# Patient Record
Sex: FEMALE | Race: BLACK OR AFRICAN AMERICAN | Employment: OTHER | URBAN - METROPOLITAN AREA
[De-identification: names, ages, dates, MRNs, and addresses within clinical notes are randomized per-mention and may not be internally consistent; named-entity substitution may affect disease eponyms.]

---

## 2017-11-17 ENCOUNTER — TELEPHONE (OUTPATIENT)
Dept: CARDIOLOGY CLINIC | Age: 42
End: 2017-11-17

## 2017-11-17 NOTE — TELEPHONE ENCOUNTER
Received call from Dr. Tomas Foster at [facility] clinic.   ([phone])    Dr. Tomas Foster would like the patient seen as a new patient for CHF by Dr. Lennox Pluck. He saw the patient on Thursday 11/16/17. Patient with weight 337.8 lbs. 15 weight gain over the past month. 3+ BLE. /104. Lasix increased to 60 mg BID by Dr. Frank fox and he will fax results over to our office. Last ECHO 2014-LVEF 55%    Spoke with Cindi BEAVER and patient placed on schedule 11/21/17 at 1215 as a new patient. LM for patient to call our office and Jaymie Ramirez did place patient on schedule. Informed Dr. Tomas Foster that patient was placed on the schedule and we did leave a message for the patient.

## 2017-11-21 ENCOUNTER — OFFICE VISIT (OUTPATIENT)
Dept: CARDIOLOGY CLINIC | Age: 42
End: 2017-11-21

## 2017-11-21 VITALS
HEART RATE: 90 BPM | SYSTOLIC BLOOD PRESSURE: 184 MMHG | BODY MASS INDEX: 50.02 KG/M2 | HEIGHT: 64 IN | DIASTOLIC BLOOD PRESSURE: 108 MMHG | WEIGHT: 293 LBS | OXYGEN SATURATION: 91 %

## 2017-11-21 DIAGNOSIS — I10 ESSENTIAL HYPERTENSION: ICD-10-CM

## 2017-11-21 DIAGNOSIS — E78.5 HYPERLIPIDEMIA LDL GOAL <70: ICD-10-CM

## 2017-11-21 DIAGNOSIS — I50.32 CHRONIC DIASTOLIC HEART FAILURE (HCC): Primary | ICD-10-CM

## 2017-11-21 PROCEDURE — 99205 OFFICE O/P NEW HI 60 MIN: CPT | Performed by: INTERNAL MEDICINE

## 2017-11-21 PROCEDURE — G8427 DOCREV CUR MEDS BY ELIG CLIN: HCPCS | Performed by: INTERNAL MEDICINE

## 2017-11-21 PROCEDURE — G8484 FLU IMMUNIZE NO ADMIN: HCPCS | Performed by: INTERNAL MEDICINE

## 2017-11-21 PROCEDURE — G8417 CALC BMI ABV UP PARAM F/U: HCPCS | Performed by: INTERNAL MEDICINE

## 2017-11-21 PROCEDURE — 4004F PT TOBACCO SCREEN RCVD TLK: CPT | Performed by: INTERNAL MEDICINE

## 2017-11-21 RX ORDER — VALSARTAN 320 MG/1
320 TABLET ORAL DAILY
Qty: 30 TABLET | Refills: 11 | Status: SHIPPED | OUTPATIENT
Start: 2017-11-21 | End: 2018-01-19 | Stop reason: ALTCHOICE

## 2017-11-21 RX ORDER — TORSEMIDE 100 MG/1
50 TABLET ORAL 2 TIMES DAILY
Qty: 90 TABLET | Refills: 11 | Status: SHIPPED | OUTPATIENT
Start: 2017-11-21 | End: 2018-06-07 | Stop reason: SDUPTHER

## 2017-11-21 RX ORDER — SPIRONOLACTONE 25 MG/1
25 TABLET ORAL DAILY
Qty: 30 TABLET | Refills: 11 | Status: SHIPPED | OUTPATIENT
Start: 2017-11-21 | End: 2018-06-07 | Stop reason: SDUPTHER

## 2017-11-21 NOTE — PROGRESS NOTES
[address]  Cardiology Consult Note      Mono Mike  1975, 38 y.o.    CC: Weight gain and Shortness of breath              CALVIN MARIA ELENA CL:    HPI:   This is a 38 y.o. female with a PMH significant for COPD, chronic diastolic heart failure, Hypertension, Hyperlipidemia and Type II Diabetes Mellitus. Patient is here to establish care and be evaluated for worsening shortness of breath and weight gain. She says she has gained ~ 30 pounds in the last month. She offers that her PCP increased her Lasix dose and she doesn't notice any change. She says she drinks \"a lot of water. \" Reports compliance with medication. Today, denies any chest pain, palpitations or dizziness. Past Medical History:   Diagnosis Date    Arthritis     Asthma     [facility]     [facility]     Diabetes mellitus [facility]     Hyperlipidemia     Hypertension     Obesity       Past Surgical History:   Procedure Laterality Date    CHOLECYSTECTOMY  1997    HYSTERECTOMY  2008    SKIN BIOPSY  2015    TUBAL LIGATION        History reviewed. No pertinent family history.    Social History   Substance Use Topics    Smoking status: Current Every Day Smoker     Packs/day: 1.00     Years: 20.00     Types: Cigarettes    Smokeless tobacco: Never Used    Alcohol use Yes      Comment: occ     No Known Allergies      Review of Systems -   Constitutional: Negative for weight gain/loss; malaise, fever  Respiratory: Negative for Asthma;  cough and hemoptysis  Cardiovascular: Negative for palpitations,dizziness   Gastrointestinal: Negative for abd.pain; constipation/diarrhea;    Genitourinary: Negative for stones; hematuria; frequency hesitancy  Integumentt: Negative for rash or pruritis  Hematologic/lymphatic: Negative for blood dyscrasia; leukemia/lymphoma  Musculoskeletal: Negative for Connective tissue disease  Neurological:  Negative for Seizure losartan-hydrochlorothiazide (HYZAAR) 100-25 MG per tablet Take 1 tablet by mouth daily.  metoprolol (LOPRESSOR) 25 MG tablet   Take 50 mg by mouth 2 times daily       amLODIPine (NORVASC) 10 MG tablet Take 10 mg by mouth daily.  metFORMIN (GLUCOPHAGE) 1000 MG tablet Take 1,000 mg by mouth 2 times daily (with meals).  aspirin 81 MG tablet Take 81 mg by mouth daily.  furosemide (LASIX) 40 MG tablet Take 40 mg by mouth 2 times daily. Labs:   Lab Results   Component Value Date    HDL 55 10/02/2014    LDLCALC 97 10/02/2014    TRIG 101 10/02/2014       EKG:     ECHO: 10/3/2014  Summary  This is a suboptimal study due to poor echocardiographic window  Normal left ventricle size, wall thickness and systolic function with an estimated ejection fraction of 55%. No significant valvular disease       ASSESSMENT AND PLAN:      SOB/LAIRD  Likely due to weight; gained 30lbs in the last 1 month  May have diastolic heart failure is well  Lives a sedentary lifestyle    LE Edema   Sleeps sitting up/sleeps sitting up  Discontinue Amlodipine; Also asked her to not take any ibuprofen .  this causes fluid retention  Lasix recently increased to 60 mg daily; no improvement  Will switch to Torsemide 50 mg bid; renal panel in 1 week  Elevate feet when sitting  Encouraged daily walking program    Chronic Diastolic Heart Failure  Have educated her about fluid and salt intake; avoidance of NSAIDs; keeping her feet up and sleeping in bed  Lasix not effective; will switch to Torsemide 50 mg bid  Have advised her to lay down after diuretic for better results  Repeat ECHO to evaluate LVEF    Severe hypertension  Systolic blood pressure is 184  We'll change Hyzaar to valsartan 320 and add Aldactone  Higher dose of torsemide twice a day and discontinuation of ibuprofen use may also help  Amlodipine is being discontinued for lower extremity edema      Follow up in 2-3 weeks with myself or Annie Oquendo       Thank you very much for allowing me to participate in the care of your patient. Please do not hesitate to contact me if you have any questions.         Sincerely,    Liat Cordoba M.D  [facility], [address] [place] [address] [facility] [facility]  Ph: [phone]  Fax: [phone]

## 2017-11-21 NOTE — PATIENT INSTRUCTIONS
yourself. Call your doctor now or seek immediate medical care if:  · Your shortness of breath gets worse or you start to wheeze. Wheezing is a high-pitched sound when you breathe. · You wake up at night out of breath or have to prop your head up on several pillows to breathe. · You are short of breath after only light activity or while at rest.  Watch closely for changes in your health, and be sure to contact your doctor if:  · You do not get better over the next 1 to 2 days. Where can you learn more? Go to https://chpepiceweb.Upland Software. org and sign in to your Stamped account. Enter S780 in the Tuizzi box to learn more about \"Shortness of Breath: Care Instructions. \"     If you do not have an account, please click on the \"Sign Up Now\" link. Current as of: March 25, 2017  Content Version: 11.3  © 9194-7137 [employer]. Care instructions adapted under license by [facility] ([facility]). If you have questions about a medical condition or this instruction, always ask your healthcare professional. [place] [address] any warranty or liability for your use of this information. Patient Education        Leg and Ankle Edema: Care Instructions  Your Care Instructions  Swelling in the legs, ankles, and feet is called edema. It is common after you sit or stand for a while. Long plane flights or car rides often cause swelling in the legs and feet. You may also have swelling if you have to stand for long periods of time at your job. Problems with the veins in the legs (varicose veins) and changes in hormones can also cause swelling. Sometimes the swelling in the ankles and feet is caused by a more serious problem, such as heart failure, infection, blood clots, or liver or kidney disease. Follow-up care is a key part of your treatment and safety. Be sure to make and go to all appointments, and call your doctor if you are having problems.  Its also a good idea to know your test 11.3  © 9915-7826 Healthwise, Incorporated. Care instructions adapted under license by [facility] If you have questions about a medical condition or this instruction, always ask your healthcare professional. [address] any warranty or liability for your use of this information. Patient Education        Low Sodium Diet (2,000 Milligram): Care Instructions  Your Care Instructions  Too much sodium causes your body to hold on to extra water. This can raise your blood pressure and force your heart and kidneys to work harder. In very serious cases, this could cause you to be put in the hospital. It might even be life-threatening. By limiting sodium, you will feel better and lower your risk of serious problems. The most common source of sodium is salt. People get most of the salt in their diet from canned, prepared, and packaged foods. Fast food and restaurant meals also are very high in sodium. Your doctor will probably limit your sodium to less than 2,000 milligrams (mg) a day. This limit counts all the sodium in prepared and packaged foods and any salt you add to your food. Follow-up care is a key part of your treatment and safety. Be sure to make and go to all appointments, and call your doctor if you are having problems. It's also a good idea to know your test results and keep a list of the medicines you take. How can you care for yourself at home? Read food labels  · Read labels on cans and food packages. The labels tell you how much sodium is in each serving. Make sure that you look at the serving size. If you eat more than the serving size, you have eaten more sodium. · Food labels also tell you the Percent Daily Value for sodium. Choose products with low Percent Daily Values for sodium. · Be aware that sodium can come in forms other than salt, including monosodium glutamate (MSG), sodium citrate, and sodium bicarbonate (baking soda). MSG is often added to Asian food.  When you eat out, you can sometimes ask for food without MSG or added salt. Buy low-sodium foods  · Buy foods that are labeled \"unsalted\" (no salt added), \"sodium-free\" (less than 5 mg of sodium per serving), or \"low-sodium\" (less than 140 mg of sodium per serving). Foods labeled \"reduced-sodium\" and \"light sodium\" may still have too much sodium. Be sure to read the label to see how much sodium you are getting. · Buy fresh vegetables, or frozen vegetables without added sauces. Buy low-sodium versions of canned vegetables, soups, and other canned goods. Prepare low-sodium meals  · Cut back on the amount of salt you use in cooking. This will help you adjust to the taste. Do not add salt after cooking. One teaspoon of salt has about 2,300 mg of sodium. · Take the salt shaker off the table. · Flavor your food with garlic, lemon juice, onion, vinegar, herbs, and spices. Do not use soy sauce, lite soy sauce, steak sauce, onion salt, garlic salt, celery salt, mustard, or ketchup on your food. · Use low-sodium salad dressings, sauces, and ketchup. Or make your own salad dressings and sauces without adding salt. · Use less salt (or none) when recipes call for it. You can often use half the salt a recipe calls for without losing flavor. Other foods such as rice, pasta, and grains do not need added salt. · Rinse canned vegetables, and cook them in fresh water. This removes somebut not allof the salt. · Avoid water that is naturally high in sodium or that has been treated with water softeners, which add sodium. Call your local water company to find out the sodium content of your water supply. If you buy bottled water, read the label and choose a sodium-free brand. Avoid high-sodium foods  · Avoid eating:  ¨ Smoked, cured, salted, and canned meat, fish, and poultry. ¨ Ham, linda, hot dogs, and luncheon meats. ¨ Regular, hard, and processed cheese and regular peanut butter.   ¨ Crackers with salted tops, and other salted snack foods such as pretzels, chips, and salted popcorn. ¨ Frozen prepared meals, unless labeled low-sodium. ¨ Canned and dried soups, broths, and bouillon, unless labeled sodium-free or low-sodium. ¨ Canned vegetables, unless labeled sodium-free or low-sodium. ¨ Western Natalia fries, pizza, tacos, and other fast foods. ¨ Pickles, olives, ketchup, and other condiments, especially soy sauce, unless labeled sodium-free or low-sodium. Where can you learn more? Go to https://TechulonpeOrad Hi-Tech Systems.nokisaki.com. org and sign in to your WePow account. Enter V715 in the 2d2c box to learn more about \"Low Sodium Diet (2,000 Milligram): Care Instructions. \"     If you do not have an account, please click on the \"Sign Up Now\" link. Current as of: July 26, 2016  Content Version: 11.3  © 9490-8237 [employer] Efficient. Care instructions adapted under license by [facility] ([facility]). If you have questions about a medical condition or this instruction, always ask your healthcare professional. [place] [address] any warranty or liability for your use of this information. Patient Education        Limiting Sodium and Fluids With Heart Failure: Care Instructions  Your Care Instructions  Sodium causes your body to hold on to extra water. This may cause your heart failure symptoms to get worse. Limiting sodium may help you feel better and lower your risk of having to go to the hospital.  People get most of their sodium from processed foods. Fast food and restaurant meals also tend to be very high in sodium. Your doctor may suggest that you limit sodium to 2,000 milligrams (mg) a day or less. That is less than 1 teaspoon of salt a day, including all the salt you eat in cooked or packaged foods. Usually, you have to limit the amount of liquids you drink only if your heart failure is severe. Limiting sodium alone often is enough to help your body get rid of extra fluids.  However, your doctor may tell you to limit your fluid intake to a set amount each day. Follow-up care is a key part of your treatment and safety. Be sure to make and go to all appointments, and call your doctor if you are having problems. It's also a good idea to know your test results and keep a list of the medicines you take. How can you care for yourself at home? Read food labels  · Read food labels on cans and food packages. The labels tell you how much sodium is in each serving. Make sure that you look at the serving size. If you eat more than the serving size, you have eaten more sodium than is listed for one serving. · Food labels also tell you the Percent Daily Value. If the Percent Daily Value says 50%, it means that you will get at least 50% of all the sodium you need for the entire day in one serving. Choose products with low Percent Daily Values for sodium. · Be aware that sodium can come in forms other than salt, including monosodium glutamate (MSG), sodium citrate, and sodium bicarbonate (baking soda). MSG is often added to Asian food. You can sometimes ask for food without MSG or salt. Buy low-sodium foods  · Buy foods that are labeled \"unsalted\" (no salt added), \"sodium-free\" (less than 5 mg of sodium per serving), or \"low-sodium\" (less than 140 mg of sodium per serving). A food labeled \"light sodium\" has less than half of the full-sodium version of that food. Foods labeled \"reduced-sodium\" may still have too much sodium. · Buy fresh vegetables or plain, frozen vegetables. Buy low-sodium versions of canned vegetables, soups, and other canned goods. Prepare low-sodium meals  · Use less salt each day when cooking. Reducing salt in this way will help you adjust to the taste. Do not add salt after cooking. Take the salt shaker off the table. · Flavor your food with garlic, lemon juice, onion, vinegar, herbs, and spices instead of salt.  Do not use soy sauce, steak sauce, onion salt, garlic salt, mustard, or ketchup on your

## 2017-12-05 ENCOUNTER — HOSPITAL ENCOUNTER (OUTPATIENT)
Dept: NON INVASIVE DIAGNOSTICS | Age: 42
Discharge: HOME | End: 2017-12-05
Attending: INTERNAL MEDICINE | Admitting: INTERNAL MEDICINE

## 2017-12-05 DIAGNOSIS — I50.32 CHRONIC DIASTOLIC HEART FAILURE (HCC): ICD-10-CM

## 2017-12-05 LAB
ALBUMIN SERPL-MCNC: 4.4 G/DL (ref 3.4–5)
ANION GAP SERPL CALCULATED.3IONS-SCNC: 10 MMOL/L (ref 3–16)
BUN BLDV-MCNC: 10 MG/DL (ref 7–20)
CALCIUM SERPL-MCNC: 9.1 MG/DL (ref 8.3–10.6)
CHLORIDE BLD-SCNC: 96 MMOL/L (ref 99–110)
CO2: 35 MMOL/L (ref 21–32)
CREAT SERPL-MCNC: 0.8 MG/DL (ref 0.6–1.1)
GFR AFRICAN AMERICAN: >60
GFR NON-AFRICAN AMERICAN: >60
GLUCOSE BLD-MCNC: 67 MG/DL (ref 70–99)
LV EF: 60 %
LVEF MODALITY: NORMAL
PHOSPHORUS: 4.5 MG/DL (ref 2.5–4.9)
POTASSIUM SERPL-SCNC: 4.3 MMOL/L (ref 3.5–5.1)
SODIUM BLD-SCNC: 141 MMOL/L (ref 136–145)

## 2017-12-12 ENCOUNTER — OFFICE VISIT (OUTPATIENT)
Dept: CARDIOLOGY CLINIC | Age: 42
End: 2017-12-12

## 2017-12-12 VITALS
HEIGHT: 64 IN | HEART RATE: 71 BPM | DIASTOLIC BLOOD PRESSURE: 82 MMHG | SYSTOLIC BLOOD PRESSURE: 122 MMHG | BODY MASS INDEX: 50.02 KG/M2 | OXYGEN SATURATION: 97 % | WEIGHT: 293 LBS

## 2017-12-12 DIAGNOSIS — I50.32 CHRONIC DIASTOLIC HEART FAILURE (HCC): Primary | ICD-10-CM

## 2017-12-12 DIAGNOSIS — I10 ESSENTIAL HYPERTENSION: ICD-10-CM

## 2017-12-12 PROCEDURE — 4004F PT TOBACCO SCREEN RCVD TLK: CPT | Performed by: INTERNAL MEDICINE

## 2017-12-12 PROCEDURE — G8427 DOCREV CUR MEDS BY ELIG CLIN: HCPCS | Performed by: INTERNAL MEDICINE

## 2017-12-12 PROCEDURE — G8417 CALC BMI ABV UP PARAM F/U: HCPCS | Performed by: INTERNAL MEDICINE

## 2017-12-12 PROCEDURE — 93000 ELECTROCARDIOGRAM COMPLETE: CPT | Performed by: INTERNAL MEDICINE

## 2017-12-12 PROCEDURE — 99214 OFFICE O/P EST MOD 30 MIN: CPT | Performed by: INTERNAL MEDICINE

## 2017-12-12 PROCEDURE — G8484 FLU IMMUNIZE NO ADMIN: HCPCS | Performed by: INTERNAL MEDICINE

## 2017-12-12 NOTE — PATIENT INSTRUCTIONS
Patient Education        High Blood Pressure: Care Instructions  Your Care Instructions    If your blood pressure is usually above 140/90, you have high blood pressure, or hypertension. That means the top number is 140 or higher or the bottom number is 90 or higher, or both. Despite what a lot of people think, high blood pressure usually doesn't cause headaches or make you feel dizzy or lightheaded. It usually has no symptoms. But it does increase your risk for heart attack, stroke, and kidney or eye damage. The higher your blood pressure, the more your risk increases. Your doctor will give you a goal for your blood pressure. Your goal will be based on your health and your age. An example of a goal is to keep your blood pressure below 140/90. Lifestyle changes, such as eating healthy and being active, are always important to help lower blood pressure. You might also take medicine to reach your blood pressure goal.  Follow-up care is a key part of your treatment and safety. Be sure to make and go to all appointments, and call your doctor if you are having problems. It's also a good idea to know your test results and keep a list of the medicines you take. How can you care for yourself at home? Medical treatment  · If you stop taking your medicine, your blood pressure will go back up. You may take one or more types of medicine to lower your blood pressure. Be safe with medicines. Take your medicine exactly as prescribed. Call your doctor if you think you are having a problem with your medicine. · Talk to your doctor before you start taking aspirin every day. Aspirin can help certain people lower their risk of a heart attack or stroke. But taking aspirin isn't right for everyone, because it can cause serious bleeding. · See your doctor regularly. You may need to see the doctor more often at first or until your blood pressure comes down.   · If you are taking blood pressure medicine, talk to your doctor before information.

## 2017-12-12 NOTE — PROGRESS NOTES
spacer 1 kit 0    montelukast (SINGULAIR) 10 MG tablet Take 10 mg by mouth nightly.  Cholecalciferol (VITAMIN D3) 94475 UNITS CAPS Take 10,000 Units by mouth three times a week. Takes on Mon Wed Fri      cloNIDine (CATAPRES) 0.3 MG tablet Take 0.3 mg by mouth 2 times daily.  metoprolol (LOPRESSOR) 25 MG tablet   Take 50 mg by mouth 2 times daily       metFORMIN (GLUCOPHAGE) 1000 MG tablet Take 1,000 mg by mouth 2 times daily (with meals).  aspirin 81 MG tablet Take 81 mg by mouth daily. Labs:   Lab Results   Component Value Date    HDL 55 10/02/2014    LDLCALC 97 10/02/2014    TRIG 101 10/02/2014       EKG:     ECHO:12/5/2017   Concentric LVH with normal systolic function. EF is  60%   Left atrium is of normal size.   Right ventricular systolic function is normal .    ASSESSMENT AND PLAN:      SOB/LAIRD  Improved; continue Torsemide     LE Edema   Improved since stopping Amlodipine  Continue low sodium diet and Torsemide    Chronic Diastolic Heart Failure  Most recent ECHO shows EF of 60%  Compensated on clinical exam  Continue Torsemide and Aldactone    Severe hypertension  BP is well controlled  Continue current medication regimen  Low fat/low sodium diet      Follow up in 6 months. Thank you very much for allowing me to participate in the care of your patient. Please do not hesitate to contact me if you have any questions.         Sincerely,    Collins Thakkar M.D  [facility], [address] [place] [address]  Ph: [phone]  Fax: [phone]

## 2018-03-09 ENCOUNTER — OFFICE VISIT (OUTPATIENT)
Dept: ORTHOPEDIC SURGERY | Age: 43
End: 2018-03-09

## 2018-03-09 VITALS — RESPIRATION RATE: 19 BRPM | WEIGHT: 293 LBS | BODY MASS INDEX: 51.91 KG/M2 | HEIGHT: 63 IN

## 2018-03-09 DIAGNOSIS — M17.0 BILATERAL PRIMARY OSTEOARTHRITIS OF KNEE: Primary | ICD-10-CM

## 2018-03-09 PROCEDURE — G8484 FLU IMMUNIZE NO ADMIN: HCPCS | Performed by: ORTHOPAEDIC SURGERY

## 2018-03-09 PROCEDURE — G8427 DOCREV CUR MEDS BY ELIG CLIN: HCPCS | Performed by: ORTHOPAEDIC SURGERY

## 2018-03-09 PROCEDURE — 99213 OFFICE O/P EST LOW 20 MIN: CPT | Performed by: ORTHOPAEDIC SURGERY

## 2018-03-09 PROCEDURE — 4004F PT TOBACCO SCREEN RCVD TLK: CPT | Performed by: ORTHOPAEDIC SURGERY

## 2018-03-09 PROCEDURE — G8417 CALC BMI ABV UP PARAM F/U: HCPCS | Performed by: ORTHOPAEDIC SURGERY

## 2018-03-09 NOTE — PROGRESS NOTES
Amara Mckeon  [id number]  March 9, 2018    Chief Complaint   Patient presents with    Pain     rohan knee pain       History: The patient is here in follow-up regarding both knees. She completed bilateral Supartz injections on 8/25/16. She tells me these injections provided relief of her knee pain for at least a year. She has continued taking approximately 4 Vicodin per day via her chronic pain management provider. The patient still has a BMI of 58. She has ultimately lost over 100 pounds over the last few years but has had some setbacks recently. She reports her knee pain continues to be worse with all weightbearing activities. The patient's  past medical history, medications, allergies,  family history, social history, and review of systems have been reviewed, and dated and are recorded in the chart. Vitals:  Resp 19   Ht 5' 2.99\" (1.6 m)   Wt (!) 330 lb 0.5 oz (149.7 kg)   BMI 58.48 kg/m²     Physical: Ms. Amara Mckeon appears well, she is in no apparent distress, she demonstrates appropriate mood & affect. She is alert and oriented to person, place and time. Examination of the bilateral lower extremity reveals no pain with range of motion of the hip. She has generalized tenderness to palpation about the joint line of the bilateral knees. Range of motion is from 0 degrees to 110 degrees bilaterally. Strength is 4+ to 5/5 for all muscle groups about the bilateral knee. There is patellofemoral crepitus with range of motion of the bilateral knee. Varus and valgus stressing of the knees reveals no evidence of instability. There is a small effusion in the bilateral knees. Anterior drawer and Lachman are negative bilaterally. Examination of the skin reveals no rashes, ulceration, or lesion, bilaterally in the lower extremities. Sensation to both lower extremities is grossly intact. Exam of both feet reveals pedal pulses intact and brisk cap refill.  Patient is able to dorsiflex and wiggle all toes. Deep tendon reflexes of the lower extremities are normal and symmetric. X-rays: 4 views of the bilateral knees were obtained in 2016 were again reviewed today. There is moderate osteoarthritis of her bilateral knees. The changes are most pronounced within her medial and patellofemoral compartments. Her left knee is slightly more arthritic than her right knee. in the office today were reviewed extensively. Impression: #1 bilateral knee osteoarthritis    Plan:  She would like to continue with Visco supplementation given her improvement with this in the past.  She will be scheduled for Euflexxa injections and will be called to schedule upon approval.  She will continue working on weight loss.

## 2018-03-16 ENCOUNTER — TELEPHONE (OUTPATIENT)
Dept: ORTHOPEDIC SURGERY | Age: 43
End: 2018-03-16

## 2018-03-16 NOTE — TELEPHONE ENCOUNTER
I spoke with the patient and informed her that I do not see an authorization for the injections. She was informed that Brittany Guidry will look into this on Monday when she is back in the office and call her.

## 2018-03-22 ENCOUNTER — OFFICE VISIT (OUTPATIENT)
Dept: ORTHOPEDIC SURGERY | Age: 43
End: 2018-03-22

## 2018-03-22 VITALS — HEIGHT: 62 IN | BODY MASS INDEX: 53.92 KG/M2 | WEIGHT: 293 LBS

## 2018-03-22 DIAGNOSIS — M17.12 PRIMARY OSTEOARTHRITIS OF LEFT KNEE: ICD-10-CM

## 2018-03-22 DIAGNOSIS — M17.11 PRIMARY OSTEOARTHRITIS OF RIGHT KNEE: Primary | ICD-10-CM

## 2018-03-22 PROCEDURE — 20610 DRAIN/INJ JOINT/BURSA W/O US: CPT | Performed by: ORTHOPAEDIC SURGERY

## 2018-03-29 ENCOUNTER — OFFICE VISIT (OUTPATIENT)
Dept: ORTHOPEDIC SURGERY | Age: 43
End: 2018-03-29

## 2018-03-29 VITALS — BODY MASS INDEX: 50.02 KG/M2 | HEIGHT: 64 IN | WEIGHT: 293 LBS

## 2018-03-29 DIAGNOSIS — M17.11 PRIMARY OSTEOARTHRITIS OF RIGHT KNEE: ICD-10-CM

## 2018-03-29 DIAGNOSIS — M17.12 PRIMARY OSTEOARTHRITIS OF LEFT KNEE: Primary | ICD-10-CM

## 2018-03-29 PROCEDURE — 20610 DRAIN/INJ JOINT/BURSA W/O US: CPT | Performed by: ORTHOPAEDIC SURGERY

## 2018-04-05 ENCOUNTER — OFFICE VISIT (OUTPATIENT)
Dept: ORTHOPEDIC SURGERY | Age: 43
End: 2018-04-05

## 2018-04-05 VITALS — HEIGHT: 63 IN | WEIGHT: 293 LBS | BODY MASS INDEX: 51.91 KG/M2

## 2018-04-05 DIAGNOSIS — M17.11 OSTEOARTHRITIS OF RIGHT KNEE, UNSPECIFIED OSTEOARTHRITIS TYPE: Primary | ICD-10-CM

## 2018-04-05 DIAGNOSIS — M17.12 OSTEOARTHRITIS OF LEFT KNEE, UNSPECIFIED OSTEOARTHRITIS TYPE: ICD-10-CM

## 2018-04-05 PROCEDURE — 20610 DRAIN/INJ JOINT/BURSA W/O US: CPT | Performed by: ORTHOPAEDIC SURGERY

## 2018-05-18 ENCOUNTER — OFFICE VISIT (OUTPATIENT)
Dept: ORTHOPEDIC SURGERY | Age: 43
End: 2018-05-18

## 2018-05-18 VITALS
SYSTOLIC BLOOD PRESSURE: 193 MMHG | HEART RATE: 81 BPM | WEIGHT: 293 LBS | DIASTOLIC BLOOD PRESSURE: 110 MMHG | HEIGHT: 63 IN | BODY MASS INDEX: 51.91 KG/M2

## 2018-05-18 DIAGNOSIS — M25.562 PAIN IN BOTH KNEES, UNSPECIFIED CHRONICITY: Primary | ICD-10-CM

## 2018-05-18 DIAGNOSIS — M17.2 BILATERAL POST-TRAUMATIC OSTEOARTHRITIS OF KNEE: ICD-10-CM

## 2018-05-18 DIAGNOSIS — M25.561 PAIN IN BOTH KNEES, UNSPECIFIED CHRONICITY: Primary | ICD-10-CM

## 2018-05-18 PROCEDURE — G8417 CALC BMI ABV UP PARAM F/U: HCPCS | Performed by: NURSE PRACTITIONER

## 2018-05-18 PROCEDURE — 99213 OFFICE O/P EST LOW 20 MIN: CPT | Performed by: NURSE PRACTITIONER

## 2018-05-18 PROCEDURE — 4004F PT TOBACCO SCREEN RCVD TLK: CPT | Performed by: NURSE PRACTITIONER

## 2018-05-18 PROCEDURE — G8427 DOCREV CUR MEDS BY ELIG CLIN: HCPCS | Performed by: NURSE PRACTITIONER

## 2018-06-07 ENCOUNTER — OFFICE VISIT (OUTPATIENT)
Dept: CARDIOLOGY CLINIC | Age: 43
End: 2018-06-07

## 2018-06-07 VITALS
HEIGHT: 64 IN | BODY MASS INDEX: 50.02 KG/M2 | WEIGHT: 293 LBS | DIASTOLIC BLOOD PRESSURE: 110 MMHG | SYSTOLIC BLOOD PRESSURE: 180 MMHG | HEART RATE: 88 BPM | OXYGEN SATURATION: 97 %

## 2018-06-07 DIAGNOSIS — R06.09 DOE (DYSPNEA ON EXERTION): ICD-10-CM

## 2018-06-07 DIAGNOSIS — I10 SEVERE HYPERTENSION: Primary | ICD-10-CM

## 2018-06-07 DIAGNOSIS — I50.32 CHRONIC DIASTOLIC CHF (CONGESTIVE HEART FAILURE) (HCC): ICD-10-CM

## 2018-06-07 DIAGNOSIS — R60.0 BILATERAL LEG EDEMA: ICD-10-CM

## 2018-06-07 PROCEDURE — 93000 ELECTROCARDIOGRAM COMPLETE: CPT | Performed by: INTERNAL MEDICINE

## 2018-06-07 PROCEDURE — 99214 OFFICE O/P EST MOD 30 MIN: CPT | Performed by: INTERNAL MEDICINE

## 2018-06-07 PROCEDURE — G8417 CALC BMI ABV UP PARAM F/U: HCPCS | Performed by: INTERNAL MEDICINE

## 2018-06-07 PROCEDURE — 4004F PT TOBACCO SCREEN RCVD TLK: CPT | Performed by: INTERNAL MEDICINE

## 2018-06-07 PROCEDURE — G8427 DOCREV CUR MEDS BY ELIG CLIN: HCPCS | Performed by: INTERNAL MEDICINE

## 2018-06-07 RX ORDER — SPIRONOLACTONE 25 MG/1
25 TABLET ORAL
Qty: 30 TABLET | Refills: 6 | Status: SHIPPED | OUTPATIENT
Start: 2018-06-07 | End: 2018-10-18 | Stop reason: SDUPTHER

## 2018-06-07 RX ORDER — TORSEMIDE 100 MG/1
50 TABLET ORAL EVERY MORNING
Qty: 30 TABLET | Refills: 6 | Status: SHIPPED | OUTPATIENT
Start: 2018-06-07 | End: 2018-10-18 | Stop reason: SDUPTHER

## 2018-09-13 NOTE — PROGRESS NOTES
Skin: Negative for rash  Neurological: Negative for syncope  Hematological: Negative for adenopathy  Psychiatric/Behavorial: Negative for anxiety    Objective:   PHYSICAL EXAM:  Blood pressure (!) 155/98, pulse 101, temperature 98.1 °F (36.7 °C), temperature source Oral, resp. rate 20, height 5' 3\" (1.6 m), weight (!) 355 lb (161 kg), SpO2 98 %, not currently breastfeeding.'  Gen: No distress. Obese Body mass index is 62.89 kg/m². Neck: Trachea midline. No obvious mass. Resp: No accessory muscle use. No crackles. No wheezes. No rhonchi. CV: Regular rate. Regular rhythm. No murmur or rub. No edema. Skin: Warm, dry, normal texture and turgor. No nodule on exposed extremities. Lymph: No cervical LAD. No supraclavicular LAD. M/S: No cyanosis. No clubbing. No joint deformity. Neuro: Moves all four extremities. Psych: Oriented x 3. No anxiety. Awake. Alert. Intact judgement and insight. Current Outpatient Prescriptions   Medication Sig Dispense Refill    mometasone-formoterol (DULERA) 200-5 MCG/ACT inhaler Inhale 2 puffs into the lungs every 12 hours      loratadine (CLARITIN) 10 MG tablet Take 10 mg by mouth daily      montelukast (SINGULAIR) 10 MG tablet Take 1 tablet by mouth nightly 30 tablet 3    albuterol-ipratropium (COMBIVENT RESPIMAT)  MCG/ACT AERS inhaler Inhale 1 puff into the lungs every 6 hours 1 Inhaler 1    omeprazole (PRILOSEC) 20 MG delayed release capsule Take 20 mg by mouth daily      acetaminophen-codeine (TYLENOL/CODEINE #3) 300-30 MG per tablet Take 1 tablet by mouth every 8 hours as needed for Pain. Mary [place]es aspirin 81 MG EC tablet Take 81 mg by mouth daily      spironolactone (ALDACTONE) 25 MG tablet Take 1 tablet by mouth Daily with lunch (Patient taking differently: Take 25 mg by mouth Daily with lunch ) 30 tablet 6    torsemide (DEMADEX) 100 MG tablet Take 0.5 tablets by mouth every morning 30 tablet 6    metoprolol tartrate (LOPRESSOR) 25 MG tablet angle.  The remainder of the lungs are clear. There is no  pleural effusion. Upper Abdomen: The visualized portions of the solid abdominal organs are  unremarkable. Soft Tissues/Bones: There is no fracture or aggressive osseous lesion. Impression Mild bibasilar segmental atelectasis versus pneumonia. CTPA: No results found for this or any previous visit. CXR PA/LAT:   Results for orders placed during the hospital encounter of 08/27/18   XR CHEST STANDARD (2 VW)    Narrative EXAMINATION:  TWO VIEWS OF THE CHEST    8/26/2018 11:48 pm    COMPARISON:  10/03/2017 and CT chest 10/06/2015    HISTORY:  ORDERING PHYSICIAN PROVIDED HISTORY: copd vs chf  TECHNOLOGIST PROVIDED HISTORY:  Technologist Provided Reason for Exam: COPD vs CHF? Acuity: Acute  Type of Encounter: Initial  Relevant Medical/Surgical History: Current Every Day Smoker, 0.5 ppd, 10  pack-years. Hx of COPD, CHF, asthma, bronchitis, hypertension, diabetes, and  obesity. FINDINGS:  Heart size and configuration are normal.  There is a left lower  lobe/retrocardiac 3.3 cm pneumatocele or bulla containing a small air-fluid  level. The lungs are otherwise clear. No pneumothorax or pleural effusion. No acute bone finding. Impression Unchanged left lower lobe/retrocardiac 3.3 cm pneumatocele or bulla. Small  air-fluid level suggests infection. CXR portable:   Results for orders placed during the hospital encounter of 10/02/14   XR Chest Portable    Narrative INDICATIONS:  check picc placement     COMPARISON: 2004 10      views : 1     FINDINGS: A  right upper extremity PICC line has been placed  terminating in the region of the superior vena cava. The heart and  lungs are stable. A cavitary lesion in the left lung base is  unchanged. .        Impression IMPRESSION: Right upper extremity PICC line placement terminating  in the region of the superior vena cava. Assessment:     ·  JOSE  · obesity  · COPD, mild, FEV1 76%. Decreased DLCO. Plan:      Problem List Items Addressed This Visit     JOSE treated with BiPAP     She was set to 13/7. Since she has not used for some time with weight gain since then, it was changed to [address] [facility] 6, Max 16 PS 4-8. Will assess with next visit. I strongly advised her to clean her bipap and will order new equipment. Morbid obesity with BMI of 60.0-69.9, adult ([facility] [address]     We discuss relationship between weight and sleep apnea. Weight loss encouraged. COPD, mild ([address]     symptoms controlled iwht albuterol / combivent prn and Dulera daily. Education give on albuterol and combivent. Only using albuterol for emergency when out and about since she leaves in her car. Relevant Medications    mometasone-formoterol (DULERA) 200-5 MCG/ACT inhaler    loratadine (CLARITIN) 10 MG tablet                  This note was transcribed using [id number] [facility]. Please disregard any translational errors.     [facility] [phone]

## 2018-09-14 ENCOUNTER — OFFICE VISIT (OUTPATIENT)
Dept: PULMONOLOGY | Age: 43
End: 2018-09-14

## 2018-09-14 VITALS
SYSTOLIC BLOOD PRESSURE: 155 MMHG | RESPIRATION RATE: 20 BRPM | HEART RATE: 101 BPM | OXYGEN SATURATION: 98 % | DIASTOLIC BLOOD PRESSURE: 98 MMHG | BODY MASS INDEX: 51.91 KG/M2 | TEMPERATURE: 98.1 F | WEIGHT: 293 LBS | HEIGHT: 63 IN

## 2018-09-14 DIAGNOSIS — G47.33 OSA TREATED WITH BIPAP: ICD-10-CM

## 2018-09-14 DIAGNOSIS — E66.01 MORBID OBESITY WITH BMI OF 60.0-69.9, ADULT (HCC): ICD-10-CM

## 2018-09-14 DIAGNOSIS — J44.9 COPD, MILD (HCC): ICD-10-CM

## 2018-09-14 PROCEDURE — 3023F SPIROM DOC REV: CPT | Performed by: INTERNAL MEDICINE

## 2018-09-14 PROCEDURE — 99214 OFFICE O/P EST MOD 30 MIN: CPT | Performed by: INTERNAL MEDICINE

## 2018-09-14 PROCEDURE — 1111F DSCHRG MED/CURRENT MED MERGE: CPT | Performed by: INTERNAL MEDICINE

## 2018-09-14 PROCEDURE — G8926 SPIRO NO PERF OR DOC: HCPCS | Performed by: INTERNAL MEDICINE

## 2018-09-14 PROCEDURE — 1036F TOBACCO NON-USER: CPT | Performed by: INTERNAL MEDICINE

## 2018-09-14 PROCEDURE — G8427 DOCREV CUR MEDS BY ELIG CLIN: HCPCS | Performed by: INTERNAL MEDICINE

## 2018-09-14 PROCEDURE — G8417 CALC BMI ABV UP PARAM F/U: HCPCS | Performed by: INTERNAL MEDICINE

## 2018-09-14 RX ORDER — LORATADINE 10 MG/1
10 TABLET ORAL DAILY
COMMUNITY
End: 2021-04-08 | Stop reason: SDUPTHER

## 2018-09-14 ASSESSMENT — SLEEP AND FATIGUE QUESTIONNAIRES
HOW LIKELY ARE YOU TO NOD OFF OR FALL ASLEEP WHILE SITTING AND TALKING TO SOMEONE: 0
HOW LIKELY ARE YOU TO NOD OFF OR FALL ASLEEP WHILE SITTING INACTIVE IN A PUBLIC PLACE: 0
HOW LIKELY ARE YOU TO NOD OFF OR FALL ASLEEP WHEN YOU ARE A PASSENGER IN A CAR FOR AN HOUR WITHOUT A BREAK: 0
ESS TOTAL SCORE: 4
HOW LIKELY ARE YOU TO NOD OFF OR FALL ASLEEP WHILE WATCHING TV: 1
HOW LIKELY ARE YOU TO NOD OFF OR FALL ASLEEP IN A CAR, WHILE STOPPED FOR A FEW MINUTES IN TRAFFIC: 0
HOW LIKELY ARE YOU TO NOD OFF OR FALL ASLEEP WHILE SITTING AND READING: 0
HOW LIKELY ARE YOU TO NOD OFF OR FALL ASLEEP WHILE LYING DOWN TO REST IN THE AFTERNOON WHEN CIRCUMSTANCES PERMIT: 3
HOW LIKELY ARE YOU TO NOD OFF OR FALL ASLEEP WHILE SITTING QUIETLY AFTER LUNCH WITHOUT ALCOHOL: 0

## 2018-09-14 NOTE — LETTER
[facility] Pulmonology  [address] [facility]  Phone: [phone]  Fax: [phone]     9/14/2018    Dr. Javier Nathan, APRN - CNP    I have seen your patient, Christiano Velez on follow up. Here is the assessment and plan for your patient. Any question, please do not hesitate to call. Problem List Items Addressed This Visit     JOSE treated with BiPAP     She was set to 13/7. Since she has not used for some time with weight gain since then, it was changed to [address] [facility] 6, Max 16 PS 4-8. Will assess with next visit. I strongly advised her to clean her bipap and will order new equipment. Morbid obesity with BMI of 60.0-69.9, adult ([address]     We discuss relationship between weight and sleep apnea. Weight loss encouraged. COPD, mild ([address]     symptoms controlled iwht albuterol / combivent prn and Dulera daily. Education give on albuterol and combivent. Only using albuterol for emergency when out and about since she leaves in her car.            Relevant Medications    mometasone-formoterol (DULERA) 200-5 MCG/ACT inhaler    loratadine (CLARITIN) 10 MG tablet            Sincerely,    [facility]  [phone]

## 2018-09-14 NOTE — ASSESSMENT & PLAN NOTE
She was set to 13/7. Since she has not used for some time with weight gain since then, it was changed to [address] [facility] 6, Max 16 PS 4-8. Will assess with next visit. I strongly advised her to clean her bipap and will order new equipment.

## 2018-10-17 NOTE — PROGRESS NOTES
[address]  Cardiology Progress Note      José Steward  1975, 37 y.o.    CC: Hypertension           ERNESTO LING KIMMIE, APRN - CNP:    HPI:   This is a 37 y.o. female with a PMH significant for COPD, chronic diastolic heart failure, Hypertension, Hyperlipidemia and Type II Diabetes Mellitus. Returns for management of HTN and HF. BP is high today. Says she \"always has a headache. \"  Says she eats 1 bag of ice daily because her mouth is dry. She reports not taking medication this morning, but says she has been compliant with all medication except Valsartan; needs a refill. She offers having pain \"all over\" and is going to a pain clinic for management of chronic pain. Today, specifically denies any dyspnea, chest pain, palpitations and dizziness. Past Medical History:   Diagnosis Date    Arthritis     Asthma     CHF (congestive heart failure) (HCC)     [facility] obstructive pulmonary disease) [facility] [address]     Diabetes mellitus ([facility] [address]     Hyperlipidemia     Hypertension     Obesity       Past Surgical History:   Procedure Laterality Date    CHOLECYSTECTOMY  1997    HYSTERECTOMY  2008    SKIN BIOPSY  2015    TUBAL LIGATION        No family history on file.    Social History   Substance Use Topics    Smoking status: Former Smoker     Packs/day: 0.50     Years: 20.00     Types: Cigarettes     Start date: 1/1/1985    Smokeless tobacco: Never Used    Alcohol use 0.6 oz/week     1 Glasses of wine per week      Comment: occ     Allergies   Allergen Reactions    Latex          Review of Systems -   Constitutional: Negative for weight gain/loss; malaise, fever  Respiratory: Negative for Asthma;  cough and hemoptysis  Cardiovascular: Negative for palpitations,dizziness   Gastrointestinal: Negative for abd.pain; constipation/diarrhea;    Genitourinary: Negative for stones; hematuria; frequency hesitancy  Integumentt: Negative for rash or pruritis  Hematologic/lymphatic: Negative for blood aspirin 81 MG EC tablet Take 81 mg by mouth daily      spironolactone (ALDACTONE) 25 MG tablet Take 1 tablet by mouth Daily with lunch (Patient taking differently: Take 25 mg by mouth Daily with lunch ) 30 tablet 6    torsemide (DEMADEX) 100 MG tablet Take 0.5 tablets by mouth every morning 30 tablet 6    metoprolol tartrate (LOPRESSOR) 25 MG tablet Take 2 tablets by mouth 2 times daily 60 tablet 6    acetaminophen (APAP EXTRA STRENGTH) 500 MG tablet Take 2 tablets by mouth every 6 hours as needed for Pain DO NOT TAKE WITH OTHER MEDICATIONS CONTAINING ACETAMINOPHEN. 30 tablet 0    magnesium chloride (MAG DELAY) 535 (64 Mg) MG TBCR extended release tablet Take 2 tablets by mouth daily for 2 days 4 tablet 0    Spacer/Aero-Holding Chambers KIT Please dispense aerochamber spacer 1 kit 0    Cholecalciferol (VITAMIN D3) 12365 UNITS CAPS Take 10,000 Units by mouth three times a week. Takes on Mon Wed Fri      metFORMIN (GLUCOPHAGE) 500 MG tablet Take 500 mg by mouth 2 times daily (with meals)        No current facility-administered medications for this visit. Labs:   Lab Results   Component Value Date    HDL 55 10/02/2014    LDLCALC 97 10/02/2014    TRIG 101 10/02/2014       EKG: none today    ECHO:12/5/2017  Concentric LVH with normal systolic function. EF is  60%  Left atrium is of normal size. Right ventricular systolic function is normal .    ASSESSMENT AND PLAN:      Essential Hypertension  BP remains elevated. Reported stopping all medication at last visit due to dizziness. Again, I have asked her to stop Clonidine as long as she is taking all other anti-hypertensive;s  She can continue BB, ARB, Aldactone and Torsemide. Again discussed the importance of daily walking and low sodium diet. Discussed the importance of medication compliance and patient verbalized understanding.      Chronic Diastolic Heart Failure  NYHA Class II    ECHO shows EF of 60%  Compensated on clinical exam  Resuming

## 2018-10-18 ENCOUNTER — OFFICE VISIT (OUTPATIENT)
Dept: CARDIOLOGY CLINIC | Age: 43
End: 2018-10-18
Payer: MEDICAID

## 2018-10-18 VITALS
DIASTOLIC BLOOD PRESSURE: 92 MMHG | BODY MASS INDEX: 51.91 KG/M2 | SYSTOLIC BLOOD PRESSURE: 160 MMHG | HEIGHT: 63 IN | HEART RATE: 88 BPM | OXYGEN SATURATION: 97 % | WEIGHT: 293 LBS

## 2018-10-18 DIAGNOSIS — I10 ESSENTIAL HYPERTENSION: Primary | ICD-10-CM

## 2018-10-18 DIAGNOSIS — I10 ESSENTIAL HYPERTENSION: ICD-10-CM

## 2018-10-18 DIAGNOSIS — G47.33 OSA TREATED WITH BIPAP: ICD-10-CM

## 2018-10-18 DIAGNOSIS — I50.32 CHRONIC DIASTOLIC HEART FAILURE (HCC): ICD-10-CM

## 2018-10-18 LAB
A/G RATIO: 1.6 (ref 1.1–2.2)
ALBUMIN SERPL-MCNC: 3.9 G/DL (ref 3.4–5)
ALP BLD-CCNC: 65 U/L (ref 40–129)
ALT SERPL-CCNC: 10 U/L (ref 10–40)
ANION GAP SERPL CALCULATED.3IONS-SCNC: 13 MMOL/L (ref 3–16)
AST SERPL-CCNC: 12 U/L (ref 15–37)
BILIRUB SERPL-MCNC: 0.3 MG/DL (ref 0–1)
BUN BLDV-MCNC: 6 MG/DL (ref 7–20)
CALCIUM SERPL-MCNC: 9.2 MG/DL (ref 8.3–10.6)
CHLORIDE BLD-SCNC: 104 MMOL/L (ref 99–110)
CO2: 26 MMOL/L (ref 21–32)
CREAT SERPL-MCNC: 0.7 MG/DL (ref 0.6–1.1)
GFR AFRICAN AMERICAN: >60
GFR NON-AFRICAN AMERICAN: >60
GLOBULIN: 2.5 G/DL
GLUCOSE BLD-MCNC: 105 MG/DL (ref 70–99)
POTASSIUM SERPL-SCNC: 4.6 MMOL/L (ref 3.5–5.1)
SODIUM BLD-SCNC: 143 MMOL/L (ref 136–145)
TOTAL PROTEIN: 6.4 G/DL (ref 6.4–8.2)

## 2018-10-18 PROCEDURE — G8417 CALC BMI ABV UP PARAM F/U: HCPCS | Performed by: INTERNAL MEDICINE

## 2018-10-18 PROCEDURE — G8427 DOCREV CUR MEDS BY ELIG CLIN: HCPCS | Performed by: INTERNAL MEDICINE

## 2018-10-18 PROCEDURE — 4004F PT TOBACCO SCREEN RCVD TLK: CPT | Performed by: INTERNAL MEDICINE

## 2018-10-18 PROCEDURE — 99214 OFFICE O/P EST MOD 30 MIN: CPT | Performed by: INTERNAL MEDICINE

## 2018-10-18 PROCEDURE — G8484 FLU IMMUNIZE NO ADMIN: HCPCS | Performed by: INTERNAL MEDICINE

## 2018-10-18 RX ORDER — VALSARTAN 320 MG/1
320 TABLET ORAL DAILY
Qty: 30 TABLET | Refills: 11 | Status: SHIPPED | OUTPATIENT
Start: 2018-10-18 | End: 2018-10-23

## 2018-10-18 RX ORDER — SPIRONOLACTONE 25 MG/1
25 TABLET ORAL
Qty: 30 TABLET | Refills: 11 | Status: SHIPPED | OUTPATIENT
Start: 2018-10-18 | End: 2020-01-16

## 2018-10-18 RX ORDER — METOPROLOL SUCCINATE 50 MG/1
50 TABLET, EXTENDED RELEASE ORAL 2 TIMES DAILY
COMMUNITY
End: 2018-10-18

## 2018-10-18 RX ORDER — CLONIDINE HYDROCHLORIDE 0.3 MG/1
0.3 TABLET ORAL 3 TIMES DAILY
COMMUNITY
End: 2018-10-18

## 2018-10-18 RX ORDER — VALSARTAN 320 MG/1
320 TABLET ORAL DAILY
COMMUNITY
End: 2018-10-18 | Stop reason: SDUPTHER

## 2018-10-18 RX ORDER — TORSEMIDE 100 MG/1
50 TABLET ORAL EVERY MORNING
Qty: 30 TABLET | Refills: 11 | Status: SHIPPED | OUTPATIENT
Start: 2018-10-18 | End: 2019-03-18 | Stop reason: SDUPTHER

## 2018-10-18 RX ORDER — METOPROLOL TARTRATE 50 MG/1
50 TABLET, FILM COATED ORAL 2 TIMES DAILY
Qty: 60 TABLET | Refills: 11 | Status: SHIPPED | OUTPATIENT
Start: 2018-10-18 | End: 2019-01-14 | Stop reason: SDUPTHER

## 2018-10-22 NOTE — TELEPHONE ENCOUNTER
Dr. Victorino Baptiste,     Patient's pharmacy doesn't have Valsartan 320 mg in stock. Please advise if she can switch to Losartan and if so, what dose?

## 2018-10-23 RX ORDER — LOSARTAN POTASSIUM 100 MG/1
100 TABLET ORAL DAILY
Qty: 30 TABLET | Refills: 3 | Status: SHIPPED | OUTPATIENT
Start: 2018-10-23 | End: 2019-03-05 | Stop reason: SDUPTHER

## 2018-12-06 ENCOUNTER — OFFICE VISIT (OUTPATIENT)
Dept: PULMONOLOGY | Age: 43
End: 2018-12-06
Payer: MEDICAID

## 2018-12-06 VITALS
WEIGHT: 293 LBS | SYSTOLIC BLOOD PRESSURE: 110 MMHG | RESPIRATION RATE: 16 BRPM | TEMPERATURE: 98.1 F | OXYGEN SATURATION: 92 % | HEART RATE: 90 BPM | BODY MASS INDEX: 51.91 KG/M2 | HEIGHT: 63 IN | DIASTOLIC BLOOD PRESSURE: 70 MMHG

## 2018-12-06 DIAGNOSIS — J44.9 COPD, MILD (HCC): Primary | ICD-10-CM

## 2018-12-06 DIAGNOSIS — Z71.6 TOBACCO ABUSE COUNSELING: ICD-10-CM

## 2018-12-06 DIAGNOSIS — G47.33 OSA TREATED WITH BIPAP: ICD-10-CM

## 2018-12-06 PROCEDURE — G8427 DOCREV CUR MEDS BY ELIG CLIN: HCPCS | Performed by: INTERNAL MEDICINE

## 2018-12-06 PROCEDURE — 4004F PT TOBACCO SCREEN RCVD TLK: CPT | Performed by: INTERNAL MEDICINE

## 2018-12-06 PROCEDURE — 3023F SPIROM DOC REV: CPT | Performed by: INTERNAL MEDICINE

## 2018-12-06 PROCEDURE — G8484 FLU IMMUNIZE NO ADMIN: HCPCS | Performed by: INTERNAL MEDICINE

## 2018-12-06 PROCEDURE — G8926 SPIRO NO PERF OR DOC: HCPCS | Performed by: INTERNAL MEDICINE

## 2018-12-06 PROCEDURE — 99214 OFFICE O/P EST MOD 30 MIN: CPT | Performed by: INTERNAL MEDICINE

## 2018-12-06 PROCEDURE — G8417 CALC BMI ABV UP PARAM F/U: HCPCS | Performed by: INTERNAL MEDICINE

## 2018-12-06 RX ORDER — ALBUTEROL SULFATE 90 UG/1
2 AEROSOL, METERED RESPIRATORY (INHALATION) EVERY 6 HOURS PRN
Qty: 1 INHALER | Refills: 5 | Status: SHIPPED | OUTPATIENT
Start: 2018-12-06 | End: 2019-06-06 | Stop reason: SDUPTHER

## 2018-12-06 ASSESSMENT — SLEEP AND FATIGUE QUESTIONNAIRES
HOW LIKELY ARE YOU TO NOD OFF OR FALL ASLEEP WHILE LYING DOWN TO REST IN THE AFTERNOON WHEN CIRCUMSTANCES PERMIT: 2
HOW LIKELY ARE YOU TO NOD OFF OR FALL ASLEEP WHILE SITTING QUIETLY AFTER LUNCH WITHOUT ALCOHOL: 0
HOW LIKELY ARE YOU TO NOD OFF OR FALL ASLEEP WHILE SITTING AND TALKING TO SOMEONE: 0
HOW LIKELY ARE YOU TO NOD OFF OR FALL ASLEEP IN A CAR, WHILE STOPPED FOR A FEW MINUTES IN TRAFFIC: 0
ESS TOTAL SCORE: 4
HOW LIKELY ARE YOU TO NOD OFF OR FALL ASLEEP WHILE WATCHING TV: 2
HOW LIKELY ARE YOU TO NOD OFF OR FALL ASLEEP WHEN YOU ARE A PASSENGER IN A CAR FOR AN HOUR WITHOUT A BREAK: 0
HOW LIKELY ARE YOU TO NOD OFF OR FALL ASLEEP WHILE SITTING AND READING: 0
HOW LIKELY ARE YOU TO NOD OFF OR FALL ASLEEP WHILE SITTING INACTIVE IN A PUBLIC PLACE: 0

## 2019-01-07 ENCOUNTER — APPOINTMENT (OUTPATIENT)
Dept: GENERAL RADIOLOGY | Age: 44
End: 2019-01-07
Payer: MEDICAID

## 2019-01-07 ENCOUNTER — HOSPITAL ENCOUNTER (EMERGENCY)
Age: 44
Discharge: HOME | End: 2019-01-07
Payer: MEDICAID

## 2019-01-07 VITALS
HEART RATE: 94 BPM | SYSTOLIC BLOOD PRESSURE: 189 MMHG | RESPIRATION RATE: 18 BRPM | HEIGHT: 64 IN | BODY MASS INDEX: 50.02 KG/M2 | TEMPERATURE: 98.2 F | OXYGEN SATURATION: 99 % | WEIGHT: 293 LBS | DIASTOLIC BLOOD PRESSURE: 109 MMHG

## 2019-01-07 DIAGNOSIS — M16.11 OSTEOARTHRITIS OF RIGHT HIP, UNSPECIFIED OSTEOARTHRITIS TYPE: Primary | ICD-10-CM

## 2019-01-07 PROCEDURE — 6370000000 HC RX 637 (ALT 250 FOR IP): Performed by: PHYSICIAN ASSISTANT

## 2019-01-07 PROCEDURE — 73502 X-RAY EXAM HIP UNI 2-3 VIEWS: CPT

## 2019-01-07 PROCEDURE — 96372 THER/PROPH/DIAG INJ SC/IM: CPT

## 2019-01-07 PROCEDURE — 99283 EMERGENCY DEPT VISIT LOW MDM: CPT

## 2019-01-07 PROCEDURE — 6360000002 HC RX W HCPCS: Performed by: PHYSICIAN ASSISTANT

## 2019-01-07 RX ORDER — OXYCODONE HYDROCHLORIDE AND ACETAMINOPHEN 5; 325 MG/1; MG/1
1 TABLET ORAL EVERY 6 HOURS PRN
Qty: 12 TABLET | Refills: 0 | Status: SHIPPED | OUTPATIENT
Start: 2019-01-07 | End: 2019-01-12

## 2019-01-07 RX ORDER — OXYCODONE HYDROCHLORIDE AND ACETAMINOPHEN 5; 325 MG/1; MG/1
1 TABLET ORAL ONCE
Status: COMPLETED | OUTPATIENT
Start: 2019-01-07 | End: 2019-01-07

## 2019-01-07 RX ORDER — KETOROLAC TROMETHAMINE 30 MG/ML
30 INJECTION, SOLUTION INTRAMUSCULAR; INTRAVENOUS ONCE
Status: COMPLETED | OUTPATIENT
Start: 2019-01-07 | End: 2019-01-07

## 2019-01-07 RX ADMIN — OXYCODONE AND ACETAMINOPHEN 1 TABLET: 5; 325 TABLET ORAL at 20:15

## 2019-01-07 RX ADMIN — KETOROLAC TROMETHAMINE 30 MG: 30 INJECTION, SOLUTION INTRAMUSCULAR at 20:15

## 2019-01-07 ASSESSMENT — PAIN DESCRIPTION - PAIN TYPE: TYPE: ACUTE PAIN

## 2019-01-07 ASSESSMENT — PAIN SCALES - GENERAL
PAINLEVEL_OUTOF10: 7
PAINLEVEL_OUTOF10: 10
PAINLEVEL_OUTOF10: 7
PAINLEVEL_OUTOF10: 10

## 2019-01-07 ASSESSMENT — PAIN DESCRIPTION - LOCATION: LOCATION: GENERALIZED

## 2019-01-08 ENCOUNTER — TELEPHONE (OUTPATIENT)
Dept: PAIN MANAGEMENT | Age: 44
End: 2019-01-08

## 2019-01-14 ENCOUNTER — OFFICE VISIT (OUTPATIENT)
Dept: FAMILY MEDICINE CLINIC | Age: 44
End: 2019-01-14
Payer: MEDICAID

## 2019-01-14 VITALS
BODY MASS INDEX: 50.02 KG/M2 | TEMPERATURE: 97.8 F | WEIGHT: 293 LBS | HEIGHT: 64 IN | HEART RATE: 94 BPM | DIASTOLIC BLOOD PRESSURE: 108 MMHG | OXYGEN SATURATION: 98 % | SYSTOLIC BLOOD PRESSURE: 156 MMHG

## 2019-01-14 DIAGNOSIS — M17.0 PRIMARY OSTEOARTHRITIS OF BOTH KNEES: ICD-10-CM

## 2019-01-14 DIAGNOSIS — I10 ESSENTIAL HYPERTENSION: ICD-10-CM

## 2019-01-14 DIAGNOSIS — E66.01 MORBID OBESITY WITH BMI OF 60.0-69.9, ADULT (HCC): ICD-10-CM

## 2019-01-14 DIAGNOSIS — I50.32 CHRONIC DIASTOLIC CONGESTIVE HEART FAILURE (HCC): ICD-10-CM

## 2019-01-14 DIAGNOSIS — R73.03 PREDIABETES: Primary | ICD-10-CM

## 2019-01-14 DIAGNOSIS — E78.2 MIXED HYPERLIPIDEMIA: ICD-10-CM

## 2019-01-14 LAB
CREATININE URINE: 267.3 MG/DL (ref 28–259)
HBA1C MFR BLD: 6 %
MICROALBUMIN UR-MCNC: 2.4 MG/DL
MICROALBUMIN/CREAT UR-RTO: 9 MG/G (ref 0–30)

## 2019-01-14 PROCEDURE — 99204 OFFICE O/P NEW MOD 45 MIN: CPT | Performed by: FAMILY MEDICINE

## 2019-01-14 PROCEDURE — G8417 CALC BMI ABV UP PARAM F/U: HCPCS | Performed by: FAMILY MEDICINE

## 2019-01-14 PROCEDURE — 83036 HEMOGLOBIN GLYCOSYLATED A1C: CPT | Performed by: FAMILY MEDICINE

## 2019-01-14 PROCEDURE — 4004F PT TOBACCO SCREEN RCVD TLK: CPT | Performed by: FAMILY MEDICINE

## 2019-01-14 PROCEDURE — G8484 FLU IMMUNIZE NO ADMIN: HCPCS | Performed by: FAMILY MEDICINE

## 2019-01-14 PROCEDURE — G8427 DOCREV CUR MEDS BY ELIG CLIN: HCPCS | Performed by: FAMILY MEDICINE

## 2019-01-14 RX ORDER — METOPROLOL TARTRATE 100 MG/1
100 TABLET ORAL 2 TIMES DAILY
Qty: 60 TABLET | Refills: 2 | Status: SHIPPED | OUTPATIENT
Start: 2019-01-14 | End: 2019-04-17 | Stop reason: SDUPTHER

## 2019-01-14 ASSESSMENT — ENCOUNTER SYMPTOMS
NAUSEA: 0
SORE THROAT: 0
EYE REDNESS: 0
COLOR CHANGE: 0
SHORTNESS OF BREATH: 0
COUGH: 1
VOMITING: 0
SINUS PRESSURE: 0
DIARRHEA: 0

## 2019-01-14 ASSESSMENT — PATIENT HEALTH QUESTIONNAIRE - PHQ9
10. IF YOU CHECKED OFF ANY PROBLEMS, HOW DIFFICULT HAVE THESE PROBLEMS MADE IT FOR YOU TO DO YOUR WORK, TAKE CARE OF THINGS AT HOME, OR GET ALONG WITH OTHER PEOPLE: 2
SUM OF ALL RESPONSES TO PHQ QUESTIONS 1-9: 19
8. MOVING OR SPEAKING SO SLOWLY THAT OTHER PEOPLE COULD HAVE NOTICED. OR THE OPPOSITE, BEING SO FIGETY OR RESTLESS THAT YOU HAVE BEEN MOVING AROUND A LOT MORE THAN USUAL: 3
3. TROUBLE FALLING OR STAYING ASLEEP: 3
1. LITTLE INTEREST OR PLEASURE IN DOING THINGS: 2
SUM OF ALL RESPONSES TO PHQ9 QUESTIONS 1 & 2: 5
2. FEELING DOWN, DEPRESSED OR HOPELESS: 3
7. TROUBLE CONCENTRATING ON THINGS, SUCH AS READING THE NEWSPAPER OR WATCHING TELEVISION: 0
4. FEELING TIRED OR HAVING LITTLE ENERGY: 3
SUM OF ALL RESPONSES TO PHQ QUESTIONS 1-9: 19
6. FEELING BAD ABOUT YOURSELF - OR THAT YOU ARE A FAILURE OR HAVE LET YOURSELF OR YOUR FAMILY DOWN: 2
9. THOUGHTS THAT YOU WOULD BE BETTER OFF DEAD, OR OF HURTING YOURSELF: 0
5. POOR APPETITE OR OVEREATING: 3

## 2019-01-18 ENCOUNTER — OFFICE VISIT (OUTPATIENT)
Dept: ORTHOPEDIC SURGERY | Age: 44
End: 2019-01-18
Payer: MEDICAID

## 2019-01-18 ENCOUNTER — PRE-EVALUATION (OUTPATIENT)
Dept: ORTHOPEDIC SURGERY | Age: 44
End: 2019-01-18

## 2019-01-18 ENCOUNTER — TELEPHONE (OUTPATIENT)
Dept: FAMILY MEDICINE CLINIC | Age: 44
End: 2019-01-18

## 2019-01-18 VITALS
BODY MASS INDEX: 50.02 KG/M2 | WEIGHT: 293 LBS | DIASTOLIC BLOOD PRESSURE: 116 MMHG | HEIGHT: 64 IN | RESPIRATION RATE: 16 BRPM | SYSTOLIC BLOOD PRESSURE: 194 MMHG | HEART RATE: 87 BPM

## 2019-01-18 DIAGNOSIS — M16.11 PRIMARY OSTEOARTHRITIS OF RIGHT HIP: Primary | ICD-10-CM

## 2019-01-18 DIAGNOSIS — M16.11 OSTEOARTHRITIS OF RIGHT HIP, UNSPECIFIED OSTEOARTHRITIS TYPE: ICD-10-CM

## 2019-01-18 DIAGNOSIS — M25.551 RIGHT HIP PAIN: ICD-10-CM

## 2019-01-18 DIAGNOSIS — E66.01 MORBID OBESITY (HCC): ICD-10-CM

## 2019-01-18 DIAGNOSIS — M17.0 PRIMARY OSTEOARTHRITIS OF BOTH KNEES: ICD-10-CM

## 2019-01-18 PROCEDURE — G8427 DOCREV CUR MEDS BY ELIG CLIN: HCPCS | Performed by: ORTHOPAEDIC SURGERY

## 2019-01-18 PROCEDURE — 99213 OFFICE O/P EST LOW 20 MIN: CPT | Performed by: ORTHOPAEDIC SURGERY

## 2019-01-18 PROCEDURE — G8417 CALC BMI ABV UP PARAM F/U: HCPCS | Performed by: ORTHOPAEDIC SURGERY

## 2019-01-18 PROCEDURE — G8484 FLU IMMUNIZE NO ADMIN: HCPCS | Performed by: ORTHOPAEDIC SURGERY

## 2019-01-18 PROCEDURE — APPSS15 APP SPLIT SHARED TIME 0-15 MINUTES: Performed by: NURSE PRACTITIONER

## 2019-01-18 PROCEDURE — 4004F PT TOBACCO SCREEN RCVD TLK: CPT | Performed by: ORTHOPAEDIC SURGERY

## 2019-01-21 ENCOUNTER — TELEPHONE (OUTPATIENT)
Dept: ORTHOPEDIC SURGERY | Age: 44
End: 2019-01-21

## 2019-01-21 RX ORDER — MELOXICAM 15 MG/1
15 TABLET ORAL DAILY
Qty: 30 TABLET | Refills: 3 | Status: SHIPPED | OUTPATIENT
Start: 2019-01-21 | End: 2019-06-26 | Stop reason: ALTCHOICE

## 2019-01-22 ENCOUNTER — OFFICE VISIT (OUTPATIENT)
Dept: ORTHOPEDIC SURGERY | Age: 44
End: 2019-01-22
Payer: MEDICAID

## 2019-01-22 VITALS
SYSTOLIC BLOOD PRESSURE: 209 MMHG | DIASTOLIC BLOOD PRESSURE: 103 MMHG | BODY MASS INDEX: 50.02 KG/M2 | HEIGHT: 64 IN | WEIGHT: 293 LBS | HEART RATE: 89 BPM

## 2019-01-22 DIAGNOSIS — M16.11 PRIMARY OSTEOARTHRITIS OF RIGHT HIP: Primary | ICD-10-CM

## 2019-01-22 PROCEDURE — 99999 PR OFFICE/OUTPT VISIT,PROCEDURE ONLY: CPT | Performed by: ORTHOPAEDIC SURGERY

## 2019-01-22 PROCEDURE — 20611 DRAIN/INJ JOINT/BURSA W/US: CPT | Performed by: ORTHOPAEDIC SURGERY

## 2019-01-22 PROCEDURE — E0100 CANE ADJUST/FIXED WITH TIP: HCPCS | Performed by: ORTHOPAEDIC SURGERY

## 2019-01-22 RX ORDER — METHYLPREDNISOLONE ACETATE 40 MG/ML
80 INJECTION, SUSPENSION INTRA-ARTICULAR; INTRALESIONAL; INTRAMUSCULAR; SOFT TISSUE ONCE
Status: COMPLETED | OUTPATIENT
Start: 2019-01-22 | End: 2019-01-22

## 2019-01-22 RX ADMIN — METHYLPREDNISOLONE ACETATE 80 MG: 40 INJECTION, SUSPENSION INTRA-ARTICULAR; INTRALESIONAL; INTRAMUSCULAR; SOFT TISSUE at 15:24

## 2019-01-28 ENCOUNTER — TELEPHONE (OUTPATIENT)
Dept: FAMILY MEDICINE CLINIC | Age: 44
End: 2019-01-28

## 2019-01-29 ENCOUNTER — TELEPHONE (OUTPATIENT)
Dept: PAIN MANAGEMENT | Age: 44
End: 2019-01-29

## 2019-02-07 ENCOUNTER — TELEPHONE (OUTPATIENT)
Dept: PAIN MANAGEMENT | Age: 44
End: 2019-02-07

## 2019-02-07 ENCOUNTER — OFFICE VISIT (OUTPATIENT)
Dept: PAIN MANAGEMENT | Age: 44
End: 2019-02-07
Payer: MEDICAID

## 2019-02-07 VITALS
HEART RATE: 83 BPM | HEIGHT: 64 IN | BODY MASS INDEX: 50.02 KG/M2 | DIASTOLIC BLOOD PRESSURE: 109 MMHG | OXYGEN SATURATION: 93 % | WEIGHT: 293 LBS | SYSTOLIC BLOOD PRESSURE: 199 MMHG

## 2019-02-07 DIAGNOSIS — E66.01 MORBID OBESITY WITH BMI OF 60.0-69.9, ADULT (HCC): ICD-10-CM

## 2019-02-07 DIAGNOSIS — G47.33 OSA TREATED WITH BIPAP: ICD-10-CM

## 2019-02-07 DIAGNOSIS — M16.11 PRIMARY OSTEOARTHRITIS OF RIGHT HIP: ICD-10-CM

## 2019-02-07 DIAGNOSIS — M17.0 PRIMARY OSTEOARTHRITIS OF BOTH KNEES: ICD-10-CM

## 2019-02-07 DIAGNOSIS — G89.4 CHRONIC PAIN SYNDROME: ICD-10-CM

## 2019-02-07 DIAGNOSIS — G89.29 CHRONIC BILATERAL LOW BACK PAIN WITHOUT SCIATICA: ICD-10-CM

## 2019-02-07 DIAGNOSIS — F51.01 PRIMARY INSOMNIA: ICD-10-CM

## 2019-02-07 DIAGNOSIS — F32.A DEPRESSION, UNSPECIFIED DEPRESSION TYPE: ICD-10-CM

## 2019-02-07 DIAGNOSIS — M54.50 CHRONIC BILATERAL LOW BACK PAIN WITHOUT SCIATICA: ICD-10-CM

## 2019-02-07 PROCEDURE — 99244 OFF/OP CNSLTJ NEW/EST MOD 40: CPT | Performed by: NURSE PRACTITIONER

## 2019-02-07 PROCEDURE — G8417 CALC BMI ABV UP PARAM F/U: HCPCS | Performed by: NURSE PRACTITIONER

## 2019-02-07 PROCEDURE — G8484 FLU IMMUNIZE NO ADMIN: HCPCS | Performed by: NURSE PRACTITIONER

## 2019-02-07 PROCEDURE — G8427 DOCREV CUR MEDS BY ELIG CLIN: HCPCS | Performed by: NURSE PRACTITIONER

## 2019-02-07 RX ORDER — AMITRIPTYLINE HYDROCHLORIDE 25 MG/1
25 TABLET, FILM COATED ORAL NIGHTLY
Qty: 30 TABLET | Refills: 0 | Status: SHIPPED | OUTPATIENT
Start: 2019-02-07 | End: 2019-03-07 | Stop reason: SDUPTHER

## 2019-02-07 RX ORDER — ATORVASTATIN CALCIUM 80 MG/1
80 TABLET, FILM COATED ORAL DAILY
COMMUNITY
End: 2019-06-26

## 2019-02-07 RX ORDER — DULOXETIN HYDROCHLORIDE 30 MG/1
30 CAPSULE, DELAYED RELEASE ORAL DAILY
Qty: 30 CAPSULE | Refills: 0 | Status: SHIPPED | OUTPATIENT
Start: 2019-02-07 | End: 2019-02-15 | Stop reason: ALTCHOICE

## 2019-02-07 RX ORDER — HYDROCODONE BITARTRATE AND ACETAMINOPHEN 5; 325 MG/1; MG/1
1 TABLET ORAL EVERY 8 HOURS PRN
Qty: 84 TABLET | Refills: 0 | Status: SHIPPED | OUTPATIENT
Start: 2019-02-07 | End: 2019-02-07 | Stop reason: SDUPTHER

## 2019-02-07 RX ORDER — HYDROCODONE BITARTRATE AND ACETAMINOPHEN 5; 325 MG/1; MG/1
1 TABLET ORAL EVERY 8 HOURS PRN
Qty: 84 TABLET | Refills: 0 | Status: SHIPPED | OUTPATIENT
Start: 2019-02-07 | End: 2019-03-07 | Stop reason: SDUPTHER

## 2019-02-07 RX ORDER — LIDOCAINE 50 MG/G
1 PATCH TOPICAL DAILY
Qty: 28 PATCH | Refills: 0 | Status: SHIPPED | OUTPATIENT
Start: 2019-02-07 | End: 2019-02-15 | Stop reason: ALTCHOICE

## 2019-02-07 RX ORDER — CLONIDINE HYDROCHLORIDE 0.1 MG/1
0.3 TABLET ORAL 3 TIMES DAILY
COMMUNITY
End: 2020-11-16 | Stop reason: SDUPTHER

## 2019-02-15 ENCOUNTER — HOSPITAL ENCOUNTER (OUTPATIENT)
Dept: GENERAL RADIOLOGY | Age: 44
Discharge: HOME | End: 2019-02-15
Payer: MEDICAID

## 2019-02-15 ENCOUNTER — HOSPITAL ENCOUNTER (OUTPATIENT)
Age: 44
Discharge: HOME | End: 2019-02-15
Payer: MEDICAID

## 2019-02-15 DIAGNOSIS — G89.4 CHRONIC PAIN SYNDROME: ICD-10-CM

## 2019-02-15 DIAGNOSIS — M54.50 CHRONIC BILATERAL LOW BACK PAIN WITHOUT SCIATICA: ICD-10-CM

## 2019-02-15 DIAGNOSIS — G89.29 CHRONIC BILATERAL LOW BACK PAIN WITHOUT SCIATICA: ICD-10-CM

## 2019-02-15 PROCEDURE — 72100 X-RAY EXAM L-S SPINE 2/3 VWS: CPT

## 2019-02-15 RX ORDER — VENLAFAXINE 25 MG/1
25 TABLET ORAL 2 TIMES DAILY
Qty: 60 TABLET | Refills: 0 | Status: SHIPPED | OUTPATIENT
Start: 2019-02-15 | End: 2019-03-16 | Stop reason: SDUPTHER

## 2019-02-18 RX ORDER — IPRATROPIUM/ALBUTEROL SULFATE 20-100 MCG
MIST INHALER (GRAM) INHALATION
Qty: 4 INHALER | Refills: 1 | Status: SHIPPED | OUTPATIENT
Start: 2019-02-18 | End: 2019-04-23

## 2019-02-19 ENCOUNTER — TELEPHONE (OUTPATIENT)
Dept: ORTHOPEDIC SURGERY | Age: 44
End: 2019-02-19

## 2019-03-05 RX ORDER — LOSARTAN POTASSIUM 100 MG/1
TABLET ORAL
Qty: 30 TABLET | Refills: 2 | Status: SHIPPED | OUTPATIENT
Start: 2019-03-05 | End: 2019-06-26

## 2019-03-07 ENCOUNTER — OFFICE VISIT (OUTPATIENT)
Dept: PAIN MANAGEMENT | Age: 44
End: 2019-03-07
Payer: MEDICAID

## 2019-03-07 VITALS — HEART RATE: 64 BPM | DIASTOLIC BLOOD PRESSURE: 102 MMHG | OXYGEN SATURATION: 94 % | SYSTOLIC BLOOD PRESSURE: 191 MMHG

## 2019-03-07 DIAGNOSIS — M54.50 CHRONIC BILATERAL LOW BACK PAIN WITHOUT SCIATICA: ICD-10-CM

## 2019-03-07 DIAGNOSIS — M16.11 PRIMARY OSTEOARTHRITIS OF RIGHT HIP: ICD-10-CM

## 2019-03-07 DIAGNOSIS — G89.29 CHRONIC BILATERAL LOW BACK PAIN WITHOUT SCIATICA: ICD-10-CM

## 2019-03-07 DIAGNOSIS — F32.A DEPRESSION, UNSPECIFIED DEPRESSION TYPE: ICD-10-CM

## 2019-03-07 DIAGNOSIS — F51.01 PRIMARY INSOMNIA: ICD-10-CM

## 2019-03-07 DIAGNOSIS — E66.01 MORBID OBESITY WITH BMI OF 60.0-69.9, ADULT (HCC): ICD-10-CM

## 2019-03-07 DIAGNOSIS — G47.33 OSA TREATED WITH BIPAP: ICD-10-CM

## 2019-03-07 DIAGNOSIS — G89.4 CHRONIC PAIN SYNDROME: ICD-10-CM

## 2019-03-07 DIAGNOSIS — M17.0 PRIMARY OSTEOARTHRITIS OF BOTH KNEES: ICD-10-CM

## 2019-03-07 PROCEDURE — 4004F PT TOBACCO SCREEN RCVD TLK: CPT | Performed by: NURSE PRACTITIONER

## 2019-03-07 PROCEDURE — G8427 DOCREV CUR MEDS BY ELIG CLIN: HCPCS | Performed by: NURSE PRACTITIONER

## 2019-03-07 PROCEDURE — G8484 FLU IMMUNIZE NO ADMIN: HCPCS | Performed by: NURSE PRACTITIONER

## 2019-03-07 PROCEDURE — G8417 CALC BMI ABV UP PARAM F/U: HCPCS | Performed by: NURSE PRACTITIONER

## 2019-03-07 PROCEDURE — 99213 OFFICE O/P EST LOW 20 MIN: CPT | Performed by: NURSE PRACTITIONER

## 2019-03-07 RX ORDER — DULOXETIN HYDROCHLORIDE 30 MG/1
30 CAPSULE, DELAYED RELEASE ORAL 2 TIMES DAILY
Qty: 60 CAPSULE | Refills: 0 | Status: SHIPPED | OUTPATIENT
Start: 2019-03-07 | End: 2019-04-04 | Stop reason: SDUPTHER

## 2019-03-07 RX ORDER — AMITRIPTYLINE HYDROCHLORIDE 25 MG/1
25 TABLET, FILM COATED ORAL NIGHTLY
Qty: 30 TABLET | Refills: 0 | Status: SHIPPED | OUTPATIENT
Start: 2019-03-07 | End: 2019-04-04 | Stop reason: SDUPTHER

## 2019-03-07 RX ORDER — HYDROCODONE BITARTRATE AND ACETAMINOPHEN 5; 325 MG/1; MG/1
1 TABLET ORAL EVERY 8 HOURS PRN
Qty: 84 TABLET | Refills: 0 | Status: SHIPPED | OUTPATIENT
Start: 2019-03-07 | End: 2019-04-04 | Stop reason: SDUPTHER

## 2019-03-08 ENCOUNTER — TELEPHONE (OUTPATIENT)
Dept: PAIN MANAGEMENT | Age: 44
End: 2019-03-08

## 2019-03-09 DIAGNOSIS — F51.01 PRIMARY INSOMNIA: ICD-10-CM

## 2019-03-09 DIAGNOSIS — G89.4 CHRONIC PAIN SYNDROME: ICD-10-CM

## 2019-03-16 DIAGNOSIS — G89.4 CHRONIC PAIN SYNDROME: ICD-10-CM

## 2019-03-19 RX ORDER — TORSEMIDE 100 MG/1
50 TABLET ORAL EVERY MORNING
Qty: 90 TABLET | Refills: 2 | Status: SHIPPED | OUTPATIENT
Start: 2019-03-19 | End: 2019-08-15

## 2019-04-01 RX ORDER — AMITRIPTYLINE HYDROCHLORIDE 25 MG/1
TABLET, FILM COATED ORAL
Qty: 30 TABLET | Refills: 0 | Status: SHIPPED | OUTPATIENT
Start: 2019-04-01 | End: 2019-04-04

## 2019-04-04 ENCOUNTER — OFFICE VISIT (OUTPATIENT)
Dept: PAIN MANAGEMENT | Age: 44
End: 2019-04-04
Payer: MEDICAID

## 2019-04-04 VITALS — DIASTOLIC BLOOD PRESSURE: 104 MMHG | SYSTOLIC BLOOD PRESSURE: 181 MMHG | OXYGEN SATURATION: 98 % | HEART RATE: 74 BPM

## 2019-04-04 DIAGNOSIS — G89.29 CHRONIC BILATERAL LOW BACK PAIN WITHOUT SCIATICA: ICD-10-CM

## 2019-04-04 DIAGNOSIS — M17.0 PRIMARY OSTEOARTHRITIS OF BOTH KNEES: ICD-10-CM

## 2019-04-04 DIAGNOSIS — G89.4 CHRONIC PAIN SYNDROME: ICD-10-CM

## 2019-04-04 DIAGNOSIS — M16.11 PRIMARY OSTEOARTHRITIS OF RIGHT HIP: ICD-10-CM

## 2019-04-04 DIAGNOSIS — F32.A DEPRESSION, UNSPECIFIED DEPRESSION TYPE: ICD-10-CM

## 2019-04-04 DIAGNOSIS — M54.50 CHRONIC BILATERAL LOW BACK PAIN WITHOUT SCIATICA: ICD-10-CM

## 2019-04-04 DIAGNOSIS — I10 ESSENTIAL HYPERTENSION: ICD-10-CM

## 2019-04-04 DIAGNOSIS — F51.01 PRIMARY INSOMNIA: ICD-10-CM

## 2019-04-04 DIAGNOSIS — G47.33 OSA TREATED WITH BIPAP: ICD-10-CM

## 2019-04-04 DIAGNOSIS — E66.01 MORBID OBESITY WITH BMI OF 60.0-69.9, ADULT (HCC): ICD-10-CM

## 2019-04-04 PROCEDURE — 4004F PT TOBACCO SCREEN RCVD TLK: CPT | Performed by: NURSE PRACTITIONER

## 2019-04-04 PROCEDURE — G8427 DOCREV CUR MEDS BY ELIG CLIN: HCPCS | Performed by: NURSE PRACTITIONER

## 2019-04-04 PROCEDURE — 99213 OFFICE O/P EST LOW 20 MIN: CPT | Performed by: NURSE PRACTITIONER

## 2019-04-04 PROCEDURE — G8417 CALC BMI ABV UP PARAM F/U: HCPCS | Performed by: NURSE PRACTITIONER

## 2019-04-04 RX ORDER — AMITRIPTYLINE HYDROCHLORIDE 25 MG/1
25 TABLET, FILM COATED ORAL NIGHTLY
Qty: 30 TABLET | Refills: 0 | Status: SHIPPED | OUTPATIENT
Start: 2019-04-04 | End: 2019-05-02 | Stop reason: SDUPTHER

## 2019-04-04 RX ORDER — DULOXETIN HYDROCHLORIDE 30 MG/1
30 CAPSULE, DELAYED RELEASE ORAL 2 TIMES DAILY
Qty: 60 CAPSULE | Refills: 0 | Status: SHIPPED | OUTPATIENT
Start: 2019-04-04 | End: 2019-04-04

## 2019-04-04 RX ORDER — HYDROCODONE BITARTRATE AND ACETAMINOPHEN 5; 325 MG/1; MG/1
1 TABLET ORAL EVERY 8 HOURS PRN
Qty: 84 TABLET | Refills: 0 | Status: SHIPPED | OUTPATIENT
Start: 2019-04-04 | End: 2019-05-02 | Stop reason: SDUPTHER

## 2019-04-04 RX ORDER — VENLAFAXINE 25 MG/1
TABLET ORAL
Qty: 60 TABLET | Refills: 0 | Status: SHIPPED | OUTPATIENT
Start: 2019-04-04 | End: 2019-05-02

## 2019-04-04 NOTE — PROGRESS NOTES
Elisa Gale  1975  [id number]      HISTORY OF PRESENT ILLNESS:  Ms. Corinne Glow is a 37 y.o. female returns for a follow up visit for pain management  She has a diagnosis of   1. Chronic pain syndrome    2. Chronic bilateral low back pain without sciatica    3. Primary osteoarthritis of both knees    4. Primary osteoarthritis of right hip    5. JOSE treated with BiPAP    6. Morbid obesity with BMI of 60.0-69.9, adult ([address]    7. Depression, unspecified depression type, mild    8. Primary insomnia    9. Essential hypertension      On the Patients Pain Assessment form:  She complains of pain in the both buttocks, both knee(s), right leg(s): upper and bilateral lower back She rates the pain 6/10 and describes it as sharp, aching, burning. Current treatment regimen has helped relieve about 30% of the pain. She denies any side effects from the current pain regimen. Patient reports that since the last follow up visit the physical functioning is better, family/social relationships are unchanged, mood is better sleep patterns are better, and that the overall functioning is better. Patient denies misusing/abusing her narcotic pain medications or using any illegal drugs. There are No indicators for possible drug abuse, addiction or diversion problems. Upon obtaining medical history from Ms. Corinne Glow states that pain is manageable on current pain therapy. She was not given a refill of the Cymbalta due to denied coverage. Pain medications otherwise provide adequate relief of pain, takes medications as prescribed. Mood is stable without anxiety. Sleep is fair with an average of 5-6 hours. Denies to having issues of constipation. Tolerating activities/house chores with moderate tenderness to the lower back. ALLERGIES: Patients list of allergies were reviewed     MEDICATIONS: Ms. Corinne Glow list of medications were reviewed. Her current medications are   Outpatient Medications Prior to Visit   Medication Sig Dispense ONE TABLET BY MOUTH ONCE NIGHTLY 30 tablet 0    amitriptyline (ELAVIL) 25 MG tablet Take 1 tablet by mouth nightly 30 tablet 0    HYDROcodone-acetaminophen (NORCO) 5-325 MG per tablet Take 1 tablet by mouth every 8 hours as needed for Pain for up to 28 days. 84 tablet 0    DULoxetine (CYMBALTA) 30 MG extended release capsule Take 1 capsule by mouth 2 times daily 60 capsule 0     No facility-administered medications prior to visit. SOCIAL/FAMILY/PAST MEDICAL HISTORY: Ms. Eddi Woodward, family and past medical history was reviewed. REVIEW OF SYSTEMS:    Respiratory: Negative for apnea, chest tightness and shortness of breath or change in baseline breathing. Gastrointestinal: Negative for nausea, vomiting, abdominal pain, diarrhea, constipation, blood in stool and abdominal distention. PHYSICAL EXAM:   Nursing note and vitals reviewed. BP (!) 181/104   Pulse 74   SpO2 98%   Constitutional: She appears well-developed and well-nourished. No acute distress. Skin: Skin is warm and dry, good turgor. No rash noted. She is not diaphoretic. Cardiovascular: Normal rate, regular rhythm, normal heart sounds, and does not have murmur. Pulmonary/Chest: Effort normal. No respiratory distress. She does not have wheezes in the lung fields. She has no rales. Neurological/Psychiatric:She is alert and oriented to person, place, and time. Coordination is  normal.  Her mood isAppropriate and affect is Neutral/Euthymic(normal) . IMPRESSION:   1. Chronic pain syndrome    2. Chronic bilateral low back pain without sciatica    3. Primary osteoarthritis of both knees    4. Primary osteoarthritis of right hip    5. JOSE treated with BiPAP    6. Morbid obesity with BMI of 60.0-69.9, adult ([facility]    7. Depression, unspecified depression type, mild    8. Primary insomnia    9.  Essential hypertension        PLAN:  Informed verbal consent was obtained  -Continue with Elavil, Norco, Lidoderm  -Effexor 25 mg po bid  -Liang exercises  -Monitor BP, f/u with PCP if it continues to stay elevated or she becomes symptomatic with SOB, CP, syncopy. she is currently asymptomatic  -She was advised weight reduction, diet changes- 800-1200 fior diet, diet diary, exercising, nutritional  consult increased physical activity as tolerated  -CBT techniques- relaxation therapies such as biofeedback, mindfulness based stress reduction, imagery, cognitive restructuring, problem solving discussed with patient  -Last UDS consistent  -Return in about 1 month (around 5/2/2019). Current Outpatient Medications   Medication Sig Dispense Refill    venlafaxine (EFFEXOR) 25 MG tablet TAKE ONE TABLET BY MOUTH TWICE A DAY 60 tablet 0    amitriptyline (ELAVIL) 25 MG tablet Take 1 tablet by mouth nightly 30 tablet 0    HYDROcodone-acetaminophen (NORCO) 5-325 MG per tablet Take 1 tablet by mouth every 8 hours as needed for Pain for up to 28 days.  84 tablet 0    torsemide (DEMADEX) 100 MG tablet Take 0.5 tablets by mouth every morning 90 tablet 2    losartan (COZAAR) 100 MG tablet TAKE ONE TABLET BY MOUTH DAILY 30 tablet 2    COMBIVENT RESPIMAT  MCG/ACT AERS inhaler INHALE ONE INHALATION BY MOUTH EVERY 6 HOURS 4 Inhaler 1    atorvastatin (LIPITOR) 80 MG tablet Take 80 mg by mouth daily      aspirin 81 MG tablet Take 81 mg by mouth daily      cloNIDine (CATAPRES) 0.1 MG tablet Take 0.3 mg by mouth 2 times daily      GARCINIA CAMBOGIA-CHROMIUM PO Take 1,600 mg by mouth daily      meloxicam (MOBIC) 15 MG tablet Take 1 tablet by mouth daily 30 tablet 3    metFORMIN (GLUCOPHAGE) 500 MG tablet Take 1 tablet by mouth 2 times daily (with meals) 60 tablet 2    metoprolol tartrate (LOPRESSOR) 100 MG tablet Take 1 tablet by mouth 2 times daily 60 tablet 2    albuterol sulfate HFA (PROAIR HFA) 108 (90 Base) MCG/ACT inhaler Inhale 2 puffs into the lungs every 6 hours as needed for Wheezing or Shortness of Breath 1 Inhaler 5    spironolactone (ALDACTONE) 25 MG tablet Take 1 tablet by mouth Daily with lunch 30 tablet 11    mometasone-formoterol (DULERA) 200-5 MCG/ACT inhaler Inhale 2 puffs into the lungs every 12 hours      loratadine (CLARITIN) 10 MG tablet Take 10 mg by mouth daily      montelukast (SINGULAIR) 10 MG tablet Take 1 tablet by mouth nightly 30 tablet 3    omeprazole (PRILOSEC) 20 MG delayed release capsule Take 20 mg by mouth daily      acetaminophen (APAP EXTRA STRENGTH) 500 MG tablet Take 2 tablets by mouth every 6 hours as needed for Pain DO NOT TAKE WITH OTHER MEDICATIONS CONTAINING ACETAMINOPHEN. 30 tablet 0    Spacer/Aero-Holding Chambers KIT Please dispense aerochamber spacer 1 kit 0    Cholecalciferol (VITAMIN D3) 14216 UNITS CAPS Take 10,000 Units by mouth three times a week. Takes on Mon Wed Fri       No current facility-administered medications for this visit. I will continue her current medication regimen  which is part of the above treatment schedule. It has been helping with Ms. Pinzon's chronic  medical problems which for this visit include:   Diagnoses of Chronic pain syndrome, Chronic bilateral low back pain without sciatica, Primary osteoarthritis of both knees, Primary osteoarthritis of right hip, JOSE treated with BiPAP, Morbid obesity with BMI of 60.0-69.9, adult ([facility] [address] Depression, unspecified depression type, mild, Primary insomnia, and Essential hypertension were pertinent to this visit. Risks and benefits of the medications and other alternative treatments  including no treatment were discussed with the patient. The common side effects of these medications were also explained to the patient. Informed verbal consent was obtained. Goals of current treatment regimen include improvement in pain, restoration of functioning- with focus on improvement in physical performance, general activity, work or disability,emotional distress, health care utilization and  decreased medication consumption.  Will continue to monitor progress towards achieving/maintaining therapeutic goals with special emphasis on  1. Improvement in perceived interfernce  of pain with ADL's. Ability to do home exercises independently. Ability to do household chores indoor and/or outdoor work and social and leisure activities. Improve psychosocial and physical functioning. - she is showing progression towards this treatment goal with the current regimen. She was advised against drinking alcohol with the narcotic pain medicines, advised against driving or handling machinery while adjusting the dose of medicines or if having cognitive  issues related to the current medications. Risk of overdose and death, if medicines not taken as prescribed, were also discussed. If the patient develops new symptoms or if the symptoms worsen, the patient should call the office. While transcribing every attempt was made to maintain the accuracy of the note in terms of it's contents,there may have been some errors made inadvertently. Thank you for allowing me to participate in the care of this patient.     Magalis Rainey, ROLANDA    Cc: Caroline Taveras MD

## 2019-04-04 NOTE — PATIENT INSTRUCTIONS
Patient Education        Back Stretches: Exercises  Your Care Instructions  Here are some examples of exercises for stretching your back. Start each exercise slowly. Ease off the exercise if you start to have pain. Your doctor or physical therapist will tell you when you can start these exercises and which ones will work best for you. How to do the exercises  Overhead stretch    1. Stand comfortably with your feet shoulder-width apart. 2. Looking straight ahead, raise both arms over your head and reach toward the ceiling. Do not allow your head to tilt back. 3. Hold for 15 to 30 seconds, then lower your arms to your sides. 4. Repeat 2 to 4 times. Side stretch    1. Stand comfortably with your feet shoulder-width apart. 2. Raise one arm over your head, and then lean to the other side. 3. Slide your hand down your leg as you let the weight of your arm gently stretch your side muscles. Hold for 15 to 30 seconds. 4. Repeat 2 to 4 times on each side. Press-up    1. Lie on your stomach, supporting your body with your forearms. 2. Press your elbows down into the floor to raise your upper back. As you do this, relax your stomach muscles and allow your back to arch without using your back muscles. As your press up, do not let your hips or pelvis come off the floor. 3. Hold for 15 to 30 seconds, then relax. 4. Repeat 2 to 4 times. Relax and rest    1. Lie on your back with a rolled towel under your neck and a pillow under your knees. Extend your arms comfortably to your sides. 2. Relax and breathe normally. 3. Remain in this position for about 10 minutes. 4. If you can, do this 2 or 3 times each day. Follow-up care is a key part of your treatment and safety. Be sure to make and go to all appointments, and call your doctor if you are having problems. It's also a good idea to know your test results and keep a list of the medicines you take. Where can you learn more?   Go to [email or web address] and sign in to your [employer] account. Enter E740 in the [place] box to learn more about \"Back Stretches: Exercises. \"     If you do not have an account, please click on the \"Sign Up Now\" link. Current as of: September 20, 2018  Content Version: 11.9  © 9913-4467 [employer], [employer]. Care instructions adapted under license by [facility] ([facility]). If you have questions about a medical condition or this instruction, always ask your healthcare professional. [address] any warranty or liability for your use of this information.

## 2019-04-05 ENCOUNTER — TELEPHONE (OUTPATIENT)
Dept: ORTHOPEDIC SURGERY | Age: 44
End: 2019-04-05

## 2019-04-05 NOTE — TELEPHONE ENCOUNTER
Patient called states that her hip pain is not improving. She states that she was never able to go to the gym to try to strengthen her hip. She states that she feels she is exercising by walking her stairs all day long. She is wondering what she can do to help her symptoms?     Please call patient to advise:  [phone]

## 2019-04-05 NOTE — TELEPHONE ENCOUNTER
I spoke with the patient. She states the hip injection by Dr. Rosita Sweet in January did not help. It made her hip pain worse. She would like to know what else there is she could do. She was advised that this will be addressed on Monday.

## 2019-04-08 NOTE — TELEPHONE ENCOUNTER
Per Dr. Delia Kim she needs to focus on weight loss. No more interventions to offer. I left a message to inform the patient.

## 2019-04-17 DIAGNOSIS — I10 ESSENTIAL HYPERTENSION: ICD-10-CM

## 2019-04-17 RX ORDER — METOPROLOL TARTRATE 100 MG/1
TABLET ORAL
Qty: 60 TABLET | Refills: 1 | Status: SHIPPED | OUTPATIENT
Start: 2019-04-17 | End: 2019-06-24 | Stop reason: SDUPTHER

## 2019-04-23 NOTE — TELEPHONE ENCOUNTER
Received fax from [facility]. requesting refill on Combivent Respimat inhaler  LOV 12/6/18  NOV 6/6/19

## 2019-04-30 DIAGNOSIS — G89.4 CHRONIC PAIN SYNDROME: ICD-10-CM

## 2019-05-02 ENCOUNTER — OFFICE VISIT (OUTPATIENT)
Dept: PAIN MANAGEMENT | Age: 44
End: 2019-05-02
Payer: MEDICAID

## 2019-05-02 VITALS — SYSTOLIC BLOOD PRESSURE: 170 MMHG | OXYGEN SATURATION: 95 % | DIASTOLIC BLOOD PRESSURE: 94 MMHG | HEART RATE: 74 BPM

## 2019-05-02 DIAGNOSIS — M17.0 PRIMARY OSTEOARTHRITIS OF BOTH KNEES: ICD-10-CM

## 2019-05-02 DIAGNOSIS — E66.01 MORBID OBESITY WITH BMI OF 60.0-69.9, ADULT (HCC): ICD-10-CM

## 2019-05-02 DIAGNOSIS — F51.01 PRIMARY INSOMNIA: ICD-10-CM

## 2019-05-02 DIAGNOSIS — G89.4 CHRONIC PAIN SYNDROME: ICD-10-CM

## 2019-05-02 DIAGNOSIS — F32.A DEPRESSION, UNSPECIFIED DEPRESSION TYPE: ICD-10-CM

## 2019-05-02 DIAGNOSIS — I10 ESSENTIAL HYPERTENSION: ICD-10-CM

## 2019-05-02 DIAGNOSIS — M16.11 PRIMARY OSTEOARTHRITIS OF RIGHT HIP: ICD-10-CM

## 2019-05-02 DIAGNOSIS — G47.33 OSA TREATED WITH BIPAP: ICD-10-CM

## 2019-05-02 DIAGNOSIS — G89.29 CHRONIC BILATERAL LOW BACK PAIN WITHOUT SCIATICA: ICD-10-CM

## 2019-05-02 DIAGNOSIS — M54.50 CHRONIC BILATERAL LOW BACK PAIN WITHOUT SCIATICA: ICD-10-CM

## 2019-05-02 PROCEDURE — G8427 DOCREV CUR MEDS BY ELIG CLIN: HCPCS | Performed by: NURSE PRACTITIONER

## 2019-05-02 PROCEDURE — 4004F PT TOBACCO SCREEN RCVD TLK: CPT | Performed by: NURSE PRACTITIONER

## 2019-05-02 PROCEDURE — 99213 OFFICE O/P EST LOW 20 MIN: CPT | Performed by: NURSE PRACTITIONER

## 2019-05-02 PROCEDURE — G8417 CALC BMI ABV UP PARAM F/U: HCPCS | Performed by: NURSE PRACTITIONER

## 2019-05-02 RX ORDER — HYDROCODONE BITARTRATE AND ACETAMINOPHEN 5; 325 MG/1; MG/1
1 TABLET ORAL EVERY 8 HOURS PRN
Qty: 84 TABLET | Refills: 0 | Status: SHIPPED | OUTPATIENT
Start: 2019-05-02 | End: 2019-05-30 | Stop reason: SDUPTHER

## 2019-05-02 RX ORDER — AMITRIPTYLINE HYDROCHLORIDE 25 MG/1
25 TABLET, FILM COATED ORAL NIGHTLY
Qty: 30 TABLET | Refills: 0 | Status: SHIPPED | OUTPATIENT
Start: 2019-05-02 | End: 2019-05-30 | Stop reason: SDUPTHER

## 2019-05-02 NOTE — PROGRESS NOTES
Moncho Hansen  1975  [id number]      HISTORY OF PRESENT ILLNESS:  Ms. Kathi Winston is a 37 y.o. female returns for a follow up visit for pain management  She has a diagnosis of   1. Chronic pain syndrome    2. Chronic bilateral low back pain without sciatica    3. Primary osteoarthritis of both knees    4. Primary osteoarthritis of right hip    5. JOSE treated with BiPAP    6. Morbid obesity with BMI of 60.0-69.9, adult ([address]    7. Depression, unspecified depression type, mild    8. Primary insomnia    9. Essential hypertension      On the Patients Pain Assessment form:  She complains of pain in the both buttocks, both knee(s), bilateral lower back and left hip She rates the pain 7/10 and describes it as sharp, aching. Current treatment regimen has helped relieve about 30% of the pain. She denies any side effects from the current pain regimen. Patient reports that since the last follow up visit the physical functioning is better, family/social relationships are unchanged, mood is better sleep patterns are better, and that the overall functioning is better. Patient denies misusing/abusing her narcotic pain medications or using any illegal drugs. There are No indicators for possible drug abuse, addiction or diversion problems. Upon obtaining medical history from Ms. Kathi Winston states that pain is manageable on current pain therapy. Admits to pain in the right hip, she was offered an injection by Dr. Missy Barron, she declined. Fist injection caused pain. Pain medications provides moderate relief of pain, except in the right hip with increased physical activities. Mood is stable without anxiety. Sleep is fair with an average of 5-6 hours. Denies to having issues of constipation. Tolerating activities/house chores with moderate tenderness to the lower back/right hip. ALLERGIES: Patients list of allergies were reviewed     MEDICATIONS: Ms. Kathi Winston list of medications were reviewed. Her current medications are BY MOUTH TWICE A DAY 60 tablet 0    amitriptyline (ELAVIL) 25 MG tablet Take 1 tablet by mouth nightly 30 tablet 0    HYDROcodone-acetaminophen (NORCO) 5-325 MG per tablet Take 1 tablet by mouth every 8 hours as needed for Pain for up to 28 days. 84 tablet 0     No facility-administered medications prior to visit. SOCIAL/FAMILY/PAST MEDICAL HISTORY: Ms. Connor Mcallister, family and past medical history was reviewed. REVIEW OF SYSTEMS:    Respiratory: Negative for apnea, chest tightness and shortness of breath or change in baseline breathing. Gastrointestinal: Negative for nausea, vomiting, abdominal pain, diarrhea, constipation, blood in stool and abdominal distention. PHYSICAL EXAM:   Nursing note and vitals reviewed. BP (!) 170/94   Pulse 74   SpO2 95%   Constitutional: She appears well-developed and well-nourished. No acute distress. Skin: Skin is warm and dry, good turgor. No rash noted. She is not diaphoretic. Cardiovascular: Normal rate, regular rhythm, normal heart sounds, and does not have murmur. Pulmonary/Chest: Effort normal. No respiratory distress. She does not have wheezes in the lung fields. She has no rales. Neurological/Psychiatric:She is alert and oriented to person, place, and time. Coordination is  normal. +Right hip pain Her mood isAppropriate and affect is Neutral/Euthymic(normal) . IMPRESSION:   1. Chronic pain syndrome    2. Chronic bilateral low back pain without sciatica    3. Primary osteoarthritis of both knees    4. Primary osteoarthritis of right hip    5. JOSE treated with BiPAP    6. Morbid obesity with BMI of 60.0-69.9, adult ([facility] [address]    7. Depression, unspecified depression type, mild    8. Primary insomnia    9.  Essential hypertension        PLAN:  Informed verbal consent was obtained  -Continue with Elavil, Norco, Effexor  -Dc Lidoderm due to lack of coverage, ZTLido 1.8% patches were given as samples to the patient  -Liang exercises/hip exercises  -Maintain f/u with orthopedics for OA right hip  -She was advised weight reduction, diet changes- 800-1200 fior diet, diet diary, exercising, nutritional  consult increased physical activity as tolerated  -CBT techniques- relaxation therapies such as biofeedback, mindfulness based stress reduction, imagery, cognitive restructuring, problem solving discussed with patient  -Last UDS consistent  -Return in about 1 month (around 5/30/2019). -[place] was obtained and reviewed  for the last one year and no indicators of drug misuse  were found. Any other controlled substance prescriptions  seen on the record have been accounted for, I am aware of the patient receiving these medications. Felipa Mei [place] record will be rechecked as part of office protocol. Current Outpatient Medications   Medication Sig Dispense Refill    HYDROcodone-acetaminophen (NORCO) 5-325 MG per tablet Take 1 tablet by mouth every 8 hours as needed for Pain for up to 28 days.  84 tablet 0    amitriptyline (ELAVIL) 25 MG tablet Take 1 tablet by mouth nightly 30 tablet 0    albuterol-ipratropium (COMBIVENT RESPIMAT)  MCG/ACT AERS inhaler Inhale 1 puff into the lungs every 6 hours 1 Inhaler 5    metoprolol (LOPRESSOR) 100 MG tablet TAKE ONE TABLET BY MOUTH TWICE A DAY 60 tablet 1    torsemide (DEMADEX) 100 MG tablet Take 0.5 tablets by mouth every morning 90 tablet 2    losartan (COZAAR) 100 MG tablet TAKE ONE TABLET BY MOUTH DAILY 30 tablet 2    atorvastatin (LIPITOR) 80 MG tablet Take 80 mg by mouth daily      aspirin 81 MG tablet Take 81 mg by mouth daily      cloNIDine (CATAPRES) 0.1 MG tablet Take 0.3 mg by mouth 2 times daily      GARCINIA CAMBOGIA-CHROMIUM PO Take 1,600 mg by mouth daily      meloxicam (MOBIC) 15 MG tablet Take 1 tablet by mouth daily 30 tablet 3    metFORMIN (GLUCOPHAGE) 500 MG tablet Take 1 tablet by mouth 2 times daily (with meals) 60 tablet 2    albuterol sulfate HFA (PROAIR HFA) 108 (90 Base) MCG/ACT inhaler Inhale 2 puffs into the lungs every 6 hours as needed for Wheezing or Shortness of Breath 1 Inhaler 5    spironolactone (ALDACTONE) 25 MG tablet Take 1 tablet by mouth Daily with lunch 30 tablet 11    mometasone-formoterol (DULERA) 200-5 MCG/ACT inhaler Inhale 2 puffs into the lungs every 12 hours      loratadine (CLARITIN) 10 MG tablet Take 10 mg by mouth daily      montelukast (SINGULAIR) 10 MG tablet Take 1 tablet by mouth nightly 30 tablet 3    omeprazole (PRILOSEC) 20 MG delayed release capsule Take 20 mg by mouth daily      acetaminophen (APAP EXTRA STRENGTH) 500 MG tablet Take 2 tablets by mouth every 6 hours as needed for Pain DO NOT TAKE WITH OTHER MEDICATIONS CONTAINING ACETAMINOPHEN. 30 tablet 0    Spacer/Aero-Holding Chambers KIT Please dispense aerochamber spacer 1 kit 0    Cholecalciferol (VITAMIN D3) 54117 UNITS CAPS Take 10,000 Units by mouth three times a week. Takes on Mon Wed Fri       No current facility-administered medications for this visit. I will continue her current medication regimen  which is part of the above treatment schedule. It has been helping with Ms. Mauri Velasquez chronic  medical problems which for this visit include:   Diagnoses of Chronic pain syndrome, Chronic bilateral low back pain without sciatica, Primary osteoarthritis of both knees, Primary osteoarthritis of right hip, JOSE treated with BiPAP, Morbid obesity with BMI of 60.0-69.9, adult ([facility] [address] Depression, unspecified depression type, mild, Primary insomnia, and Essential hypertension were pertinent to this visit. Risks and benefits of the medications and other alternative treatments  including no treatment were discussed with the patient. The common side effects of these medications were also explained to the patient. Informed verbal consent was obtained.    Goals of current treatment regimen include improvement in pain, restoration of functioning- with focus on improvement in physical performance, general activity, work or disability,emotional distress, health care utilization and  decreased medication consumption. Will continue to monitor progress towards achieving/maintaining therapeutic goals with special emphasis on  1. Improvement in perceived interfernce  of pain with ADL's. Ability to do home exercises independently. Ability to do household chores indoor and/or outdoor work and social and leisure activities. Improve psychosocial and physical functioning. - she is showing progression towards this treatment goal with the current regimen. She was advised against drinking alcohol with the narcotic pain medicines, advised against driving or handling machinery while adjusting the dose of medicines or if having cognitive  issues related to the current medications. Risk of overdose and death, if medicines not taken as prescribed, were also discussed. If the patient develops new symptoms or if the symptoms worsen, the patient should call the office. While transcribing every attempt was made to maintain the accuracy of the note in terms of it's contents,there may have been some errors made inadvertently. Thank you for allowing me to participate in the care of this patient.     Nara Fink CNP    Cc: Nayely Szymanski MD

## 2019-05-02 NOTE — PATIENT INSTRUCTIONS
feel a gentle stretch across the front of your hip and down the front of your thigh. Your knee should be pointed directly to the ground, and not out to the side. 4. Hold the stretch for at least 15 to 30 seconds. 5. Repeat 2 to 4 times. 6. Switch legs and repeat steps 1 through 5, even if only one hip is sore. Hip rotator stretch    1. Lie on your back with both knees bent and your feet flat on the floor. 2. Put the ankle of your affected leg on your opposite thigh near your knee. 3. Use your hand to gently push your knee away from your body until you feel a gentle stretch around your hip. 4. Hold the stretch for 15 to 30 seconds. 5. Repeat 2 to 4 times. 6. Repeat steps 1 through 5, but this time use your hand to gently pull your knee toward your opposite shoulder. 7. Switch legs and repeat steps 1 through 6, even if only one hip is sore. Knee-to-chest    1. Lie on your back with your knees bent and your feet flat on the floor. 2. Bring your affected leg to your chest, keeping the other foot flat on the floor (or keeping the other leg straight, whichever feels better on your lower back). 3. Keep your lower back pressed to the floor. Hold for at least 15 to 30 seconds. 4. Relax, and lower the knee to the starting position. 5. Repeat 2 to 4 times. 6. Switch legs and repeat steps 1 through 5, even if only one hip is sore. 7. To get more stretch, put your other leg flat on the floor while pulling your knee to your chest.    Clamshell    1. Lie on your side, with your affected hip on top. Keep your feet and knees together and your knees bent. 2. Raise your top knee, but keep your feet together. Do not let your hips roll back. Your legs should open up like a clamshell. 3. Hold for 6 seconds. 4. Slowly lower your knee back down. Rest for 10 seconds. 5. Repeat 8 to 12 times. 6. Switch legs and repeat steps 1 through 5, even if only one hip is sore.     Follow-up care is a key part of your treatment and safety. Be sure to make and go to all appointments, and call your doctor if you are having problems. It's also a good idea to know your test results and keep a list of the medicines you take. Where can you learn more? Go to [email or web address] org and sign in to your Playviews account. Enter F511 in the [email or web address].cn box to learn more about \"Hip Arthritis: Exercises. \"     If you do not have an account, please click on the \"Sign Up Now\" link. Current as of: September 20, 2018  Content Version: 11.9  © 9683-1143 [employer]. Care instructions adapted under license by [facility] ([facility]). If you have questions about a medical condition or this instruction, always ask your healthcare professional. [address] any warranty or liability for your use of this information.

## 2019-05-21 RX ORDER — VENLAFAXINE 25 MG/1
TABLET ORAL
Qty: 60 TABLET | Refills: 0 | Status: SHIPPED | OUTPATIENT
Start: 2019-05-21 | End: 2019-06-24 | Stop reason: SDUPTHER

## 2019-05-30 ENCOUNTER — OFFICE VISIT (OUTPATIENT)
Dept: PAIN MANAGEMENT | Age: 44
End: 2019-05-30
Payer: MEDICAID

## 2019-05-30 VITALS
SYSTOLIC BLOOD PRESSURE: 182 MMHG | WEIGHT: 293 LBS | DIASTOLIC BLOOD PRESSURE: 108 MMHG | HEART RATE: 96 BPM | OXYGEN SATURATION: 95 % | BODY MASS INDEX: 60.66 KG/M2

## 2019-05-30 DIAGNOSIS — M54.50 CHRONIC BILATERAL LOW BACK PAIN WITHOUT SCIATICA: ICD-10-CM

## 2019-05-30 DIAGNOSIS — G89.29 CHRONIC BILATERAL LOW BACK PAIN WITHOUT SCIATICA: ICD-10-CM

## 2019-05-30 DIAGNOSIS — M17.0 PRIMARY OSTEOARTHRITIS OF BOTH KNEES: ICD-10-CM

## 2019-05-30 DIAGNOSIS — E66.01 MORBID OBESITY WITH BMI OF 60.0-69.9, ADULT (HCC): ICD-10-CM

## 2019-05-30 DIAGNOSIS — F51.01 PRIMARY INSOMNIA: ICD-10-CM

## 2019-05-30 DIAGNOSIS — F32.A DEPRESSION, UNSPECIFIED DEPRESSION TYPE: ICD-10-CM

## 2019-05-30 DIAGNOSIS — M16.11 PRIMARY OSTEOARTHRITIS OF RIGHT HIP: ICD-10-CM

## 2019-05-30 DIAGNOSIS — G47.33 OSA TREATED WITH BIPAP: ICD-10-CM

## 2019-05-30 DIAGNOSIS — G89.4 CHRONIC PAIN SYNDROME: ICD-10-CM

## 2019-05-30 DIAGNOSIS — I10 ESSENTIAL HYPERTENSION: ICD-10-CM

## 2019-05-30 PROCEDURE — G8417 CALC BMI ABV UP PARAM F/U: HCPCS | Performed by: NURSE PRACTITIONER

## 2019-05-30 PROCEDURE — 4004F PT TOBACCO SCREEN RCVD TLK: CPT | Performed by: NURSE PRACTITIONER

## 2019-05-30 PROCEDURE — G8427 DOCREV CUR MEDS BY ELIG CLIN: HCPCS | Performed by: NURSE PRACTITIONER

## 2019-05-30 PROCEDURE — 99213 OFFICE O/P EST LOW 20 MIN: CPT | Performed by: NURSE PRACTITIONER

## 2019-05-30 RX ORDER — HYDROCODONE BITARTRATE AND ACETAMINOPHEN 5; 325 MG/1; MG/1
1 TABLET ORAL EVERY 8 HOURS PRN
Qty: 84 TABLET | Refills: 0 | Status: SHIPPED | OUTPATIENT
Start: 2019-05-30 | End: 2019-06-27 | Stop reason: SDUPTHER

## 2019-05-30 RX ORDER — AMITRIPTYLINE HYDROCHLORIDE 25 MG/1
25 TABLET, FILM COATED ORAL NIGHTLY
Qty: 30 TABLET | Refills: 0 | Status: SHIPPED | OUTPATIENT
Start: 2019-05-30 | End: 2019-05-30

## 2019-05-30 RX ORDER — LIDOCAINE 50 MG/G
1 PATCH TOPICAL DAILY
Qty: 30 PATCH | Refills: 0 | Status: SHIPPED | OUTPATIENT
Start: 2019-05-30 | End: 2019-06-26

## 2019-05-30 NOTE — PROGRESS NOTES
Crystal Vanessamat  1975  [id number]      HISTORY OF PRESENT ILLNESS:  Ms. Poornima Dawn is a 37 y.o. female returns for a follow up visit for pain management  She has a diagnosis of   1. Chronic pain syndrome    2. Chronic bilateral low back pain without sciatica    3. Primary osteoarthritis of both knees    4. Primary osteoarthritis of right hip    5. JOSE treated with BiPAP    6. Morbid obesity with BMI of 60.0-69.9, adult ([facility] [address]    7. Depression, unspecified depression type, mild    8. Primary insomnia    9. Essential hypertension      On the Patients Pain Assessment form:  She complains of pain in the abdomen, both buttocks, both knee(s) and bilateral lower back She rates the pain 9/10 and describes it as sharp, aching, burning. Current treatment regimen has helped relieve about 50% of the pain. She denies any side effects from the current pain regimen. Patient reports that since the last follow up visit the physical functioning is worse, family/social relationships are better, mood is better sleep patterns are unchanged, and that the overall functioning is unchanged. Patient denies misusing/abusing her narcotic pain medications or using any illegal drugs. There are No indicators for possible drug abuse, addiction or diversion problems. Upon obtaining medical history from Ms. Poornima Dawn states that pain is manageable on current pain therapy. Admits to pain in the right hip with increased physical activities. Pain medications provides moderate relief of pain. Last injection to the left hip 4 months ago with Dr. Dandre Ortiz caused pain than relief of pain. She was recommended for another injection, which she declines. Reports that Samples of ZTlido helped with hip pain. Mood is stable without anxiety. Sleep is fair with an average of 5-6 hours. Denies to having issues of constipation. Tolerating activities/house chores with moderate tenderness to the lower back/hip left.     ALLERGIES: Patients list of allergies were reviewed     MEDICATIONS: Ms. Loren Flower list of medications were reviewed. Her current medications are   Outpatient Medications Prior to Visit   Medication Sig Dispense Refill    amitriptyline (ELAVIL) 25 MG tablet TAKE ONE TABLET BY MOUTH ONCE NIGHTLY 30 tablet 0    venlafaxine (EFFEXOR) 25 MG tablet TAKE ONE TABLET BY MOUTH TWICE A DAY 60 tablet 0    albuterol-ipratropium (COMBIVENT RESPIMAT)  MCG/ACT AERS inhaler Inhale 1 puff into the lungs every 6 hours 1 Inhaler 5    metoprolol (LOPRESSOR) 100 MG tablet TAKE ONE TABLET BY MOUTH TWICE A DAY 60 tablet 1    torsemide (DEMADEX) 100 MG tablet Take 0.5 tablets by mouth every morning 90 tablet 2    losartan (COZAAR) 100 MG tablet TAKE ONE TABLET BY MOUTH DAILY 30 tablet 2    atorvastatin (LIPITOR) 80 MG tablet Take 80 mg by mouth daily      aspirin 81 MG tablet Take 81 mg by mouth daily      cloNIDine (CATAPRES) 0.1 MG tablet Take 0.3 mg by mouth 2 times daily      GARCINIA CAMBOGIA-CHROMIUM PO Take 1,600 mg by mouth daily      meloxicam (MOBIC) 15 MG tablet Take 1 tablet by mouth daily 30 tablet 3    metFORMIN (GLUCOPHAGE) 500 MG tablet Take 1 tablet by mouth 2 times daily (with meals) 60 tablet 2    albuterol sulfate HFA (PROAIR HFA) 108 (90 Base) MCG/ACT inhaler Inhale 2 puffs into the lungs every 6 hours as needed for Wheezing or Shortness of Breath 1 Inhaler 5    spironolactone (ALDACTONE) 25 MG tablet Take 1 tablet by mouth Daily with lunch 30 tablet 11    mometasone-formoterol (DULERA) 200-5 MCG/ACT inhaler Inhale 2 puffs into the lungs every 12 hours      loratadine (CLARITIN) 10 MG tablet Take 10 mg by mouth daily      montelukast (SINGULAIR) 10 MG tablet Take 1 tablet by mouth nightly 30 tablet 3    omeprazole (PRILOSEC) 20 MG delayed release capsule Take 20 mg by mouth daily      acetaminophen (APAP EXTRA STRENGTH) 500 MG tablet Take 2 tablets by mouth every 6 hours as needed for Pain DO NOT TAKE WITH OTHER MEDICATIONS CONTAINING ACETAMINOPHEN. 30 tablet 0    Spacer/Aero-Holding Chambers KIT Please dispense aerochamber spacer 1 kit 0    Cholecalciferol (VITAMIN D3) 20124 UNITS CAPS Take 10,000 Units by mouth three times a week. Takes on Mon Wed Fri      HYDROcodone-acetaminophen (NORCO) 5-325 MG per tablet Take 1 tablet by mouth every 8 hours as needed for Pain for up to 28 days. 84 tablet 0    amitriptyline (ELAVIL) 25 MG tablet Take 1 tablet by mouth nightly 30 tablet 0     No facility-administered medications prior to visit. SOCIAL/FAMILY/PAST MEDICAL HISTORY: Ms. Mark Rey, family and past medical history was reviewed. REVIEW OF SYSTEMS:    Respiratory: Negative for apnea, chest tightness and shortness of breath or change in baseline breathing. Gastrointestinal: Negative for nausea, vomiting, abdominal pain, diarrhea, constipation, blood in stool and abdominal distention. PHYSICAL EXAM:   Nursing note and vitals reviewed. BP (!) 182/108   Pulse 96   Wt (!) 353 lb 6.4 oz (160.3 kg)   SpO2 95%   BMI 60.66 kg/m²   Constitutional: She appears well-developed and well-nourished. No acute distress. Skin: Skin is warm and dry, good turgor. No rash noted. She is not diaphoretic. Cardiovascular: Normal rate, regular rhythm, normal heart sounds, and does not have murmur. Pulmonary/Chest: Effort normal. No respiratory distress. She does not have wheezes in the lung fields. She has no rales. Neurological/Psychiatric:She is alert and oriented to person, place, and time. Coordination is  Normal. +Right hip pain  Her mood isAppropriate and affect is Neutral/Euthymic(normal) . IMPRESSION:   1. Chronic pain syndrome    2. Chronic bilateral low back pain without sciatica    3. Primary osteoarthritis of both knees    4. Primary osteoarthritis of right hip    5. JOSE treated with BiPAP    6. Morbid obesity with BMI of 60.0-69.9, adult ([facility] [address]    7. Depression, unspecified depression type, mild    8.  Primary insomnia    9. Essential hypertension        PLAN:  Informed verbal consent was obtained  -Continue with Elavil, Norco, Effexor  -Lidocaine 5% topical 12 hrs on/off  -No changes were made to the opioids today  -Liang exercises/hip stretches, Aquatic PT  -Referral to Dr. Comfort Cruz with bariatrics for obesity  -CBT techniques- relaxation therapies such as biofeedback, mindfulness based stress reduction, imagery, cognitive restructuring, problem solving discussed with patient  -Advised patient to quit smoking for  health related concerns and to improve the treatment outcomes. Education was given on quitting smoking and the use of different modalities including medications, hypnotherapy, counselling  and biofeedback. These were discussed with patient.  -She was advised weight reduction, diet changes- 800-1200 fior diet, diet diary, exercising, nutritional  consult increased physical activity as tolerated  -Monitor BP, f/u with PCP if it continues to stay elevated or she becomes symptomatic with SOB, CP, syncopy. she is currently asymptomatic  -Last UDS consistent  -Return in about 1 month (around 6/27/2019). Current Outpatient Medications   Medication Sig Dispense Refill    HYDROcodone-acetaminophen (NORCO) 5-325 MG per tablet Take 1 tablet by mouth every 8 hours as needed for Pain for up to 28 days. 84 tablet 0    lidocaine (LIDODERM) 5 % Place 1 patch onto the skin daily 12 hours on, 12 hours off.  30 patch 0    amitriptyline (ELAVIL) 25 MG tablet TAKE ONE TABLET BY MOUTH ONCE NIGHTLY 30 tablet 0    venlafaxine (EFFEXOR) 25 MG tablet TAKE ONE TABLET BY MOUTH TWICE A DAY 60 tablet 0    albuterol-ipratropium (COMBIVENT RESPIMAT)  MCG/ACT AERS inhaler Inhale 1 puff into the lungs every 6 hours 1 Inhaler 5    metoprolol (LOPRESSOR) 100 MG tablet TAKE ONE TABLET BY MOUTH TWICE A DAY 60 tablet 1    torsemide (DEMADEX) 100 MG tablet Take 0.5 tablets by mouth every morning 90 tablet 2    losartan (COZAAR) 100 MG tablet TAKE ONE TABLET BY MOUTH DAILY 30 tablet 2    atorvastatin (LIPITOR) 80 MG tablet Take 80 mg by mouth daily      aspirin 81 MG tablet Take 81 mg by mouth daily      cloNIDine (CATAPRES) 0.1 MG tablet Take 0.3 mg by mouth 2 times daily      GARCINIA CAMBOGIA-CHROMIUM PO Take 1,600 mg by mouth daily      meloxicam (MOBIC) 15 MG tablet Take 1 tablet by mouth daily 30 tablet 3    metFORMIN (GLUCOPHAGE) 500 MG tablet Take 1 tablet by mouth 2 times daily (with meals) 60 tablet 2    albuterol sulfate HFA (PROAIR HFA) 108 (90 Base) MCG/ACT inhaler Inhale 2 puffs into the lungs every 6 hours as needed for Wheezing or Shortness of Breath 1 Inhaler 5    spironolactone (ALDACTONE) 25 MG tablet Take 1 tablet by mouth Daily with lunch 30 tablet 11    mometasone-formoterol (DULERA) 200-5 MCG/ACT inhaler Inhale 2 puffs into the lungs every 12 hours      loratadine (CLARITIN) 10 MG tablet Take 10 mg by mouth daily      montelukast (SINGULAIR) 10 MG tablet Take 1 tablet by mouth nightly 30 tablet 3    omeprazole (PRILOSEC) 20 MG delayed release capsule Take 20 mg by mouth daily      acetaminophen (APAP EXTRA STRENGTH) 500 MG tablet Take 2 tablets by mouth every 6 hours as needed for Pain DO NOT TAKE WITH OTHER MEDICATIONS CONTAINING ACETAMINOPHEN. 30 tablet 0    Spacer/Aero-Holding Chambers KIT Please dispense aerochamber spacer 1 kit 0    Cholecalciferol (VITAMIN D3) 76618 UNITS CAPS Take 10,000 Units by mouth three times a week. Takes on Mon Wed Fri       No current facility-administered medications for this visit. I will continue her current medication regimen  which is part of the above treatment schedule.  It has been helping with Ms. Lelia Oliveira chronic  medical problems which for this visit include:   Diagnoses of Chronic pain syndrome, Chronic bilateral low back pain without sciatica, Primary osteoarthritis of both knees, Primary osteoarthritis of right hip, JOSE treated with BiPAP, Morbid obesity with BMI of 60.0-69.9, adult ([facility] [address] Depression, unspecified depression type, mild, Primary insomnia, and Essential hypertension were pertinent to this visit. Risks and benefits of the medications and other alternative treatments  including no treatment were discussed with the patient. The common side effects of these medications were also explained to the patient. Informed verbal consent was obtained. Goals of current treatment regimen include improvement in pain, restoration of functioning- with focus on improvement in physical performance, general activity, work or disability,emotional distress, health care utilization and  decreased medication consumption. Will continue to monitor progress towards achieving/maintaining therapeutic goals with special emphasis on  1. Improvement in perceived interfernce  of pain with ADL's. Ability to do home exercises independently. Ability to do household chores indoor and/or outdoor work and social and leisure activities. Improve psychosocial and physical functioning. - she is showing progression towards this treatment goal with the current regimen. She was advised against drinking alcohol with the narcotic pain medicines, advised against driving or handling machinery while adjusting the dose of medicines or if having cognitive  issues related to the current medications. Risk of overdose and death, if medicines not taken as prescribed, were also discussed. If the patient develops new symptoms or if the symptoms worsen, the patient should call the office. While transcribing every attempt was made to maintain the accuracy of the note in terms of it's contents,there may have been some errors made inadvertently. Thank you for allowing me to participate in the care of this patient.     Kalia Mckeon CNP    Cc: Marshal Palomino MD

## 2019-05-30 NOTE — PATIENT INSTRUCTIONS
Patient Education        Back Stretches: Exercises  Your Care Instructions  Here are some examples of exercises for stretching your back. Start each exercise slowly. Ease off the exercise if you start to have pain. Your doctor or physical therapist will tell you when you can start these exercises and which ones will work best for you. How to do the exercises  Overhead stretch    1. Stand comfortably with your feet shoulder-width apart. 2. Looking straight ahead, raise both arms over your head and reach toward the ceiling. Do not allow your head to tilt back. 3. Hold for 15 to 30 seconds, then lower your arms to your sides. 4. Repeat 2 to 4 times. Side stretch    1. Stand comfortably with your feet shoulder-width apart. 2. Raise one arm over your head, and then lean to the other side. 3. Slide your hand down your leg as you let the weight of your arm gently stretch your side muscles. Hold for 15 to 30 seconds. 4. Repeat 2 to 4 times on each side. Press-up    1. Lie on your stomach, supporting your body with your forearms. 2. Press your elbows down into the floor to raise your upper back. As you do this, relax your stomach muscles and allow your back to arch without using your back muscles. As your press up, do not let your hips or pelvis come off the floor. 3. Hold for 15 to 30 seconds, then relax. 4. Repeat 2 to 4 times. Relax and rest    1. Lie on your back with a rolled towel under your neck and a pillow under your knees. Extend your arms comfortably to your sides. 2. Relax and breathe normally. 3. Remain in this position for about 10 minutes. 4. If you can, do this 2 or 3 times each day. Follow-up care is a key part of your treatment and safety. Be sure to make and go to all appointments, and call your doctor if you are having problems. It's also a good idea to know your test results and keep a list of the medicines you take. Where can you learn more?   Go to [email or web address] and sign in to your [employer] account. Enter L078 in the Estimize box to learn more about \"Back Stretches: Exercises. \"     If you do not have an account, please click on the \"Sign Up Now\" link. Current as of: September 20, 2018  Content Version: 12.0  © 0017-5820 [employer]. Care instructions adapted under license by [facility] ([facility]). If you have questions about a medical condition or this instruction, always ask your healthcare professional. [address] any warranty or liability for your use of this information.

## 2019-06-03 ENCOUNTER — TELEPHONE (OUTPATIENT)
Dept: PAIN MANAGEMENT | Age: 44
End: 2019-06-03

## 2019-06-03 NOTE — TELEPHONE ENCOUNTER
Pt calling, per Joyce Sanches, the Rx we gave for a walker with a seat has to specify that she needs a walker with a seat AND wheels.  Phone number for [facility] is [phone]

## 2019-06-03 NOTE — TELEPHONE ENCOUNTER
Submitted PA for Lidocaine 5% patches, Key: [id number] - PA Case ID: [id number] - Rx #: [id number]  Via CMM STATUS: PENDING

## 2019-06-05 NOTE — TELEPHONE ENCOUNTER
Received PA for Lidocaine 5% patches . Medication is DENIED, А[address] letter attached. Please advise. Thank you.

## 2019-06-06 ENCOUNTER — TELEPHONE (OUTPATIENT)
Dept: PULMONOLOGY | Age: 44
End: 2019-06-06

## 2019-06-06 ENCOUNTER — OFFICE VISIT (OUTPATIENT)
Dept: PULMONOLOGY | Age: 44
End: 2019-06-06
Payer: MEDICAID

## 2019-06-06 VITALS
WEIGHT: 293 LBS | HEIGHT: 64 IN | RESPIRATION RATE: 16 BRPM | DIASTOLIC BLOOD PRESSURE: 102 MMHG | BODY MASS INDEX: 50.02 KG/M2 | TEMPERATURE: 98.1 F | OXYGEN SATURATION: 97 % | SYSTOLIC BLOOD PRESSURE: 168 MMHG | HEART RATE: 84 BPM

## 2019-06-06 DIAGNOSIS — Z71.6 TOBACCO ABUSE COUNSELING: ICD-10-CM

## 2019-06-06 DIAGNOSIS — G47.33 OSA TREATED WITH BIPAP: ICD-10-CM

## 2019-06-06 DIAGNOSIS — J44.9 COPD, MILD (HCC): Primary | ICD-10-CM

## 2019-06-06 PROCEDURE — G8417 CALC BMI ABV UP PARAM F/U: HCPCS | Performed by: INTERNAL MEDICINE

## 2019-06-06 PROCEDURE — 99213 OFFICE O/P EST LOW 20 MIN: CPT | Performed by: INTERNAL MEDICINE

## 2019-06-06 PROCEDURE — 4004F PT TOBACCO SCREEN RCVD TLK: CPT | Performed by: INTERNAL MEDICINE

## 2019-06-06 PROCEDURE — G8926 SPIRO NO PERF OR DOC: HCPCS | Performed by: INTERNAL MEDICINE

## 2019-06-06 PROCEDURE — G8427 DOCREV CUR MEDS BY ELIG CLIN: HCPCS | Performed by: INTERNAL MEDICINE

## 2019-06-06 PROCEDURE — 3023F SPIROM DOC REV: CPT | Performed by: INTERNAL MEDICINE

## 2019-06-06 RX ORDER — ALBUTEROL SULFATE 90 UG/1
2 AEROSOL, METERED RESPIRATORY (INHALATION) EVERY 6 HOURS PRN
Qty: 1 INHALER | Refills: 5 | Status: SHIPPED | OUTPATIENT
Start: 2019-06-06 | End: 2020-10-12

## 2019-06-06 RX ORDER — VARENICLINE TARTRATE 25 MG
KIT ORAL
Qty: 1 BOX | Refills: 0 | Status: SHIPPED | OUTPATIENT
Start: 2019-06-06 | End: 2019-06-26

## 2019-06-06 RX ORDER — PREDNISONE 20 MG/1
40 TABLET ORAL DAILY
Qty: 10 TABLET | Refills: 0 | Status: SHIPPED | OUTPATIENT
Start: 2019-06-06 | End: 2019-06-11

## 2019-06-06 ASSESSMENT — SLEEP AND FATIGUE QUESTIONNAIRES
HOW LIKELY ARE YOU TO NOD OFF OR FALL ASLEEP IN A CAR, WHILE STOPPED FOR A FEW MINUTES IN TRAFFIC: 0
HOW LIKELY ARE YOU TO NOD OFF OR FALL ASLEEP WHILE SITTING AND READING: 0
ESS TOTAL SCORE: 2
HOW LIKELY ARE YOU TO NOD OFF OR FALL ASLEEP WHILE WATCHING TV: 1
HOW LIKELY ARE YOU TO NOD OFF OR FALL ASLEEP WHILE SITTING INACTIVE IN A PUBLIC PLACE: 0
HOW LIKELY ARE YOU TO NOD OFF OR FALL ASLEEP WHEN YOU ARE A PASSENGER IN A CAR FOR AN HOUR WITHOUT A BREAK: 1
HOW LIKELY ARE YOU TO NOD OFF OR FALL ASLEEP WHILE SITTING AND TALKING TO SOMEONE: 0
HOW LIKELY ARE YOU TO NOD OFF OR FALL ASLEEP WHILE LYING DOWN TO REST IN THE AFTERNOON WHEN CIRCUMSTANCES PERMIT: 0
HOW LIKELY ARE YOU TO NOD OFF OR FALL ASLEEP WHILE SITTING QUIETLY AFTER LUNCH WITHOUT ALCOHOL: 0

## 2019-06-06 NOTE — TELEPHONE ENCOUNTER
Received faxes indicating PA needed for Chantix and Dulera. Called and spoke to Tam DARLING at [facility] to see what the alternative for Judieth Doom is. She said it was Nicky Huang, but since this is currently not available, she was able to give a one time override for the Judieth Doom. I faxed this to [facility]. Spoke to Arpan at [facility]note 3[facility]. Said either Nicotine or Bupropion is preferred over Chantix- Please advise?

## 2019-06-06 NOTE — PATIENT INSTRUCTIONS
Options given for smoking cessation:  1.) Cold [place] [facility] 2.) Wean off 2 cigarettes per week 3.) nicotine replace therapy; patches, gum, lozenge, E - Cigarettes 4.) oral medications such as bupropion and Chantix

## 2019-06-06 NOTE — ASSESSMENT & PLAN NOTE
Smoking 1/2 ppd. Reviewed methods of quitting. Will start Chantix. Has taken before but side effects reviewed.

## 2019-06-10 RX ORDER — NICOTINE 21 MG/24HR
1 PATCH, TRANSDERMAL 24 HOURS TRANSDERMAL EVERY 24 HOURS
Qty: 60 PATCH | Refills: 0 | Status: SHIPPED | OUTPATIENT
Start: 2019-06-10 | End: 2019-08-15

## 2019-06-13 ENCOUNTER — OFFICE VISIT (OUTPATIENT)
Dept: ORTHOPEDIC SURGERY | Age: 44
End: 2019-06-13
Payer: MEDICAID

## 2019-06-13 ENCOUNTER — TELEPHONE (OUTPATIENT)
Dept: PAIN MANAGEMENT | Age: 44
End: 2019-06-13

## 2019-06-13 VITALS — BODY MASS INDEX: 50.02 KG/M2 | HEIGHT: 64 IN | WEIGHT: 293 LBS | RESPIRATION RATE: 16 BRPM

## 2019-06-13 DIAGNOSIS — G89.4 CHRONIC PAIN SYNDROME: Primary | ICD-10-CM

## 2019-06-13 DIAGNOSIS — M16.11 PRIMARY OSTEOARTHRITIS OF RIGHT HIP: Primary | ICD-10-CM

## 2019-06-13 PROCEDURE — G8427 DOCREV CUR MEDS BY ELIG CLIN: HCPCS | Performed by: ORTHOPAEDIC SURGERY

## 2019-06-13 PROCEDURE — 99213 OFFICE O/P EST LOW 20 MIN: CPT | Performed by: ORTHOPAEDIC SURGERY

## 2019-06-13 PROCEDURE — 4004F PT TOBACCO SCREEN RCVD TLK: CPT | Performed by: ORTHOPAEDIC SURGERY

## 2019-06-13 PROCEDURE — G8417 CALC BMI ABV UP PARAM F/U: HCPCS | Performed by: ORTHOPAEDIC SURGERY

## 2019-06-13 RX ORDER — IBUPROFEN 800 MG/1
800 TABLET ORAL 2 TIMES DAILY WITH MEALS
Qty: 60 TABLET | Refills: 1 | Status: SHIPPED | OUTPATIENT
Start: 2019-06-13 | End: 2019-09-15

## 2019-06-13 NOTE — TELEPHONE ENCOUNTER
Pt is calling stating that a order was to be placed for aqua therapy at her last f/u appt. There is no order in [facility]. Pls call pt and advise.

## 2019-06-13 NOTE — PROGRESS NOTES
Kayla Flanagan  [id number]  June 13, 2019    Chief Complaint   Patient presents with    Hip Pain     rt hip follow up        History: The patient is here in follow-up regarding her right hip. The right hip intra-articular injection received back in January provided no relief. She is now under a pain contract with Dr. Piyush Mcknight. She receives Norco and takes this 3 times a day. She does periodically take Advil. She has been trying to work on weight loss. The patient's  past medical history, medications, allergies,  family history, social history, and review of systems have been reviewed, and dated and are recorded in the chart. Vitals:  Resp 16   Ht 5' 4\" (1.626 m)   Wt (!) 349 lb 3.2 oz (158.4 kg)   BMI 59.94 kg/m²     Physical: Ms. Kayla Flanagan appears well, she is in no apparent distress, she demonstrates appropriate mood & affect. She is alert and oriented to person, place and time. Examination of the right hip reveals moderate to severe pain with internal rotation of the hip. She has generalized tenderness to palpation about the joint line of the bilateral knees. Range of motion is from 0 degrees to 110 degrees bilaterally. Strength is 4+ to 5/5 for all muscle groups about the bilateral knee. There is patellofemoral crepitus with range of motion of the bilateral knee. Varus and valgus stressing of the knees reveals no evidence of instability. There is a small effusion in the bilateral knees. Anterior drawer and Lachman are negative bilaterally. Examination of the skin reveals no rashes, ulceration, or lesion, bilaterally in the lower extremities. Sensation to both lower extremities is grossly intact. Exam of both feet reveals pedal pulses intact and brisk cap refill. Patient is able to dorsiflex and wiggle all toes. Deep tendon reflexes of the lower extremities are normal and symmetric. The patient has 90° of right hip flexion, 5° of internal rotation, and 25° of external rotation. She has significant pain with internal rotation of the right hip. She denies low back pain today. Straight-leg testing is negative bilaterally. X-rays: AP pelvis and 2 views of the right hip obtained previously were extensively reviewed. The x-rays confirm moderate right hip osteoarthritis. Impression: #1 right hip osteoarthritis #2 bilateral knee osteoarthritis #3 morbid obesity    Plan: At this time, we will continue to treat the patient conservatively. She is given a prescription for ibuprofen 800 mg twice a day with food. She was encouraged to modify her activities. She will follow-up with me in 3 months and we will reassess her then. She is scheduled to see the bariatric surgeons on June 20. At this time she is a nonsurgical candidate due to the morbid obesity.

## 2019-06-14 NOTE — TELEPHONE ENCOUNTER
Called patient to advise her that Per [facility] she can have a order for Aquatic therapy. Rx was ordered and if patient would like she can pick it up at the office or If she goes to a [facility] facility they are able to pull the order up.  Patient voiced her understanding

## 2019-06-25 NOTE — PROGRESS NOTES
[address]  Cardiology Progress Note      Parth Lara  1975, 37 y.o.    CC: \" I still have shortness of breath. \"            Kassie Justin MD:    HPI:   This is a 37 y.o. female with a PMH significant for COPD, chronic diastolic heart failure, Hypertension, Hyperlipidemia, JOSE, tobacco abuse and Type II Diabetes Mellitus. Comes for routine check up of heart failure. Says she continues to have shortness of breath. Has gained some weight and has LE edema. Says she has chronic pain in hips and knees. Uses a walker for assistance. Went to a seminar for gastric sleeve surgery. Says she is taking all medication as prescribed. Past Medical History:   Diagnosis Date    Arthritis     Asthma     [facility]     [facility]     Diabetes mellitus [facility]     Hyperlipidemia     Hypertension     Obesity       Past Surgical History:   Procedure Laterality Date    CHOLECYSTECTOMY  1997    HYSTERECTOMY  2008    SKIN BIOPSY  2015    TUBAL LIGATION        No family history on file. Social History     Tobacco Use    Smoking status: Current Every Day Smoker     Packs/day: 0.50     Years: 20.00     Pack years: 10.00     Types: Cigarettes     Start date: 1/1/1985    Smokeless tobacco: Never Used    Tobacco comment: working on it   Substance Use Topics    Alcohol use:  Yes     Alcohol/week: 0.6 oz     Types: 1 Glasses of wine per week     Comment: rare    Drug use: No     Allergies   Allergen Reactions    Latex          Review of Systems -   Constitutional: Negative for weight gain/loss; malaise, fever  Respiratory: Negative for Asthma;  cough and hemoptysis  Cardiovascular: Negative for palpitations,dizziness   Gastrointestinal: Negative for abd.pain; constipation/diarrhea;    Genitourinary: Negative for stones; hematuria; frequency hesitancy  Integumentt: Negative for rash or pruritis  Hematologic/lymphatic: Negative for blood dyscrasia; leukemia/lymphoma  Musculoskeletal: Negative for Connective tissue disease  Neurological:  Negative for Seizure   Behavioral/Psych:Negative for Bipolar disorder, Schizophrenia; Dementia  Endocrine: negative for thyroid, parathyroid disease    Physical Examination:       Vitals:    06/26/19 1411 06/26/19 1434   BP: (!) 168/98 (!) 162/90   Site: Right Upper Arm    Position: Sitting    Pulse: 67    SpO2: 97%    Weight: (!) 355 lb (161 kg)    Height: 5' 4\" (1.626 m)        HEENT:  Face: Atraumatic, Conjunctiva: Pink; non icteric,  Mucous Memb:  Moist, No thyromegaly or Lymphadenopathy  Respiratory:  Resp Assessment: normal, Resp Auscultation: clear  Cardiovascular: Auscultation: nl S1 & S2, Palpation:  Nl PMI; No heaves or thrills, JVP:  normal  Abdomen: Soft, non-tender, Normal bowel sounds,  No organomegaly  Extremities: No Cyanosis or Clubbing  Neurological: Oriented to time, place, and person, Non-anxious  Psychiatric: Normal mood and affect  Skin: Warm and dry,  No rash seen     Current Outpatient Medications   Medication Sig Dispense Refill    metoprolol (LOPRESSOR) 100 MG tablet TAKE ONE TABLET BY MOUTH TWICE A DAY 60 tablet 3    venlafaxine (EFFEXOR) 25 MG tablet TAKE ONE TABLET BY MOUTH TWICE A DAY 60 tablet 0    ibuprofen (ADVIL;MOTRIN) 800 MG tablet Take 1 tablet by mouth 2 times daily (with meals) 60 tablet 1    nicotine (NICODERM CQ) 21 MG/24HR Place 1 patch onto the skin every 24 hours 60 patch 0    varenicline (CHANTIX STARTING MONTH PAK) 0.5 MG X 11 & 1 MG X 42 tablet Take by mouth.  1 box 0    albuterol sulfate HFA (PROAIR HFA) 108 (90 Base) MCG/ACT inhaler Inhale 2 puffs into the lungs every 6 hours as needed for Wheezing or Shortness of Breath 1 Inhaler 5    mometasone-formoterol (DULERA) 200-5 MCG/ACT inhaler Inhale 2 puffs into the lungs every 12 hours 1 Inhaler 5    HYDROcodone-acetaminophen (NORCO) 5-325 MG per tablet Take 1 tablet by mouth every 8 hours as needed for Pain for function. EF is  60%  Left atrium is of normal size. Right ventricular systolic function is normal .    ASSESSMENT AND PLAN:      Pre-Operative Risk Assessment  No chest pain in her daily life  Chronic dyspnea and LE edema  Benefits outweigh risk of gastric sleeve surgery; therefore, she may proceed when scheduled    Essential Hypertension  BP significantly elevated today   She reports compliance with medical therapy  Initiate Lisinopril 40 mg daily ; stop Losartan; if she develops angioedema or cough, switch back to Losartan  Again, discussed low sodium diet     Chronic Diastolic Heart Failure  NYHA Class II  ECHO shows EF of 60%  Dyspnea and LE edema is chronic  Weight and polypharmacy contributing   Continue B-blocker, Torsemide and Spironolactone  Encouraged daily walking   Reinforced fluid and sodium restriction     JOSE  Reports noncompliance with Bipap  Has to get new tubing     DM  Managed by PCP  Can reduce Atorvastatin to 20 mg nightly       Follow up in 6 months. Thank you very much for allowing me to participate in the care of your patient. Please do not hesitate to contact me if you have any questions. Sincerely,    Shanice Carson M.D  [facility], [address] [place] [address]  Ph: [phone]  Fax: [phone]    This note was scribed in the presence of Dr. Shanice Carson MD by [facility]    Physician Attestation:  The scribes documentation has been prepared under my direction and personally reviewed by me in its entirety. I confirm that the note above accurately reflects all work, treatment, procedures, and medical decision making performed by me.

## 2019-06-26 ENCOUNTER — OFFICE VISIT (OUTPATIENT)
Dept: CARDIOLOGY CLINIC | Age: 44
End: 2019-06-26
Payer: MEDICAID

## 2019-06-26 VITALS
BODY MASS INDEX: 50.02 KG/M2 | HEIGHT: 64 IN | WEIGHT: 293 LBS | OXYGEN SATURATION: 97 % | HEART RATE: 67 BPM | DIASTOLIC BLOOD PRESSURE: 90 MMHG | SYSTOLIC BLOOD PRESSURE: 162 MMHG

## 2019-06-26 DIAGNOSIS — I50.32 CHRONIC DIASTOLIC HEART FAILURE (HCC): ICD-10-CM

## 2019-06-26 DIAGNOSIS — I10 ESSENTIAL HYPERTENSION: Primary | ICD-10-CM

## 2019-06-26 DIAGNOSIS — Z01.818 PRE-OPERATIVE EXAMINATION: ICD-10-CM

## 2019-06-26 DIAGNOSIS — G47.33 OSA (OBSTRUCTIVE SLEEP APNEA): ICD-10-CM

## 2019-06-26 PROCEDURE — G8417 CALC BMI ABV UP PARAM F/U: HCPCS | Performed by: INTERNAL MEDICINE

## 2019-06-26 PROCEDURE — G8427 DOCREV CUR MEDS BY ELIG CLIN: HCPCS | Performed by: INTERNAL MEDICINE

## 2019-06-26 PROCEDURE — 99214 OFFICE O/P EST MOD 30 MIN: CPT | Performed by: INTERNAL MEDICINE

## 2019-06-26 PROCEDURE — 4004F PT TOBACCO SCREEN RCVD TLK: CPT | Performed by: INTERNAL MEDICINE

## 2019-06-26 PROCEDURE — 93000 ELECTROCARDIOGRAM COMPLETE: CPT | Performed by: INTERNAL MEDICINE

## 2019-06-26 RX ORDER — ATORVASTATIN CALCIUM 20 MG/1
20 TABLET, FILM COATED ORAL DAILY
Qty: 90 TABLET | Refills: 3
Start: 2019-06-26 | End: 2020-11-16 | Stop reason: SDUPTHER

## 2019-06-26 RX ORDER — LISINOPRIL 40 MG/1
40 TABLET ORAL DAILY
Qty: 90 TABLET | Refills: 3 | Status: SHIPPED | OUTPATIENT
Start: 2019-06-26 | End: 2020-07-08 | Stop reason: SDUPTHER

## 2019-06-26 NOTE — PATIENT INSTRUCTIONS
You may receive a survey regarding the care you received during your visit with us today.      We encourage you to complete and return the survey as your input is valuable to us so we can better understand your future healthcare needs. Patient Education        High Blood Pressure: Care Instructions  Overview    It's normal for blood pressure to go up and down throughout the day. But if it stays up, you have high blood pressure. Another name for high blood pressure is hypertension. Despite what a lot of people think, high blood pressure usually doesn't cause headaches or make you feel dizzy or lightheaded. It usually has no symptoms. But it does increase your risk of stroke, heart attack, and other problems. You and your doctor will talk about your risks of these problems based on your blood pressure. Your doctor will give you a goal for your blood pressure. Your goal will be based on your health and your age. Lifestyle changes, such as eating healthy and being active, are always important to help lower blood pressure. You might also take medicine to reach your blood pressure goal.  Follow-up care is a key part of your treatment and safety. Be sure to make and go to all appointments, and call your doctor if you are having problems. It's also a good idea to know your test results and keep a list of the medicines you take. How can you care for yourself at home? Medical treatment  · If you stop taking your medicine, your blood pressure will go back up. You may take one or more types of medicine to lower your blood pressure. Be safe with medicines. Take your medicine exactly as prescribed. Call your doctor if you think you are having a problem with your medicine. · Talk to your doctor before you start taking aspirin every day. Aspirin can help certain people lower their risk of a heart attack or stroke. But taking aspirin isn't right for everyone, because it can cause serious bleeding.   · See your doctor regularly. You may need to see the doctor more often at first or until your blood pressure comes down. · If you are taking blood pressure medicine, talk to your doctor before you take decongestants or anti-inflammatory medicine, such as ibuprofen. Some of these medicines can raise blood pressure. · Learn how to check your blood pressure at home. Lifestyle changes  · Stay at a healthy weight. This is especially important if you put on weight around the waist. Losing even 10 pounds can help you lower your blood pressure. · If your doctor recommends it, get more exercise. Walking is a good choice. Bit by bit, increase the amount you walk every day. Try for at least 30 minutes on most days of the week. You also may want to swim, bike, or do other activities. · Avoid or limit alcohol. Talk to your doctor about whether you can drink any alcohol. · Try to limit how much sodium you eat to less than 2,300 milligrams (mg) a day. Your doctor may ask you to try to eat less than 1,500 mg a day. · Eat plenty of fruits (such as bananas and oranges), vegetables, legumes, whole grains, and low-fat dairy products. · Lower the amount of saturated fat in your diet. Saturated fat is found in animal products such as milk, cheese, and meat. Limiting these foods may help you lose weight and also lower your risk for heart disease. · Do not smoke. Smoking increases your risk for heart attack and stroke. If you need help quitting, talk to your doctor about stop-smoking programs and medicines. These can increase your chances of quitting for good. When should you call for help? Call 911 anytime you think you may need emergency care. This may mean having symptoms that suggest that your blood pressure is causing a serious heart or blood vessel problem. Your blood pressure may be over 180/120.   For example, call 911 if:    · You have symptoms of a heart attack. These may include:  ?  Chest pain or pressure, or a strange feeling in the [employer] disclaims any warranty or liability for your use of this information. Patient Education        Shortness of Breath: Care Instructions  Your Care Instructions  Shortness of breath has many causes. Sometimes conditions such as anxiety can lead to shortness of breath. Some people get mild shortness of breath when they exercise. Trouble breathing also can be a symptom of a serious problem, such as asthma, lung disease, emphysema, heart problems, and pneumonia. If your shortness of breath continues, you may need tests and treatment. Watch for any changes in your breathing and other symptoms. Follow-up care is a key part of your treatment and safety. Be sure to make and go to all appointments, and call your doctor if you are having problems. It's also a good idea to know your test results and keep a list of the medicines you take. How can you care for yourself at home? · Do not smoke or allow others to smoke around you. If you need help quitting, talk to your doctor about stop-smoking programs and medicines. These can increase your chances of quitting for good. · Get plenty of rest and sleep. · Take your medicines exactly as prescribed. Call your doctor if you think you are having a problem with your medicine. · Find healthy ways to deal with stress. ? Exercise daily. ? Get plenty of sleep. ? Eat regularly and well. When should you call for help? Call 911 anytime you think you may need emergency care. For example, call if:    · You have severe shortness of breath.     · You have symptoms of a heart attack. These may include:  ? Chest pain or pressure, or a strange feeling in the chest.  ? Sweating. ? Shortness of breath. ? Nausea or vomiting. ? Pain, pressure, or a strange feeling in the back, neck, jaw, or upper belly or in one or both shoulders or arms. ? Lightheadedness or sudden weakness. ? A fast or irregular heartbeat.   After you call 911, the  may tell you to chew 1 adult-strength or 2 to 4 low-dose aspirin. Wait for an ambulance. Do not try to drive yourself.    Call your doctor now or seek immediate medical care if:    · Your shortness of breath gets worse or you start to wheeze. Wheezing is a high-pitched sound when you breathe.     · You wake up at night out of breath or have to prop your head up on several pillows to breathe.     · You are short of breath after only light activity or while at rest.    Watch closely for changes in your health, and be sure to contact your doctor if:    · You do not get better over the next 1 to 2 days. Where can you learn more? Go to https://GuideWalleb."[employer]. org and sign in to your Sentrigo account. Enter S780 in the LifeScribe box to learn more about \"Shortness of Breath: Care Instructions. \"     If you do not have an account, please click on the \"Sign Up Now\" link. Current as of: September 5, 2018  Content Version: 12.0  © 2285-6521 [employer]. Care instructions adapted under license by [facility] ([facility]). If you have questions about a medical condition or this instruction, always ask your healthcare professional. [place] [address] any warranty or liability for your use of this information. Patient Education        Limiting Sodium and Fluids With Heart Failure: Care Instructions  Your Care Instructions    Sodium causes your body to hold on to extra water. This may cause your heart failure symptoms to get worse. Limiting sodium may help you feel better and lower your risk of having to go to the hospital.  People get most of their sodium from processed foods. Fast food and restaurant meals also tend to be very high in sodium. Your doctor may suggest that you limit sodium to 2,000 milligrams (mg) a day or less. That is less than 1 teaspoon of salt a day, including all the salt you eat in cooked or packaged foods.   Usually, you have to limit the amount of liquids you drink only if your heart failure is severe. Limiting sodium alone often is enough to help your body get rid of extra fluids. However, your doctor may tell you to limit your fluid intake to a set amount each day. Follow-up care is a key part of your treatment and safety. Be sure to make and go to all appointments, and call your doctor if you are having problems. It's also a good idea to know your test results and keep a list of the medicines you take. How can you care for yourself at home? Read food labels  · Read food labels on cans and food packages. The labels tell you how much sodium is in each serving. Make sure that you look at the serving size. If you eat more than the serving size, you have eaten more sodium than is listed for one serving. · Food labels also tell you the Percent Daily Value. If the Percent Daily Value says 50%, it means that you will get at least 50% of all the sodium you need for the entire day in one serving. Choose products with low Percent Daily Values for sodium. · Be aware that sodium can come in forms other than salt, including monosodium glutamate (MSG), sodium citrate, and sodium bicarbonate (baking soda). MSG is often added to Asian food. You can sometimes ask for food without MSG or salt. Buy low-sodium foods  · Buy foods that are labeled \"unsalted\" (no salt added), \"sodium-free\" (less than 5 mg of sodium per serving), or \"low-sodium\" (less than 140 mg of sodium per serving). A food labeled \"light sodium\" has less than half of the full-sodium version of that food. Foods labeled \"reduced-sodium\" may still have too much sodium. · Buy fresh vegetables or plain, frozen vegetables. Buy low-sodium versions of canned vegetables, soups, and other canned goods. Prepare low-sodium meals  · Use less salt each day when cooking. Reducing salt in this way will help you adjust to the taste. Do not add salt after cooking. Take the salt shaker off the table.   · Flavor your food with garlic, lemon juice, onion, vinegar, herbs, and spices instead of salt. Do not use soy sauce, steak sauce, onion salt, garlic salt, mustard, or ketchup on your food. · Make your own salad dressings, sauces, and ketchup without adding salt. · Use less salt (or none) when recipes call for it. You can often use half the salt a recipe calls for without losing flavor. Other dishes like rice, pasta, and grains do not need added salt. · Rinse canned vegetables. This removes some--but not all--of the salt. · Avoid water that has a naturally high sodium content or that has been treated with water softeners, which add sodium. Call your local water company to find out the sodium content of your water supply. If you buy bottled water, read the label and choose a sodium-free brand. Avoid high-sodium foods, such as:  · Smoked, cured, salted, and canned meat, fish, and poultry. · Ham, linda, hot dogs, and luncheon meats. · Regular, hard, and processed cheese and regular peanut butter. · Crackers with salted tops. · Frozen prepared meals. · Canned and dried soups, broths, and bouillon, unless labeled sodium-free or low-sodium. · Canned vegetables, unless labeled sodium-free or low-sodium. · Salted snack foods such as chips and pretzels. · Western Natalia fries, pizza, tacos, and other fast foods. · Pickles, olives, ketchup, and other condiments, especially soy sauce, unless labeled sodium-free or low-sodium. If you cannot cook for yourself  · Have family members or friends help you, or have someone cook low-sodium meals. · Check with your local senior nutrition program to find out where meals are served and whether they offer a low-sodium option. You can often find these programs through your local health department or hospital.  · Have meals delivered to your home. Most [facility] have a Meals on JESSICA Hess. These programs provide one hot meal a day for older adults, delivered to their homes. Ask whether these meals are low-sodium.  Let them know that you are on a low-sodium diet. Limiting fluid intake  · Find a method that works for you. You might simply write down how much you drink every time you do. Some people keep a container filled with the amount of fluid allowed for that day. If they drink from a source other than the container, then they pour out that amount. · Measure your regular drinking glasses to find out how much fluid each one holds. Once you know this, you will not have to measure every time. · Besides water, milk, juices, and other drinks, some foods have a lot of fluid. Count any foods that will melt (such as ice cream or gelatin dessert) or liquid foods (such as soup) as part of your fluid intake for the day. Where can you learn more? Go to https://Fleet Entertainment GrouppeContacts+.[employer]. org and sign in to your Homeschooling Through the Ages account. Enter A166 in the Tale Me Stories box to learn more about \"Limiting Sodium and Fluids With Heart Failure: Care Instructions. \"     If you do not have an account, please click on the \"Sign Up Now\" link. Current as of: July 22, 2018  Content Version: 12.0  © 2728-1986 Healthwise, Incorporated. Care instructions adapted under license by [facility] ([facility]). If you have questions about a medical condition or this instruction, always ask your healthcare professional. [address] any warranty or liability for your use of this information.

## 2019-06-27 ENCOUNTER — OFFICE VISIT (OUTPATIENT)
Dept: PAIN MANAGEMENT | Age: 44
End: 2019-06-27
Payer: MEDICAID

## 2019-06-27 VITALS — OXYGEN SATURATION: 95 % | DIASTOLIC BLOOD PRESSURE: 97 MMHG | SYSTOLIC BLOOD PRESSURE: 169 MMHG | HEART RATE: 75 BPM

## 2019-06-27 DIAGNOSIS — M17.0 PRIMARY OSTEOARTHRITIS OF BOTH KNEES: ICD-10-CM

## 2019-06-27 DIAGNOSIS — F32.A DEPRESSION, UNSPECIFIED DEPRESSION TYPE: ICD-10-CM

## 2019-06-27 DIAGNOSIS — G89.4 CHRONIC PAIN SYNDROME: ICD-10-CM

## 2019-06-27 DIAGNOSIS — F51.01 PRIMARY INSOMNIA: ICD-10-CM

## 2019-06-27 DIAGNOSIS — G89.29 CHRONIC BILATERAL LOW BACK PAIN WITHOUT SCIATICA: ICD-10-CM

## 2019-06-27 DIAGNOSIS — M54.50 CHRONIC BILATERAL LOW BACK PAIN WITHOUT SCIATICA: ICD-10-CM

## 2019-06-27 DIAGNOSIS — E66.01 MORBID OBESITY WITH BMI OF 60.0-69.9, ADULT (HCC): ICD-10-CM

## 2019-06-27 DIAGNOSIS — M16.11 PRIMARY OSTEOARTHRITIS OF RIGHT HIP: ICD-10-CM

## 2019-06-27 DIAGNOSIS — G47.33 OSA TREATED WITH BIPAP: ICD-10-CM

## 2019-06-27 DIAGNOSIS — I10 ESSENTIAL HYPERTENSION: ICD-10-CM

## 2019-06-27 PROCEDURE — 4004F PT TOBACCO SCREEN RCVD TLK: CPT | Performed by: NURSE PRACTITIONER

## 2019-06-27 PROCEDURE — 99213 OFFICE O/P EST LOW 20 MIN: CPT | Performed by: NURSE PRACTITIONER

## 2019-06-27 PROCEDURE — G8417 CALC BMI ABV UP PARAM F/U: HCPCS | Performed by: NURSE PRACTITIONER

## 2019-06-27 PROCEDURE — G8427 DOCREV CUR MEDS BY ELIG CLIN: HCPCS | Performed by: NURSE PRACTITIONER

## 2019-06-27 RX ORDER — AMITRIPTYLINE HYDROCHLORIDE 25 MG/1
TABLET, FILM COATED ORAL
Qty: 30 TABLET | Refills: 0 | Status: SHIPPED | OUTPATIENT
Start: 2019-06-27 | End: 2019-08-01 | Stop reason: SDUPTHER

## 2019-06-27 RX ORDER — HYDROCODONE BITARTRATE AND ACETAMINOPHEN 5; 325 MG/1; MG/1
1 TABLET ORAL EVERY 8 HOURS PRN
Qty: 90 TABLET | Refills: 0 | Status: SHIPPED | OUTPATIENT
Start: 2019-06-27 | End: 2019-06-27 | Stop reason: SDUPTHER

## 2019-06-27 RX ORDER — HYDROCODONE BITARTRATE AND ACETAMINOPHEN 5; 325 MG/1; MG/1
1 TABLET ORAL EVERY 8 HOURS PRN
Qty: 15 TABLET | Refills: 0 | Status: SHIPPED | OUTPATIENT
Start: 2019-06-27 | End: 2019-08-01 | Stop reason: SDUPTHER

## 2019-06-27 NOTE — PROGRESS NOTES
Angelne Bhavanasummer  1975  [id number]      HISTORY OF PRESENT ILLNESS:  Ms. Paula Niño is a 37 y.o. female returns for a follow up visit for pain management  She has a diagnosis of   1. Chronic pain syndrome    2. Chronic bilateral low back pain without sciatica    3. Primary osteoarthritis of both knees    4. Primary osteoarthritis of right hip    5. JOSE treated with BiPAP    6. Morbid obesity with BMI of 60.0-69.9, adult ([address]    7. Depression, unspecified depression type, mild    8. Primary insomnia    9. Essential hypertension      On the Patients Pain Assessment form:  She complains of pain in the both knee(s) and right hip She rates the pain 9/10 and describes it as sharp, aching, pins and needles. Current treatment regimen has helped relieve about 30% of the pain. She denies any side effects from the current pain regimen. Patient reports that since the last follow up visit the physical functioning is worse, family/social relationships are better, mood is better sleep patterns are better, and that the overall functioning is unchanged. Patient denies misusing/abusing her narcotic pain medications or using any illegal drugs. There are No indicators for possible drug abuse, addiction or diversion problems. Upon obtaining medical history from Ms. Paula Niño states that pain is manageable on current pain therapy. Admits to having bilateral hip pain. She was treated by Dr. Champ Amor who recommended conservative treatment. Ibuprofen helps manage her pain with pain medications. She is working towards weight loss surgery. Mood is stable without anxiety. Sleep is fair with an average of 5-6 hours. Denies to having issues of constipation. Tolerating activities/house chores with moderate tenderness to the lower back.  Smokes 1/2 a pack of cigarettes a day, plans to quit smoking in readiness for weight loss surgery    ALLERGIES: Patients list of allergies were reviewed     MEDICATIONS: Ms. Paula Niño list of medications were reviewed. Her current medications are   Outpatient Medications Prior to Visit   Medication Sig Dispense Refill    atorvastatin (LIPITOR) 20 MG tablet Take 1 tablet by mouth daily 90 tablet 3    lisinopril (PRINIVIL;ZESTRIL) 40 MG tablet Take 1 tablet by mouth daily 90 tablet 3    metoprolol (LOPRESSOR) 100 MG tablet TAKE ONE TABLET BY MOUTH TWICE A DAY 60 tablet 3    venlafaxine (EFFEXOR) 25 MG tablet TAKE ONE TABLET BY MOUTH TWICE A DAY 60 tablet 0    ibuprofen (ADVIL;MOTRIN) 800 MG tablet Take 1 tablet by mouth 2 times daily (with meals) 60 tablet 1    nicotine (NICODERM CQ) 21 MG/24HR Place 1 patch onto the skin every 24 hours 60 patch 0    albuterol sulfate HFA (PROAIR HFA) 108 (90 Base) MCG/ACT inhaler Inhale 2 puffs into the lungs every 6 hours as needed for Wheezing or Shortness of Breath 1 Inhaler 5    mometasone-formoterol (DULERA) 200-5 MCG/ACT inhaler Inhale 2 puffs into the lungs every 12 hours 1 Inhaler 5    albuterol-ipratropium (COMBIVENT RESPIMAT)  MCG/ACT AERS inhaler Inhale 1 puff into the lungs every 6 hours 1 Inhaler 5    torsemide (DEMADEX) 100 MG tablet Take 0.5 tablets by mouth every morning 90 tablet 2    aspirin 81 MG tablet Take 81 mg by mouth daily      cloNIDine (CATAPRES) 0.1 MG tablet Take 0.3 mg by mouth 2 times daily      GARCINIA CAMBOGIA-CHROMIUM PO Take 1,600 mg by mouth daily      metFORMIN (GLUCOPHAGE) 500 MG tablet Take 1 tablet by mouth 2 times daily (with meals) 60 tablet 2    spironolactone (ALDACTONE) 25 MG tablet Take 1 tablet by mouth Daily with lunch 30 tablet 11    loratadine (CLARITIN) 10 MG tablet Take 10 mg by mouth daily      montelukast (SINGULAIR) 10 MG tablet Take 1 tablet by mouth nightly 30 tablet 3    omeprazole (PRILOSEC) 20 MG delayed release capsule Take 20 mg by mouth daily      acetaminophen (APAP EXTRA STRENGTH) 500 MG tablet Take 2 tablets by mouth every 6 hours as needed for Pain DO NOT TAKE WITH OTHER MEDICATIONS CONTAINING ACETAMINOPHEN. 30 tablet 0    Spacer/Aero-Holding Chambers KIT Please dispense aerochamber spacer 1 kit 0    Cholecalciferol (VITAMIN D3) 73161 UNITS CAPS Take 10,000 Units by mouth three times a week. Takes on Mon Wed Fri      HYDROcodone-acetaminophen (NORCO) 5-325 MG per tablet Take 1 tablet by mouth every 8 hours as needed for Pain for up to 28 days. 84 tablet 0    amitriptyline (ELAVIL) 25 MG tablet TAKE ONE TABLET BY MOUTH ONCE NIGHTLY 30 tablet 0     No facility-administered medications prior to visit. SOCIAL/FAMILY/PAST MEDICAL HISTORY: Ms. Christa Jain, family and past medical history was reviewed. REVIEW OF SYSTEMS:    Respiratory: Negative for apnea, chest tightness and shortness of breath or change in baseline breathing. Gastrointestinal: Negative for nausea, vomiting, abdominal pain, diarrhea, constipation, blood in stool and abdominal distention. PHYSICAL EXAM:   Nursing note and vitals reviewed. BP (!) 169/97   Pulse 75   SpO2 95%   Constitutional: She appears well-developed and well-nourished. No acute distress. Skin: Skin is warm and dry, good turgor. No rash noted. She is not diaphoretic. Cardiovascular: Normal rate, regular rhythm, normal heart sounds, and does not have murmur. Pulmonary/Chest: Effort normal. No respiratory distress. She does not have wheezes in the lung fields. She has no rales. Neurological/Psychiatric:She is alert and oriented to person, place, and time. Coordination is  Normal. +hip pain bilateral  Her mood isAppropriate and affect is Neutral/Euthymic(normal) . IMPRESSION:   1. Chronic pain syndrome    2. Chronic bilateral low back pain without sciatica    3. Primary osteoarthritis of both knees    4. Primary osteoarthritis of right hip    5. JOSE treated with BiPAP    6. Morbid obesity with BMI of 60.0-69.9, adult ([facility] [address]    7. Depression, unspecified depression type, mild    8. Primary insomnia    9.  Essential hypertension PLAN:  Informed verbal consent was obtained  -Continue with Elavil, Norco, Effexor, Lidocaine  -Liang exercises/hip stretches  -Maintain f/u with orthopedics for OA of hips prn, bariatrics for weight loss  -Monitor BP, f/u with PCP if it continues to stay elevated or she becomes symptomatic with SOB, CP, syncopy. she is currently asymptomatic  -CBT techniques- relaxation therapies such as biofeedback, mindfulness based stress reduction, imagery, cognitive restructuring, problem solving discussed with patient  -Advised patient to quit smoking for  health related concerns and to improve the treatment outcomes. Education was given on quitting smoking and the use of different modalities including medications, hypnotherapy, counselling  and biofeedback. These were discussed with patient.  -Last UDS consistent  -Return in about 1 month (around 7/25/2019). Current Outpatient Medications   Medication Sig Dispense Refill    amitriptyline (ELAVIL) 25 MG tablet TAKE ONE TABLET BY MOUTH ONCE NIGHTLY 30 tablet 0    HYDROcodone-acetaminophen (NORCO) 5-325 MG per tablet Take 1 tablet by mouth every 8 hours as needed for Pain for up to 5 days.  15 tablet 0    atorvastatin (LIPITOR) 20 MG tablet Take 1 tablet by mouth daily 90 tablet 3    lisinopril (PRINIVIL;ZESTRIL) 40 MG tablet Take 1 tablet by mouth daily 90 tablet 3    metoprolol (LOPRESSOR) 100 MG tablet TAKE ONE TABLET BY MOUTH TWICE A DAY 60 tablet 3    venlafaxine (EFFEXOR) 25 MG tablet TAKE ONE TABLET BY MOUTH TWICE A DAY 60 tablet 0    ibuprofen (ADVIL;MOTRIN) 800 MG tablet Take 1 tablet by mouth 2 times daily (with meals) 60 tablet 1    nicotine (NICODERM CQ) 21 MG/24HR Place 1 patch onto the skin every 24 hours 60 patch 0    albuterol sulfate HFA (PROAIR HFA) 108 (90 Base) MCG/ACT inhaler Inhale 2 puffs into the lungs every 6 hours as needed for Wheezing or Shortness of Breath 1 Inhaler 5    mometasone-formoterol (DULERA) 200-5 MCG/ACT inhaler and other alternative treatments  including no treatment were discussed with the patient. The common side effects of these medications were also explained to the patient. Informed verbal consent was obtained. Goals of current treatment regimen include improvement in pain, restoration of functioning- with focus on improvement in physical performance, general activity, work or disability,emotional distress, health care utilization and  decreased medication consumption. Will continue to monitor progress towards achieving/maintaining therapeutic goals with special emphasis on  1. Improvement in perceived interfernce  of pain with ADL's. Ability to do home exercises independently. Ability to do household chores indoor and/or outdoor work and social and leisure activities. Improve psychosocial and physical functioning. - she is showing progression towards this treatment goal with the current regimen. She was advised against drinking alcohol with the narcotic pain medicines, advised against driving or handling machinery while adjusting the dose of medicines or if having cognitive  issues related to the current medications. Risk of overdose and death, if medicines not taken as prescribed, were also discussed. If the patient develops new symptoms or if the symptoms worsen, the patient should call the office. While transcribing every attempt was made to maintain the accuracy of the note in terms of it's contents,there may have been some errors made inadvertently. Thank you for allowing me to participate in the care of this patient.     Jacy Fregoso CNP    Cc: Cheryl Wiley MD

## 2019-06-27 NOTE — PATIENT INSTRUCTIONS
[email or web address] and sign in to your Club 42cmhart account. Enter C585 in the Good Seedhire box to learn more about \"Back Stretches: Exercises. \"     If you do not have an account, please click on the \"Sign Up Now\" link. Current as of: September 20, 2018  Content Version: 12.0  © 2443-0379 Healthwise, Incorporated. Care instructions adapted under license by [facility] [place] ([facility]). If you have questions about a medical condition or this instruction, always ask your healthcare professional. [address] any warranty or liability for your use of this information.

## 2019-07-02 ENCOUNTER — TELEPHONE (OUTPATIENT)
Dept: BARIATRICS/WEIGHT MGMT | Age: 44
End: 2019-07-02

## 2019-07-02 NOTE — TELEPHONE ENCOUNTER
Attempted to contact patient re: BMI verification. LM on VM for patient to call the office.     Patient does have LWS coverage with Rebecca Bryant

## 2019-07-08 ENCOUNTER — HOSPITAL ENCOUNTER (OUTPATIENT)
Dept: PHYSICAL THERAPY | Age: 44
Setting detail: THERAPIES SERIES
Discharge: HOME | End: 2019-07-08
Payer: MEDICAID

## 2019-07-08 PROCEDURE — 97162 PT EVAL MOD COMPLEX 30 MIN: CPT

## 2019-07-08 PROCEDURE — 97530 THERAPEUTIC ACTIVITIES: CPT

## 2019-07-08 ASSESSMENT — PAIN SCALES - QUEBEC BACK PAIN DISABILITY SCALE
PULL OR PUSH HEAVY DOORS: 1
PUT ON SOCKS OR PANYHOSE: 2
SLEEP THROUGH THE NIGHT: 5
WALK A FEW BLOCKS OR 300 TO 400M: 5
RIDE IN A CAR: 3
LIFT AND CARRY A HEAVY SUITCASE: 4
CARRY TWO BAGS OF GROCERIES: 4
RUN ONE BLOCK OR 100M: 5
BEND OVER TO CLEAN THE BATHTUB: 4
MOVE A CHAIR: 4
QUEBEC DISABILITY INDEX: 60-79%
TURN OVER IN BED: 4
QUEBEC CMS MODIFIER: CL
MAKE YOUR BED: 1
GET OUT OF BED: 4
CLIMB ONE FLIGHT OF STAIRS: 5
SIT IN A CHAIR FOR SEVERAL HOURS: 3
STAND UP FOR 20 TO 30 MINUTES: 5
TOTAL SCORE: 71
THROW A BALL: 5
WALK SEVERAL KILOMETERS  OR MILES: 2
REACH UP TO HIGH SHELVES: 4
TAKE FOOD OUT OF THE REFRIGERATOR: 1

## 2019-07-08 ASSESSMENT — PAIN - FUNCTIONAL ASSESSMENT: PAIN_FUNCTIONAL_ASSESSMENT: PREVENTS OR INTERFERES WITH ALL ACTIVE AND SOME PASSIVE ACTIVITIES

## 2019-07-08 ASSESSMENT — PAIN DESCRIPTION - LOCATION: LOCATION: BACK;KNEE;HIP

## 2019-07-08 ASSESSMENT — PAIN DESCRIPTION - PROGRESSION: CLINICAL_PROGRESSION: NOT CHANGED

## 2019-07-08 NOTE — PROGRESS NOTES
Physical Therapy  Initial Assessment  Date: 2019  Patient Name: Faby Nina  MRN: [id number]  : 1975     Treatment Diagnosis: Decreased strength, mobility, balance    Restrictions  Restrictions/Precautions  Restrictions/Precautions: Fall Risk(mod: notes she fell a week ago)    Subjective   General  Chart Reviewed: Yes  Patient assessed for rehabilitation services?: Yes  Additional Pertinent Hx: OA, asthma, CHF, COPD, DM, HTN  Referring Practitioner: Beata Martínez CNP  Referral Date : 19  Diagnosis: Chronic Pain Syndrome  PT Visit Information  PT Insurance Information: Joseeduinia Jorge  Total # of Visits Approved: 8  Total # of Visits to Date: 1  Subjective  Subjective: Patient reports chronic history of LBP, B knee pain, and R hip pain. Notes her pain started years ago when she was extremely overweight (455#). Through the years the pain has continued to progress. Has had cortisone injections and viscosupplementation injections in B knees through the years without any relief. At the beginning of this year, patient had R hip injection. Patient notes that her hip felt worse after the injection. Pt denies any injections in spine. Patient reports that sitting is better than standing but needs to move frequently. Patient ambulates in community with rolling walker at all times. Patient notes that steps are a big issue for her and that she has numerous steps at home to do. Patient has done land PT in the past for B knees. Patient was recently approved for gastric bypass surgery. Pain Screening  Patient Currently in Pain: Yes  Pain Assessment  Pain Assessment: 0-10  Pain Level: (pain 7/10 at rest, 10/10 with activity)  Patient's Stated Pain Goal: 2(Pt goal: \"Be able to walk better and get some relief. \")  Pain Location: Back;Knee;Hip  Pain Descriptors: Burning; Shooting; Naseem Bumpers; Constant  Clinical Progression: Not changed  Functional Pain Assessment: Prevents or interferes with all active and some

## 2019-07-08 NOTE — FLOWSHEET NOTE
Physical Therapy Daily Treatment Note  Date:  2019    Patient Name:  Yossi Garrett    :  1975  MRN: [id number]    Restrictions/Precautions: Fall Risk(mod: notes she fell a week ago)    Pertinent Medical History: OA, asthma, CHF, COPD, DM, HTN    Medical/Treatment Diagnosis Information:  · Diagnosis: Chronic Pain Syndrome  · Treatment Diagnosis: Decreased strength, mobility, balance    Insurance/Certification information:  PT Insurance Information: Scottie  Physician Information:  Referring Practitioner: Roscoe Mckeon CNP  Plan of care signed (Y/N): sent for cosign      Visit# / total visits:    Pain level: 7-10/10     Functional Outcomes Measure: at eval  Test: [place]  Score:  71    Progress Note: []  Yes  [x]  No  Next due by: Visit #10      History of Injury: Patient reports chronic history of LBP, B knee pain, and R hip pain. Notes her pain started years ago when she was extremely overweight (455#). Through the years the pain has continued to progress. Has had cortisone injections and viscosupplementation injections in B knees through the years without any relief. At the beginning of this year, patient had R hip injection. Patient notes that her hip felt worse after the injection. Pt denies any injections in spine. Patient reports that sitting is better than standing but needs to move frequently. Patient ambulates in community with rolling walker at all times. Patient notes that steps are a big issue for her and that she has numerous steps at home to do. Patient has done land PT in the past for B knees. Patient was recently approved for gastric bypass surgery. Subjective:   See eval    Objective:  Observation:   · Posture: Fair(forward flexed posture in standing)  · Palpation: Mod TTP B medial knee joint line, Mod TTP R lateral hip/ant thigh  · Observation: Patient ambulates with rolling walker demonstrating wide BRIANNE and mild antalgic gait.   Morbidly obese  · Test No    Plan:   [] Continue per plan of care [] Alter current plan (see comments)  [x] Plan of care initiated [] Hold pending MD visit [] Discharge    Plan for Next Session:  Begin aquatics    Electronically signed by:  Virginia Sanz, [id number]

## 2019-07-09 ENCOUNTER — HOSPITAL ENCOUNTER (OUTPATIENT)
Dept: PHYSICAL THERAPY | Age: 44
Setting detail: THERAPIES SERIES
Discharge: HOME | End: 2019-07-09
Payer: MEDICAID

## 2019-07-09 PROCEDURE — 97113 AQUATIC THERAPY/EXERCISES: CPT

## 2019-07-09 NOTE — FLOWSHEET NOTE
Physical Therapy Aquatic Flow Sheet   Date:  2019    Patient Name:  Jackie Luque    :  1975     Restrictions/Precautions: Fall Risk(mod: notes she fell a week ago)    Pertinent Medical History: OA, asthma, CHF, COPD, DM, HTN     Medical/Treatment Diagnosis Information:  · Diagnosis: Chronic Pain Syndrome  · Treatment Diagnosis: Decreased strength, mobility, balance     Insurance/Certification information:  PT Insurance Information: Scottie  Physician Information:  Referring Practitioner: Bill Dunbar CNP  Plan of care signed (Y/N): sent for cosign       Visit# / total visits:    Pain level:      6/10               Progress Note: []  Yes  [x]  No  Next due by: Visit #10:      History of Injury:  Patient reports chronic history of LBP, B knee pain, and R hip pain. Notes her pain started years ago when she was extremely overweight (455#). Through the years the pain has continued to progress. Has had cortisone injections and viscosupplementation injections in B knees through the years without any relief. At the beginning of this year, patient had R hip injection. Patient notes that her hip felt worse after the injection. Pt denies any injections in spine. Patient reports that sitting is better than standing but needs to move frequently. Patient ambulates in community with rolling walker at all times. Patient notes that steps are a big issue for her and that she has numerous steps at home to do. Patient has done land PT in the past for B knees.   Patient was recently approved for gastric bypass surgery.      Subjective:  C/o of LBP, B knees, R hip    Objective:   Observation:  See eval   Test measurements:      Key  B= Belt DB= Dumbells T= Theratube   G=Gloves N= Noodles W= Weights   P= Paddles WW=Water Walker K= Kickboard        Transfers:   steps      % Immersion: 75%            Ambulation:  laps  ind Exercises UE:      Forwards 3 Shoulder Shrugs     Lateral  Shoulder circles

## 2019-07-11 ENCOUNTER — HOSPITAL ENCOUNTER (OUTPATIENT)
Dept: PHYSICAL THERAPY | Age: 44
Setting detail: THERAPIES SERIES
Discharge: HOME | End: 2019-07-11
Payer: MEDICAID

## 2019-07-11 PROCEDURE — 97113 AQUATIC THERAPY/EXERCISES: CPT

## 2019-07-11 PROCEDURE — 97150 GROUP THERAPEUTIC PROCEDURES: CPT

## 2019-07-15 ENCOUNTER — TELEPHONE (OUTPATIENT)
Dept: PULMONOLOGY | Age: 44
End: 2019-07-15

## 2019-07-15 DIAGNOSIS — J44.9 COPD, MILD (HCC): Primary | ICD-10-CM

## 2019-07-16 ENCOUNTER — HOSPITAL ENCOUNTER (OUTPATIENT)
Dept: PHYSICAL THERAPY | Age: 44
Setting detail: THERAPIES SERIES
Discharge: HOME | End: 2019-07-16
Payer: MEDICAID

## 2019-07-16 PROCEDURE — 97113 AQUATIC THERAPY/EXERCISES: CPT

## 2019-07-16 PROCEDURE — 97150 GROUP THERAPEUTIC PROCEDURES: CPT

## 2019-07-18 ENCOUNTER — HOSPITAL ENCOUNTER (OUTPATIENT)
Dept: PHYSICAL THERAPY | Age: 44
Setting detail: THERAPIES SERIES
Discharge: HOME | End: 2019-07-18
Payer: MEDICAID

## 2019-07-18 PROCEDURE — 97113 AQUATIC THERAPY/EXERCISES: CPT

## 2019-07-18 PROCEDURE — 97150 GROUP THERAPEUTIC PROCEDURES: CPT

## 2019-07-23 ENCOUNTER — HOSPITAL ENCOUNTER (OUTPATIENT)
Dept: PHYSICAL THERAPY | Age: 44
Setting detail: THERAPIES SERIES
Discharge: HOME | End: 2019-07-23
Payer: MEDICAID

## 2019-07-23 PROCEDURE — 97150 GROUP THERAPEUTIC PROCEDURES: CPT

## 2019-07-23 PROCEDURE — 97113 AQUATIC THERAPY/EXERCISES: CPT

## 2019-07-23 NOTE — FLOWSHEET NOTE
ind Exercises UE:      Forwards 6 Shoulder Shrugs 10x    Lateral 2 Shoulder circles     Marching 1 Scapular Retraction 10x    Retro   Rolling 10x     [place]ariocas  Push Downs 10x     IR/ER 10x     Punching 10x   Stretching: B    30 sec  Rowing 10x   Gastroc/Soleus  2 Elbow Flex/Ext 10x   Hamstring  2 Shldr Flex/Ext 10x   Knee flex stretch   Shldr Abd/Add 10x   Piriformis   Horiz Abd/Add 10x    Hip flexor    Arm Circles 10x    SKTC   PNF Diagonals 10x   DKTC  UE oscillations f/b, s/s    ITB   Wall Push-ups    Quad  Figure 8's    Mid back   Buoy pull/push downs    UT  Tband rows    Rhom  Tband lats    Post Shoulder  Lumbar Rot w/ paddles    Pec   Small ball pull downs                   diagonals             Cervical:       AROM Flex       AROM Extension     LEs exercises:  B  AROM Side Bending    Marching  10 AROM Rotation    Heel Raises  10 Chin tucks    Toe Raises  10 Chin nods     Squats  10      Hamstring Curls  10      Hip Flexion  10 Balance:  B   Hip Abduction 10 SLS    Hip Circles 10 Tandem stance    TA set 10 NBOS eyes open    Glut Set 10 NBOS eyes closed    Hip Extension  Hand to Opposite Knee 10   Hip Adduction    Box Step  3 R/L   Hip IR   Noodle Stance     Hip ER  Stop/Go Gait    Fig 8's  Switch Gait                Seated: B Functional:    Ankle Pumps 30 Step up forward    Ankle circles 10 Step up lateral    Knee flex & ext 30 Step down    Hip Abd & Adduction 30 Othello squats    Bicycle  30 Crate Lifts    Add Set with ball 10 Lunges forward    LX stab with med ball throws  Lunges lateral    Ankle INV  Lunges retro    Ankle EV  Lower ab curl with noodle      Upper ab curl with ball      Med ball straight lifts      Med ball diagonal lifts      Hydrorider          Noodle:      Leg Press  Deep Water:     hang at wall 3 min    relief Jog    Noodle hang deep water  Jumping Jacks    Noodle Bicycle  Heel to toe      Hand to opposite knee      Cross country skier      Rocking Horse      Goals:    Short term goals  Time Frame for Short term goals: 3 weeks  Short term goal 1: Patient will tolerate 40 mins of aquatic exercises without increased pain for ease with transfers. Short term goal 2: Patient will report 30% improvement with pain for ease with ADLs. Long term goals  Time Frame for Long term goals : at discharge  Long term goal 1: Increase LE strength to 4/5 for ease with gait. Long term goal 2: Patient will report 50% improvement with transfers and steps. Long term goal 3: Patient will report 70% improvement with pain for ease with ADLs. Individual Aquatic Timed Minutes:  15  Group Aquatic Timed Minutes: 35  Aquatic group therapy is the presence of more than 1 patient in the pool, at the same time. During that time each patient receives individualized instruction designed for their specific diagnosis. Treatment/Activity Tolerance:  [x] Patient tolerated treatment well [] Patient limited by fatique  [] Patient limited by pain  [] Patient limited by other medical complications  [x] Other:  Progressing well with aquatic ex. Pain 0/10 after aquatic ex.  misty 50 min aquatic ex. Prognosis: [x] Good [] Fair  [] Poor    Patient Requires Follow-up: [x] Yes  [] No    Plan:   [x] Continue per plan of care [] Alter current plan (see comments)  [] Plan of care initiated [] Hold pending MD visit [] Discharge    Plan for Next Session: add squats and step ups fwds.       Electronically signed by: Zak Correa,

## 2019-07-25 ENCOUNTER — HOSPITAL ENCOUNTER (OUTPATIENT)
Dept: PHYSICAL THERAPY | Age: 44
Setting detail: THERAPIES SERIES
Discharge: HOME | End: 2019-07-25
Payer: MEDICAID

## 2019-07-25 PROCEDURE — 97113 AQUATIC THERAPY/EXERCISES: CPT

## 2019-07-25 PROCEDURE — 97150 GROUP THERAPEUTIC PROCEDURES: CPT

## 2019-07-30 ENCOUNTER — HOSPITAL ENCOUNTER (OUTPATIENT)
Dept: PHYSICAL THERAPY | Age: 44
Setting detail: THERAPIES SERIES
Discharge: HOME | End: 2019-07-30
Payer: MEDICAID

## 2019-07-30 PROCEDURE — 97150 GROUP THERAPEUTIC PROCEDURES: CPT

## 2019-07-30 PROCEDURE — 97113 AQUATIC THERAPY/EXERCISES: CPT

## 2019-07-30 PROCEDURE — 97110 THERAPEUTIC EXERCISES: CPT

## 2019-07-30 PROCEDURE — 97530 THERAPEUTIC ACTIVITIES: CPT

## 2019-07-30 ASSESSMENT — PAIN SCALES - QUEBEC BACK PAIN DISABILITY SCALE
STAND UP FOR 20 TO 30 MINUTES: 2
MOVE A CHAIR: 3
GET OUT OF BED: 1
QUEBEC CMS MODIFIER: CK
REACH UP TO HIGH SHELVES: 1
WALK A FEW BLOCKS OR 300 TO 400M: 4
RUN ONE BLOCK OR 100M: 5
CARRY TWO BAGS OF GROCERIES: 3
PUT ON SOCKS OR PANYHOSE: 1
MAKE YOUR BED: 0
BEND OVER TO CLEAN THE BATHTUB: 3
QUEBEC DISABILITY INDEX: 40-59%
TOTAL SCORE: 42
THROW A BALL: 3
LIFT AND CARRY A HEAVY SUITCASE: 4
RIDE IN A CAR: 1
TURN OVER IN BED: 2
SLEEP THROUGH THE NIGHT: 1
CLIMB ONE FLIGHT OF STAIRS: 4
PULL OR PUSH HEAVY DOORS: 0
SIT IN A CHAIR FOR SEVERAL HOURS: 2
WALK SEVERAL KILOMETERS  OR MILES: 2
TAKE FOOD OUT OF THE REFRIGERATOR: 0

## 2019-07-30 NOTE — FLOWSHEET NOTE
Cross country skier      Rocking Horse      Goals:    Short term goals  Time Frame for Short term goals: 3 weeks  Short term goal 1: Patient will tolerate 40 mins of aquatic exercises without increased pain for ease with transfers. Short term goal 2: Patient will report 30% improvement with pain for ease with ADLs. Long term goals  Time Frame for Long term goals : at discharge  Long term goal 1: Increase LE strength to 4/5 for ease with gait. Long term goal 2: Patient will report 50% improvement with transfers and steps. Long term goal 3: Patient will report 70% improvement with pain for ease with ADLs. Individual Aquatic Timed Minutes:  15  Group Aquatic Timed Minutes: 45  Aquatic group therapy is the presence of more than 1 patient in the pool, at the same time. During that time each patient receives individualized instruction designed for their specific diagnosis. Treatment/Activity Tolerance:  [x] Patient tolerated treatment well [] Patient limited by fatique  [] Patient limited by pain  [] Patient limited by other medical complications  [x] Other:  Progressing well with aquatic ex. Pain 2/10 after aquatic ex. Issued written aquatic HEP booklet,  [facility] information and 30 day pass. Reviewed aquatic exercise progression/precautions. Pt verbalized understanding. Prognosis: [x] Good [] Fair  [] Poor    Patient Requires Follow-up: [] Yes  [x] No    Plan:   [x] Continue per plan of care [] Alter current plan (see comments)  [] Plan of care initiated [] Hold pending MD visit [x] Discharge    Plan for Next Session:  D/c with continuation of aquatic ex independently.       Electronically signed by: FRITZ Aguilera

## 2019-07-30 NOTE — FLOWSHEET NOTE
Patient notes the break in PT from Thursday to Tuesday is really long and hard. Objective:  Observation: Updated 7/30/19  · Posture: Fair(forward flexed posture in standing)  · Palpation: Min TTP B medial knee joint line, Mod TTP R lateral hip/ant thigh  · Observation: Patient ambulates with rolling walker demonstrating wide BRIANNE and mild antalgic gait. Morbidly obese  · Test measurements:    · B knee PROM = 0-100  · B hip PROM = 90  ROM:  Date Lumbar Flex Lumbar Ext Lumbar L SB Lumbar R SB Lumbar L Rot Lumbar R Rot   Eval 7/8 Mod decreased neutral Mod decreased Mod decreased Severe decreased Severe decreased   7/30 Min decreased Mod decreased  Min decreased Min decreased  Min decreased Min decreased     Strength:  Date Hip flexion Hip abduction Quad Hamstring Ankle DF Ankle PF   Eval 7/8 L=3/5  R=3-/5 L=3/5  R=3-/5 L=4/5  R=4+/5 L=4-/5  R=4/5 B=5/5 B=4+/5   7/30 L=4-/5  R=3/5 L=3/5  R=3-/5 L=5/5  R=4+/5 L=5/5  R=4+/5 B=5/5 L=5/5  R=4+/5       Exercises:  Exercise/Equipment Resistance/Repetitions Other comments        Re-eval  Measurements taken                          Other Therapeutic Activities:  Pt was educated on PT POC, Diagnosis, Prognosis, pathomechanics as well as frequency and duration of scheduling future physical therapy appointments. Time was also taken on this day to answer all patient questions and participation in PT. Reviewed appointment policy in detail with patient and patient verbalized understanding. Also discussed with patient aquatic rehab expectations and procedures. Patient taken on pool tour. 7/30:Re-eval measurements taken for MD BRADSHAW. Discussed remaining deficits and continuation with HEP at this time. Patient agreeable. Home Exercise Program:  Patient issued aquatic HEP and 30 day free pass to fitness center. Patient also issued list of local area pools that are available.  Stressed the importance of continuing on own with HEP in order to maintain therapy benefits and to

## 2019-08-01 ENCOUNTER — OFFICE VISIT (OUTPATIENT)
Dept: PAIN MANAGEMENT | Age: 44
End: 2019-08-01
Payer: MEDICAID

## 2019-08-01 VITALS
BODY MASS INDEX: 60.28 KG/M2 | OXYGEN SATURATION: 97 % | SYSTOLIC BLOOD PRESSURE: 174 MMHG | HEART RATE: 93 BPM | WEIGHT: 293 LBS | DIASTOLIC BLOOD PRESSURE: 105 MMHG

## 2019-08-01 DIAGNOSIS — M54.50 CHRONIC BILATERAL LOW BACK PAIN WITHOUT SCIATICA: ICD-10-CM

## 2019-08-01 DIAGNOSIS — G89.29 CHRONIC BILATERAL LOW BACK PAIN WITHOUT SCIATICA: ICD-10-CM

## 2019-08-01 DIAGNOSIS — F32.A DEPRESSION, UNSPECIFIED DEPRESSION TYPE: ICD-10-CM

## 2019-08-01 DIAGNOSIS — E66.01 MORBID OBESITY WITH BMI OF 60.0-69.9, ADULT (HCC): ICD-10-CM

## 2019-08-01 DIAGNOSIS — G89.4 CHRONIC PAIN SYNDROME: ICD-10-CM

## 2019-08-01 DIAGNOSIS — F51.01 PRIMARY INSOMNIA: ICD-10-CM

## 2019-08-01 DIAGNOSIS — G47.33 OSA TREATED WITH BIPAP: ICD-10-CM

## 2019-08-01 DIAGNOSIS — M17.0 PRIMARY OSTEOARTHRITIS OF BOTH KNEES: ICD-10-CM

## 2019-08-01 DIAGNOSIS — M16.11 PRIMARY OSTEOARTHRITIS OF RIGHT HIP: ICD-10-CM

## 2019-08-01 DIAGNOSIS — I10 ESSENTIAL HYPERTENSION: ICD-10-CM

## 2019-08-01 PROCEDURE — 99213 OFFICE O/P EST LOW 20 MIN: CPT | Performed by: NURSE PRACTITIONER

## 2019-08-01 PROCEDURE — G8427 DOCREV CUR MEDS BY ELIG CLIN: HCPCS | Performed by: NURSE PRACTITIONER

## 2019-08-01 PROCEDURE — G8417 CALC BMI ABV UP PARAM F/U: HCPCS | Performed by: NURSE PRACTITIONER

## 2019-08-01 PROCEDURE — 4004F PT TOBACCO SCREEN RCVD TLK: CPT | Performed by: NURSE PRACTITIONER

## 2019-08-01 RX ORDER — HYDROCODONE BITARTRATE AND ACETAMINOPHEN 5; 325 MG/1; MG/1
1 TABLET ORAL EVERY 8 HOURS PRN
Qty: 90 TABLET | Refills: 0 | Status: SHIPPED | OUTPATIENT
Start: 2019-08-01 | End: 2019-08-29 | Stop reason: SDUPTHER

## 2019-08-01 RX ORDER — AMITRIPTYLINE HYDROCHLORIDE 25 MG/1
TABLET, FILM COATED ORAL
Qty: 30 TABLET | Refills: 0 | Status: SHIPPED | OUTPATIENT
Start: 2019-08-01 | End: 2019-08-29 | Stop reason: SDUPTHER

## 2019-08-01 RX ORDER — VENLAFAXINE 25 MG/1
TABLET ORAL
Qty: 60 TABLET | Refills: 0 | Status: SHIPPED | OUTPATIENT
Start: 2019-08-01 | End: 2019-08-29 | Stop reason: SDUPTHER

## 2019-08-01 NOTE — PATIENT INSTRUCTIONS
Patient Education        Back Stretches: Exercises  Your Care Instructions  Here are some examples of exercises for stretching your back. Start each exercise slowly. Ease off the exercise if you start to have pain. Your doctor or physical therapist will tell you when you can start these exercises and which ones will work best for you. How to do the exercises  Overhead stretch    1. Stand comfortably with your feet shoulder-width apart. 2. Looking straight ahead, raise both arms over your head and reach toward the ceiling. Do not allow your head to tilt back. 3. Hold for 15 to 30 seconds, then lower your arms to your sides. 4. Repeat 2 to 4 times. Side stretch    1. Stand comfortably with your feet shoulder-width apart. 2. Raise one arm over your head, and then lean to the other side. 3. Slide your hand down your leg as you let the weight of your arm gently stretch your side muscles. Hold for 15 to 30 seconds. 4. Repeat 2 to 4 times on each side. Press-up    1. Lie on your stomach, supporting your body with your forearms. 2. Press your elbows down into the floor to raise your upper back. As you do this, relax your stomach muscles and allow your back to arch without using your back muscles. As your press up, do not let your hips or pelvis come off the floor. 3. Hold for 15 to 30 seconds, then relax. 4. Repeat 2 to 4 times. Relax and rest    1. Lie on your back with a rolled towel under your neck and a pillow under your knees. Extend your arms comfortably to your sides. 2. Relax and breathe normally. 3. Remain in this position for about 10 minutes. 4. If you can, do this 2 or 3 times each day. Follow-up care is a key part of your treatment and safety. Be sure to make and go to all appointments, and call your doctor if you are having problems. It's also a good idea to know your test results and keep a list of the medicines you take. Where can you learn more?   Go to [email or web address] and sign in to your [employer] account. Enter Z676 in the [employer] box to learn more about \"Back Stretches: Exercises. \"     If you do not have an account, please click on the \"Sign Up Now\" link. Current as of: September 20, 2018  Content Version: 12.0  © 3121-5026 [employer]. Care instructions adapted under license by [facility] ([facility]). If you have questions about a medical condition or this instruction, always ask your healthcare professional. [address] any warranty or liability for your use of this information. Patient Education        Hip Arthritis: Exercises  Your Care Instructions  Here are some examples of exercises for hip arthritis. Start each exercise slowly. Ease off the exercise if you start to have pain. Your doctor or physical therapist will tell you when you can start these exercises and which ones will work best for you. How to do the exercises  Straight-leg raises to the outside    1. Lie on your side, with your affected hip on top. 2. Tighten the front thigh muscles of your top leg to keep your knee straight. 3. Keep your hip and your leg straight in line with the rest of your body, and keep your knee pointing forward. Do not drop your hip back. 4. Lift your top leg straight up toward the ceiling, about 12 inches off the floor. Hold for about 6 seconds, then slowly lower your leg. 5. Repeat 8 to 12 times. 6. Switch legs and repeat steps 1 through 5, even if only one hip is sore. Straight-leg raises to the inside    1. Lie on your side with your affected hip on the floor. 2. You can either prop up your other leg on a chair, or you can bend that knee and put that foot in front of your other knee. Do not drop your hip back. 3. Tighten the muscles on the front thigh of your bottom leg to straighten that knee.   4. Keep your kneecap pointing forward and your leg straight, and lift your bottom leg

## 2019-08-01 NOTE — PROGRESS NOTES
Colleen Mcdermott  1975  [id number]      HISTORY OF PRESENT ILLNESS:  Ms. Ruben Muñoz is a 37 y.o. female returns for a follow up visit for pain management  She has a diagnosis of   1. Chronic pain syndrome    2. Chronic bilateral low back pain without sciatica    3. Primary osteoarthritis of both knees    4. Primary osteoarthritis of right hip    5. JOSE treated with BiPAP    6. Morbid obesity with BMI of 60.0-69.9, adult ([address]    7. Depression, unspecified depression type, mild    8. Primary insomnia    9. Essential hypertension      On the Patients Pain Assessment form:  She complains of pain in the both buttocks, both knee(s), bilateral lower back and right hip She rates the pain 7/10 and describes it as sharp, aching, pins and needles. Current treatment regimen has helped relieve about 40% of the pain. She denies any side effects from the current pain regimen. Patient reports that since the last follow up visit the physical functioning is better, family/social relationships are better, mood is better sleep patterns are unchanged, and that the overall functioning is better. Patient denies misusing/abusing her narcotic pain medications or using any illegal drugs. There are No indicators for possible drug abuse, addiction or diversion problems. Upon obtaining medical history from Ms. Ruben Muñoz states that pain is manageable on current pain therapy. Completed aquatic PT, continues to go to the gym for exercises. She has an appointment with bariatrics in 2 weeks. Continues to f/u with Dr. Osmin Escudero for right hip arthritis. Mood is stable without anxiety. Sleep is fair with an average of 5-6 hours. Denies to having issues of constipation. Tolerating activities/house chores with moderate tenderness to the right hip pain. Smokes 6 cigarettes a day. ALLERGIES: Patients list of allergies were reviewed     MEDICATIONS: Ms. Ruben Muñoz list of medications were reviewed. Her current medications are   Outpatient Medications

## 2019-08-08 ENCOUNTER — TELEPHONE (OUTPATIENT)
Dept: BARIATRICS/WEIGHT MGMT | Age: 44
End: 2019-08-08

## 2019-08-08 NOTE — TELEPHONE ENCOUNTER
Called as a new pt courtesy call - spoke w patient. Did receive paperwork - told patient to have new pt paperwork completely filled out, insurance card, and id and to arrive at appt time. If they didn't have the paperwork filled out and arrive on time may be rescheduled. 1 [facility] location.

## 2019-08-15 ENCOUNTER — OFFICE VISIT (OUTPATIENT)
Dept: BARIATRICS/WEIGHT MGMT | Age: 44
End: 2019-08-15
Payer: MEDICAID

## 2019-08-15 VITALS
HEIGHT: 64 IN | WEIGHT: 293 LBS | SYSTOLIC BLOOD PRESSURE: 138 MMHG | BODY MASS INDEX: 50.02 KG/M2 | HEART RATE: 76 BPM | DIASTOLIC BLOOD PRESSURE: 86 MMHG

## 2019-08-15 DIAGNOSIS — I10 ESSENTIAL HYPERTENSION: ICD-10-CM

## 2019-08-15 DIAGNOSIS — Z71.6 TOBACCO ABUSE COUNSELING: ICD-10-CM

## 2019-08-15 DIAGNOSIS — E11.69 DIABETES MELLITUS TYPE 2 IN OBESE (HCC): ICD-10-CM

## 2019-08-15 DIAGNOSIS — E66.9 DIABETES MELLITUS TYPE 2 IN OBESE (HCC): ICD-10-CM

## 2019-08-15 DIAGNOSIS — E66.01 MORBID OBESITY WITH BMI OF 60.0-69.9, ADULT (HCC): Primary | ICD-10-CM

## 2019-08-15 DIAGNOSIS — G47.33 OSA (OBSTRUCTIVE SLEEP APNEA): ICD-10-CM

## 2019-08-15 DIAGNOSIS — K21.9 CHRONIC GERD: ICD-10-CM

## 2019-08-15 PROCEDURE — 4004F PT TOBACCO SCREEN RCVD TLK: CPT | Performed by: SURGERY

## 2019-08-15 PROCEDURE — G8417 CALC BMI ABV UP PARAM F/U: HCPCS | Performed by: SURGERY

## 2019-08-15 PROCEDURE — G8427 DOCREV CUR MEDS BY ELIG CLIN: HCPCS | Performed by: SURGERY

## 2019-08-15 PROCEDURE — 2022F DILAT RTA XM EVC RTNOPTHY: CPT | Performed by: SURGERY

## 2019-08-15 PROCEDURE — 99205 OFFICE O/P NEW HI 60 MIN: CPT | Performed by: SURGERY

## 2019-08-15 PROCEDURE — 3044F HG A1C LEVEL LT 7.0%: CPT | Performed by: SURGERY

## 2019-08-15 NOTE — PROGRESS NOTES
patient eats until she is - uncomfortable/stuffed. Patient describes level of activity as - sedentary. Surgery  Patient's greatest concern about having surgery is: not waking up. Patient describes the motivation for weight loss surgery to be: wants to live / wants to walk. Patient does feel confident in her ability to make these changes. The patient's expectations of post-surgical eating habits - voices understanding. Patient states she does understand the consequences of not complying with post-op food guidelines. Patient states she understands the long term changes in food intake that will be necessary for all occasions after surgery for the rest of her life. she does understand the long term food changes. Patient is deemed nutritionally appropriate to proceed. Goals  Weight: ~200 lb, just wants to be comfortable  Health Improvement: walk / breath better / get off c-pap    Assessment  Nutritional Needs: RMR=(9.99 x 160) + (6.25 x 163) - (4.92 x 43 y.o.) -161 = 2235 kcal x 1.4 (sedentary activity factor)= 3129 kcal - 1000 (for 2 lb weight loss/week)= 2129 kcal.    Plan  Surgical procedure desired: sleeve    Plan/Recommendations: Start pre-op guidelines    Goals:   -Eat 4-5 times daily  -Avoid high fat and high sugar foods  -Include protein with all meals and snacks  -Avoid carbonation and caffeine  -Avoid calorie containing beverages  -Increase physical activity as tolerated    PES Statement:  Overweight/Obesity related to lack of exercise, sedentary lifestyle, unhealthy eating habits, and unsuccessful diet attempts as evidenced by BMI. Body mass index is 60.49 kg/m². .    Will follow up as necessary.     Juliana Milton

## 2019-08-16 ENCOUNTER — TELEPHONE (OUTPATIENT)
Dept: CARDIOLOGY CLINIC | Age: 44
End: 2019-08-16

## 2019-08-29 ENCOUNTER — OFFICE VISIT (OUTPATIENT)
Dept: PAIN MANAGEMENT | Age: 44
End: 2019-08-29
Payer: MEDICAID

## 2019-08-29 VITALS
WEIGHT: 293 LBS | SYSTOLIC BLOOD PRESSURE: 152 MMHG | HEART RATE: 98 BPM | BODY MASS INDEX: 59.67 KG/M2 | DIASTOLIC BLOOD PRESSURE: 101 MMHG | OXYGEN SATURATION: 98 %

## 2019-08-29 DIAGNOSIS — I10 ESSENTIAL HYPERTENSION: ICD-10-CM

## 2019-08-29 DIAGNOSIS — F32.A DEPRESSION, UNSPECIFIED DEPRESSION TYPE: ICD-10-CM

## 2019-08-29 DIAGNOSIS — M17.0 PRIMARY OSTEOARTHRITIS OF BOTH KNEES: ICD-10-CM

## 2019-08-29 DIAGNOSIS — G47.33 OSA TREATED WITH BIPAP: ICD-10-CM

## 2019-08-29 DIAGNOSIS — M54.50 CHRONIC BILATERAL LOW BACK PAIN WITHOUT SCIATICA: ICD-10-CM

## 2019-08-29 DIAGNOSIS — M16.11 PRIMARY OSTEOARTHRITIS OF RIGHT HIP: ICD-10-CM

## 2019-08-29 DIAGNOSIS — F51.01 PRIMARY INSOMNIA: ICD-10-CM

## 2019-08-29 DIAGNOSIS — G89.4 CHRONIC PAIN SYNDROME: ICD-10-CM

## 2019-08-29 DIAGNOSIS — G89.29 CHRONIC BILATERAL LOW BACK PAIN WITHOUT SCIATICA: ICD-10-CM

## 2019-08-29 DIAGNOSIS — E66.01 MORBID OBESITY WITH BMI OF 60.0-69.9, ADULT (HCC): ICD-10-CM

## 2019-08-29 PROCEDURE — 99213 OFFICE O/P EST LOW 20 MIN: CPT | Performed by: NURSE PRACTITIONER

## 2019-08-29 PROCEDURE — G8427 DOCREV CUR MEDS BY ELIG CLIN: HCPCS | Performed by: NURSE PRACTITIONER

## 2019-08-29 PROCEDURE — G8417 CALC BMI ABV UP PARAM F/U: HCPCS | Performed by: NURSE PRACTITIONER

## 2019-08-29 PROCEDURE — 4004F PT TOBACCO SCREEN RCVD TLK: CPT | Performed by: NURSE PRACTITIONER

## 2019-08-29 RX ORDER — AMITRIPTYLINE HYDROCHLORIDE 25 MG/1
TABLET, FILM COATED ORAL
Qty: 30 TABLET | Refills: 1 | Status: SHIPPED | OUTPATIENT
Start: 2019-08-29 | End: 2019-10-25 | Stop reason: SDUPTHER

## 2019-08-29 RX ORDER — HYDROCODONE BITARTRATE AND ACETAMINOPHEN 5; 325 MG/1; MG/1
1 TABLET ORAL EVERY 8 HOURS PRN
Qty: 90 TABLET | Refills: 0 | Status: SHIPPED | OUTPATIENT
Start: 2019-08-29 | End: 2019-09-27 | Stop reason: SDUPTHER

## 2019-08-29 RX ORDER — VENLAFAXINE 25 MG/1
TABLET ORAL
Qty: 60 TABLET | Refills: 1 | Status: SHIPPED | OUTPATIENT
Start: 2019-08-29 | End: 2019-10-25 | Stop reason: SDUPTHER

## 2019-08-29 NOTE — PATIENT INSTRUCTIONS
Patient Education        Back Stretches: Exercises  Introduction  Here are some examples of exercises for stretching your back. Start each exercise slowly. Ease off the exercise if you start to have pain. Your doctor or physical therapist will tell you when you can start these exercises and which ones will work best for you. How to do the exercises  Overhead stretch    1. Stand comfortably with your feet shoulder-width apart. 2. Looking straight ahead, raise both arms over your head and reach toward the ceiling. Do not allow your head to tilt back. 3. Hold for 15 to 30 seconds, then lower your arms to your sides. 4. Repeat 2 to 4 times. Side stretch    1. Stand comfortably with your feet shoulder-width apart. 2. Raise one arm over your head, and then lean to the other side. 3. Slide your hand down your leg as you let the weight of your arm gently stretch your side muscles. Hold for 15 to 30 seconds. 4. Repeat 2 to 4 times on each side. Press-up    1. Lie on your stomach, supporting your body with your forearms. 2. Press your elbows down into the floor to raise your upper back. As you do this, relax your stomach muscles and allow your back to arch without using your back muscles. As your press up, do not let your hips or pelvis come off the floor. 3. Hold for 15 to 30 seconds, then relax. 4. Repeat 2 to 4 times. Relax and rest    1. Lie on your back with a rolled towel under your neck and a pillow under your knees. Extend your arms comfortably to your sides. 2. Relax and breathe normally. 3. Remain in this position for about 10 minutes. 4. If you can, do this 2 or 3 times each day. Follow-up care is a key part of your treatment and safety. Be sure to make and go to all appointments, and call your doctor if you are having problems. It's also a good idea to know your test results and keep a list of the medicines you take. Where can you learn more?   Go to center. 2. Adjust the height of the bike seat so that your knee is slightly bent when your leg is extended downward. If your knee hurts when the pedal reaches the top, you can raise the seat so that your knee does not bend as much. 3. Start slowly. At first, try to do 5 to 10 minutes of cycling with little to no resistance. Then increase your time and the resistance bit by bit until you can do 20 to 30 minutes without pain. 4. If you start to have pain, rest your knee until your pain gets back to the level that is normal for you. Or cycle for less time or with less effort. Follow-up care is a key part of your treatment and safety. Be sure to make and go to all appointments, and call your doctor if you are having problems. It's also a good idea to know your test results and keep a list of the medicines you take. Where can you learn more? Go to [email or web address] and sign in to your Smart Education account. Enter C159 in the eIQnetworks box to learn more about \"Knee Arthritis: Exercises. \"     If you do not have an account, please click on the \"Sign Up Now\" link. Current as of: September 20, 2018  Content Version: 12.1  © 1497-5814 Healthwise, Incorporated. Care instructions adapted under license by [facility] ([facility]). If you have questions about a medical condition or this instruction, always ask your healthcare professional. [place] [address] any warranty or liability for your use of this information.

## 2019-08-29 NOTE — PROGRESS NOTES
Colleen Sharron  1975  [id number]      HISTORY OF PRESENT ILLNESS:  Ms. Ruben Muñoz is a 37 y.o. female returns for a follow up visit for pain management  She has a diagnosis of   1. Chronic pain syndrome    2. Chronic bilateral low back pain without sciatica    3. Primary osteoarthritis of both knees    4. Primary osteoarthritis of right hip    5. JOSE treated with BiPAP    6. Morbid obesity with BMI of 60.0-69.9, adult ([facility] [address]    7. Depression, unspecified depression type, mild    8. Primary insomnia    9. Essential hypertension      On the Patients Pain Assessment form:  She complains of pain in the both buttocks, both knee(s), bilateral lower back and right hip She rates the pain 6/10 and describes it as sharp, aching, burning. Current treatment regimen has helped relieve about 40% of the pain. She denies any side effects from the current pain regimen. Patient reports that since the last follow up visit the physical functioning is better, family/social relationships are better, mood is better sleep patterns are better, and that the overall functioning is better. Patient denies misusing/abusing her narcotic pain medications or using any illegal drugs. There are No indicators for possible drug abuse, addiction or diversion problems. Upon obtaining medical history from Ms. Ruben Muñoz states that pain is manageable on current pain therapy. Mild tenderness to the lumbar region/knees, tolerable on current pain therapy. She has lost 4 pounds since the last o.v. Continues to work with Dr. Servando Giordano with [facility] for weight loss surgery. Mood is stable without anxiety. Sleep is fair with an average of 5-6 hours. Denies to having issues of constipation. Tolerating activities/house chores with moderate tenderness to the lower back/knees. Smoking 1/2 a pack a day    ALLERGIES: Patients list of allergies were reviewed     MEDICATIONS: Ms. Ruben Muñoz list of medications were reviewed. Her current medications are   Outpatient SOB, CP, syncopy. she is currently asymptomatic  -CBT techniques- relaxation therapies such as biofeedback, mindfulness based stress reduction, imagery, cognitive restructuring, problem solving discussed with patient  -She was advised weight reduction, diet changes- 800-1200 fior diet, diet diary, exercising, nutritional  consult increased physical activity as tolerated  -Last UDS 3/7/19 consistent  -Return in about 4 weeks (around 9/26/2019). Current Outpatient Medications   Medication Sig Dispense Refill    amitriptyline (ELAVIL) 25 MG tablet TAKE ONE TABLET BY MOUTH ONCE NIGHTLY 30 tablet 1    HYDROcodone-acetaminophen (NORCO) 5-325 MG per tablet Take 1 tablet by mouth every 8 hours as needed for Pain for up to 30 days.  90 tablet 0    venlafaxine (EFFEXOR) 25 MG tablet TAKE ONE TABLET BY MOUTH TWICE A DAY 60 tablet 1    fluticasone-salmeterol (WIXELA INHUB) 250-50 MCG/DOSE AEPB Inhale 1 puff into the lungs every 12 hours 60 each 3    atorvastatin (LIPITOR) 20 MG tablet Take 1 tablet by mouth daily 90 tablet 3    lisinopril (PRINIVIL;ZESTRIL) 40 MG tablet Take 1 tablet by mouth daily 90 tablet 3    metoprolol (LOPRESSOR) 100 MG tablet TAKE ONE TABLET BY MOUTH TWICE A DAY 60 tablet 3    ibuprofen (ADVIL;MOTRIN) 800 MG tablet Take 1 tablet by mouth 2 times daily (with meals) 60 tablet 1    albuterol sulfate HFA (PROAIR HFA) 108 (90 Base) MCG/ACT inhaler Inhale 2 puffs into the lungs every 6 hours as needed for Wheezing or Shortness of Breath 1 Inhaler 5    albuterol-ipratropium (COMBIVENT RESPIMAT)  MCG/ACT AERS inhaler Inhale 1 puff into the lungs every 6 hours 1 Inhaler 5    cloNIDine (CATAPRES) 0.1 MG tablet Take 0.3 mg by mouth 2 times daily      GARCINIA CAMBOGIA-CHROMIUM PO Take 1,600 mg by mouth daily      metFORMIN (GLUCOPHAGE) 500 MG tablet Take 1 tablet by mouth 2 times daily (with meals) 60 tablet 2    spironolactone (ALDACTONE) 25 MG tablet Take 1 tablet by mouth Daily with lunch home exercises independently. Ability to do household chores indoor and/or outdoor work and social and leisure activities. Improve psychosocial and physical functioning. - she is showing progression towards this treatment goal with the current regimen. She was advised against drinking alcohol with the narcotic pain medicines, advised against driving or handling machinery while adjusting the dose of medicines or if having cognitive  issues related to the current medications. Risk of overdose and death, if medicines not taken as prescribed, were also discussed. If the patient develops new symptoms or if the symptoms worsen, the patient should call the office. While transcribing every attempt was made to maintain the accuracy of the note in terms of it's contents,there may have been some errors made inadvertently. Thank you for allowing me to participate in the care of this patient.     Rogelio Desai CNP    Cc: Chiquis Lopez MD

## 2019-09-15 ENCOUNTER — HOSPITAL ENCOUNTER (EMERGENCY)
Age: 44
Discharge: HOME | End: 2019-09-15
Attending: EMERGENCY MEDICINE
Payer: MEDICAID

## 2019-09-15 ENCOUNTER — APPOINTMENT (OUTPATIENT)
Dept: GENERAL RADIOLOGY | Age: 44
End: 2019-09-15
Payer: MEDICAID

## 2019-09-15 VITALS
RESPIRATION RATE: 30 BRPM | SYSTOLIC BLOOD PRESSURE: 198 MMHG | DIASTOLIC BLOOD PRESSURE: 96 MMHG | TEMPERATURE: 98.5 F | WEIGHT: 293 LBS | OXYGEN SATURATION: 93 % | HEART RATE: 102 BPM | BODY MASS INDEX: 50.02 KG/M2 | HEIGHT: 64 IN

## 2019-09-15 DIAGNOSIS — J40 BRONCHITIS: Primary | ICD-10-CM

## 2019-09-15 LAB
ANION GAP SERPL CALCULATED.3IONS-SCNC: 10 MMOL/L (ref 3–16)
BUN BLDV-MCNC: 6 MG/DL (ref 7–20)
CALCIUM SERPL-MCNC: 9.2 MG/DL (ref 8.3–10.6)
CHLORIDE BLD-SCNC: 102 MMOL/L (ref 99–110)
CO2: 30 MMOL/L (ref 21–32)
CREAT SERPL-MCNC: 0.7 MG/DL (ref 0.6–1.1)
GFR AFRICAN AMERICAN: >60
GFR NON-AFRICAN AMERICAN: >60
GLUCOSE BLD-MCNC: 134 MG/DL (ref 70–99)
HCT VFR BLD CALC: 41.7 % (ref 36–48)
HEMOGLOBIN: 14.1 G/DL (ref 12–16)
MCH RBC QN AUTO: 32.7 PG (ref 26–34)
MCHC RBC AUTO-ENTMCNC: 33.9 G/DL (ref 31–36)
MCV RBC AUTO: 96.4 FL (ref 80–100)
PDW BLD-RTO: 13.5 % (ref 12.4–15.4)
PLATELET # BLD: 234 K/UL (ref 135–450)
PMV BLD AUTO: 9.1 FL (ref 5–10.5)
POTASSIUM REFLEX MAGNESIUM: 4.1 MMOL/L (ref 3.5–5.1)
RBC # BLD: 4.32 M/UL (ref 4–5.2)
SODIUM BLD-SCNC: 142 MMOL/L (ref 136–145)
WBC # BLD: 5.4 K/UL (ref 4–11)

## 2019-09-15 PROCEDURE — 80048 BASIC METABOLIC PNL TOTAL CA: CPT

## 2019-09-15 PROCEDURE — 6360000002 HC RX W HCPCS: Performed by: EMERGENCY MEDICINE

## 2019-09-15 PROCEDURE — 71046 X-RAY EXAM CHEST 2 VIEWS: CPT

## 2019-09-15 PROCEDURE — 85027 COMPLETE CBC AUTOMATED: CPT

## 2019-09-15 PROCEDURE — 99285 EMERGENCY DEPT VISIT HI MDM: CPT

## 2019-09-15 PROCEDURE — 6370000000 HC RX 637 (ALT 250 FOR IP): Performed by: EMERGENCY MEDICINE

## 2019-09-15 PROCEDURE — 36415 COLL VENOUS BLD VENIPUNCTURE: CPT

## 2019-09-15 RX ORDER — IPRATROPIUM BROMIDE AND ALBUTEROL SULFATE 2.5; .5 MG/3ML; MG/3ML
1 SOLUTION RESPIRATORY (INHALATION) ONCE
Status: COMPLETED | OUTPATIENT
Start: 2019-09-15 | End: 2019-09-15

## 2019-09-15 RX ORDER — PREDNISONE 10 MG/1
10 TABLET ORAL DAILY
Qty: 5 TABLET | Refills: 0 | Status: SHIPPED | OUTPATIENT
Start: 2019-09-15 | End: 2019-09-20

## 2019-09-15 RX ORDER — ALBUTEROL SULFATE 2.5 MG/3ML
2.5 SOLUTION RESPIRATORY (INHALATION) ONCE
Status: COMPLETED | OUTPATIENT
Start: 2019-09-15 | End: 2019-09-15

## 2019-09-15 RX ORDER — ALBUTEROL SULFATE 90 UG/1
2 AEROSOL, METERED RESPIRATORY (INHALATION) EVERY 4 HOURS PRN
Qty: 1 INHALER | Refills: 1 | Status: SHIPPED | OUTPATIENT
Start: 2019-09-15 | End: 2019-10-03 | Stop reason: SDUPTHER

## 2019-09-15 RX ORDER — PREDNISONE 20 MG/1
40 TABLET ORAL ONCE
Status: COMPLETED | OUTPATIENT
Start: 2019-09-15 | End: 2019-09-15

## 2019-09-15 RX ADMIN — ALBUTEROL SULFATE 2.5 MG: 2.5 SOLUTION RESPIRATORY (INHALATION) at 12:35

## 2019-09-15 RX ADMIN — PREDNISONE 40 MG: 20 TABLET ORAL at 12:35

## 2019-09-15 RX ADMIN — IPRATROPIUM BROMIDE AND ALBUTEROL SULFATE 1 AMPULE: .5; 3 SOLUTION RESPIRATORY (INHALATION) at 11:41

## 2019-09-15 NOTE — ED NOTES
Discharge and education instructions reviewed. Patient verbalized understanding, teach-back successful. Patient denied questions at this time. No acute distress noted. Patient instructed to follow-up as noted - return to emergency department if symptoms worsen. Patient verbalized understanding. Discharged per EDMD with discharge instructions.          Spencer Renee RN  09/15/19 1903

## 2019-09-15 NOTE — ED PROVIDER NOTES
Emergency [facility]    Patient: Regine Lopez  MRN: [id number]  : 1975  Date of Evaluation: 9/15/2019  ED Provider: Jean Carlos Botello MD    Chief Complaint       Chief Complaint   Patient presents with    Shortness of Breath     pt with a hx of CHF/COPD presents to ED with a 2 day hx of increased sob (@ rest and c activity)/cough productive of green phlegm/denies fever/chills/sweats/n/v/+dx1 today/she is tachypneic @ 30 resp per minute/aao x 4/ bpm. Skin is warm/dry/appropriate for ethnicity. Judy Huynh is a 37 y.o. female who presents to the emergency department cough with productive green sputum for the last 24 to 48 hours and shortness of breath. She has not had no chest discomfort fever chills or rigors. He does have COPD but is not on home oxygen she still smokes about half a pack cigarettes daily. Denies any orthopnea changes. No myalgias no sore throat. She was taken off of her diuretics a while back. He does not use any inhalers the last couple days says she just been in bed \"\" history of DVT or PE.    ROS:     At least 10 systems reviewed and otherwise acutely negative except as in the [address]     Past History     Past Medical History:   Diagnosis Date    Anxiety     Arthritis     Asthma     Back pain     CHF (congestive heart failure) (HCC)     COPD (chronic obstructive pulmonary disease) (HCC)     Depression     Diabetes mellitus (HCC)     GERD (gastroesophageal reflux disease)     Hyperlipidemia     Hypertension     Obesity     Sleep apnea      Past Surgical History:   Procedure Laterality Date    CHOLECYSTECTOMY      HYSTERECTOMY      SKIN BIOPSY      TUBAL LIGATION       Social History     Socioeconomic History    Marital status: Single     Spouse name: None    Number of children: None    Years of education: None    Highest education level: None   Occupational History    Occupation: stay at home mom   Social Needs    Financial resource strain: None    Food insecurity:     Worry: None     Inability: None    Transportation needs:     Medical: None     Non-medical: None   Tobacco Use    Smoking status: Current Every Day Smoker     Packs/day: 0.50     Years: 20.00     Pack years: 10.00     Types: Cigarettes     Start date: 1/1/1985    Smokeless tobacco: Never Used    Tobacco comment: working on it   Substance and Sexual Activity    Alcohol use: Yes     Alcohol/week: 1.0 standard drinks     Types: 1 Glasses of wine per week     Comment: rare    Drug use: No    Sexual activity: Yes     Partners: Male   Lifestyle    Physical activity:     Days per week: None     Minutes per session: None    Stress: None   Relationships    Social connections:     Talks on phone: None     Gets together: None     Attends Latter day service: None     Active member of club or organization: None     Attends meetings of clubs or organizations: None     Relationship status: None    Intimate partner violence:     Fear of current or ex partner: None     Emotionally abused: None     Physically abused: None     Forced sexual activity: None   Other Topics Concern    None   Social History Narrative    None       Medications/Allergies     Discharge Medication List as of 9/15/2019  1:12 PM      CONTINUE these medications which have NOT CHANGED    Details   amitriptyline (ELAVIL) 25 MG tablet TAKE ONE TABLET BY MOUTH ONCE NIGHTLY, Disp-30 tablet, R-1Normal      HYDROcodone-acetaminophen (NORCO) 5-325 MG per tablet Take 1 tablet by mouth every 8 hours as needed for Pain for up to 30 days. , Disp-90 tablet, R-0Print      venlafaxine (EFFEXOR) 25 MG tablet TAKE ONE TABLET BY MOUTH TWICE A DAY, Disp-60 tablet, R-1Normal      fluticasone-salmeterol (WIXELA INHUB) 250-50 MCG/DOSE AEPB Inhale 1 puff into the lungs every 12 hours, Disp-60 each, R-3Normal      atorvastatin (LIPITOR) 20 MG tablet Take 1 tablet by mouth Oral 102 30 98 % 5' 4\" (1.626 m) (!) 337 lb 11.9 oz (153.2 kg)     GENERAL APPEARANCE: Awake and alert. Cooperative. No acute distress. HEAD: Normocephalic. Atraumatic. EYES: Sclera anicteric. ENT: Tolerates saliva. No trismus. NECK: Supple. Trachea midline. CARDIO: RRR. Radial pulse 2+. LUNGS: Respirations slightly labored, inspiratory and expiratory wheezes throughout with fair to good air movement. ABDOMEN: Soft. Non-distended. Non-tender. EXTREMITIES: No acute deformities. No cyanosis clubbing or edema  SKIN: Warm and dry. NEUROLOGICAL: No gross facial drooping. Moves all 4 extremities spontaneously.    [address] [place], [place] [address]Storee   Labs:  Results for orders placed or performed during the hospital encounter of 09/15/19   CBC   Result Value Ref Range    WBC 5.4 4.0 - 11.0 K/uL    RBC 4.32 4.00 - 5.20 M/uL    Hemoglobin 14.1 12.0 - 16.0 g/dL    Hematocrit 41.7 36.0 - 48.0 %    MCV 96.4 80.0 - 100.0 fL    MCH 32.7 26.0 - 34.0 pg    MCHC 33.9 31.0 - 36.0 g/dL    RDW 13.5 12.4 - 15.4 %    Platelets 439 354 - 595 K/uL    MPV 9.1 5.0 - 10.5 fL   Basic Metabolic Panel w/ Reflex to MG   Result Value Ref Range    Sodium 142 136 - 145 mmol/L    Potassium reflex Magnesium 4.1 3.5 - 5.1 mmol/L    Chloride 102 99 - 110 mmol/L    CO2 30 21 - 32 mmol/L    Anion Gap 10 3 - 16    Glucose 134 (H) 70 - 99 mg/dL    BUN 6 (L) 7 - 20 mg/dL    CREATININE 0.7 0.6 - 1.1 mg/dL    GFR Non-African American >60 >60    GFR African American >60 >60    Calcium 9.2 8.3 - 10.6 mg/dL     Radiographs:  Xr Chest Standard (2 Vw)    Result Date: 9/15/2019  EXAMINATION: TWO XRAY VIEWS OF THE CHEST 9/15/2019 11:24 am COMPARISON: August 27, 2018 HISTORY: ORDERING SYSTEM PROVIDED HISTORY: cough TECHNOLOGIST PROVIDED HISTORY: Reason for exam:->cough Reason for Exam: SOB Acuity: Acute Type of Exam: Initial Additional signs and symptoms: Pt c/o wheezing, cough, and cold sx Relevant Medical/Surgical History: Pt says H/O asthma and COPD, denies surgery to chest, smoker x's 20 yrs FINDINGS: Cardiac silhouette is normal in size. Lungs appear clear. No acute bony abnormality. No acute findings       Procedures/EKG:       ED Course and MDM   In brief, Radha Yee is a 37 y.o. female who presented to the emergency department with cough congestion differential do include pneumonia bronchitis viral syndrome CHF and COPD. Her exam was consistent with COPD she did receive aerosols DuoNeb and albuterol as improved was placed on prednisone be discharged in stable condition there is no sign of any  respiratory or hematological decompensation. ED Medication Orders (From admission, onward)    Start Ordered     Status Ordering Provider    09/15/19 1215 09/15/19 1214  predniSONE (DELTASONE) tablet 40 mg  ONCE      Last MAR action:  Given - by Nick Winston on 09/15/19 at [facility]    09/15/19 1215 09/15/19 1214  albuterol (PROVENTIL) nebulizer solution 2.5 mg  ONCE      Last MAR action:  Given - by Nick Winston on 09/15/19 at [facility]    09/15/19 1130 09/15/19 1116  ipratropium-albuterol (DUONEB) nebulizer solution 1 ampule  ONCE      Last MAR action:  Given - by Nick Winston on 09/15/19 at 114[address] [facility]          Final Impression      1.  Bronchitis      DISPOSITION Decision To Discharge 09/15/2019 01:18:14 PM     (Please note that portions of this note may have been completed with a voice recognition program. Efforts were made to edit the dictations but occasionally words are mis-transcribed.)    Didi Guallpa MD  [id number] Juhi Morales MD  09/15/19 9748

## 2019-09-15 NOTE — ED TRIAGE NOTES
pt with a hx of CHF/COPD presents to ED with a 2 day hx of increased sob (@ rest and c activity)/cough productive of green phlegm/denies fever/chills/sweats/n/v/+dx1 today/she is tachypneic @ 30 resp per minute/aao x 4/ bpm. Skin is warm/dry/appropriate for ethnicity.

## 2019-09-17 DIAGNOSIS — I10 ESSENTIAL HYPERTENSION: ICD-10-CM

## 2019-09-17 DIAGNOSIS — Z71.6 TOBACCO ABUSE COUNSELING: ICD-10-CM

## 2019-09-17 DIAGNOSIS — K21.9 CHRONIC GERD: ICD-10-CM

## 2019-09-17 DIAGNOSIS — E66.9 DIABETES MELLITUS TYPE 2 IN OBESE (HCC): ICD-10-CM

## 2019-09-17 DIAGNOSIS — G47.33 OSA (OBSTRUCTIVE SLEEP APNEA): ICD-10-CM

## 2019-09-17 DIAGNOSIS — E66.01 MORBID OBESITY WITH BMI OF 60.0-69.9, ADULT (HCC): ICD-10-CM

## 2019-09-17 DIAGNOSIS — E11.69 DIABETES MELLITUS TYPE 2 IN OBESE (HCC): ICD-10-CM

## 2019-09-17 LAB
A/G RATIO: 1.8 (ref 1.1–2.2)
ALBUMIN SERPL-MCNC: 4.6 G/DL (ref 3.4–5)
ALP BLD-CCNC: 67 U/L (ref 40–129)
ALT SERPL-CCNC: 14 U/L (ref 10–40)
ANION GAP SERPL CALCULATED.3IONS-SCNC: 15 MMOL/L (ref 3–16)
AST SERPL-CCNC: 12 U/L (ref 15–37)
BASOPHILS ABSOLUTE: 0 K/UL (ref 0–0.2)
BASOPHILS RELATIVE PERCENT: 0.7 %
BILIRUB SERPL-MCNC: 0.3 MG/DL (ref 0–1)
BUN BLDV-MCNC: 6 MG/DL (ref 7–20)
CALCIUM SERPL-MCNC: 9.4 MG/DL (ref 8.3–10.6)
CHLORIDE BLD-SCNC: 100 MMOL/L (ref 99–110)
CHOLESTEROL, TOTAL: 198 MG/DL (ref 0–199)
CO2: 25 MMOL/L (ref 21–32)
CREAT SERPL-MCNC: 0.8 MG/DL (ref 0.6–1.1)
EOSINOPHILS ABSOLUTE: 0.1 K/UL (ref 0–0.6)
EOSINOPHILS RELATIVE PERCENT: 1.5 %
FOLATE: 10 NG/ML (ref 4.78–24.2)
GFR AFRICAN AMERICAN: >60
GFR NON-AFRICAN AMERICAN: >60
GLOBULIN: 2.5 G/DL
GLUCOSE BLD-MCNC: 107 MG/DL (ref 70–99)
HCT VFR BLD CALC: 41.9 % (ref 36–48)
HDLC SERPL-MCNC: 74 MG/DL (ref 40–60)
HEMOGLOBIN: 14.4 G/DL (ref 12–16)
IRON SATURATION: 30 % (ref 15–50)
IRON: 85 UG/DL (ref 37–145)
LDL CHOLESTEROL CALCULATED: 102 MG/DL
LYMPHOCYTES ABSOLUTE: 1.9 K/UL (ref 1–5.1)
LYMPHOCYTES RELATIVE PERCENT: 35.9 %
MCH RBC QN AUTO: 33.3 PG (ref 26–34)
MCHC RBC AUTO-ENTMCNC: 34.5 G/DL (ref 31–36)
MCV RBC AUTO: 96.6 FL (ref 80–100)
MONOCYTES ABSOLUTE: 0.6 K/UL (ref 0–1.3)
MONOCYTES RELATIVE PERCENT: 11.1 %
NEUTROPHILS ABSOLUTE: 2.7 K/UL (ref 1.7–7.7)
NEUTROPHILS RELATIVE PERCENT: 50.8 %
PDW BLD-RTO: 13.6 % (ref 12.4–15.4)
PLATELET # BLD: 242 K/UL (ref 135–450)
PMV BLD AUTO: 9.5 FL (ref 5–10.5)
POTASSIUM SERPL-SCNC: 4.3 MMOL/L (ref 3.5–5.1)
RBC # BLD: 4.34 M/UL (ref 4–5.2)
SODIUM BLD-SCNC: 140 MMOL/L (ref 136–145)
TOTAL IRON BINDING CAPACITY: 279 UG/DL (ref 260–445)
TOTAL PROTEIN: 7.1 G/DL (ref 6.4–8.2)
TRIGL SERPL-MCNC: 110 MG/DL (ref 0–150)
TSH REFLEX: 1.05 UIU/ML (ref 0.27–4.2)
VITAMIN B-12: 615 PG/ML (ref 211–911)
VITAMIN D 25-HYDROXY: 31.5 NG/ML
VLDLC SERPL CALC-MCNC: 22 MG/DL
WBC # BLD: 5.4 K/UL (ref 4–11)

## 2019-09-18 LAB
ESTIMATED AVERAGE GLUCOSE: 111.2 MG/DL
HBA1C MFR BLD: 5.5 %

## 2019-09-27 ENCOUNTER — OFFICE VISIT (OUTPATIENT)
Dept: PAIN MANAGEMENT | Age: 44
End: 2019-09-27
Payer: MEDICAID

## 2019-09-27 VITALS — DIASTOLIC BLOOD PRESSURE: 103 MMHG | HEART RATE: 68 BPM | OXYGEN SATURATION: 96 % | SYSTOLIC BLOOD PRESSURE: 178 MMHG

## 2019-09-27 DIAGNOSIS — M16.11 PRIMARY OSTEOARTHRITIS OF RIGHT HIP: ICD-10-CM

## 2019-09-27 DIAGNOSIS — E66.01 MORBID OBESITY WITH BMI OF 60.0-69.9, ADULT (HCC): ICD-10-CM

## 2019-09-27 DIAGNOSIS — G89.29 CHRONIC BILATERAL LOW BACK PAIN WITHOUT SCIATICA: ICD-10-CM

## 2019-09-27 DIAGNOSIS — G47.33 OSA TREATED WITH BIPAP: ICD-10-CM

## 2019-09-27 DIAGNOSIS — G89.4 CHRONIC PAIN SYNDROME: ICD-10-CM

## 2019-09-27 DIAGNOSIS — F32.A DEPRESSION, UNSPECIFIED DEPRESSION TYPE: ICD-10-CM

## 2019-09-27 DIAGNOSIS — F51.01 PRIMARY INSOMNIA: ICD-10-CM

## 2019-09-27 DIAGNOSIS — I10 ESSENTIAL HYPERTENSION: ICD-10-CM

## 2019-09-27 DIAGNOSIS — M54.50 CHRONIC BILATERAL LOW BACK PAIN WITHOUT SCIATICA: ICD-10-CM

## 2019-09-27 DIAGNOSIS — M17.0 PRIMARY OSTEOARTHRITIS OF BOTH KNEES: ICD-10-CM

## 2019-09-27 PROCEDURE — 99213 OFFICE O/P EST LOW 20 MIN: CPT | Performed by: NURSE PRACTITIONER

## 2019-09-27 PROCEDURE — G8427 DOCREV CUR MEDS BY ELIG CLIN: HCPCS | Performed by: NURSE PRACTITIONER

## 2019-09-27 PROCEDURE — G8417 CALC BMI ABV UP PARAM F/U: HCPCS | Performed by: NURSE PRACTITIONER

## 2019-09-27 PROCEDURE — 4004F PT TOBACCO SCREEN RCVD TLK: CPT | Performed by: NURSE PRACTITIONER

## 2019-09-27 RX ORDER — HYDROCODONE BITARTRATE AND ACETAMINOPHEN 5; 325 MG/1; MG/1
1 TABLET ORAL EVERY 8 HOURS PRN
Qty: 90 TABLET | Refills: 0 | Status: SHIPPED | OUTPATIENT
Start: 2019-09-27 | End: 2019-10-25 | Stop reason: SDUPTHER

## 2019-09-27 NOTE — PATIENT INSTRUCTIONS
[email or web address] and sign in to your [employer] account. Enter L956 in the [employer] ([employer]hire box to learn more about \"Back Stretches: Exercises. \"     If you do not have an account, please click on the \"Sign Up Now\" link. Current as of: September 20, 2018  Content Version: 12.1  © 6849-1976 Healthwise, Incorporated. Care instructions adapted under license by [facility] ([facility]). If you have questions about a medical condition or this instruction, always ask your healthcare professional. [address] any warranty or liability for your use of this information.

## 2019-09-27 NOTE — PROGRESS NOTES
Regine Hartmanpa  1975  [id number]      HISTORY OF PRESENT ILLNESS:  Ms. Silvana Mascorro is a 53 y.o. female returns for a follow up visit for pain management  She has a diagnosis of   1. Chronic pain syndrome    2. Chronic bilateral low back pain without sciatica    3. Primary osteoarthritis of both knees    4. Primary osteoarthritis of right hip    5. JOSE treated with BiPAP    6. Morbid obesity with BMI of 60.0-69.9, adult ([facility] [address]    7. Depression, unspecified depression type, mild    8. Primary insomnia    9. Essential hypertension      On the Patients Pain Assessment form:  She complains of pain in the both buttocks, both knee(s), left leg(s): upper and bilateral lower back She rates the pain 7/10 and describes it as sharp, aching, burning. Current treatment regimen has helped relieve about 40% of the pain. She denies any side effects from the current pain regimen. Patient reports that since the last follow up visit the physical functioning is better, family/social relationships are better, mood is better sleep patterns are better, and that the overall functioning is better. Patient denies misusing/abusing her narcotic pain medications or using any illegal drugs. There are No indicators for possible drug abuse, addiction or diversion problems. Upon obtaining medical history from Ms. Silvana Mascorro states that pain is manageable on current pain therapy. She was treated in ER for Bronchitis, symptoms are improving after treatment with antibiotics, steroids. Pain medications adequately manage her pain, takes them as prescribed. Continues to f/u with bariatrics for weight loss surgery. Mood is stable without anxiety. Sleep is fair with an average of 5-6 hours. Denies to having issues of constipation. Tolerating activities/house chores with moderate tenderness to the lower back.  Smoking 1/2 a pack of cigarettes a day    ALLERGIES: Patients list of allergies were reviewed     MEDICATIONS: Ms. Silvana Mascorro list of medications were reviewed. Her current medications are   Outpatient Medications Prior to Visit   Medication Sig Dispense Refill    albuterol sulfate HFA (PROVENTIL HFA) 108 (90 Base) MCG/ACT inhaler Inhale 2 puffs into the lungs every 4 hours as needed for Wheezing or Shortness of Breath With spacer (and mask if indicated). Thanks. 1 Inhaler 1    amitriptyline (ELAVIL) 25 MG tablet TAKE ONE TABLET BY MOUTH ONCE NIGHTLY 30 tablet 1    venlafaxine (EFFEXOR) 25 MG tablet TAKE ONE TABLET BY MOUTH TWICE A DAY 60 tablet 1    fluticasone-salmeterol (WIXELA INHUB) 250-50 MCG/DOSE AEPB Inhale 1 puff into the lungs every 12 hours 60 each 3    atorvastatin (LIPITOR) 20 MG tablet Take 1 tablet by mouth daily 90 tablet 3    lisinopril (PRINIVIL;ZESTRIL) 40 MG tablet Take 1 tablet by mouth daily 90 tablet 3    metoprolol (LOPRESSOR) 100 MG tablet TAKE ONE TABLET BY MOUTH TWICE A DAY 60 tablet 3    albuterol sulfate HFA (PROAIR HFA) 108 (90 Base) MCG/ACT inhaler Inhale 2 puffs into the lungs every 6 hours as needed for Wheezing or Shortness of Breath 1 Inhaler 5    albuterol-ipratropium (COMBIVENT RESPIMAT)  MCG/ACT AERS inhaler Inhale 1 puff into the lungs every 6 hours 1 Inhaler 5    cloNIDine (CATAPRES) 0.1 MG tablet Take 0.3 mg by mouth 2 times daily      GARCINIA CAMBOGIA-CHROMIUM PO Take 1,600 mg by mouth daily      metFORMIN (GLUCOPHAGE) 500 MG tablet Take 1 tablet by mouth 2 times daily (with meals) 60 tablet 2    spironolactone (ALDACTONE) 25 MG tablet Take 1 tablet by mouth Daily with lunch 30 tablet 11    loratadine (CLARITIN) 10 MG tablet Take 10 mg by mouth daily      montelukast (SINGULAIR) 10 MG tablet Take 1 tablet by mouth nightly 30 tablet 3    omeprazole (PRILOSEC) 20 MG delayed release capsule Take 20 mg by mouth daily      acetaminophen (APAP EXTRA STRENGTH) 500 MG tablet Take 2 tablets by mouth every 6 hours as needed for Pain DO NOT TAKE WITH OTHER MEDICATIONS CONTAINING ACETAMINOPHEN.  30 tablet 0 exercises  -Monitor BP, f/u with PCP if it continues to stay elevated or she becomes symptomatic with SOB, CP, syncopy. she is currently asymptomatic, compliant with prescribed antihypertensives, last taken this morning  -CBT techniques- relaxation therapies such as biofeedback, mindfulness based stress reduction, imagery, cognitive restructuring, problem solving discussed with patient  -Advised patient to quit smoking for  health related concerns and to improve the treatment outcomes. Education was given on quitting smoking and the use of different modalities including medications, hypnotherapy, counselling  and biofeedback. These were discussed with patient.  -She was advised weight reduction, diet changes- 800-1200 fior diet, diet diary, exercising, nutritional  consult increased physical activity as tolerated  -Last UDS 3/7/19 Consistent  -Return in about 4 weeks (around 10/25/2019). Current Outpatient Medications   Medication Sig Dispense Refill    HYDROcodone-acetaminophen (NORCO) 5-325 MG per tablet Take 1 tablet by mouth every 8 hours as needed for Pain for up to 30 days. 90 tablet 0    albuterol sulfate HFA (PROVENTIL HFA) 108 (90 Base) MCG/ACT inhaler Inhale 2 puffs into the lungs every 4 hours as needed for Wheezing or Shortness of Breath With spacer (and mask if indicated). Thanks.  1 Inhaler 1    amitriptyline (ELAVIL) 25 MG tablet TAKE ONE TABLET BY MOUTH ONCE NIGHTLY 30 tablet 1    venlafaxine (EFFEXOR) 25 MG tablet TAKE ONE TABLET BY MOUTH TWICE A DAY 60 tablet 1    fluticasone-salmeterol (WIXELA INHUB) 250-50 MCG/DOSE AEPB Inhale 1 puff into the lungs every 12 hours 60 each 3    atorvastatin (LIPITOR) 20 MG tablet Take 1 tablet by mouth daily 90 tablet 3    lisinopril (PRINIVIL;ZESTRIL) 40 MG tablet Take 1 tablet by mouth daily 90 tablet 3    metoprolol (LOPRESSOR) 100 MG tablet TAKE ONE TABLET BY MOUTH TWICE A DAY 60 tablet 3    albuterol sulfate HFA (PROAIR HFA) 108 (90 Base) MCG/ACT inhaler Inhale 2 puffs into the lungs every 6 hours as needed for Wheezing or Shortness of Breath 1 Inhaler 5    albuterol-ipratropium (COMBIVENT RESPIMAT)  MCG/ACT AERS inhaler Inhale 1 puff into the lungs every 6 hours 1 Inhaler 5    cloNIDine (CATAPRES) 0.1 MG tablet Take 0.3 mg by mouth 2 times daily      GARCINIA CAMBOGIA-CHROMIUM PO Take 1,600 mg by mouth daily      metFORMIN (GLUCOPHAGE) 500 MG tablet Take 1 tablet by mouth 2 times daily (with meals) 60 tablet 2    spironolactone (ALDACTONE) 25 MG tablet Take 1 tablet by mouth Daily with lunch 30 tablet 11    loratadine (CLARITIN) 10 MG tablet Take 10 mg by mouth daily      montelukast (SINGULAIR) 10 MG tablet Take 1 tablet by mouth nightly 30 tablet 3    omeprazole (PRILOSEC) 20 MG delayed release capsule Take 20 mg by mouth daily      acetaminophen (APAP EXTRA STRENGTH) 500 MG tablet Take 2 tablets by mouth every 6 hours as needed for Pain DO NOT TAKE WITH OTHER MEDICATIONS CONTAINING ACETAMINOPHEN. 30 tablet 0    Spacer/Aero-Holding Chambers KIT Please dispense aerochamber spacer 1 kit 0    Cholecalciferol (VITAMIN D3) 48300 UNITS CAPS Take 10,000 Units by mouth three times a week. Takes on Mon Wed Fri       No current facility-administered medications for this visit. I will continue her current medication regimen  which is part of the above treatment schedule. It has been helping with Ms. Edgardo Gerardo chronic  medical problems which for this visit include:   Diagnoses of Chronic pain syndrome, Chronic bilateral low back pain without sciatica, Primary osteoarthritis of both knees, Primary osteoarthritis of right hip, JOSE treated with BiPAP, Morbid obesity with BMI of 60.0-69.9, adult ([facility] [address] Depression, unspecified depression type, mild, Primary insomnia, and Essential hypertension were pertinent to this visit.    Risks and benefits of the medications and other alternative treatments  including no treatment were discussed with the

## 2019-10-03 ENCOUNTER — OFFICE VISIT (OUTPATIENT)
Dept: PULMONOLOGY | Age: 44
End: 2019-10-03
Payer: MEDICAID

## 2019-10-03 ENCOUNTER — OFFICE VISIT (OUTPATIENT)
Dept: BARIATRICS/WEIGHT MGMT | Age: 44
End: 2019-10-03
Payer: MEDICAID

## 2019-10-03 VITALS
WEIGHT: 293 LBS | BODY MASS INDEX: 50.02 KG/M2 | SYSTOLIC BLOOD PRESSURE: 150 MMHG | RESPIRATION RATE: 16 BRPM | DIASTOLIC BLOOD PRESSURE: 100 MMHG | TEMPERATURE: 98.4 F | HEIGHT: 64 IN | OXYGEN SATURATION: 96 % | HEART RATE: 72 BPM

## 2019-10-03 VITALS
HEART RATE: 64 BPM | DIASTOLIC BLOOD PRESSURE: 84 MMHG | BODY MASS INDEX: 50.02 KG/M2 | WEIGHT: 293 LBS | HEIGHT: 64 IN | SYSTOLIC BLOOD PRESSURE: 136 MMHG

## 2019-10-03 DIAGNOSIS — G47.33 OSA (OBSTRUCTIVE SLEEP APNEA): ICD-10-CM

## 2019-10-03 DIAGNOSIS — Z71.6 TOBACCO ABUSE COUNSELING: ICD-10-CM

## 2019-10-03 DIAGNOSIS — I10 ESSENTIAL HYPERTENSION: ICD-10-CM

## 2019-10-03 DIAGNOSIS — E66.01 MORBID OBESITY WITH BMI OF 50.0-59.9, ADULT (HCC): Primary | ICD-10-CM

## 2019-10-03 DIAGNOSIS — J44.9 COPD, MILD (HCC): ICD-10-CM

## 2019-10-03 DIAGNOSIS — K21.9 CHRONIC GERD: ICD-10-CM

## 2019-10-03 DIAGNOSIS — E11.69 DIABETES MELLITUS TYPE 2 IN OBESE (HCC): ICD-10-CM

## 2019-10-03 DIAGNOSIS — E66.9 DIABETES MELLITUS TYPE 2 IN OBESE (HCC): ICD-10-CM

## 2019-10-03 PROCEDURE — G8417 CALC BMI ABV UP PARAM F/U: HCPCS | Performed by: INTERNAL MEDICINE

## 2019-10-03 PROCEDURE — 4004F PT TOBACCO SCREEN RCVD TLK: CPT | Performed by: SURGERY

## 2019-10-03 PROCEDURE — G8427 DOCREV CUR MEDS BY ELIG CLIN: HCPCS | Performed by: INTERNAL MEDICINE

## 2019-10-03 PROCEDURE — G8417 CALC BMI ABV UP PARAM F/U: HCPCS | Performed by: SURGERY

## 2019-10-03 PROCEDURE — 4004F PT TOBACCO SCREEN RCVD TLK: CPT | Performed by: INTERNAL MEDICINE

## 2019-10-03 PROCEDURE — 99214 OFFICE O/P EST MOD 30 MIN: CPT | Performed by: INTERNAL MEDICINE

## 2019-10-03 PROCEDURE — G8926 SPIRO NO PERF OR DOC: HCPCS | Performed by: INTERNAL MEDICINE

## 2019-10-03 PROCEDURE — G8484 FLU IMMUNIZE NO ADMIN: HCPCS | Performed by: SURGERY

## 2019-10-03 PROCEDURE — 99213 OFFICE O/P EST LOW 20 MIN: CPT | Performed by: SURGERY

## 2019-10-03 PROCEDURE — G8484 FLU IMMUNIZE NO ADMIN: HCPCS | Performed by: INTERNAL MEDICINE

## 2019-10-03 PROCEDURE — 3023F SPIROM DOC REV: CPT | Performed by: INTERNAL MEDICINE

## 2019-10-03 PROCEDURE — 3044F HG A1C LEVEL LT 7.0%: CPT | Performed by: SURGERY

## 2019-10-03 PROCEDURE — 2022F DILAT RTA XM EVC RTNOPTHY: CPT | Performed by: SURGERY

## 2019-10-03 PROCEDURE — G8427 DOCREV CUR MEDS BY ELIG CLIN: HCPCS | Performed by: SURGERY

## 2019-10-03 ASSESSMENT — SLEEP AND FATIGUE QUESTIONNAIRES
HOW LIKELY ARE YOU TO NOD OFF OR FALL ASLEEP WHILE LYING DOWN TO REST IN THE AFTERNOON WHEN CIRCUMSTANCES PERMIT: 1
HOW LIKELY ARE YOU TO NOD OFF OR FALL ASLEEP WHEN YOU ARE A PASSENGER IN A CAR FOR AN HOUR WITHOUT A BREAK: 0
HOW LIKELY ARE YOU TO NOD OFF OR FALL ASLEEP WHILE SITTING INACTIVE IN A PUBLIC PLACE: 0
HOW LIKELY ARE YOU TO NOD OFF OR FALL ASLEEP WHILE WATCHING TV: 1
ESS TOTAL SCORE: 2
HOW LIKELY ARE YOU TO NOD OFF OR FALL ASLEEP WHILE SITTING AND READING: 0
HOW LIKELY ARE YOU TO NOD OFF OR FALL ASLEEP WHILE SITTING AND TALKING TO SOMEONE: 0
HOW LIKELY ARE YOU TO NOD OFF OR FALL ASLEEP IN A CAR, WHILE STOPPED FOR A FEW MINUTES IN TRAFFIC: 0
HOW LIKELY ARE YOU TO NOD OFF OR FALL ASLEEP WHILE SITTING QUIETLY AFTER LUNCH WITHOUT ALCOHOL: 0

## 2019-10-11 ENCOUNTER — TELEPHONE (OUTPATIENT)
Dept: BARIATRICS/WEIGHT MGMT | Age: 44
End: 2019-10-11

## 2019-10-14 ENCOUNTER — HOSPITAL ENCOUNTER (OUTPATIENT)
Age: 44
Setting detail: OUTPATIENT SURGERY
Discharge: HOME | End: 2019-10-14
Attending: SURGERY | Admitting: SURGERY
Payer: MEDICAID

## 2019-10-14 ENCOUNTER — ANESTHESIA (OUTPATIENT)
Dept: ENDOSCOPY | Age: 44
End: 2019-10-14
Payer: MEDICAID

## 2019-10-14 ENCOUNTER — ANESTHESIA EVENT (OUTPATIENT)
Dept: ENDOSCOPY | Age: 44
End: 2019-10-14
Payer: MEDICAID

## 2019-10-14 VITALS
RESPIRATION RATE: 20 BRPM | SYSTOLIC BLOOD PRESSURE: 185 MMHG | DIASTOLIC BLOOD PRESSURE: 102 MMHG | OXYGEN SATURATION: 98 % | HEART RATE: 75 BPM

## 2019-10-14 VITALS
SYSTOLIC BLOOD PRESSURE: 214 MMHG | OXYGEN SATURATION: 97 % | DIASTOLIC BLOOD PRESSURE: 115 MMHG | RESPIRATION RATE: 26 BRPM

## 2019-10-14 LAB
GLUCOSE BLD-MCNC: 90 MG/DL (ref 70–99)
PERFORMED ON: NORMAL

## 2019-10-14 PROCEDURE — 2500000003 HC RX 250 WO HCPCS: Performed by: NURSE ANESTHETIST, CERTIFIED REGISTERED

## 2019-10-14 PROCEDURE — 7100000011 HC PHASE II RECOVERY - ADDTL 15 MIN: Performed by: SURGERY

## 2019-10-14 PROCEDURE — 6360000002 HC RX W HCPCS: Performed by: NURSE ANESTHETIST, CERTIFIED REGISTERED

## 2019-10-14 PROCEDURE — 3609012400 HC EGD TRANSORAL BIOPSY SINGLE/MULTIPLE: Performed by: SURGERY

## 2019-10-14 PROCEDURE — 88305 TISSUE EXAM BY PATHOLOGIST: CPT

## 2019-10-14 PROCEDURE — 43239 EGD BIOPSY SINGLE/MULTIPLE: CPT | Performed by: SURGERY

## 2019-10-14 PROCEDURE — 7100000010 HC PHASE II RECOVERY - FIRST 15 MIN: Performed by: SURGERY

## 2019-10-14 PROCEDURE — 2709999900 HC NON-CHARGEABLE SUPPLY: Performed by: SURGERY

## 2019-10-14 PROCEDURE — 2580000003 HC RX 258: Performed by: NURSE ANESTHETIST, CERTIFIED REGISTERED

## 2019-10-14 PROCEDURE — 3700000000 HC ANESTHESIA ATTENDED CARE: Performed by: SURGERY

## 2019-10-14 RX ORDER — PROPOFOL 10 MG/ML
INJECTION, EMULSION INTRAVENOUS PRN
Status: DISCONTINUED | OUTPATIENT
Start: 2019-10-14 | End: 2019-10-14 | Stop reason: SDUPTHER

## 2019-10-14 RX ORDER — LIDOCAINE HYDROCHLORIDE 20 MG/ML
INJECTION, SOLUTION EPIDURAL; INFILTRATION; INTRACAUDAL; PERINEURAL PRN
Status: DISCONTINUED | OUTPATIENT
Start: 2019-10-14 | End: 2019-10-14 | Stop reason: SDUPTHER

## 2019-10-14 RX ORDER — SODIUM CHLORIDE, SODIUM LACTATE, POTASSIUM CHLORIDE, CALCIUM CHLORIDE 600; 310; 30; 20 MG/100ML; MG/100ML; MG/100ML; MG/100ML
INJECTION, SOLUTION INTRAVENOUS CONTINUOUS PRN
Status: DISCONTINUED | OUTPATIENT
Start: 2019-10-14 | End: 2019-10-14 | Stop reason: SDUPTHER

## 2019-10-14 RX ADMIN — SODIUM CHLORIDE, SODIUM LACTATE, POTASSIUM CHLORIDE, AND CALCIUM CHLORIDE: 600; 310; 30; 20 INJECTION, SOLUTION INTRAVENOUS at 12:50

## 2019-10-14 RX ADMIN — LIDOCAINE HYDROCHLORIDE 100 MG: 20 INJECTION, SOLUTION EPIDURAL; INFILTRATION; INTRACAUDAL; PERINEURAL at 13:49

## 2019-10-14 RX ADMIN — PROPOFOL 50 MG: 10 INJECTION, EMULSION INTRAVENOUS at 13:53

## 2019-10-14 RX ADMIN — PROPOFOL 50 MG: 10 INJECTION, EMULSION INTRAVENOUS at 13:51

## 2019-10-14 RX ADMIN — PROPOFOL 50 MG: 10 INJECTION, EMULSION INTRAVENOUS at 13:50

## 2019-10-14 ASSESSMENT — PULMONARY FUNCTION TESTS
PIF_VALUE: 0

## 2019-10-14 ASSESSMENT — ENCOUNTER SYMPTOMS: DYSPNEA ACTIVITY LEVEL: AFTER AMBULATING 1 FLIGHT OF STAIRS

## 2019-10-14 ASSESSMENT — PAIN SCALES - GENERAL
PAINLEVEL_OUTOF10: 0

## 2019-10-14 ASSESSMENT — LIFESTYLE VARIABLES: SMOKING_STATUS: 1

## 2019-10-14 ASSESSMENT — COPD QUESTIONNAIRES: CAT_SEVERITY: MODERATE

## 2019-10-22 ENCOUNTER — TELEPHONE (OUTPATIENT)
Dept: PULMONOLOGY | Age: 44
End: 2019-10-22

## 2019-10-23 DIAGNOSIS — I10 ESSENTIAL HYPERTENSION: ICD-10-CM

## 2019-10-23 RX ORDER — METOPROLOL TARTRATE 100 MG/1
TABLET ORAL
Qty: 60 TABLET | Refills: 2 | Status: SHIPPED | OUTPATIENT
Start: 2019-10-23 | End: 2020-02-28

## 2019-10-25 ENCOUNTER — OFFICE VISIT (OUTPATIENT)
Dept: PAIN MANAGEMENT | Age: 44
End: 2019-10-25
Payer: MEDICAID

## 2019-10-25 VITALS
BODY MASS INDEX: 58.74 KG/M2 | OXYGEN SATURATION: 95 % | SYSTOLIC BLOOD PRESSURE: 153 MMHG | HEART RATE: 74 BPM | DIASTOLIC BLOOD PRESSURE: 95 MMHG | WEIGHT: 293 LBS

## 2019-10-25 DIAGNOSIS — F51.01 PRIMARY INSOMNIA: ICD-10-CM

## 2019-10-25 DIAGNOSIS — G89.29 CHRONIC BILATERAL LOW BACK PAIN WITHOUT SCIATICA: ICD-10-CM

## 2019-10-25 DIAGNOSIS — G47.33 OSA TREATED WITH BIPAP: ICD-10-CM

## 2019-10-25 DIAGNOSIS — E66.01 MORBID OBESITY WITH BMI OF 60.0-69.9, ADULT (HCC): ICD-10-CM

## 2019-10-25 DIAGNOSIS — M54.50 CHRONIC BILATERAL LOW BACK PAIN WITHOUT SCIATICA: ICD-10-CM

## 2019-10-25 DIAGNOSIS — M16.11 PRIMARY OSTEOARTHRITIS OF RIGHT HIP: ICD-10-CM

## 2019-10-25 DIAGNOSIS — I10 ESSENTIAL HYPERTENSION: ICD-10-CM

## 2019-10-25 DIAGNOSIS — F32.A DEPRESSION, UNSPECIFIED DEPRESSION TYPE: ICD-10-CM

## 2019-10-25 DIAGNOSIS — M17.0 PRIMARY OSTEOARTHRITIS OF BOTH KNEES: ICD-10-CM

## 2019-10-25 DIAGNOSIS — G89.4 CHRONIC PAIN SYNDROME: ICD-10-CM

## 2019-10-25 PROCEDURE — G8417 CALC BMI ABV UP PARAM F/U: HCPCS | Performed by: NURSE PRACTITIONER

## 2019-10-25 PROCEDURE — G8484 FLU IMMUNIZE NO ADMIN: HCPCS | Performed by: NURSE PRACTITIONER

## 2019-10-25 PROCEDURE — 99213 OFFICE O/P EST LOW 20 MIN: CPT | Performed by: NURSE PRACTITIONER

## 2019-10-25 PROCEDURE — G8427 DOCREV CUR MEDS BY ELIG CLIN: HCPCS | Performed by: NURSE PRACTITIONER

## 2019-10-25 PROCEDURE — 4004F PT TOBACCO SCREEN RCVD TLK: CPT | Performed by: NURSE PRACTITIONER

## 2019-10-25 RX ORDER — AMITRIPTYLINE HYDROCHLORIDE 25 MG/1
TABLET, FILM COATED ORAL
Qty: 30 TABLET | Refills: 1 | Status: SHIPPED | OUTPATIENT
Start: 2019-10-25 | End: 2020-01-23 | Stop reason: SDUPTHER

## 2019-10-25 RX ORDER — VENLAFAXINE 25 MG/1
TABLET ORAL
Qty: 60 TABLET | Refills: 1 | Status: SHIPPED | OUTPATIENT
Start: 2019-10-25 | End: 2020-01-23 | Stop reason: SDUPTHER

## 2019-10-25 RX ORDER — HYDROCODONE BITARTRATE AND ACETAMINOPHEN 5; 325 MG/1; MG/1
1 TABLET ORAL EVERY 8 HOURS PRN
Qty: 90 TABLET | Refills: 0 | Status: SHIPPED | OUTPATIENT
Start: 2019-10-25 | End: 2019-11-22 | Stop reason: SDUPTHER

## 2019-10-31 ENCOUNTER — OFFICE VISIT (OUTPATIENT)
Dept: PULMONOLOGY | Age: 44
End: 2019-10-31
Payer: MEDICAID

## 2019-10-31 VITALS
BODY MASS INDEX: 50.02 KG/M2 | HEIGHT: 64 IN | DIASTOLIC BLOOD PRESSURE: 82 MMHG | SYSTOLIC BLOOD PRESSURE: 122 MMHG | RESPIRATION RATE: 16 BRPM | HEART RATE: 68 BPM | TEMPERATURE: 98.3 F | WEIGHT: 293 LBS | OXYGEN SATURATION: 96 %

## 2019-10-31 DIAGNOSIS — Z71.6 TOBACCO ABUSE COUNSELING: ICD-10-CM

## 2019-10-31 DIAGNOSIS — G47.33 OSA (OBSTRUCTIVE SLEEP APNEA): ICD-10-CM

## 2019-10-31 DIAGNOSIS — J44.9 COPD, MILD (HCC): ICD-10-CM

## 2019-10-31 PROCEDURE — G8427 DOCREV CUR MEDS BY ELIG CLIN: HCPCS | Performed by: INTERNAL MEDICINE

## 2019-10-31 PROCEDURE — G8417 CALC BMI ABV UP PARAM F/U: HCPCS | Performed by: INTERNAL MEDICINE

## 2019-10-31 PROCEDURE — 4004F PT TOBACCO SCREEN RCVD TLK: CPT | Performed by: INTERNAL MEDICINE

## 2019-10-31 PROCEDURE — G8484 FLU IMMUNIZE NO ADMIN: HCPCS | Performed by: INTERNAL MEDICINE

## 2019-10-31 PROCEDURE — 3023F SPIROM DOC REV: CPT | Performed by: INTERNAL MEDICINE

## 2019-10-31 PROCEDURE — 99214 OFFICE O/P EST MOD 30 MIN: CPT | Performed by: INTERNAL MEDICINE

## 2019-10-31 PROCEDURE — G8926 SPIRO NO PERF OR DOC: HCPCS | Performed by: INTERNAL MEDICINE

## 2019-10-31 ASSESSMENT — SLEEP AND FATIGUE QUESTIONNAIRES
HOW LIKELY ARE YOU TO NOD OFF OR FALL ASLEEP WHEN YOU ARE A PASSENGER IN A CAR FOR AN HOUR WITHOUT A BREAK: 0
HOW LIKELY ARE YOU TO NOD OFF OR FALL ASLEEP WHILE LYING DOWN TO REST IN THE AFTERNOON WHEN CIRCUMSTANCES PERMIT: 1
HOW LIKELY ARE YOU TO NOD OFF OR FALL ASLEEP WHILE WATCHING TV: 1
HOW LIKELY ARE YOU TO NOD OFF OR FALL ASLEEP WHILE SITTING AND READING: 0
HOW LIKELY ARE YOU TO NOD OFF OR FALL ASLEEP WHILE SITTING INACTIVE IN A PUBLIC PLACE: 0
HOW LIKELY ARE YOU TO NOD OFF OR FALL ASLEEP WHILE SITTING AND TALKING TO SOMEONE: 0
HOW LIKELY ARE YOU TO NOD OFF OR FALL ASLEEP IN A CAR, WHILE STOPPED FOR A FEW MINUTES IN TRAFFIC: 0
ESS TOTAL SCORE: 2
HOW LIKELY ARE YOU TO NOD OFF OR FALL ASLEEP WHILE SITTING QUIETLY AFTER LUNCH WITHOUT ALCOHOL: 0

## 2019-11-07 ENCOUNTER — OFFICE VISIT (OUTPATIENT)
Dept: BARIATRICS/WEIGHT MGMT | Age: 44
End: 2019-11-07
Payer: MEDICAID

## 2019-11-07 VITALS
WEIGHT: 293 LBS | HEART RATE: 76 BPM | SYSTOLIC BLOOD PRESSURE: 138 MMHG | HEIGHT: 64 IN | BODY MASS INDEX: 50.02 KG/M2 | DIASTOLIC BLOOD PRESSURE: 86 MMHG

## 2019-11-07 DIAGNOSIS — E66.01 MORBID OBESITY WITH BMI OF 50.0-59.9, ADULT (HCC): Primary | ICD-10-CM

## 2019-11-07 DIAGNOSIS — I10 ESSENTIAL HYPERTENSION: ICD-10-CM

## 2019-11-07 DIAGNOSIS — G47.33 OSA (OBSTRUCTIVE SLEEP APNEA): ICD-10-CM

## 2019-11-07 DIAGNOSIS — E11.69 DIABETES MELLITUS TYPE 2 IN OBESE (HCC): ICD-10-CM

## 2019-11-07 DIAGNOSIS — K21.9 CHRONIC GERD: ICD-10-CM

## 2019-11-07 DIAGNOSIS — E66.9 DIABETES MELLITUS TYPE 2 IN OBESE (HCC): ICD-10-CM

## 2019-11-07 PROCEDURE — 2022F DILAT RTA XM EVC RTNOPTHY: CPT | Performed by: SURGERY

## 2019-11-07 PROCEDURE — 99213 OFFICE O/P EST LOW 20 MIN: CPT | Performed by: SURGERY

## 2019-11-07 PROCEDURE — G8417 CALC BMI ABV UP PARAM F/U: HCPCS | Performed by: SURGERY

## 2019-11-07 PROCEDURE — 4004F PT TOBACCO SCREEN RCVD TLK: CPT | Performed by: SURGERY

## 2019-11-07 PROCEDURE — G8484 FLU IMMUNIZE NO ADMIN: HCPCS | Performed by: SURGERY

## 2019-11-07 PROCEDURE — G8427 DOCREV CUR MEDS BY ELIG CLIN: HCPCS | Performed by: SURGERY

## 2019-11-07 PROCEDURE — 3044F HG A1C LEVEL LT 7.0%: CPT | Performed by: SURGERY

## 2019-11-15 DIAGNOSIS — J44.9 COPD, MILD (HCC): ICD-10-CM

## 2019-11-22 ENCOUNTER — OFFICE VISIT (OUTPATIENT)
Dept: PAIN MANAGEMENT | Age: 44
End: 2019-11-22
Payer: MEDICAID

## 2019-11-22 VITALS — HEART RATE: 75 BPM | SYSTOLIC BLOOD PRESSURE: 170 MMHG | DIASTOLIC BLOOD PRESSURE: 98 MMHG | OXYGEN SATURATION: 97 %

## 2019-11-22 DIAGNOSIS — M16.11 PRIMARY OSTEOARTHRITIS OF RIGHT HIP: ICD-10-CM

## 2019-11-22 DIAGNOSIS — M54.50 CHRONIC BILATERAL LOW BACK PAIN WITHOUT SCIATICA: ICD-10-CM

## 2019-11-22 DIAGNOSIS — F51.01 PRIMARY INSOMNIA: ICD-10-CM

## 2019-11-22 DIAGNOSIS — G47.33 OSA TREATED WITH BIPAP: ICD-10-CM

## 2019-11-22 DIAGNOSIS — M17.0 PRIMARY OSTEOARTHRITIS OF BOTH KNEES: ICD-10-CM

## 2019-11-22 DIAGNOSIS — I10 ESSENTIAL HYPERTENSION: ICD-10-CM

## 2019-11-22 DIAGNOSIS — E66.01 MORBID OBESITY WITH BMI OF 60.0-69.9, ADULT (HCC): ICD-10-CM

## 2019-11-22 DIAGNOSIS — G89.29 CHRONIC BILATERAL LOW BACK PAIN WITHOUT SCIATICA: ICD-10-CM

## 2019-11-22 DIAGNOSIS — F32.A DEPRESSION, UNSPECIFIED DEPRESSION TYPE: ICD-10-CM

## 2019-11-22 DIAGNOSIS — G89.4 CHRONIC PAIN SYNDROME: ICD-10-CM

## 2019-11-22 PROCEDURE — G8484 FLU IMMUNIZE NO ADMIN: HCPCS | Performed by: NURSE PRACTITIONER

## 2019-11-22 PROCEDURE — 4004F PT TOBACCO SCREEN RCVD TLK: CPT | Performed by: NURSE PRACTITIONER

## 2019-11-22 PROCEDURE — G8427 DOCREV CUR MEDS BY ELIG CLIN: HCPCS | Performed by: NURSE PRACTITIONER

## 2019-11-22 PROCEDURE — 99213 OFFICE O/P EST LOW 20 MIN: CPT | Performed by: NURSE PRACTITIONER

## 2019-11-22 PROCEDURE — G8417 CALC BMI ABV UP PARAM F/U: HCPCS | Performed by: NURSE PRACTITIONER

## 2019-11-22 RX ORDER — HYDROCODONE BITARTRATE AND ACETAMINOPHEN 5; 325 MG/1; MG/1
1 TABLET ORAL EVERY 8 HOURS PRN
Qty: 90 TABLET | Refills: 0 | Status: SHIPPED | OUTPATIENT
Start: 2019-11-22 | End: 2019-12-20 | Stop reason: SDUPTHER

## 2019-11-25 ENCOUNTER — TELEPHONE (OUTPATIENT)
Dept: PULMONOLOGY | Age: 44
End: 2019-11-25

## 2019-12-03 ENCOUNTER — INITIAL CONSULT (OUTPATIENT)
Dept: BARIATRICS/WEIGHT MGMT | Age: 44
End: 2019-12-03
Payer: MEDICAID

## 2019-12-03 DIAGNOSIS — F41.8 MIXED ANXIETY AND DEPRESSIVE DISORDER: Primary | ICD-10-CM

## 2019-12-03 DIAGNOSIS — E66.01 MORBID OBESITY WITH BMI OF 60.0-69.9, ADULT (HCC): ICD-10-CM

## 2019-12-03 PROCEDURE — 90791 PSYCH DIAGNOSTIC EVALUATION: CPT | Performed by: PSYCHOLOGIST

## 2019-12-05 ENCOUNTER — OFFICE VISIT (OUTPATIENT)
Dept: BARIATRICS/WEIGHT MGMT | Age: 44
End: 2019-12-05
Payer: MEDICAID

## 2019-12-05 VITALS
HEIGHT: 64 IN | WEIGHT: 293 LBS | BODY MASS INDEX: 50.02 KG/M2 | SYSTOLIC BLOOD PRESSURE: 136 MMHG | HEART RATE: 76 BPM | DIASTOLIC BLOOD PRESSURE: 84 MMHG

## 2019-12-05 DIAGNOSIS — I10 ESSENTIAL HYPERTENSION: ICD-10-CM

## 2019-12-05 DIAGNOSIS — E66.01 MORBID OBESITY WITH BMI OF 50.0-59.9, ADULT (HCC): Primary | ICD-10-CM

## 2019-12-05 DIAGNOSIS — K21.9 CHRONIC GERD: ICD-10-CM

## 2019-12-05 DIAGNOSIS — E11.69 DIABETES MELLITUS TYPE 2 IN OBESE (HCC): ICD-10-CM

## 2019-12-05 DIAGNOSIS — G47.33 OSA (OBSTRUCTIVE SLEEP APNEA): ICD-10-CM

## 2019-12-05 DIAGNOSIS — E66.9 DIABETES MELLITUS TYPE 2 IN OBESE (HCC): ICD-10-CM

## 2019-12-05 PROCEDURE — G8417 CALC BMI ABV UP PARAM F/U: HCPCS | Performed by: SURGERY

## 2019-12-05 PROCEDURE — G8427 DOCREV CUR MEDS BY ELIG CLIN: HCPCS | Performed by: SURGERY

## 2019-12-05 PROCEDURE — 99213 OFFICE O/P EST LOW 20 MIN: CPT | Performed by: SURGERY

## 2019-12-05 PROCEDURE — 4004F PT TOBACCO SCREEN RCVD TLK: CPT | Performed by: SURGERY

## 2019-12-05 PROCEDURE — 3044F HG A1C LEVEL LT 7.0%: CPT | Performed by: SURGERY

## 2019-12-05 PROCEDURE — G8484 FLU IMMUNIZE NO ADMIN: HCPCS | Performed by: SURGERY

## 2019-12-05 PROCEDURE — 2022F DILAT RTA XM EVC RTNOPTHY: CPT | Performed by: SURGERY

## 2019-12-20 ENCOUNTER — OFFICE VISIT (OUTPATIENT)
Dept: PAIN MANAGEMENT | Age: 44
End: 2019-12-20
Payer: MEDICAID

## 2019-12-20 VITALS — HEART RATE: 63 BPM | SYSTOLIC BLOOD PRESSURE: 195 MMHG | DIASTOLIC BLOOD PRESSURE: 100 MMHG | OXYGEN SATURATION: 98 %

## 2019-12-20 DIAGNOSIS — M16.11 PRIMARY OSTEOARTHRITIS OF RIGHT HIP: ICD-10-CM

## 2019-12-20 DIAGNOSIS — G47.33 OSA TREATED WITH BIPAP: ICD-10-CM

## 2019-12-20 DIAGNOSIS — I10 ESSENTIAL HYPERTENSION: ICD-10-CM

## 2019-12-20 DIAGNOSIS — F51.01 PRIMARY INSOMNIA: ICD-10-CM

## 2019-12-20 DIAGNOSIS — M54.50 CHRONIC BILATERAL LOW BACK PAIN WITHOUT SCIATICA: ICD-10-CM

## 2019-12-20 DIAGNOSIS — M17.0 PRIMARY OSTEOARTHRITIS OF BOTH KNEES: ICD-10-CM

## 2019-12-20 DIAGNOSIS — G89.4 CHRONIC PAIN SYNDROME: ICD-10-CM

## 2019-12-20 DIAGNOSIS — G89.29 CHRONIC BILATERAL LOW BACK PAIN WITHOUT SCIATICA: ICD-10-CM

## 2019-12-20 DIAGNOSIS — F32.A DEPRESSION, UNSPECIFIED DEPRESSION TYPE: ICD-10-CM

## 2019-12-20 DIAGNOSIS — E66.01 MORBID OBESITY WITH BMI OF 60.0-69.9, ADULT (HCC): ICD-10-CM

## 2019-12-20 PROCEDURE — G8484 FLU IMMUNIZE NO ADMIN: HCPCS | Performed by: NURSE PRACTITIONER

## 2019-12-20 PROCEDURE — 4004F PT TOBACCO SCREEN RCVD TLK: CPT | Performed by: NURSE PRACTITIONER

## 2019-12-20 PROCEDURE — G8427 DOCREV CUR MEDS BY ELIG CLIN: HCPCS | Performed by: NURSE PRACTITIONER

## 2019-12-20 PROCEDURE — 99213 OFFICE O/P EST LOW 20 MIN: CPT | Performed by: NURSE PRACTITIONER

## 2019-12-20 PROCEDURE — G8417 CALC BMI ABV UP PARAM F/U: HCPCS | Performed by: NURSE PRACTITIONER

## 2019-12-20 RX ORDER — HYDROCODONE BITARTRATE AND ACETAMINOPHEN 5; 325 MG/1; MG/1
1 TABLET ORAL EVERY 8 HOURS PRN
Qty: 90 TABLET | Refills: 0 | Status: SHIPPED | OUTPATIENT
Start: 2019-12-20 | End: 2020-01-23 | Stop reason: SDUPTHER

## 2020-01-02 ENCOUNTER — OFFICE VISIT (OUTPATIENT)
Dept: BARIATRICS/WEIGHT MGMT | Age: 45
End: 2020-01-02
Payer: MEDICAID

## 2020-01-02 VITALS
HEART RATE: 60 BPM | BODY MASS INDEX: 50.02 KG/M2 | WEIGHT: 293 LBS | DIASTOLIC BLOOD PRESSURE: 88 MMHG | HEIGHT: 64 IN | SYSTOLIC BLOOD PRESSURE: 138 MMHG

## 2020-01-02 PROCEDURE — G8417 CALC BMI ABV UP PARAM F/U: HCPCS | Performed by: SURGERY

## 2020-01-02 PROCEDURE — G8484 FLU IMMUNIZE NO ADMIN: HCPCS | Performed by: SURGERY

## 2020-01-02 PROCEDURE — 99213 OFFICE O/P EST LOW 20 MIN: CPT | Performed by: SURGERY

## 2020-01-02 PROCEDURE — 4004F PT TOBACCO SCREEN RCVD TLK: CPT | Performed by: SURGERY

## 2020-01-02 PROCEDURE — 3046F HEMOGLOBIN A1C LEVEL >9.0%: CPT | Performed by: SURGERY

## 2020-01-02 PROCEDURE — G8427 DOCREV CUR MEDS BY ELIG CLIN: HCPCS | Performed by: SURGERY

## 2020-01-02 PROCEDURE — 2022F DILAT RTA XM EVC RTNOPTHY: CPT | Performed by: SURGERY

## 2020-01-02 NOTE — PROGRESS NOTES
Cholecalciferol (VITAMIN D3) 75042 UNITS CAPS, Take 10,000 Units by mouth three times a week. Takes on Mon Wed Fri, Disp: , Rfl:       Review of Systems - History obtained from the patient  General ROS: negative  Psychological ROS: negative  Endocrine ROS: negative  Respiratory ROS: negative  Cardiovascular ROS: negative  Gastrointestinal ROS:negative  Genito-Urinary ROS: negative  Musculoskeletal ROS: negative   Skin ROS: negative    Physical Exam   Vitals Reviewed   Constitutional: Patient is oriented to person, place, and time. Patient appears well-developed and well-nourished. Patient is active and cooperative. Non-toxic appearance. No distress. Neck: Trachea normal and normal range of motion. No JVD present. Pulmonary/Chest: Effort normal. No accessory muscle usage or stridor. No apnea. No respiratory distress. Cardiovascular: Normal rate and no JVD. Abdominal: Normal appearance. Patient exhibits no distension. Abdomen is soft, obese, non tender. Musculoskeletal: Normal range of motion. Patient exhibits no edema. Neurological: Patient is alert and oriented to person, place, and time. Patient has normal strength. GCS eye subscore is 4. GCS verbal subscore is 5. GCS motor subscore is 6. Skin: Skin is warm and dry. No abrasion and no rash noted. Patient is not diaphoretic. No cyanosis or erythema. Psychiatric: Patient has a normal mood and affect. Speech is normal and behavior is normal. Cognition and memory are normal.       Bety Leal is 53 y.o. female, Body mass index is 57.64 kg/m². pre surgery, has gained 2# since last visit. The patient underwent dietary counseling with registered dietician. I have reviewed, discussed and agree with the dietary plan. Patient is trying hard to keep good dietary and behavior modifications. Patient is monitoring portion sizes, food choices and liquid calories. Patient is trying to exercise regularly as much as possible.   We discussed how her

## 2020-01-16 ENCOUNTER — OFFICE VISIT (OUTPATIENT)
Dept: CARDIOLOGY CLINIC | Age: 45
End: 2020-01-16
Payer: MEDICAID

## 2020-01-16 ENCOUNTER — TELEPHONE (OUTPATIENT)
Dept: CARDIOLOGY CLINIC | Age: 45
End: 2020-01-16

## 2020-01-16 VITALS
OXYGEN SATURATION: 99 % | BODY MASS INDEX: 50.02 KG/M2 | DIASTOLIC BLOOD PRESSURE: 82 MMHG | WEIGHT: 293 LBS | HEART RATE: 60 BPM | HEIGHT: 64 IN | SYSTOLIC BLOOD PRESSURE: 130 MMHG

## 2020-01-16 PROCEDURE — G8484 FLU IMMUNIZE NO ADMIN: HCPCS | Performed by: INTERNAL MEDICINE

## 2020-01-16 PROCEDURE — 2022F DILAT RTA XM EVC RTNOPTHY: CPT | Performed by: INTERNAL MEDICINE

## 2020-01-16 PROCEDURE — 4004F PT TOBACCO SCREEN RCVD TLK: CPT | Performed by: INTERNAL MEDICINE

## 2020-01-16 PROCEDURE — G8417 CALC BMI ABV UP PARAM F/U: HCPCS | Performed by: INTERNAL MEDICINE

## 2020-01-16 PROCEDURE — 3046F HEMOGLOBIN A1C LEVEL >9.0%: CPT | Performed by: INTERNAL MEDICINE

## 2020-01-16 PROCEDURE — G8427 DOCREV CUR MEDS BY ELIG CLIN: HCPCS | Performed by: INTERNAL MEDICINE

## 2020-01-16 PROCEDURE — 99213 OFFICE O/P EST LOW 20 MIN: CPT | Performed by: INTERNAL MEDICINE

## 2020-01-16 RX ORDER — TORSEMIDE 20 MG/1
20 TABLET ORAL DAILY PRN
Qty: 30 TABLET | Refills: 11 | Status: SHIPPED | OUTPATIENT
Start: 2020-01-16 | End: 2020-07-16 | Stop reason: DRUGHIGH

## 2020-01-16 NOTE — TELEPHONE ENCOUNTER
Vannessa called in stating she just left here and she was supposed to call back. She states she does not have any spirolactone at home. You can reach Vannessa at [phone].

## 2020-01-16 NOTE — PROGRESS NOTES
[address]  Cardiology Progress Note      Lelia Comment  1975, 53 y.o.    CC: \" Leg swelling. \"            Slim Byers MD:    HPI:   This is a 53 y.o. female with a PMH significant for COPD, chronic diastolic heart failure, Hypertension, Hyperlipidemia, JOSE, tobacco abuse and Type II Diabetes Mellitus. Patient comes today for follow up. Says she has not had bariatric surgery due to continued smoking. Says her son had heart surgery and she \"put everything on hold. \" Reports bilateral leg edema and says she has not been taking any water pills. Past Medical History:   Diagnosis Date    Anxiety     Arthritis     Asthma     Back pain     CHF (congestive heart failure) (HCC)     COPD (chronic obstructive pulmonary disease) (HCC)     Depression     Diabetes mellitus (HCC)     GERD (gastroesophageal reflux disease)     Hyperlipidemia     Hypertension     Obesity     Sleep apnea       Past Surgical History:   Procedure Laterality Date    CHOLECYSTECTOMY  1997    HYSTERECTOMY  2008    SKIN BIOPSY  2015    TUBAL LIGATION      UPPER GASTROINTESTINAL ENDOSCOPY N/A 10/14/2019    EGD BIOPSY performed by Dorcas Salazar DO at [facility] ENDOSCOPY      No family history on file. Social History     Tobacco Use    Smoking status: Current Every Day Smoker     Packs/day: 0.25     Years: 20.00     Pack years: 5.00     Types: Cigarettes     Start date: 1/1/1985    Smokeless tobacco: Never Used    Tobacco comment: working on it   Substance Use Topics    Alcohol use:  Yes     Alcohol/week: 1.0 standard drinks     Types: 1 Glasses of wine per week     Comment: rare    Drug use: No     Allergies   Allergen Reactions    Latex          Review of Systems -   Constitutional: Negative for weight gain/loss; malaise, fever  Respiratory: Negative for Asthma;  cough and hemoptysis  Cardiovascular: Negative for palpitations,dizziness   Gastrointestinal: Negative for abd.pain; constipation/diarrhea;

## 2020-01-16 NOTE — TELEPHONE ENCOUNTER
Called and spoke with pt. Advised pt that she is to take torsemide in place of Aldactone. Pt does have Lisinopril at home.

## 2020-01-23 ENCOUNTER — OFFICE VISIT (OUTPATIENT)
Dept: PAIN MANAGEMENT | Age: 45
End: 2020-01-23
Payer: MEDICAID

## 2020-01-23 VITALS — HEART RATE: 67 BPM | OXYGEN SATURATION: 99 % | DIASTOLIC BLOOD PRESSURE: 106 MMHG | SYSTOLIC BLOOD PRESSURE: 168 MMHG

## 2020-01-23 PROCEDURE — G8484 FLU IMMUNIZE NO ADMIN: HCPCS | Performed by: NURSE PRACTITIONER

## 2020-01-23 PROCEDURE — G8417 CALC BMI ABV UP PARAM F/U: HCPCS | Performed by: NURSE PRACTITIONER

## 2020-01-23 PROCEDURE — G8427 DOCREV CUR MEDS BY ELIG CLIN: HCPCS | Performed by: NURSE PRACTITIONER

## 2020-01-23 PROCEDURE — 4004F PT TOBACCO SCREEN RCVD TLK: CPT | Performed by: NURSE PRACTITIONER

## 2020-01-23 PROCEDURE — 99213 OFFICE O/P EST LOW 20 MIN: CPT | Performed by: NURSE PRACTITIONER

## 2020-01-23 RX ORDER — HYDROCODONE BITARTRATE AND ACETAMINOPHEN 5; 325 MG/1; MG/1
1 TABLET ORAL EVERY 8 HOURS PRN
Qty: 90 TABLET | Refills: 0 | Status: SHIPPED | OUTPATIENT
Start: 2020-01-23 | End: 2020-02-20 | Stop reason: SDUPTHER

## 2020-01-23 RX ORDER — VENLAFAXINE 25 MG/1
TABLET ORAL
Qty: 60 TABLET | Refills: 1 | Status: SHIPPED | OUTPATIENT
Start: 2020-01-23 | End: 2020-03-19 | Stop reason: SDUPTHER

## 2020-01-23 RX ORDER — AMITRIPTYLINE HYDROCHLORIDE 25 MG/1
TABLET, FILM COATED ORAL
Qty: 30 TABLET | Refills: 1 | Status: SHIPPED | OUTPATIENT
Start: 2020-01-23 | End: 2020-03-19 | Stop reason: SDUPTHER

## 2020-01-23 NOTE — PROGRESS NOTES
NIGHTLY 30 tablet 1    venlafaxine (EFFEXOR) 25 MG tablet TAKE ONE TABLET BY MOUTH TWICE A DAY 60 tablet 1     No facility-administered medications prior to visit. SOCIAL/FAMILY/PAST MEDICAL HISTORY: Ms. Lizzeth Cifuentes, family and past medical history was reviewed. REVIEW OF SYSTEMS:    Respiratory: Negative for apnea, chest tightness and shortness of breath or change in baseline breathing. Gastrointestinal: Negative for nausea, vomiting, abdominal pain, diarrhea, constipation, blood in stool and abdominal distention. PHYSICAL EXAM:   Nursing note and vitals reviewed. BP (!) 168/106   Pulse 67   SpO2 99%   Constitutional: She appears well-developed and well-nourished. No acute distress. Skin: Skin is warm and dry, good turgor. No rash noted. She is not diaphoretic. Cardiovascular: Normal rate, regular rhythm, normal heart sounds, and does not have murmur. Pulmonary/Chest: Effort normal. No respiratory distress. She does not have wheezes in the lung fields. She has no rales. Neurological/Psychiatric:She is alert and oriented to person, place, and time. Coordination is  normal. Dtable gait with the aid of a cane Her mood isAppropriate and affect is Neutral/Euthymic(normal) . IMPRESSION:   1. Chronic pain syndrome    2. Chronic bilateral low back pain without sciatica    3. Primary osteoarthritis of both knees    4. Primary osteoarthritis of right hip    5. JOSE treated with BiPAP    6. Morbid obesity with BMI of 60.0-69.9, adult ([facility] [address]    7. Depression, unspecified depression type, mild    8. Primary insomnia    9. Essential hypertension        PLAN:  Informed verbal consent was obtained  -Continue with Elavil, Norco, Effexor, Lidocaine  -Liang exercises/Aquatic therapy  -Maintain f/u with beriatrics for weight loss  -Monitor BP, f/u with PCP if it continues to stay elevated or she becomes symptomatic with SOB, CP, syncopy.  she is currently asymptomatic  -CBT techniques- (SINGULAIR) 10 MG tablet Take 1 tablet by mouth nightly 30 tablet 3    omeprazole (PRILOSEC) 20 MG delayed release capsule Take 20 mg by mouth daily      acetaminophen (APAP EXTRA STRENGTH) 500 MG tablet Take 2 tablets by mouth every 6 hours as needed for Pain DO NOT TAKE WITH OTHER MEDICATIONS CONTAINING ACETAMINOPHEN. 30 tablet 0    Spacer/Aero-Holding Chambers KIT Please dispense aerochamber spacer 1 kit 0    Cholecalciferol (VITAMIN D3) 68529 UNITS CAPS Take 10,000 Units by mouth three times a week. Takes on Mon Wed Fri       No current facility-administered medications for this visit. I will continue her current medication regimen  which is part of the above treatment schedule. It has been helping with Ms. Victor Hugo Sun chronic  medical problems which for this visit include:   Diagnoses of Chronic pain syndrome, Chronic bilateral low back pain without sciatica, Primary osteoarthritis of both knees, Primary osteoarthritis of right hip, JOSE treated with BiPAP, Morbid obesity with BMI of 60.0-69.9, adult ([facility] [address] Depression, unspecified depression type, mild, Primary insomnia, and Essential hypertension were pertinent to this visit. Risks and benefits of the medications and other alternative treatments  including no treatment were discussed with the patient. The common side effects of these medications were also explained to the patient. Informed verbal consent was obtained. Goals of current treatment regimen include improvement in pain, restoration of functioning- with focus on improvement in physical performance, general activity, work or disability,emotional distress, health care utilization and  decreased medication consumption. Will continue to monitor progress towards achieving/maintaining therapeutic goals with special emphasis on  1. Improvement in perceived interfernce  of pain with ADL's. Ability to do home exercises independently.  Ability to do household chores indoor and/or outdoor work and

## 2020-01-31 ENCOUNTER — TELEPHONE (OUTPATIENT)
Dept: FAMILY MEDICINE CLINIC | Age: 45
End: 2020-01-31

## 2020-02-06 ENCOUNTER — TELEPHONE (OUTPATIENT)
Dept: PULMONOLOGY | Age: 45
End: 2020-02-06

## 2020-02-06 ENCOUNTER — OFFICE VISIT (OUTPATIENT)
Dept: BARIATRICS/WEIGHT MGMT | Age: 45
End: 2020-02-06
Payer: MEDICAID

## 2020-02-06 VITALS
SYSTOLIC BLOOD PRESSURE: 188 MMHG | DIASTOLIC BLOOD PRESSURE: 96 MMHG | HEART RATE: 76 BPM | HEIGHT: 64 IN | BODY MASS INDEX: 50.02 KG/M2 | WEIGHT: 293 LBS

## 2020-02-06 PROCEDURE — G8484 FLU IMMUNIZE NO ADMIN: HCPCS | Performed by: SURGERY

## 2020-02-06 PROCEDURE — 2022F DILAT RTA XM EVC RTNOPTHY: CPT | Performed by: SURGERY

## 2020-02-06 PROCEDURE — G8427 DOCREV CUR MEDS BY ELIG CLIN: HCPCS | Performed by: SURGERY

## 2020-02-06 PROCEDURE — G8417 CALC BMI ABV UP PARAM F/U: HCPCS | Performed by: SURGERY

## 2020-02-06 PROCEDURE — 4004F PT TOBACCO SCREEN RCVD TLK: CPT | Performed by: SURGERY

## 2020-02-06 PROCEDURE — 99213 OFFICE O/P EST LOW 20 MIN: CPT | Performed by: SURGERY

## 2020-02-06 PROCEDURE — 3046F HEMOGLOBIN A1C LEVEL >9.0%: CPT | Performed by: SURGERY

## 2020-02-06 RX ORDER — ASPIRIN 81 MG/1
TABLET, COATED ORAL
COMMUNITY
Start: 2020-01-23 | End: 2020-07-16

## 2020-02-06 NOTE — Clinical Note
We need your help with smoking cessationShe will be seeing you soon to discuss optionsThis is the only thing keeping her from a bariatric operationThanks! [place]

## 2020-02-06 NOTE — PROGRESS NOTES
[facility]) Physicians   General & Laparoscopic Surgery  [employer]       HPI:     Lenwood Gottron is a very pleasant 53 y.o. female with Body mass index is 57.19 kg/m². , Pre-Surgery. Pre-operative clearance and work up pending. Working hard to keep good dietary habits as well level of activity. Patient denies any nausea, vomiting, fevers, chills, shortness of breath, chest pain, cough, constipation or difficulty urinating. Past Medical History:   Diagnosis Date    Anxiety     Arthritis     Asthma     Back pain     CHF (congestive heart failure) (HCC)     COPD (chronic obstructive pulmonary disease) (HCC)     Depression     Diabetes mellitus (HCC)     GERD (gastroesophageal reflux disease)     Hyperlipidemia     Hypertension     Obesity     Sleep apnea      Past Surgical History:   Procedure Laterality Date    CHOLECYSTECTOMY  1997    HYSTERECTOMY  2008    SKIN BIOPSY  2015    TUBAL LIGATION      UPPER GASTROINTESTINAL ENDOSCOPY N/A 10/14/2019    EGD BIOPSY performed by Yumkio Candelaria DO at [facility] ENDOSCOPY     No family history on file. Social History     Tobacco Use    Smoking status: Current Every Day Smoker     Packs/day: 0.25     Years: 20.00     Pack years: 5.00     Types: Cigarettes     Start date: 1/1/1985    Smokeless tobacco: Never Used    Tobacco comment: working on it   Substance Use Topics    Alcohol use: Yes     Alcohol/week: 1.0 standard drinks     Types: 1 Glasses of wine per week     Comment: rare     I counseled the patient on the importance of not smoking and risks of ETOH. Allergies   Allergen Reactions    Latex      Vitals:    02/06/20 0934   BP: (!) 188/96   Pulse: 76   Weight: (!) 333 lb 3.2 oz (151.1 kg)   Height: 5' 4\" (1.626 m)       Body mass index is 57.19 kg/m².       Current Outpatient Medications:     ASPIRIN LOW DOSE 81 MG EC tablet, , Disp: , Rfl:     amitriptyline (ELAVIL) 25 MG tablet, TAKE ONE TABLET BY MOUTH ONCE NIGHTLY, Disp: 30 tablet, Rfl: 1    venlafaxine (EFFEXOR) 25 MG tablet, TAKE ONE TABLET BY MOUTH TWICE A DAY, Disp: 60 tablet, Rfl: 1    HYDROcodone-acetaminophen (NORCO) 5-325 MG per tablet, Take 1 tablet by mouth every 8 hours as needed for Pain for up to 30 days. , Disp: 90 tablet, Rfl: 0    torsemide (DEMADEX) 20 MG tablet, Take 1 tablet by mouth daily as needed (shortness of breath/wheezing), Disp: 30 tablet, Rfl: 11    fluticasone-salmeterol (ADVAIR) 250-50 MCG/DOSE AEPB, INHALE ONE PUFF BY MOUTH EVERY 12 HOURS, Disp: 60 each, Rfl: 2    metoprolol (LOPRESSOR) 100 MG tablet, TAKE ONE TABLET BY MOUTH TWICE A DAY (Patient taking differently: 100 mg 3 times daily ), Disp: 60 tablet, Rfl: 2    atorvastatin (LIPITOR) 20 MG tablet, Take 1 tablet by mouth daily, Disp: 90 tablet, Rfl: 3    lisinopril (PRINIVIL;ZESTRIL) 40 MG tablet, Take 1 tablet by mouth daily, Disp: 90 tablet, Rfl: 3    albuterol sulfate HFA (PROAIR HFA) 108 (90 Base) MCG/ACT inhaler, Inhale 2 puffs into the lungs every 6 hours as needed for Wheezing or Shortness of Breath, Disp: 1 Inhaler, Rfl: 5    albuterol-ipratropium (COMBIVENT RESPIMAT)  MCG/ACT AERS inhaler, Inhale 1 puff into the lungs every 6 hours, Disp: 1 Inhaler, Rfl: 5    cloNIDine (CATAPRES) 0.1 MG tablet, Take 0.3 mg by mouth three times daily , Disp: , Rfl:     GARCINIA CAMBOGIA-CHROMIUM PO, Take 1,600 mg by mouth daily, Disp: , Rfl:     metFORMIN (GLUCOPHAGE) 500 MG tablet, Take 1 tablet by mouth 2 times daily (with meals), Disp: 60 tablet, Rfl: 2    loratadine (CLARITIN) 10 MG tablet, Take 10 mg by mouth daily, Disp: , Rfl:     montelukast (SINGULAIR) 10 MG tablet, Take 1 tablet by mouth nightly, Disp: 30 tablet, Rfl: 3    omeprazole (PRILOSEC) 20 MG delayed release capsule, Take 20 mg by mouth daily, Disp: , Rfl:     acetaminophen (APAP EXTRA STRENGTH) 500 MG tablet, Take 2 tablets by mouth every 6 hours as needed for Pain DO NOT TAKE WITH OTHER MEDICATIONS CONTAINING ACETAMINOPHEN., Disp: 30 tablet, Rfl: 0    Spacer/Aero-Holding Chambers KIT, Please dispense aerochamber spacer, Disp: 1 kit, Rfl: 0    Cholecalciferol (VITAMIN D3) 29732 UNITS CAPS, Take 10,000 Units by mouth three times a week. Takes on Mon Wed Fri, Disp: , Rfl:       Review of Systems - History obtained from the patient  General ROS: negative  Psychological ROS: negative  Endocrine ROS: negative  Respiratory ROS: negative  Cardiovascular ROS: negative  Gastrointestinal ROS:negative  Genito-Urinary ROS: negative  Musculoskeletal ROS: negative   Skin ROS: negative    Physical Exam   Vitals Reviewed   Constitutional: Patient is oriented to person, place, and time. Patient appears well-developed and well-nourished. Patient is active and cooperative. Non-toxic appearance. No distress. Neck: Trachea normal and normal range of motion. No JVD present. Pulmonary/Chest: Effort normal. No accessory muscle usage or stridor. No apnea. No respiratory distress. Cardiovascular: Normal rate and no JVD. Abdominal: Normal appearance. Patient exhibits no distension. Abdomen is soft, obese, non tender. Musculoskeletal: Normal range of motion. Patient exhibits no edema. Neurological: Patient is alert and oriented to person, place, and time. Patient has normal strength. GCS eye subscore is 4. GCS verbal subscore is 5. GCS motor subscore is 6. Skin: Skin is warm and dry. No abrasion and no rash noted. Patient is not diaphoretic. No cyanosis or erythema. Psychiatric: Patient has a normal mood and affect. Speech is normal and behavior is normal. Cognition and memory are normal.       Roel Khan is 53 y.o. female, Body mass index is 57.19 kg/m². pre surgery, has lost 3# since last visit. The patient underwent dietary counseling with registered dietician. I have reviewed, discussed and agree with the dietary plan. Patient is trying hard to keep good dietary and behavior modifications.  Patient is monitoring portion sizes, food choices and liquid calories. Patient is trying to exercise regularly as much as possible. We discussed how her weight affects her overall health including:  Vannessa was seen today for obesity. Diagnoses and all orders for this visit:    Morbid obesity with BMI of 50.0-59.9, adult ([address]    Diabetes mellitus type 2 in obese ([facility] [address]    Chronic GERD    Tobacco abuse counseling       and importance of weight loss to alleviate those co morbid conditions. I encouraged the patient to continue exercise and keeping healthy eating habits. Discussed pre-op labs and work up till now. Also counseled the patient extensively on Surgery. I spent 15 minutes face to face with patient with more than 50% of the time counseling and/or coordinating care for weight loss surgery. RTC in 4 weeks  Obtain rest of pre-op work up / clearances  Diet and Exercise      Patient advised that its their responsibility to follow up for studies and/or labs ordered today.      [employer]

## 2020-02-06 NOTE — PROGRESS NOTES
Javi Huerta lost 2.6 lbs over 1 month. Breakfast: half caff coffee + protein bar    Snack: None    Lunch: sandwich - spicy mustard, turkey/ham slices with slice of cheese, 1 slice white bread, trying to do protein shake    Snack: veggies with Marky dressing     Dinner: leftovers - spaghetti with ground beef (83% lean) in spaghetti sauce     Snack: None    Fluids: half caff coffee, water    Is pt consuming smaller portions? yes , using smaller compartmentalized plate    Is pt consuming at least 64 oz of fluids per day? yes , 5 16 oz bottles daily    Is pt consuming carbonated, caffeinated, or sugary beverages? yes , some caffeine    Has pt sampled Unjury and/or Nectar protein? Yes, did not like it.     Exercise: Walking up and down steps and at grocery store    Plan/Recommendations:   Trying protein powder in different ways- use , try unflavored or chicken soup flavor  Protein with q meal and snack  Eliminate caffeine    Handouts: None    Yesy Ast

## 2020-02-13 ENCOUNTER — TELEPHONE (OUTPATIENT)
Dept: CARDIOLOGY CLINIC | Age: 45
End: 2020-02-13

## 2020-02-13 ENCOUNTER — OFFICE VISIT (OUTPATIENT)
Dept: FAMILY MEDICINE CLINIC | Age: 45
End: 2020-02-13
Payer: MEDICAID

## 2020-02-13 VITALS
HEART RATE: 67 BPM | OXYGEN SATURATION: 98 % | WEIGHT: 293 LBS | DIASTOLIC BLOOD PRESSURE: 84 MMHG | BODY MASS INDEX: 50.02 KG/M2 | HEIGHT: 64 IN | SYSTOLIC BLOOD PRESSURE: 132 MMHG

## 2020-02-13 LAB
BILIRUBIN, POC: NORMAL
BLOOD URINE, POC: NORMAL
CLARITY, POC: CLEAR
COLOR, POC: YELLOW
GLUCOSE URINE, POC: NORMAL
KETONES, POC: NORMAL
LEUKOCYTE EST, POC: NORMAL
NITRITE, POC: NORMAL
PH, POC: 7.5
PROTEIN, POC: NORMAL
SPECIFIC GRAVITY, POC: 1.02
UROBILINOGEN, POC: 0.2

## 2020-02-13 PROCEDURE — G8484 FLU IMMUNIZE NO ADMIN: HCPCS | Performed by: FAMILY MEDICINE

## 2020-02-13 PROCEDURE — G8417 CALC BMI ABV UP PARAM F/U: HCPCS | Performed by: FAMILY MEDICINE

## 2020-02-13 PROCEDURE — 99214 OFFICE O/P EST MOD 30 MIN: CPT | Performed by: FAMILY MEDICINE

## 2020-02-13 PROCEDURE — 4004F PT TOBACCO SCREEN RCVD TLK: CPT | Performed by: FAMILY MEDICINE

## 2020-02-13 PROCEDURE — G8427 DOCREV CUR MEDS BY ELIG CLIN: HCPCS | Performed by: FAMILY MEDICINE

## 2020-02-13 PROCEDURE — 81002 URINALYSIS NONAUTO W/O SCOPE: CPT | Performed by: FAMILY MEDICINE

## 2020-02-13 RX ORDER — TIZANIDINE 4 MG/1
4 TABLET ORAL EVERY 8 HOURS PRN
Qty: 30 TABLET | Refills: 0 | Status: SHIPPED | OUTPATIENT
Start: 2020-02-13 | End: 2020-05-18

## 2020-02-13 RX ORDER — VARENICLINE TARTRATE 25 MG
KIT ORAL
Qty: 1 BOX | Refills: 0 | Status: SHIPPED | OUTPATIENT
Start: 2020-02-13 | End: 2020-07-16 | Stop reason: ALTCHOICE

## 2020-02-13 RX ORDER — VARENICLINE TARTRATE 1 MG/1
1 TABLET, FILM COATED ORAL 2 TIMES DAILY
Qty: 60 TABLET | Refills: 1 | Status: SHIPPED | OUTPATIENT
Start: 2020-02-13 | End: 2020-07-16

## 2020-02-13 ASSESSMENT — ENCOUNTER SYMPTOMS
SHORTNESS OF BREATH: 0
BACK PAIN: 1
COUGH: 0

## 2020-02-13 NOTE — PROGRESS NOTES
2/13/2020    This is a 53 y.o. female   Chief Complaint   Patient presents with    Other     wants help to stop smoking    Back Pain     left sided low back pain, about 2 weeks - having no urinary symptom     HPI  Tobacco  - motivated to quit  - currently down to about 1/2 ppd  - has smoked for 30+ years  - has quit before with Chantix. Was able to quit 6 months. Restarted due to stress. Weight  - long time issue  - working to get weight off  - report was down near 300 pounds before getting sick last year  - used to be around 450 years ago  - gave up [employer], other foods  - following with Dr. Estefany Parker for bariatric surgery. Interested in gastric sleeve. Needs to quit smoking to be eligible. - has been meeting with dietician monthly  - she is motivated to have less joint pain and less strain on her heart    HTN  BP Readings from Last 3 Encounters:   02/13/20 132/84   02/06/20 (!) 188/96   01/23/20 (!) 168/106     Lab Results   Component Value Date     09/17/2019    K 4.3 09/17/2019    CREATININE 0.8 09/17/2019   - currently on lisinopril, clonidine, metoprolol, torsemide  - taking medications as prescribed  - denies dizziness or light-headedness  - denies side effects from medications  - BP at home: typically [id number] sysyolic, 88-47 diastolic at home    Low back pain  - chronic. A little worse on L lower side recently. Not radiating. No leg symptoms or weakness    Review of Systems   Constitutional: Negative for chills and fever. Respiratory: Negative for cough and shortness of breath. Cardiovascular: Negative for chest pain. Leg swelling (at baseline)   Musculoskeletal: Positive for arthralgias and back pain.        Patient Active Problem List   Diagnosis    Osteoarthritis of left knee    Hemoptysis    Essential hypertension    Morbid obesity with body mass index of 50.0-59.9 in adult [facility])    CHF (congestive heart failure) ([facility] [address]    Osteoarthritis of both knees    Hyperlipidemia    Sexual activity: Yes     Partners: Male   Lifestyle    Physical activity:     Days per week: Not on file     Minutes per session: Not on file    Stress: Not on file   Relationships    Social connections:     Talks on phone: Not on file     Gets together: Not on file     Attends Restoration service: Not on file     Active member of club or organization: Not on file     Attends meetings of clubs or organizations: Not on file     Relationship status: Not on file    Intimate partner violence:     Fear of current or ex partner: Not on file     Emotionally abused: Not on file     Physically abused: Not on file     Forced sexual activity: Not on file   Other Topics Concern    Not on file   Social History Narrative    Not on file       No family history on file. Current Outpatient Medications   Medication Sig Dispense Refill    varenicline (CHANTIX STARTING MONTH BRI) 0.5 MG X 11 & 1 MG X 42 tablet Take by mouth. 1 box 0    varenicline (CHANTIX CONTINUING MONTH BRI) 1 MG tablet Take 1 tablet by mouth 2 times daily 60 tablet 1    tiZANidine (ZANAFLEX) 4 MG tablet Take 1 tablet by mouth every 8 hours as needed (back pain) 30 tablet 0    ASPIRIN LOW DOSE 81 MG EC tablet       amitriptyline (ELAVIL) 25 MG tablet TAKE ONE TABLET BY MOUTH ONCE NIGHTLY 30 tablet 1    venlafaxine (EFFEXOR) 25 MG tablet TAKE ONE TABLET BY MOUTH TWICE A DAY 60 tablet 1    HYDROcodone-acetaminophen (NORCO) 5-325 MG per tablet Take 1 tablet by mouth every 8 hours as needed for Pain for up to 30 days.  90 tablet 0    torsemide (DEMADEX) 20 MG tablet Take 1 tablet by mouth daily as needed (shortness of breath/wheezing) 30 tablet 11    fluticasone-salmeterol (ADVAIR) 250-50 MCG/DOSE AEPB INHALE ONE PUFF BY MOUTH EVERY 12 HOURS 60 each 2    metoprolol (LOPRESSOR) 100 MG tablet TAKE ONE TABLET BY MOUTH TWICE A DAY (Patient taking differently: 100 mg 3 times daily ) 60 tablet 2    atorvastatin (LIPITOR) 20 MG tablet Take 1 tablet by mouth daily 90 tablet 3    lisinopril (PRINIVIL;ZESTRIL) 40 MG tablet Take 1 tablet by mouth daily 90 tablet 3    albuterol sulfate HFA (PROAIR HFA) 108 (90 Base) MCG/ACT inhaler Inhale 2 puffs into the lungs every 6 hours as needed for Wheezing or Shortness of Breath 1 Inhaler 5    albuterol-ipratropium (COMBIVENT RESPIMAT)  MCG/ACT AERS inhaler Inhale 1 puff into the lungs every 6 hours 1 Inhaler 5    cloNIDine (CATAPRES) 0.1 MG tablet Take 0.3 mg by mouth three times daily       GARCINIA CAMBOGIA-CHROMIUM PO Take 1,600 mg by mouth daily      metFORMIN (GLUCOPHAGE) 500 MG tablet Take 1 tablet by mouth 2 times daily (with meals) 60 tablet 2    loratadine (CLARITIN) 10 MG tablet Take 10 mg by mouth daily      montelukast (SINGULAIR) 10 MG tablet Take 1 tablet by mouth nightly 30 tablet 3    omeprazole (PRILOSEC) 20 MG delayed release capsule Take 20 mg by mouth daily      acetaminophen (APAP EXTRA STRENGTH) 500 MG tablet Take 2 tablets by mouth every 6 hours as needed for Pain DO NOT TAKE WITH OTHER MEDICATIONS CONTAINING ACETAMINOPHEN. 30 tablet 0    Spacer/Aero-Holding Chambers KIT Please dispense aerochamber spacer 1 kit 0    Cholecalciferol (VITAMIN D3) 36693 UNITS CAPS Take 10,000 Units by mouth three times a week. Takes on Mon Wed Fri       No current facility-administered medications for this visit. Allergies   Allergen Reactions    Latex        /84 (Site: Right Upper Arm, Position: Sitting, Cuff Size: Large Adult)   Pulse 67   Ht 5' 4\" (1.626 m)   Wt (!) 342 lb (155.1 kg)   SpO2 98%   BMI 58.70 kg/m²     Physical Exam  Constitutional:       Appearance: She is well-developed. HENT:      Head: Normocephalic and atraumatic. Pulmonary:      Effort: Pulmonary effort is normal.   Musculoskeletal:      Comments: Mild TTP L lower back with muscle tenderness   Skin:     Coloration: Skin is not pale.    Neurological:      Mental Status: She is alert and oriented to person, place, and time.         Wt Readings from Last 3 Encounters:   02/13/20 (!) 342 lb (155.1 kg)   02/06/20 (!) 333 lb 3.2 oz (151.1 kg)   01/16/20 (!) 344 lb (156 kg)     BP Readings from Last 3 Encounters:   02/13/20 132/84   02/06/20 (!) 188/96   01/23/20 (!) 168/106     Assessment/Plan:  Vannessa was seen today for other and back pain. Diagnoses and all orders for this visit:    Tobacco use  Discussed side effects including vivid dreams and possible mood changes. Told to begin starter pack and set quit date for 2 weeks afterward. Call for further refills. -     varenicline (CHANTIX STARTING MONTH PAK) 0.5 MG X 11 & 1 MG X 42 tablet; Take by mouth.  -     varenicline (CHANTIX CONTINUING MONTH PAK) 1 MG tablet; Take 1 tablet by mouth 2 times daily    Morbid obesity with body mass index of 50.0-59.9 in [facility])  Pt following with Bariatric center and motivated, looking toward gastric sleeve surgery    Essential hypertension  This is well controlled today. Continue current medicines. Tobacco abuse counseling  Discussed 5+ minutes on details of this. Low back pain with bilateral sciatica, unspecified back pain laterality, unspecified chronicity  No red flag symptoms. Discussed sedative side effects of muscle relaxer, use PRN. Call if worsening or not improving.  -     POCT Urinalysis no Micro  -     tiZANidine (ZANAFLEX) 4 MG tablet; Take 1 tablet by mouth every 8 hours as needed (back pain)        Return in about 6 months (around 8/13/2020) for htn f/u.

## 2020-02-13 NOTE — TELEPHONE ENCOUNTER
Vannessa stopped by the office and is going to be have gastric sleeve surgery with Dr. Kami House and says her doctor wants to know what her fluid intake should be on a daily basis . Has CHF. Surgery not yet scheduled.   Saw Chalino Organ 1/16/2020    Vannessa can be reached at [phone]

## 2020-02-13 NOTE — TELEPHONE ENCOUNTER
Pt returned call. Advised pt that fluid intake should be limited to 64oz daily. Advised pt to also consult Dr. Kacey Miner. Pt voiced understanding.

## 2020-02-20 ENCOUNTER — OFFICE VISIT (OUTPATIENT)
Dept: PAIN MANAGEMENT | Age: 45
End: 2020-02-20
Payer: MEDICAID

## 2020-02-20 VITALS
WEIGHT: 293 LBS | DIASTOLIC BLOOD PRESSURE: 91 MMHG | BODY MASS INDEX: 58.12 KG/M2 | HEART RATE: 67 BPM | OXYGEN SATURATION: 93 % | SYSTOLIC BLOOD PRESSURE: 149 MMHG

## 2020-02-20 PROCEDURE — G8427 DOCREV CUR MEDS BY ELIG CLIN: HCPCS | Performed by: NURSE PRACTITIONER

## 2020-02-20 PROCEDURE — 99213 OFFICE O/P EST LOW 20 MIN: CPT | Performed by: NURSE PRACTITIONER

## 2020-02-20 PROCEDURE — 4004F PT TOBACCO SCREEN RCVD TLK: CPT | Performed by: NURSE PRACTITIONER

## 2020-02-20 PROCEDURE — G8417 CALC BMI ABV UP PARAM F/U: HCPCS | Performed by: NURSE PRACTITIONER

## 2020-02-20 PROCEDURE — G8484 FLU IMMUNIZE NO ADMIN: HCPCS | Performed by: NURSE PRACTITIONER

## 2020-02-20 RX ORDER — HYDROCODONE BITARTRATE AND ACETAMINOPHEN 5; 325 MG/1; MG/1
1 TABLET ORAL EVERY 8 HOURS PRN
Qty: 90 TABLET | Refills: 0 | Status: SHIPPED | OUTPATIENT
Start: 2020-02-20 | End: 2020-03-19 | Stop reason: SDUPTHER

## 2020-02-20 NOTE — PROGRESS NOTES
were pertinent to this visit. Risks and benefits of the medications and other alternative treatments  including no treatment were discussed with the patient. The common side effects of these medications were also explained to the patient. Informed verbal consent was obtained. Goals of current treatment regimen include improvement in pain, restoration of functioning- with focus on improvement in physical performance, general activity, work or disability,emotional distress, health care utilization and  decreased medication consumption. Will continue to monitor progress towards achieving/maintaining therapeutic goals with special emphasis on  1. Improvement in perceived interfernce  of pain with ADL's. Ability to do home exercises independently. Ability to do household chores indoor and/or outdoor work and social and leisure activities. Improve psychosocial and physical functioning. - she is showing progression towards this treatment goal with the current regimen. She was advised against drinking alcohol with the narcotic pain medicines, advised against driving or handling machinery while adjusting the dose of medicines or if having cognitive  issues related to the current medications. Risk of overdose and death, if medicines not taken as prescribed, were also discussed. If the patient develops new symptoms or if the symptoms worsen, the patient should call the office. While transcribing every attempt was made to maintain the accuracy of the note in terms of it's contents,there may have been some errors made inadvertently. Thank you for allowing me to participate in the care of this patient.     Haydee Murrieta, CNP    Cc: Addison Omer MD

## 2020-02-20 NOTE — PATIENT INSTRUCTIONS
Patient Education        Back Stretches: Exercises  Introduction  Here are some examples of exercises for stretching your back. Start each exercise slowly. Ease off the exercise if you start to have pain. Your doctor or physical therapist will tell you when you can start these exercises and which ones will work best for you. How to do the exercises  Overhead stretch   1. Stand comfortably with your feet shoulder-width apart. 2. Looking straight ahead, raise both arms over your head and reach toward the ceiling. Do not allow your head to tilt back. 3. Hold for 15 to 30 seconds, then lower your arms to your sides. 4. Repeat 2 to 4 times. Side stretch   1. Stand comfortably with your feet shoulder-width apart. 2. Raise one arm over your head, and then lean to the other side. 3. Slide your hand down your leg as you let the weight of your arm gently stretch your side muscles. Hold for 15 to 30 seconds. 4. Repeat 2 to 4 times on each side. Press-up   1. Lie on your stomach, supporting your body with your forearms. 2. Press your elbows down into the floor to raise your upper back. As you do this, relax your stomach muscles and allow your back to arch without using your back muscles. As your press up, do not let your hips or pelvis come off the floor. 3. Hold for 15 to 30 seconds, then relax. 4. Repeat 2 to 4 times. Relax and rest   1. Lie on your back with a rolled towel under your neck and a pillow under your knees. Extend your arms comfortably to your sides. 2. Relax and breathe normally. 3. Remain in this position for about 10 minutes. 4. If you can, do this 2 or 3 times each day. Follow-up care is a key part of your treatment and safety. Be sure to make and go to all appointments, and call your doctor if you are having problems. It's also a good idea to know your test results and keep a list of the medicines you take. Where can you learn more? Go to https://margo.[employer]. org and sign in to your [employer] account. Enter H163 in the [place] box to learn more about \"Back Stretches: Exercises. \"     If you do not have an account, please click on the \"Sign Up Now\" link. Current as of: June 26, 2019  Content Version: 12.3  © 7062-0469 Healthwise, Incorporated. Care instructions adapted under license by [facility] ([facility]). If you have questions about a medical condition or this instruction, always ask your healthcare professional. [address] any warranty or liability for your use of this information.

## 2020-02-28 ENCOUNTER — APPOINTMENT (OUTPATIENT)
Dept: GENERAL RADIOLOGY | Age: 45
End: 2020-02-28
Payer: MEDICAID

## 2020-02-28 ENCOUNTER — HOSPITAL ENCOUNTER (EMERGENCY)
Age: 45
Discharge: HOME | End: 2020-02-28
Payer: MEDICAID

## 2020-02-28 VITALS
TEMPERATURE: 97.6 F | OXYGEN SATURATION: 99 % | HEART RATE: 76 BPM | RESPIRATION RATE: 19 BRPM | HEIGHT: 64 IN | SYSTOLIC BLOOD PRESSURE: 210 MMHG | DIASTOLIC BLOOD PRESSURE: 100 MMHG | BODY MASS INDEX: 50.02 KG/M2 | WEIGHT: 293 LBS

## 2020-02-28 LAB
ANION GAP SERPL CALCULATED.3IONS-SCNC: 11 MMOL/L (ref 3–16)
BASOPHILS ABSOLUTE: 0.1 K/UL (ref 0–0.2)
BASOPHILS RELATIVE PERCENT: 0.9 %
BUN BLDV-MCNC: 9 MG/DL (ref 7–20)
CALCIUM SERPL-MCNC: 9.1 MG/DL (ref 8.3–10.6)
CHLORIDE BLD-SCNC: 103 MMOL/L (ref 99–110)
CO2: 26 MMOL/L (ref 21–32)
CREAT SERPL-MCNC: 0.7 MG/DL (ref 0.6–1.1)
EKG ATRIAL RATE: 69 BPM
EKG DIAGNOSIS: NORMAL
EKG P AXIS: 57 DEGREES
EKG P-R INTERVAL: 172 MS
EKG Q-T INTERVAL: 376 MS
EKG QRS DURATION: 96 MS
EKG QTC CALCULATION (BAZETT): 402 MS
EKG R AXIS: 45 DEGREES
EKG T AXIS: 45 DEGREES
EKG VENTRICULAR RATE: 69 BPM
EOSINOPHILS ABSOLUTE: 0.2 K/UL (ref 0–0.6)
EOSINOPHILS RELATIVE PERCENT: 2.5 %
GFR AFRICAN AMERICAN: >60
GFR NON-AFRICAN AMERICAN: >60
GLUCOSE BLD-MCNC: 91 MG/DL (ref 70–99)
HCT VFR BLD CALC: 37.9 % (ref 36–48)
HEMOGLOBIN: 13 G/DL (ref 12–16)
LYMPHOCYTES ABSOLUTE: 2.4 K/UL (ref 1–5.1)
LYMPHOCYTES RELATIVE PERCENT: 39.6 %
MCH RBC QN AUTO: 33.5 PG (ref 26–34)
MCHC RBC AUTO-ENTMCNC: 34.2 G/DL (ref 31–36)
MCV RBC AUTO: 97.9 FL (ref 80–100)
MONOCYTES ABSOLUTE: 0.5 K/UL (ref 0–1.3)
MONOCYTES RELATIVE PERCENT: 9.1 %
NEUTROPHILS ABSOLUTE: 2.9 K/UL (ref 1.7–7.7)
NEUTROPHILS RELATIVE PERCENT: 47.9 %
PDW BLD-RTO: 13.6 % (ref 12.4–15.4)
PLATELET # BLD: 235 K/UL (ref 135–450)
PMV BLD AUTO: 8.8 FL (ref 5–10.5)
POTASSIUM SERPL-SCNC: 4.6 MMOL/L (ref 3.5–5.1)
PRO-BNP: 425 PG/ML (ref 0–124)
RAPID INFLUENZA  B AGN: NEGATIVE
RAPID INFLUENZA A AGN: NEGATIVE
RBC # BLD: 3.87 M/UL (ref 4–5.2)
SODIUM BLD-SCNC: 140 MMOL/L (ref 136–145)
TROPONIN: <0.01 NG/ML
WBC # BLD: 6 K/UL (ref 4–11)

## 2020-02-28 PROCEDURE — 6360000002 HC RX W HCPCS: Performed by: NURSE PRACTITIONER

## 2020-02-28 PROCEDURE — 6370000000 HC RX 637 (ALT 250 FOR IP): Performed by: NURSE PRACTITIONER

## 2020-02-28 PROCEDURE — 99283 EMERGENCY DEPT VISIT LOW MDM: CPT

## 2020-02-28 PROCEDURE — 87804 INFLUENZA ASSAY W/OPTIC: CPT

## 2020-02-28 PROCEDURE — 2580000003 HC RX 258: Performed by: NURSE PRACTITIONER

## 2020-02-28 PROCEDURE — 80048 BASIC METABOLIC PNL TOTAL CA: CPT

## 2020-02-28 PROCEDURE — 93010 ELECTROCARDIOGRAM REPORT: CPT | Performed by: INTERNAL MEDICINE

## 2020-02-28 PROCEDURE — 96375 TX/PRO/DX INJ NEW DRUG ADDON: CPT

## 2020-02-28 PROCEDURE — 85025 COMPLETE CBC W/AUTO DIFF WBC: CPT

## 2020-02-28 PROCEDURE — 36415 COLL VENOUS BLD VENIPUNCTURE: CPT

## 2020-02-28 PROCEDURE — 93005 ELECTROCARDIOGRAM TRACING: CPT | Performed by: NURSE PRACTITIONER

## 2020-02-28 PROCEDURE — 83880 ASSAY OF NATRIURETIC PEPTIDE: CPT

## 2020-02-28 PROCEDURE — 84484 ASSAY OF TROPONIN QUANT: CPT

## 2020-02-28 PROCEDURE — 71046 X-RAY EXAM CHEST 2 VIEWS: CPT

## 2020-02-28 PROCEDURE — 96365 THER/PROPH/DIAG IV INF INIT: CPT

## 2020-02-28 RX ORDER — DEXTROMETHORPHAN HYDROBROMIDE AND PROMETHAZINE HYDROCHLORIDE 15; 6.25 MG/5ML; MG/5ML
5 SYRUP ORAL 4 TIMES DAILY PRN
Qty: 118 ML | Refills: 0 | Status: SHIPPED | OUTPATIENT
Start: 2020-02-28 | End: 2020-03-06

## 2020-02-28 RX ORDER — IPRATROPIUM BROMIDE AND ALBUTEROL SULFATE 2.5; .5 MG/3ML; MG/3ML
1 SOLUTION RESPIRATORY (INHALATION) ONCE
Status: COMPLETED | OUTPATIENT
Start: 2020-02-28 | End: 2020-02-28

## 2020-02-28 RX ORDER — CEFUROXIME AXETIL 500 MG/1
500 TABLET ORAL 2 TIMES DAILY
Qty: 14 TABLET | Refills: 0 | Status: SHIPPED | OUTPATIENT
Start: 2020-02-28 | End: 2020-03-06

## 2020-02-28 RX ORDER — PREDNISONE 20 MG/1
TABLET ORAL
Qty: 18 TABLET | Refills: 0 | Status: SHIPPED | OUTPATIENT
Start: 2020-02-28 | End: 2020-03-09

## 2020-02-28 RX ORDER — METHYLPREDNISOLONE SODIUM SUCCINATE 125 MG/2ML
60 INJECTION, POWDER, LYOPHILIZED, FOR SOLUTION INTRAMUSCULAR; INTRAVENOUS ONCE
Status: COMPLETED | OUTPATIENT
Start: 2020-02-28 | End: 2020-02-28

## 2020-02-28 RX ORDER — METOPROLOL TARTRATE 100 MG/1
TABLET ORAL
Qty: 60 TABLET | Refills: 2 | Status: SHIPPED | OUTPATIENT
Start: 2020-02-28 | End: 2020-05-28

## 2020-02-28 RX ADMIN — METHYLPREDNISOLONE SODIUM SUCCINATE 60 MG: 125 INJECTION, POWDER, FOR SOLUTION INTRAMUSCULAR; INTRAVENOUS at 14:44

## 2020-02-28 RX ADMIN — IPRATROPIUM BROMIDE AND ALBUTEROL SULFATE 1 AMPULE: .5; 3 SOLUTION RESPIRATORY (INHALATION) at 14:28

## 2020-02-28 RX ADMIN — CEFTRIAXONE 1 G: 1 INJECTION, POWDER, FOR SOLUTION INTRAMUSCULAR; INTRAVENOUS at 15:00

## 2020-02-28 ASSESSMENT — PAIN DESCRIPTION - LOCATION: LOCATION: CHEST;RIB CAGE

## 2020-02-28 ASSESSMENT — PAIN DESCRIPTION - DESCRIPTORS: DESCRIPTORS: THROBBING;TIGHTNESS

## 2020-02-28 ASSESSMENT — ENCOUNTER SYMPTOMS
SORE THROAT: 1
CHEST TIGHTNESS: 1
RHINORRHEA: 1
SHORTNESS OF BREATH: 1
COUGH: 1
WHEEZING: 1
NAUSEA: 1

## 2020-02-28 ASSESSMENT — PAIN DESCRIPTION - PAIN TYPE: TYPE: ACUTE PAIN

## 2020-02-28 ASSESSMENT — PAIN DESCRIPTION - PROGRESSION: CLINICAL_PROGRESSION: GRADUALLY WORSENING

## 2020-02-28 ASSESSMENT — PAIN SCALES - GENERAL: PAINLEVEL_OUTOF10: 10

## 2020-02-28 ASSESSMENT — PAIN DESCRIPTION - FREQUENCY: FREQUENCY: INTERMITTENT

## 2020-02-28 NOTE — ED NOTES
Pt up to bathroom to void misty well still chest pain on breathing 5/10     Viktor aHji, RN  02/28/20 2393

## 2020-02-28 NOTE — ED PROVIDER NOTES
The Ekg interpreted by me shows  normal sinus rhythm with a rate of 69  Axis is   Normal  QTc is  normal  Intervals and Durations are unremarkable.       ST Segments: normal  No significant change from prior EKG dated 6/26/19            Naz Hardin MD  02/28/20 [id number]

## 2020-02-28 NOTE — ED PROVIDER NOTES
pain, palpitations and leg swelling. Gastrointestinal: Positive for nausea. Genitourinary:        Stress incontinence with coughing, sneezing   Musculoskeletal: Negative. Neurological: Negative. Except as noted above the remainder of the review of systems was reviewed and negative. PAST MEDICAL HISTORY         Diagnosis Date    Anxiety     Arthritis     Asthma     Back pain     CHF (congestive heart failure) (HCC)     COPD (chronic obstructive pulmonary disease) (HCC)     Depression     Diabetes mellitus ([address]     GERD (gastroesophageal reflux disease)     Hyperlipidemia     Hypertension     Obesity     Sleep apnea        SURGICAL HISTORY           Procedure Laterality Date    CHOLECYSTECTOMY      HYSTERECTOMY      SKIN BIOPSY      TUBAL LIGATION      UPPER GASTROINTESTINAL ENDOSCOPY N/A 10/14/2019    EGD BIOPSY performed by Deisy Marcos DO at [address]     [email or web address]    ALLERGIES     Latex    FAMILY HISTORY     History reviewed. No pertinent family history. Family Status   Relation Name Status    Mother      Father  Alive        SOCIAL HISTORY      reports that she has been smoking cigarettes. She started smoking about 35 years ago. She has a 5.00 pack-year smoking history. She uses smokeless tobacco. She reports current alcohol use of about 1.0 standard drinks of alcohol per week. She reports that she does not use drugs. PHYSICAL EXAM    (up to 7 for level 4, 8 or more for level 5)     ED Triage Vitals [20 1400]   Enc Vitals Group      BP (!) 195/99      Pulse 76      Resp 19      Temp 97.6 °F (36.4 °C)      Temp Source Oral      SpO2 99 %      Weight (!) 343 lb 4.1 oz (155.7 kg)      Height 5' 4\" (1.626 m)      Head Circumference       Peak Flow       Pain Score       Pain Loc       Pain Edu? Excl. in [address]? Physical Exam  Vitals signs and nursing note reviewed.    Constitutional:       General: She is [address]    Schedule an appointment as soon as possible for a visit       [facility]  [place] [address] [phone]    If symptoms worsen      DISCHARGE MEDICATIONS:  New Prescriptions    CEFUROXIME (CEFTIN) 500 MG TABLET    Take 1 tablet by mouth 2 times daily for 7 days    PREDNISONE (DELTASONE) 20 MG TABLET    3 tabs po qam for 3 days then 2 tabs qam for 3 days the 1 tab qam for 3 days    PROMETHAZINE-DEXTROMETHORPHAN (PROMETHAZINE-DM) 6.25-15 MG/5ML SYRUP    Take 5 mLs by mouth 4 times daily as needed for Cough       (Please note that portions of this note were completed with a voice recognition program.  Efforts were made to edit the dictations but occasionally words are mis-transcribed.)    GILLIAN Yates CNP, APRN - CNP  02/28/20 1600

## 2020-03-05 ENCOUNTER — OFFICE VISIT (OUTPATIENT)
Dept: BARIATRICS/WEIGHT MGMT | Age: 45
End: 2020-03-05
Payer: MEDICAID

## 2020-03-05 VITALS
HEIGHT: 64 IN | WEIGHT: 293 LBS | DIASTOLIC BLOOD PRESSURE: 112 MMHG | BODY MASS INDEX: 50.02 KG/M2 | SYSTOLIC BLOOD PRESSURE: 185 MMHG | HEART RATE: 60 BPM

## 2020-03-05 PROCEDURE — G8427 DOCREV CUR MEDS BY ELIG CLIN: HCPCS | Performed by: SURGERY

## 2020-03-05 PROCEDURE — G8484 FLU IMMUNIZE NO ADMIN: HCPCS | Performed by: SURGERY

## 2020-03-05 PROCEDURE — 99214 OFFICE O/P EST MOD 30 MIN: CPT | Performed by: SURGERY

## 2020-03-05 PROCEDURE — G8417 CALC BMI ABV UP PARAM F/U: HCPCS | Performed by: SURGERY

## 2020-03-05 PROCEDURE — 3046F HEMOGLOBIN A1C LEVEL >9.0%: CPT | Performed by: SURGERY

## 2020-03-05 PROCEDURE — 4004F PT TOBACCO SCREEN RCVD TLK: CPT | Performed by: SURGERY

## 2020-03-05 PROCEDURE — 2022F DILAT RTA XM EVC RTNOPTHY: CPT | Performed by: SURGERY

## 2020-03-05 NOTE — PROGRESS NOTES
TWICE A DAY, Disp: 60 tablet, Rfl: 2    cefUROXime (CEFTIN) 500 MG tablet, Take 1 tablet by mouth 2 times daily for 7 days, Disp: 14 tablet, Rfl: 0    predniSONE (DELTASONE) 20 MG tablet, 3 tabs po qam for 3 days then 2 tabs qam for 3 days the 1 tab qam for 3 days, Disp: 18 tablet, Rfl: 0    promethazine-dextromethorphan (PROMETHAZINE-DM) 6.25-15 MG/5ML syrup, Take 5 mLs by mouth 4 times daily as needed for Cough, Disp: 118 mL, Rfl: 0    HYDROcodone-acetaminophen (NORCO) 5-325 MG per tablet, Take 1 tablet by mouth every 8 hours as needed for Pain for up to 30 days. , Disp: 90 tablet, Rfl: 0    varenicline (CHANTIX STARTING MONTH BRI) 0.5 MG X 11 & 1 MG X 42 tablet, Take by mouth., Disp: 1 box, Rfl: 0    varenicline (CHANTIX CONTINUING MONTH BRI) 1 MG tablet, Take 1 tablet by mouth 2 times daily, Disp: 60 tablet, Rfl: 1    tiZANidine (ZANAFLEX) 4 MG tablet, Take 1 tablet by mouth every 8 hours as needed (back pain), Disp: 30 tablet, Rfl: 0    ASPIRIN LOW DOSE 81 MG EC tablet, , Disp: , Rfl:     amitriptyline (ELAVIL) 25 MG tablet, TAKE ONE TABLET BY MOUTH ONCE NIGHTLY, Disp: 30 tablet, Rfl: 1    venlafaxine (EFFEXOR) 25 MG tablet, TAKE ONE TABLET BY MOUTH TWICE A DAY, Disp: 60 tablet, Rfl: 1    torsemide (DEMADEX) 20 MG tablet, Take 1 tablet by mouth daily as needed (shortness of breath/wheezing), Disp: 30 tablet, Rfl: 11    fluticasone-salmeterol (ADVAIR) 250-50 MCG/DOSE AEPB, INHALE ONE PUFF BY MOUTH EVERY 12 HOURS, Disp: 60 each, Rfl: 2    atorvastatin (LIPITOR) 20 MG tablet, Take 1 tablet by mouth daily, Disp: 90 tablet, Rfl: 3    lisinopril (PRINIVIL;ZESTRIL) 40 MG tablet, Take 1 tablet by mouth daily, Disp: 90 tablet, Rfl: 3    albuterol sulfate HFA (PROAIR HFA) 108 (90 Base) MCG/ACT inhaler, Inhale 2 puffs into the lungs every 6 hours as needed for Wheezing or Shortness of Breath, Disp: 1 Inhaler, Rfl: 5    albuterol-ipratropium (COMBIVENT RESPIMAT)  MCG/ACT AERS inhaler, Inhale 1 puff

## 2020-03-16 NOTE — PROGRESS NOTES
EXAM: CHEST AP ONLY

 

INDICATION: Pulmonary infiltrate follow-up..

 

TECHNIQUE: Single AP view 

 

COMPARISON: 3/15/2020 chest x-ray

 

FINDINGS:

 

The heart size is enlarged, similar to prior..

 

The great vessels appear unremarkable.

 

There is no hilar or mediastinal mass.

 

Lungs show further improvement in interstitial opacities with minimal 

residual bibasilar linear opacities.

 

No evidence of a pneumothorax or pleural effusion although the left 

costophrenic angle has been partly excluded.

 

There are no significant osseous abnormalities.

 

IMPRESSION:

 

Continued improvement in interstitial pulmonary opacities with residual 

bibasilar opacities favored to represent atelectasis..

 

 

Electronically signed by: Yaneli Zabala MD (3/16/2020 8:10 AM) 

[id number] REASON FOR CONSULTATION/CC:    Chief Complaint   Patient presents with    COPD     f/u COPD    Sleep Apnea     f/u cpap- did not bring machine or card. Has not used for approx 5 mos. PCP: Jamie Ramirez MD    HISTORY OF PRESENT ILLNESS: Parth Lara is a 37y.o. year old female with a history of JOSE who presents :       JOSE  Has not used for 6 months  She is going to try to use it again. She is going start sleeping in bed room again. Tobacco abuse  1/2 ppd. Interested in quitting. Has tried chantix in the past with success. COPD  Using Dulera. No issues with cost or side effects     She is concerned for \"summer cold\"             PAST MEDICAL HISTORY:  Past Medical History:   Diagnosis Date    Arthritis     Asthma     [facility] [address]     COPD (chronic obstructive pulmonary disease) [facility] [address]     Diabetes mellitus ([facility] [address]     Hyperlipidemia     Hypertension     Obesity        PAST SURGICAL HISTORY:  Past Surgical History:   Procedure Laterality Date    CHOLECYSTECTOMY  1997    HYSTERECTOMY  2008    SKIN BIOPSY  2015    TUBAL LIGATION         FAMILY HISTORY:  family history is not on file. SOCIAL HISTORY:   reports that she has been smoking cigarettes. She started smoking about 34 years ago. She has a 10.00 pack-year smoking history. She has never used smokeless tobacco.      ALLERGIES:  Patient is allergic to latex. REVIEW OF SYSTEMS:  Constitutional: Negative for fever   HENT: Negative for sore throat  Respiratory: Negative for dyspnea, cough  Cardiovascular: Negative for chest pain  Gastrointestinal: Negative for vomiting, diarrhea   Skin: Negative for rash  Psychiatric/Behavorial: Negative for anxiety     Objective:   PHYSICAL EXAM:  Blood pressure (!) 168/102, pulse 84, temperature 98.1 °F (36.7 °C), temperature source Oral, resp. rate 16, height 5' 4\" (1.626 m), weight (!) 349 lb (158.3 kg), SpO2 97 %, not currently breastfeeding.'  Gen: No distress. Body mass index is 59.91 kg/m². ENT:   Resp: No accessory muscle use. No crackles. No wheezes. No rhonchi. CV: Regular rate. Regular rhythm. No murmur or rub. No edema. Skin: Warm, dry, normal texture and turgor. No nodule on exposed extremities. M/S: No cyanosis. No clubbing. No joint deformity. Psych: Oriented x 3. No anxiety. Awake. Alert. Intact judgement and insight. Good Mood / Affect. Memory appears in tact  . Current Outpatient Medications   Medication Sig Dispense Refill    predniSONE (DELTASONE) 20 MG tablet Take 2 tablets by mouth daily for 5 days 10 tablet 0    varenicline (CHANTIX STARTING MONTH PAK) 0.5 MG X 11 & 1 MG X 42 tablet Take by mouth. 1 box 0    albuterol sulfate HFA (PROAIR HFA) 108 (90 Base) MCG/ACT inhaler Inhale 2 puffs into the lungs every 6 hours as needed for Wheezing or Shortness of Breath 1 Inhaler 5    mometasone-formoterol (DULERA) 200-5 MCG/ACT inhaler Inhale 2 puffs into the lungs every 12 hours 1 Inhaler 5    HYDROcodone-acetaminophen (NORCO) 5-325 MG per tablet Take 1 tablet by mouth every 8 hours as needed for Pain for up to 28 days.  84 tablet 0    amitriptyline (ELAVIL) 25 MG tablet TAKE ONE TABLET BY MOUTH ONCE NIGHTLY 30 tablet 0    albuterol-ipratropium (COMBIVENT RESPIMAT)  MCG/ACT AERS inhaler Inhale 1 puff into the lungs every 6 hours 1 Inhaler 5    metoprolol (LOPRESSOR) 100 MG tablet TAKE ONE TABLET BY MOUTH TWICE A DAY 60 tablet 1    torsemide (DEMADEX) 100 MG tablet Take 0.5 tablets by mouth every morning 90 tablet 2    losartan (COZAAR) 100 MG tablet TAKE ONE TABLET BY MOUTH DAILY 30 tablet 2    atorvastatin (LIPITOR) 80 MG tablet Take 80 mg by mouth daily      aspirin 81 MG tablet Take 81 mg by mouth daily      cloNIDine (CATAPRES) 0.1 MG tablet Take 0.3 mg by mouth 2 times daily      GARCINIA CAMBOGIA-CHROMIUM PO Take 1,600 mg by mouth daily      meloxicam (MOBIC) 15 MG tablet Take 1 tablet by mouth daily 30 tablet 3    metFORMIN (GLUCOPHAGE) 500 MG tablet Take 1 tablet by mouth 2 times daily (with meals) 60 tablet 2    spironolactone (ALDACTONE) 25 MG tablet Take 1 tablet by mouth Daily with lunch 30 tablet 11    loratadine (CLARITIN) 10 MG tablet Take 10 mg by mouth daily      montelukast (SINGULAIR) 10 MG tablet Take 1 tablet by mouth nightly 30 tablet 3    omeprazole (PRILOSEC) 20 MG delayed release capsule Take 20 mg by mouth daily      acetaminophen (APAP EXTRA STRENGTH) 500 MG tablet Take 2 tablets by mouth every 6 hours as needed for Pain DO NOT TAKE WITH OTHER MEDICATIONS CONTAINING ACETAMINOPHEN. 30 tablet 0    Spacer/Aero-Holding Chambers KIT Please dispense aerochamber spacer 1 kit 0    Cholecalciferol (VITAMIN D3) 06390 UNITS CAPS Take 10,000 Units by mouth three times a week. Takes on Mon Wed Fri      lidocaine (LIDODERM) 5 % Place 1 patch onto the skin daily 12 hours on, 12 hours off. 30 patch 0    venlafaxine (EFFEXOR) 25 MG tablet TAKE ONE TABLET BY MOUTH TWICE A DAY 60 tablet 0     No current facility-administered medications for this visit. Data Reviewed:   CBC and Renal reviewed  Last CBC  Lab Results   Component Value Date    WBC 9.0 08/28/2018    RBC 4.60 08/28/2018    HGB 15.1 08/28/2018    MCV 96.8 08/28/2018     08/28/2018     Last Renal  Lab Results   Component Value Date     10/18/2018    K 4.6 10/18/2018    K 4.1 08/28/2018     10/18/2018    CO2 26 10/18/2018    CO2 28 08/28/2018    CO2 28 08/26/2018    BUN 6 10/18/2018    CREATININE 0.7 10/18/2018    GLUCOSE 105 10/18/2018    CALCIUM 9.2 10/18/2018       Last ABG  POC Blood Gas: No results found for: POCPH, POCPCO2, POCPO2, POCHCO3, NBEA, [id number]  No results for input(s): PH, PCO2, PO2, HCO3, BE, O2SAT in the last 72 hours. Assessment:     ·  JOSE  · obesity  · COPD, mild, FEV1 76%. Decreased DLCO. Plan:      Problem List Items Addressed This Visit     Tobacco abuse counseling     Smoking 1/2 ppd. Reviewed methods of quitting. Will start Chantix. Has taken before but side effects reviewed. Relevant Medications    varenicline (CHANTIX STARTING MONTH PAK) 0.5 MG X 11 & 1 MG X 42 tablet    JOSE treated with BiPAP     She has completely stopped using this. COPD, mild ([address] - Primary     Using dyspnea and albuterol as needed    She is very concerned about getting the summer cold and leading to aecopd. She would like to have rx pred on hand. This was given. Advised of good hand washing to prevent this.           Relevant Medications    predniSONE (DELTASONE) 20 MG tablet    varenicline (CHANTIX STARTING MONTH PAK) 0.5 MG X 11 & 1 MG X 42 tablet    albuterol sulfate HFA (PROAIR HFA) 108 (90 Base) MCG/ACT inhaler    mometasone-formoterol (DULERA) 200-5 MCG/ACT inhaler               [facility] [address] [place] [facility]  [phone]

## 2020-03-19 ENCOUNTER — OFFICE VISIT (OUTPATIENT)
Dept: PAIN MANAGEMENT | Age: 45
End: 2020-03-19
Payer: MEDICAID

## 2020-03-19 VITALS
BODY MASS INDEX: 60.15 KG/M2 | HEART RATE: 55 BPM | OXYGEN SATURATION: 96 % | DIASTOLIC BLOOD PRESSURE: 88 MMHG | SYSTOLIC BLOOD PRESSURE: 169 MMHG | WEIGHT: 293 LBS

## 2020-03-19 PROCEDURE — 99213 OFFICE O/P EST LOW 20 MIN: CPT | Performed by: NURSE PRACTITIONER

## 2020-03-19 PROCEDURE — 4004F PT TOBACCO SCREEN RCVD TLK: CPT | Performed by: NURSE PRACTITIONER

## 2020-03-19 PROCEDURE — G8484 FLU IMMUNIZE NO ADMIN: HCPCS | Performed by: NURSE PRACTITIONER

## 2020-03-19 PROCEDURE — G8427 DOCREV CUR MEDS BY ELIG CLIN: HCPCS | Performed by: NURSE PRACTITIONER

## 2020-03-19 PROCEDURE — G8417 CALC BMI ABV UP PARAM F/U: HCPCS | Performed by: NURSE PRACTITIONER

## 2020-03-19 RX ORDER — HYDROCODONE BITARTRATE AND ACETAMINOPHEN 5; 325 MG/1; MG/1
1 TABLET ORAL EVERY 8 HOURS PRN
Qty: 90 TABLET | Refills: 0 | Status: SHIPPED | OUTPATIENT
Start: 2020-03-19 | End: 2020-04-20 | Stop reason: SDUPTHER

## 2020-03-19 RX ORDER — AMITRIPTYLINE HYDROCHLORIDE 25 MG/1
TABLET, FILM COATED ORAL
Qty: 30 TABLET | Refills: 1 | Status: SHIPPED | OUTPATIENT
Start: 2020-03-19 | End: 2020-04-20 | Stop reason: SDUPTHER

## 2020-03-19 RX ORDER — VENLAFAXINE 25 MG/1
TABLET ORAL
Qty: 60 TABLET | Refills: 1 | Status: SHIPPED | OUTPATIENT
Start: 2020-03-19 | End: 2020-04-20 | Stop reason: SDUPTHER

## 2020-03-19 NOTE — PROGRESS NOTES
Reggie Riedel  1975  <[id number]>      HISTORY OF PRESENT ILLNESS:  Ms. Kemal Saenz is a 53 y.o. female returns for a follow up visit for pain management  She has a diagnosis of   1. Chronic pain syndrome    2. Chronic bilateral low back pain without sciatica    3. Primary osteoarthritis of both knees    4. Primary osteoarthritis of right hip    5. JOSE treated with BiPAP    6. Morbid obesity with BMI of 60.0-69.9, adult ([address]    7. Depression, unspecified depression type, mild    8. Primary insomnia    9. Essential hypertension      On the Patients Pain Assessment form:  She complains of pain in the both buttocks, both knee(s), both leg(s): upper and bilateral lower back She rates the pain 6/10 and describes it as sharp, aching, burning. Current treatment regimen has helped relieve about 40% of the pain. She denies any side effects from the current pain regimen. Patient reports that since the last follow up visit the physical functioning is unchanged, family/social relationships are unchanged, mood is better sleep patterns are better, and that the overall functioning is better. Patient denies misusing/abusing her narcotic pain medications or using any illegal drugs. There are No indicators for possible drug abuse, addiction or diversion problems. Upon obtaining medical history from Ms. Kemal Seanz states that pain is manageable on current pain therapy. She was treated in the ER for URI, currently reports symptoms are resolved. Takes medications as prescribed. Mood is stable without anxiety. Sleep is fair with an average of 5-6 hours. Denies to having issues of constipation. Tolerating activities/house chores with moderate tenderness to the lower back. Quits smoking 1 month ago    ALLERGIES: Patients list of allergies were reviewed     MEDICATIONS: Ms. Kemal Saenz list of medications were reviewed. Her current medications are   Outpatient Medications Prior to Visit   Medication Sig Dispense Refill    metoprolol mouth three times a week. Takes on Mon Wed Fri      HYDROcodone-acetaminophen (NORCO) 5-325 MG per tablet Take 1 tablet by mouth every 8 hours as needed for Pain for up to 30 days. 90 tablet 0    amitriptyline (ELAVIL) 25 MG tablet TAKE ONE TABLET BY MOUTH ONCE NIGHTLY 30 tablet 1    venlafaxine (EFFEXOR) 25 MG tablet TAKE ONE TABLET BY MOUTH TWICE A DAY 60 tablet 1     No facility-administered medications prior to visit. SOCIAL/FAMILY/PAST MEDICAL HISTORY: Ms. Chiara Trinidad, family and past medical history was reviewed. REVIEW OF SYSTEMS:    Respiratory: Negative for apnea, chest tightness and shortness of breath or change in baseline breathing. Gastrointestinal: Negative for nausea, vomiting, abdominal pain, diarrhea, constipation, blood in stool and abdominal distention. PHYSICAL EXAM:   Nursing note and vitals reviewed. BP (!) 169/88   Pulse 55   Wt (!) 350 lb 6.4 oz (158.9 kg)   SpO2 96%   BMI 60.15 kg/m²   Constitutional: She appears well-developed and well-nourished. No acute distress. Skin: Skin is warm and dry, good turgor. No rash noted. She is not diaphoretic. Cardiovascular: Normal rate, regular rhythm, normal heart sounds, and does not have murmur. Pulmonary/Chest: Effort normal. No respiratory distress. She does not have wheezes in the lung fields. She has no rales. Neurological/Psychiatric:She is alert and oriented to person, place, and time. Coordination is  normal.  Her mood isAppropriate and affect is Neutral/Euthymic(normal) . IMPRESSION:   1. Chronic pain syndrome    2. Chronic bilateral low back pain without sciatica    3. Primary osteoarthritis of both knees    4. Primary osteoarthritis of right hip    5. JOSE treated with BiPAP    6. Morbid obesity with BMI of 60.0-69.9, adult ([facility] [address]    7. Depression, unspecified depression type, mild    8. Primary insomnia    9.  Essential hypertension        PLAN:  Informed verbal consent was obtained  -Continue with HFA) 108 (90 Base) MCG/ACT inhaler Inhale 2 puffs into the lungs every 6 hours as needed for Wheezing or Shortness of Breath 1 Inhaler 5    albuterol-ipratropium (COMBIVENT RESPIMAT)  MCG/ACT AERS inhaler Inhale 1 puff into the lungs every 6 hours 1 Inhaler 5    cloNIDine (CATAPRES) 0.1 MG tablet Take 0.3 mg by mouth three times daily       GARCINIA CAMBOGIA-CHROMIUM PO Take 1,600 mg by mouth daily      metFORMIN (GLUCOPHAGE) 500 MG tablet Take 1 tablet by mouth 2 times daily (with meals) 60 tablet 2    loratadine (CLARITIN) 10 MG tablet Take 10 mg by mouth daily      montelukast (SINGULAIR) 10 MG tablet Take 1 tablet by mouth nightly 30 tablet 3    omeprazole (PRILOSEC) 20 MG delayed release capsule Take 20 mg by mouth daily      acetaminophen (APAP EXTRA STRENGTH) 500 MG tablet Take 2 tablets by mouth every 6 hours as needed for Pain DO NOT TAKE WITH OTHER MEDICATIONS CONTAINING ACETAMINOPHEN. 30 tablet 0    Spacer/Aero-Holding Chambers KIT Please dispense aerochamber spacer 1 kit 0    Cholecalciferol (VITAMIN D3) 73423 UNITS CAPS Take 10,000 Units by mouth three times a week. Takes on Mon Wed Fri       No current facility-administered medications for this visit. I will continue her current medication regimen  which is part of the above treatment schedule. It has been helping with Ms. Kristofer Duffy chronic  medical problems which for this visit include:   Diagnoses of Chronic pain syndrome, Chronic bilateral low back pain without sciatica, Primary osteoarthritis of both knees, Primary osteoarthritis of right hip, JOSE treated with BiPAP, Morbid obesity with BMI of 60.0-69.9, adult ([facility] [address] Depression, unspecified depression type, mild, Primary insomnia, and Essential hypertension were pertinent to this visit. Risks and benefits of the medications and other alternative treatments  including no treatment were discussed with the patient. The common side effects of these medications were also explained

## 2020-03-19 NOTE — PATIENT INSTRUCTIONS
and sign in to your [employer] account. Enter D557 in the [employer] box to learn more about \"Back Stretches: Exercises. \"     If you do not have an account, please click on the \"Sign Up Now\" link. Current as of: June 26, 2019Content Version: 12.4  © 5045-6366 [employer], Incorporated. Care instructions adapted under license by [facility] ([facility]). If you have questions about a medical condition or this instruction, always ask your healthcare professional. [address] any warranty or liability for your use of this information.

## 2020-04-20 ENCOUNTER — VIRTUAL VISIT (OUTPATIENT)
Dept: PAIN MANAGEMENT | Age: 45
End: 2020-04-20
Payer: MEDICAID

## 2020-04-20 PROCEDURE — 99213 OFFICE O/P EST LOW 20 MIN: CPT | Performed by: NURSE PRACTITIONER

## 2020-04-20 PROCEDURE — G8427 DOCREV CUR MEDS BY ELIG CLIN: HCPCS | Performed by: NURSE PRACTITIONER

## 2020-04-20 RX ORDER — VENLAFAXINE 25 MG/1
TABLET ORAL
Qty: 60 TABLET | Refills: 1 | Status: SHIPPED | OUTPATIENT
Start: 2020-04-20 | End: 2020-06-15 | Stop reason: SDUPTHER

## 2020-04-20 RX ORDER — AMITRIPTYLINE HYDROCHLORIDE 25 MG/1
TABLET, FILM COATED ORAL
Qty: 30 TABLET | Refills: 1 | Status: SHIPPED | OUTPATIENT
Start: 2020-04-20 | End: 2020-06-15 | Stop reason: SDUPTHER

## 2020-04-20 RX ORDER — HYDROCODONE BITARTRATE AND ACETAMINOPHEN 5; 325 MG/1; MG/1
1 TABLET ORAL EVERY 8 HOURS PRN
Qty: 90 TABLET | Refills: 0 | Status: SHIPPED | OUTPATIENT
Start: 2020-04-20 | End: 2020-05-18 | Stop reason: SDUPTHER

## 2020-04-20 RX ORDER — BACLOFEN 10 MG/1
10 TABLET ORAL DAILY
Qty: 30 TABLET | Refills: 0 | Status: SHIPPED | OUTPATIENT
Start: 2020-04-20 | End: 2020-05-18 | Stop reason: SDUPTHER

## 2020-04-20 NOTE — PATIENT INSTRUCTIONS
Patient Education        Back Stretches: Exercises  Introduction  Here are some examples of exercises for stretching your back. Start each exercise slowly. Ease off the exercise if you start to have pain. Your doctor or physical therapist will tell you when you can start these exercises and which ones will work best for you. How to do the exercises  Overhead stretch   1. Stand comfortably with your feet shoulder-width apart. 2. Looking straight ahead, raise both arms over your head and reach toward the ceiling. Do not allow your head to tilt back. 3. Hold for 15 to 30 seconds, then lower your arms to your sides. 4. Repeat 2 to 4 times. Side stretch   1. Stand comfortably with your feet shoulder-width apart. 2. Raise one arm over your head, and then lean to the other side. 3. Slide your hand down your leg as you let the weight of your arm gently stretch your side muscles. Hold for 15 to 30 seconds. 4. Repeat 2 to 4 times on each side. Press-up   1. Lie on your stomach, supporting your body with your forearms. 2. Press your elbows down into the floor to raise your upper back. As you do this, relax your stomach muscles and allow your back to arch without using your back muscles. As your press up, do not let your hips or pelvis come off the floor. 3. Hold for 15 to 30 seconds, then relax. 4. Repeat 2 to 4 times. Relax and rest   1. Lie on your back with a rolled towel under your neck and a pillow under your knees. Extend your arms comfortably to your sides. 2. Relax and breathe normally. 3. Remain in this position for about 10 minutes. 4. If you can, do this 2 or 3 times each day. Follow-up care is a key part of your treatment and safety. Be sure to make and go to all appointments, and call your doctor if you are having problems. It's also a good idea to know your test results and keep a list of the medicines you take. Where can you learn more? Go to [email or web address]

## 2020-04-20 NOTE — PROGRESS NOTES
TELE HEALTH VISIT (AUDIO-VISUAL)    Pursuant to the emergency declaration under the [facility] waiver authority and the [employer] Act, this Virtual  Visit was conducted, with patient's consent, to reduce the patient's risk of exposure to COVID-19 and provide continuity of care for an established patient. Service is  provided through a video synchronous discussion virtually to substitute for in-person clinic visit. Due to this being a TeleHealth encounter (During HJTPD-77 public health emergency), evaluation of the following organ systems was limited: Vitals/Constitutional/EENT/Resp/CV/GI//MS/Neuro/Skin/Lluk-Dbav-Jgp. Cely Delvis  1975  <[id number]>    Ms. Kiara Wang is being seen virtually for a follow up visit using [employer] me[employer] Video visit/[employer] or face time  Informed verbal consent to the virtual visit was obtained from Ms. Kiara Wang. Risks associated with HIPPA compliance with the virtual visit was explained to the patient. Ms. Kiara Wang is at her home and Adra Sandhoff. Clarke Hilda APRN-CNP is in her office. HISTORY OF PRESENT ILLNESS:  Ms. Kiara Wang is a 53 y.o. female  being assessed for a follow up visit for pain management for evaluation of ongoing care regarding her symptoms and monitoring of compliance with long term use high risk medications. She has a diagnosis of   1. Chronic pain syndrome    2. Chronic bilateral low back pain without sciatica    3. Back spasm    4. Primary osteoarthritis of both knees    5. Primary osteoarthritis of right hip    6. JOSE treated with BiPAP    7. Morbid obesity with BMI of 60.0-69.9, adult ([facility] [address]    8. Depression, unspecified depression type, mild    9. Primary insomnia    10.  Essential hypertension      On the Patients Pain Assessment form reviewed with the Medical Assistant:  She complains of pain in the bilateral lower back  with radiation to the hips Right and days. 90 tablet 0    baclofen (LIORESAL) 10 MG tablet Take 1 tablet by mouth daily 30 tablet 0    metoprolol (LOPRESSOR) 100 MG tablet TAKE ONE TABLET BY MOUTH TWICE A DAY 60 tablet 2    varenicline (CHANTIX STARTING MONTH PAK) 0.5 MG X 11 & 1 MG X 42 tablet Take by mouth. 1 box 0    varenicline (CHANTIX CONTINUING MONTH PAK) 1 MG tablet Take 1 tablet by mouth 2 times daily 60 tablet 1    tiZANidine (ZANAFLEX) 4 MG tablet Take 1 tablet by mouth every 8 hours as needed (back pain) 30 tablet 0    ASPIRIN LOW DOSE 81 MG EC tablet       torsemide (DEMADEX) 20 MG tablet Take 1 tablet by mouth daily as needed (shortness of breath/wheezing) 30 tablet 11    fluticasone-salmeterol (ADVAIR) 250-50 MCG/DOSE AEPB INHALE ONE PUFF BY MOUTH EVERY 12 HOURS 60 each 2    atorvastatin (LIPITOR) 20 MG tablet Take 1 tablet by mouth daily 90 tablet 3    lisinopril (PRINIVIL;ZESTRIL) 40 MG tablet Take 1 tablet by mouth daily 90 tablet 3    albuterol sulfate HFA (PROAIR HFA) 108 (90 Base) MCG/ACT inhaler Inhale 2 puffs into the lungs every 6 hours as needed for Wheezing or Shortness of Breath 1 Inhaler 5    albuterol-ipratropium (COMBIVENT RESPIMAT)  MCG/ACT AERS inhaler Inhale 1 puff into the lungs every 6 hours 1 Inhaler 5    cloNIDine (CATAPRES) 0.1 MG tablet Take 0.3 mg by mouth three times daily       GARCINIA CAMBOGIA-CHROMIUM PO Take 1,600 mg by mouth daily      metFORMIN (GLUCOPHAGE) 500 MG tablet Take 1 tablet by mouth 2 times daily (with meals) 60 tablet 2    loratadine (CLARITIN) 10 MG tablet Take 10 mg by mouth daily      montelukast (SINGULAIR) 10 MG tablet Take 1 tablet by mouth nightly 30 tablet 3    omeprazole (PRILOSEC) 20 MG delayed release capsule Take 20 mg by mouth daily      acetaminophen (APAP EXTRA STRENGTH) 500 MG tablet Take 2 tablets by mouth every 6 hours as needed for Pain DO NOT TAKE WITH OTHER MEDICATIONS CONTAINING ACETAMINOPHEN.  30 tablet 0    Spacer/Aero-Holding Chambers KIT Please dispense aerochamber spacer 1 kit 0    Cholecalciferol (VITAMIN D3) 46261 UNITS CAPS Take 10,000 Units by mouth three times a week. Takes on Mon Wed Fri       No current facility-administered medications for this visit. I will continue her current medication regimen  which is part of the above treatment schedule. It has been helping with Ms. Margarito Weems chronic  medical problems which for this visit include:   Diagnoses of Chronic pain syndrome, Chronic bilateral low back pain without sciatica, Back spasm, Primary osteoarthritis of both knees, Primary osteoarthritis of right hip, JOSE treated with BiPAP, Morbid obesity with BMI of 60.0-69.9, adult ([facility] [address] Depression, unspecified depression type, mild, Primary insomnia, and Essential hypertension were pertinent to this visit. Risks and benefits of the medications and other alternative treatments  including no treatment were discussed with the patient. The common side effects of these medications were also explained to the patient. Informed verbal consent was obtained. Goals of current treatment regimen include improvement in pain, restoration of functioning- with focus on improvement in physical performance, general activity, work or disability,emotional distress, health care utilization and  decreased medication consumption. Will continue to monitor progress towards achieving/maintaining therapeutic goals with special emphasis on  1. Improvement in perceived interfernce  of pain with ADL's. Ability to do home exercises independently. Ability to do household chores indoor and/or outdoor work and social and leisure activities. Improve psychosocial and physical functioning. - she is showing progression towards this treatment goal with the current regimen.     She was advised against drinking alcohol with the narcotic pain medicines, advised against driving or handling machinery while adjusting the dose of medicines or if having cognitive  issues related to

## 2020-05-04 ENCOUNTER — TELEMEDICINE (OUTPATIENT)
Dept: BARIATRICS/WEIGHT MGMT | Age: 45
End: 2020-05-04
Payer: MEDICAID

## 2020-05-04 VITALS — WEIGHT: 293 LBS | BODY MASS INDEX: 58.88 KG/M2

## 2020-05-04 PROCEDURE — G8417 CALC BMI ABV UP PARAM F/U: HCPCS | Performed by: SURGERY

## 2020-05-04 PROCEDURE — 1036F TOBACCO NON-USER: CPT | Performed by: SURGERY

## 2020-05-04 PROCEDURE — 99214 OFFICE O/P EST MOD 30 MIN: CPT | Performed by: SURGERY

## 2020-05-04 PROCEDURE — G8428 CUR MEDS NOT DOCUMENT: HCPCS | Performed by: SURGERY

## 2020-05-04 NOTE — PROGRESS NOTES
every 6 hours as needed for Wheezing or Shortness of Breath, Disp: 1 Inhaler, Rfl: 5    albuterol-ipratropium (COMBIVENT RESPIMAT)  MCG/ACT AERS inhaler, Inhale 1 puff into the lungs every 6 hours, Disp: 1 Inhaler, Rfl: 5    cloNIDine (CATAPRES) 0.1 MG tablet, Take 0.3 mg by mouth three times daily , Disp: , Rfl:     GARCINIA CAMBOGIA-CHROMIUM PO, Take 1,600 mg by mouth daily, Disp: , Rfl:     metFORMIN (GLUCOPHAGE) 500 MG tablet, Take 1 tablet by mouth 2 times daily (with meals), Disp: 60 tablet, Rfl: 2    loratadine (CLARITIN) 10 MG tablet, Take 10 mg by mouth daily, Disp: , Rfl:     montelukast (SINGULAIR) 10 MG tablet, Take 1 tablet by mouth nightly, Disp: 30 tablet, Rfl: 3    omeprazole (PRILOSEC) 20 MG delayed release capsule, Take 20 mg by mouth daily, Disp: , Rfl:     acetaminophen (APAP EXTRA STRENGTH) 500 MG tablet, Take 2 tablets by mouth every 6 hours as needed for Pain DO NOT TAKE WITH OTHER MEDICATIONS CONTAINING ACETAMINOPHEN., Disp: 30 tablet, Rfl: 0    Spacer/Aero-Holding Chambers KIT, Please dispense aerochamber spacer, Disp: 1 kit, Rfl: 0    Cholecalciferol (VITAMIN D3) 18436 UNITS CAPS, Take 10,000 Units by mouth three times a week. Takes on Mon Wed Fri, Disp: , Rfl:     Review of Systems - History obtained from the patient  General ROS: negative  Psychological ROS: negative  Ophthalmic ROS: negative  Neurological ROS: negative  ENT ROS: negative  Allergy and Immunology ROS: negative  Hematological and Lymphatic ROS: negative  Endocrine ROS: negative  Breast ROS: negative  Respiratory ROS: negative  Cardiovascular ROS: negative  Gastrointestinal ROS:negative  Genito-Urinary ROS: negative  Musculoskeletal ROS: negative   Skin ROS: negative       Physical Exam  Deferred       A/P    Poly Fisher is 53 y.o. female, Body mass index is 58.88 kg/m². pre surgery, has  since last visit. The patient underwent dietary counseling with myself / or registered dietitian.   I have reviewed, discussed and agree with the dietary plan. Patient is trying hard to keep good dietary and behavior modifications. Patient is monitoring portion sizes, food choices and liquid calories. Patient is trying to exercise regularly as much as possible. We discussed how her weight affects her overall health including:  Patient Active Problem List   Diagnosis    Osteoarthritis of left knee    Hemoptysis    Essential hypertension    Morbid obesity with body mass index of 50.0-59.9 in adult [facility])    CHF (congestive heart failure) (HCC)    Osteoarthritis of both knees    Hyperlipidemia    Cavitating mass in left lower lung lobe    Cavitary lesion of lung    COPD, mild (HCC)    Chronic diastolic CHF (congestive heart failure) ([facility] [address]    JOSE (obstructive sleep apnea)    Tobacco abuse counseling    Bronchitis    Pneumatocele of lung    Prediabetes    Chronic pain syndrome    Primary osteoarthritis of right hip    Chronic bilateral low back pain without sciatica    Diabetes mellitus type 2 in obese ([facility] [address]    Chronic GERD    and importance of weight loss to alleviate those co morbid conditions. I encouraged the patient to continue exercise and keeping healthy eating habits. Discussed pre-op labs and work up till now. Also counseled the patient extensively on Surgery. Obesity as a disease is considered a high risk to patients overall health and should therefore be considered a high risk disease state. I spent a total of 25 minutes via telemedicine (Video) with the patient and greater than 50% of the time was spent in counseling, answering questions, addressing concerns, reviewing labs and/or other studies with the patient as well as coordinating care. RTC in 4 weeks  Obtain rest of pre-op work up / clearances  Healthy Diet and Exercise      Patient advised that its their responsibility to follow up for their care, studies, referrals and/or labs ordered today.        Pursuant to the emergency

## 2020-05-04 NOTE — PROGRESS NOTES
Joseph U. 64. stable / unchanged. Pt's mother recently passed away; she is back to smoking cigarettes and drinking more coffee. She recognizes she is not ready mentally for surgery. Is pt eating at least 4 times everyday? trying to; including protein shakes    Is pt eating a lean protein source with all meals and snacks? yes trying to    Has pt decreased their portions using the plate method? portions are too big    Is pt choosing low fat/sugar free options? no    Is pt drinking at least 64 oz of clear liquids everyday? yes she is trying to    Has pt stopped drinking carbonation, caffeinated, and sugar sweetened beverages? no; still drinking coffee    Has pt sampled Unjury and/or Nectar protein?  yes she has    Participating in intentional exercise? no    Plan/Recommendations: Pt recognizes she is grieving and dealing with depression and will take a break for a month    Handouts: none    Kasandra Rodriges

## 2020-05-04 NOTE — PATIENT INSTRUCTIONS
Due to the COVID-19 restrictions on close contact interactions the patient's visit was conducted via telephone in alfredo of a face to face visit. The patient is here through telemedicine for their 8th presurgical visit  .

## 2020-05-15 NOTE — PROGRESS NOTES
of breath or change in baseline breathing. Gastrointestinal: Negative for nausea, vomiting, abdominal pain, diarrhea, constipation, blood in stool and abdominal distention. PHYSICAL EXAM:   Nursing note and vitals reviewed. There were no vitals taken for this visit. as per patient  Constitutional: She appears well-developed and well-nourished. No acute distress. Skin: Skin appears to be warm and dry. No rashes or any other marks noted. She is not diaphoretic. Neurological/Psychiatric:She is alert and oriented to person, place, and time. Coordination is  normal.  Her mood isAppropriate and affect is Neutral/Euthymic(normal). Her behavior is normal.   thought content normal.   Musculoskeletal / Extremities: Gait is normal, assistive devices use: cane. IMPRESSION:   1. Chronic pain syndrome    2. Chronic bilateral low back pain without sciatica    3. Back spasm    4. Primary osteoarthritis of both knees    5. Primary osteoarthritis of right hip    6. JOSE treated with BiPAP    7. Morbid obesity with BMI of 60.0-69.9, adult ([facility]    8. Depression, unspecified depression type, mild    9. Primary insomnia    10. Essential hypertension        PLAN:  Informed verbal consent regarding treatment was obtained  -Continue with Norco, Elavil, Effexor, Lidocaine, Baclofen  -Liang exercises/knee stretches recommended  -CBT techniques- relaxation therapies such as biofeedback, mindfulness based stress reduction, imagery, cognitive restructuring, problem solving discussed with patient  -Advised patient to quit smoking for  health related concerns and to improve the treatment outcomes. Education was given on quitting smoking and the use of different modalities including medications, hypnotherapy, counselling  and biofeedback.  These were discussed with patient.  -She was advised weight reduction, diet changes- 800-1200 fior diet, diet diary, exercising, nutritional  consult increased physical activity as

## 2020-05-18 ENCOUNTER — VIRTUAL VISIT (OUTPATIENT)
Dept: PAIN MANAGEMENT | Age: 45
End: 2020-05-18
Payer: MEDICAID

## 2020-05-18 PROCEDURE — 99213 OFFICE O/P EST LOW 20 MIN: CPT | Performed by: NURSE PRACTITIONER

## 2020-05-18 PROCEDURE — G8427 DOCREV CUR MEDS BY ELIG CLIN: HCPCS | Performed by: NURSE PRACTITIONER

## 2020-05-18 RX ORDER — BACLOFEN 10 MG/1
10 TABLET ORAL DAILY
Qty: 30 TABLET | Refills: 0 | Status: SHIPPED | OUTPATIENT
Start: 2020-05-18 | End: 2020-06-15 | Stop reason: SDUPTHER

## 2020-05-18 RX ORDER — HYDROCODONE BITARTRATE AND ACETAMINOPHEN 5; 325 MG/1; MG/1
1 TABLET ORAL EVERY 8 HOURS PRN
Qty: 90 TABLET | Refills: 0 | Status: SHIPPED | OUTPATIENT
Start: 2020-05-18 | End: 2020-06-15 | Stop reason: SDUPTHER

## 2020-05-18 NOTE — PATIENT INSTRUCTIONS
Patient Education        Back Stretches: Exercises  Introduction  Here are some examples of exercises for stretching your back. Start each exercise slowly. Ease off the exercise if you start to have pain. Your doctor or physical therapist will tell you when you can start these exercises and which ones will work best for you. How to do the exercises  Overhead stretch   1. Stand comfortably with your feet shoulder-width apart. 2. Looking straight ahead, raise both arms over your head and reach toward the ceiling. Do not allow your head to tilt back. 3. Hold for 15 to 30 seconds, then lower your arms to your sides. 4. Repeat 2 to 4 times. Side stretch   1. Stand comfortably with your feet shoulder-width apart. 2. Raise one arm over your head, and then lean to the other side. 3. Slide your hand down your leg as you let the weight of your arm gently stretch your side muscles. Hold for 15 to 30 seconds. 4. Repeat 2 to 4 times on each side. Press-up   1. Lie on your stomach, supporting your body with your forearms. 2. Press your elbows down into the floor to raise your upper back. As you do this, relax your stomach muscles and allow your back to arch without using your back muscles. As your press up, do not let your hips or pelvis come off the floor. 3. Hold for 15 to 30 seconds, then relax. 4. Repeat 2 to 4 times. Relax and rest   1. Lie on your back with a rolled towel under your neck and a pillow under your knees. Extend your arms comfortably to your sides. 2. Relax and breathe normally. 3. Remain in this position for about 10 minutes. 4. If you can, do this 2 or 3 times each day. Follow-up care is a key part of your treatment and safety. Be sure to make and go to all appointments, and call your doctor if you are having problems. It's also a good idea to know your test results and keep a list of the medicines you take. Where can you learn more? Go to [email or web address]

## 2020-05-19 RX ORDER — BACLOFEN 10 MG/1
TABLET ORAL
Qty: 30 TABLET | Refills: 0 | OUTPATIENT
Start: 2020-05-19

## 2020-05-20 RX ORDER — BACLOFEN 10 MG/1
TABLET ORAL
Qty: 30 TABLET | Refills: 0 | OUTPATIENT
Start: 2020-05-20

## 2020-05-28 RX ORDER — METOPROLOL TARTRATE 100 MG/1
TABLET ORAL
Qty: 60 TABLET | Refills: 2 | Status: SHIPPED | OUTPATIENT
Start: 2020-05-28 | End: 2020-11-16 | Stop reason: SDUPTHER

## 2020-06-15 ENCOUNTER — VIRTUAL VISIT (OUTPATIENT)
Dept: PAIN MANAGEMENT | Age: 45
End: 2020-06-15
Payer: MEDICAID

## 2020-06-15 PROCEDURE — 99213 OFFICE O/P EST LOW 20 MIN: CPT | Performed by: NURSE PRACTITIONER

## 2020-06-15 PROCEDURE — G8427 DOCREV CUR MEDS BY ELIG CLIN: HCPCS | Performed by: NURSE PRACTITIONER

## 2020-06-15 RX ORDER — HYDROCODONE BITARTRATE AND ACETAMINOPHEN 5; 325 MG/1; MG/1
1 TABLET ORAL EVERY 8 HOURS PRN
Qty: 90 TABLET | Refills: 0 | Status: SHIPPED | OUTPATIENT
Start: 2020-06-15 | End: 2020-07-13 | Stop reason: SDUPTHER

## 2020-06-15 RX ORDER — BACLOFEN 10 MG/1
10 TABLET ORAL DAILY
Qty: 30 TABLET | Refills: 0 | Status: SHIPPED | OUTPATIENT
Start: 2020-06-15 | End: 2020-07-13 | Stop reason: SDUPTHER

## 2020-06-15 RX ORDER — VENLAFAXINE 25 MG/1
TABLET ORAL
Qty: 60 TABLET | Refills: 1 | Status: SHIPPED | OUTPATIENT
Start: 2020-06-15 | End: 2020-08-10 | Stop reason: SDUPTHER

## 2020-06-15 RX ORDER — AMITRIPTYLINE HYDROCHLORIDE 25 MG/1
TABLET, FILM COATED ORAL
Qty: 30 TABLET | Refills: 1 | Status: SHIPPED | OUTPATIENT
Start: 2020-06-15 | End: 2020-08-10 | Stop reason: SDUPTHER

## 2020-06-15 NOTE — PATIENT INSTRUCTIONS
Patient Education        Back Stretches: Exercises  Introduction  Here are some examples of exercises for stretching your back. Start each exercise slowly. Ease off the exercise if you start to have pain. Your doctor or physical therapist will tell you when you can start these exercises and which ones will work best for you. How to do the exercises  Overhead stretch   1. Stand comfortably with your feet shoulder-width apart. 2. Looking straight ahead, raise both arms over your head and reach toward the ceiling. Do not allow your head to tilt back. 3. Hold for 15 to 30 seconds, then lower your arms to your sides. 4. Repeat 2 to 4 times. Side stretch   1. Stand comfortably with your feet shoulder-width apart. 2. Raise one arm over your head, and then lean to the other side. 3. Slide your hand down your leg as you let the weight of your arm gently stretch your side muscles. Hold for 15 to 30 seconds. 4. Repeat 2 to 4 times on each side. Press-up   1. Lie on your stomach, supporting your body with your forearms. 2. Press your elbows down into the floor to raise your upper back. As you do this, relax your stomach muscles and allow your back to arch without using your back muscles. As your press up, do not let your hips or pelvis come off the floor. 3. Hold for 15 to 30 seconds, then relax. 4. Repeat 2 to 4 times. Relax and rest   1. Lie on your back with a rolled towel under your neck and a pillow under your knees. Extend your arms comfortably to your sides. 2. Relax and breathe normally. 3. Remain in this position for about 10 minutes. 4. If you can, do this 2 or 3 times each day. Follow-up care is a key part of your treatment and safety. Be sure to make and go to all appointments, and call your doctor if you are having problems. It's also a good idea to know your test results and keep a list of the medicines you take. Where can you learn more? Go to [email or web address]

## 2020-06-15 NOTE — PROGRESS NOTES
pain 4/10 and describes it as burning, stabbing, pressure. Current treatment regimen has helped relieve about 60% of the pain. She denies any side effects from the current pain regimen. Patient reports that since the last follow up visit the physical functioning is unchanged, family/social relationships are unchanged, mood is unchanged sleep patterns are better, and that the overall functioning is unchanged. Patient denies misusing/abusing her narcotic pain medications or using any illegal drugs. Upon obtaining medical history from Ms. Christine Torres states that pain is manageable on current pain therapy. Occasional aching with increased physical activities. Still working with bariatrics with hopes for weight loss surgery. Had quit smoking cigarettes, restarted during the covid-19 pandemic, working towards smoking cessation. Mood is stable without anxiety. Sleep is fair with an average of 5-6 hours. Denies to having issues of constipation. Tolerating activities/house chores with moderate tenderness to the lower back. ALLERGIES: Patients list of allergies were reviewed     MEDICATIONS: Ms. Christine Torres list of medications were reviewed. Her current medications are   Outpatient Medications Prior to Visit   Medication Sig Dispense Refill    metoprolol (LOPRESSOR) 100 MG tablet TAKE ONE TABLET BY MOUTH TWICE A DAY 60 tablet 2    varenicline (CHANTIX STARTING MONTH PAK) 0.5 MG X 11 & 1 MG X 42 tablet Take by mouth.  1 box 0    varenicline (CHANTIX CONTINUING MONTH PAK) 1 MG tablet Take 1 tablet by mouth 2 times daily 60 tablet 1    ASPIRIN LOW DOSE 81 MG EC tablet       torsemide (DEMADEX) 20 MG tablet Take 1 tablet by mouth daily as needed (shortness of breath/wheezing) 30 tablet 11    fluticasone-salmeterol (ADVAIR) 250-50 MCG/DOSE AEPB INHALE ONE PUFF BY MOUTH EVERY 12 HOURS 60 each 2    atorvastatin (LIPITOR) 20 MG tablet Take 1 tablet by mouth daily 90 tablet 3    lisinopril (PRINIVIL;ZESTRIL) 40 MG tablet Take 1 tablet by mouth daily 90 tablet 3    albuterol sulfate HFA (PROAIR HFA) 108 (90 Base) MCG/ACT inhaler Inhale 2 puffs into the lungs every 6 hours as needed for Wheezing or Shortness of Breath 1 Inhaler 5    albuterol-ipratropium (COMBIVENT RESPIMAT)  MCG/ACT AERS inhaler Inhale 1 puff into the lungs every 6 hours 1 Inhaler 5    cloNIDine (CATAPRES) 0.1 MG tablet Take 0.3 mg by mouth three times daily       GARCINIA CAMBOGIA-CHROMIUM PO Take 1,600 mg by mouth daily      metFORMIN (GLUCOPHAGE) 500 MG tablet Take 1 tablet by mouth 2 times daily (with meals) 60 tablet 2    loratadine (CLARITIN) 10 MG tablet Take 10 mg by mouth daily      montelukast (SINGULAIR) 10 MG tablet Take 1 tablet by mouth nightly 30 tablet 3    omeprazole (PRILOSEC) 20 MG delayed release capsule Take 20 mg by mouth daily      acetaminophen (APAP EXTRA STRENGTH) 500 MG tablet Take 2 tablets by mouth every 6 hours as needed for Pain DO NOT TAKE WITH OTHER MEDICATIONS CONTAINING ACETAMINOPHEN. 30 tablet 0    Spacer/Aero-Holding Chambers KIT Please dispense aerochamber spacer 1 kit 0    Cholecalciferol (VITAMIN D3) 89333 UNITS CAPS Take 10,000 Units by mouth three times a week. Takes on Mon Wed Fri      HYDROcodone-acetaminophen (NORCO) 5-325 MG per tablet Take 1 tablet by mouth every 8 hours as needed for Pain for up to 30 days. 90 tablet 0    baclofen (LIORESAL) 10 MG tablet Take 1 tablet by mouth daily 30 tablet 0    amitriptyline (ELAVIL) 25 MG tablet TAKE ONE TABLET BY MOUTH ONCE NIGHTLY 30 tablet 1    venlafaxine (EFFEXOR) 25 MG tablet TAKE ONE TABLET BY MOUTH TWICE A DAY 60 tablet 1     No facility-administered medications prior to visit. SOCIAL/FAMILY/PAST MEDICAL HISTORY: Ms. Arsen Camacho, family and past medical history was reviewed. REVIEW OF SYSTEMS:    Respiratory: Negative for apnea, chest tightness and shortness of breath or change in baseline breathing.     Gastrointestinal: Negative for nausea, Pain DO NOT TAKE WITH OTHER MEDICATIONS CONTAINING ACETAMINOPHEN. 30 tablet 0    Spacer/Aero-Holding Chambers KIT Please dispense aerochamber spacer 1 kit 0    Cholecalciferol (VITAMIN D3) 68838 UNITS CAPS Take 10,000 Units by mouth three times a week. Takes on Mon Wed Fri       No current facility-administered medications for this visit. I will continue her current medication regimen  which is part of the above treatment schedule. It has been helping with Ms. Miky Donohue chronic  medical problems which for this visit include:   Diagnoses of Chronic pain syndrome, Primary osteoarthritis of both knees, Primary osteoarthritis of right hip, Chronic bilateral low back pain without sciatica, Back spasm, Morbid obesity with BMI of 60.0-69.9, adult ([facility] [address] Depression, unspecified depression type, mild, Primary insomnia, and JOSE treated with BiPAP were pertinent to this visit. Risks and benefits of the medications and other alternative treatments  including no treatment were discussed with the patient. The common side effects of these medications were also explained to the patient. Informed verbal consent was obtained. Goals of current treatment regimen include improvement in pain, restoration of functioning- with focus on improvement in physical performance, general activity, work or disability,emotional distress, health care utilization and  decreased medication consumption. Will continue to monitor progress towards achieving/maintaining therapeutic goals with special emphasis on  1. Improvement in perceived interfernce  of pain with ADL's. Ability to do home exercises independently. Ability to do household chores indoor and/or outdoor work and social and leisure activities. Improve psychosocial and physical functioning. - she is showing progression towards this treatment goal with the current regimen.     She was advised against drinking alcohol with the narcotic pain medicines, advised against driving or handling

## 2020-06-18 RX ORDER — BACLOFEN 10 MG/1
TABLET ORAL
Qty: 30 TABLET | Refills: 0 | OUTPATIENT
Start: 2020-06-18

## 2020-07-08 ENCOUNTER — TELEMEDICINE (OUTPATIENT)
Dept: BARIATRICS/WEIGHT MGMT | Age: 45
End: 2020-07-08
Payer: MEDICAID

## 2020-07-08 PROCEDURE — 2022F DILAT RTA XM EVC RTNOPTHY: CPT | Performed by: SURGERY

## 2020-07-08 PROCEDURE — G8428 CUR MEDS NOT DOCUMENT: HCPCS | Performed by: SURGERY

## 2020-07-08 PROCEDURE — 4004F PT TOBACCO SCREEN RCVD TLK: CPT | Performed by: SURGERY

## 2020-07-08 PROCEDURE — 3046F HEMOGLOBIN A1C LEVEL >9.0%: CPT | Performed by: SURGERY

## 2020-07-08 PROCEDURE — 99213 OFFICE O/P EST LOW 20 MIN: CPT | Performed by: SURGERY

## 2020-07-08 PROCEDURE — G8417 CALC BMI ABV UP PARAM F/U: HCPCS | Performed by: SURGERY

## 2020-07-08 RX ORDER — LISINOPRIL 40 MG/1
40 TABLET ORAL DAILY
Qty: 90 TABLET | Refills: 3 | Status: SHIPPED | OUTPATIENT
Start: 2020-07-08 | End: 2020-09-10

## 2020-07-08 NOTE — PROGRESS NOTES
[facility] ([facility]) Physicians   [employer]  Miky Cassidy DO       TELEHEALTH EVALUATION -- Audio/Visual (During QLNBX-13 public health emergency)           Chief Complaint   Patient presents with    Weight Management       HPI:       Due to the COVID-19 pandemic restrictions on close contact interactions as recommended by CDC and health authorities, the patient's visit was conducted via telemedicine in lieu of a face to face visit. Patient verbally consented and agreed to proceed. The patient is here through telemedicine for their bariatric surgery presurgical visit for future weight loss. Melanie Gonzalez is a very pleasant 53 y.o. female with Chronic Obesity. [facility] has been struggling for several years now with obesity. Mayfairia feels the weight is an obstacle to achieve and perform things in daily living as well risk on health. Patient  is very determined to lose weight and be healthy, and is working towards  surgical weight loss to achieve this goal. Pre-operative clearance and work up pending. Working hard to keep good dietary habits as well level of activity. Patient denies any nausea, vomiting, fevers, chills, shortness of breath, chest pain, cough, constipation or difficulty urinating. Past Medical History:   Diagnosis Date    Anxiety     Arthritis     Asthma     Back pain     CHF (congestive heart failure) (HCC)     COPD (chronic obstructive pulmonary disease) (HCC)     Depression     Diabetes mellitus (HCC)     GERD (gastroesophageal reflux disease)     Hyperlipidemia     Hypertension     Obesity     Sleep apnea      Past Surgical History:   Procedure Laterality Date    CHOLECYSTECTOMY  1997    HYSTERECTOMY  2008    SKIN BIOPSY  2015    TUBAL LIGATION      UPPER GASTROINTESTINAL ENDOSCOPY N/A 10/14/2019    EGD BIOPSY performed by Miky Cassidy DO at [facility] ENDOSCOPY     No family history on file.   Social History     Tobacco Use    Smoking status: Current Every Day Smoker     Packs/day: 0.25     Years: 20.00     Pack years: 5.00     Types: Cigarettes     Start date: 1985     Last attempt to quit: 2020     Years since quittin.3    Smokeless tobacco: Former User    Tobacco comment: working on it   Substance Use Topics    Alcohol use: Yes     Alcohol/week: 1.0 standard drinks     Types: 1 Glasses of wine per week     Comment: rare     I counseled the patient on the importance of not smoking and risks of ETOH. Allergies   Allergen Reactions    Latex      There were no vitals filed for this visit.       Lab Results   Component Value Date    WBC 6.0 2020    RBC 3.87 2020    HGB 13.0 2020    HCT 37.9 2020    MCV 97.9 2020    MCH 33.5 2020    MCHC 34.2 2020    MPV 8.8 2020    NEUTOPHILPCT 47.9 2020    LYMPHOPCT 39.6 2020    MONOPCT 9.1 2020    EOSRELPCT 2.5 2020    BASOPCT 0.9 2020    NEUTROABS 2.9 2020    LYMPHSABS 2.4 2020    MONOSABS 0.5 2020    EOSABS 0.2 2020     Lab Results   Component Value Date     2020    K 4.6 2020    K 4.1 09/15/2019     2020    CO2 26 2020    ANIONGAP 11 2020    GLUCOSE 91 2020    BUN 9 2020    CREATININE 0.7 2020    LABGLOM >60 2020    GFRAA >60 2020    GFRAA >60 2013    CALCIUM 9.1 2020    PROT 7.1 2019    PROT 7.1 2012    LABALBU 4.6 2019    AGRATIO 1.8 2019    BILITOT 0.3 2019    ALKPHOS 67 2019    ALT 14 2019    AST 12 2019    GLOB 2.5 2019     Lab Results   Component Value Date    CHOL 198 2019    TRIG 110 2019    HDL 74 2019    LDLCALC 102 2019    LABVLDL 22 2019     Lab Results   Component Value Date    TSHREFLEX 1.05 2019     Lab Results   Component Value Date    IRON 85 2019    TIBC 279 2019    LABIRON 30 2019     Lab Results Component Value Date    LLAAEQUB36 615 09/17/2019    FOLATE 10.00 09/17/2019     Lab Results   Component Value Date    VITD25 31.5 09/17/2019     Lab Results   Component Value Date    LABA1C 5.5 09/17/2019    .2 09/17/2019         Current Outpatient Medications:     lisinopril (PRINIVIL;ZESTRIL) 40 MG tablet, Take 1 tablet by mouth daily, Disp: 90 tablet, Rfl: 3    HYDROcodone-acetaminophen (NORCO) 5-325 MG per tablet, Take 1 tablet by mouth every 8 hours as needed for Pain for up to 30 days. , Disp: 90 tablet, Rfl: 0    baclofen (LIORESAL) 10 MG tablet, Take 1 tablet by mouth daily, Disp: 30 tablet, Rfl: 0    amitriptyline (ELAVIL) 25 MG tablet, TAKE ONE TABLET BY MOUTH ONCE NIGHTLY, Disp: 30 tablet, Rfl: 1    venlafaxine (EFFEXOR) 25 MG tablet, TAKE ONE TABLET BY MOUTH TWICE A DAY, Disp: 60 tablet, Rfl: 1    metoprolol (LOPRESSOR) 100 MG tablet, TAKE ONE TABLET BY MOUTH TWICE A DAY, Disp: 60 tablet, Rfl: 2    varenicline (CHANTIX STARTING MONTH BRI) 0.5 MG X 11 & 1 MG X 42 tablet, Take by mouth., Disp: 1 box, Rfl: 0    varenicline (CHANTIX CONTINUING MONTH BRI) 1 MG tablet, Take 1 tablet by mouth 2 times daily, Disp: 60 tablet, Rfl: 1    ASPIRIN LOW DOSE 81 MG EC tablet, , Disp: , Rfl:     torsemide (DEMADEX) 20 MG tablet, Take 1 tablet by mouth daily as needed (shortness of breath/wheezing), Disp: 30 tablet, Rfl: 11    fluticasone-salmeterol (ADVAIR) 250-50 MCG/DOSE AEPB, INHALE ONE PUFF BY MOUTH EVERY 12 HOURS, Disp: 60 each, Rfl: 2    atorvastatin (LIPITOR) 20 MG tablet, Take 1 tablet by mouth daily, Disp: 90 tablet, Rfl: 3    albuterol sulfate HFA (PROAIR HFA) 108 (90 Base) MCG/ACT inhaler, Inhale 2 puffs into the lungs every 6 hours as needed for Wheezing or Shortness of Breath, Disp: 1 Inhaler, Rfl: 5    albuterol-ipratropium (COMBIVENT RESPIMAT)  MCG/ACT AERS inhaler, Inhale 1 puff into the lungs every 6 hours, Disp: 1 Inhaler, Rfl: 5    cloNIDine (CATAPRES) 0.1 MG tablet, Take health including:  Patient Active Problem List   Diagnosis    Osteoarthritis of left knee    Hemoptysis    Essential hypertension    Morbid obesity with body mass index of 50.0-59.9 in adult [facility])    CHF (congestive heart failure) (HCC)    Osteoarthritis of both knees    Hyperlipidemia    Cavitating mass in left lower lung lobe    Cavitary lesion of lung    COPD, mild (HCC)    Chronic diastolic CHF (congestive heart failure) ([facility] [address]    JOSE (obstructive sleep apnea)    Tobacco abuse counseling    Bronchitis    Pneumatocele of lung    Prediabetes    Chronic pain syndrome    Primary osteoarthritis of right hip    Chronic bilateral low back pain without sciatica    Diabetes mellitus type 2 in obese ([facility] [address]    Chronic GERD    and importance of weight loss to alleviate those co morbid conditions. I encouraged the patient to continue exercise and keeping healthy eating habits. Discussed pre-op labs and work up till now. Also counseled the patient extensively on Surgery. Obesity as a disease is considered a high risk to patients overall health and should therefore be considered a high risk disease state. I spent a total of 15 minutes via telemedicine (Video) with the patient and greater than 50% of the time was spent in counseling, answering questions, addressing concerns, reviewing labs and/or other studies with the patient as well as coordinating care. RTC in 4 weeks  Obtain rest of pre-op work up / clearances  Healthy Diet and Exercise   Complete tobacco cessation     Patient advised that its their responsibility to follow up for their care, studies, referrals and/or labs ordered today.        Pursuant to the emergency declaration under the [facility], [address] [place] [address] authority and the [employer] and [employer] Act, this Virtual Visit was conducted, with patient's consent, to reduce the patient's risk of exposure to COVID-19 and provide continuity of care for an established patient. Services were provided through a video synchronous discussion virtually to substitute for in-person clinic visit.

## 2020-07-13 ENCOUNTER — VIRTUAL VISIT (OUTPATIENT)
Dept: PAIN MANAGEMENT | Age: 45
End: 2020-07-13
Payer: MEDICAID

## 2020-07-13 PROCEDURE — 99213 OFFICE O/P EST LOW 20 MIN: CPT | Performed by: NURSE PRACTITIONER

## 2020-07-13 PROCEDURE — G8427 DOCREV CUR MEDS BY ELIG CLIN: HCPCS | Performed by: NURSE PRACTITIONER

## 2020-07-13 RX ORDER — HYDROCODONE BITARTRATE AND ACETAMINOPHEN 5; 325 MG/1; MG/1
1 TABLET ORAL EVERY 8 HOURS PRN
Qty: 90 TABLET | Refills: 0 | Status: SHIPPED | OUTPATIENT
Start: 2020-07-13 | End: 2020-08-10 | Stop reason: SDUPTHER

## 2020-07-13 RX ORDER — BACLOFEN 10 MG/1
10 TABLET ORAL DAILY
Qty: 30 TABLET | Refills: 0 | Status: SHIPPED | OUTPATIENT
Start: 2020-07-13 | End: 2020-08-10 | Stop reason: SDUPTHER

## 2020-07-13 NOTE — PATIENT INSTRUCTIONS
Patient Education        Back Stretches: Exercises  Introduction  Here are some examples of exercises for stretching your back. Start each exercise slowly. Ease off the exercise if you start to have pain. Your doctor or physical therapist will tell you when you can start these exercises and which ones will work best for you. How to do the exercises  Overhead stretch   1. Stand comfortably with your feet shoulder-width apart. 2. Looking straight ahead, raise both arms over your head and reach toward the ceiling. Do not allow your head to tilt back. 3. Hold for 15 to 30 seconds, then lower your arms to your sides. 4. Repeat 2 to 4 times. Side stretch   1. Stand comfortably with your feet shoulder-width apart. 2. Raise one arm over your head, and then lean to the other side. 3. Slide your hand down your leg as you let the weight of your arm gently stretch your side muscles. Hold for 15 to 30 seconds. 4. Repeat 2 to 4 times on each side. Press-up   1. Lie on your stomach, supporting your body with your forearms. 2. Press your elbows down into the floor to raise your upper back. As you do this, relax your stomach muscles and allow your back to arch without using your back muscles. As your press up, do not let your hips or pelvis come off the floor. 3. Hold for 15 to 30 seconds, then relax. 4. Repeat 2 to 4 times. Relax and rest   1. Lie on your back with a rolled towel under your neck and a pillow under your knees. Extend your arms comfortably to your sides. 2. Relax and breathe normally. 3. Remain in this position for about 10 minutes. 4. If you can, do this 2 or 3 times each day. Follow-up care is a key part of your treatment and safety. Be sure to make and go to all appointments, and call your doctor if you are having problems. It's also a good idea to know your test results and keep a list of the medicines you take. Where can you learn more? Go to [email or web address].[email or web address] org and sign in to your [employer] account. Enter L547 in the Sellywhere box to learn more about \"Back Stretches: Exercises. \"     If you do not have an account, please click on the \"Sign Up Now\" link. Current as of: March 2, 2020               Content Version: 12.5  © 9398-0440 Healthwise, Incorporated. Care instructions adapted under license by [facility] ([facility]). If you have questions about a medical condition or this instruction, always ask your healthcare professional. [address] any warranty or liability for your use of this information.

## 2020-07-13 NOTE — PROGRESS NOTES
TELE HEALTH VISIT (AUDIO-VISUAL)    Pursuant to the emergency declaration under the [facility] waiver authority and the [employer] and [employer] Act, this Virtual  Visit was conducted, with patient's consent, to reduce the patient's risk of exposure to COVID-19 and provide continuity of care for an established patient. Service is  provided through a video synchronous discussion virtually to substitute for in-person clinic visit. Due to this being a TeleHealth encounter (During XADLJ-88 public health emergency), evaluation of the following organ systems was limited: Vitals/Constitutional/EENT/Resp/CV/GI//MS/Neuro/Skin/Qdls-Ztft-Uwe. Chevis Lesch  1975  <[id number]>    Ms. Hollis Temple is being seen virtually for a follow up visit using Doxy. me/YuMingle Video visit/[employer] or face time  Informed verbal consent to the virtual visit was obtained from Ms. Hollis Temple. Risks associated with HIPPA compliance with the virtual visit was explained to the patient. Ms. Hollis Temple is at her home and Varun BERTRAND is in her office. HISTORY OF PRESENT ILLNESS:  Ms. Hollis Temple is a 53 y.o. female  being assessed for a follow up visit for pain management for evaluation of ongoing care regarding her symptoms and monitoring of compliance with long term use high risk medications. She has a diagnosis of   1. Chronic pain syndrome    2. Back spasm    3. Primary osteoarthritis of both knees    4. Primary osteoarthritis of right hip    5. Chronic bilateral low back pain without sciatica    6. Morbid obesity with BMI of 60.0-69.9, adult ([facility] [address]    7. Depression, unspecified depression type, mild    8. Primary insomnia    9.  JOSE treated with BiPAP      On the Patients Pain Assessment form reviewed with the Medical Assistant:  She complains of pain in the both knee(s) and bilateral lower back  with radiation to the hips Bilateral . She rates the pain 10/10 and describes it as burning, throbbing, pulling, stabbing. Current treatment regimen has helped relieve about 40% of the pain. She denies any side effects from the current pain regimen. Patient reports that since the last follow up visit the physical functioning is unchanged, family/social relationships are better, mood is unchanged sleep patterns are better, and that the overall functioning is better. Patient denies misusing/abusing her narcotic pain medications or using any illegal drugs. Upon obtaining medical history from Ms. Florence Diaz states that pain is manageable on current pain therapy. Takes pain medications as prescribed. Mood is stable without anxiety. Sleep is fair with an average of 5-6 hours. Denies to having issues of constipation. Tolerating activities/house chores with moderate tenderness to the lower back. Working on smoking cessation. Reports having had a covid-19 test that came back negative. ALLERGIES: Patients list of allergies were reviewed     MEDICATIONS: Ms. Florence Diaz list of medications were reviewed. Her current medications are   Outpatient Medications Prior to Visit   Medication Sig Dispense Refill    lisinopril (PRINIVIL;ZESTRIL) 40 MG tablet Take 1 tablet by mouth daily 90 tablet 3    amitriptyline (ELAVIL) 25 MG tablet TAKE ONE TABLET BY MOUTH ONCE NIGHTLY 30 tablet 1    venlafaxine (EFFEXOR) 25 MG tablet TAKE ONE TABLET BY MOUTH TWICE A DAY 60 tablet 1    metoprolol (LOPRESSOR) 100 MG tablet TAKE ONE TABLET BY MOUTH TWICE A DAY 60 tablet 2    varenicline (CHANTIX STARTING MONTH BRI) 0.5 MG X 11 & 1 MG X 42 tablet Take by mouth.  1 box 0    varenicline (CHANTIX CONTINUING MONTH PAK) 1 MG tablet Take 1 tablet by mouth 2 times daily 60 tablet 1    ASPIRIN LOW DOSE 81 MG EC tablet       torsemide (DEMADEX) 20 MG tablet Take 1 tablet by mouth daily as needed (shortness of breath/wheezing) 30 tablet 11    fluticasone-salmeterol (ADVAIR) 250-50 MCG/DOSE AEPB INHALE mg by mouth daily      metFORMIN (GLUCOPHAGE) 500 MG tablet Take 1 tablet by mouth 2 times daily (with meals) 60 tablet 2    loratadine (CLARITIN) 10 MG tablet Take 10 mg by mouth daily      montelukast (SINGULAIR) 10 MG tablet Take 1 tablet by mouth nightly 30 tablet 3    omeprazole (PRILOSEC) 20 MG delayed release capsule Take 20 mg by mouth daily      acetaminophen (APAP EXTRA STRENGTH) 500 MG tablet Take 2 tablets by mouth every 6 hours as needed for Pain DO NOT TAKE WITH OTHER MEDICATIONS CONTAINING ACETAMINOPHEN. 30 tablet 0    Spacer/Aero-Holding Chambers KIT Please dispense aerochamber spacer 1 kit 0    Cholecalciferol (VITAMIN D3) 30435 UNITS CAPS Take 10,000 Units by mouth three times a week. Takes on Mon Wed Fri       No current facility-administered medications for this visit. I will continue her current medication regimen  which is part of the above treatment schedule. It has been helping with Ms. Caitlin Duncan chronic  medical problems which for this visit include:   Diagnoses of Chronic pain syndrome, Back spasm, Primary osteoarthritis of both knees, Primary osteoarthritis of right hip, Chronic bilateral low back pain without sciatica, Morbid obesity with BMI of 60.0-69.9, adult ([facility] [address] Depression, unspecified depression type, mild, Primary insomnia, and JOSE treated with BiPAP were pertinent to this visit. Risks and benefits of the medications and other alternative treatments  including no treatment were discussed with the patient. The common side effects of these medications were also explained to the patient. Informed verbal consent was obtained. Goals of current treatment regimen include improvement in pain, restoration of functioning- with focus on improvement in physical performance, general activity, work or disability,emotional distress, health care utilization and  decreased medication consumption.  Will continue to monitor progress towards achieving/maintaining therapeutic goals with special emphasis on  1. Improvement in perceived interfernce  of pain with ADL's. Ability to do home exercises independently. Ability to do household chores indoor and/or outdoor work and social and leisure activities. Improve psychosocial and physical functioning. - she is showing progression towards this treatment goal with the current regimen. She was advised against drinking alcohol with the narcotic pain medicines, advised against driving or handling machinery while adjusting the dose of medicines or if having cognitive  issues related to the current medications. Risk of overdose and death, if medicines not taken as prescribed, were also discussed. If the patient develops new symptoms or if the symptoms worsen, the patient should call the office. While transcribing every attempt was made to maintain the accuracy of the note in terms of it's contents,there may have been some errors made inadvertently. Thank you for allowing me to participate in the care of this patient. Anoop Payton.  Corey Ibarra APRN-CNP     Cc: Charito Fernandes MD

## 2020-07-16 ENCOUNTER — HOSPITAL ENCOUNTER (EMERGENCY)
Age: 45
Discharge: HOME | End: 2020-07-16
Payer: MEDICAID

## 2020-07-16 ENCOUNTER — APPOINTMENT (OUTPATIENT)
Dept: CT IMAGING | Age: 45
End: 2020-07-16
Payer: MEDICAID

## 2020-07-16 ENCOUNTER — PATIENT MESSAGE (OUTPATIENT)
Dept: FAMILY MEDICINE CLINIC | Age: 45
End: 2020-07-16

## 2020-07-16 ENCOUNTER — OFFICE VISIT (OUTPATIENT)
Dept: CARDIOLOGY CLINIC | Age: 45
End: 2020-07-16
Payer: MEDICAID

## 2020-07-16 VITALS
TEMPERATURE: 98 F | RESPIRATION RATE: 16 BRPM | OXYGEN SATURATION: 99 % | HEART RATE: 62 BPM | SYSTOLIC BLOOD PRESSURE: 173 MMHG | DIASTOLIC BLOOD PRESSURE: 94 MMHG

## 2020-07-16 VITALS
DIASTOLIC BLOOD PRESSURE: 98 MMHG | TEMPERATURE: 98.1 F | OXYGEN SATURATION: 97 % | HEART RATE: 56 BPM | SYSTOLIC BLOOD PRESSURE: 186 MMHG | BODY MASS INDEX: 50.02 KG/M2 | WEIGHT: 293 LBS | HEIGHT: 64 IN

## 2020-07-16 LAB
A/G RATIO: 1.2 (ref 1.1–2.2)
ALBUMIN SERPL-MCNC: 3.7 G/DL (ref 3.4–5)
ALP BLD-CCNC: 65 U/L (ref 40–129)
ALT SERPL-CCNC: 9 U/L (ref 10–40)
ANION GAP SERPL CALCULATED.3IONS-SCNC: 8 MMOL/L (ref 3–16)
AST SERPL-CCNC: 11 U/L (ref 15–37)
BACTERIA: ABNORMAL /HPF
BASOPHILS ABSOLUTE: 0.1 K/UL (ref 0–0.2)
BASOPHILS RELATIVE PERCENT: 1.2 %
BILIRUB SERPL-MCNC: 0.4 MG/DL (ref 0–1)
BILIRUBIN URINE: NEGATIVE
BLOOD, URINE: ABNORMAL
BUN BLDV-MCNC: 6 MG/DL (ref 7–20)
CALCIUM SERPL-MCNC: 8.8 MG/DL (ref 8.3–10.6)
CHLORIDE BLD-SCNC: 101 MMOL/L (ref 99–110)
CLARITY: ABNORMAL
CO2: 27 MMOL/L (ref 21–32)
COLOR: ABNORMAL
CREAT SERPL-MCNC: 0.8 MG/DL (ref 0.6–1.1)
EOSINOPHILS ABSOLUTE: 0.1 K/UL (ref 0–0.6)
EOSINOPHILS RELATIVE PERCENT: 2.1 %
EPITHELIAL CELLS, UA: 14 /HPF (ref 0–5)
GFR AFRICAN AMERICAN: >60
GFR NON-AFRICAN AMERICAN: >60
GLOBULIN: 3.2 G/DL
GLUCOSE BLD-MCNC: 106 MG/DL (ref 70–99)
GLUCOSE URINE: NEGATIVE MG/DL
HCG(URINE) PREGNANCY TEST: NEGATIVE
HCT VFR BLD CALC: 42.1 % (ref 36–48)
HEMOGLOBIN: 14.1 G/DL (ref 12–16)
HYALINE CASTS: 7 /LPF (ref 0–8)
KETONES, URINE: NEGATIVE MG/DL
LEUKOCYTE ESTERASE, URINE: ABNORMAL
LIPASE: 50 U/L (ref 13–60)
LYMPHOCYTES ABSOLUTE: 2.6 K/UL (ref 1–5.1)
LYMPHOCYTES RELATIVE PERCENT: 41.9 %
MCH RBC QN AUTO: 32.6 PG (ref 26–34)
MCHC RBC AUTO-ENTMCNC: 33.5 G/DL (ref 31–36)
MCV RBC AUTO: 97.3 FL (ref 80–100)
MICROSCOPIC EXAMINATION: YES
MONOCYTES ABSOLUTE: 0.5 K/UL (ref 0–1.3)
MONOCYTES RELATIVE PERCENT: 7.4 %
NEUTROPHILS ABSOLUTE: 2.9 K/UL (ref 1.7–7.7)
NEUTROPHILS RELATIVE PERCENT: 47.4 %
NITRITE, URINE: NEGATIVE
PDW BLD-RTO: 13.1 % (ref 12.4–15.4)
PH UA: 7 (ref 5–8)
PLATELET # BLD: 244 K/UL (ref 135–450)
PMV BLD AUTO: 9.1 FL (ref 5–10.5)
POTASSIUM REFLEX MAGNESIUM: 4.4 MMOL/L (ref 3.5–5.1)
PROTEIN UA: >=300 MG/DL
RBC # BLD: 4.33 M/UL (ref 4–5.2)
RBC UA: 166 /HPF (ref 0–4)
SODIUM BLD-SCNC: 136 MMOL/L (ref 136–145)
SPECIFIC GRAVITY UA: 1.03 (ref 1–1.03)
TOTAL PROTEIN: 6.9 G/DL (ref 6.4–8.2)
URINE REFLEX TO CULTURE: YES
URINE TYPE: ABNORMAL
UROBILINOGEN, URINE: 1 E.U./DL
WBC # BLD: 6.1 K/UL (ref 4–11)
WBC UA: 442 /HPF (ref 0–5)

## 2020-07-16 PROCEDURE — 74176 CT ABD & PELVIS W/O CONTRAST: CPT

## 2020-07-16 PROCEDURE — G8417 CALC BMI ABV UP PARAM F/U: HCPCS | Performed by: INTERNAL MEDICINE

## 2020-07-16 PROCEDURE — G8427 DOCREV CUR MEDS BY ELIG CLIN: HCPCS | Performed by: INTERNAL MEDICINE

## 2020-07-16 PROCEDURE — 87086 URINE CULTURE/COLONY COUNT: CPT

## 2020-07-16 PROCEDURE — 80053 COMPREHEN METABOLIC PANEL: CPT

## 2020-07-16 PROCEDURE — 2580000003 HC RX 258: Performed by: NURSE PRACTITIONER

## 2020-07-16 PROCEDURE — 83690 ASSAY OF LIPASE: CPT

## 2020-07-16 PROCEDURE — 99284 EMERGENCY DEPT VISIT MOD MDM: CPT

## 2020-07-16 PROCEDURE — 99214 OFFICE O/P EST MOD 30 MIN: CPT | Performed by: INTERNAL MEDICINE

## 2020-07-16 PROCEDURE — 4004F PT TOBACCO SCREEN RCVD TLK: CPT | Performed by: INTERNAL MEDICINE

## 2020-07-16 PROCEDURE — 81001 URINALYSIS AUTO W/SCOPE: CPT

## 2020-07-16 PROCEDURE — 85025 COMPLETE CBC W/AUTO DIFF WBC: CPT

## 2020-07-16 PROCEDURE — 84703 CHORIONIC GONADOTROPIN ASSAY: CPT

## 2020-07-16 PROCEDURE — 6360000002 HC RX W HCPCS: Performed by: NURSE PRACTITIONER

## 2020-07-16 PROCEDURE — 6370000000 HC RX 637 (ALT 250 FOR IP): Performed by: NURSE PRACTITIONER

## 2020-07-16 PROCEDURE — 96365 THER/PROPH/DIAG IV INF INIT: CPT

## 2020-07-16 RX ORDER — CEFUROXIME AXETIL 500 MG/1
500 TABLET ORAL 2 TIMES DAILY
Qty: 20 TABLET | Refills: 0 | Status: SHIPPED | OUTPATIENT
Start: 2020-07-16 | End: 2020-07-26

## 2020-07-16 RX ORDER — SPIRONOLACTONE 25 MG/1
25 TABLET ORAL DAILY
Qty: 90 TABLET | Refills: 3 | Status: SHIPPED | OUTPATIENT
Start: 2020-07-16 | End: 2020-07-30

## 2020-07-16 RX ORDER — TORSEMIDE 20 MG/1
20 TABLET ORAL DAILY
Qty: 30 TABLET | Refills: 11 | Status: SHIPPED
Start: 2020-07-16 | End: 2020-07-30

## 2020-07-16 RX ORDER — OXYCODONE HYDROCHLORIDE AND ACETAMINOPHEN 5; 325 MG/1; MG/1
1 TABLET ORAL ONCE
Status: COMPLETED | OUTPATIENT
Start: 2020-07-16 | End: 2020-07-16

## 2020-07-16 RX ORDER — 0.9 % SODIUM CHLORIDE 0.9 %
500 INTRAVENOUS SOLUTION INTRAVENOUS ONCE
Status: COMPLETED | OUTPATIENT
Start: 2020-07-16 | End: 2020-07-16

## 2020-07-16 RX ADMIN — SODIUM CHLORIDE 500 ML: 9 INJECTION, SOLUTION INTRAVENOUS at 15:44

## 2020-07-16 RX ADMIN — OXYCODONE HYDROCHLORIDE AND ACETAMINOPHEN 1 TABLET: 5; 325 TABLET ORAL at 15:43

## 2020-07-16 RX ADMIN — CEFTRIAXONE 1 G: 1 INJECTION, POWDER, FOR SOLUTION INTRAMUSCULAR; INTRAVENOUS at 15:44

## 2020-07-16 ASSESSMENT — PAIN - FUNCTIONAL ASSESSMENT: PAIN_FUNCTIONAL_ASSESSMENT: ACTIVITIES ARE NOT PREVENTED

## 2020-07-16 ASSESSMENT — PAIN DESCRIPTION - ORIENTATION: ORIENTATION: LEFT

## 2020-07-16 ASSESSMENT — PAIN SCALES - GENERAL
PAINLEVEL_OUTOF10: 8
PAINLEVEL_OUTOF10: 3
PAINLEVEL_OUTOF10: 4

## 2020-07-16 ASSESSMENT — PAIN DESCRIPTION - PAIN TYPE: TYPE: ACUTE PAIN

## 2020-07-16 ASSESSMENT — PAIN DESCRIPTION - ONSET: ONSET: ON-GOING

## 2020-07-16 ASSESSMENT — PAIN DESCRIPTION - PROGRESSION: CLINICAL_PROGRESSION: NOT CHANGED

## 2020-07-16 ASSESSMENT — PAIN DESCRIPTION - FREQUENCY: FREQUENCY: CONTINUOUS

## 2020-07-16 ASSESSMENT — PAIN DESCRIPTION - LOCATION: LOCATION: FLANK

## 2020-07-16 ASSESSMENT — PAIN SCALES - WONG BAKER: WONGBAKER_NUMERICALRESPONSE: 4

## 2020-07-16 ASSESSMENT — PAIN DESCRIPTION - DESCRIPTORS: DESCRIPTORS: ACHING;CONSTANT

## 2020-07-16 NOTE — PATIENT INSTRUCTIONS
ibuprofen. Some of these medicines can raise blood pressure. · Learn how to check your blood pressure at home. Lifestyle changes  · Stay at a healthy weight. This is especially important if you put on weight around the waist. Losing even 10 pounds can help you lower your blood pressure. · If your doctor recommends it, get more exercise. Walking is a good choice. Bit by bit, increase the amount you walk every day. Try for at least 30 minutes on most days of the week. You also may want to swim, bike, or do other activities. · Avoid or limit alcohol. Talk to your doctor about whether you can drink any alcohol. · Try to limit how much sodium you eat to less than 2,300 milligrams (mg) a day. Your doctor may ask you to try to eat less than 1,500 mg a day. · Eat plenty of fruits (such as bananas and oranges), vegetables, legumes, whole grains, and low-fat dairy products. · Lower the amount of saturated fat in your diet. Saturated fat is found in animal products such as milk, cheese, and meat. Limiting these foods may help you lose weight and also lower your risk for heart disease. · Do not smoke. Smoking increases your risk for heart attack and stroke. If you need help quitting, talk to your doctor about stop-smoking programs and medicines. These can increase your chances of quitting for good. When should you call for help? Call  911 anytime you think you may need emergency care. This may mean having symptoms that suggest that your blood pressure is causing a serious heart or blood vessel problem. Your blood pressure may be over 180/120. For example, call 911 if:  · You have symptoms of a heart attack. These may include:  ? Chest pain or pressure, or a strange feeling in the chest.  ? Sweating. ? Shortness of breath. ? Nausea or vomiting. ? Pain, pressure, or a strange feeling in the back, neck, jaw, or upper belly or in one or both shoulders or arms. ? Lightheadedness or sudden weakness.   ? A fast or irregular heartbeat. · You have symptoms of a stroke. These may include:  ? Sudden numbness, tingling, weakness, or loss of movement in your face, arm, or leg, especially on only one side of your body. ? Sudden vision changes. ? Sudden trouble speaking. ? Sudden confusion or trouble understanding simple statements. ? Sudden problems with walking or balance. ? A sudden, severe headache that is different from past headaches. · You have severe back or belly pain. Do not wait until your blood pressure comes down on its own. Get help right away. Call your doctor now or seek immediate care if:  · Your blood pressure is much higher than normal (such as 180/120 or higher), but you don't have symptoms. · You think high blood pressure is causing symptoms, such as:  ? Severe headache.  ? Blurry vision. Watch closely for changes in your health, and be sure to contact your doctor if:  · Your blood pressure measures higher than your doctor recommends at least 2 times. That means the top number is higher or the bottom number is higher, or both. · You think you may be having side effects from your blood pressure medicine. Where can you learn more? Go to https://QuesCompepiceweb.inexio. org and sign in to your [employer] account. Enter F819 in the [employer] box to learn more about \"High Blood Pressure: Care Instructions. \"     If you do not have an account, please click on the \"Sign Up Now\" link. Current as of: December 16, 2019               Content Version: 12.5  © 6678-4614 Healthwise, Incorporated. Care instructions adapted under license by [facility] [employer] [facility] ([facility]). If you have questions about a medical condition or this instruction, always ask your healthcare professional. [place] [address] any warranty or liability for your use of this information.          Patient Education        Low Sodium Diet (2,000 Milligram): Care Instructions  Your Care Instructions     Too much sodium causes your body to hold on to extra water. This can raise your blood pressure and force your heart and kidneys to work harder. In very serious cases, this could cause you to be put in the hospital. It might even be life-threatening. By limiting sodium, you will feel better and lower your risk of serious problems. The most common source of sodium is salt. People get most of the salt in their diet from canned, prepared, and packaged foods. Fast food and restaurant meals also are very high in sodium. Your doctor will probably limit your sodium to less than 2,000 milligrams (mg) a day. This limit counts all the sodium in prepared and packaged foods and any salt you add to your food. Follow-up care is a key part of your treatment and safety. Be sure to make and go to all appointments, and call your doctor if you are having problems. It's also a good idea to know your test results and keep a list of the medicines you take. How can you care for yourself at home? Read food labels  · Read labels on cans and food packages. The labels tell you how much sodium is in each serving. Make sure that you look at the serving size. If you eat more than the serving size, you have eaten more sodium. · Food labels also tell you the Percent Daily Value for sodium. Choose products with low Percent Daily Values for sodium. · Be aware that sodium can come in forms other than salt, including monosodium glutamate (MSG), sodium citrate, and sodium bicarbonate (baking soda). MSG is often added to Asian food. When you eat out, you can sometimes ask for food without MSG or added salt. Buy low-sodium foods  · Buy foods that are labeled \"unsalted\" (no salt added), \"sodium-free\" (less than 5 mg of sodium per serving), or \"low-sodium\" (less than 140 mg of sodium per serving). Foods labeled \"reduced-sodium\" and \"light sodium\" may still have too much sodium. Be sure to read the label to see how much sodium you are getting.   · Buy fresh vegetables, or frozen vegetables without added sauces. Buy low-sodium versions of canned vegetables, soups, and other canned goods. Prepare low-sodium meals  · Cut back on the amount of salt you use in cooking. This will help you adjust to the taste. Do not add salt after cooking. One teaspoon of salt has about 2,300 mg of sodium. · Take the salt shaker off the table. · Flavor your food with garlic, lemon juice, onion, vinegar, herbs, and spices. Do not use soy sauce, lite soy sauce, steak sauce, onion salt, garlic salt, celery salt, mustard, or ketchup on your food. · Use low-sodium salad dressings, sauces, and ketchup. Or make your own salad dressings and sauces without adding salt. · Use less salt (or none) when recipes call for it. You can often use half the salt a recipe calls for without losing flavor. Other foods such as rice, pasta, and grains do not need added salt. · Rinse canned vegetables, and cook them in fresh water. This removes some--but not all--of the salt. · Avoid water that is naturally high in sodium or that has been treated with water softeners, which add sodium. Call your local water company to find out the sodium content of your water supply. If you buy bottled water, read the label and choose a sodium-free brand. Avoid high-sodium foods  · Avoid eating:  ? Smoked, cured, salted, and canned meat, fish, and poultry. ? Ham, linda, hot dogs, and luncheon meats. ? Regular, hard, and processed cheese and regular peanut butter. ? Crackers with salted tops, and other salted snack foods such as pretzels, chips, and salted popcorn. ? Frozen prepared meals, unless labeled low-sodium. ? Canned and dried soups, broths, and bouillon, unless labeled sodium-free or low-sodium. ? Canned vegetables, unless labeled sodium-free or low-sodium. ? Western [place]ia fries, pizza, tacos, and other fast foods. ? Pickles, olives, ketchup, and other condiments, especially soy sauce, unless labeled sodium-free or low-sodium.   Where can you learn more? Go to https://chpepiceweb.healthPositronics. org and sign in to your [employer] account. Enter I321 in the [facility] box to learn more about \"Low Sodium Diet (2,000 Milligram): Care Instructions. \"     If you do not have an account, please click on the \"Sign Up Now\" link. Current as of: August 22, 2019               Content Version: 12.5  © 5178-5542 Healthwise, Incorporated. Care instructions adapted under license by [facility] ([facility]). If you have questions about a medical condition or this instruction, always ask your healthcare professional. [address] any warranty or liability for your use of this information.

## 2020-07-17 ENCOUNTER — CARE COORDINATION (OUTPATIENT)
Dept: CARE COORDINATION | Age: 45
End: 2020-07-17

## 2020-07-17 LAB — URINE CULTURE, ROUTINE: NORMAL

## 2020-07-17 ASSESSMENT — ENCOUNTER SYMPTOMS
DIARRHEA: 0
CONSTIPATION: 0
BACK PAIN: 1
VOMITING: 0
NAUSEA: 1
RESPIRATORY NEGATIVE: 1

## 2020-07-17 NOTE — CARE COORDINATION
all cruise travel and non-essential air travel.  Call your healthcare professional if you have concerns about COVID-19 and your underlying condition or if you are sick. For more information on steps you can take to protect yourself, see CDC's How to Protect Yourself    Pt will be further monitored by COVID Loop Team based on severity of symptoms and risk factors.

## 2020-07-17 NOTE — ED PROVIDER NOTES
[address]  [address] [address]  Dept: [address]: [phone]  eMERGENCYdEPARTMENT eNCOUnter      Pt Name: Caroline Burden  MRN: [id number]  Dayogfqamar 1975  Date of evaluation: 7/16/2020  Provider:GILLIAN Mancilla CNP     Evaluated by the advanced practice provider    [address] [place] [address]       Chief Complaint   Patient presents with    Flank Pain     left with bood in urine       CRITICAL CARE TIME       HISTORY OF PRESENT ILLNESS  (Location/Symptom, Timing/Onset, Context/Setting, Quality, Duration,Modifying Factors, Severity.)   Caroline Burden is a 53 y.o. female who presents to the emergency department complaining of dark urine, left lower back aching for approximately 1 to 2 weeks. Noticed some mild discomfort during urination. Pain is slightly starting to radiate towards the left lower abdomen. Denies fever. Denies chills. Pain medication that she is taking for her osteoarthritis is no longer helping. She thought maybe she just had a strained back muscle but it just seemed to be more progressive in the last couple of days. She has had some mild nausea without food intolerance. Denies trauma. Denies falling. She also notes that she noticed some streaking of blood in urine yesterday and then a little bit more this morning. History of partial hysterectomy: Uterus, prior tubal ligation. Still has ovaries and tubes    Nursing Notes were reviewedand agreed with or any disagreements were addressed in the HPI. REVIEW OF SYSTEMS    (2-9 systems for level 4, 10 or more for level 5)     Review of Systems   Constitutional: Negative. HENT: Negative. Respiratory: Negative. Cardiovascular: Negative. Gastrointestinal: Positive for nausea. Negative for constipation, diarrhea and vomiting. Genitourinary: Positive for dysuria, flank pain and hematuria.    Musculoskeletal: Positive for arthralgias and back pain. Skin: Negative. Neurological: Negative. Hematological: Negative. Except as noted above the remainder of the review of systems was reviewed and negative. PAST MEDICAL HISTORY         Diagnosis Date    Anxiety     Arthritis     Asthma     Back pain     CHF (congestive heart failure) (HCC)     COPD (chronic obstructive pulmonary disease) (HCC)     Depression     Diabetes mellitus ([facility] [address]     GERD (gastroesophageal reflux disease)     Hyperlipidemia     Hypertension     Obesity     Sleep apnea        SURGICAL HISTORY           Procedure Laterality Date    CHOLECYSTECTOMY      HYSTERECTOMY      SKIN BIOPSY      TUBAL LIGATION      UPPER GASTROINTESTINAL ENDOSCOPY N/A 10/14/2019    EGD BIOPSY performed by Areli Parsons DO at [address]     [unfilled]    ALLERGIES     Latex    FAMILY HISTORY     No family history on file. Family Status   Relation Name Status    Mother      Father  Alive        SOCIAL HISTORY      reports that she has been smoking cigarettes. She started smoking about 35 years ago. She has a 5.00 pack-year smoking history. She has quit using smokeless tobacco. She reports current alcohol use of about 1.0 standard drinks of alcohol per week. She reports that she does not use drugs. PHYSICAL EXAM    (up to 7 for level 4, 8 or more for level 5)     ED Triage Vitals [20 1204]   Enc Vitals Group      BP (!) 155/102      Pulse 62      Resp 16      Temp 98 °F (36.7 °C)      Temp Source Oral      SpO2 99 %      Weight       Height       Head Circumference       Peak Flow       Pain Score       Pain Loc       Pain Edu? Excl. in [address]? Physical Exam  Vitals signs and nursing note reviewed. Constitutional:       General: She is awake. She is not in acute distress. Appearance: Normal appearance. She is well-developed. She is morbidly obese.  She is not toxic-appearing or LABS:  Labs Reviewed   COMPREHENSIVE METABOLIC PANEL W/ REFLEX TO MG FOR LOW K - Abnormal; Notable for the following components:       Result Value    Glucose 106 (*)     BUN 6 (*)     ALT 9 (*)     AST 11 (*)     All other components within normal limits    Narrative:     Performed at:  [facility]  [address] [facility] [address] [employer] [address]   Phone [phone]   URINE RT REFLEX TO CULTURE - Abnormal; Notable for the following components:    Clarity, UA CLOUDY (*)     Blood, Urine LARGE (*)     Protein, UA >=300 (*)     Leukocyte Esterase, Urine MODERATE (*)     All other components within normal limits    Narrative:     Performed at:  [facility]  [address] [facility] [address] [place] [address]   Phone [phone]   MICROSCOPIC URINALYSIS - Abnormal; Notable for the following components:    Bacteria, UA RARE (*)     WBC,  (*)     RBC,  (*)     Epithelial Cells, UA 14 (*)     All other components within normal limits    Narrative:     Performed at:  [facility]  [address] [facility] [address] [address]   Phone [phone]   CULTURE, URINE    Narrative:     ORDER#: [id number]                          ORDERED BY: MELISSA BRANDON  SOURCE: Urine Clean Catch                  COLLECTED:  07/16/20 12:30  ANTIBIOTICS AT [place].:                      RECEIVED :  07/16/20 12:57  Performed at:  [facility]  [address] [facility] [address] [address]   Phone [phone]   CBC WITH AUTO DIFFERENTIAL    Narrative:     Performed at:  [facility]  [address] [facility] [address] [address]   Phone [phone]   LIPASE    Narrative:     Performed at:  [facility]  [address] [facility] [address] [address]   Phone [phone]   PREGNANCY, URINE    Narrative:     Performed at:  [facility]  [address] [place] [address]   Phone [phone]       All other labs were within normal range or not returned as of this dictation. EMERGENCY DEPARTMENT COURSE and DIFFERENTIAL DIAGNOSIS/MDM:   Vitals:    Vitals:    07/16/20 1204 07/16/20 1545 07/16/20 1602   BP: (!) 155/102 (!) 165/98 (!) 173/94   Pulse: 62     Resp: 16     Temp: 98 °F (36.7 °C)     TempSrc: Oral     SpO2: 99%       Medications   0.9 % sodium chloride bolus (0 mLs Intravenous Stopped 7/16/20 1623)   cefTRIAXone (ROCEPHIN) 1 g IVPB in 50 mL D5W minibag (0 g Intravenous Stopped 7/16/20 1614)   oxyCODONE-acetaminophen (PERCOCET) 5-325 MG per tablet 1 tablet (1 tablet Oral Given 7/16/20 1543)       MDM   Patient was seen and evaluated per myself. Dr. Maxwlel Whalen was present available for consultation as needed. Differential diagnosis: Renal calculus/obstruction, pyelonephritis, UTI, hemorrhagic cystitis, sepsis, renal failure, musculoskeletal back pain    Patient has a history of hypertension and her blood pressure is elevated here in the emergency department. She is afebrile hemodynamically stable does not appear to be in acute distress. Skin is warm and dry. No evidence of shock. Left CVA tenderness without evidence of an acute abdomen. CBC is negative for leukocytosis. Metabolic panel is negative for metabolic acidosis. BUN 6, creatinine and GFR within normal limits. Glucose stable at 106. No electrolyte derangement. Urinalysis: Cloudy, large blood and moderate leukocytes protein greater than 300  Urine pregnancy negative  Microscopic urine: White blood cells 442, RBCs 166  Urine culture will be processed    CT of the abdomen and pelvis negative for evidence of free air or free fluid. No evidence of hydronephrosis. No evidence of retroperitoneal hemorrhage. No vascular phenomenon. No evidence of perinephric stranding. No evidence of renal calculus or hydronephrosis.     Patient was treated with tablet,ConnXusPrint             (Please note that portions of this note were completed with a voice recognition program.  Efforts were made to edit the dictations but occasionally words are mis-transcribed.)    GILLIAN Archer - GILLIAN Harrison CNP  07/17/20 7199

## 2020-07-20 RX ORDER — BACLOFEN 10 MG/1
TABLET ORAL
Qty: 30 TABLET | Refills: 0 | OUTPATIENT
Start: 2020-07-20

## 2020-07-22 DIAGNOSIS — I50.32 CHRONIC DIASTOLIC HEART FAILURE (HCC): ICD-10-CM

## 2020-07-22 DIAGNOSIS — I10 ESSENTIAL HYPERTENSION: ICD-10-CM

## 2020-07-22 LAB
ALBUMIN SERPL-MCNC: 4.1 G/DL (ref 3.4–5)
ANION GAP SERPL CALCULATED.3IONS-SCNC: 12 MMOL/L (ref 3–16)
BUN BLDV-MCNC: 8 MG/DL (ref 7–20)
CALCIUM SERPL-MCNC: 9.1 MG/DL (ref 8.3–10.6)
CHLORIDE BLD-SCNC: 102 MMOL/L (ref 99–110)
CO2: 25 MMOL/L (ref 21–32)
CREAT SERPL-MCNC: 0.8 MG/DL (ref 0.6–1.1)
GFR AFRICAN AMERICAN: >60
GFR NON-AFRICAN AMERICAN: >60
GLUCOSE BLD-MCNC: 105 MG/DL (ref 70–99)
PHOSPHORUS: 3.2 MG/DL (ref 2.5–4.9)
POTASSIUM SERPL-SCNC: 4.7 MMOL/L (ref 3.5–5.1)
SODIUM BLD-SCNC: 139 MMOL/L (ref 136–145)

## 2020-07-24 ENCOUNTER — TELEMEDICINE (OUTPATIENT)
Dept: FAMILY MEDICINE CLINIC | Age: 45
End: 2020-07-24
Payer: MEDICAID

## 2020-07-24 PROCEDURE — G8427 DOCREV CUR MEDS BY ELIG CLIN: HCPCS | Performed by: FAMILY MEDICINE

## 2020-07-24 PROCEDURE — 99214 OFFICE O/P EST MOD 30 MIN: CPT | Performed by: FAMILY MEDICINE

## 2020-07-24 NOTE — PROGRESS NOTES
7/24/2020    This is a 53 y.o. female   Chief Complaint   Patient presents with   [facility] ED Follow-up     lower back pain left side. 7/16 rosalva Dorado concerned about kidneys     HPI   Pt following up after ERR visit  - she reports having lower back pain on her L side that was worsening earlier this month. She then noted blood in the urine starting around 7/10, her urine was very dark. SHe then went to the ER on 7/16 where she had a urinalysis notable for high WBC and RBCs and protein  - urine culture was negative, and CT abdomen and pelvis had no acute findings  - she reports her urine is now more clear and she has been drinking a lot more water  - her back pain is still present but has improved some. Doesn't keep her from doing things  - she denies having fever or chills  - no new medications that were started in recent past before this started    - she feels she is holding onto more fluid. She does have a hx of CHF. She denies SOB  - She was recently started on spironolactone just a few days ago    She notes her BP has been elevated more over the past few months, rarely to never within normal range    She smokes about 5 cigarettes per day  - she reports her breathing has been fine. She is on Advair daily and has Proair PRN.  She follows with Pulm    Review of Systems   As per HPI, otherwise negative    Past Medical History:   Diagnosis Date    Anxiety     Arthritis     Asthma     Back pain     CHF (congestive heart failure) (HCC)     [facility]ch[facility]     Depression     Diabetes mellitus ([facility]     GERD (gastroesophageal reflux disease)     Hyperlipidemia     Hypertension     Obesity     Sleep apnea        Past Surgical History:   Procedure Laterality Date    CHOLECYSTECTOMY  1997    HYSTERECTOMY  2008    SKIN BIOPSY  2015    TUBAL LIGATION      UPPER GASTROINTESTINAL ENDOSCOPY N/A 10/14/2019    EGD BIOPSY performed by Magdiel Curran DO at [facility]       No family history on file. Current Outpatient Medications   Medication Sig Dispense Refill    torsemide (DEMADEX) 20 MG tablet Take 1 tablet by mouth daily 30 tablet 11    spironolactone (ALDACTONE) 25 MG tablet Take 1 tablet by mouth daily 90 tablet 3    cefUROXime (CEFTIN) 500 MG tablet Take 1 tablet by mouth 2 times daily for 10 days 20 tablet 0    HYDROcodone-acetaminophen (NORCO) 5-325 MG per tablet Take 1 tablet by mouth every 8 hours as needed for Pain for up to 30 days.  90 tablet 0    baclofen (LIORESAL) 10 MG tablet Take 1 tablet by mouth daily 30 tablet 0    lisinopril (PRINIVIL;ZESTRIL) 40 MG tablet Take 1 tablet by mouth daily 90 tablet 3    amitriptyline (ELAVIL) 25 MG tablet TAKE ONE TABLET BY MOUTH ONCE NIGHTLY 30 tablet 1    venlafaxine (EFFEXOR) 25 MG tablet TAKE ONE TABLET BY MOUTH TWICE A DAY 60 tablet 1    metoprolol (LOPRESSOR) 100 MG tablet TAKE ONE TABLET BY MOUTH TWICE A DAY 60 tablet 2    fluticasone-salmeterol (ADVAIR) 250-50 MCG/DOSE AEPB INHALE ONE PUFF BY MOUTH EVERY 12 HOURS 60 each 2    atorvastatin (LIPITOR) 20 MG tablet Take 1 tablet by mouth daily 90 tablet 3    albuterol sulfate HFA (PROAIR HFA) 108 (90 Base) MCG/ACT inhaler Inhale 2 puffs into the lungs every 6 hours as needed for Wheezing or Shortness of Breath 1 Inhaler 5    albuterol-ipratropium (COMBIVENT RESPIMAT)  MCG/ACT AERS inhaler Inhale 1 puff into the lungs every 6 hours 1 Inhaler 5    cloNIDine (CATAPRES) 0.1 MG tablet Take 0.3 mg by mouth three times daily       GARCINIA CAMBOGIA-CHROMIUM PO Take 1,600 mg by mouth daily      metFORMIN (GLUCOPHAGE) 500 MG tablet Take 1 tablet by mouth 2 times daily (with meals) 60 tablet 2    loratadine (CLARITIN) 10 MG tablet Take 10 mg by mouth daily      montelukast (SINGULAIR) 10 MG tablet Take 1 tablet by mouth nightly 30 tablet 3    omeprazole (PRILOSEC) 20 MG delayed release capsule Take 20 mg by mouth daily      acetaminophen (APAP EXTRA STRENGTH) 500 MG tablet Take 2 tablets by mouth every 6 hours as needed for Pain DO NOT TAKE WITH OTHER MEDICATIONS CONTAINING ACETAMINOPHEN. 30 tablet 0    Spacer/Aero-Holding Chambers KIT Please dispense aerochamber spacer 1 kit 0    Cholecalciferol (VITAMIN D3) 23059 UNITS CAPS Take 10,000 Units by mouth three times a week. Takes on Mon Wed Fri       No current facility-administered medications for this visit. There were no vitals taken for this visit. Physical Exam  Constitutional:       Appearance: She is well-developed. HENT:      Head: Normocephalic and atraumatic. Pulmonary:      Effort: Pulmonary effort is normal.   Skin:     Coloration: Skin is not pale. Neurological:      Mental Status: She is alert and oriented to person, place, and time. Wt Readings from Last 3 Encounters:   07/16/20 (!) 346 lb 6.4 oz (157.1 kg)   05/04/20 (!) 343 lb (155.6 kg)   03/19/20 (!) 350 lb 6.4 oz (158.9 kg)       BP Readings from Last 3 Encounters:   07/16/20 (!) 173/94   07/16/20 (!) 186/98   03/19/20 (!) 169/88         Assessment/Plan:  1. Gross hematuria  - [facility] ([facility]) -Shanna Encarnacion MD, Nephrology, [facility]    2. Proteinuria, unspecified type  - [facility] ([facility]) -Shanna Encarnacion MD, Nephrology, [facility]    3. Abnormal urinalysis  - [facility] ([facility]) -Shanna Encarnacion MD, Nephrology, [facility]    4. COPD, mild ([address]  Well controlled, no issues    Pt with gross hematuria and proteinuria. Normal CT scan and urine culture negative. Concern for possible glomerulonephritis or other acute renal issue. Her Creatinine is normal. Will get her in with Nephrology. Advised if having worsening swelling or feeling ill to call this weekend. If no etiology is  Found we can consider Urology referral for cystoscopy given her smoking history, but the amount of proteinuria on the U/A points to a different etiology at this time.     Recently started on spironolactone but this was after hematuria had already started. Continue to monitor BP and may need to increase this. Eliana Albright is a 53 y.o. female being evaluated by a Virtual Visit (video visit) encounter to address concerns as mentioned above. A caregiver was present when appropriate. Due to this being a TeleHealth encounter ([facility] public health emergency), evaluation of the following organ systems was limited: Vitals/Constitutional/EENT/Resp/CV/GI//MS/Neuro/Skin/Heme-Lymph-Imm. Pursuant to the emergency declaration under the [facility], [address] [place] [address] authority and the [employer] Act, this Virtual Visit was conducted with patient's (and/or legal guardian's) consent, to reduce the patient's risk of exposure to COVID-19 and provide necessary medical care. The patient (and/or legal guardian) has also been advised to contact this office for worsening conditions or problems, and seek emergency medical treatment and/or call 911 if deemed necessary. Patient identification was verified at the start of the visit: yes    Total time spent for this encounter: not billed on time    Services were provided through a video synchronous discussion virtually to substitute for in-person clinic visit. Patient and provider were located at their individual homes. --Lucy Thomson MD on 7/24/2020 at 2:42 PM    An electronic signature was used to authenticate this note.

## 2020-07-24 NOTE — Clinical Note
Please call to set up appointment for patient with nephrology. This is semi-urgent so early next week would be ideal with their first available provider. Thanks.

## 2020-07-26 ENCOUNTER — CARE COORDINATION (OUTPATIENT)
Dept: CARE COORDINATION | Age: 45
End: 2020-07-26

## 2020-07-26 NOTE — CARE COORDINATION
Comment alert noted in Loop remote symptom monitoring program. Messaged patient to notify Briana Brandie if symptoms have worsened since yesterday. [place], 10:13 AM  \"My back is hurting me\"    Message sent to patient:   Samantha Adam Morning and thank you for checking in with us this morning! Sorry to hear you are having back pain. I would recommend calling your PCP tomorrow for follow up and continue your medications prescribed by pain management. If pain is increased/severe, you could call the on call physician for your PCP to get recommendations.  Luis Molina RN

## 2020-08-03 DIAGNOSIS — R80.9 PROTEINURIA, UNSPECIFIED TYPE: ICD-10-CM

## 2020-08-03 LAB
ANION GAP SERPL CALCULATED.3IONS-SCNC: 13 MMOL/L (ref 3–16)
BILIRUBIN URINE: NEGATIVE
BLOOD, URINE: NEGATIVE
BUN BLDV-MCNC: 7 MG/DL (ref 7–20)
CALCIUM SERPL-MCNC: 9.2 MG/DL (ref 8.3–10.6)
CHLORIDE BLD-SCNC: 101 MMOL/L (ref 99–110)
CLARITY: CLEAR
CO2: 26 MMOL/L (ref 21–32)
COLOR: YELLOW
CREAT SERPL-MCNC: 0.8 MG/DL (ref 0.6–1.1)
CREATININE URINE: 215.9 MG/DL (ref 28–259)
EPITHELIAL CELLS, UA: 5 /HPF (ref 0–5)
GFR AFRICAN AMERICAN: >60
GFR NON-AFRICAN AMERICAN: >60
GLUCOSE BLD-MCNC: 94 MG/DL (ref 70–99)
GLUCOSE URINE: NEGATIVE MG/DL
HCT VFR BLD CALC: 41.5 % (ref 36–48)
HEMOGLOBIN: 14 G/DL (ref 12–16)
HYALINE CASTS: 2 /LPF (ref 0–8)
KETONES, URINE: NEGATIVE MG/DL
LEUKOCYTE ESTERASE, URINE: NEGATIVE
MCH RBC QN AUTO: 32.8 PG (ref 26–34)
MCHC RBC AUTO-ENTMCNC: 33.7 G/DL (ref 31–36)
MCV RBC AUTO: 97.3 FL (ref 80–100)
MICROSCOPIC EXAMINATION: NORMAL
NITRITE, URINE: NEGATIVE
PARATHYROID HORMONE INTACT: 47.7 PG/ML (ref 14–72)
PDW BLD-RTO: 13.8 % (ref 12.4–15.4)
PH UA: 6 (ref 5–8)
PLATELET # BLD: 233 K/UL (ref 135–450)
PMV BLD AUTO: 9.5 FL (ref 5–10.5)
POTASSIUM SERPL-SCNC: 4.8 MMOL/L (ref 3.5–5.1)
PROTEIN PROTEIN: 10 MG/DL
PROTEIN UA: NEGATIVE MG/DL
PROTEIN/CREAT RATIO: 0 MG/DL
RBC # BLD: 4.26 M/UL (ref 4–5.2)
RBC UA: 2 /HPF (ref 0–4)
SODIUM BLD-SCNC: 140 MMOL/L (ref 136–145)
SPECIFIC GRAVITY UA: 1.02 (ref 1–1.03)
URINE TYPE: NORMAL
UROBILINOGEN, URINE: 0.2 E.U./DL
VITAMIN D 25-HYDROXY: 41.4 NG/ML
WBC # BLD: 5.3 K/UL (ref 4–11)
WBC UA: 1 /HPF (ref 0–5)

## 2020-08-10 ENCOUNTER — VIRTUAL VISIT (OUTPATIENT)
Dept: PAIN MANAGEMENT | Age: 45
End: 2020-08-10
Payer: MEDICAID

## 2020-08-10 PROCEDURE — 99213 OFFICE O/P EST LOW 20 MIN: CPT | Performed by: NURSE PRACTITIONER

## 2020-08-10 PROCEDURE — G8427 DOCREV CUR MEDS BY ELIG CLIN: HCPCS | Performed by: NURSE PRACTITIONER

## 2020-08-10 RX ORDER — AMITRIPTYLINE HYDROCHLORIDE 25 MG/1
TABLET, FILM COATED ORAL
Qty: 30 TABLET | Refills: 1 | Status: SHIPPED | OUTPATIENT
Start: 2020-08-10 | End: 2020-10-07 | Stop reason: SDUPTHER

## 2020-08-10 RX ORDER — HYDROCODONE BITARTRATE AND ACETAMINOPHEN 5; 325 MG/1; MG/1
1 TABLET ORAL EVERY 8 HOURS PRN
Qty: 90 TABLET | Refills: 0 | Status: SHIPPED | OUTPATIENT
Start: 2020-08-10 | End: 2020-09-09 | Stop reason: SDUPTHER

## 2020-08-10 RX ORDER — BACLOFEN 10 MG/1
10 TABLET ORAL DAILY
Qty: 30 TABLET | Refills: 0 | Status: SHIPPED | OUTPATIENT
Start: 2020-08-10 | End: 2020-09-09 | Stop reason: SDUPTHER

## 2020-08-10 RX ORDER — VENLAFAXINE 25 MG/1
TABLET ORAL
Qty: 60 TABLET | Refills: 1 | Status: SHIPPED | OUTPATIENT
Start: 2020-08-10 | End: 2020-10-07 | Stop reason: SDUPTHER

## 2020-08-10 NOTE — PATIENT INSTRUCTIONS
Patient Education        Back Stretches: Exercises  Introduction  Here are some examples of exercises for stretching your back. Start each exercise slowly. Ease off the exercise if you start to have pain. Your doctor or physical therapist will tell you when you can start these exercises and which ones will work best for you. How to do the exercises  Overhead stretch   1. Stand comfortably with your feet shoulder-width apart. 2. Looking straight ahead, raise both arms over your head and reach toward the ceiling. Do not allow your head to tilt back. 3. Hold for 15 to 30 seconds, then lower your arms to your sides. 4. Repeat 2 to 4 times. Side stretch   1. Stand comfortably with your feet shoulder-width apart. 2. Raise one arm over your head, and then lean to the other side. 3. Slide your hand down your leg as you let the weight of your arm gently stretch your side muscles. Hold for 15 to 30 seconds. 4. Repeat 2 to 4 times on each side. Press-up   1. Lie on your stomach, supporting your body with your forearms. 2. Press your elbows down into the floor to raise your upper back. As you do this, relax your stomach muscles and allow your back to arch without using your back muscles. As your press up, do not let your hips or pelvis come off the floor. 3. Hold for 15 to 30 seconds, then relax. 4. Repeat 2 to 4 times. Relax and rest   1. Lie on your back with a rolled towel under your neck and a pillow under your knees. Extend your arms comfortably to your sides. 2. Relax and breathe normally. 3. Remain in this position for about 10 minutes. 4. If you can, do this 2 or 3 times each day. Follow-up care is a key part of your treatment and safety. Be sure to make and go to all appointments, and call your doctor if you are having problems. It's also a good idea to know your test results and keep a list of the medicines you take. Where can you learn more? Go to [email or web address] org and sign in to your [employer] account. Enter K134 in the Qudini box to learn more about \"Back Stretches: Exercises. \"     If you do not have an account, please click on the \"Sign Up Now\" link. Current as of: March 2, 2020               Content Version: 12.5  © 8618-5742 Healthwise, Incorporated. Care instructions adapted under license by [facility] ([facility]). If you have questions about a medical condition or this instruction, always ask your healthcare professional. [address] any warranty or liability for your use of this information.

## 2020-08-10 NOTE — PROGRESS NOTES
pain 10/10 and describes it as pulling, aching, stabbing. Current treatment regimen has helped relieve about 70% of the pain. She denies any side effects from the current pain regimen. Patient reports that since the last follow up visit the physical functioning is unchanged, family/social relationships are unchanged, mood is unchanged sleep patterns are unchanged, and that the overall functioning is better. Patient denies misusing/abusing her narcotic pain medications or using any illegal drugs. Upon obtaining medical history from Ms. Love Guy states that pain is manageable on current pain therapy. Take pain medications as prescribed. Reports that the pain medications do not seem to last long enough. Still working with bariatrics with hopes for weight loss surgery. Mood is stable without anxiety. Sleep is fair with an average of 5-6 hours. Denies to having issues of constipation. Tolerating activities/house chores with moderate tenderness to the lower back. ALLERGIES: Patients list of allergies were reviewed     MEDICATIONS: Ms. Love Guy list of medications were reviewed. Her current medications are   Outpatient Medications Prior to Visit   Medication Sig Dispense Refill    spironolactone (ALDACTONE) 25 MG tablet Take 1 tablet by mouth 2 times daily 60 tablet 0    torsemide (DEMADEX) 20 MG tablet Take 2 tablets by mouth daily 60 tablet 11    lisinopril (PRINIVIL;ZESTRIL) 40 MG tablet Take 1 tablet by mouth daily 90 tablet 3    metoprolol (LOPRESSOR) 100 MG tablet TAKE ONE TABLET BY MOUTH TWICE A DAY 60 tablet 2    fluticasone-salmeterol (ADVAIR) 250-50 MCG/DOSE AEPB INHALE ONE PUFF BY MOUTH EVERY 12 HOURS 60 each 2    atorvastatin (LIPITOR) 20 MG tablet Take 1 tablet by mouth daily 90 tablet 3    albuterol sulfate HFA (PROAIR HFA) 108 (90 Base) MCG/ACT inhaler Inhale 2 puffs into the lungs every 6 hours as needed for Wheezing or Shortness of Breath 1 Inhaler 5    albuterol-ipratropium (COMBIVENT RESPIMAT)  MCG/ACT AERS inhaler Inhale 1 puff into the lungs every 6 hours 1 Inhaler 5    cloNIDine (CATAPRES) 0.1 MG tablet Take 0.3 mg by mouth three times daily       GARCINIA CAMBOGIA-CHROMIUM PO Take 1,600 mg by mouth daily      metFORMIN (GLUCOPHAGE) 500 MG tablet Take 1 tablet by mouth 2 times daily (with meals) 60 tablet 2    loratadine (CLARITIN) 10 MG tablet Take 10 mg by mouth daily      montelukast (SINGULAIR) 10 MG tablet Take 1 tablet by mouth nightly 30 tablet 3    omeprazole (PRILOSEC) 20 MG delayed release capsule Take 20 mg by mouth daily      acetaminophen (APAP EXTRA STRENGTH) 500 MG tablet Take 2 tablets by mouth every 6 hours as needed for Pain DO NOT TAKE WITH OTHER MEDICATIONS CONTAINING ACETAMINOPHEN. 30 tablet 0    Spacer/Aero-Holding Chambers KIT Please dispense aerochamber spacer 1 kit 0    Cholecalciferol (VITAMIN D3) 53795 UNITS CAPS Take 10,000 Units by mouth three times a week. Takes on Mon Wed Fri      HYDROcodone-acetaminophen (NORCO) 5-325 MG per tablet Take 1 tablet by mouth every 8 hours as needed for Pain for up to 30 days. 90 tablet 0    baclofen (LIORESAL) 10 MG tablet Take 1 tablet by mouth daily 30 tablet 0    amitriptyline (ELAVIL) 25 MG tablet TAKE ONE TABLET BY MOUTH ONCE NIGHTLY 30 tablet 1    venlafaxine (EFFEXOR) 25 MG tablet TAKE ONE TABLET BY MOUTH TWICE A DAY 60 tablet 1     No facility-administered medications prior to visit. SOCIAL/FAMILY/PAST MEDICAL HISTORY: Ms. Gerber Sides, family and past medical history was reviewed. REVIEW OF SYSTEMS:    Respiratory: Negative for apnea, chest tightness and shortness of breath or change in baseline breathing. Gastrointestinal: Negative for nausea, vomiting, abdominal pain, diarrhea, constipation, blood in stool and abdominal distention. PHYSICAL EXAM:   Nursing note and vitals reviewed. LMP  (LMP Unknown)  as per patient  Constitutional: She appears well-developed and well-nourished. No acute distress. Skin: Skin appears to be warm and dry. No rashes or any other marks noted. She is not diaphoretic. Neurological/Psychiatric:She is alert and oriented to person, place, and time. Coordination is  normal.  Her mood isAppropriate and affect is Neutral/Euthymic(normal). Her behavior is normal.   thought content normal.   Musculoskeletal / Extremities: Gait is normal, assistive devices use: cane. IMPRESSION:   1. Chronic pain syndrome    2. Back spasm    3. Primary osteoarthritis of both knees    4. Primary osteoarthritis of right hip    5. Chronic bilateral low back pain without sciatica    6. Morbid obesity with BMI of 60.0-69.9, adult ([facility] [address]    7. Depression, unspecified depression type, mild    8. Primary insomnia    9. JOSE treated with BiPAP        PLAN:  Informed verbal consent regarding treatment was obtained  -Continue with Norco, Elavil, Effexor, Lidocaine, Baclofen  -Aquatic PT  -Adjustment in dose will be considered when patient confirms being in therapy  -CBT techniques- relaxation therapies such as biofeedback, mindfulness based stress reduction, imagery, cognitive restructuring, problem solving discussed with patient  -She was advised weight reduction, diet changes- 800-1200 fior diet, diet diary, exercising, nutritional  consult increased physical activity as tolerated. Currently under the care of beriatrics  -Advised patient to quit smoking for  health related concerns and to improve the treatment outcomes. Education was given on quitting smoking and the use of different modalities including medications, hypnotherapy, counselling  and biofeedback. These were discussed with patient.  -Last UDS 2/23/20 Consistent  -Return in about 4 weeks (around 9/7/2020). Current Outpatient Medications   Medication Sig Dispense Refill    HYDROcodone-acetaminophen (NORCO) 5-325 MG per tablet Take 1 tablet by mouth every 8 hours as needed for Pain for up to 30 days.  90 tablet 0    baclofen (LIORESAL) 10 MG tablet Take 1 tablet by mouth daily 30 tablet 0    amitriptyline (ELAVIL) 25 MG tablet TAKE ONE TABLET BY MOUTH ONCE NIGHTLY 30 tablet 1    venlafaxine (EFFEXOR) 25 MG tablet TAKE ONE TABLET BY MOUTH TWICE A DAY 60 tablet 1    spironolactone (ALDACTONE) 25 MG tablet Take 1 tablet by mouth 2 times daily 60 tablet 0    torsemide (DEMADEX) 20 MG tablet Take 2 tablets by mouth daily 60 tablet 11    lisinopril (PRINIVIL;ZESTRIL) 40 MG tablet Take 1 tablet by mouth daily 90 tablet 3    metoprolol (LOPRESSOR) 100 MG tablet TAKE ONE TABLET BY MOUTH TWICE A DAY 60 tablet 2    fluticasone-salmeterol (ADVAIR) 250-50 MCG/DOSE AEPB INHALE ONE PUFF BY MOUTH EVERY 12 HOURS 60 each 2    atorvastatin (LIPITOR) 20 MG tablet Take 1 tablet by mouth daily 90 tablet 3    albuterol sulfate HFA (PROAIR HFA) 108 (90 Base) MCG/ACT inhaler Inhale 2 puffs into the lungs every 6 hours as needed for Wheezing or Shortness of Breath 1 Inhaler 5    albuterol-ipratropium (COMBIVENT RESPIMAT)  MCG/ACT AERS inhaler Inhale 1 puff into the lungs every 6 hours 1 Inhaler 5    cloNIDine (CATAPRES) 0.1 MG tablet Take 0.3 mg by mouth three times daily       GARCINIA CAMBOGIA-CHROMIUM PO Take 1,600 mg by mouth daily      metFORMIN (GLUCOPHAGE) 500 MG tablet Take 1 tablet by mouth 2 times daily (with meals) 60 tablet 2    loratadine (CLARITIN) 10 MG tablet Take 10 mg by mouth daily      montelukast (SINGULAIR) 10 MG tablet Take 1 tablet by mouth nightly 30 tablet 3    omeprazole (PRILOSEC) 20 MG delayed release capsule Take 20 mg by mouth daily      acetaminophen (APAP EXTRA STRENGTH) 500 MG tablet Take 2 tablets by mouth every 6 hours as needed for Pain DO NOT TAKE WITH OTHER MEDICATIONS CONTAINING ACETAMINOPHEN. 30 tablet 0    Spacer/Aero-Holding Chambers KIT Please dispense aerochamber spacer 1 kit 0    Cholecalciferol (VITAMIN D3) 82484 UNITS CAPS Take 10,000 Units by mouth three times a week.  Takes on Mon Wed Fri No current facility-administered medications for this visit. I will continue her current medication regimen  which is part of the above treatment schedule. It has been helping with Ms. Caitlin Duncan chronic  medical problems which for this visit include:   Diagnoses of Chronic pain syndrome, Back spasm, Primary osteoarthritis of both knees, Primary osteoarthritis of right hip, Chronic bilateral low back pain without sciatica, Morbid obesity with BMI of 60.0-69.9, adult ([facility] [address] Depression, unspecified depression type, mild, Primary insomnia, and JOSE treated with BiPAP were pertinent to this visit. Risks and benefits of the medications and other alternative treatments  including no treatment were discussed with the patient. The common side effects of these medications were also explained to the patient. Informed verbal consent was obtained. Goals of current treatment regimen include improvement in pain, restoration of functioning- with focus on improvement in physical performance, general activity, work or disability,emotional distress, health care utilization and  decreased medication consumption. Will continue to monitor progress towards achieving/maintaining therapeutic goals with special emphasis on  1. Improvement in perceived interfernce  of pain with ADL's. Ability to do home exercises independently. Ability to do household chores indoor and/or outdoor work and social and leisure activities. Improve psychosocial and physical functioning. - she is not showing any significant progress/or showing regression  towards this goal and reassessment and adjustment of goals/treatment have been made. She was advised against drinking alcohol with the narcotic pain medicines, advised against driving or handling machinery while adjusting the dose of medicines or if having cognitive  issues related to the current medications. Risk of overdose and death, if medicines not taken as prescribed, were also discussed.  If the patient develops new symptoms or if the symptoms worsen, the patient should call the office. While transcribing every attempt was made to maintain the accuracy of the note in terms of it's contents,there may have been some errors made inadvertently. Thank you for allowing me to participate in the care of this patient. Ashwin Garcia.  Haylie FRENCH-CNP     Cc: Jettie Bamberger, MD

## 2020-08-20 ENCOUNTER — TELEMEDICINE (OUTPATIENT)
Dept: BARIATRICS/WEIGHT MGMT | Age: 45
End: 2020-08-20
Payer: MEDICAID

## 2020-08-20 PROCEDURE — G8428 CUR MEDS NOT DOCUMENT: HCPCS | Performed by: SURGERY

## 2020-08-20 PROCEDURE — 4004F PT TOBACCO SCREEN RCVD TLK: CPT | Performed by: SURGERY

## 2020-08-20 PROCEDURE — G8417 CALC BMI ABV UP PARAM F/U: HCPCS | Performed by: SURGERY

## 2020-08-20 PROCEDURE — 99214 OFFICE O/P EST MOD 30 MIN: CPT | Performed by: SURGERY

## 2020-08-20 NOTE — PROGRESS NOTES
[facility] [place] ([facility]) Physicians   [employer]  Trevor Diaz DO       TELEHEALTH EVALUATION -- Audio/Visual (During NHDSW-99 public health emergency)           Chief Complaint   Patient presents with    Weight Management       HPI:       Due to the COVID-19 pandemic restrictions on close contact interactions as recommended by CDC and health authorities, the patient's visit was conducted via telemedicine in lieu of a face to face visit. Patient verbally consented and agreed to proceed. The patient is here through telemedicine for their bariatric surgery presurgical visit for future weight loss. Bell Perkins is a very pleasant 53 y.o. female with Chronic Obesity. Robbie Perez has been struggling for several years now with obesity. Robbie Perez feels the weight is an obstacle to achieve and perform things in daily living as well risk on health. Patient  is very determined to lose weight and be healthy, and is working towards  surgical weight loss to achieve this goal. Pre-operative clearance and work up pending. Working hard to keep good dietary habits as well level of activity. Patient denies any nausea, vomiting, fevers, chills, shortness of breath, chest pain, cough, constipation or difficulty urinating. Past Medical History:   Diagnosis Date    Anxiety     Arthritis     Asthma     Back pain     CHF (congestive heart failure) (HCC)     COPD (chronic obstructive pulmonary disease) (HCC)     Depression     Diabetes mellitus (HCC)     GERD (gastroesophageal reflux disease)     Hyperlipidemia     Hypertension     Obesity     Sleep apnea      Past Surgical History:   Procedure Laterality Date    CHOLECYSTECTOMY  1997    HYSTERECTOMY  2008    SKIN BIOPSY  2015    TUBAL LIGATION      UPPER GASTROINTESTINAL ENDOSCOPY N/A 10/14/2019    EGD BIOPSY performed by Trevor Diaz DO at [facility] END[facility]COPY     No family history on file.   Social History     Tobacco Use    Smoking status: Current Every Day Smoker     Packs/day: 0.25     Years: 20.00     Pack years: 5.00     Types: Cigarettes     Start date: 1985     Last attempt to quit: 2020     Years since quittin.4    Smokeless tobacco: Former User    Tobacco comment: working on it   Substance Use Topics    Alcohol use: Yes     Alcohol/week: 1.0 standard drinks     Types: 1 Glasses of wine per week     Comment: rare     I counseled the patient on the importance of not smoking and risks of ETOH. Allergies   Allergen Reactions    Latex      There were no vitals filed for this visit.       Lab Results   Component Value Date    WBC 5.3 2020    RBC 4.26 2020    HGB 14.0 2020    HCT 41.5 2020    MCV 97.3 2020    MCH 32.8 2020    MCHC 33.7 2020    MPV 9.5 2020    NEUTOPHILPCT 47.4 2020    LYMPHOPCT 41.9 2020    MONOPCT 7.4 2020    EOSRELPCT 2.1 2020    BASOPCT 1.2 2020    NEUTROABS 2.9 2020    LYMPHSABS 2.6 2020    MONOSABS 0.5 2020    EOSABS 0.1 2020     Lab Results   Component Value Date     2020    K 4.8 2020    K 4.4 2020     2020    CO2 26 2020    ANIONGAP 13 2020    GLUCOSE 94 2020    BUN 7 2020    CREATININE 0.8 2020    LABGLOM >60 2020    GFRAA >60 2020    GFRAA >60 2013    CALCIUM 9.2 2020    PROT 6.9 2020    PROT 7.1 2012    LABALBU 4.1 2020    AGRATIO 1.2 2020    BILITOT 0.4 2020    ALKPHOS 65 2020    ALT 9 2020    AST 11 2020    GLOB 3.2 2020     Lab Results   Component Value Date    CHOL 198 2019    TRIG 110 2019    HDL 74 2019    LDLCALC 102 2019    LABVLDL 22 2019     Lab Results   Component Value Date    TSHREFLEX 1.05 2019     Lab Results   Component Value Date    IRON 85 2019    TIBC 279 2019    LABIRON 30 2019     Lab Results Component Value Date    IESYFDFN91 615 09/17/2019    FOLATE 10.00 09/17/2019     Lab Results   Component Value Date    VITD25 41.4 08/03/2020     Lab Results   Component Value Date    LABA1C 5.5 09/17/2019    .2 09/17/2019         Current Outpatient Medications:     HYDROcodone-acetaminophen (NORCO) 5-325 MG per tablet, Take 1 tablet by mouth every 8 hours as needed for Pain for up to 30 days. , Disp: 90 tablet, Rfl: 0    baclofen (LIORESAL) 10 MG tablet, Take 1 tablet by mouth daily, Disp: 30 tablet, Rfl: 0    amitriptyline (ELAVIL) 25 MG tablet, TAKE ONE TABLET BY MOUTH ONCE NIGHTLY, Disp: 30 tablet, Rfl: 1    venlafaxine (EFFEXOR) 25 MG tablet, TAKE ONE TABLET BY MOUTH TWICE A DAY, Disp: 60 tablet, Rfl: 1    spironolactone (ALDACTONE) 25 MG tablet, Take 1 tablet by mouth 2 times daily, Disp: 60 tablet, Rfl: 0    torsemide (DEMADEX) 20 MG tablet, Take 2 tablets by mouth daily, Disp: 60 tablet, Rfl: 11    lisinopril (PRINIVIL;ZESTRIL) 40 MG tablet, Take 1 tablet by mouth daily, Disp: 90 tablet, Rfl: 3    metoprolol (LOPRESSOR) 100 MG tablet, TAKE ONE TABLET BY MOUTH TWICE A DAY, Disp: 60 tablet, Rfl: 2    fluticasone-salmeterol (ADVAIR) 250-50 MCG/DOSE AEPB, INHALE ONE PUFF BY MOUTH EVERY 12 HOURS, Disp: 60 each, Rfl: 2    atorvastatin (LIPITOR) 20 MG tablet, Take 1 tablet by mouth daily, Disp: 90 tablet, Rfl: 3    albuterol sulfate HFA (PROAIR HFA) 108 (90 Base) MCG/ACT inhaler, Inhale 2 puffs into the lungs every 6 hours as needed for Wheezing or Shortness of Breath, Disp: 1 Inhaler, Rfl: 5    albuterol-ipratropium (COMBIVENT RESPIMAT)  MCG/ACT AERS inhaler, Inhale 1 puff into the lungs every 6 hours, Disp: 1 Inhaler, Rfl: 5    cloNIDine (CATAPRES) 0.1 MG tablet, Take 0.3 mg by mouth three times daily , Disp: , Rfl:     GARCINIA CAMBOGIA-CHROMIUM PO, Take 1,600 mg by mouth daily, Disp: , Rfl:     metFORMIN (GLUCOPHAGE) 500 MG tablet, Take 1 tablet by mouth 2 times daily (with meals), Disp: 60 tablet, Rfl: 2    loratadine (CLARITIN) 10 MG tablet, Take 10 mg by mouth daily, Disp: , Rfl:     montelukast (SINGULAIR) 10 MG tablet, Take 1 tablet by mouth nightly, Disp: 30 tablet, Rfl: 3    omeprazole (PRILOSEC) 20 MG delayed release capsule, Take 20 mg by mouth daily, Disp: , Rfl:     acetaminophen (APAP EXTRA STRENGTH) 500 MG tablet, Take 2 tablets by mouth every 6 hours as needed for Pain DO NOT TAKE WITH OTHER MEDICATIONS CONTAINING ACETAMINOPHEN., Disp: 30 tablet, Rfl: 0    Spacer/Aero-Holding Chambers KIT, Please dispense aerochamber spacer, Disp: 1 kit, Rfl: 0    Cholecalciferol (VITAMIN D3) 96887 UNITS CAPS, Take 10,000 Units by mouth three times a week. Takes on Mon Wed Fri, Disp: , Rfl:     Review of Systems - History obtained from the patient  General ROS: negative  Psychological ROS: negative  Ophthalmic ROS: negative  Neurological ROS: negative  ENT ROS: negative  Allergy and Immunology ROS: negative  Hematological and Lymphatic ROS: negative  Endocrine ROS: negative  Respiratory ROS: negative  Cardiovascular ROS: negative  Gastrointestinal ROS:negative  Genito-Urinary ROS: negative  Musculoskeletal ROS: negative   Skin ROS: negative       Physical Exam  Deferred       A/P    Bell Perkins is 53 y.o. female, pre surgery, has stayed weight stable since last visit. The patient underwent dietary counseling with myself . I have reviewed, discussed and agree with the dietary plan.    We discussed how her weight affects her overall health including:  Patient Active Problem List   Diagnosis    Osteoarthritis of left knee    Hemoptysis    Essential hypertension    Morbid obesity with body mass index of 50.0-59.9 in adult [facility])    CHF (congestive heart failure) (HCC)    Osteoarthritis of both knees    Hyperlipidemia    Cavitating mass in left lower lung lobe    Cavitary lesion of lung    COPD, mild (HCC)    Chronic diastolic CHF (congestive heart failure) (HCC)    JOSE (obstructive sleep apnea)    Tobacco abuse counseling    Bronchitis    Pneumatocele of lung    Prediabetes    Chronic pain syndrome    Primary osteoarthritis of right hip    Chronic bilateral low back pain without sciatica    Diabetes mellitus type 2 in obese (HCC)    Chronic GERD    and importance of weight loss to alleviate those co morbid conditions. I encouraged the patient to continue exercise and keeping healthy eating habits. I spent a total of 25 minutes via telemedicine (Video) with the patient and greater than 50% of the time was spent in counseling, answering questions, addressing concerns, reviewing labs and/or other studies with the patient as well as coordinating care. At this time patient has uncontrolled BP. Retaining fluid, non-compliant on CPAP, and continues to smoke. Counseled extensively. Patient agrees it is not a good time to pursue an elective weight loss surgery at this time. Will f/u in 1 year if compliant if above factors are improved/resolved      Pursuant to the emergency declaration under the [facility], [address] [place] [address] authority and the [employer] Act, this Virtual Visit was conducted, with patient's consent, to reduce the patient's risk of exposure to COVID-19 and provide continuity of care for an established patient. Services were provided through a video synchronous discussion virtually to substitute for in-person clinic visit.

## 2020-09-09 ENCOUNTER — VIRTUAL VISIT (OUTPATIENT)
Dept: PAIN MANAGEMENT | Age: 45
End: 2020-09-09
Payer: MEDICAID

## 2020-09-09 ENCOUNTER — PATIENT MESSAGE (OUTPATIENT)
Dept: PAIN MANAGEMENT | Age: 45
End: 2020-09-09

## 2020-09-09 PROCEDURE — 99213 OFFICE O/P EST LOW 20 MIN: CPT | Performed by: NURSE PRACTITIONER

## 2020-09-09 PROCEDURE — G8427 DOCREV CUR MEDS BY ELIG CLIN: HCPCS | Performed by: NURSE PRACTITIONER

## 2020-09-09 RX ORDER — HYDROCODONE BITARTRATE AND ACETAMINOPHEN 5; 325 MG/1; MG/1
1 TABLET ORAL EVERY 8 HOURS PRN
Qty: 90 TABLET | Refills: 0 | Status: SHIPPED | OUTPATIENT
Start: 2020-09-09 | End: 2020-10-07 | Stop reason: SDUPTHER

## 2020-09-09 RX ORDER — BACLOFEN 10 MG/1
10 TABLET ORAL DAILY
Qty: 30 TABLET | Refills: 0 | Status: SHIPPED | OUTPATIENT
Start: 2020-09-09 | End: 2020-10-07 | Stop reason: SDUPTHER

## 2020-09-09 NOTE — PATIENT INSTRUCTIONS
Patient Education        Back Stretches: Exercises  Introduction  Here are some examples of exercises for stretching your back. Start each exercise slowly. Ease off the exercise if you start to have pain. Your doctor or physical therapist will tell you when you can start these exercises and which ones will work best for you. How to do the exercises  Overhead stretch   1. Stand comfortably with your feet shoulder-width apart. 2. Looking straight ahead, raise both arms over your head and reach toward the ceiling. Do not allow your head to tilt back. 3. Hold for 15 to 30 seconds, then lower your arms to your sides. 4. Repeat 2 to 4 times. Side stretch   1. Stand comfortably with your feet shoulder-width apart. 2. Raise one arm over your head, and then lean to the other side. 3. Slide your hand down your leg as you let the weight of your arm gently stretch your side muscles. Hold for 15 to 30 seconds. 4. Repeat 2 to 4 times on each side. Press-up   1. Lie on your stomach, supporting your body with your forearms. 2. Press your elbows down into the floor to raise your upper back. As you do this, relax your stomach muscles and allow your back to arch without using your back muscles. As your press up, do not let your hips or pelvis come off the floor. 3. Hold for 15 to 30 seconds, then relax. 4. Repeat 2 to 4 times. Relax and rest   1. Lie on your back with a rolled towel under your neck and a pillow under your knees. Extend your arms comfortably to your sides. 2. Relax and breathe normally. 3. Remain in this position for about 10 minutes. 4. If you can, do this 2 or 3 times each day. Follow-up care is a key part of your treatment and safety. Be sure to make and go to all appointments, and call your doctor if you are having problems. It's also a good idea to know your test results and keep a list of the medicines you take. Where can you learn more? Go to [email or web address] org and sign in to your [employer] account. Enter P673 in the [employer] vascular solution box to learn more about \"Back Stretches: Exercises. \"     If you do not have an account, please click on the \"Sign Up Now\" link. Current as of: March 2, 2020               Content Version: 12.5  © 6390-2485 Healthwise, Incorporated. Care instructions adapted under license by [facility] ([facility]). If you have questions about a medical condition or this instruction, always ask your healthcare professional. [address] any warranty or liability for your use of this information.

## 2020-09-09 NOTE — PROGRESS NOTES
TELE HEALTH VISIT (AUDIO-VISUAL)    Pursuant to the emergency declaration under the [facility], [facility] waiver authority and the [employer] Act, this Virtual  Visit was conducted, with patient's consent, to reduce the patient's risk of exposure to COVID-19 and provide continuity of care for an established patient. Service is  provided through a video synchronous discussion virtually to substitute for in-person clinic visit. Due to this being a TeleHealth encounter (During QTZAG-84 public health emergency), evaluation of the following organ systems was limited: Vitals/Constitutional/EENT/Resp/CV/GI//MS/Neuro/Skin/Ijcg-Vdjp-Cqo. Williamivis Amy  1975  <[id number]>    Ms. Margie Devi is being seen virtually for a follow up visit using [employer] Video visit/[facility] or face time  Informed verbal consent to the virtual visit was obtained from Ms. Margie Devi. Risks associated with HIPPA compliance with the virtual visit was explained to the patient. Ms. Margie Devi is at [facility] and [facility]. Marya BERTRAND is in her office. HISTORY OF PRESENT ILLNESS:  Ms. Margie Devi is a 53 y.o. female  being assessed for a follow up visit for pain management for evaluation of ongoing care regarding her symptoms and monitoring of compliance with long term use high risk medications. She has a diagnosis of   1. Chronic pain syndrome    2. Back spasm    3. Primary osteoarthritis of both knees    4. Primary osteoarthritis of right hip    5. Chronic bilateral low back pain without sciatica    6. Morbid obesity with BMI of 60.0-69.9, adult ([address]    7. Depression, unspecified depression type, mild    8. Primary insomnia    9.  JOSE treated with BiPAP      On the Patients Pain Assessment form reviewed with the Medical Assistant:  She complains of pain in the both knee(s) and bilateral lower back  with radiation to the hips Bilateral . She rates the pain 7/10 and describes it as aching, pulling. Current treatment regimen has helped relieve about 50% of the pain. She denies any side effects from the current pain regimen. Patient reports that since the last follow up visit the physical functioning is unchanged, family/social relationships are unchanged, mood is unchanged sleep patterns are unchanged, and that the overall functioning is unchanged. Patient denies misusing/abusing her narcotic pain medications or using any illegal drugs. Upon obtaining medical history from Ms. Alisia Tapia states that pain is manageable on current pain therapy. Reports that she will be following up with weight loss surgery next year. She was treated in the ER for hematuria/UTI, currently under the care of nephrology. Pain medications adequately managed, takes them as prescribed. Requesting for early fill of opioids due to travelling to [facility] in 5 days. Mood is stable without anxiety. Sleep is fair with an average of 5-6 hours. Denies to having issues of constipation. Tolerating activities/house chores with moderate tenderness to the lower back. ALLERGIES: Patients list of allergies were reviewed     MEDICATIONS: Ms. Alisia Tapia list of medications were reviewed. Her current medications are   Outpatient Medications Prior to Visit   Medication Sig Dispense Refill    amitriptyline (ELAVIL) 25 MG tablet TAKE ONE TABLET BY MOUTH ONCE NIGHTLY 30 tablet 1    venlafaxine (EFFEXOR) 25 MG tablet TAKE ONE TABLET BY MOUTH TWICE A DAY 60 tablet 1    torsemide (DEMADEX) 20 MG tablet Take 2 tablets by mouth daily 60 tablet 11    spironolactone (ALDACTONE) 25 MG tablet Take 1 tablet by mouth 2 times daily 60 tablet 0    lisinopril (PRINIVIL;ZESTRIL) 40 MG tablet Take 1 tablet by mouth daily 90 tablet 3    metoprolol (LOPRESSOR) 100 MG tablet TAKE ONE TABLET BY MOUTH TWICE A DAY 60 tablet 2    fluticasone-salmeterol (ADVAIR) 250-50 MCG/DOSE AEPB INHALE ONE PUFF BY MOUTH EVERY 12 HOURS 60 and vitals reviewed. LMP  (LMP Unknown)  as per patient  Constitutional: She appears well-developed and well-nourished. No acute distress. Skin: Skin appears to be warm and dry. No rashes or any other marks noted. She is not diaphoretic. Neurological/Psychiatric:She is alert and oriented to person, place, and time. Coordination is  normal.  Her mood isAppropriate and affect is Neutral/Euthymic(normal). Her behavior is normal.   thought content normal.   Musculoskeletal / Extremities: Gait is normal, assistive devices use: none. IMPRESSION:   1. Chronic pain syndrome    2. Back spasm    3. Primary osteoarthritis of both knees    4. Primary osteoarthritis of right hip    5. Chronic bilateral low back pain without sciatica    6. Morbid obesity with BMI of 60.0-69.9, adult ([facility] [address]    7. Depression, unspecified depression type, mild    8. Primary insomnia    9. JOSE treated with BiPAP        PLAN:  Informed verbal consent regarding treatment was obtained  -Continue with Norco, Elavil, Effexor, Lidocaine Baclofen  -Liang exercises  -Patient showed 12 pills in her bottle, she will have to verify the medications at the pharmacy before early fill of opioids can be permitted  -CBT techniques- relaxation therapies such as biofeedback, mindfulness based stress reduction, imagery, cognitive restructuring, problem solving discussed with patient  -She was advised weight reduction, diet changes- 800-1200 fior diet, diet diary, exercising, nutritional  consult increased physical activity as tolerated  -Last UDS 3/19/20 Consistent  -Return in about 4 weeks (around 10/7/2020). Current Outpatient Medications   Medication Sig Dispense Refill    HYDROcodone-acetaminophen (NORCO) 5-325 MG per tablet Take 1 tablet by mouth every 8 hours as needed for Pain for up to 30 days.  90 tablet 0    baclofen (LIORESAL) 10 MG tablet Take 1 tablet by mouth daily 30 tablet 0    amitriptyline (ELAVIL) 25 MG tablet TAKE ONE TABLET BY MOUTH ONCE NIGHTLY 30 tablet 1    venlafaxine (EFFEXOR) 25 MG tablet TAKE ONE TABLET BY MOUTH TWICE A DAY 60 tablet 1    torsemide (DEMADEX) 20 MG tablet Take 2 tablets by mouth daily 60 tablet 11    metoprolol (LOPRESSOR) 100 MG tablet TAKE ONE TABLET BY MOUTH TWICE A DAY 60 tablet 2    fluticasone-salmeterol (ADVAIR) 250-50 MCG/DOSE AEPB INHALE ONE PUFF BY MOUTH EVERY 12 HOURS 60 each 2    atorvastatin (LIPITOR) 20 MG tablet Take 1 tablet by mouth daily 90 tablet 3    albuterol sulfate HFA (PROAIR HFA) 108 (90 Base) MCG/ACT inhaler Inhale 2 puffs into the lungs every 6 hours as needed for Wheezing or Shortness of Breath 1 Inhaler 5    albuterol-ipratropium (COMBIVENT RESPIMAT)  MCG/ACT AERS inhaler Inhale 1 puff into the lungs every 6 hours 1 Inhaler 5    cloNIDine (CATAPRES) 0.1 MG tablet Take 0.3 mg by mouth three times daily       GARCINIA CAMBOGIA-CHROMIUM PO Take 1,600 mg by mouth daily      metFORMIN (GLUCOPHAGE) 500 MG tablet Take 1 tablet by mouth 2 times daily (with meals) 60 tablet 2    loratadine (CLARITIN) 10 MG tablet Take 10 mg by mouth daily      montelukast (SINGULAIR) 10 MG tablet Take 1 tablet by mouth nightly 30 tablet 3    omeprazole (PRILOSEC) 20 MG delayed release capsule Take 20 mg by mouth daily      acetaminophen (APAP EXTRA STRENGTH) 500 MG tablet Take 2 tablets by mouth every 6 hours as needed for Pain DO NOT TAKE WITH OTHER MEDICATIONS CONTAINING ACETAMINOPHEN. 30 tablet 0    Cholecalciferol (VITAMIN D3) 67075 UNITS CAPS Take 10,000 Units by mouth three times a week. Takes on Mon Wed Fri      spironolactone (ALDACTONE) 25 MG tablet Take 2 tablets by mouth 2 times daily 60 tablet 0    lisinopril (PRINIVIL;ZESTRIL) 40 MG tablet Take 0.5 tablets by mouth daily 90 tablet 0    Spacer/Aero-Holding Chambers KIT Please dispense aerochamber spacer 1 kit 0     No current facility-administered medications for this visit.       I will continue her current medication regimen  which contents,there may have been some errors made inadvertently. Thank you for allowing me to participate in the care of this patient. Kezia Fay.  Booker Findkaren SAGASTUMEN-CNP     Cc: Poornima Celaya MD

## 2020-09-10 ENCOUNTER — PATIENT MESSAGE (OUTPATIENT)
Dept: FAMILY MEDICINE CLINIC | Age: 45
End: 2020-09-10

## 2020-09-10 NOTE — LETTER
[address]  Phone: [phone]  Fax: [phone]    Prisca Salas MD        September 10, 2020     Patient: Bell Perkins   YOB: 1975   Date of Visit:        To Whom it May Concern: It is my medical opinion that [place] would benefit from an air conditioning unit. Her medical conditions, including COPD and CHF can be exacerbated by extreme heat. If you have any questions or concerns, please don't hesitate to call.     Sincerely,         Prisca Saals MD

## 2020-10-07 ENCOUNTER — VIRTUAL VISIT (OUTPATIENT)
Dept: PAIN MANAGEMENT | Age: 45
End: 2020-10-07
Payer: MEDICAID

## 2020-10-07 PROCEDURE — G8427 DOCREV CUR MEDS BY ELIG CLIN: HCPCS | Performed by: NURSE PRACTITIONER

## 2020-10-07 PROCEDURE — 99213 OFFICE O/P EST LOW 20 MIN: CPT | Performed by: NURSE PRACTITIONER

## 2020-10-07 RX ORDER — HYDROCODONE BITARTRATE AND ACETAMINOPHEN 5; 325 MG/1; MG/1
1 TABLET ORAL EVERY 8 HOURS PRN
Qty: 90 TABLET | Refills: 0 | Status: SHIPPED | OUTPATIENT
Start: 2020-10-07 | End: 2020-11-03 | Stop reason: SDUPTHER

## 2020-10-07 RX ORDER — BACLOFEN 10 MG/1
10 TABLET ORAL DAILY
Qty: 30 TABLET | Refills: 0 | Status: SHIPPED | OUTPATIENT
Start: 2020-10-07 | End: 2020-11-09

## 2020-10-07 RX ORDER — AMITRIPTYLINE HYDROCHLORIDE 25 MG/1
TABLET, FILM COATED ORAL
Qty: 30 TABLET | Refills: 1 | Status: SHIPPED | OUTPATIENT
Start: 2020-10-07 | End: 2020-11-16 | Stop reason: SDUPTHER

## 2020-10-07 RX ORDER — VENLAFAXINE 25 MG/1
TABLET ORAL
Qty: 60 TABLET | Refills: 1 | Status: SHIPPED | OUTPATIENT
Start: 2020-10-07 | End: 2020-12-09 | Stop reason: SDUPTHER

## 2020-10-07 NOTE — PATIENT INSTRUCTIONS
Patient Education        Back Stretches: Exercises  Introduction  Here are some examples of exercises for stretching your back. Start each exercise slowly. Ease off the exercise if you start to have pain. Your doctor or physical therapist will tell you when you can start these exercises and which ones will work best for you. How to do the exercises  Overhead stretch   1. Stand comfortably with your feet shoulder-width apart. 2. Looking straight ahead, raise both arms over your head and reach toward the ceiling. Do not allow your head to tilt back. 3. Hold for 15 to 30 seconds, then lower your arms to your sides. 4. Repeat 2 to 4 times. Side stretch   1. Stand comfortably with your feet shoulder-width apart. 2. Raise one arm over your head, and then lean to the other side. 3. Slide your hand down your leg as you let the weight of your arm gently stretch your side muscles. Hold for 15 to 30 seconds. 4. Repeat 2 to 4 times on each side. Press-up   1. Lie on your stomach, supporting your body with your forearms. 2. Press your elbows down into the floor to raise your upper back. As you do this, relax your stomach muscles and allow your back to arch without using your back muscles. As your press up, do not let your hips or pelvis come off the floor. 3. Hold for 15 to 30 seconds, then relax. 4. Repeat 2 to 4 times. Relax and rest   1. Lie on your back with a rolled towel under your neck and a pillow under your knees. Extend your arms comfortably to your sides. 2. Relax and breathe normally. 3. Remain in this position for about 10 minutes. 4. If you can, do this 2 or 3 times each day. Follow-up care is a key part of your treatment and safety. Be sure to make and go to all appointments, and call your doctor if you are having problems. It's also a good idea to know your test results and keep a list of the medicines you take. Where can you learn more? Go to [email or web address]://margo.[email or web address] org and sign in to your [employer] account. Enter D326 in the [employer] box to learn more about \"Back Stretches: Exercises. \"     If you do not have an account, please click on the \"Sign Up Now\" link. Current as of: March 2, 2020               Content Version: 12.6  © 0265-0275 [employer]orporated. Care instructions adapted under license by [facility] ([facility]). If you have questions about a medical condition or this instruction, always ask your healthcare professional. [address] any warranty or liability for your use of this information.

## 2020-10-07 NOTE — PROGRESS NOTES
TELE HEALTH VISIT (AUDIO-VISUAL)    Pursuant to the emergency declaration under the [facility], [facility] waiver authority and the [employer] and [employer] Act, this Virtual  Visit was conducted, with patient's consent, to reduce the patient's risk of exposure to COVID-19 and provide continuity of care for an established patient. Service is  provided through a video synchronous discussion virtually to substitute for in-person clinic visit. Due to this being a TeleHealth encounter ([facility]), evaluation of the following organ systems was limited: Vitals/Constitutional/EENT/Resp/CV/GI//MS/Neuro/Skin/Qhgs-Nzxp-Thq. [place] [employer]  1975  <[id number]>    Ms. Jessica Guzman is being seen virtually for a follow up visit using [employer] me/[employer] Video visit/[employer] or face time  Informed verbal consent to the virtual visit was obtained from Ms. Jessica Guzman. Risks associated with HIPPA compliance with the virtual visit was explained to the patient. Ms. Jessica Guzman is at her home and Anh BERTRAND is in her office. HISTORY OF PRESENT ILLNESS:  Ms. Jessica Guzman is a 50 y.o. female  being assessed for a follow up visit for pain management for evaluation of ongoing care regarding her symptoms and monitoring of compliance with long term use high risk medications. She has a diagnosis of   1. Chronic pain syndrome    2. Back spasm    3. Primary osteoarthritis of both knees    4. Primary osteoarthritis of right hip    5. Chronic bilateral low back pain without sciatica    6. Morbid obesity with BMI of 60.0-69.9, adult ([address]    7. Depression, unspecified depression type, mild    8. Primary insomnia    9.  JOSE treated with BiPAP      On the Patients Pain Assessment form reviewed with the Medical Assistant:  She complains of pain in the bilateral lower back  with radiation to the hips Bilateral . She rates the pain 9/10 and describes it as pressure, stabbing and pulling. Current treatment regimen has helped relieve about 70% of the pain. She denies any side effects from the current pain regimen. Patient reports that since the last follow up visit the physical functioning is unchanged, family/social relationships are unchanged, mood is unchanged sleep patterns are unchanged, and that the overall functioning is unchanged. Patient denies misusing/abusing her narcotic pain medications or using any illegal drugs. Upon obtaining medical history from Ms. Chary Botello states that pain is manageable on current pain therapy. Admits to tenderness in the right groin especially after returning from [place]. Last had intra-articular injection with orthopedics to the right hipPain medications provide moderate relief of pain. Mood is stable without anxiety. Sleep is fair with an average of 5-6 hours. Denies to having issues of constipation. Tolerating activities/house chores with moderate tenderness to the lower back/right groin. Working on smoking cessation    ALLERGIES: Patients list of allergies were reviewed     MEDICATIONS: Ms. Chary Botello list of medications were reviewed. Her current medications are   Outpatient Medications Prior to Visit   Medication Sig Dispense Refill    spironolactone (ALDACTONE) 25 MG tablet TAKE TWO TABLETS BY MOUTH TWICE A  tablet 1    lisinopril (PRINIVIL;ZESTRIL) 40 MG tablet Take 0.5 tablets by mouth daily 90 tablet 0    torsemide (DEMADEX) 20 MG tablet Take 2 tablets by mouth daily 60 tablet 11    metoprolol (LOPRESSOR) 100 MG tablet TAKE ONE TABLET BY MOUTH TWICE A DAY 60 tablet 2    fluticasone-salmeterol (ADVAIR) 250-50 MCG/DOSE AEPB INHALE ONE PUFF BY MOUTH EVERY 12 HOURS 60 each 2    atorvastatin (LIPITOR) 20 MG tablet Take 1 tablet by mouth daily 90 tablet 3    albuterol sulfate HFA (PROAIR HFA) 108 (90 Base) MCG/ACT inhaler Inhale 2 puffs into the lungs every 6 hours as needed for Wheezing or Shortness of Breath 1 Inhaler 5    albuterol-ipratropium (COMBIVENT RESPIMAT)  MCG/ACT AERS inhaler Inhale 1 puff into the lungs every 6 hours 1 Inhaler 5    cloNIDine (CATAPRES) 0.1 MG tablet Take 0.3 mg by mouth three times daily       GARCINIA CAMBOGIA-CHROMIUM PO Take 1,600 mg by mouth daily      metFORMIN (GLUCOPHAGE) 500 MG tablet Take 1 tablet by mouth 2 times daily (with meals) 60 tablet 2    loratadine (CLARITIN) 10 MG tablet Take 10 mg by mouth daily      montelukast (SINGULAIR) 10 MG tablet Take 1 tablet by mouth nightly 30 tablet 3    omeprazole (PRILOSEC) 20 MG delayed release capsule Take 20 mg by mouth daily      acetaminophen (APAP EXTRA STRENGTH) 500 MG tablet Take 2 tablets by mouth every 6 hours as needed for Pain DO NOT TAKE WITH OTHER MEDICATIONS CONTAINING ACETAMINOPHEN. 30 tablet 0    Cholecalciferol (VITAMIN D3) 53507 UNITS CAPS Take 10,000 Units by mouth three times a week. Takes on Mon Wed Fri      HYDROcodone-acetaminophen (NORCO) 5-325 MG per tablet Take 1 tablet by mouth every 8 hours as needed for Pain for up to 30 days. 90 tablet 0    baclofen (LIORESAL) 10 MG tablet Take 1 tablet by mouth daily 30 tablet 0    amitriptyline (ELAVIL) 25 MG tablet TAKE ONE TABLET BY MOUTH ONCE NIGHTLY 30 tablet 1    venlafaxine (EFFEXOR) 25 MG tablet TAKE ONE TABLET BY MOUTH TWICE A DAY 60 tablet 1    Spacer/Aero-Holding Chambers KIT Please dispense aerochamber spacer 1 kit 0     No facility-administered medications prior to visit. SOCIAL/FAMILY/PAST MEDICAL HISTORY: Ms. Perry Elliott, family and past medical history was reviewed. REVIEW OF SYSTEMS:    Respiratory: Negative for apnea, chest tightness and shortness of breath or change in baseline breathing. Gastrointestinal: Negative for nausea, vomiting, abdominal pain, diarrhea, constipation, blood in stool and abdominal distention. PHYSICAL EXAM:   Nursing note and vitals reviewed.  LMP  (LMP Unknown)  as per patient.  -Last UDS 3/19/20 Consistent  -Return in about 4 weeks (around 11/4/2020). Current Outpatient Medications   Medication Sig Dispense Refill    baclofen (LIORESAL) 10 MG tablet Take 1 tablet by mouth daily 30 tablet 0    venlafaxine (EFFEXOR) 25 MG tablet TAKE ONE TABLET BY MOUTH TWICE A DAY 60 tablet 1    amitriptyline (ELAVIL) 25 MG tablet TAKE ONE TABLET BY MOUTH ONCE NIGHTLY 30 tablet 1    HYDROcodone-acetaminophen (NORCO) 5-325 MG per tablet Take 1 tablet by mouth every 8 hours as needed for Pain for up to 30 days.  90 tablet 0    spironolactone (ALDACTONE) 25 MG tablet TAKE TWO TABLETS BY MOUTH TWICE A  tablet 1    lisinopril (PRINIVIL;ZESTRIL) 40 MG tablet Take 0.5 tablets by mouth daily 90 tablet 0    torsemide (DEMADEX) 20 MG tablet Take 2 tablets by mouth daily 60 tablet 11    metoprolol (LOPRESSOR) 100 MG tablet TAKE ONE TABLET BY MOUTH TWICE A DAY 60 tablet 2    fluticasone-salmeterol (ADVAIR) 250-50 MCG/DOSE AEPB INHALE ONE PUFF BY MOUTH EVERY 12 HOURS 60 each 2    atorvastatin (LIPITOR) 20 MG tablet Take 1 tablet by mouth daily 90 tablet 3    albuterol sulfate HFA (PROAIR HFA) 108 (90 Base) MCG/ACT inhaler Inhale 2 puffs into the lungs every 6 hours as needed for Wheezing or Shortness of Breath 1 Inhaler 5    albuterol-ipratropium (COMBIVENT RESPIMAT)  MCG/ACT AERS inhaler Inhale 1 puff into the lungs every 6 hours 1 Inhaler 5    cloNIDine (CATAPRES) 0.1 MG tablet Take 0.3 mg by mouth three times daily       GARCINIA CAMBOGIA-CHROMIUM PO Take 1,600 mg by mouth daily      metFORMIN (GLUCOPHAGE) 500 MG tablet Take 1 tablet by mouth 2 times daily (with meals) 60 tablet 2    loratadine (CLARITIN) 10 MG tablet Take 10 mg by mouth daily      montelukast (SINGULAIR) 10 MG tablet Take 1 tablet by mouth nightly 30 tablet 3    omeprazole (PRILOSEC) 20 MG delayed release capsule Take 20 mg by mouth daily      acetaminophen (APAP EXTRA STRENGTH) 500 MG tablet Take 2 tablets by mouth every 6 hours as needed for Pain DO NOT TAKE WITH OTHER MEDICATIONS CONTAINING ACETAMINOPHEN. 30 tablet 0    Cholecalciferol (VITAMIN D3) 92899 UNITS CAPS Take 10,000 Units by mouth three times a week. Takes on Mon Wed Fri      Spacer/Aero-Holding Chambers KIT Please dispense aerochamber spacer 1 kit 0     No current facility-administered medications for this visit. I will continue her current medication regimen  which is part of the above treatment schedule. It has been helping with Ms. Mendel Arena chronic  medical problems which for this visit include:   Diagnoses of Chronic pain syndrome, Back spasm, Primary osteoarthritis of both knees, Primary osteoarthritis of right hip, Chronic bilateral low back pain without sciatica, Morbid obesity with BMI of 60.0-69.9, adult ([facility] [address] Depression, unspecified depression type, mild, Primary insomnia, and JOSE treated with BiPAP were pertinent to this visit. Risks and benefits of the medications and other alternative treatments  including no treatment were discussed with the patient. The common side effects of these medications were also explained to the patient. Informed verbal consent was obtained. Goals of current treatment regimen include improvement in pain, restoration of functioning- with focus on improvement in physical performance, general activity, work or disability,emotional distress, health care utilization and  decreased medication consumption. Will continue to monitor progress towards achieving/maintaining therapeutic goals with special emphasis on  1. Improvement in perceived interfernce  of pain with ADL's. Ability to do home exercises independently. Ability to do household chores indoor and/or outdoor work and social and leisure activities. Improve psychosocial and physical functioning. - she is showing progression towards this treatment goal with the current regimen.     She was advised against drinking alcohol with the narcotic pain medicines, advised against driving or handling machinery while adjusting the dose of medicines or if having cognitive  issues related to the current medications. Risk of overdose and death, if medicines not taken as prescribed, were also discussed. If the patient develops new symptoms or if the symptoms worsen, the patient should call the office. While transcribing every attempt was made to maintain the accuracy of the note in terms of it's contents,there may have been some errors made inadvertently. Thank you for allowing me to participate in the care of this patient. Gibson Connor APRN-CNP     Cc: Caro Montelongo MD

## 2020-10-12 RX ORDER — ALBUTEROL SULFATE 90 UG/1
2 AEROSOL, METERED RESPIRATORY (INHALATION) EVERY 6 HOURS PRN
Qty: 1 INHALER | Refills: 0 | Status: SHIPPED | OUTPATIENT
Start: 2020-10-12 | End: 2020-11-16 | Stop reason: SDUPTHER

## 2020-10-13 ENCOUNTER — OFFICE VISIT (OUTPATIENT)
Dept: ORTHOPEDIC SURGERY | Age: 45
End: 2020-10-13
Payer: MEDICAID

## 2020-10-13 ENCOUNTER — PATIENT MESSAGE (OUTPATIENT)
Dept: PAIN MANAGEMENT | Age: 45
End: 2020-10-13

## 2020-10-13 VITALS — WEIGHT: 293 LBS | HEIGHT: 64 IN | BODY MASS INDEX: 50.02 KG/M2

## 2020-10-13 PROCEDURE — 4004F PT TOBACCO SCREEN RCVD TLK: CPT | Performed by: ORTHOPAEDIC SURGERY

## 2020-10-13 PROCEDURE — G8428 CUR MEDS NOT DOCUMENT: HCPCS | Performed by: ORTHOPAEDIC SURGERY

## 2020-10-13 PROCEDURE — 99214 OFFICE O/P EST MOD 30 MIN: CPT | Performed by: ORTHOPAEDIC SURGERY

## 2020-10-13 PROCEDURE — G8484 FLU IMMUNIZE NO ADMIN: HCPCS | Performed by: ORTHOPAEDIC SURGERY

## 2020-10-13 PROCEDURE — G8417 CALC BMI ABV UP PARAM F/U: HCPCS | Performed by: ORTHOPAEDIC SURGERY

## 2020-10-13 RX ORDER — METHYLPREDNISOLONE 4 MG/1
TABLET ORAL
Qty: 1 KIT | Refills: 0 | Status: SHIPPED | OUTPATIENT
Start: 2020-10-13 | End: 2020-10-19

## 2020-10-13 NOTE — PROGRESS NOTES
Missy Salcedo  <[id number]>  October 13, 2020    Chief Complaint   Patient presents with    Knee Pain     Bilateral knee/ last seen 2018 and Finished euflexxa series     Hip Pain     R hip/ last seen 2019       History: The patient is here in follow-up regarding her right hip. The right hip intra-articular injection received back in January provided no relief. She is now under a pain contract with Dr. Dipti Hensley. She does take Norco on a regular basis. She also takes Tylenol. She states that the right hip pain is worse after the intra-articular injection. She has participated in aquatic therapy. The patient's  past medical history, medications, allergies,  family history, social history, and review of systems have been reviewed, and dated and are recorded in the chart. Vitals:  Ht 5' 4\" (1.626 m)   Wt (!) 343 lb 9.6 oz (155.9 kg)   LMP  (LMP Unknown)   BMI 58.98 kg/m²     Physical: Ms. Missy Salcedo appears well, she is in no apparent distress, she demonstrates appropriate mood & affect. She is alert and oriented to person, place and time. Examination of the right hip reveals moderate to severe pain with internal rotation of the hip. She has generalized tenderness to palpation about the joint line of the bilateral knees. Range of motion is from 0 degrees to 110 degrees bilaterally. Strength is 4+ to 5/5 for all muscle groups about the bilateral knee. There is patellofemoral crepitus with range of motion of the bilateral knee. Varus and valgus stressing of the knees reveals no evidence of instability. There is a small effusion in the bilateral knees. Anterior drawer and Lachman are negative bilaterally. Examination of the skin reveals no rashes, ulceration, or lesion, bilaterally in the lower extremities. Sensation to both lower extremities is grossly intact. Exam of both feet reveals pedal pulses intact and brisk cap refill. Patient is able to dorsiflex and wiggle all toes.  Deep

## 2020-10-21 NOTE — TELEPHONE ENCOUNTER
Per PKA patient must got back bariatric doctor and cut back on cigrates intake. Patient states she receive a 6 day supply of steroids from Dr. Chrystine Krabbe.

## 2020-10-21 NOTE — TELEPHONE ENCOUNTER
From: Missy Salcedo  To: GILLIAN Cardozo - CNP  Sent: 10/13/2020 3:11 PM EDT  Subject: Non-Urgent Medical Question    Víctorlo Ms Son Alfaro I just left Dr Blu Carbajal office and he said there's nothing they can do for my hip or my knees until my body mass index is under 45 he has prescribed me the highest dosage of steroids

## 2020-10-21 NOTE — TELEPHONE ENCOUNTER
From: Jcarlos Cox  To: Terrie Cowden, APRN - CNP  Sent: 9/9/2020 10:13 AM EDT  Subject: Non-Urgent Medical Question    good morning Ms Jodelle Harada my virtual visit was at 9:40 and I haven't heard anything from anyone except early this morning

## 2020-11-03 ENCOUNTER — VIRTUAL VISIT (OUTPATIENT)
Dept: PAIN MANAGEMENT | Age: 45
End: 2020-11-03
Payer: MEDICAID

## 2020-11-03 PROCEDURE — G8427 DOCREV CUR MEDS BY ELIG CLIN: HCPCS | Performed by: NURSE PRACTITIONER

## 2020-11-03 PROCEDURE — 99213 OFFICE O/P EST LOW 20 MIN: CPT | Performed by: NURSE PRACTITIONER

## 2020-11-03 RX ORDER — HYDROCODONE BITARTRATE AND ACETAMINOPHEN 5; 325 MG/1; MG/1
1 TABLET ORAL EVERY 8 HOURS PRN
Qty: 90 TABLET | Refills: 0 | Status: SHIPPED | OUTPATIENT
Start: 2020-11-03 | End: 2020-12-09 | Stop reason: SDUPTHER

## 2020-11-03 NOTE — PROGRESS NOTES
TELE HEALTH VISIT (AUDIO-VISUAL)    Pursuant to the emergency declaration under the [facility], [facility] waiver authority and the [employer] Act, this Virtual  Visit was conducted, with patient's consent, to reduce the patient's risk of exposure to COVID-19 and provide continuity of care for an established patient. Service is  provided through a video synchronous discussion virtually to substitute for in-person clinic visit. Due to this being a TeleHealth encounter (During YANOF-74 public health emergency), evaluation of the following organ systems was limited: Vitals/Constitutional/EENT/Resp/CV/GI//MS/Neuro/Skin/Hdqe-Vikw-Akw. Dameon Rivera  1975  <[id number]>    Ms. Camacho Mccoy is being seen virtually for a follow up visit using [employer] [place] Video visit/[email or web address] or face time  Informed verbal consent to the virtual visit was obtained from Ms. Camacho Mccoy. Risks associated with HIPPA compliance with the virtual visit was explained to the patient. Ms. Camacho Mccoy is at her home and Asa Lie. [employer] [facility] is in her office. HISTORY OF PRESENT ILLNESS:  Ms. Camacho Mccoy is a 50 y.o. female  being assessed for a follow up visit for pain management for evaluation of ongoing care regarding her symptoms and monitoring of compliance with long term use high risk medications. She has a diagnosis of   1. Chronic pain syndrome    2. Primary osteoarthritis of both knees    3. Primary osteoarthritis of right hip    4. Chronic bilateral low back pain without sciatica    5. Back spasm    6. Morbid obesity with BMI of 60.0-69.9, adult ([address]    7. Depression, unspecified depression type, mild    8. Primary insomnia    9.  JOSE treated with BiPAP      On the Patients Pain Assessment form reviewed with the Medical Assistant:  She complains of pain in the both knee(s) and bilateral lower back  with radiation to the hips Bilateral . She rates the pain 6/10 and describes it as aching, pressure. Current treatment regimen has helped relieve about 60% of the pain. She denies any side effects from the current pain regimen. Patient reports that since the last follow up visit the physical functioning is unchanged, family/social relationships are unchanged, mood is unchanged sleep patterns are unchanged, and that the overall functioning is unchanged. Patient denies misusing/abusing her narcotic pain medications or using any illegal drugs. Upon obtaining medical history from Ms. Chary Botello states that pain is manageable on current pain therapy. Endorsed having taken a few more pills on certain days due to pain. Mood is stable without anxiety. Sleep is fair with an average of 5-6 hours. Denies to having issues of constipation. Tolerating activities/house chores with moderate tenderness to the lower back. Working on smoking cessation    ALLERGIES: Patients list of allergies were reviewed     MEDICATIONS: Ms. Chary Botello list of medications were reviewed. Her current medications are   Outpatient Medications Prior to Visit   Medication Sig Dispense Refill    albuterol sulfate  (90 Base) MCG/ACT inhaler Inhale 2 puffs into the lungs every 6 hours as needed for Wheezing 1 Inhaler 0    baclofen (LIORESAL) 10 MG tablet Take 1 tablet by mouth daily 30 tablet 0    venlafaxine (EFFEXOR) 25 MG tablet TAKE ONE TABLET BY MOUTH TWICE A DAY 60 tablet 1    amitriptyline (ELAVIL) 25 MG tablet TAKE ONE TABLET BY MOUTH ONCE NIGHTLY 30 tablet 1    spironolactone (ALDACTONE) 25 MG tablet TAKE TWO TABLETS BY MOUTH TWICE A  tablet 1    lisinopril (PRINIVIL;ZESTRIL) 40 MG tablet Take 0.5 tablets by mouth daily 90 tablet 0    torsemide (DEMADEX) 20 MG tablet Take 2 tablets by mouth daily 60 tablet 11    metoprolol (LOPRESSOR) 100 MG tablet TAKE ONE TABLET BY MOUTH TWICE A DAY 60 tablet 2    fluticasone-salmeterol (ADVAIR) 250-50 MCG/DOSE AEPB INHALE ONE PUFF BY MOUTH EVERY 12 HOURS 60 each 2    atorvastatin (LIPITOR) 20 MG tablet Take 1 tablet by mouth daily 90 tablet 3    albuterol-ipratropium (COMBIVENT RESPIMAT)  MCG/ACT AERS inhaler Inhale 1 puff into the lungs every 6 hours 1 Inhaler 5    cloNIDine (CATAPRES) 0.1 MG tablet Take 0.3 mg by mouth three times daily       GARCINIA CAMBOGIA-CHROMIUM PO Take 1,600 mg by mouth daily      metFORMIN (GLUCOPHAGE) 500 MG tablet Take 1 tablet by mouth 2 times daily (with meals) 60 tablet 2    loratadine (CLARITIN) 10 MG tablet Take 10 mg by mouth daily      montelukast (SINGULAIR) 10 MG tablet Take 1 tablet by mouth nightly 30 tablet 3    omeprazole (PRILOSEC) 20 MG delayed release capsule Take 20 mg by mouth daily      acetaminophen (APAP EXTRA STRENGTH) 500 MG tablet Take 2 tablets by mouth every 6 hours as needed for Pain DO NOT TAKE WITH OTHER MEDICATIONS CONTAINING ACETAMINOPHEN. 30 tablet 0    Cholecalciferol (VITAMIN D3) 22651 UNITS CAPS Take 10,000 Units by mouth three times a week. Takes on Mon Wed Fri      HYDROcodone-acetaminophen (NORCO) 5-325 MG per tablet Take 1 tablet by mouth every 8 hours as needed for Pain for up to 30 days. 90 tablet 0    Spacer/Aero-Holding Chambers KIT Please dispense aerochamber spacer 1 kit 0     No facility-administered medications prior to visit. SOCIAL/FAMILY/PAST MEDICAL HISTORY: Ms. Leida Walls, family and past medical history was reviewed. REVIEW OF SYSTEMS:    Respiratory: Negative for apnea, chest tightness and shortness of breath or change in baseline breathing. Gastrointestinal: Negative for nausea, vomiting, abdominal pain, diarrhea, constipation, blood in stool and abdominal distention. PHYSICAL EXAM:   Nursing note and vitals reviewed. LMP  (LMP Unknown)  as per patient  Constitutional: She appears well-developed and well-nourished. No acute distress. Skin: Skin appears to be warm and dry. No rashes or any other marks noted.  She is not diaphoretic. Neurological/Psychiatric:She is alert and oriented to person, place, and time. Coordination is  normal.  Her mood isAppropriate and affect is Neutral/Euthymic(normal). Her behavior is normal.   thought content normal.   Musculoskeletal / Extremities:Gait is normal, assistive devices use: none. IMPRESSION:   1. Chronic pain syndrome    2. Primary osteoarthritis of both knees    3. Primary osteoarthritis of right hip    4. Chronic bilateral low back pain without sciatica    5. Back spasm    6. Morbid obesity with BMI of 60.0-69.9, adult ([facility] [address]    7. Depression, unspecified depression type, mild    8. Primary insomnia    9. JOSE treated with BiPAP        PLAN:  Informed verbal consent regarding treatment was obtained  -Continue with Norco, Effexor, Lidocaine, Baclofen, Elavil  -Liang exercises  -Recommended patient take medications as prescribed, reviewed pain contract, informed the patient that passing UDS/Pill count is important to continue with opioid therapy, she verbalized understanding  -CBT techniques- relaxation therapies such as biofeedback, mindfulness based stress reduction, imagery, cognitive restructuring, problem solving discussed with patient  -She was advised weight reduction, diet changes- 800-1200 fior diet, diet diary, exercising, nutritional  consult increased physical activity as tolerated  -Reviewed Covid-19 safety regulations which were discussed including Flu vaccine, patient maintains compliance with the safety guidelines regarding Covid-19  -Last UDS 3/23/20 Consistent  -Return in about 4 weeks (around 12/1/2020). Current Outpatient Medications   Medication Sig Dispense Refill    HYDROcodone-acetaminophen (NORCO) 5-325 MG per tablet Take 1 tablet by mouth every 8 hours as needed for Pain for up to 30 days.  90 tablet 0    albuterol sulfate  (90 Base) MCG/ACT inhaler Inhale 2 puffs into the lungs every 6 hours as needed for Wheezing 1 Inhaler 0    baclofen (LIORESAL) 10 MG tablet Take 1 tablet by mouth daily 30 tablet 0    venlafaxine (EFFEXOR) 25 MG tablet TAKE ONE TABLET BY MOUTH TWICE A DAY 60 tablet 1    amitriptyline (ELAVIL) 25 MG tablet TAKE ONE TABLET BY MOUTH ONCE NIGHTLY 30 tablet 1    spironolactone (ALDACTONE) 25 MG tablet TAKE TWO TABLETS BY MOUTH TWICE A  tablet 1    lisinopril (PRINIVIL;ZESTRIL) 40 MG tablet Take 0.5 tablets by mouth daily 90 tablet 0    torsemide (DEMADEX) 20 MG tablet Take 2 tablets by mouth daily 60 tablet 11    metoprolol (LOPRESSOR) 100 MG tablet TAKE ONE TABLET BY MOUTH TWICE A DAY 60 tablet 2    fluticasone-salmeterol (ADVAIR) 250-50 MCG/DOSE AEPB INHALE ONE PUFF BY MOUTH EVERY 12 HOURS 60 each 2    atorvastatin (LIPITOR) 20 MG tablet Take 1 tablet by mouth daily 90 tablet 3    albuterol-ipratropium (COMBIVENT RESPIMAT)  MCG/ACT AERS inhaler Inhale 1 puff into the lungs every 6 hours 1 Inhaler 5    cloNIDine (CATAPRES) 0.1 MG tablet Take 0.3 mg by mouth three times daily       GARCINIA CAMBOGIA-CHROMIUM PO Take 1,600 mg by mouth daily      metFORMIN (GLUCOPHAGE) 500 MG tablet Take 1 tablet by mouth 2 times daily (with meals) 60 tablet 2    loratadine (CLARITIN) 10 MG tablet Take 10 mg by mouth daily      montelukast (SINGULAIR) 10 MG tablet Take 1 tablet by mouth nightly 30 tablet 3    omeprazole (PRILOSEC) 20 MG delayed release capsule Take 20 mg by mouth daily      acetaminophen (APAP EXTRA STRENGTH) 500 MG tablet Take 2 tablets by mouth every 6 hours as needed for Pain DO NOT TAKE WITH OTHER MEDICATIONS CONTAINING ACETAMINOPHEN. 30 tablet 0    Cholecalciferol (VITAMIN D3) 06104 UNITS CAPS Take 10,000 Units by mouth three times a week. Takes on Mon Wed Fri      Spacer/Aero-Holding Chambers KIT Please dispense aerochamber spacer 1 kit 0     No current facility-administered medications for this visit. I will continue her current medication regimen  which is part of the above treatment schedule. It has been helping with Ms. Amanda Herrera chronic  medical problems which for this visit include:   Diagnoses of Chronic pain syndrome, Primary osteoarthritis of both knees, Primary osteoarthritis of right hip, Chronic bilateral low back pain without sciatica, Back spasm, Morbid obesity with BMI of 60.0-69.9, adult ([address] Depression, unspecified depression type, mild, Primary insomnia, and JOSE treated with BiPAP were pertinent to this visit. Risks and benefits of the medications and other alternative treatments  including no treatment were discussed with the patient. The common side effects of these medications were also explained to the patient. Informed verbal consent was obtained. Goals of current treatment regimen include improvement in pain, restoration of functioning- with focus on improvement in physical performance, general activity, work or disability,emotional distress, health care utilization and  decreased medication consumption. Will continue to monitor progress towards achieving/maintaining therapeutic goals with special emphasis on  1. Improvement in perceived interfernce  of pain with ADL's. Ability to do home exercises independently. Ability to do household chores indoor and/or outdoor work and social and leisure activities. Improve psychosocial and physical functioning. - she is showing progression towards this treatment goal with the current regimen. She was advised against drinking alcohol with the narcotic pain medicines, advised against driving or handling machinery while adjusting the dose of medicines or if having cognitive  issues related to the current medications. Risk of overdose and death, if medicines not taken as prescribed, were also discussed. If the patient develops new symptoms or if the symptoms worsen, the patient should call the office.     While transcribing every attempt was made to maintain the accuracy of the note in terms of it's contents,there may have been some errors made inadvertently. Thank you for allowing me to participate in the care of this patient. Kevan Olson.  Nusrat FRENCH-ROLANDA     Cc: Can Ivy MD

## 2020-11-09 RX ORDER — BACLOFEN 10 MG/1
TABLET ORAL
Qty: 30 TABLET | Refills: 0 | Status: SHIPPED | OUTPATIENT
Start: 2020-11-09 | End: 2020-11-16 | Stop reason: SDUPTHER

## 2020-11-11 ENCOUNTER — TELEPHONE (OUTPATIENT)
Dept: PAIN MANAGEMENT | Age: 45
End: 2020-11-11

## 2020-11-11 NOTE — TELEPHONE ENCOUNTER
This is the first attempt to call this pt into the office for a pill count. She will need to come into the office today with ALL her medications, pain medications included, for review. If she is unable to come in today, she will need to come into the [facility] office tomorrow, or the [facility] office the day after that. In the event she is out of town or is unable to get to the office, she will need to go to his local pharmacy to complete the pill count there. Results will need to be on pharmacy letter head with the name and signature of the pharmacist performing the count and will need to be faxed to [phone]. She states he will come into the office on after her other doctor appointment and that she just picked up her medication on [place]. Patient would like to know if it is still necessary for her to come in for a pill count since she just got the medication?

## 2020-11-16 ENCOUNTER — TELEMEDICINE (OUTPATIENT)
Dept: FAMILY MEDICINE CLINIC | Age: 45
End: 2020-11-16
Payer: MEDICAID

## 2020-11-16 PROBLEM — R73.03 PREDIABETES: Status: RESOLVED | Noted: 2019-01-14 | Resolved: 2020-11-16

## 2020-11-16 PROBLEM — E66.01 MORBID OBESITY (HCC): Status: ACTIVE | Noted: 2020-11-16

## 2020-11-16 PROCEDURE — 3046F HEMOGLOBIN A1C LEVEL >9.0%: CPT | Performed by: FAMILY MEDICINE

## 2020-11-16 PROCEDURE — 2022F DILAT RTA XM EVC RTNOPTHY: CPT | Performed by: FAMILY MEDICINE

## 2020-11-16 PROCEDURE — 99214 OFFICE O/P EST MOD 30 MIN: CPT | Performed by: FAMILY MEDICINE

## 2020-11-16 PROCEDURE — G8427 DOCREV CUR MEDS BY ELIG CLIN: HCPCS | Performed by: FAMILY MEDICINE

## 2020-11-16 RX ORDER — ATORVASTATIN CALCIUM 20 MG/1
20 TABLET, FILM COATED ORAL DAILY
Qty: 90 TABLET | Refills: 3 | Status: SHIPPED | OUTPATIENT
Start: 2020-11-16 | End: 2022-06-10 | Stop reason: ALTCHOICE

## 2020-11-16 RX ORDER — LISINOPRIL 20 MG/1
20 TABLET ORAL DAILY
Qty: 90 TABLET | Refills: 1 | Status: SHIPPED
Start: 2020-11-16 | End: 2021-06-09 | Stop reason: ALTCHOICE

## 2020-11-16 RX ORDER — MONTELUKAST SODIUM 10 MG/1
10 TABLET ORAL NIGHTLY
Qty: 30 TABLET | Refills: 3 | Status: SHIPPED | OUTPATIENT
Start: 2020-11-16 | End: 2021-03-22

## 2020-11-16 RX ORDER — METOPROLOL TARTRATE 100 MG/1
TABLET ORAL
Qty: 60 TABLET | Refills: 2 | Status: SHIPPED | OUTPATIENT
Start: 2020-11-16 | End: 2021-03-08

## 2020-11-16 RX ORDER — BACLOFEN 10 MG/1
TABLET ORAL
Qty: 30 TABLET | Refills: 2 | Status: SHIPPED | OUTPATIENT
Start: 2020-11-16 | End: 2021-02-04 | Stop reason: SDUPTHER

## 2020-11-16 RX ORDER — CLONIDINE HYDROCHLORIDE 0.1 MG/1
0.3 TABLET ORAL 3 TIMES DAILY
Qty: 270 TABLET | Refills: 2 | Status: SHIPPED | OUTPATIENT
Start: 2020-11-16 | End: 2021-02-15

## 2020-11-16 RX ORDER — AMITRIPTYLINE HYDROCHLORIDE 25 MG/1
TABLET, FILM COATED ORAL
Qty: 30 TABLET | Refills: 1 | Status: SHIPPED | OUTPATIENT
Start: 2020-11-16 | End: 2021-01-07 | Stop reason: SDUPTHER

## 2020-11-16 RX ORDER — ALBUTEROL SULFATE 90 UG/1
2 AEROSOL, METERED RESPIRATORY (INHALATION) EVERY 6 HOURS PRN
Qty: 1 INHALER | Refills: 0 | Status: SHIPPED | OUTPATIENT
Start: 2020-11-16 | End: 2020-12-17

## 2020-11-16 RX ORDER — SPIRONOLACTONE 25 MG/1
TABLET ORAL
Qty: 120 TABLET | Refills: 1 | Status: SHIPPED | OUTPATIENT
Start: 2020-11-16 | End: 2021-02-15

## 2020-11-16 RX ORDER — OXYBUTYNIN CHLORIDE 5 MG/1
5 TABLET, EXTENDED RELEASE ORAL DAILY
Qty: 30 TABLET | Refills: 3 | Status: SHIPPED | OUTPATIENT
Start: 2020-11-16 | End: 2021-03-22

## 2020-11-16 NOTE — PROGRESS NOTES
11/16/2020    This is a 50 y.o. female   Chief Complaint   Patient presents with    Other     referral portable toilet chair     HPI    HTN  - she has established with Dr. Geoffrey Helton of Nephrology due to this and proteinuria  BP Readings from Last 3 Encounters:   09/10/20 (!) 161/92   07/30/20 (!) 180/96   07/16/20 (!) 173/94   currently on lisinopril, clonidine, metoprolol, torsemide, and spironolactone  Needs refills  Lab Results   Component Value Date    CREATININE 0.8 08/03/2020     Chronic pain / incontinence  - she follows with pain management  - she has chronic pain in her back, knees, and hip  - this is preventing her from reaching the bathroom in time which is on the second floor and she is having a lot of accidents  - her BMI is 62  - she is in need of a portable toilet chair  - frequently loses control of her bladder. Takes Torsemide so goes frequently. Also loses urine with cough or sneezing. Diabetes  Lab Results   Component Value Date    LABA1C 5.5 09/17/2019     Lab Results   Component Value Date    .2 09/17/2019   she is on metformin. When checking sugar typically is around . Mood  - stressed out. Taking care of her her grandkids, often up to 6 of them there at a time    Review of Systems   As per HPI, otherwise negative    Past Medical History:   Diagnosis Date    Anxiety     Arthritis     Asthma     Back pain     CHF (congestive heart failure) ([address]     COPD (chronic obstructive pulmonary disease) ([address]     Depression     Diabetes mellitus ([address]     GERD (gastroesophageal reflux disease)     Hyperlipidemia     Hypertension     Obesity     Sleep apnea        Past Surgical History:   Procedure Laterality Date    CHOLECYSTECTOMY  1997    HYSTERECTOMY  2008    SKIN BIOPSY  2015    TUBAL LIGATION      UPPER GASTROINTESTINAL ENDOSCOPY N/A 10/14/2019    EGD BIOPSY performed by Deacon Acuna DO at [facility] ENDOSCOPY       No family history on file.     Current Outpatient Medications   Medication Sig Dispense Refill    albuterol sulfate  (90 Base) MCG/ACT inhaler Inhale 2 puffs into the lungs every 6 hours as needed for Wheezing 1 Inhaler 0    montelukast (SINGULAIR) 10 MG tablet Take 1 tablet by mouth nightly 30 tablet 3    atorvastatin (LIPITOR) 20 MG tablet Take 1 tablet by mouth daily 90 tablet 3    metFORMIN (GLUCOPHAGE) 500 MG tablet Take 1 tablet by mouth 2 times daily (with meals) 60 tablet 2    cloNIDine (CATAPRES) 0.1 MG tablet Take 3 tablets by mouth three times daily 270 tablet 2    albuterol-ipratropium (COMBIVENT RESPIMAT)  MCG/ACT AERS inhaler Inhale 1 puff into the lungs every 6 hours 1 Inhaler 5    amitriptyline (ELAVIL) 25 MG tablet TAKE ONE TABLET BY MOUTH ONCE NIGHTLY 30 tablet 1    baclofen (LIORESAL) 10 MG tablet TAKE ONE TABLET BY MOUTH DAILY 30 tablet 2    fluticasone-salmeterol (ADVAIR) 250-50 MCG/DOSE AEPB INHALE ONE PUFF BY MOUTH EVERY 12 HOURS 60 each 2    lisinopril (PRINIVIL;ZESTRIL) 20 MG tablet Take 1 tablet by mouth daily 90 tablet 1    metoprolol (LOPRESSOR) 100 MG tablet TAKE ONE TABLET BY MOUTH TWICE A DAY 60 tablet 2    spironolactone (ALDACTONE) 25 MG tablet TAKE TWO TABLETS BY MOUTH TWICE A  tablet 1    oxybutynin (DITROPAN-XL) 5 MG extended release tablet Take 1 tablet by mouth daily 30 tablet 3    HYDROcodone-acetaminophen (NORCO) 5-325 MG per tablet Take 1 tablet by mouth every 8 hours as needed for Pain for up to 30 days.  90 tablet 0    venlafaxine (EFFEXOR) 25 MG tablet TAKE ONE TABLET BY MOUTH TWICE A DAY 60 tablet 1    torsemide (DEMADEX) 20 MG tablet Take 2 tablets by mouth daily 60 tablet 11    GARCINIA CAMBOGIA-CHROMIUM PO Take 1,600 mg by mouth daily      loratadine (CLARITIN) 10 MG tablet Take 10 mg by mouth daily      omeprazole (PRILOSEC) 20 MG delayed release capsule Take 20 mg by mouth daily      acetaminophen (APAP EXTRA STRENGTH) 500 MG tablet Take 2 tablets by mouth every 6 hours as needed for Pain DO NOT TAKE WITH OTHER MEDICATIONS CONTAINING ACETAMINOPHEN. 30 tablet 0    Cholecalciferol (VITAMIN D3) 97589 UNITS CAPS Take 10,000 Units by mouth three times a week. Takes on Mon Wed Fri      Spacer/Aero-Holding Chambers KIT Please dispense aerochamber spacer 1 kit 0     No current facility-administered medications for this visit. LMP  (LMP Unknown)     Physical Exam  Constitutional:       Appearance: She is well-developed. HENT:      Head: Normocephalic and atraumatic. Pulmonary:      Effort: Pulmonary effort is normal.   Skin:     Coloration: Skin is not pale. Neurological:      Mental Status: She is alert and oriented to person, place, and time. Wt Readings from Last 3 Encounters:   10/13/20 (!) 343 lb 9.6 oz (155.9 kg)   09/10/20 (!) 343 lb 9.6 oz (155.9 kg)   07/30/20 (!) 345 lb 6.4 oz (156.7 kg)       BP Readings from Last 3 Encounters:   09/10/20 (!) 161/92   07/30/20 (!) 180/96   07/16/20 (!) 173/94       Assessment/Plan:  1. Mixed stress and urge incontinence  We will start ditropan and get her set up with a home toilet chair. Her weight and orthopedic issues are big contributing factors  - oxybutynin (DITROPAN-XL) 5 MG extended release tablet; Take 1 tablet by mouth daily  Dispense: 30 tablet; Refill: 3    2. Morbid obesity ([address]  We discussed this at length. I recommended she consider gastric sleeve surgery. She is going to revisit this with Dr. Phyllis Edward    3. Diabetes mellitus type 2 in obese (HCC)  Continue metformin. Under control but due for labs. - metFORMIN (GLUCOPHAGE) 500 MG tablet; Take 1 tablet by mouth 2 times daily (with meals)  Dispense: 60 tablet; Refill: 2  - Hemoglobin A1C; Future  - Lipid Panel; Future  - Microalbumin / Creatinine Urine Ratio; Future  - BASIC METABOLIC PANEL; Future    4. Essential hypertension  Following with Dr. Jeremy Galicia  - cloNIDine (CATAPRES) 0.1 MG tablet; Take 3 tablets by mouth three times daily  Dispense: 270 tablet;  Refill: 2  - lisinopril (PRINIVIL;ZESTRIL) 20 MG tablet; Take 1 tablet by mouth daily  Dispense: 90 tablet; Refill: 1  - metoprolol (LOPRESSOR) 100 MG tablet; TAKE ONE TABLET BY MOUTH TWICE A DAY  Dispense: 60 tablet; Refill: 2  - BASIC METABOLIC PANEL; Future    5. COPD, mild ([address]  Reports her breathing is ok currently at baseline  - albuterol sulfate  (90 Base) MCG/ACT inhaler; Inhale 2 puffs into the lungs every 6 hours as needed for Wheezing  Dispense: 1 Inhaler; Refill: 0  - fluticasone-salmeterol (ADVAIR) 250-50 MCG/DOSE AEPB; INHALE ONE PUFF BY MOUTH EVERY 12 HOURS  Dispense: 60 each; Refill: 2    6. Chronic pain syndrome  Continue these medications  - amitriptyline (ELAVIL) 25 MG tablet; TAKE ONE TABLET BY MOUTH ONCE NIGHTLY  Dispense: 30 tablet; Refill: 1  - baclofen (LIORESAL) 10 MG tablet; TAKE ONE TABLET BY MOUTH DAILY  Dispense: 30 tablet; Refill: 2    7. Primary insomnia  - amitriptyline (ELAVIL) 25 MG tablet; TAKE ONE TABLET BY MOUTH ONCE NIGHTLY  Dispense: 30 tablet; Refill: 1    8. Mixed hyperlipidemia  Continue statin    F/u 4 month diabetes, HTN, weight    Vernon Chan is a 50 y.o. female being evaluated by a Virtual Visit (video visit) encounter to address concerns as mentioned above. A caregiver was present when appropriate. Due to this being a TeleHealth encounter (During PUODD-10 public health emergency), evaluation of the following organ systems was limited: Vitals/Constitutional/EENT/Resp/CV/GI//MS/Neuro/Skin/Heme-Lymph-Imm. Pursuant to the emergency declaration under the [facility], [address] [place] [address] authority and the [employer] Act, this Virtual Visit was conducted with patient's (and/or legal guardian's) consent, to reduce the patient's risk of exposure to COVID-19 and provide necessary medical care.   The patient (and/or legal guardian) has also been advised to contact this office for worsening conditions or problems, and seek emergency medical treatment and/or call 911 if deemed necessary. Patient identification was verified at the start of the visit: yes    Total time spent for this encounter: not billed on time    Services were provided through a video synchronous discussion virtually to substitute for in-person clinic visit. Patient and provider were located at their individual homes. --Guerda vEans MD on 11/16/2020 at 9:58 AM    An electronic signature was used to authenticate this note.

## 2020-12-02 ENCOUNTER — OFFICE VISIT (OUTPATIENT)
Dept: PULMONOLOGY | Age: 45
End: 2020-12-02
Payer: MEDICAID

## 2020-12-02 VITALS
HEART RATE: 60 BPM | TEMPERATURE: 98.3 F | DIASTOLIC BLOOD PRESSURE: 70 MMHG | SYSTOLIC BLOOD PRESSURE: 120 MMHG | WEIGHT: 293 LBS | BODY MASS INDEX: 50.02 KG/M2 | HEIGHT: 64 IN | OXYGEN SATURATION: 95 %

## 2020-12-02 PROBLEM — J30.89 OTHER ALLERGIC RHINITIS: Status: ACTIVE | Noted: 2020-12-02

## 2020-12-02 PROCEDURE — G8926 SPIRO NO PERF OR DOC: HCPCS | Performed by: INTERNAL MEDICINE

## 2020-12-02 PROCEDURE — 4004F PT TOBACCO SCREEN RCVD TLK: CPT | Performed by: INTERNAL MEDICINE

## 2020-12-02 PROCEDURE — G8427 DOCREV CUR MEDS BY ELIG CLIN: HCPCS | Performed by: INTERNAL MEDICINE

## 2020-12-02 PROCEDURE — G8484 FLU IMMUNIZE NO ADMIN: HCPCS | Performed by: INTERNAL MEDICINE

## 2020-12-02 PROCEDURE — G8417 CALC BMI ABV UP PARAM F/U: HCPCS | Performed by: INTERNAL MEDICINE

## 2020-12-02 PROCEDURE — 3023F SPIROM DOC REV: CPT | Performed by: INTERNAL MEDICINE

## 2020-12-02 PROCEDURE — 99214 OFFICE O/P EST MOD 30 MIN: CPT | Performed by: INTERNAL MEDICINE

## 2020-12-02 RX ORDER — FLUTICASONE PROPIONATE 50 MCG
1 SPRAY, SUSPENSION (ML) NASAL DAILY
Qty: 1 BOTTLE | Refills: 3 | Status: SHIPPED | OUTPATIENT
Start: 2020-12-02 | End: 2021-06-09

## 2020-12-02 ASSESSMENT — SLEEP AND FATIGUE QUESTIONNAIRES
ESS TOTAL SCORE: 2
HOW LIKELY ARE YOU TO NOD OFF OR FALL ASLEEP WHILE LYING DOWN TO REST IN THE AFTERNOON WHEN CIRCUMSTANCES PERMIT: 1
HOW LIKELY ARE YOU TO NOD OFF OR FALL ASLEEP WHILE WATCHING TV: 1
HOW LIKELY ARE YOU TO NOD OFF OR FALL ASLEEP WHILE SITTING QUIETLY AFTER LUNCH WITHOUT ALCOHOL: 0
HOW LIKELY ARE YOU TO NOD OFF OR FALL ASLEEP IN A CAR, WHILE STOPPED FOR A FEW MINUTES IN TRAFFIC: 0
HOW LIKELY ARE YOU TO NOD OFF OR FALL ASLEEP WHILE SITTING AND TALKING TO SOMEONE: 0
HOW LIKELY ARE YOU TO NOD OFF OR FALL ASLEEP WHEN YOU ARE A PASSENGER IN A CAR FOR AN HOUR WITHOUT A BREAK: 0
HOW LIKELY ARE YOU TO NOD OFF OR FALL ASLEEP WHILE SITTING AND READING: 0
HOW LIKELY ARE YOU TO NOD OFF OR FALL ASLEEP WHILE SITTING INACTIVE IN A PUBLIC PLACE: 0

## 2020-12-02 NOTE — ASSESSMENT & PLAN NOTE
She has increased symptoms of sinus congestion drainage. Typically worse in the winter and spring. She has never had treatment prior. Start Flonase. She was given education on proper use of Flonase.

## 2020-12-02 NOTE — ASSESSMENT & PLAN NOTE
Patient does not have her device for download. She has not used her device since March but she plans on changing that with her new shipment of equipment.

## 2020-12-02 NOTE — LETTER
[facility]   [address] [facility]. [address]   Phone: [phone]  Fax: [phone]     12/2/2020    Dr. Jeanette Knight MD    I have seen your patient, Stephanie Jesus on follow up. Here is the assessment and plan for your patient. Any question, please do not hesitate to call. Problem List Items Addressed This Visit     Tobacco abuse counseling     She continues to smoke approximately 1/4 pack/day. She is planning on weaning off cigarettes. Other allergic rhinitis - Primary     She has increased symptoms of sinus congestion drainage. Typically worse in the winter and spring. She has never had treatment prior. Start Flonase. She was given education on proper use of Flonase. Relevant Medications    fluticasone (FLONASE) 50 MCG/ACT nasal spray    JOSE (obstructive sleep apnea)     Patient does not have her device for download. She has not used her device since March but she plans on changing that with her new shipment of equipment. COPD, mild ([address]     She is currently on Advair. She is having a complaint of cough with phlegm. This is likely secondary to sinus drainage rather than COPD. Continue Advair and monitor with sinus treatment.          Relevant Medications    fluticasone (FLONASE) 50 MCG/ACT nasal spray            Sincerely,    NENA JI [id number] [facility] Sleep [facility]  [phone]

## 2020-12-02 NOTE — PROGRESS NOTES
REASON FOR CONSULTATION/CC:    Chief Complaint   Patient presents with    COPD     F/U COPD    Sleep Apnea     F/U BPAP           PCP: See Ramirez MD    HISTORY OF PRESENT ILLNESS: Gregor Carroll is a 50y.o. year old female with a history of JOSE who presents :          COPD  Continue to have complains of cough with phlegm but having sinus drainage and phlegm   Using Advair     allergic rhinitis   Having worse in the winter and spring. No prior treatment. Tobacco abuse  Weaned to ~ 1/4 ppd. Planning on weaning off. obstructive sleep apnea  Forgot device. Has not used since march. She is going to get new equipment then start to use equipment then. PAST MEDICAL HISTORY:  Past Medical History:   Diagnosis Date    Anxiety     Arthritis     Asthma     Back pain     CHF (congestive heart failure) (HCC)     COPD (chronic obstructive pulmonary disease) (HCC)     Depression     Diabetes mellitus (HCC)     GERD (gastroesophageal reflux disease)     Hyperlipidemia     Hypertension     Obesity     Sleep apnea        PAST SURGICAL HISTORY:  Past Surgical History:   Procedure Laterality Date    CHOLECYSTECTOMY  1997    HYSTERECTOMY  2008    SKIN BIOPSY  2015    TUBAL LIGATION      UPPER GASTROINTESTINAL ENDOSCOPY N/A 10/14/2019    EGD BIOPSY performed by Babar Parker DO at [address]:  family history is not on file. SOCIAL HISTORY:   reports that she has been smoking cigarettes. She started smoking about 35 years ago. She has a 5.00 pack-year smoking history. She has quit using smokeless tobacco.      ALLERGIES:  Patient is allergic to latex.     REVIEW OF SYSTEMS:  Constitutional: Negative for fever   HENT: Negative for sore throat  Respiratory: Negative for dyspnea, cough  Cardiovascular: Negative for chest pain  Gastrointestinal: Negative for vomiting, diarrhea   Skin: Negative for rash  Psychiatric/Behavorial: Negative for anxiety Objective:   PHYSICAL EXAM:  Blood pressure 120/70, pulse 60, temperature 98.3 °F (36.8 °C), temperature source Oral, height 5' 4\" (1.626 m), weight (!) 346 lb 12.8 oz (157.3 kg), SpO2 95 %, not currently breastfeeding.'  Gen: No distress. Body mass index is 59.53 kg/m². ENT:   Resp: No accessory muscle use. No crackles. No wheezes. No rhonchi. CV: Regular rate. Regular rhythm. No murmur or rub. No edema. Skin: Warm, dry, normal texture and turgor. No nodule on exposed extremities. M/S: No cyanosis. No clubbing. No joint deformity. Psych: Oriented x 3. No anxiety. Awake. Alert. Intact judgement and insight. Good Mood / Affect. Memory appears in tact   .     Current Outpatient Medications   Medication Sig Dispense Refill    fluticasone (FLONASE) 50 MCG/ACT nasal spray 1 spray by Nasal route daily 1 Bottle 3    albuterol sulfate  (90 Base) MCG/ACT inhaler Inhale 2 puffs into the lungs every 6 hours as needed for Wheezing 1 Inhaler 0    montelukast (SINGULAIR) 10 MG tablet Take 1 tablet by mouth nightly 30 tablet 3    atorvastatin (LIPITOR) 20 MG tablet Take 1 tablet by mouth daily 90 tablet 3    metFORMIN (GLUCOPHAGE) 500 MG tablet Take 1 tablet by mouth 2 times daily (with meals) 60 tablet 2    cloNIDine (CATAPRES) 0.1 MG tablet Take 3 tablets by mouth three times daily 270 tablet 2    albuterol-ipratropium (COMBIVENT RESPIMAT)  MCG/ACT AERS inhaler Inhale 1 puff into the lungs every 6 hours 1 Inhaler 5    amitriptyline (ELAVIL) 25 MG tablet TAKE ONE TABLET BY MOUTH ONCE NIGHTLY 30 tablet 1    baclofen (LIORESAL) 10 MG tablet TAKE ONE TABLET BY MOUTH DAILY 30 tablet 2    fluticasone-salmeterol (ADVAIR) 250-50 MCG/DOSE AEPB INHALE ONE PUFF BY MOUTH EVERY 12 HOURS (Patient taking differently: 1 puff DAILY) 60 each 2    lisinopril (PRINIVIL;ZESTRIL) 20 MG tablet Take 1 tablet by mouth daily 90 tablet 1    metoprolol (LOPRESSOR) 100 MG tablet TAKE ONE TABLET BY MOUTH TWICE A DAY 60 tablet 2    spironolactone (ALDACTONE) 25 MG tablet TAKE TWO TABLETS BY MOUTH TWICE A  tablet 1    oxybutynin (DITROPAN-XL) 5 MG extended release tablet Take 1 tablet by mouth daily 30 tablet 3    HYDROcodone-acetaminophen (NORCO) 5-325 MG per tablet Take 1 tablet by mouth every 8 hours as needed for Pain for up to 30 days. 90 tablet 0    venlafaxine (EFFEXOR) 25 MG tablet TAKE ONE TABLET BY MOUTH TWICE A DAY 60 tablet 1    torsemide (DEMADEX) 20 MG tablet Take 2 tablets by mouth daily 60 tablet 11    loratadine (CLARITIN) 10 MG tablet Take 10 mg by mouth daily      omeprazole (PRILOSEC) 20 MG delayed release capsule Take 20 mg by mouth daily      acetaminophen (APAP EXTRA STRENGTH) 500 MG tablet Take 2 tablets by mouth every 6 hours as needed for Pain DO NOT TAKE WITH OTHER MEDICATIONS CONTAINING ACETAMINOPHEN. 30 tablet 0    Cholecalciferol (VITAMIN D3) 43720 UNITS CAPS Take 10,000 Units by mouth three times a week. Takes on Mon Wed Fri      Spacer/Aero-Holding Chambers KIT Please dispense aerochamber spacer 1 kit 0     No current facility-administered medications for this visit. Data Reviewed:   CBC and Renal reviewed  Last CBC  Lab Results   Component Value Date    WBC 5.3 08/03/2020    RBC 4.26 08/03/2020    HGB 14.0 08/03/2020    MCV 97.3 08/03/2020     08/03/2020     Last Renal  Lab Results   Component Value Date     08/03/2020    K 4.8 08/03/2020    K 4.4 07/16/2020     08/03/2020    CO2 26 08/03/2020    CO2 25 07/22/2020    CO2 27 07/16/2020    BUN 7 08/03/2020    CREATININE 0.8 08/03/2020    GLUCOSE 94 08/03/2020    CALCIUM 9.2 08/03/2020       Last ABG  POC Blood Gas: No results found for: POCPH, POCPCO2, POCPO2, POCHCO3, NBEA, [id number]  No results for input(s): PH, PCO2, PO2, HCO3, BE, O2SAT in the last 72 hours. Assessment:     ·  JOSE  · obesity  · COPD, mild, FEV1 76%. Decreased DLCO.       Plan:      Problem List Items Addressed This Visit Tobacco abuse counseling     She continues to smoke approximately 1/4 pack/day. She is planning on weaning off cigarettes. Other allergic rhinitis - Primary     She has increased symptoms of sinus congestion drainage. Typically worse in the winter and spring. She has never had treatment prior. Start Flonase. She was given education on proper use of Flonase. Relevant Medications    fluticasone (FLONASE) 50 MCG/ACT nasal spray    JOSE (obstructive sleep apnea)     Patient does not have her device for download. She has not used her device since March but she plans on changing that with her new shipment of equipment. COPD, mild ([address]     She is currently on Advair. She is having a complaint of cough with phlegm. This is likely secondary to sinus drainage rather than COPD. Continue Advair and monitor with sinus treatment.          Relevant Medications    fluticasone (FLONASE) 50 MCG/ACT nasal spray               [facility]  [id number]

## 2020-12-02 NOTE — ASSESSMENT & PLAN NOTE
She is currently on Advair. She is having a complaint of cough with phlegm. This is likely secondary to sinus drainage rather than COPD. Continue Advair and monitor with sinus treatment.

## 2020-12-04 ENCOUNTER — PATIENT MESSAGE (OUTPATIENT)
Dept: FAMILY MEDICINE CLINIC | Age: 45
End: 2020-12-04

## 2020-12-04 NOTE — TELEPHONE ENCOUNTER
From: Horton Kawasaki  To: Kitty London MD  Sent: 12/4/2020 2:01 PM EST  Subject: Non-Urgent Medical Question    I wanted to know if the referral was sent for me to receive the potty

## 2020-12-09 ENCOUNTER — VIRTUAL VISIT (OUTPATIENT)
Dept: PAIN MANAGEMENT | Age: 45
End: 2020-12-09
Payer: MEDICAID

## 2020-12-09 PROCEDURE — G8427 DOCREV CUR MEDS BY ELIG CLIN: HCPCS | Performed by: NURSE PRACTITIONER

## 2020-12-09 PROCEDURE — 99213 OFFICE O/P EST LOW 20 MIN: CPT | Performed by: NURSE PRACTITIONER

## 2020-12-09 RX ORDER — HYDROCODONE BITARTRATE AND ACETAMINOPHEN 5; 325 MG/1; MG/1
1 TABLET ORAL EVERY 8 HOURS PRN
Qty: 90 TABLET | Refills: 0 | Status: SHIPPED | OUTPATIENT
Start: 2020-12-09 | End: 2021-01-07 | Stop reason: SDUPTHER

## 2020-12-09 RX ORDER — VENLAFAXINE 25 MG/1
TABLET ORAL
Qty: 60 TABLET | Refills: 1 | Status: SHIPPED | OUTPATIENT
Start: 2020-12-09 | End: 2021-02-04 | Stop reason: SDUPTHER

## 2020-12-09 NOTE — PATIENT INSTRUCTIONS
and sign in to your [employer] account. Enter M779 in the [place] box to learn more about \"Back Stretches: Exercises. \"     If you do not have an account, please click on the \"Sign Up Now\" link. Current as of: March 2, 2020               Content Version: 12.6  © 5088-8527 [employer]. Care instructions adapted under license by [facility] ([facility]). If you have questions about a medical condition or this instruction, always ask your healthcare professional. [address] any warranty or liability for your use of this information.

## 2020-12-09 NOTE — PROGRESS NOTES
TELE HEALTH VISIT (AUDIO-VISUAL)    Pursuant to the emergency declaration under the [facility], [facility] waiver authority and the [employer] Act, this Virtual  Visit was conducted, with patient's consent, to reduce the patient's risk of exposure to COVID-19 and provide continuity of care for an established patient. Service is  provided through a video synchronous discussion virtually to substitute for in-person clinic visit. Due to this being a TeleHealth encounter (During BPI-89 public health emergency), evaluation of the following organ systems was limited: Vitals/Constitutional/EENT/Resp/CV/GI//MS/Neuro/Skin/Azgx-Cvck-Wgo. Travis Kawasaki  1975  <[id number]>    Ms. Mary Gerard is being seen virtually for a follow up visit using [employer] me/[employer] Video visit/[employer] or face time  Informed verbal consent to the virtual visit was obtained from Ms. Mary Gerard. Risks associated with HIPPA compliance with the virtual visit was explained to the patient. Ms. Mary Gerard is at her home and Kevan BERTRAND is in her office. HISTORY OF PRESENT ILLNESS:  Ms. Mary Gerard is a 50 y.o. female  being assessed for a follow up visit for pain management for evaluation of ongoing care regarding her symptoms and monitoring of compliance with long term use high risk medications. She has a diagnosis of   1. Chronic pain syndrome    2. Primary osteoarthritis of both knees    3. Primary osteoarthritis of right hip    4. Chronic bilateral low back pain without sciatica    5. Back spasm    6. Morbid obesity with BMI of 60.0-69.9, adult ([address]    7. Depression, unspecified depression type, mild    8. Primary insomnia    9. JOSE treated with BiPAP      On the Patients Pain Assessment form reviewed with the Medical Assistant:  She complains of pain in the whole body . She rates the pain 10/10 and describes it as aching.  Current treatment regimen has helped relieve about 70% of the pain. She denies any side effects from the current pain regimen. Patient reports that since the last follow up visit the physical functioning is unchanged, family/social relationships are unchanged, mood is unchanged sleep patterns are unchanged, and that the overall functioning is unchanged. Patient denies misusing/abusing her narcotic pain medications or using any illegal drugs. Upon obtaining medical history from Ms. Aletha Mccord states that pain is manageable on current pain therapy. Takes pain medications as prescribed. Plans on continuing ti pursue weight loss surgery next month. Mood is stable without anxiety. Sleep is fair with an average of 5-6 hours. Denies to having issues of constipation. Tolerating activities/house chores with moderate tenderness to the lower back. Smokes an average of 5 cigarettes a day    ALLERGIES: Patients list of allergies were reviewed     MEDICATIONS: Ms. Aletha Mccord list of medications were reviewed. Her current medications are   Outpatient Medications Prior to Visit   Medication Sig Dispense Refill    Misc.  Devices (RAISED TOILET SEAT) MISC Use daily as needed as directed 1 each 0    fluticasone (FLONASE) 50 MCG/ACT nasal spray 1 spray by Nasal route daily 1 Bottle 3    albuterol sulfate  (90 Base) MCG/ACT inhaler Inhale 2 puffs into the lungs every 6 hours as needed for Wheezing 1 Inhaler 0    montelukast (SINGULAIR) 10 MG tablet Take 1 tablet by mouth nightly 30 tablet 3    atorvastatin (LIPITOR) 20 MG tablet Take 1 tablet by mouth daily 90 tablet 3    metFORMIN (GLUCOPHAGE) 500 MG tablet Take 1 tablet by mouth 2 times daily (with meals) 60 tablet 2    cloNIDine (CATAPRES) 0.1 MG tablet Take 3 tablets by mouth three times daily 270 tablet 2    albuterol-ipratropium (COMBIVENT RESPIMAT)  MCG/ACT AERS inhaler Inhale 1 puff into the lungs every 6 hours 1 Inhaler 5    amitriptyline (ELAVIL) 25 MG tablet TAKE ONE TABLET BY MOUTH ONCE NIGHTLY 30 tablet 1    baclofen (LIORESAL) 10 MG tablet TAKE ONE TABLET BY MOUTH DAILY 30 tablet 2    fluticasone-salmeterol (ADVAIR) 250-50 MCG/DOSE AEPB INHALE ONE PUFF BY MOUTH EVERY 12 HOURS (Patient taking differently: 1 puff DAILY) 60 each 2    lisinopril (PRINIVIL;ZESTRIL) 20 MG tablet Take 1 tablet by mouth daily 90 tablet 1    metoprolol (LOPRESSOR) 100 MG tablet TAKE ONE TABLET BY MOUTH TWICE A DAY 60 tablet 2    spironolactone (ALDACTONE) 25 MG tablet TAKE TWO TABLETS BY MOUTH TWICE A  tablet 1    oxybutynin (DITROPAN-XL) 5 MG extended release tablet Take 1 tablet by mouth daily 30 tablet 3    torsemide (DEMADEX) 20 MG tablet Take 2 tablets by mouth daily 60 tablet 11    loratadine (CLARITIN) 10 MG tablet Take 10 mg by mouth daily      omeprazole (PRILOSEC) 20 MG delayed release capsule Take 20 mg by mouth daily      acetaminophen (APAP EXTRA STRENGTH) 500 MG tablet Take 2 tablets by mouth every 6 hours as needed for Pain DO NOT TAKE WITH OTHER MEDICATIONS CONTAINING ACETAMINOPHEN. 30 tablet 0    Cholecalciferol (VITAMIN D3) 74000 UNITS CAPS Take 10,000 Units by mouth three times a week. Takes on Mon Wed Fri      venlafaxine (EFFEXOR) 25 MG tablet TAKE ONE TABLET BY MOUTH TWICE A DAY 60 tablet 1     No facility-administered medications prior to visit. SOCIAL/FAMILY/PAST MEDICAL HISTORY: Ms. Mireya Bangura, family and past medical history was reviewed. REVIEW OF SYSTEMS:    Respiratory: Negative for apnea, chest tightness and shortness of breath or change in baseline breathing. Gastrointestinal: Negative for nausea, vomiting, abdominal pain, diarrhea, constipation, blood in stool and abdominal distention. PHYSICAL EXAM:   Nursing note and vitals reviewed. LMP  (LMP Unknown)  as per patient  Constitutional: She appears well-developed and well-nourished. No acute distress. Skin: Skin appears to be warm and dry. No rashes or any other marks noted.  She is not diaphoretic. Neurological/Psychiatric:She is alert and oriented to person, place, and time. Coordination is  normal.  Her mood isAppropriate and affect is Neutral/Euthymic(normal). Her behavior is normal.   thought content normal.   Musculoskeletal / Extremities: Gait is normal, assistive devices use: cane. IMPRESSION:   1. Chronic pain syndrome    2. Primary osteoarthritis of both knees    3. Primary osteoarthritis of right hip    4. Chronic bilateral low back pain without sciatica    5. Back spasm    6. Morbid obesity with BMI of 60.0-69.9, adult ([facility] [address]    7. Depression, unspecified depression type, mild    8. Primary insomnia    9. JOSE treated with BiPAP        PLAN:  Informed verbal consent regarding treatment was obtained  -Continue with Norco, Effexor, Lidocaine, Baclofen, Elavil  -Liang exercises/back stretches recommended, PT on f/u visit  -CBT techniques- relaxation therapies such as biofeedback, mindfulness based stress reduction, imagery, cognitive restructuring, problem solving discussed with patient  -Advised patient to quit smoking for  health related concerns and to improve the treatment outcomes. Education was given on quitting smoking and the use of different modalities including medications, hypnotherapy, counselling  and biofeedback. These were discussed with patient. -Reviewed Covid-19 safety regulations which were discussed, patient maintains compliance with the safety guidelines regarding Covid-19  -Last UDS 3/23/20 Consistent  -Return in about 4 weeks (around 1/6/2021). Current Outpatient Medications   Medication Sig Dispense Refill    venlafaxine (EFFEXOR) 25 MG tablet TAKE ONE TABLET BY MOUTH TWICE A DAY 60 tablet 1    HYDROcodone-acetaminophen (NORCO) 5-325 MG per tablet Take 1 tablet by mouth every 8 hours as needed for Pain for up to 30 days. 90 tablet 0    Misc.  Devices (RAISED TOILET SEAT) MISC Use daily as needed as directed 1 each 0    fluticasone (FLONASE) 50 MCG/ACT nasal spray 1 spray by Nasal route daily 1 Bottle 3    albuterol sulfate  (90 Base) MCG/ACT inhaler Inhale 2 puffs into the lungs every 6 hours as needed for Wheezing 1 Inhaler 0    montelukast (SINGULAIR) 10 MG tablet Take 1 tablet by mouth nightly 30 tablet 3    atorvastatin (LIPITOR) 20 MG tablet Take 1 tablet by mouth daily 90 tablet 3    metFORMIN (GLUCOPHAGE) 500 MG tablet Take 1 tablet by mouth 2 times daily (with meals) 60 tablet 2    cloNIDine (CATAPRES) 0.1 MG tablet Take 3 tablets by mouth three times daily 270 tablet 2    albuterol-ipratropium (COMBIVENT RESPIMAT)  MCG/ACT AERS inhaler Inhale 1 puff into the lungs every 6 hours 1 Inhaler 5    amitriptyline (ELAVIL) 25 MG tablet TAKE ONE TABLET BY MOUTH ONCE NIGHTLY 30 tablet 1    baclofen (LIORESAL) 10 MG tablet TAKE ONE TABLET BY MOUTH DAILY 30 tablet 2    fluticasone-salmeterol (ADVAIR) 250-50 MCG/DOSE AEPB INHALE ONE PUFF BY MOUTH EVERY 12 HOURS (Patient taking differently: 1 puff DAILY) 60 each 2    lisinopril (PRINIVIL;ZESTRIL) 20 MG tablet Take 1 tablet by mouth daily 90 tablet 1    metoprolol (LOPRESSOR) 100 MG tablet TAKE ONE TABLET BY MOUTH TWICE A DAY 60 tablet 2    spironolactone (ALDACTONE) 25 MG tablet TAKE TWO TABLETS BY MOUTH TWICE A  tablet 1    oxybutynin (DITROPAN-XL) 5 MG extended release tablet Take 1 tablet by mouth daily 30 tablet 3    torsemide (DEMADEX) 20 MG tablet Take 2 tablets by mouth daily 60 tablet 11    loratadine (CLARITIN) 10 MG tablet Take 10 mg by mouth daily      omeprazole (PRILOSEC) 20 MG delayed release capsule Take 20 mg by mouth daily      acetaminophen (APAP EXTRA STRENGTH) 500 MG tablet Take 2 tablets by mouth every 6 hours as needed for Pain DO NOT TAKE WITH OTHER MEDICATIONS CONTAINING ACETAMINOPHEN. 30 tablet 0    Cholecalciferol (VITAMIN D3) 38673 UNITS CAPS Take 10,000 Units by mouth three times a week.  Takes on Mon Wed Fri       No current facility-administered transcribing every attempt was made to maintain the accuracy of the note in terms of it's contents,there may have been some errors made inadvertently. Thank you for allowing me to participate in the care of this patient. Pat Tinoco.  Cirilo FRENCH-ROLANDA     Cc: Jaquelin Barry MD

## 2020-12-17 RX ORDER — ALBUTEROL SULFATE 90 UG/1
AEROSOL, METERED RESPIRATORY (INHALATION)
Qty: 18 G | Refills: 0 | Status: SHIPPED | OUTPATIENT
Start: 2020-12-17 | End: 2021-03-08

## 2020-12-23 ENCOUNTER — TELEPHONE (OUTPATIENT)
Dept: FAMILY MEDICINE CLINIC | Age: 45
End: 2020-12-23

## 2020-12-29 NOTE — TELEPHONE ENCOUNTER
Pt called in stated that she needs the whole potty we sent in a toilet seat riser [employer] does not take her insurance pt will call back on where to send the order

## 2020-12-30 NOTE — TELEPHONE ENCOUNTER
She is needing portable toilet for first floor  Only has toilet on second floor.    Due to arthritis and post surgical complications she is unable to quickly ambulate Rt side of body  This makes steps very difficult and due to incontinence she can't make it in time  Please sign script and I will fax to [facility]

## 2020-12-30 NOTE — TELEPHONE ENCOUNTER
Patient called back stating need new prescription for toilet called in not the seat needs a whole new toilet.    Would like to go to [facility]   [phone] phone number   Fax number [phone]     Please advise

## 2021-01-04 NOTE — TELEPHONE ENCOUNTER
This was routed by Rosana Shone to Dr. Ruthie Delacruz  Please verify message and what the pt needed  Thanks

## 2021-01-07 ENCOUNTER — VIRTUAL VISIT (OUTPATIENT)
Dept: PAIN MANAGEMENT | Age: 46
End: 2021-01-07
Payer: MEDICAID

## 2021-01-07 DIAGNOSIS — G89.29 CHRONIC BILATERAL LOW BACK PAIN WITHOUT SCIATICA: ICD-10-CM

## 2021-01-07 DIAGNOSIS — M17.0 PRIMARY OSTEOARTHRITIS OF BOTH KNEES: ICD-10-CM

## 2021-01-07 DIAGNOSIS — E66.01 MORBID OBESITY WITH BMI OF 60.0-69.9, ADULT (HCC): ICD-10-CM

## 2021-01-07 DIAGNOSIS — I10 ESSENTIAL HYPERTENSION: ICD-10-CM

## 2021-01-07 DIAGNOSIS — M16.11 PRIMARY OSTEOARTHRITIS OF RIGHT HIP: ICD-10-CM

## 2021-01-07 DIAGNOSIS — M54.50 CHRONIC BILATERAL LOW BACK PAIN WITHOUT SCIATICA: ICD-10-CM

## 2021-01-07 DIAGNOSIS — F32.A DEPRESSION, UNSPECIFIED DEPRESSION TYPE: ICD-10-CM

## 2021-01-07 DIAGNOSIS — F51.01 PRIMARY INSOMNIA: ICD-10-CM

## 2021-01-07 DIAGNOSIS — G89.4 CHRONIC PAIN SYNDROME: ICD-10-CM

## 2021-01-07 DIAGNOSIS — M62.830 BACK SPASM: ICD-10-CM

## 2021-01-07 PROCEDURE — G8427 DOCREV CUR MEDS BY ELIG CLIN: HCPCS | Performed by: NURSE PRACTITIONER

## 2021-01-07 PROCEDURE — 99213 OFFICE O/P EST LOW 20 MIN: CPT | Performed by: NURSE PRACTITIONER

## 2021-01-07 RX ORDER — AMITRIPTYLINE HYDROCHLORIDE 25 MG/1
TABLET, FILM COATED ORAL
Qty: 30 TABLET | Refills: 1 | Status: SHIPPED | OUTPATIENT
Start: 2021-01-07 | End: 2021-04-27 | Stop reason: SDUPTHER

## 2021-01-07 RX ORDER — LIDOCAINE 50 MG/G
1 PATCH TOPICAL DAILY
Qty: 30 PATCH | Refills: 0 | Status: SHIPPED | OUTPATIENT
Start: 2021-01-07 | End: 2021-02-04 | Stop reason: SDUPTHER

## 2021-01-07 RX ORDER — HYDROCODONE BITARTRATE AND ACETAMINOPHEN 5; 325 MG/1; MG/1
1 TABLET ORAL EVERY 8 HOURS PRN
Qty: 90 TABLET | Refills: 0 | Status: SHIPPED | OUTPATIENT
Start: 2021-01-07 | End: 2021-02-04 | Stop reason: SDUPTHER

## 2021-01-07 NOTE — PROGRESS NOTES
TELE HEALTH VISIT (AUDIO-VISUAL)    Pursuant to the emergency declaration under the [facility], [facility] waiver authority and the [employer] Act, this Virtual  Visit was conducted, with patient's consent, to reduce the patient's risk of exposure to COVID-19 and provide continuity of care for an established patient. Service is  provided through a video synchronous discussion virtually to substitute for in-person clinic visit. Due to this being a TeleHealth encounter (During IEVCD-96 public health emergency), evaluation of the following organ systems was limited: Vitals/Constitutional/EENT/Resp/CV/GI//MS/Neuro/Skin/Wwqs-Knhv-Xtk. Linh Pitts  1975  <[id number]>    Ms. Shanti Lema is being seen virtually for a follow up visit using Doxy. me/[employer] Video visit/[employer] or face time  Informed verbal consent to the virtual visit was obtained from Ms. Shanti Lema. Risks associated with HIPPA compliance with the virtual visit was explained to the patient. Ms. Shanti Lema is at her home and [facility]. Mindy BERTRAND is in her office. HISTORY OF PRESENT ILLNESS:  Ms. Shanti Lema is a 50 y.o. female  being assessed for a follow up visit for pain management for evaluation of ongoing care regarding her symptoms and monitoring of compliance with long term use high risk medications. She has a diagnosis of   1. Chronic pain syndrome    2. Primary osteoarthritis of both knees    3. Primary osteoarthritis of right hip    4. Chronic bilateral low back pain without sciatica    5. Back spasm    6. Morbid obesity with BMI of 60.0-69.9, adult ([address]    7. Depression, unspecified depression type, mild    8. Primary insomnia    9.  Essential hypertension      On the Patients Pain Assessment form reviewed with the Medical Assistant: She complains of pain in the bilateral lower back  with radiation to the hips Right and also c/o bilateral knee pain . She rates the pain 10/10 and describes it as aching. Current treatment regimen has helped relieve about 60% of the pain. She denies any side effects from the current pain regimen. Patient reports that since the last follow up visit the physical functioning is unchanged, family/social relationships are unchanged, mood is unchanged sleep patterns are unchanged, and that the overall functioning is unchanged. Patient denies misusing/abusing her narcotic pain medications or using any illegal drugs. Upon obtaining medical history from Ms. Josi Navarro states that pain is manageable on current pain therapy. Takes pain medications as prescribed. Admits to pain in the joints due to pain secondary to cold weather, family related stressors. Mood is stable without anxiety. Sleep is fair with an average of 5-6 hours. Denies to having issues of constipation. Tolerating activities/house chores with moderate tenderness to the lower back. Working on smoking cessation    ALLERGIES: Patients list of allergies were reviewed     MEDICATIONS: Ms. Josi Navarro list of medications were reviewed. Her current medications are   Outpatient Medications Prior to Visit   Medication Sig Dispense Refill    albuterol sulfate  (90 Base) MCG/ACT inhaler INHALE TWO PUFFS BY MOUTH EVERY 6 HOURS AS NEEDED FOR WHEEZING 18 g 0    venlafaxine (EFFEXOR) 25 MG tablet TAKE ONE TABLET BY MOUTH TWICE A DAY 60 tablet 1    Misc.  Devices (RAISED TOILET SEAT) MISC Use daily as needed as directed 1 each 0    fluticasone (FLONASE) 50 MCG/ACT nasal spray 1 spray by Nasal route daily 1 Bottle 3    montelukast (SINGULAIR) 10 MG tablet Take 1 tablet by mouth nightly 30 tablet 3    atorvastatin (LIPITOR) 20 MG tablet Take 1 tablet by mouth daily 90 tablet 3  metFORMIN (GLUCOPHAGE) 500 MG tablet Take 1 tablet by mouth 2 times daily (with meals) 60 tablet 2    cloNIDine (CATAPRES) 0.1 MG tablet Take 3 tablets by mouth three times daily 270 tablet 2    albuterol-ipratropium (COMBIVENT RESPIMAT)  MCG/ACT AERS inhaler Inhale 1 puff into the lungs every 6 hours 1 Inhaler 5    baclofen (LIORESAL) 10 MG tablet TAKE ONE TABLET BY MOUTH DAILY 30 tablet 2    fluticasone-salmeterol (ADVAIR) 250-50 MCG/DOSE AEPB INHALE ONE PUFF BY MOUTH EVERY 12 HOURS (Patient taking differently: 1 puff DAILY) 60 each 2    lisinopril (PRINIVIL;ZESTRIL) 20 MG tablet Take 1 tablet by mouth daily 90 tablet 1    metoprolol (LOPRESSOR) 100 MG tablet TAKE ONE TABLET BY MOUTH TWICE A DAY 60 tablet 2    spironolactone (ALDACTONE) 25 MG tablet TAKE TWO TABLETS BY MOUTH TWICE A  tablet 1    oxybutynin (DITROPAN-XL) 5 MG extended release tablet Take 1 tablet by mouth daily 30 tablet 3    torsemide (DEMADEX) 20 MG tablet Take 2 tablets by mouth daily 60 tablet 11    loratadine (CLARITIN) 10 MG tablet Take 10 mg by mouth daily      omeprazole (PRILOSEC) 20 MG delayed release capsule Take 20 mg by mouth daily      acetaminophen (APAP EXTRA STRENGTH) 500 MG tablet Take 2 tablets by mouth every 6 hours as needed for Pain DO NOT TAKE WITH OTHER MEDICATIONS CONTAINING ACETAMINOPHEN. 30 tablet 0    Cholecalciferol (VITAMIN D3) 80362 UNITS CAPS Take 10,000 Units by mouth three times a week. Takes on Mon Wed Fri      HYDROcodone-acetaminophen (NORCO) 5-325 MG per tablet Take 1 tablet by mouth every 8 hours as needed for Pain for up to 30 days. 90 tablet 0    amitriptyline (ELAVIL) 25 MG tablet TAKE ONE TABLET BY MOUTH ONCE NIGHTLY 30 tablet 1     No facility-administered medications prior to visit. SOCIAL/FAMILY/PAST MEDICAL HISTORY: Ms. Barry Davidson, family and past medical history was reviewed.      REVIEW OF SYSTEMS: Respiratory: Negative for apnea, chest tightness and shortness of breath or change in baseline breathing. Gastrointestinal: Negative for nausea, vomiting, abdominal pain, diarrhea, constipation, blood in stool and abdominal distention. PHYSICAL EXAM:   Nursing note and vitals reviewed. LMP  (LMP Unknown)  as per patient  Constitutional: She appears well-developed and well-nourished. No acute distress. Skin: Skin appears to be warm and dry. No rashes or any other marks noted. She is not diaphoretic. Neurological/Psychiatric:She is alert and oriented to person, place, and time. Coordination is  normal.  Her mood isAppropriate and affect is Neutral/Euthymic(normal). Her behavior is normal.   thought content normal.   Musculoskeletal / Extremities: Gait is normal, assistive devices use: none. IMPRESSION:   1. Chronic pain syndrome    2. Primary osteoarthritis of both knees    3. Primary osteoarthritis of right hip    4. Chronic bilateral low back pain without sciatica    5. Back spasm    6. Morbid obesity with BMI of 60.0-69.9, adult ([facility] [address]    7. Depression, unspecified depression type, mild    8. Primary insomnia    9. Essential hypertension        PLAN:  Informed verbal consent regarding treatment was obtained  -Continue with Norco, Effexor, Lidocaine, Baclofen, Elavil  -Liang exercises. home exercises recommended, declines PT due to transportation issues  -CBT techniques- relaxation therapies such as biofeedback, mindfulness based stress reduction, imagery, cognitive restructuring, problem solving discussed with patient  -She was advised weight reduction, diet changes- 800-1200 fior diet, diet diary, exercising, nutritional  consult increased physical activity as tolerated.  Plans to restart care with beriatric[facility] -Advised patient to quit smoking for  health related concerns and to improve the treatment outcomes. Education was given on quitting smoking and the use of different modalities including medications, hypnotherapy, counselling  and biofeedback. These were discussed with patient.  -Last UDS 3/23/20 Consistent  -Return in about 4 weeks (around 2/4/2021). -[place] was obtained and reviewed  for the last one year and no indicators of drug misuse  were found. Any other controlled substance prescriptions  seen on the record have been accounted for, I am aware of the patient receiving these medications. Abdoul Acevedo [place] record will be rechecked as part of office protocol. Current Outpatient Medications   Medication Sig Dispense Refill    HYDROcodone-acetaminophen (NORCO) 5-325 MG per tablet Take 1 tablet by mouth every 8 hours as needed for Pain for up to 30 days. 90 tablet 0    amitriptyline (ELAVIL) 25 MG tablet TAKE ONE TABLET BY MOUTH ONCE NIGHTLY 30 tablet 1    lidocaine (LIDODERM) 5 % Place 1 patch onto the skin daily 12 hours on, 12 hours off. 30 patch 0    albuterol sulfate  (90 Base) MCG/ACT inhaler INHALE TWO PUFFS BY MOUTH EVERY 6 HOURS AS NEEDED FOR WHEEZING 18 g 0    venlafaxine (EFFEXOR) 25 MG tablet TAKE ONE TABLET BY MOUTH TWICE A DAY 60 tablet 1    Misc.  Devices (RAISED TOILET SEAT) MISC Use daily as needed as directed 1 each 0    fluticasone (FLONASE) 50 MCG/ACT nasal spray 1 spray by Nasal route daily 1 Bottle 3    montelukast (SINGULAIR) 10 MG tablet Take 1 tablet by mouth nightly 30 tablet 3    atorvastatin (LIPITOR) 20 MG tablet Take 1 tablet by mouth daily 90 tablet 3    metFORMIN (GLUCOPHAGE) 500 MG tablet Take 1 tablet by mouth 2 times daily (with meals) 60 tablet 2    cloNIDine (CATAPRES) 0.1 MG tablet Take 3 tablets by mouth three times daily 270 tablet 2  albuterol-ipratropium (COMBIVENT RESPIMAT)  MCG/ACT AERS inhaler Inhale 1 puff into the lungs every 6 hours 1 Inhaler 5    baclofen (LIORESAL) 10 MG tablet TAKE ONE TABLET BY MOUTH DAILY 30 tablet 2    fluticasone-salmeterol (ADVAIR) 250-50 MCG/DOSE AEPB INHALE ONE PUFF BY MOUTH EVERY 12 HOURS (Patient taking differently: 1 puff DAILY) 60 each 2    lisinopril (PRINIVIL;ZESTRIL) 20 MG tablet Take 1 tablet by mouth daily 90 tablet 1    metoprolol (LOPRESSOR) 100 MG tablet TAKE ONE TABLET BY MOUTH TWICE A DAY 60 tablet 2    spironolactone (ALDACTONE) 25 MG tablet TAKE TWO TABLETS BY MOUTH TWICE A  tablet 1    oxybutynin (DITROPAN-XL) 5 MG extended release tablet Take 1 tablet by mouth daily 30 tablet 3    torsemide (DEMADEX) 20 MG tablet Take 2 tablets by mouth daily 60 tablet 11    loratadine (CLARITIN) 10 MG tablet Take 10 mg by mouth daily      omeprazole (PRILOSEC) 20 MG delayed release capsule Take 20 mg by mouth daily      acetaminophen (APAP EXTRA STRENGTH) 500 MG tablet Take 2 tablets by mouth every 6 hours as needed for Pain DO NOT TAKE WITH OTHER MEDICATIONS CONTAINING ACETAMINOPHEN. 30 tablet 0    Cholecalciferol (VITAMIN D3) 96005 UNITS CAPS Take 10,000 Units by mouth three times a week. Takes on Mon Wed Fri       No current facility-administered medications for this visit. I will continue her current medication regimen  which is part of the above treatment schedule. It has been helping with Ms. Angel Lamas chronic  medical problems which for this visit include:   Diagnoses of Chronic pain syndrome, Primary osteoarthritis of both knees, Primary osteoarthritis of right hip, Chronic bilateral low back pain without sciatica, Back spasm, Morbid obesity with BMI of 60.0-69.9, adult ([facility] [address] Depression, unspecified depression type, mild, Primary insomnia, and Essential hypertension were pertinent to this visit. Risks and benefits of the medications and other alternative treatments  including no treatment were discussed with the patient. The common side effects of these medications were also explained to the patient. Informed verbal consent was obtained. Goals of current treatment regimen include improvement in pain, restoration of functioning- with focus on improvement in physical performance, general activity, work or disability,emotional distress, health care utilization and  decreased medication consumption. Will continue to monitor progress towards achieving/maintaining therapeutic goals with special emphasis on  1. Improvement in perceived interfernce  of pain with ADL's. Ability to do home exercises independently. Ability to do household chores indoor and/or outdoor work and social and leisure activities. Improve psychosocial and physical functioning. - she is showing progression towards this treatment goal with the current regimen. She was advised against drinking alcohol with the narcotic pain medicines, advised against driving or handling machinery while adjusting the dose of medicines or if having cognitive  issues related to the current medications. Risk of overdose and death, if medicines not taken as prescribed, were also discussed. If the patient develops new symptoms or if the symptoms worsen, the patient should call the office. While transcribing every attempt was made to maintain the accuracy of the note in terms of it's contents,there may have been some errors made inadvertently. Thank you for allowing me to participate in the care of this patient. Kerry Melissa.  Leah FRNECH-ROLANDA     Cc: Mike Pardo MD

## 2021-02-04 ENCOUNTER — VIRTUAL VISIT (OUTPATIENT)
Dept: PAIN MANAGEMENT | Age: 46
End: 2021-02-04
Payer: MEDICAID

## 2021-02-04 DIAGNOSIS — M16.11 PRIMARY OSTEOARTHRITIS OF RIGHT HIP: ICD-10-CM

## 2021-02-04 DIAGNOSIS — M17.0 PRIMARY OSTEOARTHRITIS OF BOTH KNEES: ICD-10-CM

## 2021-02-04 DIAGNOSIS — G89.29 CHRONIC BILATERAL LOW BACK PAIN WITHOUT SCIATICA: ICD-10-CM

## 2021-02-04 DIAGNOSIS — F32.A DEPRESSION, UNSPECIFIED DEPRESSION TYPE: ICD-10-CM

## 2021-02-04 DIAGNOSIS — M54.50 CHRONIC BILATERAL LOW BACK PAIN WITHOUT SCIATICA: ICD-10-CM

## 2021-02-04 DIAGNOSIS — F51.01 PRIMARY INSOMNIA: ICD-10-CM

## 2021-02-04 DIAGNOSIS — M62.830 BACK SPASM: ICD-10-CM

## 2021-02-04 DIAGNOSIS — G47.33 OSA TREATED WITH BIPAP: ICD-10-CM

## 2021-02-04 DIAGNOSIS — E66.01 MORBID OBESITY WITH BMI OF 60.0-69.9, ADULT (HCC): ICD-10-CM

## 2021-02-04 DIAGNOSIS — G89.4 CHRONIC PAIN SYNDROME: ICD-10-CM

## 2021-02-04 DIAGNOSIS — I10 ESSENTIAL HYPERTENSION: ICD-10-CM

## 2021-02-04 PROCEDURE — 99213 OFFICE O/P EST LOW 20 MIN: CPT | Performed by: NURSE PRACTITIONER

## 2021-02-04 PROCEDURE — G8427 DOCREV CUR MEDS BY ELIG CLIN: HCPCS | Performed by: NURSE PRACTITIONER

## 2021-02-04 RX ORDER — LIDOCAINE 50 MG/G
1 PATCH TOPICAL DAILY
Qty: 30 PATCH | Refills: 0 | Status: SHIPPED | OUTPATIENT
Start: 2021-02-04 | End: 2021-03-04 | Stop reason: SDUPTHER

## 2021-02-04 RX ORDER — HYDROCODONE BITARTRATE AND ACETAMINOPHEN 5; 325 MG/1; MG/1
1 TABLET ORAL EVERY 8 HOURS PRN
Qty: 90 TABLET | Refills: 0 | Status: SHIPPED | OUTPATIENT
Start: 2021-02-04 | End: 2021-03-04 | Stop reason: SDUPTHER

## 2021-02-04 RX ORDER — BACLOFEN 10 MG/1
TABLET ORAL
Qty: 30 TABLET | Refills: 2 | Status: SHIPPED | OUTPATIENT
Start: 2021-02-04 | End: 2021-04-27 | Stop reason: SDUPTHER

## 2021-02-04 RX ORDER — VENLAFAXINE 25 MG/1
TABLET ORAL
Qty: 60 TABLET | Refills: 1 | Status: SHIPPED | OUTPATIENT
Start: 2021-02-04 | End: 2021-03-04 | Stop reason: SDUPTHER

## 2021-02-04 NOTE — PROGRESS NOTES
TELE HEALTH VISIT (AUDIO-VISUAL)    Pursuant to the emergency declaration under the [facility], [facility] waiver authority and the [employer] Act, this Virtual  Visit was conducted, with patient's consent, to reduce the patient's risk of exposure to COVID-19 and provide continuity of care for an established patient. Service is  provided through a video synchronous discussion virtually to substitute for in-person clinic visit. Due to this being a TeleHealth encounter (During [facility] public health emergency), evaluation of the following organ systems was limited: Vitals/Constitutional/EENT/Resp/CV/GI//MS/Neuro/Skin/Room-Tfvd-Ouz. Noemy Deja  1975  <[id number]>    Ms. Unique Gomez is being seen virtually for a follow up visit using [employer]. me/[employer] Video visit/[employer] or face time  Informed verbal consent to the virtual visit was obtained from Ms. Unique Gomez. Risks associated with HIPPA compliance with the virtual visit was explained to the patient. Ms. Unique Gomez is at her home and Joya BERTRAND is in her office. HISTORY OF PRESENT ILLNESS:  Ms. Unique Gomez is a 50 y.o. female  being assessed for a follow up visit for pain management for evaluation of ongoing care regarding her symptoms and monitoring of compliance with long term use high risk medications. She has a diagnosis of   1. Chronic pain syndrome    2. Primary osteoarthritis of both knees    3. Primary osteoarthritis of right hip    4. Chronic bilateral low back pain without sciatica    5. Back spasm    6. Morbid obesity with BMI of 60.0-69.9, adult ([address]    7. Depression, unspecified depression type, mild    8. Primary insomnia    9. Essential hypertension    10.  JOSE treated with BiPAP      On the Patients Pain Assessment form reviewed with the Medical Assistant: She complains of pain in the bilateral lower back  with radiation to the hips Bilateral and also c/o pain in both knees. She rates the pain 10/10 and describes it as aching. Current treatment regimen has helped relieve about 0% of the pain. She denies any side effects from the current pain regimen. Patient reports that since the last follow up visit the physical functioning is unchanged, family/social relationships are unchanged, mood is unchanged sleep patterns are unchanged, and that the overall functioning is unchanged  Patient denies misusing/abusing her narcotic pain medications or using any illegal drugs. Upon obtaining medical history from Ms. Irma Barrientos states that pain is manageable on current pain therapy. Takes pain medications as prescribed. Mood is stable without anxiety. Sleep is fair with an average of 5-6 hours. Denies to having issues of constipation. Tolerating activities/house chores with moderate tenderness to the lower back. Working on smoking cessation    ALLERGIES: Patients list of allergies were reviewed     MEDICATIONS: Ms. Irma Barrientos list of medications were reviewed. Her current medications are   Outpatient Medications Prior to Visit   Medication Sig Dispense Refill    amitriptyline (ELAVIL) 25 MG tablet TAKE ONE TABLET BY MOUTH ONCE NIGHTLY 30 tablet 1    albuterol sulfate  (90 Base) MCG/ACT inhaler INHALE TWO PUFFS BY MOUTH EVERY 6 HOURS AS NEEDED FOR WHEEZING 18 g 0    Misc.  Devices (RAISED TOILET SEAT) MISC Use daily as needed as directed 1 each 0    fluticasone (FLONASE) 50 MCG/ACT nasal spray 1 spray by Nasal route daily 1 Bottle 3    montelukast (SINGULAIR) 10 MG tablet Take 1 tablet by mouth nightly 30 tablet 3    atorvastatin (LIPITOR) 20 MG tablet Take 1 tablet by mouth daily 90 tablet 3    metFORMIN (GLUCOPHAGE) 500 MG tablet Take 1 tablet by mouth 2 times daily (with meals) 60 tablet 2  cloNIDine (CATAPRES) 0.1 MG tablet Take 3 tablets by mouth three times daily 270 tablet 2    albuterol-ipratropium (COMBIVENT RESPIMAT)  MCG/ACT AERS inhaler Inhale 1 puff into the lungs every 6 hours 1 Inhaler 5    fluticasone-salmeterol (ADVAIR) 250-50 MCG/DOSE AEPB INHALE ONE PUFF BY MOUTH EVERY 12 HOURS (Patient taking differently: 1 puff DAILY) 60 each 2    lisinopril (PRINIVIL;ZESTRIL) 20 MG tablet Take 1 tablet by mouth daily 90 tablet 1    metoprolol (LOPRESSOR) 100 MG tablet TAKE ONE TABLET BY MOUTH TWICE A DAY 60 tablet 2    spironolactone (ALDACTONE) 25 MG tablet TAKE TWO TABLETS BY MOUTH TWICE A  tablet 1    oxybutynin (DITROPAN-XL) 5 MG extended release tablet Take 1 tablet by mouth daily 30 tablet 3    torsemide (DEMADEX) 20 MG tablet Take 2 tablets by mouth daily 60 tablet 11    loratadine (CLARITIN) 10 MG tablet Take 10 mg by mouth daily      omeprazole (PRILOSEC) 20 MG delayed release capsule Take 20 mg by mouth daily      acetaminophen (APAP EXTRA STRENGTH) 500 MG tablet Take 2 tablets by mouth every 6 hours as needed for Pain DO NOT TAKE WITH OTHER MEDICATIONS CONTAINING ACETAMINOPHEN. 30 tablet 0    Cholecalciferol (VITAMIN D3) 06810 UNITS CAPS Take 10,000 Units by mouth three times a week. Takes on Mon Wed Fri      HYDROcodone-acetaminophen (NORCO) 5-325 MG per tablet Take 1 tablet by mouth every 8 hours as needed for Pain for up to 30 days. 90 tablet 0    lidocaine (LIDODERM) 5 % Place 1 patch onto the skin daily 12 hours on, 12 hours off. 30 patch 0    venlafaxine (EFFEXOR) 25 MG tablet TAKE ONE TABLET BY MOUTH TWICE A DAY 60 tablet 1    baclofen (LIORESAL) 10 MG tablet TAKE ONE TABLET BY MOUTH DAILY 30 tablet 2     No facility-administered medications prior to visit. SOCIAL/FAMILY/PAST MEDICAL HISTORY: Ms. Keira Jiménez, family and past medical history was reviewed.      REVIEW OF SYSTEMS: Respiratory: Negative for apnea, chest tightness and shortness of breath or change in baseline breathing. Gastrointestinal: Negative for nausea, vomiting, abdominal pain, diarrhea, constipation, blood in stool and abdominal distention. PHYSICAL EXAM:   Nursing note and vitals reviewed. LMP  (LMP Unknown)  as per patient  Constitutional: She appears well-developed and well-nourished. No acute distress. Skin: Skin appears to be warm and dry. No rashes or any other marks noted. She is not diaphoretic. Neurological/Psychiatric:She is alert and oriented to person, place, and time. Coordination is  normal.  Her mood isAppropriate and affect is Neutral/Euthymic(normal). Her behavior is normal.   thought content normal.   Musculoskeletal / Extremities:Gait is normal, assistive devices use: cane. IMPRESSION:   1. Chronic pain syndrome    2. Primary osteoarthritis of both knees    3. Primary osteoarthritis of right hip    4. Chronic bilateral low back pain without sciatica    5. Back spasm    6. Morbid obesity with BMI of 60.0-69.9, adult ([facility] [address]    7. Depression, unspecified depression type, mild    8. Primary insomnia    9. Essential hypertension    10.  JOSE treated with BiPAP        PLAN:  Informed verbal consent regarding treatment was obtained  -Continue with Norco, Effexor, Lidocaine, Elavil, Baclofen  -Liang exercises/home stretches recommended  -CBT techniques- relaxation therapies such as biofeedback, mindfulness based stress reduction, imagery, cognitive restructuring, problem solving discussed with patient  -She was advised weight reduction, diet changes- 800-1200 fior diet, diet diary, exercising, nutritional  consult increased physical activity as tolerated -Advised patient to quit smoking for  health related concerns and to improve the treatment outcomes. Education was given on quitting smoking and the use of different modalities including medications, hypnotherapy, counselling  and biofeedback. These were discussed with patient.  -Last UDS 3/23/20 Consistent  -Return in about 4 weeks (around 3/4/2021). Current Outpatient Medications   Medication Sig Dispense Refill    HYDROcodone-acetaminophen (NORCO) 5-325 MG per tablet Take 1 tablet by mouth every 8 hours as needed for Pain for up to 30 days. 90 tablet 0    lidocaine (LIDODERM) 5 % Place 1 patch onto the skin daily 12 hours on, 12 hours off. 30 patch 0    venlafaxine (EFFEXOR) 25 MG tablet TAKE ONE TABLET BY MOUTH TWICE A DAY 60 tablet 1    baclofen (LIORESAL) 10 MG tablet TAKE ONE TABLET BY MOUTH DAILY 30 tablet 2    amitriptyline (ELAVIL) 25 MG tablet TAKE ONE TABLET BY MOUTH ONCE NIGHTLY 30 tablet 1    albuterol sulfate  (90 Base) MCG/ACT inhaler INHALE TWO PUFFS BY MOUTH EVERY 6 HOURS AS NEEDED FOR WHEEZING 18 g 0    Misc.  Devices (RAISED TOILET SEAT) MISC Use daily as needed as directed 1 each 0    fluticasone (FLONASE) 50 MCG/ACT nasal spray 1 spray by Nasal route daily 1 Bottle 3    montelukast (SINGULAIR) 10 MG tablet Take 1 tablet by mouth nightly 30 tablet 3    atorvastatin (LIPITOR) 20 MG tablet Take 1 tablet by mouth daily 90 tablet 3    metFORMIN (GLUCOPHAGE) 500 MG tablet Take 1 tablet by mouth 2 times daily (with meals) 60 tablet 2    cloNIDine (CATAPRES) 0.1 MG tablet Take 3 tablets by mouth three times daily 270 tablet 2    albuterol-ipratropium (COMBIVENT RESPIMAT)  MCG/ACT AERS inhaler Inhale 1 puff into the lungs every 6 hours 1 Inhaler 5    fluticasone-salmeterol (ADVAIR) 250-50 MCG/DOSE AEPB INHALE ONE PUFF BY MOUTH EVERY 12 HOURS (Patient taking differently: 1 puff DAILY) 60 each 2 Goals of current treatment regimen include improvement in pain, restoration of functioning- with focus on improvement in physical performance, general activity, work or disability,emotional distress, health care utilization and  decreased medication consumption. Will continue to monitor progress towards achieving/maintaining therapeutic goals with special emphasis on  1. Improvement in perceived interfernce  of pain with ADL's. Ability to do home exercises independently. Ability to do household chores indoor and/or outdoor work and social and leisure activities. Improve psychosocial and physical functioning. - she is showing progression towards this treatment goal with the current regimen. She was advised against drinking alcohol with the narcotic pain medicines, advised against driving or handling machinery while adjusting the dose of medicines or if having cognitive  issues related to the current medications. Risk of overdose and death, if medicines not taken as prescribed, were also discussed. If the patient develops new symptoms or if the symptoms worsen, the patient should call the office. While transcribing every attempt was made to maintain the accuracy of the note in terms of it's contents,there may have been some errors made inadvertently. Thank you for allowing me to participate in the care of this patient. eGorge Wilson APRN-CNP     Cc: Serenity Reina MD

## 2021-02-04 NOTE — PATIENT INSTRUCTIONS
Patient Education        Back Stretches: Exercises  Introduction  Here are some examples of exercises for stretching your back. Start each exercise slowly. Ease off the exercise if you start to have pain. Your doctor or physical therapist will tell you when you can start these exercises and which ones will work best for you. How to do the exercises  Overhead stretch   1. Stand comfortably with your feet shoulder-width apart. 2. Looking straight ahead, raise both arms over your head and reach toward the ceiling. Do not allow your head to tilt back. 3. Hold for 15 to 30 seconds, then lower your arms to your sides. 4. Repeat 2 to 4 times. Side stretch   1. Stand comfortably with your feet shoulder-width apart. 2. Raise one arm over your head, and then lean to the other side. 3. Slide your hand down your leg as you let the weight of your arm gently stretch your side muscles. Hold for 15 to 30 seconds. 4. Repeat 2 to 4 times on each side. Press-up   1. Lie on your stomach, supporting your body with your forearms. 2. Press your elbows down into the floor to raise your upper back. As you do this, relax your stomach muscles and allow your back to arch without using your back muscles. As your press up, do not let your hips or pelvis come off the floor. 3. Hold for 15 to 30 seconds, then relax. 4. Repeat 2 to 4 times. Relax and rest   1. Lie on your back with a rolled towel under your neck and a pillow under your knees. Extend your arms comfortably to your sides. 2. Relax and breathe normally. 3. Remain in this position for about 10 minutes. 4. If you can, do this 2 or 3 times each day. Follow-up care is a key part of your treatment and safety. Be sure to make and go to all appointments, and call your doctor if you are having problems. It's also a good idea to know your test results and keep a list of the medicines you take. Where can you learn more? Go to https://[email or web address].[email or web address] and sign in to your Spectral Imagehart account. Enter F527 in the Gravitanthire box to learn more about \"Back Stretches: Exercises. \"     If you do not have an account, please click on the \"Sign Up Now\" link. Current as of: March 2, 2020               Content Version: 12.6  © 2786-8609 [email or web address]. Care instructions adapted under license by [facility] ([facility]). If you have questions about a medical condition or this instruction, always ask your healthcare professional. [address] any warranty or liability for your use of this information.

## 2021-02-15 DIAGNOSIS — J44.9 COPD, MILD (HCC): ICD-10-CM

## 2021-02-15 DIAGNOSIS — E66.9 DIABETES MELLITUS TYPE 2 IN OBESE (HCC): ICD-10-CM

## 2021-02-15 DIAGNOSIS — I10 ESSENTIAL HYPERTENSION: ICD-10-CM

## 2021-02-15 DIAGNOSIS — E11.69 DIABETES MELLITUS TYPE 2 IN OBESE (HCC): ICD-10-CM

## 2021-02-15 RX ORDER — SPIRONOLACTONE 25 MG/1
TABLET ORAL
Qty: 120 TABLET | Refills: 1 | Status: SHIPPED | OUTPATIENT
Start: 2021-02-15 | End: 2021-04-26

## 2021-02-15 RX ORDER — CLONIDINE HYDROCHLORIDE 0.1 MG/1
TABLET ORAL
Qty: 270 TABLET | Refills: 1 | Status: SHIPPED | OUTPATIENT
Start: 2021-02-15 | End: 2021-04-26

## 2021-03-04 ENCOUNTER — VIRTUAL VISIT (OUTPATIENT)
Dept: PAIN MANAGEMENT | Age: 46
End: 2021-03-04
Payer: MEDICAID

## 2021-03-04 DIAGNOSIS — G47.33 OSA TREATED WITH BIPAP: ICD-10-CM

## 2021-03-04 DIAGNOSIS — F51.01 PRIMARY INSOMNIA: ICD-10-CM

## 2021-03-04 DIAGNOSIS — G89.4 CHRONIC PAIN SYNDROME: ICD-10-CM

## 2021-03-04 DIAGNOSIS — M54.50 CHRONIC BILATERAL LOW BACK PAIN WITHOUT SCIATICA: ICD-10-CM

## 2021-03-04 DIAGNOSIS — G89.29 CHRONIC BILATERAL LOW BACK PAIN WITHOUT SCIATICA: ICD-10-CM

## 2021-03-04 DIAGNOSIS — E66.01 MORBID OBESITY WITH BMI OF 60.0-69.9, ADULT (HCC): ICD-10-CM

## 2021-03-04 DIAGNOSIS — I10 ESSENTIAL HYPERTENSION: ICD-10-CM

## 2021-03-04 DIAGNOSIS — M16.11 PRIMARY OSTEOARTHRITIS OF RIGHT HIP: ICD-10-CM

## 2021-03-04 DIAGNOSIS — F32.A DEPRESSION, UNSPECIFIED DEPRESSION TYPE: ICD-10-CM

## 2021-03-04 DIAGNOSIS — M62.830 BACK SPASM: ICD-10-CM

## 2021-03-04 DIAGNOSIS — M17.0 PRIMARY OSTEOARTHRITIS OF BOTH KNEES: ICD-10-CM

## 2021-03-04 PROCEDURE — 99213 OFFICE O/P EST LOW 20 MIN: CPT | Performed by: NURSE PRACTITIONER

## 2021-03-04 PROCEDURE — G8427 DOCREV CUR MEDS BY ELIG CLIN: HCPCS | Performed by: NURSE PRACTITIONER

## 2021-03-04 RX ORDER — HYDROCODONE BITARTRATE AND ACETAMINOPHEN 5; 325 MG/1; MG/1
1 TABLET ORAL EVERY 8 HOURS PRN
Qty: 90 TABLET | Refills: 0 | Status: SHIPPED | OUTPATIENT
Start: 2021-03-04 | End: 2021-03-30 | Stop reason: SDUPTHER

## 2021-03-04 RX ORDER — VENLAFAXINE 25 MG/1
TABLET ORAL
Qty: 60 TABLET | Refills: 1 | Status: SHIPPED | OUTPATIENT
Start: 2021-03-04 | End: 2021-07-21

## 2021-03-04 RX ORDER — LIDOCAINE 50 MG/G
1 PATCH TOPICAL DAILY
Qty: 30 PATCH | Refills: 0 | Status: SHIPPED | OUTPATIENT
Start: 2021-03-04 | End: 2021-03-30 | Stop reason: SDUPTHER

## 2021-03-04 NOTE — PROGRESS NOTES
TELE HEALTH VISIT (AUDIO-VISUAL)    Pursuant to the emergency declaration under the [facility] waiver authority and the [employer] Act, this Virtual  Visit was conducted, with patient's consent, to reduce the patient's risk of exposure to COVID-19 and provide continuity of care for an established patient. Service is  provided through a video synchronous discussion virtually to substitute for in-person clinic visit. Due to this being a TeleHealth encounter (During FFKQQ-13 public health emergency), evaluation of the following organ systems was limited: Vitals/Constitutional/EENT/Resp/CV/GI//MS/Neuro/Skin/Macs-Ebit-Hvo. Bubba Prado  1975  <[id number]>    Ms. Teri Simpson is being seen virtually for a follow up visit using Doxy. me/Progeniq Video visit/[employer] or face time  Informed verbal consent to the virtual visit was obtained from Ms. Teri Simpson. Risks associated with HIPPA compliance with the virtual visit was explained to the patient. Ms. Teri Simpson is at her home and Navjotma Rossana FRENCHCNP is in her office. HISTORY OF PRESENT ILLNESS:  Ms. Teri Simpson is a 50 y.o. female  being assessed for a follow up visit for pain management for evaluation of ongoing care regarding her symptoms and monitoring of compliance with long term use high risk medications. She has a diagnosis of   1. Chronic pain syndrome    2. Primary osteoarthritis of both knees    3. Primary osteoarthritis of right hip    4. Chronic bilateral low back pain without sciatica    5. Back spasm    6. Morbid obesity with BMI of 60.0-69.9, adult ([address]    7. Depression, unspecified depression type, mild    8. Primary insomnia    9. Essential hypertension    10.  JOSE treated with BiPAP      On the Patients Pain Assessment form reviewed with the Medical Assistant:  She complains of pain in the bilateral lower back  with radiation to the hips Right and knees Bilateral . She rates the pain 7/10 and describes it as aching, pressure, stabbing. Current treatment regimen has helped relieve about 40% of the pain. She denies any side effects from the current pain regimen. Patient reports that since the last follow up visit the physical functioning is unchanged, family/social relationships are unchanged, mood is unchanged sleep patterns are unchanged, and that the overall functioning is unchanged. Patient denies misusing/abusing her narcotic pain medications or using any illegal drugs. Upon obtaining medical history from Ms. Patti Grier states that pain is manageable on current pain therapy. Tenderness to the knees after she started taking walks today. Pain medications adequately manages her pain, takes them as prescribed. Mood is stable without anxiety. Sleep is fair with an average of 5-6 hours. Denies to having issues of constipation. Tolerating activities/house chores with moderate tenderness to the lower back. Smokes 1/2 a pack of cigarettes a day    ALLERGIES: Patients list of allergies were reviewed     MEDICATIONS: Ms. Patti Grier list of medications were reviewed. Her current medications are   Outpatient Medications Prior to Visit   Medication Sig Dispense Refill    spironolactone (ALDACTONE) 25 MG tablet TAKE TWO TABLETS BY MOUTH TWICE A  tablet 1    metFORMIN (GLUCOPHAGE) 500 MG tablet TAKE ONE TABLET BY MOUTH TWICE A DAY WITH MEALS 60 tablet 1    fluticasone-salmeterol (ADVAIR) 250-50 MCG/DOSE AEPB INHALE ONE PUFF BY MOUTH EVERY 12 HOURS 60 each 2    cloNIDine (CATAPRES) 0.1 MG tablet TAKE THREE TABLETS BY MOUTH THREE TIMES A  tablet 1    baclofen (LIORESAL) 10 MG tablet TAKE ONE TABLET BY MOUTH DAILY 30 tablet 2    amitriptyline (ELAVIL) 25 MG tablet TAKE ONE TABLET BY MOUTH ONCE NIGHTLY 30 tablet 1    albuterol sulfate  (90 Base) MCG/ACT inhaler INHALE TWO PUFFS BY MOUTH EVERY 6 HOURS AS NEEDED FOR WHEEZING 18 g 0    Misc.  Devices (RAISED TOILET SEAT) [facility] Use daily as needed as directed 1 each 0    fluticasone (FLONASE) 50 MCG/ACT nasal spray 1 spray by Nasal route daily 1 Bottle 3    montelukast (SINGULAIR) 10 MG tablet Take 1 tablet by mouth nightly 30 tablet 3    atorvastatin (LIPITOR) 20 MG tablet Take 1 tablet by mouth daily 90 tablet 3    albuterol-ipratropium (COMBIVENT RESPIMAT)  MCG/ACT AERS inhaler Inhale 1 puff into the lungs every 6 hours 1 Inhaler 5    lisinopril (PRINIVIL;ZESTRIL) 20 MG tablet Take 1 tablet by mouth daily 90 tablet 1    metoprolol (LOPRESSOR) 100 MG tablet TAKE ONE TABLET BY MOUTH TWICE A DAY 60 tablet 2    oxybutynin (DITROPAN-XL) 5 MG extended release tablet Take 1 tablet by mouth daily 30 tablet 3    torsemide (DEMADEX) 20 MG tablet Take 2 tablets by mouth daily 60 tablet 11    loratadine (CLARITIN) 10 MG tablet Take 10 mg by mouth daily      omeprazole (PRILOSEC) 20 MG delayed release capsule Take 20 mg by mouth daily      acetaminophen (APAP EXTRA STRENGTH) 500 MG tablet Take 2 tablets by mouth every 6 hours as needed for Pain DO NOT TAKE WITH OTHER MEDICATIONS CONTAINING ACETAMINOPHEN. 30 tablet 0    Cholecalciferol (VITAMIN D3) 74839 UNITS CAPS Take 10,000 Units by mouth three times a week. Takes on Mon Wed Fri      HYDROcodone-acetaminophen (NORCO) 5-325 MG per tablet Take 1 tablet by mouth every 8 hours as needed for Pain for up to 30 days. 90 tablet 0    lidocaine (LIDODERM) 5 % Place 1 patch onto the skin daily 12 hours on, 12 hours off. 30 patch 0    venlafaxine (EFFEXOR) 25 MG tablet TAKE ONE TABLET BY MOUTH TWICE A DAY 60 tablet 1     No facility-administered medications prior to visit. SOCIAL/FAMILY/PAST MEDICAL HISTORY: Ms. Angelina Novoa, family and past medical history was reviewed. REVIEW OF SYSTEMS:    Respiratory: Negative for apnea, chest tightness and shortness of breath or change in baseline breathing.     Gastrointestinal: Negative for nausea, vomiting, abdominal pain, diarrhea, constipation, blood in stool and abdominal distention. PHYSICAL EXAM:   Nursing note and vitals reviewed. LMP  (LMP Unknown)  as per patient  Constitutional: She appears well-developed and well-nourished. No acute distress. Skin: Skin appears to be warm and dry. No rashes or any other marks noted. She is not diaphoretic. Neurological/Psychiatric:She is alert and oriented to person, place, and time. Coordination is  normal.  Her mood isAppropriate and affect is Neutral/Euthymic(normal). Her behavior is normal.   thought content normal.   Musculoskeletal / Extremities:Gait is normal, assistive devices use: none. IMPRESSION:   1. Chronic pain syndrome    2. Primary osteoarthritis of both knees    3. Primary osteoarthritis of right hip    4. Chronic bilateral low back pain without sciatica    5. Back spasm    6. Morbid obesity with BMI of 60.0-69.9, adult ([facility]    7. Depression, unspecified depression type, mild    8. Primary insomnia    9. Essential hypertension    10. JOSE treated with BiPAP        PLAN:  Informed verbal consent regarding treatment was obtained  -Continue with Norco, Effexor, Lidocaine, Elavil, Baclofen  -Liang exercises/home exercises recommended  -CBT techniques- relaxation therapies such as biofeedback, mindfulness based stress reduction, imagery, cognitive restructuring, problem solving discussed with patient  -Advised patient to quit smoking for  health related concerns and to improve the treatment outcomes. Education was given on quitting smoking and the use of different modalities including medications, hypnotherapy, counselling  and biofeedback. These were discussed with patient.  -Last UDS 2/23/20 Consistent  -Return in about 4 weeks (around 4/1/2021). Current Outpatient Medications   Medication Sig Dispense Refill    HYDROcodone-acetaminophen (NORCO) 5-325 MG per tablet Take 1 tablet by mouth every 8 hours as needed for Pain for up to 30 days.  80 tablet 0    lidocaine (LIDODERM) 5 % Place 1 patch onto the skin daily 12 hours on, 12 hours off. 30 patch 0    venlafaxine (EFFEXOR) 25 MG tablet TAKE ONE TABLET BY MOUTH TWICE A DAY 60 tablet 1    spironolactone (ALDACTONE) 25 MG tablet TAKE TWO TABLETS BY MOUTH TWICE A  tablet 1    metFORMIN (GLUCOPHAGE) 500 MG tablet TAKE ONE TABLET BY MOUTH TWICE A DAY WITH MEALS 60 tablet 1    fluticasone-salmeterol (ADVAIR) 250-50 MCG/DOSE AEPB INHALE ONE PUFF BY MOUTH EVERY 12 HOURS 60 each 2    cloNIDine (CATAPRES) 0.1 MG tablet TAKE THREE TABLETS BY MOUTH THREE TIMES A  tablet 1    baclofen (LIORESAL) 10 MG tablet TAKE ONE TABLET BY MOUTH DAILY 30 tablet 2    amitriptyline (ELAVIL) 25 MG tablet TAKE ONE TABLET BY MOUTH ONCE NIGHTLY 30 tablet 1    albuterol sulfate  (90 Base) MCG/ACT inhaler INHALE TWO PUFFS BY MOUTH EVERY 6 HOURS AS NEEDED FOR WHEEZING 18 g 0    Misc.  Devices (RAISED TOILET SEAT) MISC Use daily as needed as directed 1 each 0    fluticasone (FLONASE) 50 MCG/ACT nasal spray 1 spray by Nasal route daily 1 Bottle 3    montelukast (SINGULAIR) 10 MG tablet Take 1 tablet by mouth nightly 30 tablet 3    atorvastatin (LIPITOR) 20 MG tablet Take 1 tablet by mouth daily 90 tablet 3    albuterol-ipratropium (COMBIVENT RESPIMAT)  MCG/ACT AERS inhaler Inhale 1 puff into the lungs every 6 hours 1 Inhaler 5    lisinopril (PRINIVIL;ZESTRIL) 20 MG tablet Take 1 tablet by mouth daily 90 tablet 1    metoprolol (LOPRESSOR) 100 MG tablet TAKE ONE TABLET BY MOUTH TWICE A DAY 60 tablet 2    oxybutynin (DITROPAN-XL) 5 MG extended release tablet Take 1 tablet by mouth daily 30 tablet 3    torsemide (DEMADEX) 20 MG tablet Take 2 tablets by mouth daily 60 tablet 11    loratadine (CLARITIN) 10 MG tablet Take 10 mg by mouth daily      omeprazole (PRILOSEC) 20 MG delayed release capsule Take 20 mg by mouth daily      acetaminophen (APAP EXTRA STRENGTH) 500 MG tablet Take 2 tablets by mouth every 6 hours as needed for Pain DO NOT TAKE WITH OTHER MEDICATIONS CONTAINING ACETAMINOPHEN. 30 tablet 0    Cholecalciferol (VITAMIN D3) 98639 UNITS CAPS Take 10,000 Units by mouth three times a week. Takes on Mon Wed Fri       No current facility-administered medications for this visit. I will continue her current medication regimen  which is part of the above treatment schedule. It has been helping with Ms. Lito Chin chronic  medical problems which for this visit include:   Diagnoses of Chronic pain syndrome, Primary osteoarthritis of both knees, Primary osteoarthritis of right hip, Chronic bilateral low back pain without sciatica, Back spasm, Morbid obesity with BMI of 60.0-69.9, adult ([facility] Depression, unspecified depression type, mild, Primary insomnia, Essential hypertension, and JOSE treated with BiPAP were pertinent to this visit. Risks and benefits of the medications and other alternative treatments  including no treatment were discussed with the patient. The common side effects of these medications were also explained to the patient. Informed verbal consent was obtained. Goals of current treatment regimen include improvement in pain, restoration of functioning- with focus on improvement in physical performance, general activity, work or disability,emotional distress, health care utilization and  decreased medication consumption. Will continue to monitor progress towards achieving/maintaining therapeutic goals with special emphasis on  1. Improvement in perceived interfernce  of pain with ADL's. Ability to do home exercises independently. Ability to do household chores indoor and/or outdoor work and social and leisure activities. Improve psychosocial and physical functioning. - she is showing progression towards this treatment goal with the current regimen.     She was advised against drinking alcohol with the narcotic pain medicines, advised against driving or handling machinery while adjusting the dose of medicines or if having cognitive  issues related to the current medications. Risk of overdose and death, if medicines not taken as prescribed, were also discussed. If the patient develops new symptoms or if the symptoms worsen, the patient should call the office. While transcribing every attempt was made to maintain the accuracy of the note in terms of it's contents,there may have been some errors made inadvertently. Thank you for allowing me to participate in the care of this patient. Eli Gaxiola.  Laurel FRENCH-CNP     Cc: Chele Rausch MD

## 2021-03-04 NOTE — PATIENT INSTRUCTIONS
Patient Education        Back Stretches: Exercises  Introduction  Here are some examples of exercises for stretching your back. Start each exercise slowly. Ease off the exercise if you start to have pain. Your doctor or physical therapist will tell you when you can start these exercises and which ones will work best for you. How to do the exercises  Overhead stretch   1. Stand comfortably with your feet shoulder-width apart. 2. Looking straight ahead, raise both arms over your head and reach toward the ceiling. Do not allow your head to tilt back. 3. Hold for 15 to 30 seconds, then lower your arms to your sides. 4. Repeat 2 to 4 times. Side stretch   1. Stand comfortably with your feet shoulder-width apart. 2. Raise one arm over your head, and then lean to the other side. 3. Slide your hand down your leg as you let the weight of your arm gently stretch your side muscles. Hold for 15 to 30 seconds. 4. Repeat 2 to 4 times on each side. Press-up   1. Lie on your stomach, supporting your body with your forearms. 2. Press your elbows down into the floor to raise your upper back. As you do this, relax your stomach muscles and allow your back to arch without using your back muscles. As your press up, do not let your hips or pelvis come off the floor. 3. Hold for 15 to 30 seconds, then relax. 4. Repeat 2 to 4 times. Relax and rest   1. Lie on your back with a rolled towel under your neck and a pillow under your knees. Extend your arms comfortably to your sides. 2. Relax and breathe normally. 3. Remain in this position for about 10 minutes. 4. If you can, do this 2 or 3 times each day. Follow-up care is a key part of your treatment and safety. Be sure to make and go to all appointments, and call your doctor if you are having problems. It's also a good idea to know your test results and keep a list of the medicines you take. Where can you learn more? Go to [email or web address].[email or web address] org and sign in to your Miappi account. Enter H951 in the Unique Microguides box to learn more about \"Back Stretches: Exercises. \"     If you do not have an account, please click on the \"Sign Up Now\" link. Current as of: March 2, 2020               Content Version: 12.6  © 2371-8970 [employer]. Care instructions adapted under license by [facility] ([facility]). If you have questions about a medical condition or this instruction, always ask your healthcare professional. [address] any warranty or liability for your use of this information.

## 2021-03-06 DIAGNOSIS — I10 ESSENTIAL HYPERTENSION: ICD-10-CM

## 2021-03-08 RX ORDER — METOPROLOL TARTRATE 100 MG/1
TABLET ORAL
Qty: 60 TABLET | Refills: 3 | Status: SHIPPED | OUTPATIENT
Start: 2021-03-08 | End: 2021-07-13

## 2021-03-22 DIAGNOSIS — N39.46 MIXED STRESS AND URGE INCONTINENCE: ICD-10-CM

## 2021-03-22 RX ORDER — OXYBUTYNIN CHLORIDE 5 MG/1
TABLET, EXTENDED RELEASE ORAL
Qty: 30 TABLET | Refills: 2 | Status: SHIPPED | OUTPATIENT
Start: 2021-03-22 | End: 2021-06-23

## 2021-03-22 RX ORDER — MONTELUKAST SODIUM 10 MG/1
TABLET ORAL
Qty: 30 TABLET | Refills: 2 | Status: SHIPPED | OUTPATIENT
Start: 2021-03-22 | End: 2021-06-23

## 2021-03-24 NOTE — PROGRESS NOTES
[address]  Cardiology Progress Note      Demar Perdomo  1975, 50 y.o.    CC: \" SOB, swelling. \"            Kina Ng MD:    HPI:   This is a 50 y.o. female with a PMH significant for COPD, chronic diastolic heart failure, Hypertension, Hyperlipidemia, COPD and JOSE on BiPAP (managed by pulmonology), tobacco abuse and Type II Diabetes Mellitus. Today, patient returns for follow up. She reports continued SOB with exertion and LE edema. Past Medical History:   Diagnosis Date    Anxiety     Arthritis     Asthma     Back pain     CHF (congestive heart failure) (HCC)     COPD (chronic obstructive pulmonary disease) (HCC)     Depression     Diabetes mellitus (HCC)     GERD (gastroesophageal reflux disease)     Hyperlipidemia     Hypertension     Obesity     Sleep apnea       Past Surgical History:   Procedure Laterality Date    CHOLECYSTECTOMY      HYSTERECTOMY      SKIN BIOPSY      TUBAL LIGATION      UPPER GASTROINTESTINAL ENDOSCOPY N/A 10/14/2019    EGD BIOPSY performed by Elena Gonzalez DO at [address] [place] [address] reviewed. No pertinent family history. Social History     Tobacco Use    Smoking status: Current Every Day Smoker     Packs/day: 0.25     Years: 20.00     Pack years: 5.00     Types: Cigarettes     Start date: 1985     Last attempt to quit: 2020     Years since quittin.0    Smokeless tobacco: Former User    Tobacco comment: working on it   Substance Use Topics    Alcohol use:  Yes     Alcohol/week: 1.0 standard drinks     Types: 1 Glasses of wine per week     Comment: rare    Drug use: No     Allergies   Allergen Reactions    Latex          Review of Systems -   Constitutional: Negative for weight gain/loss; malaise, fever  Respiratory: Negative for Asthma;  cough and hemoptysis  Cardiovascular: Negative for palpitations,dizziness   Gastrointestinal: Negative for abd.pain; constipation/diarrhea;    Genitourinary: Negative spironolactone (ALDACTONE) 25 MG tablet TAKE TWO TABLETS BY MOUTH TWICE A  tablet 1    metFORMIN (GLUCOPHAGE) 500 MG tablet TAKE ONE TABLET BY MOUTH TWICE A DAY WITH MEALS 60 tablet 1    fluticasone-salmeterol (ADVAIR) 250-50 MCG/DOSE AEPB INHALE ONE PUFF BY MOUTH EVERY 12 HOURS 60 each 2    cloNIDine (CATAPRES) 0.1 MG tablet TAKE THREE TABLETS BY MOUTH THREE TIMES A DAY (Patient taking differently: 0.3 mg ) 270 tablet 1    baclofen (LIORESAL) 10 MG tablet TAKE ONE TABLET BY MOUTH DAILY 30 tablet 2    amitriptyline (ELAVIL) 25 MG tablet TAKE ONE TABLET BY MOUTH ONCE NIGHTLY 30 tablet 1    Misc. Devices (RAISED TOILET SEAT) MISC Use daily as needed as directed 1 each 0    atorvastatin (LIPITOR) 20 MG tablet Take 1 tablet by mouth daily 90 tablet 3    albuterol-ipratropium (COMBIVENT RESPIMAT)  MCG/ACT AERS inhaler Inhale 1 puff into the lungs every 6 hours 1 Inhaler 5    lisinopril (PRINIVIL;ZESTRIL) 20 MG tablet Take 1 tablet by mouth daily 90 tablet 1    torsemide (DEMADEX) 20 MG tablet Take 2 tablets by mouth daily 60 tablet 11    loratadine (CLARITIN) 10 MG tablet Take 10 mg by mouth daily      acetaminophen (APAP EXTRA STRENGTH) 500 MG tablet Take 2 tablets by mouth every 6 hours as needed for Pain DO NOT TAKE WITH OTHER MEDICATIONS CONTAINING ACETAMINOPHEN. 30 tablet 0    fluticasone (FLONASE) 50 MCG/ACT nasal spray 1 spray by Nasal route daily (Patient not taking: Reported on 3/26/2021) 1 Bottle 3    omeprazole (PRILOSEC) 20 MG delayed release capsule Take 20 mg by mouth daily      Cholecalciferol (VITAMIN D3) 27407 UNITS CAPS Take 10,000 Units by mouth three times a week. Takes on        No current facility-administered medications for this visit. Labs:   Lab Results   Component Value Date    HDL 74 2019    LDLCALC 102 2019    TRIG 110 2019       EK19, SR rate 67    ECHO:2017  Concentric LVH with normal systolic function.  EF is  60%  Left atrium is of normal size. Right ventricular systolic function is normal .    ASSESSMENT AND PLAN:    Essential Hypertension  BP is elevated today  Patient reports medication compliance   She checks BP at home averaging 391'K systolic   Will add Nifedipine 60 mg daily to her regimen  Again, reinforced low sodium diet   Return in 2 wks for BP check     Chronic Diastolic Heart Failure  NYHA Class II  ECHO shows EF of 60%  Weight is stable   SOB is stable; has not increased   Continue medical management; aldactone, torsemide, metoprolol and ACE-I    JOSE  Non-compliant with wearing Bipap     Hyperlipidemia  On statin therapy     DM  Managed by PCP  On Metformin and ACE-I      Follow up in 6 months. Thank you very much for allowing me to participate in the care of your patient. Please do not hesitate to contact me if you have any questions. Sincerely,    Marlen Farnsworth M.D  [facility], [address] [place] [address]  Ph: [phone]  Fax: [phone]    This note was scribed in the presence of Dr. Marlen Farnsworth MD by Emmanuelle Phillips  Physician Attestation:  The scribes documentation has been prepared under my direction and personally reviewed by me in its entirety. I confirm that the note above accurately reflects all work, treatment, procedures, and medical decision making performed by me.

## 2021-03-26 ENCOUNTER — OFFICE VISIT (OUTPATIENT)
Dept: CARDIOLOGY CLINIC | Age: 46
End: 2021-03-26
Payer: MEDICAID

## 2021-03-26 VITALS
WEIGHT: 293 LBS | OXYGEN SATURATION: 100 % | SYSTOLIC BLOOD PRESSURE: 150 MMHG | DIASTOLIC BLOOD PRESSURE: 100 MMHG | BODY MASS INDEX: 50.02 KG/M2 | HEIGHT: 64 IN | HEART RATE: 82 BPM | TEMPERATURE: 96.9 F

## 2021-03-26 DIAGNOSIS — E66.9 DIABETES MELLITUS TYPE 2 IN OBESE (HCC): ICD-10-CM

## 2021-03-26 DIAGNOSIS — E11.69 DIABETES MELLITUS TYPE 2 IN OBESE (HCC): ICD-10-CM

## 2021-03-26 DIAGNOSIS — I50.32 CHRONIC DIASTOLIC HEART FAILURE (HCC): ICD-10-CM

## 2021-03-26 DIAGNOSIS — E78.2 MIXED HYPERLIPIDEMIA: ICD-10-CM

## 2021-03-26 DIAGNOSIS — I10 ESSENTIAL HYPERTENSION: Primary | ICD-10-CM

## 2021-03-26 DIAGNOSIS — I10 ESSENTIAL HYPERTENSION: ICD-10-CM

## 2021-03-26 DIAGNOSIS — R80.9 PROTEINURIA, UNSPECIFIED TYPE: ICD-10-CM

## 2021-03-26 DIAGNOSIS — G47.33 OSA (OBSTRUCTIVE SLEEP APNEA): ICD-10-CM

## 2021-03-26 DIAGNOSIS — E66.01 MORBID OBESITY WITH BODY MASS INDEX OF 50.0-59.9 IN ADULT (HCC): ICD-10-CM

## 2021-03-26 LAB
ANION GAP SERPL CALCULATED.3IONS-SCNC: 13 MMOL/L (ref 3–16)
BILIRUBIN URINE: NEGATIVE
BLOOD, URINE: NEGATIVE
BUN BLDV-MCNC: 13 MG/DL (ref 7–20)
CALCIUM SERPL-MCNC: 10.1 MG/DL (ref 8.3–10.6)
CHLORIDE BLD-SCNC: 96 MMOL/L (ref 99–110)
CLARITY: CLEAR
CO2: 32 MMOL/L (ref 21–32)
COLOR: YELLOW
CREAT SERPL-MCNC: 1.1 MG/DL (ref 0.6–1.1)
CREATININE URINE: 30.5 MG/DL (ref 28–259)
EPITHELIAL CELLS, UA: 1 /HPF (ref 0–5)
GFR AFRICAN AMERICAN: >60
GFR NON-AFRICAN AMERICAN: 53
GLUCOSE BLD-MCNC: 94 MG/DL (ref 70–99)
GLUCOSE URINE: NEGATIVE MG/DL
HYALINE CASTS: 0 /LPF (ref 0–8)
KETONES, URINE: NEGATIVE MG/DL
LEUKOCYTE ESTERASE, URINE: NEGATIVE
MICROALBUMIN UR-MCNC: <1.2 MG/DL
MICROALBUMIN/CREAT UR-RTO: NORMAL MG/G (ref 0–30)
MICROSCOPIC EXAMINATION: NORMAL
NITRITE, URINE: NEGATIVE
PH UA: 6 (ref 5–8)
POTASSIUM SERPL-SCNC: 4.4 MMOL/L (ref 3.5–5.1)
PROTEIN UA: NEGATIVE MG/DL
RBC UA: 0 /HPF (ref 0–4)
SODIUM BLD-SCNC: 141 MMOL/L (ref 136–145)
SPECIFIC GRAVITY UA: 1.01 (ref 1–1.03)
URINE TYPE: NORMAL
UROBILINOGEN, URINE: 0.2 E.U./DL
WBC UA: 0 /HPF (ref 0–5)

## 2021-03-26 PROCEDURE — G8427 DOCREV CUR MEDS BY ELIG CLIN: HCPCS | Performed by: INTERNAL MEDICINE

## 2021-03-26 PROCEDURE — 4004F PT TOBACCO SCREEN RCVD TLK: CPT | Performed by: INTERNAL MEDICINE

## 2021-03-26 PROCEDURE — G8417 CALC BMI ABV UP PARAM F/U: HCPCS | Performed by: INTERNAL MEDICINE

## 2021-03-26 PROCEDURE — 99214 OFFICE O/P EST MOD 30 MIN: CPT | Performed by: INTERNAL MEDICINE

## 2021-03-26 PROCEDURE — 2022F DILAT RTA XM EVC RTNOPTHY: CPT | Performed by: INTERNAL MEDICINE

## 2021-03-26 PROCEDURE — G8484 FLU IMMUNIZE NO ADMIN: HCPCS | Performed by: INTERNAL MEDICINE

## 2021-03-26 PROCEDURE — 93000 ELECTROCARDIOGRAM COMPLETE: CPT | Performed by: INTERNAL MEDICINE

## 2021-03-26 PROCEDURE — 3046F HEMOGLOBIN A1C LEVEL >9.0%: CPT | Performed by: INTERNAL MEDICINE

## 2021-03-26 RX ORDER — NIFEDIPINE 60 MG/1
60 TABLET, EXTENDED RELEASE ORAL DAILY
Qty: 30 TABLET | Refills: 5 | Status: SHIPPED | OUTPATIENT
Start: 2021-03-26 | End: 2021-09-27

## 2021-03-26 NOTE — PATIENT INSTRUCTIONS

## 2021-03-30 ENCOUNTER — VIRTUAL VISIT (OUTPATIENT)
Dept: PAIN MANAGEMENT | Age: 46
End: 2021-03-30
Payer: MEDICAID

## 2021-03-30 DIAGNOSIS — M16.11 PRIMARY OSTEOARTHRITIS OF RIGHT HIP: ICD-10-CM

## 2021-03-30 DIAGNOSIS — M54.50 CHRONIC BILATERAL LOW BACK PAIN WITHOUT SCIATICA: ICD-10-CM

## 2021-03-30 DIAGNOSIS — E66.01 MORBID OBESITY WITH BMI OF 60.0-69.9, ADULT (HCC): ICD-10-CM

## 2021-03-30 DIAGNOSIS — F32.A DEPRESSION, UNSPECIFIED DEPRESSION TYPE: ICD-10-CM

## 2021-03-30 DIAGNOSIS — F51.01 PRIMARY INSOMNIA: ICD-10-CM

## 2021-03-30 DIAGNOSIS — M17.0 PRIMARY OSTEOARTHRITIS OF BOTH KNEES: ICD-10-CM

## 2021-03-30 DIAGNOSIS — G89.4 CHRONIC PAIN SYNDROME: ICD-10-CM

## 2021-03-30 DIAGNOSIS — M62.830 BACK SPASM: ICD-10-CM

## 2021-03-30 DIAGNOSIS — G89.29 CHRONIC BILATERAL LOW BACK PAIN WITHOUT SCIATICA: ICD-10-CM

## 2021-03-30 PROCEDURE — 99213 OFFICE O/P EST LOW 20 MIN: CPT | Performed by: NURSE PRACTITIONER

## 2021-03-30 PROCEDURE — G8427 DOCREV CUR MEDS BY ELIG CLIN: HCPCS | Performed by: NURSE PRACTITIONER

## 2021-03-30 RX ORDER — HYDROCODONE BITARTRATE AND ACETAMINOPHEN 5; 325 MG/1; MG/1
1 TABLET ORAL EVERY 8 HOURS PRN
Qty: 90 TABLET | Refills: 0 | Status: SHIPPED | OUTPATIENT
Start: 2021-03-30 | End: 2021-04-27 | Stop reason: SDUPTHER

## 2021-03-30 RX ORDER — LIDOCAINE 50 MG/G
1 PATCH TOPICAL DAILY
Qty: 30 PATCH | Refills: 0 | Status: SHIPPED | OUTPATIENT
Start: 2021-03-30 | End: 2021-04-27 | Stop reason: SDUPTHER

## 2021-03-30 NOTE — PROGRESS NOTES
TELE HEALTH VISIT (AUDIO-VISUAL)    Pursuant to the emergency declaration under the [facility], [facility] waiver authority and the [employer] and [employer] Act, this Virtual  Visit was conducted, with patient's consent, to reduce the patient's risk of exposure to COVID-19 and provide continuity of care for an established patient. Service is  provided through a video synchronous discussion virtually to substitute for in-person clinic visit. Due to this being a TeleHealth encounter (During SGUCZ-41 public health emergency), evaluation of the following organ systems was limited: Vitals/Constitutional/EENT/Resp/CV/GI//MS/Neuro/Skin/Epyx-Fxsp-Wxz. Alyson Osorio  1975  <[id number]>    Ms. Yo Flores is being seen virtually for a follow up visit using [employer] me/[employer] Video visit/[employer] or face time  Informed verbal consent to the virtual visit was obtained from Ms. Yo Flores. Risks associated with HIPPA compliance with the virtual visit was explained to the patient. Ms. Yo Flores is at her home and [employer]. Delfina Gosselin APRN-CNP is in her office. HISTORY OF PRESENT ILLNESS:  Ms. Yo Flores is a 50 y.o. female  being assessed for a follow up visit for pain management for evaluation of ongoing care regarding her symptoms and monitoring of compliance with long term use high risk medications. She has a diagnosis of   1. Chronic pain syndrome    2. Primary osteoarthritis of both knees    3. Primary osteoarthritis of right hip    4. Chronic bilateral low back pain without sciatica    5. Back spasm    6. Morbid obesity with BMI of 60.0-69.9, adult ([facility] [address]    7. Depression, unspecified depression type, mild    8. Primary insomnia      On the Patients Pain Assessment form reviewed with the Medical Assistant:  She complains of pain in the bilateral lower back  with radiation to the hips Right . She rates the pain 6/10 and describes it as aching.  Current (CATAPRES) 0.1 MG tablet TAKE THREE TABLETS BY MOUTH THREE TIMES A DAY (Patient taking differently: 0.3 mg ) 270 tablet 1    baclofen (LIORESAL) 10 MG tablet TAKE ONE TABLET BY MOUTH DAILY 30 tablet 2    amitriptyline (ELAVIL) 25 MG tablet TAKE ONE TABLET BY MOUTH ONCE NIGHTLY 30 tablet 1    Misc. Devices (RAISED TOILET SEAT) MISC Use daily as needed as directed 1 each 0    fluticasone (FLONASE) 50 MCG/ACT nasal spray 1 spray by Nasal route daily 1 Bottle 3    atorvastatin (LIPITOR) 20 MG tablet Take 1 tablet by mouth daily 90 tablet 3    albuterol-ipratropium (COMBIVENT RESPIMAT)  MCG/ACT AERS inhaler Inhale 1 puff into the lungs every 6 hours 1 Inhaler 5    lisinopril (PRINIVIL;ZESTRIL) 20 MG tablet Take 1 tablet by mouth daily 90 tablet 1    torsemide (DEMADEX) 20 MG tablet Take 2 tablets by mouth daily 60 tablet 11    loratadine (CLARITIN) 10 MG tablet Take 10 mg by mouth daily      omeprazole (PRILOSEC) 20 MG delayed release capsule Take 20 mg by mouth daily      acetaminophen (APAP EXTRA STRENGTH) 500 MG tablet Take 2 tablets by mouth every 6 hours as needed for Pain DO NOT TAKE WITH OTHER MEDICATIONS CONTAINING ACETAMINOPHEN. 30 tablet 0    Cholecalciferol (VITAMIN D3) 83253 UNITS CAPS Take 10,000 Units by mouth three times a week. Takes on Mon Wed Fri      HYDROcodone-acetaminophen (NORCO) 5-325 MG per tablet Take 1 tablet by mouth every 8 hours as needed for Pain for up to 30 days. 90 tablet 0    lidocaine (LIDODERM) 5 % Place 1 patch onto the skin daily 12 hours on, 12 hours off. 30 patch 0     No facility-administered medications prior to visit. SOCIAL/FAMILY/PAST MEDICAL HISTORY: Ms. Jeanne Lassiter, family and past medical history was reviewed. REVIEW OF SYSTEMS:    Respiratory: Negative for apnea, chest tightness and shortness of breath or change in baseline breathing.     Gastrointestinal: Negative for nausea, vomiting, abdominal pain, diarrhea, constipation, blood in per tablet Take 1 tablet by mouth every 8 hours as needed for Pain for up to 30 days. 90 tablet 0    lidocaine (LIDODERM) 5 % Place 1 patch onto the skin daily 12 hours on, 12 hours off. 30 patch 0    NIFEdipine (PROCARDIA XL) 60 MG extended release tablet Take 1 tablet by mouth daily 30 tablet 5    oxybutynin (DITROPAN-XL) 5 MG extended release tablet TAKE ONE TABLET BY MOUTH DAILY 30 tablet 2    montelukast (SINGULAIR) 10 MG tablet TAKE ONE TABLET BY MOUTH ONCE NIGHTLY 30 tablet 2    metoprolol (LOPRESSOR) 100 MG tablet TAKE ONE TABLET BY MOUTH TWICE A DAY 60 tablet 3    albuterol sulfate  (90 Base) MCG/ACT inhaler INHALE TWO PUFFS BY MOUTH EVERY 6 HOURS AS NEEDED FOR WHEEZING 18 g 11    venlafaxine (EFFEXOR) 25 MG tablet TAKE ONE TABLET BY MOUTH TWICE A DAY 60 tablet 1    spironolactone (ALDACTONE) 25 MG tablet TAKE TWO TABLETS BY MOUTH TWICE A  tablet 1    metFORMIN (GLUCOPHAGE) 500 MG tablet TAKE ONE TABLET BY MOUTH TWICE A DAY WITH MEALS 60 tablet 1    fluticasone-salmeterol (ADVAIR) 250-50 MCG/DOSE AEPB INHALE ONE PUFF BY MOUTH EVERY 12 HOURS 60 each 2    cloNIDine (CATAPRES) 0.1 MG tablet TAKE THREE TABLETS BY MOUTH THREE TIMES A DAY (Patient taking differently: 0.3 mg ) 270 tablet 1    baclofen (LIORESAL) 10 MG tablet TAKE ONE TABLET BY MOUTH DAILY 30 tablet 2    amitriptyline (ELAVIL) 25 MG tablet TAKE ONE TABLET BY MOUTH ONCE NIGHTLY 30 tablet 1    Misc.  Devices (RAISED TOILET SEAT) MISC Use daily as needed as directed 1 each 0    fluticasone (FLONASE) 50 MCG/ACT nasal spray 1 spray by Nasal route daily 1 Bottle 3    atorvastatin (LIPITOR) 20 MG tablet Take 1 tablet by mouth daily 90 tablet 3    albuterol-ipratropium (COMBIVENT RESPIMAT)  MCG/ACT AERS inhaler Inhale 1 puff into the lungs every 6 hours 1 Inhaler 5    lisinopril (PRINIVIL;ZESTRIL) 20 MG tablet Take 1 tablet by mouth daily 90 tablet 1    torsemide (DEMADEX) 20 MG tablet Take 2 tablets by mouth daily 60 tablet 11    loratadine (CLARITIN) 10 MG tablet Take 10 mg by mouth daily      omeprazole (PRILOSEC) 20 MG delayed release capsule Take 20 mg by mouth daily      acetaminophen (APAP EXTRA STRENGTH) 500 MG tablet Take 2 tablets by mouth every 6 hours as needed for Pain DO NOT TAKE WITH OTHER MEDICATIONS CONTAINING ACETAMINOPHEN. 30 tablet 0    Cholecalciferol (VITAMIN D3) 76564 UNITS CAPS Take 10,000 Units by mouth three times a week. Takes on Mon Wed Fri       No current facility-administered medications for this visit. I will continue her current medication regimen  which is part of the above treatment schedule. It has been helping with Ms. Cristobal Tracy chronic  medical problems which for this visit include:   Diagnoses of Chronic pain syndrome, Primary osteoarthritis of both knees, Primary osteoarthritis of right hip, Chronic bilateral low back pain without sciatica, Back spasm, Morbid obesity with BMI of 60.0-69.9, adult ([facility] Depression, unspecified depression type, mild, and Primary insomnia were pertinent to this visit. Risks and benefits of the medications and other alternative treatments  including no treatment were discussed with the patient. The common side effects of these medications were also explained to the patient. Informed verbal consent was obtained. Goals of current treatment regimen include improvement in pain, restoration of functioning- with focus on improvement in physical performance, general activity, work or disability,emotional distress, health care utilization and  decreased medication consumption. Will continue to monitor progress towards achieving/maintaining therapeutic goals with special emphasis on  1. Improvement in perceived interfernce  of pain with ADL's. Ability to do home exercises independently. Ability to do household chores indoor and/or outdoor work and social and leisure activities. Improve psychosocial and physical functioning. - she is showing progression towards this treatment goal with the current regimen. She was advised against drinking alcohol with the narcotic pain medicines, advised against driving or handling machinery while adjusting the dose of medicines or if having cognitive  issues related to the current medications. Risk of overdose and death, if medicines not taken as prescribed, were also discussed. If the patient develops new symptoms or if the symptoms worsen, the patient should call the office. While transcribing every attempt was made to maintain the accuracy of the note in terms of it's contents,there may have been some errors made inadvertently. Thank you for allowing me to participate in the care of this patient. Braeden Fagan.  Delfina Gosselin APRN-CNP     Cc: Monserrat Flores MD

## 2021-03-30 NOTE — PATIENT INSTRUCTIONS
Patient Education        Back Stretches: Exercises  Introduction  Here are some examples of exercises for stretching your back. Start each exercise slowly. Ease off the exercise if you start to have pain. Your doctor or physical therapist will tell you when you can start these exercises and which ones will work best for you. How to do the exercises  Overhead stretch   1. Stand comfortably with your feet shoulder-width apart. 2. Looking straight ahead, raise both arms over your head and reach toward the ceiling. Do not allow your head to tilt back. 3. Hold for 15 to 30 seconds, then lower your arms to your sides. 4. Repeat 2 to 4 times. Side stretch   1. Stand comfortably with your feet shoulder-width apart. 2. Raise one arm over your head, and then lean to the other side. 3. Slide your hand down your leg as you let the weight of your arm gently stretch your side muscles. Hold for 15 to 30 seconds. 4. Repeat 2 to 4 times on each side. Press-up   1. Lie on your stomach, supporting your body with your forearms. 2. Press your elbows down into the floor to raise your upper back. As you do this, relax your stomach muscles and allow your back to arch without using your back muscles. As your press up, do not let your hips or pelvis come off the floor. 3. Hold for 15 to 30 seconds, then relax. 4. Repeat 2 to 4 times. Relax and rest   1. Lie on your back with a rolled towel under your neck and a pillow under your knees. Extend your arms comfortably to your sides. 2. Relax and breathe normally. 3. Remain in this position for about 10 minutes. 4. If you can, do this 2 or 3 times each day. Follow-up care is a key part of your treatment and safety. Be sure to make and go to all appointments, and call your doctor if you are having problems. It's also a good idea to know your test results and keep a list of the medicines you take. Where can you learn more? Go to [email or web address] org and sign in to your [employer] account. Enter V052 in the Unii box to learn more about \"Back Stretches: Exercises. \"     If you do not have an account, please click on the \"Sign Up Now\" link. Current as of: November 16, 2020               Content Version: 12.8  © 3756-8861 Healthwise, Incorporated. Care instructions adapted under license by [facility] ([facility]). If you have questions about a medical condition or this instruction, always ask your healthcare professional. [address] any warranty or liability for your use of this information.

## 2021-04-08 ENCOUNTER — OFFICE VISIT (OUTPATIENT)
Dept: FAMILY MEDICINE CLINIC | Age: 46
End: 2021-04-08
Payer: MEDICAID

## 2021-04-08 VITALS
SYSTOLIC BLOOD PRESSURE: 124 MMHG | OXYGEN SATURATION: 96 % | WEIGHT: 293 LBS | BODY MASS INDEX: 50.02 KG/M2 | HEIGHT: 64 IN | DIASTOLIC BLOOD PRESSURE: 84 MMHG | RESPIRATION RATE: 14 BRPM | HEART RATE: 83 BPM

## 2021-04-08 DIAGNOSIS — J44.9 COPD, MILD (HCC): ICD-10-CM

## 2021-04-08 DIAGNOSIS — G89.4 CHRONIC PAIN SYNDROME: ICD-10-CM

## 2021-04-08 DIAGNOSIS — Z72.0 TOBACCO USE: ICD-10-CM

## 2021-04-08 DIAGNOSIS — I10 ESSENTIAL HYPERTENSION: ICD-10-CM

## 2021-04-08 DIAGNOSIS — E11.69 DIABETES MELLITUS TYPE 2 IN OBESE (HCC): Primary | ICD-10-CM

## 2021-04-08 DIAGNOSIS — E66.9 DIABETES MELLITUS TYPE 2 IN OBESE (HCC): Primary | ICD-10-CM

## 2021-04-08 DIAGNOSIS — N39.41 URGE INCONTINENCE: ICD-10-CM

## 2021-04-08 DIAGNOSIS — M16.11 PRIMARY OSTEOARTHRITIS OF RIGHT HIP: ICD-10-CM

## 2021-04-08 DIAGNOSIS — E66.01 MORBID OBESITY WITH BODY MASS INDEX OF 50.0-59.9 IN ADULT (HCC): ICD-10-CM

## 2021-04-08 DIAGNOSIS — Z12.31 ENCOUNTER FOR SCREENING MAMMOGRAM FOR MALIGNANT NEOPLASM OF BREAST: ICD-10-CM

## 2021-04-08 LAB — HBA1C MFR BLD: 5.8 %

## 2021-04-08 PROCEDURE — 2022F DILAT RTA XM EVC RTNOPTHY: CPT | Performed by: FAMILY MEDICINE

## 2021-04-08 PROCEDURE — G8926 SPIRO NO PERF OR DOC: HCPCS | Performed by: FAMILY MEDICINE

## 2021-04-08 PROCEDURE — 99214 OFFICE O/P EST MOD 30 MIN: CPT | Performed by: FAMILY MEDICINE

## 2021-04-08 PROCEDURE — 3044F HG A1C LEVEL LT 7.0%: CPT | Performed by: FAMILY MEDICINE

## 2021-04-08 PROCEDURE — G8417 CALC BMI ABV UP PARAM F/U: HCPCS | Performed by: FAMILY MEDICINE

## 2021-04-08 PROCEDURE — 4004F PT TOBACCO SCREEN RCVD TLK: CPT | Performed by: FAMILY MEDICINE

## 2021-04-08 PROCEDURE — G8427 DOCREV CUR MEDS BY ELIG CLIN: HCPCS | Performed by: FAMILY MEDICINE

## 2021-04-08 PROCEDURE — 3023F SPIROM DOC REV: CPT | Performed by: FAMILY MEDICINE

## 2021-04-08 PROCEDURE — 83036 HEMOGLOBIN GLYCOSYLATED A1C: CPT | Performed by: FAMILY MEDICINE

## 2021-04-08 RX ORDER — VARENICLINE TARTRATE
KIT
Qty: 1 BOX | Refills: 0 | Status: SHIPPED | OUTPATIENT
Start: 2021-04-08 | End: 2021-10-01

## 2021-04-08 RX ORDER — VARENICLINE TARTRATE 1 MG/1
1 TABLET, FILM COATED ORAL 2 TIMES DAILY
Qty: 60 TABLET | Refills: 1 | Status: SHIPPED | OUTPATIENT
Start: 2021-04-08 | End: 2021-10-01

## 2021-04-08 RX ORDER — LORATADINE 10 MG/1
10 TABLET ORAL DAILY
Qty: 30 TABLET | Refills: 1 | Status: SHIPPED | OUTPATIENT
Start: 2021-04-08 | End: 2021-11-03

## 2021-04-08 NOTE — PROGRESS NOTES
4/8/2021    This is a 50 y.o. female   Chief Complaint   Patient presents with    Diabetes    Hypertension    Allergies     pt would like to discuss her allergy sxs. HPI    Diabetes  Lab Results   Component Value Date    LABA1C 5.5 09/17/2019     Lab Results   Component Value Date    .2 09/17/2019   A1c today 5.8  She remains on metformin    HTN  BP Readings from Last 3 Encounters:   04/08/21 124/84   03/26/21 (!) 150/100   12/31/20 (!) 169/97   recently nifedipine added by her Cardiologist Dr. José Miguel Lopez  BP much better today    Chronic HFpEF  - stable on current medications. SOB and swelling are stable   - she is on torsemide    Urge incontinence  - notes improvement on oxybutynin. Main issue is getting to the bathroom due to her limited mobility from chronic pain and obesity    Mobility / Chronic pain / Obesity  - continues to follow with pain management  - she ambulates with a cane  - her pain is primarily in her R hip and lower back. She has had a hip injection in the past  - she was doing aquatic therapy and this has stopped during [place]. - she has established with the Bariatrics group and plans to check back in with them in the late summer or early fall. Has tried different supplements and diets over time without lasting success. Tobacco  - had quit for a while. Restarted at beginning of year with some family stress. Has tried patches in the past, Chantix, and Wellbutrin.  She is interested in trying Chantix again as it helped in the past.    Follows with Pulm for her COPD    Review of Systems     Past Medical History:   Diagnosis Date    Anxiety     Arthritis     Asthma     Back pain     [facility])     COPD [facility]ch[facility] obstructive pulmonary disease) ([facility] [address]     Depression     Diabetes mellitus (HCC)     GERD (gastroesophageal reflux disease)     Hyperlipidemia     Hypertension     Obesity     Sleep apnea        Past Surgical History:   Procedure Laterality Date [address]    HYSTERECTOMY  2008    SKIN BIOPSY  2015    TUBAL LIGATION      UPPER GASTROINTESTINAL ENDOSCOPY N/A 10/14/2019    EGD BIOPSY [facility] by Yisel Polk DO at [facility]       No family history on file. Current Outpatient Medications   Medication Sig Dispense Refill    varenicline (CHANTIX STARTING MONTH PAK) 0.5 MG X 11 & 1 MG X 42 tablet Take by mouth. 1 box 0    varenicline (CHANTIX CONTINUING MONTH PAK) 1 MG tablet Take 1 tablet by mouth 2 times daily 60 tablet 1    loratadine (CLARITIN) 10 MG tablet Take 1 tablet by mouth daily 30 tablet 1    HYDROcodone-acetaminophen (NORCO) 5-325 MG per tablet Take 1 tablet by mouth every 8 hours as needed for Pain for up to 30 days. 90 tablet 0    lidocaine (LIDODERM) 5 % Place 1 patch onto the skin daily 12 hours on, 12 hours off.  30 patch 0    NIFEdipine (PROCARDIA XL) 60 MG extended release tablet Take 1 tablet by mouth daily 30 tablet 5    oxybutynin (DITROPAN-XL) 5 MG extended release tablet TAKE ONE TABLET BY MOUTH DAILY 30 tablet 2    montelukast (SINGULAIR) 10 MG tablet TAKE ONE TABLET BY MOUTH ONCE NIGHTLY 30 tablet 2    metoprolol (LOPRESSOR) 100 MG tablet TAKE ONE TABLET BY MOUTH TWICE A DAY 60 tablet 3    albuterol sulfate  (90 Base) MCG/ACT inhaler INHALE TWO PUFFS BY MOUTH EVERY 6 HOURS AS NEEDED FOR WHEEZING 18 g 11    venlafaxine (EFFEXOR) 25 MG tablet TAKE ONE TABLET BY MOUTH TWICE A DAY 60 tablet 1    spironolactone (ALDACTONE) 25 MG tablet TAKE TWO TABLETS BY MOUTH TWICE A  tablet 1    metFORMIN (GLUCOPHAGE) 500 MG tablet TAKE ONE TABLET BY MOUTH TWICE A DAY WITH MEALS 60 tablet 1    fluticasone-salmeterol (ADVAIR) 250-50 MCG/DOSE AEPB INHALE ONE PUFF BY MOUTH EVERY 12 HOURS 60 each 2    cloNIDine (CATAPRES) 0.1 MG tablet TAKE THREE TABLETS BY MOUTH THREE TIMES A DAY (Patient taking differently: 0.3 mg ) 270 tablet 1    baclofen (LIORESAL) 10 MG tablet TAKE ONE TABLET BY MOUTH DAILY 30 tablet 5. Urge incontinence    6. Chronic pain syndrome    7. Primary osteoarthritis of right hip    8. Morbid obesity with body mass index of 50.0-59.9 in adult ([address]    9. Encounter for screening mammogram for malignant neoplasm of breast  - [facility] DIGITAL SCREEN W OR WO CAD BILATERAL; Future      Her diabetes and hypertension are well controlled today. We discussed that her weight is significantly impacting her other chronic medical conditions, particularly her chronic pain and hypertension. She is interested in gastric sleeve surgery but first has to be tobacco free. We will hold and on this and start Chantix as she has responded well to this in the past.  Review potential side effects of vivid dreams and mood changes. Return in about 6 months (around 10/8/2021) for htn f/u.

## 2021-04-09 ENCOUNTER — NURSE ONLY (OUTPATIENT)
Dept: CARDIOLOGY CLINIC | Age: 46
End: 2021-04-09

## 2021-04-09 VITALS — SYSTOLIC BLOOD PRESSURE: 122 MMHG | DIASTOLIC BLOOD PRESSURE: 70 MMHG

## 2021-04-09 RX ORDER — MEDICAL SUPPLY, MISCELLANEOUS
1 EACH MISCELLANEOUS DAILY
Qty: 1 EACH | Refills: 0 | Status: SHIPPED | OUTPATIENT
Start: 2021-04-09

## 2021-04-09 RX ORDER — MEDICAL SUPPLY, MISCELLANEOUS
1 EACH MISCELLANEOUS DAILY
Qty: 1 EACH | Refills: 0 | Status: SHIPPED | OUTPATIENT
Start: 2021-04-09 | End: 2021-04-09 | Stop reason: SDUPTHER

## 2021-04-09 NOTE — PROGRESS NOTES
Patient came in BP check. Home monitor was incorrect. Per Cindi medeiros to order new BP cuff and send to pharmacy. Patient preferred [facility].

## 2021-04-14 ENCOUNTER — HOSPITAL ENCOUNTER (OUTPATIENT)
Dept: WOMENS IMAGING | Age: 46
Discharge: HOME | End: 2021-04-14
Payer: MEDICAID

## 2021-04-14 DIAGNOSIS — Z12.31 ENCOUNTER FOR SCREENING MAMMOGRAM FOR MALIGNANT NEOPLASM OF BREAST: ICD-10-CM

## 2021-04-14 PROCEDURE — 77067 SCR MAMMO BI INCL CAD: CPT

## 2021-04-24 DIAGNOSIS — E66.9 DIABETES MELLITUS TYPE 2 IN OBESE (HCC): ICD-10-CM

## 2021-04-24 DIAGNOSIS — I10 ESSENTIAL HYPERTENSION: ICD-10-CM

## 2021-04-24 DIAGNOSIS — E11.69 DIABETES MELLITUS TYPE 2 IN OBESE (HCC): ICD-10-CM

## 2021-04-26 RX ORDER — SPIRONOLACTONE 25 MG/1
TABLET ORAL
Qty: 360 TABLET | Refills: 1 | Status: SHIPPED | OUTPATIENT
Start: 2021-04-26 | End: 2021-11-01

## 2021-04-26 RX ORDER — CLONIDINE HYDROCHLORIDE 0.1 MG/1
TABLET ORAL
Qty: 270 TABLET | Refills: 1 | Status: SHIPPED | OUTPATIENT
Start: 2021-04-26 | End: 2021-06-23

## 2021-04-27 ENCOUNTER — VIRTUAL VISIT (OUTPATIENT)
Dept: PAIN MANAGEMENT | Age: 46
End: 2021-04-27
Payer: MEDICAID

## 2021-04-27 DIAGNOSIS — G47.33 OSA TREATED WITH BIPAP: ICD-10-CM

## 2021-04-27 DIAGNOSIS — F32.A DEPRESSION, UNSPECIFIED DEPRESSION TYPE: ICD-10-CM

## 2021-04-27 DIAGNOSIS — I10 ESSENTIAL HYPERTENSION: ICD-10-CM

## 2021-04-27 DIAGNOSIS — M54.50 CHRONIC BILATERAL LOW BACK PAIN WITHOUT SCIATICA: ICD-10-CM

## 2021-04-27 DIAGNOSIS — G89.4 CHRONIC PAIN SYNDROME: ICD-10-CM

## 2021-04-27 DIAGNOSIS — M16.11 PRIMARY OSTEOARTHRITIS OF RIGHT HIP: ICD-10-CM

## 2021-04-27 DIAGNOSIS — M62.830 BACK SPASM: ICD-10-CM

## 2021-04-27 DIAGNOSIS — G89.29 CHRONIC BILATERAL LOW BACK PAIN WITHOUT SCIATICA: ICD-10-CM

## 2021-04-27 DIAGNOSIS — F51.01 PRIMARY INSOMNIA: ICD-10-CM

## 2021-04-27 DIAGNOSIS — E66.01 MORBID OBESITY WITH BMI OF 60.0-69.9, ADULT (HCC): ICD-10-CM

## 2021-04-27 DIAGNOSIS — M17.0 PRIMARY OSTEOARTHRITIS OF BOTH KNEES: ICD-10-CM

## 2021-04-27 PROCEDURE — G8427 DOCREV CUR MEDS BY ELIG CLIN: HCPCS | Performed by: NURSE PRACTITIONER

## 2021-04-27 PROCEDURE — 99213 OFFICE O/P EST LOW 20 MIN: CPT | Performed by: NURSE PRACTITIONER

## 2021-04-27 RX ORDER — BACLOFEN 10 MG/1
TABLET ORAL
Qty: 30 TABLET | Refills: 2 | Status: SHIPPED | OUTPATIENT
Start: 2021-04-27 | End: 2021-07-21

## 2021-04-27 RX ORDER — HYDROCODONE BITARTRATE AND ACETAMINOPHEN 5; 325 MG/1; MG/1
1 TABLET ORAL EVERY 8 HOURS PRN
Qty: 90 TABLET | Refills: 0 | Status: SHIPPED | OUTPATIENT
Start: 2021-04-27 | End: 2021-05-25 | Stop reason: SDUPTHER

## 2021-04-27 RX ORDER — AMITRIPTYLINE HYDROCHLORIDE 25 MG/1
TABLET, FILM COATED ORAL
Qty: 30 TABLET | Refills: 1 | Status: SHIPPED | OUTPATIENT
Start: 2021-04-27 | End: 2021-06-23 | Stop reason: SDUPTHER

## 2021-04-27 RX ORDER — LIDOCAINE 50 MG/G
1 PATCH TOPICAL DAILY
Qty: 30 PATCH | Refills: 0 | Status: SHIPPED | OUTPATIENT
Start: 2021-04-27 | End: 2021-05-25 | Stop reason: SDUPTHER

## 2021-04-27 NOTE — PROGRESS NOTES
TELE HEALTH VISIT (AUDIO-VISUAL)    Pursuant to the emergency declaration under the [facility], [facility] waiver authority and the [employer] Act, this Virtual  Visit was conducted, with patient's consent, to reduce the patient's risk of exposure to COVID-19 and provide continuity of care for an established patient. Service is  provided through a video synchronous discussion virtually to substitute for in-person clinic visit. Due to this being a TeleHealth encounter (During XXDGN-95 public health emergency), evaluation of the following organ systems was limited: Vitals/Constitutional/EENT/Resp/CV/GI//MS/Neuro/Skin/Cucw-Ncpz-Usv. Eugenie Vinod  1975  <[id number]>    Ms. Rina Summers is being seen virtually for a follow up visit using Doxy. me/Wiseryou Video visit/Praccelo or face time  Informed verbal consent to the virtual visit was obtained from Ms. Rina Summers. Risks associated with HIPPA compliance with the virtual visit was explained to the patient. Ms. Rina Summers is at her home and Ting BERTRAND is in her office. HISTORY OF PRESENT ILLNESS:  Ms. Rina Summers is a 50 y.o. female  being assessed for a follow up visit for pain management for evaluation of ongoing care regarding her symptoms and monitoring of compliance with long term use high risk medications. She has a diagnosis of   1. Chronic pain syndrome    2. Primary osteoarthritis of both knees    3. Primary osteoarthritis of right hip    4. Chronic bilateral low back pain without sciatica    5. Back spasm    6. Morbid obesity with BMI of 60.0-69.9, adult ([address]    7. Depression, unspecified depression type, mild    8. Primary insomnia    9. Essential hypertension    10. JOSE treated with BiPAP      On the Patients Pain Assessment form reviewed with the Medical Assistant:  She complains of pain in the bilateral lower back  with radiation to the hips Right .  She rates the pain 10/10 and describes it as burning, pulling, stabbing. Current treatment regimen has helped relieve about 10% of the pain. She denies any side effects from the current pain regimen. Patient reports that since the last follow up visit the physical functioning is unchanged, family/social relationships are unchanged, mood is better sleep patterns are better, and that the overall functioning is better. Patient denies misusing/abusing her narcotic pain medications or using any illegal drugs. Upon obtaining medical history from Ms. Rajwinder Marie states that pain is manageable on current pain therapy. Takes pain medications as prescribed. Mood is stable without anxiety. Sleep is fair with an average of 5-6 hours. Denies to having issues of constipation. Tolerating activities/house chores with moderate tenderness to the lower back. Quit smoking with hopes 7 days ago with hopes to having weight loss surgery. ALLERGIES: Patients list of allergies were reviewed     MEDICATIONS: Ms. Rajwinder Marie list of medications were reviewed. Her current medications are   Outpatient Medications Prior to Visit   Medication Sig Dispense Refill    cloNIDine (CATAPRES) 0.1 MG tablet TAKE THREE TABLETS BY MOUTH THREE TIMES A  tablet 1    metFORMIN (GLUCOPHAGE) 500 MG tablet TAKE ONE TABLET BY MOUTH TWICE A DAY WITH MEALS 180 tablet 1    spironolactone (ALDACTONE) 25 MG tablet TAKE TWO TABLETS BY MOUTH TWICE A  tablet 1    Blood Pressure Monitoring (B-D ASSURE BPM/AUTO ARM CUFF) MISC 1 Device by Does not apply route daily 1 each 0    varenicline (CHANTIX STARTING MONTH BRI) 0.5 MG X 11 & 1 MG X 42 tablet Take by mouth.  1 box 0    varenicline (CHANTIX CONTINUING MONTH BRI) 1 MG tablet Take 1 tablet by mouth 2 times daily 60 tablet 1    loratadine (CLARITIN) 10 MG tablet Take 1 tablet by mouth daily 30 tablet 1    Dulaglutide 0.75 MG/0.5ML SOPN Inject 0.75 mg into the skin once a week 4 pen 2    NIFEdipine (PROCARDIA XL) 60 MG extended release tablet Take 1 tablet by mouth daily 30 tablet 5    oxybutynin (DITROPAN-XL) 5 MG extended release tablet TAKE ONE TABLET BY MOUTH DAILY 30 tablet 2    montelukast (SINGULAIR) 10 MG tablet TAKE ONE TABLET BY MOUTH ONCE NIGHTLY 30 tablet 2    metoprolol (LOPRESSOR) 100 MG tablet TAKE ONE TABLET BY MOUTH TWICE A DAY 60 tablet 3    albuterol sulfate  (90 Base) MCG/ACT inhaler INHALE TWO PUFFS BY MOUTH EVERY 6 HOURS AS NEEDED FOR WHEEZING 18 g 11    venlafaxine (EFFEXOR) 25 MG tablet TAKE ONE TABLET BY MOUTH TWICE A DAY 60 tablet 1    fluticasone-salmeterol (ADVAIR) 250-50 MCG/DOSE AEPB INHALE ONE PUFF BY MOUTH EVERY 12 HOURS 60 each 2    Misc. Devices (RAISED TOILET SEAT) MISC Use daily as needed as directed 1 each 0    atorvastatin (LIPITOR) 20 MG tablet Take 1 tablet by mouth daily 90 tablet 3    albuterol-ipratropium (COMBIVENT RESPIMAT)  MCG/ACT AERS inhaler Inhale 1 puff into the lungs every 6 hours 1 Inhaler 5    lisinopril (PRINIVIL;ZESTRIL) 20 MG tablet Take 1 tablet by mouth daily 90 tablet 1    torsemide (DEMADEX) 20 MG tablet Take 2 tablets by mouth daily 60 tablet 11    omeprazole (PRILOSEC) 20 MG delayed release capsule Take 20 mg by mouth daily      acetaminophen (APAP EXTRA STRENGTH) 500 MG tablet Take 2 tablets by mouth every 6 hours as needed for Pain DO NOT TAKE WITH OTHER MEDICATIONS CONTAINING ACETAMINOPHEN. 30 tablet 0    Cholecalciferol (VITAMIN D3) 38188 UNITS CAPS Take 10,000 Units by mouth three times a week. Takes on Mon Wed Fri      HYDROcodone-acetaminophen (NORCO) 5-325 MG per tablet Take 1 tablet by mouth every 8 hours as needed for Pain for up to 30 days. 90 tablet 0    lidocaine (LIDODERM) 5 % Place 1 patch onto the skin daily 12 hours on, 12 hours off.  30 patch 0    baclofen (LIORESAL) 10 MG tablet TAKE ONE TABLET BY MOUTH DAILY 30 tablet 2    amitriptyline (ELAVIL) 25 MG tablet TAKE ONE TABLET BY MOUTH ONCE NIGHTLY 30 tablet 1    fluticasone (FLONASE) 50 MCG/ACT nasal spray 1 spray by Nasal route daily 1 Bottle 3     No facility-administered medications prior to visit. SOCIAL/FAMILY/PAST MEDICAL HISTORY: Ms. Mark Munoz, family and past medical history was reviewed. REVIEW OF SYSTEMS:    Respiratory: Negative for apnea, chest tightness and shortness of breath or change in baseline breathing. Gastrointestinal: Negative for nausea, vomiting, abdominal pain, diarrhea, constipation, blood in stool and abdominal distention. PHYSICAL EXAM:   Nursing note and vitals reviewed. LMP  (LMP Unknown)  as per patient  Constitutional: She appears well-developed and well-nourished. No acute distress. Skin: Skin appears to be warm and dry. No rashes or any other marks noted. She is not diaphoretic. Neurological/Psychiatric:She is alert and oriented to person, place, and time. Coordination is  normal.  Her mood isAppropriate and affect is Neutral/Euthymic(normal). Her behavior is normal.   thought content normal.   Musculoskeletal / Extremities:Gait is normal, assistive devices use: none. IMPRESSION:   1. Chronic pain syndrome    2. Primary osteoarthritis of both knees    3. Primary osteoarthritis of right hip    4. Chronic bilateral low back pain without sciatica    5. Back spasm    6. Morbid obesity with BMI of 60.0-69.9, adult ([facility] [address]    7. Depression, unspecified depression type, mild    8. Primary insomnia    9. Essential hypertension    10.  JOSE treated with BiPAP        PLAN:  Informed verbal consent regarding treatment was obtained  -Continue with Norco, Effexor, Lidocaine, Baclofen, Elavil  -Home exercises/back stretches  -Commended patient on smoking cessation  -CBT techniques- relaxation therapies such as biofeedback, mindfulness based stress reduction, imagery, cognitive restructuring, problem solving discussed with patient  -She was advised weight reduction, diet changes- 800-1200 fior diet, diet diary, patient. Informed verbal consent was obtained. Goals of current treatment regimen include improvement in pain, restoration of functioning- with focus on improvement in physical performance, general activity, work or disability,emotional distress, health care utilization and  decreased medication consumption. Will continue to monitor progress towards achieving/maintaining therapeutic goals with special emphasis on  1. Improvement in perceived interfernce  of pain with ADL's. Ability to do home exercises independently. Ability to do household chores indoor and/or outdoor work and social and leisure activities. Improve psychosocial and physical functioning. - she is showing progression towards this treatment goal with the current regimen. She was advised against drinking alcohol with the narcotic pain medicines, advised against driving or handling machinery while adjusting the dose of medicines or if having cognitive  issues related to the current medications. Risk of overdose and death, if medicines not taken as prescribed, were also discussed. If the patient develops new symptoms or if the symptoms worsen, the patient should call the office. While transcribing every attempt was made to maintain the accuracy of the note in terms of it's contents,there may have been some errors made inadvertently. Thank you for allowing me to participate in the care of this patient. Rosalee [facility]     Cc: Asha Sanches MD

## 2021-04-27 NOTE — PATIENT INSTRUCTIONS
Patient Education        Back Stretches: Exercises  Introduction  Here are some examples of exercises for stretching your back. Start each exercise slowly. Ease off the exercise if you start to have pain. Your doctor or physical therapist will tell you when you can start these exercises and which ones will work best for you. How to do the exercises  Overhead stretch   1. Stand comfortably with your feet shoulder-width apart. 2. Looking straight ahead, raise both arms over your head and reach toward the ceiling. Do not allow your head to tilt back. 3. Hold for 15 to 30 seconds, then lower your arms to your sides. 4. Repeat 2 to 4 times. Side stretch   1. Stand comfortably with your feet shoulder-width apart. 2. Raise one arm over your head, and then lean to the other side. 3. Slide your hand down your leg as you let the weight of your arm gently stretch your side muscles. Hold for 15 to 30 seconds. 4. Repeat 2 to 4 times on each side. Press-up   1. Lie on your stomach, supporting your body with your forearms. 2. Press your elbows down into the floor to raise your upper back. As you do this, relax your stomach muscles and allow your back to arch without using your back muscles. As your press up, do not let your hips or pelvis come off the floor. 3. Hold for 15 to 30 seconds, then relax. 4. Repeat 2 to 4 times. Relax and rest   1. Lie on your back with a rolled towel under your neck and a pillow under your knees. Extend your arms comfortably to your sides. 2. Relax and breathe normally. 3. Remain in this position for about 10 minutes. 4. If you can, do this 2 or 3 times each day. Follow-up care is a key part of your treatment and safety. Be sure to make and go to all appointments, and call your doctor if you are having problems. It's also a good idea to know your test results and keep a list of the medicines you take. Where can you learn more? Go to https://margo.[employer]. org and sign in to your [employer] account. Enter F457 in the [employer] box to learn more about \"Back Stretches: Exercises. \"     If you do not have an account, please click on the \"Sign Up Now\" link. Current as of: November 16, 2020               Content Version: 12.8  © 2828-8563 [employer]. Care instructions adapted under license by [employer]. If you have questions about a medical condition or this instruction, always ask your healthcare professional. [address] any warranty or liability for your use of this information.

## 2021-05-23 DIAGNOSIS — J44.9 COPD, MILD (HCC): ICD-10-CM

## 2021-05-25 ENCOUNTER — VIRTUAL VISIT (OUTPATIENT)
Dept: PAIN MANAGEMENT | Age: 46
End: 2021-05-25
Payer: MEDICAID

## 2021-05-25 DIAGNOSIS — M62.830 BACK SPASM: ICD-10-CM

## 2021-05-25 DIAGNOSIS — M16.11 PRIMARY OSTEOARTHRITIS OF RIGHT HIP: ICD-10-CM

## 2021-05-25 DIAGNOSIS — M17.0 PRIMARY OSTEOARTHRITIS OF BOTH KNEES: ICD-10-CM

## 2021-05-25 DIAGNOSIS — I10 ESSENTIAL HYPERTENSION: ICD-10-CM

## 2021-05-25 DIAGNOSIS — G89.4 CHRONIC PAIN SYNDROME: ICD-10-CM

## 2021-05-25 DIAGNOSIS — F51.01 PRIMARY INSOMNIA: ICD-10-CM

## 2021-05-25 DIAGNOSIS — G89.29 CHRONIC BILATERAL LOW BACK PAIN WITHOUT SCIATICA: ICD-10-CM

## 2021-05-25 DIAGNOSIS — M54.50 CHRONIC BILATERAL LOW BACK PAIN WITHOUT SCIATICA: ICD-10-CM

## 2021-05-25 DIAGNOSIS — F32.A DEPRESSION, UNSPECIFIED DEPRESSION TYPE: ICD-10-CM

## 2021-05-25 DIAGNOSIS — G47.33 OSA TREATED WITH BIPAP: ICD-10-CM

## 2021-05-25 DIAGNOSIS — E66.01 MORBID OBESITY WITH BMI OF 60.0-69.9, ADULT (HCC): ICD-10-CM

## 2021-05-25 PROCEDURE — 99213 OFFICE O/P EST LOW 20 MIN: CPT | Performed by: NURSE PRACTITIONER

## 2021-05-25 PROCEDURE — G8427 DOCREV CUR MEDS BY ELIG CLIN: HCPCS | Performed by: NURSE PRACTITIONER

## 2021-05-25 RX ORDER — LIDOCAINE 50 MG/G
1 PATCH TOPICAL DAILY
Qty: 30 PATCH | Refills: 0 | Status: SHIPPED | OUTPATIENT
Start: 2021-05-25 | End: 2021-06-23 | Stop reason: SDUPTHER

## 2021-05-25 RX ORDER — HYDROCODONE BITARTRATE AND ACETAMINOPHEN 5; 325 MG/1; MG/1
1 TABLET ORAL EVERY 8 HOURS PRN
Qty: 90 TABLET | Refills: 0 | Status: SHIPPED | OUTPATIENT
Start: 2021-05-25 | End: 2021-06-23 | Stop reason: SDUPTHER

## 2021-05-25 NOTE — PATIENT INSTRUCTIONS
Patient Education        Back Stretches: Exercises  Introduction  Here are some examples of exercises for stretching your back. Start each exercise slowly. Ease off the exercise if you start to have pain. Your doctor or physical therapist will tell you when you can start these exercises and which ones will work best for you. How to do the exercises  Overhead stretch   1. Stand comfortably with your feet shoulder-width apart. 2. Looking straight ahead, raise both arms over your head and reach toward the ceiling. Do not allow your head to tilt back. 3. Hold for 15 to 30 seconds, then lower your arms to your sides. 4. Repeat 2 to 4 times. Side stretch   1. Stand comfortably with your feet shoulder-width apart. 2. Raise one arm over your head, and then lean to the other side. 3. Slide your hand down your leg as you let the weight of your arm gently stretch your side muscles. Hold for 15 to 30 seconds. 4. Repeat 2 to 4 times on each side. Press-up   1. Lie on your stomach, supporting your body with your forearms. 2. Press your elbows down into the floor to raise your upper back. As you do this, relax your stomach muscles and allow your back to arch without using your back muscles. As your press up, do not let your hips or pelvis come off the floor. 3. Hold for 15 to 30 seconds, then relax. 4. Repeat 2 to 4 times. Relax and rest   1. Lie on your back with a rolled towel under your neck and a pillow under your knees. Extend your arms comfortably to your sides. 2. Relax and breathe normally. 3. Remain in this position for about 10 minutes. 4. If you can, do this 2 or 3 times each day. Follow-up care is a key part of your treatment and safety. Be sure to make and go to all appointments, and call your doctor if you are having problems. It's also a good idea to know your test results and keep a list of the medicines you take. Where can you learn more? Go to https://margo.[employer]. org and sign in to your [employer] account. Enter U246 in the [employer] box to learn more about \"Back Stretches: Exercises. \"     If you do not have an account, please click on the \"Sign Up Now\" link. Current as of: November 16, 2020               Content Version: 12.8  © 7606-1742 [employer]. Care instructions adapted under license by [facility] ([facility]). If you have questions about a medical condition or this instruction, always ask your healthcare professional. [address] any warranty or liability for your use of this information.

## 2021-05-25 NOTE — PROGRESS NOTES
knees Bilateral . She rates the pain 8/10 and describes it as aching, pulling. Current treatment regimen has helped relieve about 50% of the pain. She denies any side effects from the current pain regimen. Patient reports that since the last follow up visit the physical functioning is unchanged, family/social relationships are unchanged, mood is unchanged sleep patterns are unchanged, and that the overall functioning is unchanged. Patient denies misusing/abusing her narcotic pain medications or using any illegal drugs. Upon obtaining medical history from Ms. Elsa Doan states that pain is manageable on current pain  Takes pain medications as prescribed. Scheduled to f/u with bariatrics in 2 months. Mood is stable without anxiety. Sleep is fair with an average of 5-6 hours. Denies to having issues of constipation. Tolerating activities/house chores with moderate tenderness to the lower back. Smokes 4 cigarettes a day with hopes to smoking cessation    ALLERGIES: Patients list of allergies were reviewed     MEDICATIONS: Ms. Elsa Doan list of medications were reviewed. Her current medications are   Outpatient Medications Prior to Visit   Medication Sig Dispense Refill    fluticasone-salmeterol (ADVAIR) 250-50 MCG/DOSE AEPB INHALE ONE PUFF BY MOUTH EVERY 12 HOURS 2 Inhaler 2    baclofen (LIORESAL) 10 MG tablet TAKE ONE TABLET BY MOUTH DAILY 30 tablet 2    amitriptyline (ELAVIL) 25 MG tablet TAKE ONE TABLET BY MOUTH ONCE NIGHTLY 30 tablet 1    cloNIDine (CATAPRES) 0.1 MG tablet TAKE THREE TABLETS BY MOUTH THREE TIMES A  tablet 1    metFORMIN (GLUCOPHAGE) 500 MG tablet TAKE ONE TABLET BY MOUTH TWICE A DAY WITH MEALS 180 tablet 1    spironolactone (ALDACTONE) 25 MG tablet TAKE TWO TABLETS BY MOUTH TWICE A  tablet 1    Blood Pressure Monitoring (B-D ASSURE BPM/AUTO ARM CUFF) MISC 1 Device by Does not apply route daily 1 each 0    varenicline (CHANTIX STARTING MONTH PAK) 0.5 MG X 11 & 1 MG X 42 tablet Take by mouth. 1 box 0    varenicline (CHANTIX CONTINUING MONTH BRI) 1 MG tablet Take 1 tablet by mouth 2 times daily 60 tablet 1    loratadine (CLARITIN) 10 MG tablet Take 1 tablet by mouth daily 30 tablet 1    Dulaglutide 0.75 MG/0.5ML SOPN Inject 0.75 mg into the skin once a week 4 pen 2    NIFEdipine (PROCARDIA XL) 60 MG extended release tablet Take 1 tablet by mouth daily 30 tablet 5    oxybutynin (DITROPAN-XL) 5 MG extended release tablet TAKE ONE TABLET BY MOUTH DAILY 30 tablet 2    montelukast (SINGULAIR) 10 MG tablet TAKE ONE TABLET BY MOUTH ONCE NIGHTLY 30 tablet 2    metoprolol (LOPRESSOR) 100 MG tablet TAKE ONE TABLET BY MOUTH TWICE A DAY 60 tablet 3    albuterol sulfate  (90 Base) MCG/ACT inhaler INHALE TWO PUFFS BY MOUTH EVERY 6 HOURS AS NEEDED FOR WHEEZING 18 g 11    venlafaxine (EFFEXOR) 25 MG tablet TAKE ONE TABLET BY MOUTH TWICE A DAY 60 tablet 1    Misc. Devices (RAISED TOILET SEAT) MISC Use daily as needed as directed 1 each 0    atorvastatin (LIPITOR) 20 MG tablet Take 1 tablet by mouth daily 90 tablet 3    albuterol-ipratropium (COMBIVENT RESPIMAT)  MCG/ACT AERS inhaler Inhale 1 puff into the lungs every 6 hours 1 Inhaler 5    lisinopril (PRINIVIL;ZESTRIL) 20 MG tablet Take 1 tablet by mouth daily 90 tablet 1    torsemide (DEMADEX) 20 MG tablet Take 2 tablets by mouth daily 60 tablet 11    omeprazole (PRILOSEC) 20 MG delayed release capsule Take 20 mg by mouth daily      acetaminophen (APAP EXTRA STRENGTH) 500 MG tablet Take 2 tablets by mouth every 6 hours as needed for Pain DO NOT TAKE WITH OTHER MEDICATIONS CONTAINING ACETAMINOPHEN. 30 tablet 0    Cholecalciferol (VITAMIN D3) 59776 UNITS CAPS Take 10,000 Units by mouth three times a week. Takes on Mon Wed Fri      HYDROcodone-acetaminophen (NORCO) 5-325 MG per tablet Take 1 tablet by mouth every 8 hours as needed for Pain for up to 30 days.  90 tablet 0    lidocaine (LIDODERM) 5 % Place 1 patch onto the skin daily 12 hours on, 12 hours off. 30 patch 0    fluticasone (FLONASE) 50 MCG/ACT nasal spray 1 spray by Nasal route daily 1 Bottle 3     No facility-administered medications prior to visit. SOCIAL/FAMILY/PAST MEDICAL HISTORY: Ms. Vance Lambert, family and past medical history was reviewed. REVIEW OF SYSTEMS:    Respiratory: Negative for apnea, chest tightness and shortness of breath or change in baseline breathing. Gastrointestinal: Negative for nausea, vomiting, abdominal pain, diarrhea, constipation, blood in stool and abdominal distention. PHYSICAL EXAM:   Nursing note and vitals reviewed. LMP  (LMP Unknown)  as per patient  Constitutional: She appears well-developed and well-nourished. No acute distress. Skin: Skin appears to be warm and dry. No rashes or any other marks noted. She is not diaphoretic. Neurological/Psychiatric:She is alert and oriented to person, place, and time. Coordination is  normal.  Her mood isAppropriate and affect is Neutral/Euthymic(normal). Her behavior is normal.   thought content normal.   Musculoskeletal / Extremities: Gait is normal, assistive devices use: cane. IMPRESSION:   1. Chronic pain syndrome    2. Primary osteoarthritis of both knees    3. Primary osteoarthritis of right hip    4. Chronic bilateral low back pain without sciatica    5. Back spasm    6. Morbid obesity with BMI of 60.0-69.9, adult ([facility] [address]    7. Depression, unspecified depression type, mild    8. Primary insomnia    9. Essential hypertension    10.  JOSE treated with BiPAP        PLAN:  Informed verbal consent regarding treatment was obtained  -Continue with Norco, Effexor, Lidocaine, Elavil, Baclofen  -Liang exercises/knee stretches  -CBT techniques- relaxation therapies such as biofeedback, mindfulness based stress reduction, imagery, cognitive restructuring, problem solving discussed with patient  -She was advised weight reduction, diet changes- 800-1200 fior diet, diet diary, exercising, nutritional  consult increased physical activity as tolerated  -Advised patient to quit smoking for  health related concerns and to improve the treatment outcomes. Education was given on quitting smoking and the use of different modalities including medications, hypnotherapy, counselling  and biofeedback. These were discussed with patient.  -Last UDS 3/4/21 Consistent  -Return in about 4 weeks (around 6/22/2021). Current Outpatient Medications   Medication Sig Dispense Refill    HYDROcodone-acetaminophen (NORCO) 5-325 MG per tablet Take 1 tablet by mouth every 8 hours as needed for Pain for up to 30 days. 90 tablet 0    lidocaine (LIDODERM) 5 % Place 1 patch onto the skin daily 12 hours on, 12 hours off. 30 patch 0    fluticasone-salmeterol (ADVAIR) 250-50 MCG/DOSE AEPB INHALE ONE PUFF BY MOUTH EVERY 12 HOURS 2 Inhaler 2    baclofen (LIORESAL) 10 MG tablet TAKE ONE TABLET BY MOUTH DAILY 30 tablet 2    amitriptyline (ELAVIL) 25 MG tablet TAKE ONE TABLET BY MOUTH ONCE NIGHTLY 30 tablet 1    cloNIDine (CATAPRES) 0.1 MG tablet TAKE THREE TABLETS BY MOUTH THREE TIMES A  tablet 1    metFORMIN (GLUCOPHAGE) 500 MG tablet TAKE ONE TABLET BY MOUTH TWICE A DAY WITH MEALS 180 tablet 1    spironolactone (ALDACTONE) 25 MG tablet TAKE TWO TABLETS BY MOUTH TWICE A  tablet 1    Blood Pressure Monitoring (B-D ASSURE BPM/AUTO ARM CUFF) MISC 1 Device by Does not apply route daily 1 each 0    varenicline (CHANTIX STARTING MONTH PAK) 0.5 MG X 11 & 1 MG X 42 tablet Take by mouth.  1 box 0    varenicline (CHANTIX CONTINUING MONTH BRI) 1 MG tablet Take 1 tablet by mouth 2 times daily 60 tablet 1    loratadine (CLARITIN) 10 MG tablet Take 1 tablet by mouth daily 30 tablet 1    Dulaglutide 0.75 MG/0.5ML SOPN Inject 0.75 mg into the skin once a week 4 pen 2    NIFEdipine (PROCARDIA XL) 60 MG extended release tablet Take 1 tablet by mouth daily 30 tablet 5    oxybutynin (DITROPAN-XL) 5 MG extended release tablet TAKE ONE TABLET BY MOUTH DAILY 30 tablet 2    montelukast (SINGULAIR) 10 MG tablet TAKE ONE TABLET BY MOUTH ONCE NIGHTLY 30 tablet 2    metoprolol (LOPRESSOR) 100 MG tablet TAKE ONE TABLET BY MOUTH TWICE A DAY 60 tablet 3    albuterol sulfate  (90 Base) MCG/ACT inhaler INHALE TWO PUFFS BY MOUTH EVERY 6 HOURS AS NEEDED FOR WHEEZING 18 g 11    venlafaxine (EFFEXOR) 25 MG tablet TAKE ONE TABLET BY MOUTH TWICE A DAY 60 tablet 1    Misc. Devices (RAISED TOILET SEAT) MISC Use daily as needed as directed 1 each 0    atorvastatin (LIPITOR) 20 MG tablet Take 1 tablet by mouth daily 90 tablet 3    albuterol-ipratropium (COMBIVENT RESPIMAT)  MCG/ACT AERS inhaler Inhale 1 puff into the lungs every 6 hours 1 Inhaler 5    lisinopril (PRINIVIL;ZESTRIL) 20 MG tablet Take 1 tablet by mouth daily 90 tablet 1    torsemide (DEMADEX) 20 MG tablet Take 2 tablets by mouth daily 60 tablet 11    omeprazole (PRILOSEC) 20 MG delayed release capsule Take 20 mg by mouth daily      acetaminophen (APAP EXTRA STRENGTH) 500 MG tablet Take 2 tablets by mouth every 6 hours as needed for Pain DO NOT TAKE WITH OTHER MEDICATIONS CONTAINING ACETAMINOPHEN. 30 tablet 0    Cholecalciferol (VITAMIN D3) 62926 UNITS CAPS Take 10,000 Units by mouth three times a week. Takes on Mon Wed Fri      fluticasone (FLONASE) 50 MCG/ACT nasal spray 1 spray by Nasal route daily 1 Bottle 3     No current facility-administered medications for this visit. I will continue her current medication regimen  which is part of the above treatment schedule.  It has been helping with Ms. Rosario Simmonds chronic  medical problems which for this visit include:   Diagnoses of Chronic pain syndrome, Primary osteoarthritis of both knees, Primary osteoarthritis of right hip, Chronic bilateral low back pain without sciatica, Back spasm, Morbid obesity with BMI of 60.0-69.9, adult ([facility] [address] Depression, unspecified depression type, mild, Primary insomnia, Essential hypertension, and JOSE treated with BiPAP were pertinent to this visit. Risks and benefits of the medications and other alternative treatments  including no treatment were discussed with the patient. The common side effects of these medications were also explained to the patient. Informed verbal consent was obtained. Goals of current treatment regimen include improvement in pain, restoration of functioning- with focus on improvement in physical performance, general activity, work or disability,emotional distress, health care utilization and  decreased medication consumption. Will continue to monitor progress towards achieving/maintaining therapeutic goals with special emphasis on  1. Improvement in perceived interfernce  of pain with ADL's. Ability to do home exercises independently. Ability to do household chores indoor and/or outdoor work and social and leisure activities. Improve psychosocial and physical functioning. - she is showing progression towards this treatment goal with the current regimen. She was advised against drinking alcohol with the narcotic pain medicines, advised against driving or handling machinery while adjusting the dose of medicines or if having cognitive  issues related to the current medications. Risk of overdose and death, if medicines not taken as prescribed, were also discussed. If the patient develops new symptoms or if the symptoms worsen, the patient should call the office. While transcribing every attempt was made to maintain the accuracy of the note in terms of it's contents,there may have been some errors made inadvertently. Thank you for allowing me to participate in the care of this patient. Avinash Thomas.  Buzz FRENCH-CNP     Cc: Monica Pardo MD

## 2021-06-02 ENCOUNTER — CLINICAL DOCUMENTATION (OUTPATIENT)
Dept: OTHER | Age: 46
End: 2021-06-02

## 2021-06-09 ENCOUNTER — OFFICE VISIT (OUTPATIENT)
Dept: PULMONOLOGY | Age: 46
End: 2021-06-09
Payer: MEDICAID

## 2021-06-09 VITALS
BODY MASS INDEX: 50.02 KG/M2 | SYSTOLIC BLOOD PRESSURE: 100 MMHG | OXYGEN SATURATION: 97 % | TEMPERATURE: 97.3 F | WEIGHT: 293 LBS | HEART RATE: 70 BPM | DIASTOLIC BLOOD PRESSURE: 70 MMHG | HEIGHT: 64 IN | RESPIRATION RATE: 16 BRPM

## 2021-06-09 DIAGNOSIS — G47.33 OSA (OBSTRUCTIVE SLEEP APNEA): ICD-10-CM

## 2021-06-09 DIAGNOSIS — J44.9 COPD, MILD (HCC): ICD-10-CM

## 2021-06-09 DIAGNOSIS — Z71.6 TOBACCO ABUSE COUNSELING: ICD-10-CM

## 2021-06-09 PROCEDURE — 4004F PT TOBACCO SCREEN RCVD TLK: CPT | Performed by: INTERNAL MEDICINE

## 2021-06-09 PROCEDURE — G8427 DOCREV CUR MEDS BY ELIG CLIN: HCPCS | Performed by: INTERNAL MEDICINE

## 2021-06-09 PROCEDURE — G8417 CALC BMI ABV UP PARAM F/U: HCPCS | Performed by: INTERNAL MEDICINE

## 2021-06-09 PROCEDURE — 3023F SPIROM DOC REV: CPT | Performed by: INTERNAL MEDICINE

## 2021-06-09 PROCEDURE — 99214 OFFICE O/P EST MOD 30 MIN: CPT | Performed by: INTERNAL MEDICINE

## 2021-06-09 PROCEDURE — G8926 SPIRO NO PERF OR DOC: HCPCS | Performed by: INTERNAL MEDICINE

## 2021-06-09 RX ORDER — ALBUTEROL SULFATE 90 UG/1
AEROSOL, METERED RESPIRATORY (INHALATION)
Qty: 18 G | Refills: 11 | Status: SHIPPED | OUTPATIENT
Start: 2021-06-09

## 2021-06-09 ASSESSMENT — COPD QUESTIONNAIRES
QUESTION2_CHESTPHLEGM: 3
QUESTION1_COUGHFREQUENCY: 3
CAT_TOTALSCORE: 22
QUESTION7_SLEEPQUALITY: 1
QUESTION8_ENERGYLEVEL: 2
QUESTION3_CHESTTIGHTNESS: 4
QUESTION5_HOMEACTIVITIES: 3
QUESTION6_LEAVINGHOUSE: 1
QUESTION4_WALKINCLINE: 5

## 2021-06-09 ASSESSMENT — SLEEP AND FATIGUE QUESTIONNAIRES
HOW LIKELY ARE YOU TO NOD OFF OR FALL ASLEEP WHILE SITTING QUIETLY AFTER LUNCH WITHOUT ALCOHOL: 0
ESS TOTAL SCORE: 2
HOW LIKELY ARE YOU TO NOD OFF OR FALL ASLEEP WHILE SITTING AND TALKING TO SOMEONE: 0
HOW LIKELY ARE YOU TO NOD OFF OR FALL ASLEEP WHILE LYING DOWN TO REST IN THE AFTERNOON WHEN CIRCUMSTANCES PERMIT: 1
HOW LIKELY ARE YOU TO NOD OFF OR FALL ASLEEP IN A CAR, WHILE STOPPED FOR A FEW MINUTES IN TRAFFIC: 0
HOW LIKELY ARE YOU TO NOD OFF OR FALL ASLEEP WHILE SITTING AND READING: 0
HOW LIKELY ARE YOU TO NOD OFF OR FALL ASLEEP WHILE WATCHING TV: 1
HOW LIKELY ARE YOU TO NOD OFF OR FALL ASLEEP WHEN YOU ARE A PASSENGER IN A CAR FOR AN HOUR WITHOUT A BREAK: 0
HOW LIKELY ARE YOU TO NOD OFF OR FALL ASLEEP WHILE SITTING INACTIVE IN A PUBLIC PLACE: 0

## 2021-06-09 NOTE — PROGRESS NOTES
REASON FOR CONSULTATION/CC:    Chief Complaint   Patient presents with    COPD     f/u COPD    Sleep Apnea     f/u bpap           PCP: Freddie Ramirez MD    HISTORY OF PRESENT ILLNESS: Salina Maloney is a 50y.o. year old female with a history of JOSE who presents :          Copd  Having some congestion but clears after cough  Advair       allergic rhinitis  Improved cough      Tobacco abuse  Still at 5 cigarettes per day. Still wanting to stop    Given chantix but stopped secondary to dreams. JOSE  Stopped use it. She has been sleeping in living room         REVIEW OF SYSTEMS:  Constitutional: Negative for fever   HENT: Negative for sore throat  Respiratory: Negative for dyspnea, cough  Cardiovascular: Negative for chest pain  Gastrointestinal: Negative for vomiting, diarrhea   Skin: Negative for rash  Psychiatric/Behavorial: Negative for anxiety      Objective:   PHYSICAL EXAM:  Blood pressure 100/70, pulse 70, temperature 97.3 °F (36.3 °C), temperature source Infrared, resp. rate 16, height 5' 4\" (1.626 m), weight (!) 348 lb (157.9 kg), SpO2 97 %, not currently breastfeeding.'  Gen: No distress. Body mass index is 59.73 kg/m². ENT:   Resp: No accessory muscle use. No crackles. No wheezes. No rhonchi. CV: Regular rate. Regular rhythm. No murmur or rub. No edema. Skin: Warm, dry, normal texture and turgor. No nodule on exposed extremities. M/S: No cyanosis. No clubbing. No joint deformity. Psych: Oriented x 3. No anxiety. Awake. Alert. Intact judgement and insight. Good Mood / Affect. Memory appears in tact   . Data Reviewed:        Assessment:     ·  JOSE  · Obesity Body mass index is 59.73 kg/m². · allergic rhinitis   · COPD, mild, FEV1 76%. Decreased DLCO. Plan:      Problem List Items Addressed This Visit     Tobacco abuse counseling     Trying to wean off. Currently smoking 5 cigarettes a day.          JOSE (obstructive sleep apnea)     Patient states she stopped using her CPAP secondary to routinely sleeping in the living room. We discussed tactics including moving CPAP in the living room. COPD, mild ([address]     Symptoms controlled with Advair. Refills given.          Relevant Medications    albuterol sulfate  (90 Base) MCG/ACT inhaler    fluticasone-salmeterol (ADVAIR) 250-50 MCG/DOSE [address] [facility]  [phone]

## 2021-06-22 DIAGNOSIS — I10 ESSENTIAL HYPERTENSION: ICD-10-CM

## 2021-06-23 ENCOUNTER — VIRTUAL VISIT (OUTPATIENT)
Dept: PAIN MANAGEMENT | Age: 46
End: 2021-06-23
Payer: MEDICAID

## 2021-06-23 DIAGNOSIS — G89.29 CHRONIC BILATERAL LOW BACK PAIN WITHOUT SCIATICA: ICD-10-CM

## 2021-06-23 DIAGNOSIS — M54.50 CHRONIC BILATERAL LOW BACK PAIN WITHOUT SCIATICA: ICD-10-CM

## 2021-06-23 DIAGNOSIS — G47.33 OSA TREATED WITH BIPAP: ICD-10-CM

## 2021-06-23 DIAGNOSIS — E66.01 MORBID OBESITY WITH BMI OF 60.0-69.9, ADULT (HCC): ICD-10-CM

## 2021-06-23 DIAGNOSIS — M17.0 PRIMARY OSTEOARTHRITIS OF BOTH KNEES: ICD-10-CM

## 2021-06-23 DIAGNOSIS — M16.11 PRIMARY OSTEOARTHRITIS OF RIGHT HIP: ICD-10-CM

## 2021-06-23 DIAGNOSIS — F32.A DEPRESSION, UNSPECIFIED DEPRESSION TYPE: ICD-10-CM

## 2021-06-23 DIAGNOSIS — M62.830 BACK SPASM: ICD-10-CM

## 2021-06-23 DIAGNOSIS — F51.01 PRIMARY INSOMNIA: ICD-10-CM

## 2021-06-23 DIAGNOSIS — G89.4 CHRONIC PAIN SYNDROME: ICD-10-CM

## 2021-06-23 DIAGNOSIS — I10 ESSENTIAL HYPERTENSION: ICD-10-CM

## 2021-06-23 DIAGNOSIS — N39.46 MIXED STRESS AND URGE INCONTINENCE: ICD-10-CM

## 2021-06-23 PROCEDURE — G8427 DOCREV CUR MEDS BY ELIG CLIN: HCPCS | Performed by: NURSE PRACTITIONER

## 2021-06-23 PROCEDURE — 99213 OFFICE O/P EST LOW 20 MIN: CPT | Performed by: NURSE PRACTITIONER

## 2021-06-23 RX ORDER — MONTELUKAST SODIUM 10 MG/1
TABLET ORAL
Qty: 30 TABLET | Refills: 3 | Status: SHIPPED | OUTPATIENT
Start: 2021-06-23 | End: 2021-10-19

## 2021-06-23 RX ORDER — HYDROCODONE BITARTRATE AND ACETAMINOPHEN 5; 325 MG/1; MG/1
1 TABLET ORAL EVERY 6 HOURS PRN
Qty: 120 TABLET | Refills: 0 | Status: SHIPPED | OUTPATIENT
Start: 2021-06-23 | End: 2021-07-06 | Stop reason: SDUPTHER

## 2021-06-23 RX ORDER — LIDOCAINE 50 MG/G
1 PATCH TOPICAL DAILY
Qty: 30 PATCH | Refills: 0 | Status: SHIPPED | OUTPATIENT
Start: 2021-06-23 | End: 2021-08-25 | Stop reason: SDUPTHER

## 2021-06-23 RX ORDER — CLONIDINE HYDROCHLORIDE 0.1 MG/1
TABLET ORAL
Qty: 270 TABLET | Refills: 3 | Status: SHIPPED | OUTPATIENT
Start: 2021-06-23 | End: 2021-10-19

## 2021-06-23 RX ORDER — OXYBUTYNIN CHLORIDE 5 MG/1
TABLET, EXTENDED RELEASE ORAL
Qty: 30 TABLET | Refills: 3 | Status: SHIPPED | OUTPATIENT
Start: 2021-06-23 | End: 2022-07-05

## 2021-06-23 RX ORDER — AMITRIPTYLINE HYDROCHLORIDE 25 MG/1
TABLET, FILM COATED ORAL
Qty: 30 TABLET | Refills: 1 | Status: SHIPPED | OUTPATIENT
Start: 2021-06-23 | End: 2021-08-30

## 2021-06-23 RX ORDER — HYDROCODONE BITARTRATE AND ACETAMINOPHEN 5; 325 MG/1; MG/1
1 TABLET ORAL EVERY 8 HOURS PRN
Qty: 90 TABLET | Refills: 0 | Status: SHIPPED | OUTPATIENT
Start: 2021-06-23 | End: 2021-06-23 | Stop reason: SDUPTHER

## 2021-06-23 NOTE — PATIENT INSTRUCTIONS
and sign in to your [employer] account. Enter H088 in the [employer] box to learn more about \"Back Stretches: Exercises. \"     If you do not have an account, please click on the \"Sign Up Now\" link. Current as of: November 16, 2020               Content Version: 12.9  © 4271-2746 [employer]. Care instructions adapted under license by [facility] ([facility]). If you have questions about a medical condition or this instruction, always ask your healthcare professional. [address] any warranty or liability for your use of this information.

## 2021-06-23 NOTE — PROGRESS NOTES
TELE HEALTH VISIT (AUDIO-VISUAL)    Pursuant to the emergency declaration under the [facility], [facility] waiver authority and the [employer] Act, this Virtual  Visit was conducted, with patient's consent, to reduce the patient's risk of exposure to COVID-19 and provide continuity of care for an established patient. Service is  provided through a video synchronous discussion virtually to substitute for in-person clinic visit. Due to this being a TeleHealth encounter (During GKQJV-39 public health emergency), evaluation of the following organ systems was limited: Vitals/Constitutional/EENT/Resp/CV/GI//MS/Neuro/Skin/Pmfa-Xtrf-Ace. Brittany Burden  1975  [id number]    Ms. Rakan Boucher is being seen virtually for a follow up visit using [employer] Video visit/[employer] or face time  Informed verbal consent to the virtual visit was obtained from Ms. Rakan Boucher. Risks associated with HIPPA compliance with the virtual visit was explained to the patient. Ms. Rakan Boucher is at her home and Matthew BERTRAND is in her office. HISTORY OF PRESENT ILLNESS:  Ms. Rakan Boucher is a 50 y.o. female  being assessed for a follow up visit for pain management for evaluation of ongoing care regarding her symptoms and monitoring of compliance with long term use high risk medications. She has a diagnosis of   1. Chronic pain syndrome    2. Primary osteoarthritis of both knees    3. Primary osteoarthritis of right hip    4. Chronic bilateral low back pain without sciatica    5. Back spasm    6. Morbid obesity with BMI of 60.0-69.9, adult ([address]    7. Depression, unspecified depression type, mild    8. Primary insomnia    9. Essential hypertension    10.  JOSE treated with BiPAP      On the Patients Pain Assessment form reviewed with the Medical Assistant:  She complains of pain in the bilateral lower back  with radiation to the hips Bilateral . She rates the pain 8/10 and describes it as pulling, stabbing, spasm, pressure. Current treatment regimen has helped relieve about 20% of the pain. She denies any side effects from the current pain regimen. Patient reports that since the last follow up visit the physical functioning is worse, family/social relationships are unchanged, mood is unchanged sleep patterns are unchanged, and that the overall functioning is worse. Patient denies misusing/abusing her narcotic pain medications or using any illegal drugs. Upon obtaining medical history from Ms. Rocío Mercado states that pain is not manageable on current pain therapy. Tenderness tot he hips/knees. Pain medications do not last long enough to manage the pain. Plans to f/u with [facility] in 2 months to resume her goal for weight loss surgery. Mood is stable without anxiety. Sleep is fair with an average of 5-6 hours. Denies to having issues of constipation. Tolerating activities/house chores with moderate tenderness to the knees/hips. ALLERGIES: Patients list of allergies were reviewed     MEDICATIONS: Ms. Rocío Mercado list of medications were reviewed. Her current medications are   Outpatient Medications Prior to Visit   Medication Sig Dispense Refill    albuterol sulfate  (90 Base) MCG/ACT inhaler INHALE TWO PUFFS BY MOUTH EVERY 6 HOURS AS NEEDED FOR WHEEZING 18 g 11    fluticasone-salmeterol (ADVAIR) 250-50 MCG/DOSE AEPB Takes 1 puff daily 2 Inhaler 11    baclofen (LIORESAL) 10 MG tablet TAKE ONE TABLET BY MOUTH DAILY 30 tablet 2    metFORMIN (GLUCOPHAGE) 500 MG tablet TAKE ONE TABLET BY MOUTH TWICE A DAY WITH MEALS 180 tablet 1    spironolactone (ALDACTONE) 25 MG tablet TAKE TWO TABLETS BY MOUTH TWICE A  tablet 1    cloNIDine (CATAPRES) 0.1 MG tablet TAKE THREE TABLETS BY MOUTH THREE TIMES A  tablet 1    Blood Pressure Monitoring (B-D ASSURE BPM/AUTO ARM CUFF) MISC 1 Device by Does not apply route daily 1 each 0    varenicline (CHANTIX STARTING MONTH BRI) 0.5 MG X 11 & 1 MG X 42 tablet Take by mouth. 1 box 0    varenicline (CHANTIX CONTINUING MONTH PAK) 1 MG tablet Take 1 tablet by mouth 2 times daily 60 tablet 1    loratadine (CLARITIN) 10 MG tablet Take 1 tablet by mouth daily 30 tablet 1    Dulaglutide 0.75 MG/0.5ML SOPN Inject 0.75 mg into the skin once a week 4 pen 2    NIFEdipine (PROCARDIA XL) 60 MG extended release tablet Take 1 tablet by mouth daily 30 tablet 5    oxybutynin (DITROPAN-XL) 5 MG extended release tablet TAKE ONE TABLET BY MOUTH DAILY 30 tablet 2    montelukast (SINGULAIR) 10 MG tablet TAKE ONE TABLET BY MOUTH ONCE NIGHTLY 30 tablet 2    metoprolol (LOPRESSOR) 100 MG tablet TAKE ONE TABLET BY MOUTH TWICE A DAY 60 tablet 3    venlafaxine (EFFEXOR) 25 MG tablet TAKE ONE TABLET BY MOUTH TWICE A DAY 60 tablet 1    Misc. Devices (RAISED TOILET SEAT) MISC Use daily as needed as directed 1 each 0    atorvastatin (LIPITOR) 20 MG tablet Take 1 tablet by mouth daily 90 tablet 3    albuterol-ipratropium (COMBIVENT RESPIMAT)  MCG/ACT AERS inhaler Inhale 1 puff into the lungs every 6 hours (Patient taking differently: Inhale 1 puff into the lungs every 6 hours Once daily) 1 Inhaler 5    torsemide (DEMADEX) 20 MG tablet Take 2 tablets by mouth daily 60 tablet 11    omeprazole (PRILOSEC) 20 MG delayed release capsule Take 20 mg by mouth daily Indications: patient gets from [address] [facility] acetaminophen (APAP EXTRA STRENGTH) 500 MG tablet Take 2 tablets by mouth every 6 hours as needed for Pain DO NOT TAKE WITH OTHER MEDICATIONS CONTAINING ACETAMINOPHEN. 30 tablet 0    Cholecalciferol (VITAMIN D3) 00418 UNITS CAPS Take 10,000 Units by mouth three times a week Takes on Mon Wed Fri       HYDROcodone-acetaminophen (NORCO) 5-325 MG per tablet Take 1 tablet by mouth every 8 hours as needed for Pain for up to 30 days.  90 tablet 0    lidocaine (LIDODERM) 5 % Place 1 patch onto the skin daily 12 hours on, 12 hours off. 30 patch 0    amitriptyline (ELAVIL) 25 MG tablet TAKE ONE TABLET BY MOUTH ONCE NIGHTLY 30 tablet 1     No facility-administered medications prior to visit. SOCIAL/FAMILY/PAST MEDICAL HISTORY: Ms. Montes Sessions, family and past medical history was reviewed. REVIEW OF SYSTEMS:    Respiratory: Negative for apnea, chest tightness and shortness of breath or change in baseline breathing. Gastrointestinal: Negative for nausea, vomiting, abdominal pain, diarrhea, constipation, blood in stool and abdominal distention. PHYSICAL EXAM:   Nursing note and vitals reviewed. LMP  (LMP Unknown)  as per patient  Constitutional: She appears well-developed and well-nourished. No acute distress. Skin: Skin appears to be warm and dry. No rashes or any other marks noted. She is not diaphoretic. Neurological/Psychiatric:She is alert and oriented to person, place, and time. Coordination is  normal.  Her mood isAppropriate and affect is Neutral/Euthymic(normal). Her behavior is normal.   thought content normal.   Musculoskeletal / Extremities: Gait is normal, assistive devices use: cane    IMPRESSION:   1. Chronic pain syndrome    2. Primary osteoarthritis of both knees    3. Primary osteoarthritis of right hip    4. Chronic bilateral low back pain without sciatica    5. Back spasm    6. Morbid obesity with BMI of 60.0-69.9, adult ([address]    7. Depression, unspecified depression type, mild    8. Primary insomnia    9. Essential hypertension    10. JOSE treated with BiPAP        PLAN:  Informed verbal consent regarding treatment was obtained  -Take Norco 5-325 mg no more than 3-4 times daily prn  -Continue with Effexor, Lidocaine, Elavil, Baclofen  -Aquatic therapy  -Had a detailed discussion with the patient about weight gain in relation to joint pain. She was advised to abide by recommendations made beriatics. She surgical candidate for knees due to elevated BMI.    -She was advised weight reduction, diet changes- 800-1200 fior diet, diet diary, exercising, nutritional  consult increased physical activity as tolerated  -CBT techniques- relaxation therapies such as biofeedback, mindfulness based stress reduction, imagery, cognitive restructuring, problem solving discussed with patient  -Last UDS 3/4/21 Consistent  -Return in about 4 weeks (around 7/21/2021). -[place] record was obtained and reviewed  for the last one year and no indicators of drug misuse  were found. Any other controlled substance prescriptions  seen on the record have been accounted for, I am aware of the patient receiving these medications. Tika Roberts [place] record will be rechecked as part of office protocol. Current Outpatient Medications   Medication Sig Dispense Refill    lidocaine (LIDODERM) 5 % Place 1 patch onto the skin daily 12 hours on, 12 hours off. 30 patch 0    amitriptyline (ELAVIL) 25 MG tablet TAKE ONE TABLET BY MOUTH ONCE NIGHTLY 30 tablet 1    HYDROcodone-acetaminophen (NORCO) 5-325 MG per tablet Take 1 tablet by mouth every 6 hours as needed for Pain for up to 30 days. 120 tablet 0    albuterol sulfate  (90 Base) MCG/ACT inhaler INHALE TWO PUFFS BY MOUTH EVERY 6 HOURS AS NEEDED FOR WHEEZING 18 g 11    fluticasone-salmeterol (ADVAIR) 250-50 MCG/DOSE AEPB Takes 1 puff daily 2 Inhaler 11    baclofen (LIORESAL) 10 MG tablet TAKE ONE TABLET BY MOUTH DAILY 30 tablet 2    metFORMIN (GLUCOPHAGE) 500 MG tablet TAKE ONE TABLET BY MOUTH TWICE A DAY WITH MEALS 180 tablet 1    spironolactone (ALDACTONE) 25 MG tablet TAKE TWO TABLETS BY MOUTH TWICE A  tablet 1    Blood Pressure Monitoring (B-D ASSURE BPM/AUTO ARM CUFF) MISC 1 Device by Does not apply route daily 1 each 0    varenicline (CHANTIX STARTING MONTH PAK) 0.5 MG X 11 & 1 MG X 42 tablet Take by mouth.  1 box 0    varenicline (CHANTIX CONTINUING MONTH PAK) 1 MG tablet Take 1 tablet by mouth 2 times daily 60 tablet 1    loratadine (CLARITIN) 10 MG tablet Take 1 tablet by mouth daily 30 tablet 1    Dulaglutide 0.75 MG/0.5ML SOPN Inject 0.75 mg into the skin once a week 4 pen 2    NIFEdipine (PROCARDIA XL) 60 MG extended release tablet Take 1 tablet by mouth daily 30 tablet 5    metoprolol (LOPRESSOR) 100 MG tablet TAKE ONE TABLET BY MOUTH TWICE A DAY 60 tablet 3    venlafaxine (EFFEXOR) 25 MG tablet TAKE ONE TABLET BY MOUTH TWICE A DAY 60 tablet 1    Misc. Devices (RAISED TOILET SEAT) MISC Use daily as needed as directed 1 each 0    atorvastatin (LIPITOR) 20 MG tablet Take 1 tablet by mouth daily 90 tablet 3    albuterol-ipratropium (COMBIVENT RESPIMAT)  MCG/ACT AERS inhaler Inhale 1 puff into the lungs every 6 hours (Patient taking differently: Inhale 1 puff into the lungs every 6 hours Once daily) 1 Inhaler 5    torsemide (DEMADEX) 20 MG tablet Take 2 tablets by mouth daily 60 tablet 11    omeprazole (PRILOSEC) 20 MG delayed release capsule Take 20 mg by mouth daily Indications: patient gets from [address] [facility] acetaminophen (APAP EXTRA STRENGTH) 500 MG tablet Take 2 tablets by mouth every 6 hours as needed for Pain DO NOT TAKE WITH OTHER MEDICATIONS CONTAINING ACETAMINOPHEN. 30 tablet 0    Cholecalciferol (VITAMIN D3) 97324 UNITS CAPS Take 10,000 Units by mouth three times a week Takes on Mon Wed Fri       cloNIDine (CATAPRES) 0.1 MG tablet TAKE THREE TABLETS BY MOUTH THREE TIMES A  tablet 3    oxybutynin (DITROPAN-XL) 5 MG extended release tablet TAKE ONE TABLET BY MOUTH DAILY 30 tablet 3    montelukast (SINGULAIR) 10 MG tablet TAKE ONE TABLET BY MOUTH ONCE NIGHTLY 30 tablet 3     No current facility-administered medications for this visit. I will continue her current medication regimen  which is part of the above treatment schedule.  It has been helping with Ms. Charlann Carrel chronic  medical problems which for this visit include:   Diagnoses of Chronic pain syndrome, Primary osteoarthritis of both knees, Primary osteoarthritis of right hip, Chronic bilateral low back pain without sciatica, Back spasm, Morbid obesity with BMI of 60.0-69.9, adult ([address] Depression, unspecified depression type, mild, Primary insomnia, Essential hypertension, and JOSE treated with BiPAP were pertinent to this visit. Risks and benefits of the medications and other alternative treatments  including no treatment were discussed with the patient. The common side effects of these medications were also explained to the patient. Informed verbal consent was obtained. Goals of current treatment regimen include improvement in pain, restoration of functioning- with focus on improvement in physical performance, general activity, work or disability,emotional distress, health care utilization and  decreased medication consumption. Will continue to monitor progress towards achieving/maintaining therapeutic goals with special emphasis on  1. Improvement in perceived interfernce  of pain with ADL's. Ability to do home exercises independently. Ability to do household chores indoor and/or outdoor work and social and leisure activities. Improve psychosocial and physical functioning. - she is showing progression towards this treatment goal with the current regimen. She was advised against drinking alcohol with the narcotic pain medicines, advised against driving or handling machinery while adjusting the dose of medicines or if having cognitive  issues related to the current medications. Risk of overdose and death, if medicines not taken as prescribed, were also discussed. If the patient develops new symptoms or if the symptoms worsen, the patient should call the office. While transcribing every attempt was made to maintain the accuracy of the note in terms of it's contents,there may have been some errors made inadvertently. Thank you for allowing me to participate in the care of this patient. Samaria Anderson.  Rendall Schlatter APRN-CNP     Cc: Yulissa Harris MD

## 2021-07-02 ENCOUNTER — HOSPITAL ENCOUNTER (OUTPATIENT)
Dept: PHYSICAL THERAPY | Age: 46
Setting detail: THERAPIES SERIES
Discharge: HOME | End: 2021-07-02
Payer: MEDICAID

## 2021-07-02 PROCEDURE — 97161 PT EVAL LOW COMPLEX 20 MIN: CPT | Performed by: CHIROPRACTOR

## 2021-07-02 PROCEDURE — 97530 THERAPEUTIC ACTIVITIES: CPT | Performed by: CHIROPRACTOR

## 2021-07-02 ASSESSMENT — PAIN SCALES - QUEBEC BACK PAIN DISABILITY SCALE
LIFT AND CARRY A HEAVY SUITCASE: 5
QUEBEC CMS MODIFIER: CK
TAKE FOOD OUT OF THE REFRIGERATOR: 0
WALK SEVERAL KILOMETERS  OR MILES: 5
PUT ON SOCKS OR PANYHOSE: 1
TURN OVER IN BED: 4
SIT IN A CHAIR FOR SEVERAL HOURS: 2
PULL OR PUSH HEAVY DOORS: 0
REACH UP TO HIGH SHELVES: 1
CLIMB ONE FLIGHT OF STAIRS: 4
RUN ONE BLOCK OR 100M: 5
THROW A BALL: 0
CARRY TWO BAGS OF GROCERIES: 2
SLEEP THROUGH THE NIGHT: 4
MAKE YOUR BED: 0
WALK A FEW BLOCKS OR 300 TO 400M: 5
RIDE IN A CAR: 2
STAND UP FOR 20 TO 30 MINUTES: 5
TOTAL SCORE: 48
GET OUT OF BED: 2
MOVE A CHAIR: 0
QUEBEC DISABILITY INDEX: 40-59%
BEND OVER TO CLEAN THE BATHTUB: 1

## 2021-07-02 NOTE — FLOWSHEET NOTE
[address], [facility]  [address] [facility]  Phone: [phone]   Fax:     [phone]      Physical Therapy Daily/Aquatic Flow Sheet   Date:  2021    Patient Name:  Gennaro Dakins    :  1975  MRN: [id number]    Restrictions/Precautions:    Pertinent Medical History:Additional Pertinent Hx: Arthritis, asthma, CHF,GOPD, DM, HTN, Depression, Anxiety, Obesity    Medical/Treatment Diagnosis Information:   · Diagnosis: Chronic Pain Syndrome, OA Both knees  · Treatment Diagnosis: Main complaint is pain and weakness in R hip/ LE    Insurance/Certification information:   Beronica Moralez  Physician Information:  Erick FRENCH,-CNP  Plan of care signed (Y/N): Inbox    Date of Patient follow up with Physician:      Progress Report: []  Yes  [x]  No     Date Range for reporting period:  Beginnin2021  Ending:      Progress report due (10 Rx/or 30 days whichever is less):     Recertification due (POC duration/ or 90 days whichever is less):    Visit # POC/Insurance Allowable Auth Needed    30 []Yes   [x]No     Latex Allergy:  [x]NO      []YES  Preferred Language for Healthcare:   [x]English       []Other:    Functional Scale:     Date assessed: at eval  Test: Mildred  Score:  48    Pain level: Ranges 5-10/10  (Without meds  - knees 7-8/10, hip 10/10)    History of Injury:  H/o OA in Both knees and R hip. States that her right hip pain got worse after having injection ~ 2 yrs ago. Subjective:  Main complaint is R  hip pain and R LE weakness, but also has pain in LB and B                                         Knees. Able to control pain in B knees and LB with pain meds, but not in R hip.                                 Has had benefit from Aquatic PT in the past    Objective:    Observation:    Test measurements:    Land-based Visits Exercises/Activities:  Therapeutic Ex (87034)  Min: Resistance/Repetitions lateral    Ankle INV  Lunges retro    Ankle EV  Lower ab curl with noodle      Upper ab curl with ball      Med ball straight lifts      Med ball diagonal lifts      Hydrorider          Noodle:      Leg Press  Deep Water:    Noodle hang at wall  Jog    Noodle hang deep water  Jumping Jacks    Noodle Bicycle  Heel to toe      Hand to opposite knee      Cross country skier      Rocking Horse      Other Therapeutic Activities:  Pt was educated on PT POC, Diagnosis, Prognosis, pathomechanics as well as frequency and duration of scheduling future physical therapy appointments. Time was also taken on this day to answer all patient questions and participation in PT. Reviewed appointment policy in detail with patient and patient verbalized understanding. Home Exercise Program:  Patient was instructed in the following for HEP:     . Patient verbalized/demonstrated understanding and was issued written handout. Charges: Therapeutic Exercise and NMR EXR  [] (83990) Provided verbal/tactile cueing for activities related to strengthening, flexibility, endurance, ROM for improvements in LE, proximal hip, and core control with self care, mobility, lifting, ambulation.  [] (85082) Provided verbal/tactile cueing for activities related to improving balance, coordination, kinesthetic sense, posture, motor skill, proprioception  to assist with LE, proximal hip, and core control in self care, mobility, lifting, ambulation and eccentric single leg control.      NMR and Therapeutic Activities:    [] (43031 or 11623) Provided verbal/tactile cueing for activities related to improving balance, coordination, kinesthetic sense, posture, motor skill, proprioception and motor activation to allow for proper function of core, proximal hip and LE with self care and ADLs and functional mobility.   [] (38746) Gait Re-education- Provided training and instruction to the patient for proper LE, core and proximal hip recruitment and positioning and eccentric body weight control with ambulation re-education including up and down stairs     Home Exercise Program:    [x] (42041) Reviewed/Progressed HEP activities related to strengthening, flexibility, endurance, ROM of core, proximal hip and LE for functional self-care, mobility, lifting and ambulation/stair navigation   [] (24021)Reviewed/Progressed HEP activities related to improving balance, coordination, kinesthetic sense, posture, motor skill, proprioception of core, proximal hip and LE for self care, mobility, lifting, and ambulation/stair navigation      Manual Treatments:  PROM / STM / Oscillations-Mobs:  G-I, II, III, IV (PA's, Inf., Post.)  [] (78869) Provided manual therapy to mobilize LE, proximal hip and/or LS spine soft tissue/joints for the purpose of modulating pain, promoting relaxation,  increasing ROM, reducing/eliminating soft tissue swelling/inflammation/restriction, improving soft tissue extensibility and allowing for proper ROM for normal function with self care, mobility, lifting and ambulation.      Individual Aquatic Therapy:  [] (50618) Provided verbal and tactile cueing for strengthening, flexibility, ROM using the therapeutic properties of water (buoyancy, resistance) for improvements in core control, mobility and ambulation     Aquatic Group:  [] (14192) Provided intermittent verbal and tactile cueing for strengthening, endurance, flexibility and ROM for 2-4 individuals that do not require one-on one patient contact by the therapist     If [employer] Please Indicate Time In/Out  CPT Code Time in Time out                                   Approval Dates:  CPT Code Units Approved Units Used  Date Updated:                     Land Timed Code Treatment Minutes: 30   Land Total Treatment Minutes: 45     Individual Aquatic Minutes:    Aquatic Group Minutes:      [] EVAL (LOW) 97361 (typically 20 minutes face-to-face)  [] EVAL (MOD) 81313 (typically 30 minutes face-to-face)  [] EVAL (HIGH) 72436 (typically 45 minutes face-to-face)  [] RE-EVAL     [] AO(12738) x     [] Dry needle 1 or 2 Muscles (12826)  [] NMR (48101) x     [] Dry needle 3+ Muscles (32419)  [] Manual (76063) x     [] Ultrasound (96564) x  [x] TA (59326) x  2   [] Mech Traction (88918)  [] ES(attended) (52330)     [] ES (un) (66118):   [] Vasopump (73865) [] Ionto (77456)   [] Aquatic Ind (63950) x    [] Aquatic Group ([phone]) x   [] Other:    Treatment/Activity Tolerance:  [] Patient tolerated treatment well [] Patient limited by fatigue  [x] Patient limited by pain  [] Patient limited by other medical complications  [] Other:     Prognosis: [x] Good [] Fair  [] Poor    Goals:   Patient stated goal: Some relief with movement  [] Progressing: [] Met: [] Not Met: [] Adjusted    Therapist goals for Patient:   Short Term Goals: To be achieved in: 2 weeks  1. Independent in HEP and progression per patient tolerance, in order to prevent re-injury. [] Progressing: [] Met: [] Not Met: [] Adjusted  2. Patient will have a decrease in pain to facilitate improvement in movement, function, and ADLs as indicated by Functional Deficits. [] Progressing: [] Met: [] Not Met: [] Adjusted    Long Term Goals: To be achieved in:  weeks  1. Disability index score of 20% or less for the LEFS to assist with reaching prior level of function. [] Progressing: [] Met: [] Not Met: [] Adjusted  2. Patient will demonstrate increased AROM to allow for proper joint functioning as indicated by patients Functional Deficits. [] Progressing: [] Met: [] Not Met: [] Adjusted  3. Patient will demonstrate an increase in Strength to good proximal hip strength and control to demonstrated good R LE control during gait  [] Progressing: [] Met: [] Not Met: [] Adjusted  4. Patient will amb short distances at home without the cane  [] Progressing: [] Met: [] Not Met: [] Adjusted  5. Decrease pain to be able to move / transfer with less difficulty  [] Progressing: [] Met: [] Not Met: [] Adjusted     Patient Requires Follow-up: [x] Yes  [] No    Plan:   [x] Continue per plan of care [] Alter current plan (see comments)  [] Plan of care initiated [] Hold pending MD visit [] Discharge    Plan for Next Session:  Begin Aquatics    Electronically signed by:  Maria Eugenia CARRASCO#[id number]    Note: If patient does not return for scheduled/recommended follow up visits, this note will serve as a discharge from care along with the most recent update on progress.

## 2021-07-02 NOTE — PLAN OF CARE
[place] [facility], [facility]  [address] [facility] [facility] [place], [facility]  Phone: [phone]   Fax:     [phone]                                                       Physical Therapy Certification    Dear Referring Practitioner: CORINNA Palmer,    We had the pleasure of evaluating the following patient for physical therapy services at [facility]. A summary of our findings can be found in the initial assessment below. This includes our plan of care. If you have any questions or concerns regarding these findings, please do not hesitate to contact me at the office phone number checked above. Thank you for the referral.       Physician Signature:_______________________________Date:__________________  By signing above (or electronic signature), therapists plan is approved by physician              Patient: Olga Ibrahim   : 1975   MRN: [id number]  Referring Physician: Referring Practitioner: CORINNA Palmer      Evaluation Date: 2021      Medical Diagnosis Information:  Diagnosis: Chronic Pain Syndrome, OA Both knees   Treatment Diagnosis: Main complaint is pain and weakness in R hip/ LE                                         Insurance information: PT Insurance Information: [employer]on Sacks     Precautions/ Contra-indications:   Latex Allergy:  [x]NO      []YES  Preferred Language for Healthcare:   [x]English       []other:    C-SSRS Triggered by Intake questionnaire (Past 2 wk assessment ):   [] No, Questionnaire did not trigger screening. [x] Yes, Patient intake triggered C-SSRS Screening      [x] C-SSRS Screening completed  [] PCP notified via Epic    C-SSRS Triggered by Intake questionnaire:     YES NO   In the past month, have you wished you were dead or wished you could go to sleep and not wake up?   X   In the past month, have you had any thoughts of killing yourself? X                    Lifetime   YES NO   Have you ever done anything, started to do anything, or prepared to do anything to end your life? X             Past 3 months    X       SUBJECTIVE:    Main complaint is R  hip pain and R LE weakness, but also has pain in LB and B                                                                         Knees. Able to control pain in B knees and LB with pain meds, but not in R hip. Has had benefit from Aquatic PT in the past                                    Relevant Medical History:Additional Pertinent Hx: Arthritis, asthma, CHF,COPD, DM, HTN, Depression, Anxiety, Obesity  Functional Scale/Score:  [place] = 48    Pain Scale: Ranges 5-10/10  Easing factors:  Rest   Provocative factors: Amb     Type: [x]Constant   []Intermittent  []Radiating []Localized []other:     Numbness/Tingling: Intermittent tingling in R LE    Occupation/School:  Disabled    Living Status/Prior Level of Function: Independent with ADLs and IADLs    OBJECTIVE:   Palpation: Sensation is grossly intact to light touch    Functional Mobility/Transfers:  Independent, but moves slowly    Posture: Difficult sitting in a chair with good posture due to her size    Bandages/Dressings/Incisions:     Gait: (include devices/WB status) Amb with a cane, but moves her R leg through swing phase with poor control.                                                            Up/down stairs with non-recip gait    ROM LEFT RIGHT   HIP Flex 90 70   HIP Abd     HIP Ext 0 0   HIP IR     HIP ER     Knee ext 0 0   Knee Flex 100 100   Ankle PF     Ankle DF     Ankle In     Ankle Ev     Strength  LEFT RIGHT   HIP Flexors 3/5 3-/5   HIP Abductors     HIP Ext 4-/5 3-/5   Hip ER     Knee EXT (quad) 5/5 3-/5   Knee Flex (HS) 5/5 3-/5   Ankle DF 4/5 3-/5   Ankle PF 4/5 3-/5   Ankle Inv     Ankle EV       R LE movements also limited by pain   Circumference  Mid apex  7 cm prox Reflexes/Sensation:    [x]Dermatomes/Myotomes intact    [x]Reflexes equal and normal bilaterally   []Other:    Joint mobility:    []Normal    []Hypo   []Hyper    Orthopedic Special Tests:                        [x] Patient history, allergies, meds reviewed. Medical chart reviewed. See intake form. Review Of Systems (ROS):  [x]Performed Review of systems (Integumentary, CardioPulmonary, Neurological) by intake and observation. Intake form has been scanned into medical record. Patient has been instructed to contact their primary care physician regarding ROS issues if not already being addressed at this time.       Co-morbidities/Complexities (which will affect course of rehabilitation):   []None           Arthritic conditions   []Rheumatoid arthritis (M05.9)  [x]Osteoarthritis (M19.91)   Cardiovascular conditions   [x]Hypertension (I10)  []Hyperlipidemia (E78.5)  []Angina pectoris (I20)  []Atherosclerosis (I70)  []CVA Musculoskeletal conditions   []Disc pathology   []Congenital spine pathologies   []Prior surgical intervention  []Osteoporosis (M81.8)  []Osteopenia (M85.8)   Endocrine conditions   []Hypothyroid (E03.9)  []Hyperthyroid Gastrointestinal conditions   []Constipation (Z82.46)   Metabolic conditions   [x]Morbid obesity (E66.01)  [x]Diabetes type 1(E10.65) or 2 (E11.65)   []Neuropathy (G60.9)     Pulmonary conditions   [x]Asthma (J45)  []Coughing   [x]COPD (J44.9)   Psychological Disorders  [x]Anxiety (F41.9)  [x]Depression (F32.9)   []Other:   []Other:          Barriers to/and or personal factors that will affect rehab potential:              []Age  []Sex    []Smoker              []Motivation/Lack of Motivation                        [x]Co-Morbidities              []Cognitive Function, education/learning barriers              []Environmental, home barriers              []profession/work barriers  []past PT/medical experience  []other:      Falls Risk Assessment (30 days):   [x] Falls Risk assessed and no intervention required. [] Falls Risk assessed and Patient requires intervention due to being higher risk   TUG score (>12s at risk):     [] Falls education provided, including         ASSESSMENT:  Functional Impairments:     [x]Noted lumbar/proximal hip/LE hypomobility   [x]Decreased LE functional ROM   [x]Decreased core/proximal hip strength and neuromuscular control   [x]Decreased LE functional strength   []Reduced balance/proprioceptive control   []other:      Functional Activity Limitations (from functional questionnaire and intake)   [x]Reduced ability to tolerate prolonged functional positions   [x]Reduced ability or difficulty with changes of positions or transfers between positions   [x]Reduced ability to maintain good posture and demonstrate good body mechanics with sitting, bending, and lifting   [x]Reduced ability to sleep   [x] Reduced ability or tolerance with driving and/or computer work   [x]Reduced ability to perform lifting, carrying tasks   [x]Reduced ability to squat   [x]Reduced ability to forward bend   [x]Reduced ability to ambulate prolonged functional periods/distances/surfaces   [x]Reduced ability to ascend/descend stairs   [x]Reduced ability to run, hop or jump   []other:     Participation Restrictions   [x]Reduced participation in self care activities   [x]Reduced participation in home management activities   []Reduced participation in work activities   []Reduced participation in social activities. []Reduced participation in sport activities. Classification :    []Signs/symptoms consistent with post-surgical status including decreased ROM, strength and function.    []Signs/symptoms consistent with joint sprain/strain   []Signs/symptoms consistent with patella-femoral syndrome   [x]Signs/symptoms consistent with knee OA/hip OA   [x]Signs/symptoms consistent with internal derangement of knee/Hip   [x]Signs/symptoms consistent with functional hip weakness/NMR control []Signs/symptoms consistent with tendinitis/tendinosis    []signs/symptoms consistent with pathology which may benefit from Dry needling      []other:      Prognosis/Rehab Potential:      []Excellent   [x]Good    []Fair   []Poor    Tolerance of evaluation/treatment:    []Excellent   [x]Good    []Fair   []Poor    Physical Therapy Evaluation Complexity Justification  [x] A history of present problem with:  [] no personal factors and/or comorbidities that impact the plan of care;  []1-2 personal factors and/or comorbidities that impact the plan of care  [x]3 personal factors and/or comorbidities that impact the plan of care  [x] An examination of body systems using standardized tests and measures addressing any of the following: body structures and functions (impairments), activity limitations, and/or participation restrictions;:  [x] a total of 1-2 or more elements   [] a total of 3 or more elements   [] a total of 4 or more elements   [x] A clinical presentation with:  [x] stable and/or uncomplicated characteristics   [] evolving clinical presentation with changing characteristics  [] unstable and unpredictable characteristics;   [x] Clinical decision making of [] low, [] moderate, [] high complexity using standardized patient assessment instrument and/or measurable assessment of functional outcome. [x] EVAL (LOW) 69749 (typically 20 minutes face-to-face)  [] EVAL (MOD) 45455 (typically 30 minutes face-to-face)  [] EVAL (HIGH) 12439 (typically 45 minutes face-to-face)  [] RE-EVAL     PLAN:  Frequency/Duration:  2 days per week for 6 Weeks:  Interventions:  [x]  Therapeutic exercise including: strength training, ROM, for Lower extremity and core   [x]  NMR activation and proprioception for LE, Glutes and Core   []  Manual therapy as indicated for LE, Hip and spine to include: Dry Needling/IASTM, STM, PROM, Gr I-IV mobilizations, manipulation.    [] Modalities as needed that may include: thermal agents, E-stim, Biofeedback, US, iontophoresis as indicated  [x] Patient education on joint protection, postural re-education, activity modification, progression of HEP. [x] Aquatic exercise including: strength training, ROM, and balance for Lower extremity and core     HEP instruction: Voiced understanding of home exer    GOALS:  Patient stated goal: Some relief with movement  [] Progressing: [] Met: [] Not Met: [] Adjusted    Therapist goals for Patient:   Short Term Goals: To be achieved in: 2 weeks  1. Independent in HEP and progression per patient tolerance, in order to prevent re-injury. [] Progressing: [] Met: [] Not Met: [] Adjusted  2. Patient will have a decrease in pain to facilitate improvement in movement, function, and ADLs as indicated by Functional Deficits. [] Progressing: [] Met: [] Not Met: [] Adjusted    Long Term Goals: To be achieved in:  weeks  1. Disability index score of 20% or less for the LEFS to assist with reaching prior level of function. [] Progressing: [] Met: [] Not Met: [] Adjusted  2. Patient will demonstrate increased AROM to allow for proper joint functioning as indicated by patients Functional Deficits. [] Progressing: [] Met: [] Not Met: [] Adjusted  3. Patient will demonstrate an increase in Strength to good proximal hip strength and control to demonstrated good R LE control during gait  [] Progressing: [] Met: [] Not Met: [] Adjusted  4. Patient will amb short distances at home without the cane  [] Progressing: [] Met: [] Not Met: [] Adjusted  5. Decrease pain to be able to move / transfer with less difficulty  [] Progressing: [] Met: [] Not Met: [] Adjusted     Electronically signed by:  Yunior Berrios [id number]      Note: If patient does not return for scheduled/recommended follow up visits, this note will serve as a discharge from care along with the most recent update on progress.

## 2021-07-06 ENCOUNTER — HOSPITAL ENCOUNTER (OUTPATIENT)
Dept: PHYSICAL THERAPY | Age: 46
Setting detail: THERAPIES SERIES
Discharge: HOME | End: 2021-07-06
Payer: MEDICAID

## 2021-07-06 NOTE — FLOWSHEET NOTE
[place] [facility], [facility]  [address] [facility] [address] [place], [facility]  Phone: [phone]   Fax:     [phone]    Physical Therapy  Cancellation/No-show Note  Patient Name:  Gregor Carroll  :  1975   Date:  2021  Cancelled visits to date: 1  No-shows to date: 0    Patient status for today's appointment patient:  [x]  Cancelled  []  Rescheduled appointment  []  No-show     Reason given by patient:  []  Patient ill  []  Conflicting appointment  []  No transportation    []  Conflict with work  []  No reason given  [x]  Other:   Too much pain   Comments:      Phone call information:   []  Phone call made today to patient at _ time at number provided:      []  Patient answered, conversation as follows:    []  Patient did not answer, message left as follows:  []  Phone call not made today    Electronically signed by:  Mitzi Smith, PTA [id number]

## 2021-07-08 ENCOUNTER — HOSPITAL ENCOUNTER (OUTPATIENT)
Dept: PHYSICAL THERAPY | Age: 46
Setting detail: THERAPIES SERIES
Discharge: HOME | End: 2021-07-08
Payer: MEDICAID

## 2021-07-08 PROCEDURE — 97113 AQUATIC THERAPY/EXERCISES: CPT

## 2021-07-08 PROCEDURE — 97150 GROUP THERAPEUTIC PROCEDURES: CPT

## 2021-07-08 NOTE — FLOWSHEET NOTE
[address], [place]  [address] [facility]  Phone: [phone]   Fax:     [phone]      Physical Therapy Daily/Aquatic Flow Sheet   Date:  2021    Patient Name:  Nasir Chiu    :  1975  MRN: [id number]    Restrictions/Precautions:    Pertinent Medical History:Additional Pertinent Hx: Arthritis, asthma, CHF,GOPD, DM, HTN, Depression, Anxiety, Obesity    Medical/Treatment Diagnosis Information:   · Diagnosis: Chronic Pain Syndrome, OA Both knees  · Treatment Diagnosis: Main complaint is pain and weakness in R hip/ LE    Insurance/Certification information:   Jose Daniel Dunbar  Physician Information:  Angelic FRENCH,-CNP  Plan of care signed (Y/N): Inbox    Date of Patient follow up with Physician:      Progress Report: []  Yes  [x]  No     Date Range for reporting period:  Beginnin2021  Ending:      Progress report due (10 Rx/or 30 days whichever is less):     Recertification due (POC duration/ or 90 days whichever is less):    Visit # POC/Insurance Allowable Auth Needed    30 []Yes   [x]No     Latex Allergy:  [x]NO      []YES  Preferred Language for Healthcare:   [x]English       []Other:    Functional Scale:     Date assessed: at eval  Test: Mildred  Score:  48    Pain level: 8/10 right hip     History of Injury:  H/o OA in Both knees and R hip. States that her right hip pain got worse after having injection ~ 2 yrs ago. Subjective:  Main complaint is R  hip pain and R LE weakness, but also has pain in LB and B                                         Knees. Able to control pain in B knees and LB with pain meds, but not in R hip.                                 Has had benefit from Aquatic PT in the past    Objective:    Observation:    Test measurements:    Land-based Visits Exercises/Activities:  Therapeutic Ex (19130)  Min: Resistance/Repetitions Notes                  Therapeutic Activity (77287) Min:     Reviewed home exer/activities     Given pool pass          NMR re-education (81561) Min:                          Manual Intervention (22564)  Min:                    Modalities  Min:               Aquatic Visits Exercises/Activities:  Aquatic Abbreviation Key  B= Belt DB= Dumbells T= Theratube   G=Gloves N= Noodles W= Weights   P= Paddles WW=Water Walker K= Kickboard        Transfers:   steps with bilat handrail      % Immersion: 75%            Ambulation:   Exercises UE:      Forwards 3+1 Shoulder Shrugs     Lateral 1 Shoulder circles     Marching    Scapular Retraction      Retro   Rolling      Cariocas  Push Downs    Multifidus walkouts w/paddle  IR/ER      Punching    Stretching:  Rowing    Gastroc/Soleus   Elbow Flex/Ext    Hamstring   Shldr Flex/Ext     Knee flex stretch   Shldr Abd/Add    Piriformis   Horiz Abd/Add     Hip flexor    Arm Circles     SKTC   PNF Diagonals    DKTC  UE oscillations f/b, s/s    ITB   Wall Push-ups    Quad  Figure 8's    Mid back   Buoy pull/push downs    UT  Tband rows    Rhom  Tband lats    Post Shoulder  Lumbar Rot w/ paddles    Pec   Small ball pull downs                   diagonals             Cervical:       AROM Flex       AROM Extension     LEs exercises:  bilat AROM Side Bending    Marching  10 AROM Rotation    Heel Raises  10 Chin tucks    Toe Raises  10 Chin nods     Squats  10      Hamstring Curls  10      Hip Flexion  10 Balance:      Hip Abduction 10 SLS    Hip Circles  Tandem stance    TA set  NBOS eyes open 30 sec with HHA prn   Glut Set  NBOS eyes closed    Hip Extension  Hand to Opposite Knee    Hip Adduction    Box Step     Hip IR   Noodle Stance     Hip ER  Stop/Go Gait    Fig 8's  Switch Gait      Foot on step bal 30 sec R/L         Seated: bilat Functional:    Ankle Pumps 10 Step up forward    Ankle circles 10 Step up lateral    Knee flex & ext 10 Step down    Hip Abd & Adduction 10 [place] squats    Bicycle   Crate Lifts    Add Set with ball  Lunges forward    LX stab with med ball throws  Lunges lateral    Ankle INV  Lunges retro    Ankle EV  Lower ab curl with noodle      Upper ab curl with ball      Med ball straight lifts      Med ball diagonal lifts      Hydrorider          Noodle:      Leg Press  Deep Water:    Noodle hang at wall  Jog    Noodle hang deep water  Jumping Jacks    Noodle Bicycle  Heel to toe      Hand to opposite knee      Cross country skier      Rocking Horse      Other Therapeutic Activities:  Pt was educated on PT POC, Diagnosis, Prognosis, pathomechanics as well as frequency and duration of scheduling future physical therapy appointments. Time was also taken on this day to answer all patient questions and participation in PT. Reviewed appointment policy in detail with patient and patient verbalized understanding. Home Exercise Program:  Patient was instructed in the following for HEP:     . Patient verbalized/demonstrated understanding and was issued written handout. Charges: Therapeutic Exercise and NMR EXR  [] (01684) Provided verbal/tactile cueing for activities related to strengthening, flexibility, endurance, ROM for improvements in LE, proximal hip, and core control with self care, mobility, lifting, ambulation.  [] (44816) Provided verbal/tactile cueing for activities related to improving balance, coordination, kinesthetic sense, posture, motor skill, proprioception  to assist with LE, proximal hip, and core control in self care, mobility, lifting, ambulation and eccentric single leg control.      NMR and Therapeutic Activities:    [] (97397 or 59353) Provided verbal/tactile cueing for activities related to improving balance, coordination, kinesthetic sense, posture, motor skill, proprioception and motor activation to allow for proper function of core, proximal hip and LE with self care and ADLs and functional mobility.   [] (63066) Gait Re-education- Provided training and instruction to the patient for proper LE, core and proximal hip recruitment and positioning and eccentric body weight control with ambulation re-education including up and down stairs     Home Exercise Program:    [x] (95244) Reviewed/Progressed HEP activities related to strengthening, flexibility, endurance, ROM of core, proximal hip and LE for functional self-care, mobility, lifting and ambulation/stair navigation   [] (50191)Reviewed/Progressed HEP activities related to improving balance, coordination, kinesthetic sense, posture, motor skill, proprioception of core, proximal hip and LE for self care, mobility, lifting, and ambulation/stair navigation      Manual Treatments:  PROM / STM / Oscillations-Mobs:  G-I, II, III, IV (PA's, Inf., Post.)  [] (03996) Provided manual therapy to mobilize LE, proximal hip and/or LS spine soft tissue/joints for the purpose of modulating pain, promoting relaxation,  increasing ROM, reducing/eliminating soft tissue swelling/inflammation/restriction, improving soft tissue extensibility and allowing for proper ROM for normal function with self care, mobility, lifting and ambulation.      Individual Aquatic Therapy:  [x] (67524) Provided verbal and tactile cueing for strengthening, flexibility, ROM using the therapeutic properties of water (buoyancy, resistance) for improvements in core control, mobility and ambulation     Aquatic Group:  [x] (24713) Provided intermittent verbal and tactile cueing for strengthening, endurance, flexibility and ROM for 2-4 individuals that do not require one-on one patient contact by the therapist       Approval Dates:  CPT Code Units Approved Units Used  Date Updated:                     Land Timed Code Treatment Minutes: 30   Land Total Treatment Minutes: 45     Individual Aquatic Minutes: 20   Aquatic Group Minutes: 20     [] EVAL (LOW) 45718 (typically 20 minutes face-to-face)  [] EVAL (MOD) 27753 (typically 30 minutes face-to-face)  [] EVAL (HIGH) 13694 (typically 45 minutes face-to-face)  [] RE-EVAL     [] LR(85766) x     [] Dry needle 1 or 2 Muscles (56311)  [] NMR (30514) x     [] Dry needle 3+ Muscles (48948)  [] Manual (12034) x     [] Ultrasound (58147) x  [x] TA (16299) x  2   [] Mech Traction (08870)  [] ES(attended) (18588)     [] ES (un) (11054):   [] Vasopump (40155) [] Ionto (33560)   [x] Aquatic Ind (80049) x 2    [x] Aquatic Group (54016) x 1  [] Other:    Treatment/Activity Tolerance:  [] Patient tolerated treatment well [] Patient limited by fatigue  [x] Patient limited by pain  [] Patient limited by other medical complications  [] Other:     Prognosis: [x] Good [] Fair  [] Poor    Goals:   Patient stated goal: Some relief with movement  [] Progressing: [] Met: [] Not Met: [] Adjusted    Therapist goals for Patient:   Short Term Goals: To be achieved in: 2 weeks  1. Independent in HEP and progression per patient tolerance, in order to prevent re-injury. [] Progressing: [] Met: [] Not Met: [] Adjusted  2. Patient will have a decrease in pain to facilitate improvement in movement, function, and ADLs as indicated by Functional Deficits. [] Progressing: [] Met: [] Not Met: [] Adjusted    Long Term Goals: To be achieved in:  weeks  1. Disability index score of 20% or less for the LEFS to assist with reaching prior level of function. [] Progressing: [] Met: [] Not Met: [] Adjusted  2. Patient will demonstrate increased AROM to allow for proper joint functioning as indicated by patients Functional Deficits. [] Progressing: [] Met: [] Not Met: [] Adjusted  3. Patient will demonstrate an increase in Strength to good proximal hip strength and control to demonstrated good R LE control during gait  [] Progressing: [] Met: [] Not Met: [] Adjusted  4. Patient will amb short distances at home without the cane  [] Progressing: [] Met: [] Not Met: [] Adjusted  5. Decrease pain to be able to move / transfer with less difficulty  [] Progressing: [] Met: [] Not Met: [] Adjusted     Patient Requires Follow-up: [x] Yes  [] No    Plan:   [x] Continue per plan of care [] Alter current plan (see comments)  [] Plan of care initiated [] Hold pending MD visit [] Discharge    Plan for Next Session:  Add: other UE and LE exercises, inc forward gt as tolerated    Electronically signed by:  Marissa Merino, PTA [id number]  Note: If patient does not return for scheduled/recommended follow up visits, this note will serve as a discharge from care along with the most recent update on progress.

## 2021-07-13 ENCOUNTER — HOSPITAL ENCOUNTER (OUTPATIENT)
Dept: PHYSICAL THERAPY | Age: 46
Setting detail: THERAPIES SERIES
Discharge: HOME | End: 2021-07-13
Payer: MEDICAID

## 2021-07-13 PROCEDURE — 97113 AQUATIC THERAPY/EXERCISES: CPT

## 2021-07-13 PROCEDURE — 97150 GROUP THERAPEUTIC PROCEDURES: CPT

## 2021-07-13 NOTE — FLOWSHEET NOTE
[address] [facility], [address] [place] [address] Juan Antonio Gonzalez [facility] [address] [place] [address]  Phone: [phone]   Fax:     [phone]      Physical Therapy Daily/Aquatic Flow Sheet   Date:  2021    Patient Name:  Elizabet Delong    :  1975  MRN: [id number]    Restrictions/Precautions:    Pertinent Medical History:Additional Pertinent Hx: Arthritis, asthma, CHF,GOPD, DM, HTN, Depression, Anxiety, Obesity    Medical/Treatment Diagnosis Information:   · Diagnosis: Chronic Pain Syndrome, OA Both knees  · Treatment Diagnosis: Main complaint is pain and weakness in R hip/ LE    Insurance/Certification information:   Bambi Henry  Physician Information:  CORINNA Le  Plan of care signed (Y/N): Inbox    Date of Patient follow up with Physician:      Progress Report: []  Yes  [x]  No     Date Range for reporting period:  Beginnin2021  Ending:      Progress report due (10 Rx/or 30 days whichever is less):     Recertification due (POC duration/ or 90 days whichever is less):    Visit # POC/Insurance Allowable Auth Needed   3/12 30 []Yes   [x]No     Latex Allergy:  [x]NO      []YES  Preferred Language for Healthcare:   [x]English       []Other:    Functional Scale:     Date assessed: at eval  Test: Mildred  Score:  48    Pain level: 50/10 right hip     History of Injury:  H/o OA in Both knees and R hip. States that her right hip pain got worse after having injection ~ 2 yrs ago. Subjective:  Main complaint is R  hip pain and R LE weakness, but also has pain in LB and B                                         Knees. Able to control pain in B knees and LB with pain meds, but not in R hip. Has had benefit from Aquatic PT in the past  : My pain is a 50/10! After the pool last time I was not hurting so bad. Today my right leg is painful.     Objective:    Observation:    Test measurements:    Land-based Visits Exercises/Activities:  Therapeutic Ex (65099)  Min: Resistance/Repetitions Notes                  Therapeutic Activity (66431) Min:     Reviewed home exer/activities     Given pool pass          NMR re-education (90778) Min:                          Manual Intervention (95690)  Min:                    Modalities  Min:               Aquatic Visits Exercises/Activities:  Aquatic Abbreviation Key  B= Belt DB= Dumbells T= Theratube   G=Gloves N= Noodles W= Weights   P= Paddles WW=Water Walker K= Kickboard        Transfers:   steps with bilat handrail      % Immersion: 75%            Ambulation:   Exercises UE:      Forwards 3+1 Shoulder Shrugs     Lateral 1 Shoulder circles     Marching    Scapular Retraction  10    Retro   Rolling  10    Cariocas  Push Downs 10   Multifidus walkouts w/paddle  IR/ER 10     Punching    Stretching:  Rowing 10   Gastroc/Soleus   Elbow Flex/Ext 10   Hamstring   Shldr Flex/Ext     Knee flex stretch   Shldr Abd/Add    Piriformis   Horiz Abd/Add     Hip flexor    Arm Circles  10   SKTC   PNF Diagonals    DKTC  UE oscillations f/b, s/s    ITB   Wall Push-ups    Quad  Figure 8's    Mid back   Buoy pull/push downs    UT  Tband rows    Rhom  Tband lats    Post Shoulder  Lumbar Rot w/ paddles    Pec   Small ball pull downs                   diagonals             Cervical:       AROM Flex       AROM Extension     LEs exercises:  bilat AROM Side Bending    Marching  10 AROM Rotation    Heel Raises  10 Chin tucks    Toe Raises  10 Chin nods     Squats  10      Hamstring Curls  10      Hip Flexion  10 Balance:      Hip Abduction 10 SLS    Hip Circles 10 Tandem stance    TA set  NBOS eyes open 30 sec with HHA prn   Glut Set  NBOS eyes closed    Hip Extension  Hand to Opposite Knee    Hip Adduction    Box Step     Hip IR   Noodle Stance     Hip ER  Stop/Go Gait    Fig 8's  Switch Gait      Foot on step bal 30 sec R/L         Seated: bilat Functional:    Ankle Pumps 10 Step up forward    Ankle circles 10 Step up lateral    Knee flex & ext 10 Step down    Hip Abd & Adduction 10 [place] squats    Bicycle   Crate Lifts    Add Set with ball  Lunges forward    LX stab with med ball throws  Lunges lateral    Ankle INV  Lunges retro    Ankle EV  Lower ab curl with noodle      Upper ab curl with ball      Med ball straight lifts      Med ball diagonal lifts      Hydrorider      Foot onto/off of step 5 R/L   Noodle:      Leg Press  Deep Water:    Noodle hang at wall  Jog    Noodle hang deep water  Jumping Jacks    Noodle Bicycle  Heel to toe      Hand to opposite knee      Cross country skier      Rocking Horse      Other Therapeutic Activities:  Pt was educated on PT POC, Diagnosis, Prognosis, pathomechanics as well as frequency and duration of scheduling future physical therapy appointments. Time was also taken on this day to answer all patient questions and participation in PT. Reviewed appointment policy in detail with patient and patient verbalized understanding. Home Exercise Program:  Patient was instructed in the following for HEP:     . Patient verbalized/demonstrated understanding and was issued written handout. Charges: Therapeutic Exercise and NMR EXR  [] (99511) Provided verbal/tactile cueing for activities related to strengthening, flexibility, endurance, ROM for improvements in LE, proximal hip, and core control with self care, mobility, lifting, ambulation.  [] (44497) Provided verbal/tactile cueing for activities related to improving balance, coordination, kinesthetic sense, posture, motor skill, proprioception  to assist with LE, proximal hip, and core control in self care, mobility, lifting, ambulation and eccentric single leg control.      NMR and Therapeutic Activities:    [] (93840 or 16630) Provided verbal/tactile cueing for activities related to improving balance, coordination, kinesthetic sense, posture, motor skill, proprioception and motor activation to allow for proper function of core, proximal hip and LE with self care and ADLs and functional mobility.   [] (35787) Gait Re-education- Provided training and instruction to the patient for proper LE, core and proximal hip recruitment and positioning and eccentric body weight control with ambulation re-education including up and down stairs     Home Exercise Program:    [x] (14397) Reviewed/Progressed HEP activities related to strengthening, flexibility, endurance, ROM of core, proximal hip and LE for functional self-care, mobility, lifting and ambulation/stair navigation   [] (29784)Reviewed/Progressed HEP activities related to improving balance, coordination, kinesthetic sense, posture, motor skill, proprioception of core, proximal hip and LE for self care, mobility, lifting, and ambulation/stair navigation      Manual Treatments:  PROM / STM / Oscillations-Mobs:  G-I, II, III, IV (PA's, Inf., Post.)  [] (74572) Provided manual therapy to mobilize LE, proximal hip and/or LS spine soft tissue/joints for the purpose of modulating pain, promoting relaxation,  increasing ROM, reducing/eliminating soft tissue swelling/inflammation/restriction, improving soft tissue extensibility and allowing for proper ROM for normal function with self care, mobility, lifting and ambulation.      Individual Aquatic Therapy:  [x] (92506) Provided verbal and tactile cueing for strengthening, flexibility, ROM using the therapeutic properties of water (buoyancy, resistance) for improvements in core control, mobility and ambulation     Aquatic Group:  [x] (89569) Provided intermittent verbal and tactile cueing for strengthening, endurance, flexibility and ROM for 2-4 individuals that do not require one-on one patient contact by the therapist       Approval Dates:  CPT Code Units Approved Units Used  Date Updated:                     Land Timed Code Treatment Minutes: 30   Land Total Treatment Minutes: 45     Individual Aquatic Minutes: 15   Aquatic Group Minutes: 30 [] EVAL (LOW) 73445 (typically 20 minutes face-to-face)  [] EVAL (MOD) 75380 (typically 30 minutes face-to-face)  [] EVAL (HIGH) 19150 (typically 45 minutes face-to-face)  [] RE-EVAL     [] DP(96327) x     [] Dry needle 1 or 2 Muscles (31320)  [] NMR (85463) x     [] Dry needle 3+ Muscles (32218)  [] Manual (67528) x     [] Ultrasound (75327) x  [x] TA (35957) x  2   [] Mech Traction (59967)  [] ES(attended) (90034)     [] ES (un) (92839):   [] Vasopump (53780) [] Ionto (37960)   [x] Aquatic Ind (62854) x 1   [x] Aquatic Group (65406) x 1  [] Other:    Treatment/Activity Tolerance:  [] Patient tolerated treatment well [] Patient limited by fatigue  [x] Patient limited by pain  [] Patient limited by other medical complications  [] Other:     Prognosis: [x] Good [] Fair  [] Poor    Goals:   Patient stated goal: Some relief with movement  [] Progressing: [] Met: [] Not Met: [] Adjusted    Therapist goals for Patient:   Short Term Goals: To be achieved in: 2 weeks  1. Independent in HEP and progression per patient tolerance, in order to prevent re-injury. [] Progressing: [] Met: [] Not Met: [] Adjusted  2. Patient will have a decrease in pain to facilitate improvement in movement, function, and ADLs as indicated by Functional Deficits. [] Progressing: [] Met: [] Not Met: [] Adjusted    Long Term Goals: To be achieved in:  weeks  1. Disability index score of 20% or less for the LEFS to assist with reaching prior level of function. [] Progressing: [] Met: [] Not Met: [] Adjusted  2. Patient will demonstrate increased AROM to allow for proper joint functioning as indicated by patients Functional Deficits. [] Progressing: [] Met: [] Not Met: [] Adjusted  3. Patient will demonstrate an increase in Strength to good proximal hip strength and control to demonstrated good R LE control during gait  [] Progressing: [] Met: [] Not Met: [] Adjusted  4.  Patient will amb short distances at home without the cane  []

## 2021-07-15 ENCOUNTER — HOSPITAL ENCOUNTER (OUTPATIENT)
Dept: PHYSICAL THERAPY | Age: 46
Setting detail: THERAPIES SERIES
Discharge: HOME | End: 2021-07-15
Payer: MEDICAID

## 2021-07-15 PROCEDURE — 97113 AQUATIC THERAPY/EXERCISES: CPT

## 2021-07-15 PROCEDURE — 97150 GROUP THERAPEUTIC PROCEDURES: CPT

## 2021-07-15 NOTE — FLOWSHEET NOTE
[address], [facility]  [address] [facility]  Phone: [phone]   Fax:     [phone]      Physical Therapy Daily/Aquatic Flow Sheet   Date:  7/15/2021    Patient Name:  Ace Kendrick    :  1975  MRN: [id number]    Restrictions/Precautions:    Pertinent Medical History:Additional Pertinent Hx: Arthritis, asthma, CHF,GOPD, DM, HTN, Depression, Anxiety, Obesity    Medical/Treatment Diagnosis Information:   · Diagnosis: Chronic Pain Syndrome, OA Both knees  · Treatment Diagnosis: Main complaint is pain and weakness in R hip/ LE    Insurance/Certification information:   Petr Ambrosio  Physician Information:  Pinky Parent APRN,-CNP  Plan of care signed (Y/N): Inbox    Date of Patient follow up with Physician:      Progress Report: []  Yes  [x]  No     Date Range for reporting period:  Beginnin/15/2021  Ending:      Progress report due (10 Rx/or 30 days whichever is less):     Recertification due (POC duration/ or 90 days whichever is less):    Visit # POC/Insurance Allowable Auth Needed    30 []Yes   [x]No     Latex Allergy:  [x]NO      []YES  Preferred Language for Healthcare:   [x]English       []Other:    Functional Scale:     Date assessed: at eval  Test: Mildred  Score:  48    Pain level: 5/10 right hip, 3/10 LB    History of Injury:  H/o OA in Both knees and R hip. States that her right hip pain got worse after having injection ~ 2 yrs ago. Subjective:  Main complaint is R  hip pain and R LE weakness, but also has pain in LB and B                                         Knees. Able to control pain in B knees and LB with pain meds, but not in R hip. Has had benefit from Aquatic PT in the past  : My pain is a 50/10! After the pool last time I was not hurting so bad. Today my right leg is painful. 7/15: After last time I got home and went to bed.   It is my hip that hurts more every night. Objective:    Observation:    Test measurements:    Land-based Visits Exercises/Activities:  Therapeutic Ex (89445)  Min: Resistance/Repetitions Notes                  Therapeutic Activity (80953) Min:     Reviewed home exer/activities     Given pool pass          NMR re-education (99094) Min:                          Manual Intervention (50946)  Min:                    Modalities  Min:               Aquatic Visits Exercises/Activities:  Aquatic Abbreviation Key  B= Belt DB= Dumbells T= Theratube   G=Gloves N= Noodles W= Weights   P= Paddles WW=Water Walker K= Kickboard        Transfers:   steps with bilat handrail      % Immersion: 75%            Ambulation:   Exercises UE:      Forwards 3+1 Shoulder Shrugs     Lateral 2 Shoulder circles     Marching    Scapular Retraction  10    Retro   Rolling  10    Cariocas  Push Downs 10   Multifidus walkouts w/paddle  IR/ER 10     Punching 10   Stretching:  Rowing 10   Gastroc/Soleus   Elbow Flex/Ext 10   Hamstring   Shldr Flex/Ext  10   Knee flex stretch   Shldr Abd/Add 10   Piriformis   Horiz Abd/Add  10   Hip flexor    Arm Circles  10   SKTC   PNF Diagonals    DKTC  UE oscillations f/b, s/s    ITB   Wall Push-ups    Quad  Figure 8's    Mid back   Buoy pull/push downs    UT  Tband rows    Rhom  Tband lats    Post Shoulder  Lumbar Rot w/ paddles    Pec   Small ball pull downs                   diagonals             Cervical:       AROM Flex       AROM Extension     LEs exercises:  bilat AROM Side Bending    Marching  10 AROM Rotation    Heel Raises  10 Chin tucks    Toe Raises  10 Chin nods     Squats  10      Hamstring Curls  10      Hip Flexion  10 Balance:      Hip Abduction 10 SLS    Hip Circles 10 Tandem stance    TA set  NBOS eyes open 30 sec with HHA prn   Glut Set  NBOS eyes closed    Hip Extension  Hand to Opposite Knee    Hip Adduction    Box Step     Hip IR   Noodle Stance     Hip ER  Stop/Go Gait    Fig 8's  Switch Gait      Foot on step bal 30 sec R/L         Seated: bilat Functional:    Ankle Pumps 15 Step up forward    Ankle circles 10 Step up lateral    Knee flex & ext 15 Step down    Hip Abd & Adduction 15 [place] squats    Bicycle  15 Crate Lifts    Add Set with ball  Lunges forward    LX stab with med ball throws  Lunges lateral    Ankle INV  Lunges retro    Ankle EV  Lower ab curl with noodle      Upper ab curl with ball      Med ball straight lifts      Med ball diagonal lifts      Hydrorider      Foot onto/off of step 5 R/L   Noodle:      Leg Press  Deep Water:    Noodle hang at wall  Jog    Noodle hang deep water  Jumping Jacks    Noodle Bicycle  Heel to toe      Hand to opposite knee      Cross country skier      Rocking Horse      Other Therapeutic Activities:  Pt was educated on PT POC, Diagnosis, Prognosis, pathomechanics as well as frequency and duration of scheduling future physical therapy appointments. Time was also taken on this day to answer all patient questions and participation in PT. Reviewed appointment policy in detail with patient and patient verbalized understanding. Home Exercise Program:  Patient was instructed in the following for HEP:     . Patient verbalized/demonstrated understanding and was issued written handout. Charges: Therapeutic Exercise and NMR EXR  [] (55328) Provided verbal/tactile cueing for activities related to strengthening, flexibility, endurance, ROM for improvements in LE, proximal hip, and core control with self care, mobility, lifting, ambulation.  [] (79263) Provided verbal/tactile cueing for activities related to improving balance, coordination, kinesthetic sense, posture, motor skill, proprioception  to assist with LE, proximal hip, and core control in self care, mobility, lifting, ambulation and eccentric single leg control.      NMR and Therapeutic Activities:    [] (92457 or ) Provided verbal/tactile cueing for activities related to improving balance, coordination, kinesthetic sense, posture, motor skill, proprioception and motor activation to allow for proper function of core, proximal hip and LE with self care and ADLs and functional mobility.   [] (84301) Gait Re-education- Provided training and instruction to the patient for proper LE, core and proximal hip recruitment and positioning and eccentric body weight control with ambulation re-education including up and down stairs     Home Exercise Program:    [x] (63765) Reviewed/Progressed HEP activities related to strengthening, flexibility, endurance, ROM of core, proximal hip and LE for functional self-care, mobility, lifting and ambulation/stair navigation   [] (00479)Reviewed/Progressed HEP activities related to improving balance, coordination, kinesthetic sense, posture, motor skill, proprioception of core, proximal hip and LE for self care, mobility, lifting, and ambulation/stair navigation      Manual Treatments:  PROM / STM / Oscillations-Mobs:  G-I, II, III, IV (PA's, Inf., Post.)  [] (73526) Provided manual therapy to mobilize LE, proximal hip and/or LS spine soft tissue/joints for the purpose of modulating pain, promoting relaxation,  increasing ROM, reducing/eliminating soft tissue swelling/inflammation/restriction, improving soft tissue extensibility and allowing for proper ROM for normal function with self care, mobility, lifting and ambulation.      Individual Aquatic Therapy:  [x] (59564) Provided verbal and tactile cueing for strengthening, flexibility, ROM using the therapeutic properties of water (buoyancy, resistance) for improvements in core control, mobility and ambulation     Aquatic Group:  [x] (61542) Provided intermittent verbal and tactile cueing for strengthening, endurance, flexibility and ROM for 2-4 individuals that do not require one-on one patient contact by the therapist       Approval Dates:  CPT Code Units Approved Units Used  Date Updated:                     Land Timed Code Treatment Minutes: 30   Land Total Treatment Minutes: [id number] Minutes: 15   Aquatic Group Minutes: 30     [] EVAL (LOW) 51572 (typically 20 minutes face-to-face)  [] EVAL (MOD) 15484 (typically 30 minutes face-to-face)  [] EVAL (HIGH) 59283 (typically 45 minutes face-to-face)  [] RE-EVAL     [] SE(54103) x     [] Dry needle 1 or 2 Muscles (31769)  [] NMR (48563) x     [] Dry needle 3+ Muscles (88025)  [] Manual (68660) x     [] Ultrasound (08207) x  [x] TA (18876) x  2   [] Mech Traction (93496)  [] ES(attended) (16039)     [] ES (un) (52436):   [] Vasopump (89953) [] Ionto (73456)   [x] Aquatic Ind (31109) x 1   [x] Aquatic Group (17485) x 1  [] Other:    Treatment/Activity Tolerance:  [] Patient tolerated treatment well [] Patient limited by fatigue  [x] Patient limited by pain  [] Patient limited by other medical complications  [x] Other:   7/15: Pain after aquatics: 3/10 Right hip, 0-1/10 LB    Prognosis: [x] Good [] Fair  [] Poor    Goals:   Patient stated goal: Some relief with movement  [] Progressing: [] Met: [] Not Met: [] Adjusted    Therapist goals for Patient:   Short Term Goals: To be achieved in: 2 weeks  1. Independent in HEP and progression per patient tolerance, in order to prevent re-injury. [] Progressing: [] Met: [] Not Met: [] Adjusted  2. Patient will have a decrease in pain to facilitate improvement in movement, function, and ADLs as indicated by Functional Deficits. [] Progressing: [] Met: [] Not Met: [] Adjusted    Long Term Goals: To be achieved in:  weeks  1. Disability index score of 20% or less for the LEFS to assist with reaching prior level of function. [] Progressing: [] Met: [] Not Met: [] Adjusted  2. Patient will demonstrate increased AROM to allow for proper joint functioning as indicated by patients Functional Deficits. [] Progressing: [] Met: [] Not Met: [] Adjusted  3.  Patient will demonstrate an increase in Strength to good proximal hip strength and control to demonstrated good R LE control during gait  [] Progressing: [] Met: [] Not Met: [] Adjusted  4. Patient will amb short distances at home without the cane  [] Progressing: [] Met: [] Not Met: [] Adjusted  5. Decrease pain to be able to move / transfer with less difficulty  [] Progressing: [] Met: [] Not Met: [] Adjusted     Patient Requires Follow-up: [x] Yes  [] No    Plan:   [x] Continue per plan of care [] Alter current plan (see comments)  [] Plan of care initiated [] Hold pending MD visit [] Discharge    Plan for Next Session:  Add:  inc seated exer x 20, box step as tolerated    Electronically signed by:  Hamlet Wang, PTA [id number]  Note: If patient does not return for scheduled/recommended follow up visits, this note will serve as a discharge from care along with the most recent update on progress.

## 2021-07-20 ENCOUNTER — HOSPITAL ENCOUNTER (OUTPATIENT)
Dept: PHYSICAL THERAPY | Age: 46
Setting detail: THERAPIES SERIES
Discharge: HOME | End: 2021-07-20
Payer: MEDICAID

## 2021-07-20 PROCEDURE — 97150 GROUP THERAPEUTIC PROCEDURES: CPT

## 2021-07-20 PROCEDURE — 97113 AQUATIC THERAPY/EXERCISES: CPT

## 2021-07-20 NOTE — FLOWSHEET NOTE
[address] [facility],[address], [facility]  [address] [facility]  Phone: [phone]   Fax:     [phone]      Physical Therapy Daily/Aquatic Flow Sheet   Date:  2021    Patient Name:  Gregor Carroll    :  1975  MRN: [id number]    Restrictions/Precautions:    Pertinent Medical History:Additional Pertinent Hx: Arthritis, asthma, CHF,GOPD, DM, HTN, Depression, Anxiety, Obesity    Medical/Treatment Diagnosis Information:   · Diagnosis: Chronic Pain Syndrome, OA Both knees  · Treatment Diagnosis: Main complaint is pain and weakness in R hip/ LE    Insurance/Certification information:   Vee Gordon  Physician Information:  CORINNA Ordaz  Plan of care signed (Y/N): Inbox    Date of Patient follow up with Physician:      Progress Report: []  Yes  [x]  No     Date Range for reporting period:  Beginnin2021  Ending:      Progress report due (10 Rx/or 30 days whichever is less):     Recertification due (POC duration/ or 90 days whichever is less):    Visit # POC/Insurance Allowable Auth Needed    30 []Yes   [x]No     Latex Allergy:  [x]NO      []YES  Preferred Language for Healthcare:   [x]English       []Other:    Functional Scale:     Date assessed: at eval  Test: Mildred  Score:  48    Pain level: 6/10 right hip, 3/10 LB    History of Injury:  H/o OA in Both knees and R hip. States that her right hip pain got worse after having injection ~ 2 yrs ago. Subjective:  Main complaint is R  hip pain and R LE weakness, but also has pain in LB and B                                         Knees. Able to control pain in B knees and LB with pain meds, but not in R hip. Has had benefit from Aquatic PT in the past  : My pain is a 50/10! After the pool last time I was not hurting so bad. Today my right leg is painful. 7/15: After last time I got home and went to bed.   It is my hip that hurts more every night.   7/20: R hip pretty sore      Objective:    Observation:    Test measurements:    Land-based Visits Exercises/Activities:  Therapeutic Ex (78746)  Min: Resistance/Repetitions Notes                  Therapeutic Activity (61649) Min:     Reviewed home exer/activities     Given pool pass          NMR re-education (09603) Min:                          Manual Intervention ([phone])  Min:                    Modalities  Min:               Aquatic Visits Exercises/Activities:  Aquatic Abbreviation Key  B= Belt DB= Dumbells T= Theratube   G=Gloves N= Noodles W= Weights   P= Paddles WW=Water Walker K= Kickboard        Transfers:   steps with bilat handrail      % Immersion: 75%            Ambulation:   Exercises UE:      Forwards 3+1 Shoulder Shrugs     Lateral 2 Shoulder circles 10    Marching    Scapular Retraction  10    Retro   Rolling  10    Cariocas  Push Downs 10   Multifidus walkouts w/paddle  IR/ER 10     Punching 10   Stretching:  Rowing 10   Gastroc/Soleus   Elbow Flex/Ext 10   Hamstring   Shldr Flex/Ext  10   Knee flex stretch   Shldr Abd/Add 10   Piriformis   Horiz Abd/Add  10   Hip flexor    Arm Circles  10   SKTC   PNF Diagonals    DKTC  UE oscillations f/b, s/s    ITB   Wall Push-ups    Quad  Figure 8's    Mid back   Buoy pull/push downs    UT  Tband rows    Rhom  Tband lats    Post Shoulder  Lumbar Rot w/ paddles    Pec   Small ball pull downs                   diagonals             Cervical:       AROM Flex       AROM Extension     LEs exercises:  bilat AROM Side Bending    Marching  10 AROM Rotation    Heel Raises  10 Chin tucks    Toe Raises  10 Chin nods     Squats  10      Hamstring Curls  10      Hip Flexion  10 Balance:      Hip Abduction 10 SLS    Hip Circles 10 Tandem stance    TA set  NBOS eyes open 30 sec with HHA prn   Glut Set  NBOS eyes closed    Hip Extension  Hand to Opposite Knee    Hip Adduction    Box Step  3x L/R   Hip IR   Noodle Stance     Hip ER  Stop/Go Gait Fig 8's  Switch Gait      Foot on step bal 30 sec R/L         Seated: bilat Functional:    Ankle Pumps 20 Step up forward    Ankle circles 10 Step up lateral    Knee flex & ext 20 Step down    Hip Abd & Adduction 20 Strawberry squats    Bicycle  20 Crate Lifts    Add Set with ball  Lunges forward    LX stab with med ball throws  Lunges lateral    Ankle INV  Lunges retro    Ankle EV  Lower ab curl with noodle      Upper ab curl with ball      Med ball straight lifts      Med ball diagonal lifts      Hydrorider      Foot onto/off of step 5 R/L   Noodle:      Leg Press  Deep Water:    Noodle hang at wall  Jog    Noodle hang deep water  Jumping Jacks    Noodle Bicycle  Heel to toe      Hand to opposite knee      Cross country skier      Rocking Horse      Other Therapeutic Activities:  Pt was educated on PT POC, Diagnosis, Prognosis, pathomechanics as well as frequency and duration of scheduling future physical therapy appointments. Time was also taken on this day to answer all patient questions and participation in PT. Reviewed appointment policy in detail with patient and patient verbalized understanding. Home Exercise Program:  Patient was instructed in the following for HEP:     . Patient verbalized/demonstrated understanding and was issued written handout. Charges: Therapeutic Exercise and NMR EXR  [] (48564) Provided verbal/tactile cueing for activities related to strengthening, flexibility, endurance, ROM for improvements in LE, proximal hip, and core control with self care, mobility, lifting, ambulation.  [] (67304) Provided verbal/tactile cueing for activities related to improving balance, coordination, kinesthetic sense, posture, motor skill, proprioception  to assist with LE, proximal hip, and core control in self care, mobility, lifting, ambulation and eccentric single leg control.      NMR and Therapeutic Activities:    [] (20114 or ) Provided verbal/tactile cueing for activities related to improving balance, coordination, kinesthetic sense, posture, motor skill, proprioception and motor activation to allow for proper function of core, proximal hip and LE with self care and ADLs and functional mobility.   [] (05429) Gait Re-education- Provided training and instruction to the patient for proper LE, core and proximal hip recruitment and positioning and eccentric body weight control with ambulation re-education including up and down stairs     Home Exercise Program:    [x] (19236) Reviewed/Progressed HEP activities related to strengthening, flexibility, endurance, ROM of core, proximal hip and LE for functional self-care, mobility, lifting and ambulation/stair navigation   [] (04944)Reviewed/Progressed HEP activities related to improving balance, coordination, kinesthetic sense, posture, motor skill, proprioception of core, proximal hip and LE for self care, mobility, lifting, and ambulation/stair navigation      Manual Treatments:  PROM / STM / Oscillations-Mobs:  G-I, II, III, IV (PA's, Inf., Post.)  [] (50973) Provided manual therapy to mobilize LE, proximal hip and/or LS spine soft tissue/joints for the purpose of modulating pain, promoting relaxation,  increasing ROM, reducing/eliminating soft tissue swelling/inflammation/restriction, improving soft tissue extensibility and allowing for proper ROM for normal function with self care, mobility, lifting and ambulation. Individual Aquatic Therapy:  [x] (79200) Provided verbal and tactile cueing for strengthening, flexibility, ROM using the therapeutic properties of water (buoyancy, resistance) for improvements in core control, mobility and ambulation     Aquatic Group:  [x] (51299) Provided intermittent verbal and tactile cueing for strengthening, endurance, flexibility and ROM for 2-4 individuals that do not require one-on one patient contact by the therapist     Land Timed Code Treatment Minutes:    Land Total Treatment Minutes:       Individual Aquatic Minutes: 15   Aquatic Group Minutes: 30     [] EVAL (LOW) 58806 (typically 20 minutes face-to-face)  [] EVAL (MOD) 90852 (typically 30 minutes face-to-face)  [] EVAL (HIGH) 28047 (typically 45 minutes face-to-face)  [] RE-EVAL     [] NM(71210) x     [] Dry needle 1 or 2 Muscles (83829)  [] NMR (94221) x     [] Dry needle 3+ Muscles (19778)  [] Manual (32376) x     [] Ultrasound (15793) x  [] TA (52808) x  2   [] Mech Traction (49712)  [] ES(attended) (08351)     [] ES (un) (28562):   [] Vasopump (82493) [] Ionto (61848)   [x] Aquatic Ind (12400) x 1   [x] Aquatic Group (43956) x 1  [] Other:    Treatment/Activity Tolerance:  [] Patient tolerated treatment well [] Patient limited by fatigue  [x] Patient limited by pain  [] Patient limited by other medical complications  [x] Other:   7/15: Pain after aquatics: 3/10 Right hip, 0-1/10 LB  7/20: 2/10 after aquatics    Prognosis: [x] Good [] Fair  [] Poor    Goals:   Patient stated goal: Some relief with movement  [] Progressing: [] Met: [] Not Met: [] Adjusted    Therapist goals for Patient:   Short Term Goals: To be achieved in: 2 weeks  1. Independent in HEP and progression per patient tolerance, in order to prevent re-injury. [] Progressing: [] Met: [] Not Met: [] Adjusted  2. Patient will have a decrease in pain to facilitate improvement in movement, function, and ADLs as indicated by Functional Deficits. [] Progressing: [] Met: [] Not Met: [] Adjusted    Long Term Goals: To be achieved in:  weeks  1. Disability index score of 20% or less for the LEFS to assist with reaching prior level of function. [] Progressing: [] Met: [] Not Met: [] Adjusted  2. Patient will demonstrate increased AROM to allow for proper joint functioning as indicated by patients Functional Deficits. [] Progressing: [] Met: [] Not Met: [] Adjusted  3.  Patient will demonstrate an increase in Strength to good proximal hip strength and control to demonstrated good R LE control during gait  [] Progressing: [] Met: [] Not Met: [] Adjusted  4. Patient will amb short distances at home without the cane  [] Progressing: [] Met: [] Not Met: [] Adjusted  5. Decrease pain to be able to move / transfer with less difficulty  [] Progressing: [] Met: [] Not Met: [] Adjusted     Patient Requires Follow-up: [x] Yes  [] No    Plan:   [x] Continue per plan of care [] Alter current plan (see comments)  [] Plan of care initiated [] Hold pending MD visit [] Discharge    Plan for Next Session:  Add: increase foot on/off step, as tolerated    Electronically signed by:  Mya Gresham PTA   Note: If patient does not return for scheduled/recommended follow up visits, this note will serve as a discharge from care along with the most recent update on progress.

## 2021-07-21 ENCOUNTER — TELEPHONE (OUTPATIENT)
Dept: PAIN MANAGEMENT | Age: 46
End: 2021-07-21

## 2021-07-21 ENCOUNTER — OFFICE VISIT (OUTPATIENT)
Dept: PAIN MANAGEMENT | Age: 46
End: 2021-07-21
Payer: MEDICAID

## 2021-07-21 VITALS
TEMPERATURE: 97.7 F | DIASTOLIC BLOOD PRESSURE: 92 MMHG | HEART RATE: 77 BPM | WEIGHT: 293 LBS | BODY MASS INDEX: 59.77 KG/M2 | OXYGEN SATURATION: 98 % | SYSTOLIC BLOOD PRESSURE: 173 MMHG

## 2021-07-21 DIAGNOSIS — G89.29 CHRONIC BILATERAL LOW BACK PAIN WITHOUT SCIATICA: ICD-10-CM

## 2021-07-21 DIAGNOSIS — M54.50 CHRONIC BILATERAL LOW BACK PAIN WITHOUT SCIATICA: ICD-10-CM

## 2021-07-21 DIAGNOSIS — G47.33 OSA TREATED WITH BIPAP: ICD-10-CM

## 2021-07-21 DIAGNOSIS — I10 ESSENTIAL HYPERTENSION: ICD-10-CM

## 2021-07-21 DIAGNOSIS — M16.11 PRIMARY OSTEOARTHRITIS OF RIGHT HIP: ICD-10-CM

## 2021-07-21 DIAGNOSIS — M17.0 PRIMARY OSTEOARTHRITIS OF BOTH KNEES: ICD-10-CM

## 2021-07-21 DIAGNOSIS — E66.01 MORBID OBESITY WITH BMI OF 60.0-69.9, ADULT (HCC): ICD-10-CM

## 2021-07-21 DIAGNOSIS — G89.4 CHRONIC PAIN SYNDROME: ICD-10-CM

## 2021-07-21 DIAGNOSIS — F32.A DEPRESSION, UNSPECIFIED DEPRESSION TYPE: ICD-10-CM

## 2021-07-21 DIAGNOSIS — F51.01 PRIMARY INSOMNIA: ICD-10-CM

## 2021-07-21 DIAGNOSIS — M62.830 BACK SPASM: ICD-10-CM

## 2021-07-21 PROCEDURE — G8427 DOCREV CUR MEDS BY ELIG CLIN: HCPCS | Performed by: NURSE PRACTITIONER

## 2021-07-21 PROCEDURE — 4004F PT TOBACCO SCREEN RCVD TLK: CPT | Performed by: NURSE PRACTITIONER

## 2021-07-21 PROCEDURE — G8417 CALC BMI ABV UP PARAM F/U: HCPCS | Performed by: NURSE PRACTITIONER

## 2021-07-21 PROCEDURE — 99214 OFFICE O/P EST MOD 30 MIN: CPT | Performed by: NURSE PRACTITIONER

## 2021-07-21 RX ORDER — HYDROCODONE BITARTRATE AND ACETAMINOPHEN 7.5; 325 MG/1; MG/1
1 TABLET ORAL EVERY 6 HOURS PRN
Qty: 120 TABLET | Refills: 0 | Status: SHIPPED | OUTPATIENT
Start: 2021-07-21 | End: 2021-08-25 | Stop reason: SDUPTHER

## 2021-07-21 RX ORDER — VENLAFAXINE HYDROCHLORIDE 75 MG/1
150 CAPSULE, EXTENDED RELEASE ORAL DAILY
Qty: 30 CAPSULE | Refills: 3 | Status: SHIPPED | OUTPATIENT
Start: 2021-07-21 | End: 2021-07-22 | Stop reason: SDUPTHER

## 2021-07-21 RX ORDER — METHOCARBAMOL 500 MG/1
500 TABLET, FILM COATED ORAL 4 TIMES DAILY
Qty: 40 TABLET | Refills: 0 | Status: SHIPPED | OUTPATIENT
Start: 2021-07-21 | End: 2021-08-03

## 2021-07-21 NOTE — PROGRESS NOTES
Zeinab Palacios  1975  [id number]      HISTORY OF PRESENT ILLNESS:  Ms. Ken Warner is a 50 y.o. female returns for a follow up visit for pain management  She has a diagnosis of   1. Chronic pain syndrome    2. Primary osteoarthritis of both knees    3. Primary osteoarthritis of right hip    4. Chronic bilateral low back pain without sciatica    5. Back spasm    6. Primary insomnia    7. Morbid obesity with BMI of 60.0-69.9, adult ([facility] [address]    8. Depression, unspecified depression type, mild    9. Essential hypertension    10. JOSE treated with BiPAP      On the Patients Pain Assessment form reviewed with the Medical Assistant:  She complains of pain in the both knee(s), bilateral lower back and right hip pain She rates the pain 10/10 and describes it as aching, burning, stabbing. Current treatment regimen has helped relieve about 0% of the pain. She denies any side effects from the current pain regimen. Patient reports that since the last follow up visit the physical functioning is worse, family/social relationships are unchanged, mood is worse sleep patterns are worse, and that the overall functioning is worse. Patient denies misusing/abusing her narcotic pain medications or using any illegal drugs. There are No indicators for possible drug abuse, addiction or diversion problems. Upon obtaining medical history from Ms. Ken Warner states that pain is not manageable on current pain therapy. Reports that pain medications do not provide any relief of pain even with the muscle relaxer's. Currently in Aquatic therapy. Failed weight loss surgery a year ago due to nicotine dependency. Pain is mainly in the hips right. Was evaluated Mood is stable without anxiety. Sleep is fair with an average of 5-6 hours. Denies to having issues of constipation. Tolerating activities/house chores with moderate tenderness to the lower back.  Smokes 6-7 cigarettes a day     ALLERGIES: Patients list of allergies were reviewed  MCG/ACT AERS inhaler Inhale 1 puff into the lungs every 6 hours (Patient taking differently: Inhale 1 puff into the lungs every 6 hours Once daily) 1 Inhaler 5    torsemide (DEMADEX) 20 MG tablet Take 2 tablets by mouth daily 60 tablet 11    omeprazole (PRILOSEC) 20 MG delayed release capsule Take 20 mg by mouth daily Indications: patient gets from [address] [facility]       acetaminophen (APAP EXTRA STRENGTH) 500 MG tablet Take 2 tablets by mouth every 6 hours as needed for Pain DO NOT TAKE WITH OTHER MEDICATIONS CONTAINING ACETAMINOPHEN. 30 tablet 0    Cholecalciferol (VITAMIN D3) 53225 UNITS CAPS Take 10,000 Units by mouth three times a week Takes on Mon Wed Fri       HYDROcodone-acetaminophen (NORCO) 5-325 MG per tablet Take 1 tablet by mouth every 6 hours as needed for Pain for up to 7 days. 28 tablet 0    baclofen (LIORESAL) 10 MG tablet TAKE ONE TABLET BY MOUTH DAILY 30 tablet 2    venlafaxine (EFFEXOR) 25 MG tablet TAKE ONE TABLET BY MOUTH TWICE A DAY 60 tablet 1     No facility-administered medications prior to visit. SOCIAL/FAMILY/PAST MEDICAL HISTORY: Ms. Harland Dakin, family and past medical history was reviewed. REVIEW OF SYSTEMS:    Respiratory: Negative for apnea, chest tightness and shortness of breath or change in baseline breathing. Gastrointestinal: Negative for nausea, vomiting, abdominal pain, diarrhea, constipation, blood in stool and abdominal distention. PHYSICAL EXAM:   Nursing note and vitals reviewed. BP (!) 173/92   Pulse 77   Temp 97.7 °F (36.5 °C) (Temporal)   Wt (!) 348 lb 3.2 oz (157.9 kg)   LMP  (LMP Unknown)   SpO2 98%   BMI 59.77 kg/m²   Constitutional: She appears well-developed and well-nourished. No acute distress. Skin: Skin is warm and dry, good turgor. No rash noted. She is not diaphoretic. Cardiovascular: Normal rate, regular rhythm, normal heart sounds, and does not have murmur.      Pulmonary/Chest: Effort normal. No respiratory distress. She does not have wheezes in the lung fields. She has no rales. Neurological/Psychiatric:She is alert and oriented to person, place, and time. Coordination is  normal.  Her mood isAppropriate and affect is Neutral/Euthymic(normal) . IMPRESSION:   1. Chronic pain syndrome    2. Primary osteoarthritis of both knees    3. Primary osteoarthritis of right hip    4. Chronic bilateral low back pain without sciatica    5. Back spasm    6. Primary insomnia    7. Morbid obesity with BMI of 60.0-69.9, adult ([facility] [address]    8. Depression, unspecified depression type, mild    9. Essential hypertension    10. JOSE treated with BiPAP        PLAN:  Informed verbal consent was obtained  -Take Norco 7.5-325 mg tabs no more than 3-4 times a day, Robaxin 500 mg bid, Effexor 75 mg daily  -Continue with Lidocaine, Elavil  -D/c Baclofen  -Currently in Aquatic therapy  -Strongly advised patient to maintain f/u with beriatrics, weight seems to be her biggest barrier to adequate pain relief to the hips/knees. Recommended she f/u with orthopedics for possible U/S guided injections to the hips. She verbalized understanding, has an appointment with bariatrics next month. Also advised to f/u with Pulmonology/sleep meds for adjustments to her BIPAP machine, reports not using it due to discomfort  -Monitor BP, f/u with PCP if it continues to stay elevated or she becomes symptomatic with SOB, CP, syncopy. she is currently asymptomatic  -CBT techniques- relaxation therapies such as biofeedback, mindfulness based stress reduction, imagery, cognitive restructuring, problem solving discussed with patient  -Advised patient to quit smoking for  health related concerns and to improve the treatment outcomes. Education was given on quitting smoking and the use of different modalities including medications, hypnotherapy, counselling  and biofeedback.  These were discussed with patient.  -Last UDS 3/4/21 Consistent  -Return in about 4 weeks (around 8/18/2021). Current Outpatient Medications   Medication Sig Dispense Refill    HYDROcodone-acetaminophen (NORCO) 7.5-325 MG per tablet Take 1 tablet by mouth every 6 hours as needed for Pain for up to 30 days. 120 tablet 0    methocarbamol (ROBAXIN) 500 MG tablet Take 1 tablet by mouth 4 times daily for 10 days 40 tablet 0    metoprolol (LOPRESSOR) 100 MG tablet TAKE ONE TABLET BY MOUTH TWICE A DAY 60 tablet 3    TRULICITY 0.27 GM/8.4LV SOPN INJECT 0.75 MG UNDER THE SKIN ONCE WEEKLY 4 pen 3    cloNIDine (CATAPRES) 0.1 MG tablet TAKE THREE TABLETS BY MOUTH THREE TIMES A  tablet 3    oxybutynin (DITROPAN-XL) 5 MG extended release tablet TAKE ONE TABLET BY MOUTH DAILY 30 tablet 3    montelukast (SINGULAIR) 10 MG tablet TAKE ONE TABLET BY MOUTH ONCE NIGHTLY 30 tablet 3    lidocaine (LIDODERM) 5 % Place 1 patch onto the skin daily 12 hours on, 12 hours off. 30 patch 0    amitriptyline (ELAVIL) 25 MG tablet TAKE ONE TABLET BY MOUTH ONCE NIGHTLY 30 tablet 1    albuterol sulfate  (90 Base) MCG/ACT inhaler INHALE TWO PUFFS BY MOUTH EVERY 6 HOURS AS NEEDED FOR WHEEZING 18 g 11    fluticasone-salmeterol (ADVAIR) 250-50 MCG/DOSE AEPB Takes 1 puff daily 2 Inhaler 11    metFORMIN (GLUCOPHAGE) 500 MG tablet TAKE ONE TABLET BY MOUTH TWICE A DAY WITH MEALS 180 tablet 1    spironolactone (ALDACTONE) 25 MG tablet TAKE TWO TABLETS BY MOUTH TWICE A  tablet 1    Blood Pressure Monitoring (B-D ASSURE BPM/AUTO ARM CUFF) MISC 1 Device by Does not apply route daily 1 each 0    varenicline (CHANTIX STARTING MONTH BRI) 0.5 MG X 11 & 1 MG X 42 tablet Take by mouth. 1 box 0    varenicline (CHANTIX CONTINUING MONTH BRI) 1 MG tablet Take 1 tablet by mouth 2 times daily 60 tablet 1    loratadine (CLARITIN) 10 MG tablet Take 1 tablet by mouth daily 30 tablet 1    NIFEdipine (PROCARDIA XL) 60 MG extended release tablet Take 1 tablet by mouth daily 30 tablet 5    Misc.  Devices (RAISED TOILET SEAT) MISC Use daily as needed as directed 1 each 0    atorvastatin (LIPITOR) 20 MG tablet Take 1 tablet by mouth daily 90 tablet 3    albuterol-ipratropium (COMBIVENT RESPIMAT)  MCG/ACT AERS inhaler Inhale 1 puff into the lungs every 6 hours (Patient taking differently: Inhale 1 puff into the lungs every 6 hours Once daily) 1 Inhaler 5    torsemide (DEMADEX) 20 MG tablet Take 2 tablets by mouth daily 60 tablet 11    omeprazole (PRILOSEC) 20 MG delayed release capsule Take 20 mg by mouth daily Indications: patient gets from [address] [place] acetaminophen (APAP EXTRA STRENGTH) 500 MG tablet Take 2 tablets by mouth every 6 hours as needed for Pain DO NOT TAKE WITH OTHER MEDICATIONS CONTAINING ACETAMINOPHEN. 30 tablet 0    Cholecalciferol (VITAMIN D3) 58537 UNITS CAPS Take 10,000 Units by mouth three times a week Takes on Mon Wed Fri       venlafaxine (EFFEXOR XR) 75 MG extended release capsule Take 2 capsules by mouth daily 60 capsule 0     No current facility-administered medications for this visit. I will continue her current medication regimen  which is part of the above treatment schedule. It has been helping with Ms. Ryley Mendoza chronic  medical problems which for this visit include:   Diagnoses of Chronic pain syndrome, Primary osteoarthritis of both knees, Primary osteoarthritis of right hip, Chronic bilateral low back pain without sciatica, Back spasm, Primary insomnia, Morbid obesity with BMI of 60.0-69.9, adult ([facility] [address] Depression, unspecified depression type, mild, Essential hypertension, and JOSE treated with BiPAP were pertinent to this visit. Risks and benefits of the medications and other alternative treatments  including no treatment were discussed with the patient. The common side effects of these medications were also explained to the patient. Informed verbal consent was obtained.    Goals of current treatment regimen include improvement in pain, restoration of functioning- with focus on improvement in physical performance, general activity, work or disability,emotional distress, health care utilization and  decreased medication consumption. Will continue to monitor progress towards achieving/maintaining therapeutic goals with special emphasis on  1. Improvement in perceived interfernce  of pain with ADL's. Ability to do home exercises independently. Ability to do household chores indoor and/or outdoor work and social and leisure activities. Improve psychosocial and physical functioning. - she is showing progression towards this treatment goal with the current regimen. She was advised against drinking alcohol with the narcotic pain medicines, advised against driving or handling machinery while adjusting the dose of medicines or if having cognitive  issues related to the current medications. Risk of overdose and death, if medicines not taken as prescribed, were also discussed. If the patient develops new symptoms or if the symptoms worsen, the patient should call the office. While transcribing every attempt was made to maintain the accuracy of the note in terms of it's contents,there may have been some errors made inadvertently. Thank you for allowing me to participate in the care of this patient.     Lonia Soulier, ROLANDA    Cc: Xu Gilmore MD

## 2021-07-21 NOTE — TELEPHONE ENCOUNTER
Submitted PA for METHOCARBAMOL  [facility] Key: [id number] STATUS: APPROVED. The patient was notified by phone.

## 2021-07-21 NOTE — PATIENT INSTRUCTIONS
Patient Education        Back Stretches: Exercises  Introduction  Here are some examples of exercises for stretching your back. Start each exercise slowly. Ease off the exercise if you start to have pain. Your doctor or physical therapist will tell you when you can start these exercises and which ones will work best for you. How to do the exercises  Overhead stretch   1. Stand comfortably with your feet shoulder-width apart. 2. Looking straight ahead, raise both arms over your head and reach toward the ceiling. Do not allow your head to tilt back. 3. Hold for 15 to 30 seconds, then lower your arms to your sides. 4. Repeat 2 to 4 times. Side stretch   1. Stand comfortably with your feet shoulder-width apart. 2. Raise one arm over your head, and then lean to the other side. 3. Slide your hand down your leg as you let the weight of your arm gently stretch your side muscles. Hold for 15 to 30 seconds. 4. Repeat 2 to 4 times on each side. Press-up   1. Lie on your stomach, supporting your body with your forearms. 2. Press your elbows down into the floor to raise your upper back. As you do this, relax your stomach muscles and allow your back to arch without using your back muscles. As your press up, do not let your hips or pelvis come off the floor. 3. Hold for 15 to 30 seconds, then relax. 4. Repeat 2 to 4 times. Relax and rest   1. Lie on your back with a rolled towel under your neck and a pillow under your knees. Extend your arms comfortably to your sides. 2. Relax and breathe normally. 3. Remain in this position for about 10 minutes. 4. If you can, do this 2 or 3 times each day. Follow-up care is a key part of your treatment and safety. Be sure to make and go to all appointments, and call your doctor if you are having problems. It's also a good idea to know your test results and keep a list of the medicines you take. Where can you learn more? Go to https://margo.healthVirtuix. org and sign in to your [employer] account. Enter I547 in the Runtastic box to learn more about \"Back Stretches: Exercises. \"     If you do not have an account, please click on the \"Sign Up Now\" link. Current as of: November 16, 2020               Content Version: 12.9  © 2006-2021 Healthwise, [place]. Care instructions adapted under license by [facility] ([facility]). If you have questions about a medical condition or this instruction, always ask your healthcare professional. [address] any warranty or liability for your use of this information. Patient Education        Hip Arthritis: Exercises  Introduction  Here are some examples of exercises for you to try. The exercises may be suggested for a condition or for rehabilitation. Start each exercise slowly. Ease off the exercises if you start to have pain. You will be told when to start these exercises and which ones will work best for you. How to do the exercises  Straight-leg raises to the outside   1. Lie on your side, with your affected hip on top. 2. Tighten the front thigh muscles of your top leg to keep your knee straight. 3. Keep your hip and your leg straight in line with the rest of your body, and keep your knee pointing forward. Do not drop your hip back. 4. Lift your top leg straight up toward the ceiling, about 12 inches off the floor. Hold for about 6 seconds, then slowly lower your leg. 5. Repeat 8 to 12 times. 6. Switch legs and repeat steps 1 through 5, even if only one hip is sore. Straight-leg raises to the inside   1. Lie on your side with your affected hip on the floor. 2. You can either prop up your other leg on a chair, or you can bend that knee and put that foot in front of your other knee. Do not drop your hip back. 3. Tighten the muscles on the front thigh of your bottom leg to straighten that knee.   4. Keep your kneecap pointing forward and your leg straight, and lift your bottom leg up toward the ceiling about 6 inches. Hold for about 6 seconds, then lower slowly. 5. Repeat 8 to 12 times. 6. Switch legs and repeat steps 1 through 5, even if only one hip is sore. Hip hike   1. Stand sideways on the bottom step of a staircase, and hold on to the banister or wall. 2. Keeping both knees straight, lift your good leg off the step and let it hang down. Then hike your good hip up to the same level as your affected hip or a little higher. 3. Repeat 8 to 12 times. 4. Switch legs and repeat steps 1 through 3, even if only one hip is sore. Bridging   1. Lie on your back with both knees bent. Your knees should be bent about 90 degrees. 2. Then push your feet into the floor, squeeze your buttocks, and lift your hips off the floor until your shoulders, hips, and knees are all in a straight line. 3. Hold for about 6 seconds as you continue to breathe normally, and then slowly lower your hips back down to the floor and rest for up to 10 seconds. 4. Repeat 8 to 12 times. Hamstring stretch (lying down)   1. Lie flat on your back with your legs straight. If you feel discomfort in your back, place a small towel roll under your lower back. 2. Holding the back of your affected leg, lift your leg straight up and toward your body until you feel a stretch at the back of your thigh. 3. Hold the stretch for at least 30 seconds. 4. Repeat 2 to 4 times. 5. Switch legs and repeat steps 1 through 4, even if only one hip is sore. Standing quadriceps stretch   1. If you are not steady on your feet, hold on to a chair, counter, or wall. You can also lie on your stomach or your side to do this exercise. 2. Bend the knee of the leg you want to stretch, and reach behind you to grab the front of your foot or ankle with the hand on the same side. For example, if you are stretching your right leg, use your right hand.   3. Keeping your knees next to each other, pull your foot toward your buttock until you feel a gentle stretch across the front of your hip and down the front of your thigh. Your knee should be pointed directly to the ground, and not out to the side. 4. Hold the stretch for at least 15 to 30 seconds. 5. Repeat 2 to 4 times. 6. Switch legs and repeat steps 1 through 5, even if only one hip is sore. Hip rotator stretch   1. Lie on your back with both knees bent and your feet flat on the floor. 2. Put the ankle of your affected leg on your opposite thigh near your knee. 3. Use your hand to gently push your knee away from your body until you feel a gentle stretch around your hip. 4. Hold the stretch for 15 to 30 seconds. 5. Repeat 2 to 4 times. 6. Repeat steps 1 through 5, but this time use your hand to gently pull your knee toward your opposite shoulder. 7. Switch legs and repeat steps 1 through 6, even if only one hip is sore. Knee-to-chest   1. Lie on your back with your knees bent and your feet flat on the floor. 2. Bring your affected leg to your chest, keeping the other foot flat on the floor (or keeping the other leg straight, whichever feels better on your lower back). 3. Keep your lower back pressed to the floor. Hold for at least 15 to 30 seconds. 4. Relax, and lower the knee to the starting position. 5. Repeat 2 to 4 times. 6. Switch legs and repeat steps 1 through 5, even if only one hip is sore. 7. To get more stretch, put your other leg flat on the floor while pulling your knee to your chest.    Clamshell   1. Lie on your side, with your affected hip on top. Keep your feet and knees together and your knees bent. 2. Raise your top knee, but keep your feet together. Do not let your hips roll back. Your legs should open up like a clamshell. 3. Hold for 6 seconds. 4. Slowly lower your knee back down. Rest for 10 seconds. 5. Repeat 8 to 12 times. 6. Switch legs and repeat steps 1 through 5, even if only one hip is sore.     Follow-up care is a key part of your treatment and safety. Be sure to make and go to all appointments, and call your doctor if you are having problems. It's also a good idea to know your test results and keep a list of the medicines you take. Where can you learn more? Go to https://chpejluiseb.getupp. org and sign in to your [employer] account. Enter G064 in the Hobby box to learn more about \"Hip Arthritis: Exercises. \"     If you do not have an account, please click on the \"Sign Up Now\" link. Current as of: November 16, 2020               Content Version: 12.9  © 3028-7982 Healthwise, [employer]. Care instructions adapted under license by [facility] ([facility]). If you have questions about a medical condition or this instruction, always ask your healthcare professional. [address] any warranty or liability for your use of this information.

## 2021-07-22 ENCOUNTER — HOSPITAL ENCOUNTER (OUTPATIENT)
Dept: PHYSICAL THERAPY | Age: 46
Setting detail: THERAPIES SERIES
Discharge: HOME | End: 2021-07-22
Payer: MEDICAID

## 2021-07-22 DIAGNOSIS — F32.A DEPRESSION, UNSPECIFIED DEPRESSION TYPE: ICD-10-CM

## 2021-07-22 DIAGNOSIS — G89.4 CHRONIC PAIN SYNDROME: ICD-10-CM

## 2021-07-22 PROCEDURE — 97113 AQUATIC THERAPY/EXERCISES: CPT

## 2021-07-22 RX ORDER — VENLAFAXINE HYDROCHLORIDE 75 MG/1
150 CAPSULE, EXTENDED RELEASE ORAL DAILY
Qty: 60 CAPSULE | Refills: 0 | Status: SHIPPED | OUTPATIENT
Start: 2021-07-22 | End: 2021-08-25 | Stop reason: SDUPTHER

## 2021-07-22 NOTE — FLOWSHEET NOTE
[address], [facility]  [address] [facility], [facility]  Phone: [phone]   Fax:     [phone]      Physical Therapy Daily/Aquatic Flow Sheet   Date:  2021    Patient Name:  Antonieta Yoon    :  1975  MRN: [id number]    Restrictions/Precautions:    Pertinent Medical History:Additional Pertinent Hx: Arthritis, asthma, CHF,GOPD, DM, HTN, Depression, Anxiety, Obesity    Medical/Treatment Diagnosis Information:   · Diagnosis: Chronic Pain Syndrome, OA Both knees  · Treatment Diagnosis: Main complaint is pain and weakness in R hip/ LE    Insurance/Certification information:   Poncho Vang  Physician Information:  OCRINNA Rehman  Plan of care signed (Y/N): Inbox    Date of Patient follow up with Physician:      Progress Report: []  Yes  [x]  No     Date Range for reporting period:  Beginnin2021  Ending:      Progress report due (10 Rx/or 30 days whichever is less):     Recertification due (POC duration/ or 90 days whichever is less):    Visit # POC/Insurance Allowable Auth Needed    30 []Yes   [x]No     Latex Allergy:  [x]NO      []YES  Preferred Language for Healthcare:   [x]English       []Other:    Functional Scale:     Date assessed: at eval  Test: Mildred  Score:  48    Pain level: 7/10 right hip, 3/10 LB    History of Injury:  H/o OA in Both knees and R hip. States that her right hip pain got worse after having injection ~ 2 yrs ago. Subjective:  Main complaint is R  hip pain and R LE weakness, but also has pain in LB and B                                         Knees. Able to control pain in B knees and LB with pain meds, but not in R hip. Has had benefit from Aquatic PT in the past  : My pain is a 50/10! After the pool last time I was not hurting so bad. Today my right leg is painful. 7/15: After last time I got home and went to bed.   It is my hip that hurts more every night.   7/20: R hip pretty sore  7/22:  Hip is hurting. LB and knee are fine. Patient reports that she does not have much of a support system at home and that her  is not sympathetic to her needs      Objective:    Observation:    Test measurements:    Land-based Visits Exercises/Activities:  Therapeutic Ex (16740)  Min: Resistance/Repetitions Notes                  Therapeutic Activity (48891) Min:     Reviewed home exer/activities     Given pool pass          NMR re-education (42791) Min:                          Manual Intervention (86440)  Min:                    Modalities  Min:               Aquatic Visits Exercises/Activities:  Aquatic Abbreviation Key  B= Belt DB= Dumbells T= Theratube   G=Gloves N= Noodles W= Weights   P= Paddles WW=Water Walker K= Kickboard        Transfers:   steps with bilat handrail      % Immersion: 75%            Ambulation:   Exercises UE:      Forwards 4 Shoulder Shrugs     Lateral 2 Shoulder circles 10    Marching    Scapular Retraction  10    Retro   Rolling  10    [place]as  Push Downs 10   Multifidus walkouts w/paddle  IR/ER 10     Punching 10   Stretching:  Rowing 10   Gastroc/Soleus   Elbow Flex/Ext 10   Hamstring   Shldr Flex/Ext  10   Knee flex stretch   Shldr Abd/Add 10   Piriformis   Horiz Abd/Add  10   Hip flexor    Arm Circles  10   SKTC   PNF Diagonals 10   DKTC  UE oscillations f/b, s/s    ITB   Wall Push-ups    Quad  Figure 8's    Mid back   Buoy pull/push downs    UT  Tband rows    Rhom  Tband lats    Post Shoulder  Lumbar Rot w/ paddles    Pec   Small ball pull downs                   diagonals             Cervical:       AROM Flex       AROM Extension     LEs exercises:  bilat AROM Side Bending    Marching  10 AROM Rotation    Heel Raises  10 Chin tucks    Toe Raises  10 Chin nods     Squats  10      Hamstring Curls  10      Hip Flexion  10 Balance:      Hip Abduction 10 SLS    Hip Circles 10 Tandem stance    TA set  NBOS eyes open 30 sec with HHA prn   Glut Set  NBOS eyes closed    Hip Extension  Hand to Opposite Knee    Hip Adduction    Box Step  3x L/R   Hip IR   Noodle Stance     Hip ER  Stop/Go Gait    Fig 8's  Switch Gait      Foot on step bal 30 sec R/L         Seated: bilat Functional:    Ankle Pumps 20 Step up forward    Ankle circles 10 Step up lateral    Knee flex & ext 20 Step down    Hip Abd & Adduction 20 [place] squats    Bicycle  20 Crate Lifts    Add Set with ball  Lunges forward    LX stab with med ball throws  Lunges lateral    Ankle INV  Lunges retro    Ankle EV  Lower ab curl with noodle      Upper ab curl with ball      Med ball straight lifts      Med ball diagonal lifts      Hydrorider      Foot onto/off of step 6 R/L   Noodle:      Leg Press  Deep Water:    Noodle hang at wall  Jog    Noodle hang deep water  Jumping Jacks    Noodle Bicycle  Heel to toe      Hand to opposite knee      Cross country skier      Rocking Horse      Other Therapeutic Activities:  Pt was educated on PT POC, Diagnosis, Prognosis, pathomechanics as well as frequency and duration of scheduling future physical therapy appointments. Time was also taken on this day to answer all patient questions and participation in PT. Reviewed appointment policy in detail with patient and patient verbalized understanding. Home Exercise Program:  Patient was instructed in the following for HEP:     . Patient verbalized/demonstrated understanding and was issued written handout. Charges:   Therapeutic Exercise and NMR EXR  [] (63582) Provided verbal/tactile cueing for activities related to strengthening, flexibility, endurance, ROM for improvements in LE, proximal hip, and core control with self care, mobility, lifting, ambulation.  [] (98609) Provided verbal/tactile cueing for activities related to improving balance, coordination, kinesthetic sense, posture, motor skill, proprioception  to assist with LE, proximal hip, and core control in self care, mobility, lifting, ambulation and eccentric single leg control. NMR and Therapeutic Activities:    [] (67399 or 58963) Provided verbal/tactile cueing for activities related to improving balance, coordination, kinesthetic sense, posture, motor skill, proprioception and motor activation to allow for proper function of core, proximal hip and LE with self care and ADLs and functional mobility.   [] (31767) Gait Re-education- Provided training and instruction to the patient for proper LE, core and proximal hip recruitment and positioning and eccentric body weight control with ambulation re-education including up and down stairs     Home Exercise Program:    [x] (21941) Reviewed/Progressed HEP activities related to strengthening, flexibility, endurance, ROM of core, proximal hip and LE for functional self-care, mobility, lifting and ambulation/stair navigation   [] (95267)Reviewed/Progressed HEP activities related to improving balance, coordination, kinesthetic sense, posture, motor skill, proprioception of core, proximal hip and LE for self care, mobility, lifting, and ambulation/stair navigation      Manual Treatments:  PROM / STM / Oscillations-Mobs:  G-I, II, III, IV (PA's, Inf., Post.)  [] (25276) Provided manual therapy to mobilize LE, proximal hip and/or LS spine soft tissue/joints for the purpose of modulating pain, promoting relaxation,  increasing ROM, reducing/eliminating soft tissue swelling/inflammation/restriction, improving soft tissue extensibility and allowing for proper ROM for normal function with self care, mobility, lifting and ambulation.      Individual Aquatic Therapy:  [x] (85768) Provided verbal and tactile cueing for strengthening, flexibility, ROM using the therapeutic properties of water (buoyancy, resistance) for improvements in core control, mobility and ambulation     Aquatic Group:  [x] (70413) Provided intermittent verbal and tactile cueing for strengthening, endurance, flexibility and ROM for 2-4 individuals that do not require one-on one patient contact by the therapist     Land Timed Code Treatment Minutes:    Land Total Treatment Minutes: Individual Aquatic Minutes: 40   Aquatic Group Minutes:      [] EVAL (LOW) 92631 (typically 20 minutes face-to-face)  [] EVAL (MOD) 73212 (typically 30 minutes face-to-face)  [] EVAL (HIGH) 58561 (typically 45 minutes face-to-face)  [] RE-EVAL     [] ZV(61713) x     [] Dry needle 1 or 2 Muscles (20146)  [] NMR (57391) x     [] Dry needle 3+ Muscles (10329)  [] Manual (03233) x     [] Ultrasound (58277) x  [] TA (35640) x  2   [] Mech Traction (36846)  [] ES(attended) (71850)     [] ES (un) (54365):   [] Vasopump (35180) [] Ionto (10569)   [x] Aquatic Ind (17679) x    [] Aquatic Group (51345) x 1  [] Other:    Treatment/Activity Tolerance:  [] Patient tolerated treatment well [] Patient limited by fatigue  [x] Patient limited by pain  [] Patient limited by other medical complications  [x] Other:   7/15: Pain after aquatics: 3/10 Right hip, 0-1/10 LB  7/20: 2/10 after aquatics    Prognosis: [x] Good [] Fair  [] Poor    Goals:   Patient stated goal: Some relief with movement  [] Progressing: [] Met: [] Not Met: [] Adjusted    Therapist goals for Patient:   Short Term Goals: To be achieved in: 2 weeks  1. Independent in HEP and progression per patient tolerance, in order to prevent re-injury. [] Progressing: [] Met: [] Not Met: [] Adjusted  2. Patient will have a decrease in pain to facilitate improvement in movement, function, and ADLs as indicated by Functional Deficits. [] Progressing: [] Met: [] Not Met: [] Adjusted    Long Term Goals: To be achieved in:  weeks  1. Disability index score of 20% or less for the LEFS to assist with reaching prior level of function. [] Progressing: [] Met: [] Not Met: [] Adjusted  2. Patient will demonstrate increased AROM to allow for proper joint functioning as indicated by patients Functional Deficits.    [] Progressing: [] Met: [] Not Met: [] Adjusted  3. Patient will demonstrate an increase in Strength to good proximal hip strength and control to demonstrated good R LE control during gait  [] Progressing: [] Met: [] Not Met: [] Adjusted  4. Patient will amb short distances at home without the cane  [] Progressing: [] Met: [] Not Met: [] Adjusted  5. Decrease pain to be able to move / transfer with less difficulty  [] Progressing: [] Met: [] Not Met: [] Adjusted     Patient Requires Follow-up: [x] Yes  [] No    Plan:   [x] Continue per plan of care [] Alter current plan (see comments)  [] Plan of care initiated [] Hold pending MD visit [] Discharge    Plan for Next Session:  Add: increase foot on/off step, as tolerated    Electronically signed by:  Thaddeus Fischer PTA   Note: If patient does not return for scheduled/recommended follow up visits, this note will serve as a discharge from care along with the most recent update on progress.

## 2021-07-22 NOTE — TELEPHONE ENCOUNTER
patient states pharm only auth 15 day supply       venlafaxine (EFFEXOR XR) 75 MG extended release capsule     Pharm [facility] [employer]       Please advise

## 2021-07-22 NOTE — TELEPHONE ENCOUNTER
There is no PA required. The prescription was for quantity # 30 at 2 pills a day which is a 15 day supply. The patient needs a 30 day RX quantity # 60 sent to the pharmacy for two pills a day. If this requires a response please respond to the pool ( [facility] [address]). Thank you please advise patient.

## 2021-07-27 ENCOUNTER — HOSPITAL ENCOUNTER (OUTPATIENT)
Dept: PHYSICAL THERAPY | Age: 46
Setting detail: THERAPIES SERIES
Discharge: HOME | End: 2021-07-27
Payer: MEDICAID

## 2021-07-27 PROCEDURE — 97150 GROUP THERAPEUTIC PROCEDURES: CPT

## 2021-07-27 PROCEDURE — 97530 THERAPEUTIC ACTIVITIES: CPT | Performed by: CHIROPRACTOR

## 2021-07-27 PROCEDURE — 97113 AQUATIC THERAPY/EXERCISES: CPT

## 2021-07-27 NOTE — FLOWSHEET NOTE
[address] [facility],[address], [facility]  [address] [facility], [facility]  Phone: [phone]   Fax:     [phone]      Physical Therapy Daily/Aquatic Flow Sheet   Date:  2021    Patient Name:  Sebastian Decker    :  1975  MRN: [id number]    Restrictions/Precautions:    Pertinent Medical History:Additional Pertinent Hx: Arthritis, asthma, CHF,GOPD, DM, HTN, Depression, Anxiety, Obesity    Medical/Treatment Diagnosis Information:   · Diagnosis: Chronic Pain Syndrome, OA Both knees  · Treatment Diagnosis: Main complaint is pain and weakness in R hip/ LE    Insurance/Certification information:   Reji De Jesus  Physician Information:  Ja FRENCH,-CNP  Plan of care signed (Y/N): Inbox    Date of Patient follow up with Physician:      Progress Report: []  Yes  [x]  No     Date Range for reporting period:  Beginnin2021  Ending:      Progress report due (10 Rx/or 30 days whichever is less):     Recertification due (POC duration/ or 90 days whichever is less):    Visit # POC/Insurance Allowable Auth Needed    30 []Yes   [x]No     Latex Allergy:  [x]NO      []YES  Preferred Language for Healthcare:   [x]English       []Other:    Functional Scale:     Date assessed: at eval  Test: Mildred  Score:  48    Pain level: 7/10 right hip, 4/10 LB    History of Injury:  H/o OA in Both knees and R hip. States that her right hip pain got worse after having injection ~ 2 yrs ago. Subjective:  Main complaint is R  hip pain and R LE weakness, but also has pain in LB and B                                         Knees. Able to control pain in B knees and LB with pain meds, but not in R hip. Has had benefit from Aquatic PT in the past  : My pain is a 50/10! After the pool last time I was not hurting so bad. Today my right leg is painful. 7/15: After last time I got home and went to bed.   It is my hip that hurts more every night.   7/20: R hip pretty sore  7/22:  Hip is hurting. LB and knee are fine. Patient reports that she does not have much of a support system at home and that her  is not sympathetic to her needs  7/27: My hip is what is painful.  6/10 today        Objective:    Observation:    Test measurements:R knee flex/ext 3/5/5, hip flex 3-/5                                        Knee ROM 0-105                                        Difficulty testing due to R hip pain    Land-based Visits Exercises/Activities:  Therapeutic Ex (30173)  Min: Resistance/Repetitions Notes                  Therapeutic Activity (88686) Min:     Reviewed home exer/activities     Re-eval          NMR re-education (64958) Min:                          Manual Intervention ([id number])  Min:                    Modalities  Min:               Aquatic Visits Exercises/Activities:  Aquatic Abbreviation Key  B= Belt DB= Dumbells T= Theratube   G=Gloves N= Noodles W= Weights   P= Paddles WW=Water Walker K= Kickboard        Transfers:   steps with bilat handrail      % Immersion: 75%            Ambulation:   Exercises UE:      Forwards 4 + 1 Shoulder Shrugs     Lateral 2 Shoulder circles 10    Marching    Scapular Retraction  10    Retro   Rolling  10    Cariocas  Push Downs 10   Multifidus walkouts w/paddle  IR/ER 10     Punching 10   Stretching: bilat Rowing 10   Gastroc/Soleus   Elbow Flex/Ext 10   Hamstring  30 sec x 1  Shldr Flex/Ext  10   Knee flex stretch   Shldr Abd/Add 10   Piriformis   Horiz Abd/Add  10   Hip flexor    Arm Circles  10   SKTC   PNF Diagonals 10   DKTC  UE oscillations f/b, s/s    ITB   Wall Push-ups    Quad  Figure 8's    Mid back   Buoy pull/push downs    UT  Tband rows    Rhom  Tband lats    Post Shoulder  Lumbar Rot w/ paddles    Pec   Small ball pull downs                   diagonals             Cervical:       AROM Flex       AROM Extension     LEs exercises:  bilat AROM Side Bending    Marching  10 AROM Rotation    Heel Raises  10 Chin tucks    Toe Raises  10 Chin nods     Squats  10      Hamstring Curls  10      Hip Flexion  10 Balance: Hip Abduction 10 SLS    Hip Circles 10 Tandem stance    TA set  NBOS eyes open 30 sec with HHA prn   Glut Set  NBOS eyes closed    Hip Extension  Hand to Opposite Knee    Hip Adduction    Box Step  3x L/R   Hip IR   Noodle Stance     Hip ER  Stop/Go Gait    Fig 8's  Switch Gait      Foot on step bal 30 sec R/L         Seated: bilat Functional:    Ankle Pumps 20 Step up forward    Ankle circles 10 Step up lateral    Knee flex & ext 20 Step down    Hip Abd & Adduction 20 Bond squats    Bicycle  20 Crate Lifts    Add Set with ball  Lunges forward    LX stab with med ball throws  Lunges lateral    Ankle INV  Lunges retro    Ankle EV  Lower ab curl with noodle      Upper ab curl with ball      Med ball straight lifts      Med ball diagonal lifts      Hydrorider      Foot onto/off of step 5 x 2  R/L   Noodle:      Leg Press  Deep Water:    Noodle hang at wall  Jog    Noodle hang deep water  Jumping Jacks    Noodle Bicycle  Heel to toe      Hand to opposite knee      Cross country skier      Rocking Horse      Other Therapeutic Activities:  Pt was educated on PT POC, Diagnosis, Prognosis, pathomechanics as well as frequency and duration of scheduling future physical therapy appointments. Time was also taken on this day to answer all patient questions and participation in PT. Reviewed appointment policy in detail with patient and patient verbalized understanding. Home Exercise Program:  Patient was instructed in the following for HEP:     . Patient verbalized/demonstrated understanding and was issued written handout. Charges:   Therapeutic Exercise and NMR EXR  [] (52671) Provided verbal/tactile cueing for activities related to strengthening, flexibility, endurance, ROM for improvements in LE, proximal hip, and core control with self care, mobility, lifting, ambulation.  [] (02237) Provided verbal/tactile cueing for activities related to improving balance, coordination, kinesthetic sense, posture, motor skill, proprioception  to assist with LE, proximal hip, and core control in self care, mobility, lifting, ambulation and eccentric single leg control. NMR and Therapeutic Activities:    [] (19747 or 39159) Provided verbal/tactile cueing for activities related to improving balance, coordination, kinesthetic sense, posture, motor skill, proprioception and motor activation to allow for proper function of core, proximal hip and LE with self care and ADLs and functional mobility.   [] (20260) Gait Re-education- Provided training and instruction to the patient for proper LE, core and proximal hip recruitment and positioning and eccentric body weight control with ambulation re-education including up and down stairs     Home Exercise Program:    [x] (78449) Reviewed/Progressed HEP activities related to strengthening, flexibility, endurance, ROM of core, proximal hip and LE for functional self-care, mobility, lifting and ambulation/stair navigation   [] (13142)Reviewed/Progressed HEP activities related to improving balance, coordination, kinesthetic sense, posture, motor skill, proprioception of core, proximal hip and LE for self care, mobility, lifting, and ambulation/stair navigation      Manual Treatments:  PROM / STM / Oscillations-Mobs:  G-I, II, III, IV (PA's, Inf., Post.)  [] (59582) Provided manual therapy to mobilize LE, proximal hip and/or LS spine soft tissue/joints for the purpose of modulating pain, promoting relaxation,  increasing ROM, reducing/eliminating soft tissue swelling/inflammation/restriction, improving soft tissue extensibility and allowing for proper ROM for normal function with self care, mobility, lifting and ambulation.      Individual Aquatic Therapy:  [x] (51480) Provided verbal and tactile cueing for strengthening, flexibility, ROM using the therapeutic properties of water (buoyancy, resistance) for improvements in core control, mobility and ambulation     Aquatic Group:  [x] (99428) Provided intermittent verbal and tactile cueing for strengthening, endurance, flexibility and ROM for 2-4 individuals that do not require one-on one patient contact by the therapist     Land Timed Code Treatment Minutes: 30   Land Total Treatment Minutes: 30     Individual Aquatic Minutes: 20   Aquatic Group Minutes: 25     [] EVAL (LOW) 43668 (typically 20 minutes face-to-face)  [] EVAL (MOD) 33690 (typically 30 minutes face-to-face)  [] EVAL (HIGH) 98371 (typically 45 minutes face-to-face)  [] RE-EVAL     [] PU(72524) x     [] Dry needle 1 or 2 Muscles (96925)  [] NMR (98465) x     [] Dry needle 3+ Muscles (18096)  [] Manual (97521) x     [] Ultrasound (60069) x  [] TA (24374) x  2   [] Mech Traction (79262)  [] ES(attended) (03574)     [] ES (un) (96562):   [] Vasopump (62767) [] Ionto (02680)   [x] Aquatic Ind (73482) x 1   [x] Aquatic Group (84834) x 1  [] Other:    Treatment/Activity Tolerance:  [] Patient tolerated treatment well [] Patient limited by fatigue  [x] Patient limited by pain  [] Patient limited by other medical complications  [x] Other:   7/15: Pain after aquatics: 3/10 Right hip, 0-1/10 LB  7/20: 2/10 after aquatics  7/27: 3-4/10 right hip after aquatics    Prognosis: [x] Good [] Fair  [] Poor    Goals:   Patient stated goal: Some relief with movement  [] Progressing: [] Met: [] Not Met: [] Adjusted    Therapist goals for Patient:   Short Term Goals: To be achieved in: 2 weeks  1. Independent in HEP and progression per patient tolerance, in order to prevent re-injury. [] Progressing: [] Met: [] Not Met: [] Adjusted  2. Patient will have a decrease in pain to facilitate improvement in movement, function, and ADLs as indicated by Functional Deficits. [] Progressing: [] Met: [] Not Met: [] Adjusted    Long Term Goals: To be achieved in:  weeks  1. Disability index score of 20% or less for the LEFS to assist with reaching prior level of function. [] Progressing: [] Met: [] Not Met: [] Adjusted  2. Patient will demonstrate increased AROM to allow for proper joint functioning as indicated by patients Functional Deficits. [] Progressing: [] Met: [] Not Met: [] Adjusted  3. Patient will demonstrate an increase in Strength to good proximal hip strength and control to demonstrated good R LE control during gait  [] Progressing: [] Met: [] Not Met: [] Adjusted  4. Patient will amb short distances at home without the cane  [] Progressing: [] Met: [] Not Met: [] Adjusted  5. Decrease pain to be able to move / transfer with less difficulty  [] Progressing: [] Met: [] Not Met: [] Adjusted     Patient Requires Follow-up: [x] Yes  [] No    Plan:   [x] Continue per plan of care [] Alter current plan (see comments)  [] Plan of care initiated [] Hold pending MD visit [] Discharge    Plan for Next Session:  Add: tandem/ narrow stance, as tolerated    Electronically signed by:  Amy FELIPE#[id number]  Debbie Johnson, PTA [id number]      Note: If patient does not return for scheduled/recommended follow up visits, this note will serve as a discharge from care along with the most recent update on progress.

## 2021-07-29 ENCOUNTER — HOSPITAL ENCOUNTER (OUTPATIENT)
Dept: PHYSICAL THERAPY | Age: 46
Setting detail: THERAPIES SERIES
Discharge: HOME | End: 2021-07-29
Payer: MEDICAID

## 2021-07-29 PROCEDURE — 97150 GROUP THERAPEUTIC PROCEDURES: CPT

## 2021-07-29 PROCEDURE — 97113 AQUATIC THERAPY/EXERCISES: CPT

## 2021-07-29 NOTE — FLOWSHEET NOTE
[address] [place]  [address] [facility]  Phone: [phone]   Fax:     [phone]      Physical Therapy Daily/Aquatic Flow Sheet   Date:  2021    Patient Name:  Richard Ramirez    :  1975  MRN: [id number]    Restrictions/Precautions:    Pertinent Medical History:Additional Pertinent Hx: Arthritis, asthma, CHF,GOPD, DM, HTN, Depression, Anxiety, Obesity    Medical/Treatment Diagnosis Information:   · Diagnosis: Chronic Pain Syndrome, OA Both knees  · Treatment Diagnosis: Main complaint is pain and weakness in R hip/ LE    Insurance/Certification information:   Quinn Harris  Physician Information:  CORINNA Sherwood  Plan of care signed (Y/N): Inbox    Date of Patient follow up with Physician:      Progress Report: []  Yes  [x]  No     Date Range for reporting period:  Beginnin2021  Ending:      Progress report due (10 Rx/or 30 days whichever is less):     Recertification due (POC duration/ or 90 days whichever is less):    Visit # POC/Insurance Allowable Auth Needed    30 []Yes   [x]No     Latex Allergy:  [x]NO      []YES  Preferred Language for Healthcare:   [x]English       []Other:    Functional Scale:     Date assessed: at eval  Test: Mildred  Score:  48    Pain level: 5/10 right hip, 0/10 LB    History of Injury:  H/o OA in Both knees and R hip. States that her right hip pain got worse after having injection ~ 2 yrs ago. Subjective:  Main complaint is R  hip pain and R LE weakness, but also has pain in LB and B                                         Knees. Able to control pain in B knees and LB with pain meds, but not in R hip. Has had benefit from Aquatic PT in the past  : My pain is a 50/10! After the pool last time I was not hurting so bad. Today my right leg is painful. 7/15: After last time I got home and went to bed.   It is my hip that hurts more every night.   7/20: R hip pretty sore  7/22:  Hip is hurting. LB and knee are fine. Patient reports that she does not have much of a support system at home and that her  is not sympathetic to her needs  7/27: My hip is what is painful. 6/10 today  7/29: I am scheduled to see an ortho doctor on August 4 for my hip. I now have a knot in my right groin since Wednesday and it is sore all the time.       Objective:    Observation:    Test measurements:R knee flex/ext 3/5/5, hip flex 3-/5                                        Knee ROM 0-105                                        Difficulty testing due to R hip pain    Land-based Visits Exercises/Activities:  Therapeutic Ex (22956)  Min: Resistance/Repetitions Notes                  Therapeutic Activity (49619) Min:     Reviewed home exer/activities     Re-eval          NMR re-education (32478) Min:                          Manual Intervention ([id number])  Min:                    Modalities  Min:               Aquatic Visits Exercises/Activities:  Aquatic Abbreviation Key  B= Belt DB= Dumbells T= Theratube   G=Gloves N= Noodles W= Weights   P= Paddles WW=Water Walker K= Kickboard        Transfers:   steps with bilat handrail      % Immersion: 75%            Ambulation:   Exercises UE:  HOLD today due to inc LE exer to match pt complaint/weakness    Forwards 4 + 1 Shoulder Shrugs     Lateral 2 Shoulder circles    Marching    Scapular Retraction    Retro   Rolling    Cariocas  Push Downs   Multifidus walkouts w/paddle  IR/ER     Punching   Stretching: bilat Rowing   Gastroc/Soleus   Elbow Flex/Ext   Hamstring  30 sec x 2 Shldr Flex/Ext   Knee flex stretch   Shldr Abd/Add   Piriformis   Horiz Abd/Add   Hip flexor    Arm Circles   SKTC   PNF Diagonals   DKTC  UE oscillations f/b, s/s    ITB   Wall Push-ups    Quad  Figure 8's    Mid back   Buoy pull/push downs    UT  Tband rows    Rhom  Tband lats    Post Shoulder  Lumbar Rot w/ paddles    Pec   Small ball pull downs                   diagonals             Cervical:       AROM Flex       AROM Extension     LEs exercises:  bilat AROM Side Bending    Marching  10 AROM Rotation    Heel Raises  10 Chin tucks    Toe Raises  10 Chin nods     Squats  10      Hamstring Curls  10      Hip Flexion  10 Balance: Hip Abduction 10 SLS    Hip Circles 10 Tandem stance 30 sec x 2   TA set  NBOS eyes open 30 sec with HHA prn   Glut Set  NBOS eyes closed    Hip Extension  Hand to Opposite Knee    Hip Adduction    Box Step  4x L/R   Hip IR   Noodle Stance     Hip ER  Stop/Go Gait    Fig 8's  Switch Gait      Foot on step bal 30 sec R/L         Seated: bilat Functional:    Ankle Pumps 30 Step up forward    Ankle circles 10 Step up lateral    Knee flex & ext 30 Step down    Hip Abd & Adduction 30 [place] squats    Bicycle  30 Crate Lifts    Add Set with ball 10 Lunges forward    LX stab with med ball throws  Lunges lateral    Ankle INV  Lunges retro    Ankle EV  Lower ab curl with noodle    Seated hip IR/ER as able 10 with assistance as tolerated Upper ab curl with ball      Med ball straight lifts      Med ball diagonal lifts      Hydrorider      Foot onto/off of step  due to inc pain   Noodle:      Leg Press  Deep Water:    Noodle hang at wall  Jog    Noodle hang deep water  Jumping Jacks    Noodle Bicycle  Heel to toe      Hand to opposite knee      Cross country skier      Rocking Horse      Other Therapeutic Activities:  Pt was educated on PT POC, Diagnosis, Prognosis, pathomechanics as well as frequency and duration of scheduling future physical therapy appointments. Time was also taken on this day to answer all patient questions and participation in PT. Reviewed appointment policy in detail with patient and patient verbalized understanding. Home Exercise Program:  Patient was instructed in the following for HEP:     . Patient verbalized/demonstrated understanding and was issued written handout. Charges:   Therapeutic Exercise and NMR EXR  [] (49795) Provided verbal/tactile cueing for activities related to strengthening, flexibility, endurance, ROM for improvements in LE, proximal hip, and core control with self care, mobility, lifting, ambulation.  [] (35146) Provided verbal/tactile cueing for activities related to improving balance, coordination, kinesthetic sense, posture, motor skill, proprioception  to assist with LE, proximal hip, and core control in self care, mobility, lifting, ambulation and eccentric single leg control.      NMR and Therapeutic Activities:    [] (79074 or 40221) Provided verbal/tactile cueing for activities related to improving balance, coordination, kinesthetic sense, posture, motor skill, proprioception and motor activation to allow for proper function of core, proximal hip and LE with self care and ADLs and functional mobility.   [] (33234) Gait Re-education- Provided training and instruction to the patient for proper LE, core and proximal hip recruitment and positioning and eccentric body weight control with ambulation re-education including up and down stairs     Home Exercise Program:    [x] (99033) Reviewed/Progressed HEP activities related to strengthening, flexibility, endurance, ROM of core, proximal hip and LE for functional self-care, mobility, lifting and ambulation/stair navigation   [] (77686)Reviewed/Progressed HEP activities related to improving balance, coordination, kinesthetic sense, posture, motor skill, proprioception of core, proximal hip and LE for self care, mobility, lifting, and ambulation/stair navigation      Manual Treatments:  PROM / STM / Oscillations-Mobs:  G-I, II, III, IV (PA's, Inf., Post.)  [] (68312) Provided manual therapy to mobilize LE, proximal hip and/or LS spine soft tissue/joints for the purpose of modulating pain, promoting relaxation,  increasing ROM, reducing/eliminating soft tissue swelling/inflammation/restriction, improving soft tissue extensibility and allowing for proper ROM for normal function with self care, mobility, lifting and ambulation. Individual Aquatic Therapy:  [x] (99656) Provided verbal and tactile cueing for strengthening, flexibility, ROM using the therapeutic properties of water (buoyancy, resistance) for improvements in core control, mobility and ambulation     Aquatic Group:  [x] (12216) Provided intermittent verbal and tactile cueing for strengthening, endurance, flexibility and ROM for 2-4 individuals that do not require one-on one patient contact by the therapist     Land Timed Code Treatment Minutes: 30   Land Total Treatment Minutes: 30     Individual Aquatic Minutes: 20   Aquatic Group Minutes: 25     [] EVAL (LOW) 84991 (typically 20 minutes face-to-face)  [] EVAL (MOD) 81867 (typically 30 minutes face-to-face)  [] EVAL (HIGH) 53530 (typically 45 minutes face-to-face)  [] RE-EVAL     [] OO(81653) x     [] Dry needle 1 or 2 Muscles (07119)  [] NMR (45074) x     [] Dry needle 3+ Muscles (21032)  [] Manual (94375) x     [] Ultrasound (95755) x  [] TA (78027) x  2   [] Mech Traction (62459)  [] ES(attended) (93252)     [] ES (un) (07291):   [] Vasopump (24024) [] Ionto (36061)   [x] Aquatic Ind (95965) x 1   [x] Aquatic Group (44761) x 1  [] Other:    Treatment/Activity Tolerance:  [] Patient tolerated treatment well [] Patient limited by fatigue  [x] Patient limited by pain  [] Patient limited by other medical complications  [x] Other:   7/15: Pain after aquatics: 3/10 Right hip, 0-1/10 LB  7/20: 2/10 after aquatics  7/27: 3-4/10 right hip after aquatics  7/29:     Prognosis: [x] Good [] Fair  [] Poor    Goals:   Patient stated goal: Some relief with movement  [] Progressing: [] Met: [] Not Met: [] Adjusted    Therapist goals for Patient:   Short Term Goals: To be achieved in: 2 weeks  1. Independent in HEP and progression per patient tolerance, in order to prevent re-injury. [] Progressing: [] Met: [] Not Met: [] Adjusted  2.  Patient will have a decrease in pain to facilitate improvement in movement, function, and ADLs as indicated by Functional Deficits. [] Progressing: [] Met: [] Not Met: [] Adjusted    Long Term Goals: To be achieved in:  weeks  1. Disability index score of 20% or less for the LEFS to assist with reaching prior level of function. [] Progressing: [] Met: [] Not Met: [] Adjusted  2. Patient will demonstrate increased AROM to allow for proper joint functioning as indicated by patients Functional Deficits. [] Progressing: [] Met: [] Not Met: [] Adjusted  3. Patient will demonstrate an increase in Strength to good proximal hip strength and control to demonstrated good R LE control during gait  [] Progressing: [] Met: [] Not Met: [] Adjusted  4. Patient will amb short distances at home without the cane  [] Progressing: [] Met: [] Not Met: [] Adjusted  5. Decrease pain to be able to move / transfer with less difficulty  [] Progressing: [] Met: [] Not Met: [] Adjusted     Patient Requires Follow-up: [x] Yes  [] No    Plan:   [x] Continue per plan of care [] Alter current plan (see comments)  [] Plan of care initiated [] Hold pending MD visit [] Discharge    Plan for Next Session:  Add: inc box step as tolerated. Pt to see ortho MD next week. Electronically signed by:  Debbie Johnson PTA  1700      Note: If patient does not return for scheduled/recommended follow up visits, this note will serve as a discharge from care along with the most recent update on progress.

## 2021-08-02 DIAGNOSIS — G89.4 CHRONIC PAIN SYNDROME: ICD-10-CM

## 2021-08-02 DIAGNOSIS — G89.29 CHRONIC BILATERAL LOW BACK PAIN WITHOUT SCIATICA: ICD-10-CM

## 2021-08-02 DIAGNOSIS — M54.50 CHRONIC BILATERAL LOW BACK PAIN WITHOUT SCIATICA: ICD-10-CM

## 2021-08-02 DIAGNOSIS — M62.830 BACK SPASM: ICD-10-CM

## 2021-08-03 ENCOUNTER — HOSPITAL ENCOUNTER (OUTPATIENT)
Dept: PHYSICAL THERAPY | Age: 46
Setting detail: THERAPIES SERIES
Discharge: HOME | End: 2021-08-03
Payer: MEDICAID

## 2021-08-03 PROCEDURE — 97150 GROUP THERAPEUTIC PROCEDURES: CPT

## 2021-08-03 PROCEDURE — 97113 AQUATIC THERAPY/EXERCISES: CPT

## 2021-08-03 NOTE — FLOWSHEET NOTE
[address] [facility],[address], [facility]  [address] [facility], [facility]  Phone: [phone]   Fax:     [phone]      Physical Therapy Daily/Aquatic Flow Sheet   Date:  8/3/2021    Patient Name:  Antonieta Yoon    :  1975  MRN: [id number]    Restrictions/Precautions:    Pertinent Medical History:Additional Pertinent Hx: Arthritis, asthma, CHF,GOPD, DM, HTN, Depression, Anxiety, Obesity    Medical/Treatment Diagnosis Information:   · Diagnosis: Chronic Pain Syndrome, OA Both knees  · Treatment Diagnosis: Main complaint is pain and weakness in R hip/ LE    Insurance/Certification information:   Poncho Vang  Physician Information:  CORINNA Rehman  Plan of care signed (Y/N): Inbox    Date of Patient follow up with Physician:      Progress Report: []  Yes  [x]  No     Date Range for reporting period:  Beginnin/3/2021  Ending:      Progress report due (10 Rx/or 30 days whichever is less):     Recertification due (POC duration/ or 90 days whichever is less):    Visit # POC/Insurance Allowable Auth Needed    30 []Yes   [x]No     Latex Allergy:  [x]NO      []YES  Preferred Language for Healthcare:   [x]English       []Other:    Functional Scale:     Date assessed: at eval  Test: Mildred  Score:  48    Pain level: 4/10 right hip, 0/10 LB    History of Injury:  H/o OA in Both knees and R hip. States that her right hip pain got worse after having injection ~ 2 yrs ago. Subjective:  Main complaint is R  hip pain and R LE weakness, but also has pain in LB and B                                         Knees. Able to control pain in B knees and LB with pain meds, but not in R hip. Has had benefit from Aquatic PT in the past  : My pain is a 50/10! After the pool last time I was not hurting so bad. Today my right leg is painful. 7/15: After last time I got home and went to bed.   It is my hip that hurts more every night.   7/20: R hip pretty sore  7/22:  Hip is hurting. LB and knee are fine. Patient reports that she does not have much of a support system at home and that her  is not sympathetic to her needs  7/27: My hip is what is painful. 6/10 today  7/29: I am scheduled to see an ortho doctor on August 4 for my hip. I now have a knot in my right groin since Wednesday and it is sore all the time. 8/3: Overall the pain in my Right hip is about the same. I am going to the ortho doc tomorrow.       Objective:    Observation:    Test measurements:R knee flex/ext 3/5/5, hip flex 3-/5                                        Knee ROM 0-105                                        Difficulty testing due to R hip pain    Land-based Visits Exercises/Activities:  Therapeutic Ex (52605)  Min: Resistance/Repetitions Notes                  Therapeutic Activity (94885) Min:     Reviewed home exer/activities     Re-eval          NMR re-education (66505) Min:                          Manual Intervention ([id number])  Min:                    Modalities  Min:               Aquatic Visits Exercises/Activities:  Aquatic Abbreviation Key  B= Belt DB= Dumbells T= Theratube   G=Gloves N= Noodles W= Weights   P= Paddles WW=Water Walker K= Kickboard        Transfers:   steps with bilat handrail      % Immersion: 75%            Ambulation:   Exercises UE:  HOLD today due to inc LE exer to match pt complaint/weakness    Forwards 4 + 2 Shoulder Shrugs     Lateral 2 Shoulder circles    Marching    Scapular Retraction    Retro   Rolling    [place]  Push Downs   Multifidus walkouts w/paddle  IR/ER     Punching   Stretching: bilat Rowing   Gastroc/Soleus   Elbow Flex/Ext   Hamstring  30 sec x 2 Shldr Flex/Ext   Knee flex stretch   Shldr Abd/Add   Piriformis   Horiz Abd/Add   Hip flexor    Arm Circles   SKTC   PNF Diagonals   DKTC  UE oscillations f/b, s/s    ITB   Wall Push-ups    Quad  Figure 8's    Mid back   [employer] downs UT  Tband rows    Rhom  Tband lats    Post Shoulder  Lumbar Rot w/ paddles    Pec   Small ball pull downs                   diagonals             Cervical:       AROM Flex       AROM Extension     LEs exercises:  bilat AROM Side Bending    Marching  10 AROM Rotation    Heel Raises  10 Chin tucks    Toe Raises  10 Chin nods     Squats  10      Hamstring Curls  10      Hip Flexion  10 Balance: Hip Abduction 10 SLS    Hip Circles 10 Tandem stance 30 sec x 2   TA set  NBOS eyes open 30 sec with HHA prn   Glut Set  NBOS eyes closed    Hip Extension  Hand to Opposite Knee    Hip Adduction    Box Step  5x L/R   Hip IR   Noodle Stance     Hip ER  Stop/Go Gait    Fig 8's  Switch Gait      Foot on step bal 30 sec R/L         Seated: bilat Functional:    Ankle Pumps 30 Step up forward    Ankle circles 10 Step up lateral    Knee flex & ext 30 Step down    Hip Abd & Adduction 30 [place] squats    Bicycle  30 Crate Lifts    Add Set with ball 10 Lunges forward    LX stab with med ball throws  Lunges lateral    Ankle INV  Lunges retro    Ankle EV  Lower ab curl with noodle    Seated hip IR/ER as able 10 with assistance as tolerated Upper ab curl with ball      Med ball straight lifts      Med ball diagonal lifts      Hydrorider      Foot onto/off of step  due to inc pain   Noodle:      Leg Press  Deep Water:    Noodle hang at wall  Jog    Noodle hang deep water  Jumping Jacks    Noodle Bicycle  Heel to toe      Hand to opposite knee      Cross country skier      Rocking Horse      Other Therapeutic Activities:  Pt was educated on PT POC, Diagnosis, Prognosis, pathomechanics as well as frequency and duration of scheduling future physical therapy appointments. Time was also taken on this day to answer all patient questions and participation in PT. Reviewed appointment policy in detail with patient and patient verbalized understanding.      Home Exercise Program:  Patient was instructed in the following for HEP:     . Patient verbalized/demonstrated understanding and was issued written handout. Charges: Therapeutic Exercise and NMR EXR  [] (24378) Provided verbal/tactile cueing for activities related to strengthening, flexibility, endurance, ROM for improvements in LE, proximal hip, and core control with self care, mobility, lifting, ambulation.  [] (02279) Provided verbal/tactile cueing for activities related to improving balance, coordination, kinesthetic sense, posture, motor skill, proprioception  to assist with LE, proximal hip, and core control in self care, mobility, lifting, ambulation and eccentric single leg control.      NMR and Therapeutic Activities:    [] (35685 or 16750) Provided verbal/tactile cueing for activities related to improving balance, coordination, kinesthetic sense, posture, motor skill, proprioception and motor activation to allow for proper function of core, proximal hip and LE with self care and ADLs and functional mobility.   [] (02610) Gait Re-education- Provided training and instruction to the patient for proper LE, core and proximal hip recruitment and positioning and eccentric body weight control with ambulation re-education including up and down stairs     Home Exercise Program:    [x] (96945) Reviewed/Progressed HEP activities related to strengthening, flexibility, endurance, ROM of core, proximal hip and LE for functional self-care, mobility, lifting and ambulation/stair navigation   [] (12489)Reviewed/Progressed HEP activities related to improving balance, coordination, kinesthetic sense, posture, motor skill, proprioception of core, proximal hip and LE for self care, mobility, lifting, and ambulation/stair navigation      Manual Treatments:  PROM / STM / Oscillations-Mobs:  G-I, II, III, IV (PA's, Inf., Post.)  [] (96423) Provided manual therapy to mobilize LE, proximal hip and/or LS spine soft tissue/joints for the purpose of modulating pain, promoting relaxation,  increasing ROM, reducing/eliminating soft tissue swelling/inflammation/restriction, improving soft tissue extensibility and allowing for proper ROM for normal function with self care, mobility, lifting and ambulation. Individual Aquatic Therapy:  [x] (13108) Provided verbal and tactile cueing for strengthening, flexibility, ROM using the therapeutic properties of water (buoyancy, resistance) for improvements in core control, mobility and ambulation     Aquatic Group:  [x] (02737) Provided intermittent verbal and tactile cueing for strengthening, endurance, flexibility and ROM for 2-4 individuals that do not require one-on one patient contact by the therapist     Land Timed Code Treatment Minutes: 30   Land Total Treatment Minutes: 30     Individual Aquatic Minutes: 20   Aquatic Group Minutes: 25     [] EVAL (LOW) 79691 (typically 20 minutes face-to-face)  [] EVAL (MOD) 88964 (typically 30 minutes face-to-face)  [] EVAL (HIGH) 63060 (typically 45 minutes face-to-face)  [] RE-EVAL     [] FO(39500) x     [] Dry needle 1 or 2 Muscles (72048)  [] NMR (70815) x     [] Dry needle 3+ Muscles (48462)  [] Manual (08242) x     [] Ultrasound (11309) x  [] TA (04345) x  2   [] Mech Traction (56773)  [] ES(attended) (22369)     [] ES (un) (55603):   [] Vasopump (21430) [] Ionto (79469)   [x] Aquatic Ind (17829) x 1   [x] Aquatic Group (93725) x 1  [] Other:    Treatment/Activity Tolerance:  [] Patient tolerated treatment well [] Patient limited by fatigue  [x] Patient limited by pain  [] Patient limited by other medical complications  [x] Other:   7/15: Pain after aquatics: 3/10 Right hip, 0-1/10 LB  7/20: 2/10 after aquatics  7/27: 3-4/10 right hip after aquatics  8/3: 3/10 pain after aquatics     Prognosis: [x] Good [] Fair  [] Poor    Goals:   Patient stated goal: Some relief with movement  [] Progressing: [] Met: [] Not Met: [] Adjusted    Therapist goals for Patient:   Short Term Goals: To be achieved in: 2 weeks  1.  Independent in HEP and progression per patient tolerance, in order to prevent re-injury. [] Progressing: [] Met: [] Not Met: [] Adjusted  2. Patient will have a decrease in pain to facilitate improvement in movement, function, and ADLs as indicated by Functional Deficits. [] Progressing: [] Met: [] Not Met: [] Adjusted    Long Term Goals: To be achieved in:  weeks  1. Disability index score of 20% or less for the LEFS to assist with reaching prior level of function. [] Progressing: [] Met: [] Not Met: [] Adjusted  2. Patient will demonstrate increased AROM to allow for proper joint functioning as indicated by patients Functional Deficits. [] Progressing: [] Met: [] Not Met: [] Adjusted  3. Patient will demonstrate an increase in Strength to good proximal hip strength and control to demonstrated good R LE control during gait  [] Progressing: [] Met: [] Not Met: [] Adjusted  4. Patient will amb short distances at home without the cane  [] Progressing: [] Met: [] Not Met: [] Adjusted  5. Decrease pain to be able to move / transfer with less difficulty  [] Progressing: [] Met: [] Not Met: [] Adjusted     Patient Requires Follow-up: [x] Yes  [] No    Plan:   [x] Continue per plan of care [] Alter current plan (see comments)  [] Plan of care initiated [] Hold pending MD visit [] Discharge    Plan for Next Session:  Await orders from MD. Progress as tolerated unless MD changes plan. Electronically signed by:  Markos Lyles, PTA  [id number]      Note: If patient does not return for scheduled/recommended follow up visits, this note will serve as a discharge from care along with the most recent update on progress.

## 2021-08-04 ENCOUNTER — OFFICE VISIT (OUTPATIENT)
Dept: ORTHOPEDIC SURGERY | Age: 46
End: 2021-08-04
Payer: MEDICAID

## 2021-08-04 VITALS — WEIGHT: 293 LBS | BODY MASS INDEX: 50.02 KG/M2 | RESPIRATION RATE: 16 BRPM | HEIGHT: 64 IN

## 2021-08-04 DIAGNOSIS — M16.11 PRIMARY OSTEOARTHRITIS OF RIGHT HIP: Primary | ICD-10-CM

## 2021-08-04 DIAGNOSIS — M17.0 PRIMARY OSTEOARTHRITIS OF BOTH KNEES: ICD-10-CM

## 2021-08-04 PROCEDURE — G8417 CALC BMI ABV UP PARAM F/U: HCPCS | Performed by: ORTHOPAEDIC SURGERY

## 2021-08-04 PROCEDURE — 99213 OFFICE O/P EST LOW 20 MIN: CPT | Performed by: ORTHOPAEDIC SURGERY

## 2021-08-04 PROCEDURE — G8427 DOCREV CUR MEDS BY ELIG CLIN: HCPCS | Performed by: ORTHOPAEDIC SURGERY

## 2021-08-04 PROCEDURE — 4004F PT TOBACCO SCREEN RCVD TLK: CPT | Performed by: ORTHOPAEDIC SURGERY

## 2021-08-04 RX ORDER — METHOCARBAMOL 500 MG/1
500 TABLET, FILM COATED ORAL 2 TIMES DAILY
Qty: 60 TABLET | Refills: 0 | Status: SHIPPED | OUTPATIENT
Start: 2021-08-04 | End: 2021-08-25 | Stop reason: SDUPTHER

## 2021-08-04 NOTE — PROGRESS NOTES
Olga Ibrahim  [id number]  August 4, 2021    Chief Complaint   Patient presents with    Hip Pain     rt hip       History: The patient is here in follow-up regarding her right hip. The right hip intra-articular injection received back in January provided no relief. She is now under a pain contract with Dr. Veda Pardo. She does take Norco 7.5 mg 1 p.o. 4 times daily. She is on a muscle relaxant. The bariatric surgery was on hold for various reasons. We have treated her in the past for her knees and her knees are doing relatively well. The patient's  past medical history, medications, allergies,  family history, social history, and review of systems have been reviewed, and dated and are recorded in the chart. Vitals:  Resp 16   Ht 5' 4\" (1.626 m)   Wt (!) 338 lb 3.2 oz (153.4 kg)   LMP  (LMP Unknown)   BMI 58.05 kg/m²     Physical: Ms. Olga Ibrahim appears well, she is in no apparent distress, she demonstrates appropriate mood & affect. She is alert and oriented to person, place and time. Examination of the right hip reveals moderate to severe pain with internal rotation of the hip. She has generalized tenderness to palpation about the joint line of the bilateral knees. Range of motion is from 0 degrees to 110 degrees bilaterally. Strength is 4+ to 5/5 for all muscle groups about the bilateral knee. There is patellofemoral crepitus with range of motion of the bilateral knee. Varus and valgus stressing of the knees reveals no evidence of instability. There is a small effusion in the bilateral knees. Anterior drawer and Lachman are negative bilaterally. Examination of the skin reveals no rashes, ulceration, or lesion, bilaterally in the lower extremities. Sensation to both lower extremities is grossly intact. Exam of both feet reveals pedal pulses intact and brisk cap refill. Patient is able to dorsiflex and wiggle all toes.  Deep tendon reflexes of the lower extremities are normal and symmetric. The patient has 90° of right hip flexion, 5° of internal rotation, and 25° of external rotation. She has significant pain with internal rotation of the right hip. She denies low back pain today. Straight-leg testing is negative bilaterally. X-rays: AP pelvis and 2 views of the right hip obtained previously were extensively reviewed. The x-rays confirm moderate right hip osteoarthritis. 4 views of the right knee and 4 views of the left knee obtained in the office today were extensively reviewed. The x-rays confirm severe osteoarthritis of both knees. The changes are most severe within the patellofemoral compartment and medially bilaterally. Impression: #1 right hip osteoarthritis #2 bilateral knee osteoarthritis #3 morbid obesity    Plan: At this time, we will continue to treat the patient conservatively. The patient was given a Medrol Dosepak. She was encouraged to modify her activities. We did encourage the patient to go ahead and get the bariatric surgery performed. She is not a surgical candidate due to the morbid obesity. She cannot take anti-inflammatories due to their chronic kidney disease.

## 2021-08-05 ENCOUNTER — HOSPITAL ENCOUNTER (OUTPATIENT)
Dept: PHYSICAL THERAPY | Age: 46
Setting detail: THERAPIES SERIES
Discharge: HOME | End: 2021-08-05
Payer: MEDICAID

## 2021-08-05 PROCEDURE — 97113 AQUATIC THERAPY/EXERCISES: CPT

## 2021-08-05 RX ORDER — METHYLPREDNISOLONE 4 MG/1
TABLET ORAL
Qty: 1 KIT | Refills: 0 | Status: SHIPPED | OUTPATIENT
Start: 2021-08-05 | End: 2021-10-01

## 2021-08-05 ASSESSMENT — PAIN SCALES - QUEBEC BACK PAIN DISABILITY SCALE
PULL OR PUSH HEAVY DOORS: 0
BEND OVER TO CLEAN THE BATHTUB: 2
STAND UP FOR 20 TO 30 MINUTES: 1
QUEBEC DISABILITY INDEX: 20-39%
TOTAL SCORE: 33
MOVE A CHAIR: 0
WALK SEVERAL KILOMETERS  OR MILES: 5
RUN ONE BLOCK OR 100M: 5
TAKE FOOD OUT OF THE REFRIGERATOR: 0
THROW A BALL: 0
GET OUT OF BED: 1
QUEBEC CMS MODIFIER: CJ
PUT ON SOCKS OR PANYHOSE: 2
MAKE YOUR BED: 0
SIT IN A CHAIR FOR SEVERAL HOURS: 2
CARRY TWO BAGS OF GROCERIES: 2
LIFT AND CARRY A HEAVY SUITCASE: 2
RIDE IN A CAR: 1
CLIMB ONE FLIGHT OF STAIRS: 3
TURN OVER IN BED: 1
SLEEP THROUGH THE NIGHT: 1
REACH UP TO HIGH SHELVES: 0
WALK A FEW BLOCKS OR 300 TO 400M: 5

## 2021-08-05 NOTE — FLOWSHEET NOTE
IR/ER     Punching   Stretching: bilat Rowing   Gastroc/Soleus  30 sec x 2 Elbow Flex/Ext   Hamstring  30 sec x 2 Shldr Flex/Ext   Knee flex stretch   Shldr Abd/Add   Piriformis   Horiz Abd/Add   Hip flexor    Arm Circles   SKTC   PNF Diagonals   DKTC  UE oscillations f/b, s/s    ITB   Wall Push-ups    Quad  Figure 8's    Mid back   Buoy pull/push downs    UT  Tband rows    Rhom  Tband lats    Post Shoulder  Lumbar Rot w/ paddles    Pec   Small ball pull downs                   diagonals             Cervical:       AROM Flex       AROM Extension     LEs exercises:  bilat AROM Side Bending    Marching  10 AROM Rotation    Heel Raises  10 Chin tucks    Toe Raises  10 Chin nods     Squats  10      Hamstring Curls  10      Hip Flexion  10 Balance:      Hip Abduction 10 SLS    Hip Circles 10 Tandem stance 30 sec x 2 as able   TA set  NBOS eyes open 30 sec with HHA prn   Glut Set  NBOS eyes closed    Hip Extension  Hand to Opposite Knee    Hip Adduction    Box Step  5x L/R   Hip IR   Noodle Stance     Hip ER  Stop/Go Gait    Fig 8's  Switch Gait      Foot on step bal 30 sec R/L         Seated: bilat Functional:    Ankle Pumps 30 Step up forward    Ankle circles 10 Step up lateral    Knee flex & ext 30 Step down    Hip Abd & Adduction 30 [place] squats    Bicycle  30 Crate Lifts    Add Set with ball 10 Lunges forward    LX stab with med ball throws  Lunges lateral    Ankle INV  Lunges retro    Ankle EV  Lower ab curl with noodle    Seated hip IR/ER as able 10 with assistance as tolerated Upper ab curl with ball      Med ball straight lifts      Med ball diagonal lifts      Hydrorider      Foot onto/off of step  due to inc pain   Noodle:      Leg Press  Deep Water:    Noodle hang at wall  Jog    Noodle hang deep water  Jumping Jacks    Noodle Bicycle  Heel to toe      Hand to opposite knee      Cross country skier      Rocking Horse      Other Therapeutic Activities:  Pt was educated on PT POC, Diagnosis, Prognosis, pathomechanics as well as frequency and duration of scheduling future physical therapy appointments. Time was also taken on this day to answer all patient questions and participation in PT. Reviewed appointment policy in detail with patient and patient verbalized understanding. Home Exercise Program:  Patient was instructed in the following for HEP:     . Patient verbalized/demonstrated understanding and was issued written handout. Charges: Therapeutic Exercise and NMR EXR  [] (22936) Provided verbal/tactile cueing for activities related to strengthening, flexibility, endurance, ROM for improvements in LE, proximal hip, and core control with self care, mobility, lifting, ambulation.  [] (18224) Provided verbal/tactile cueing for activities related to improving balance, coordination, kinesthetic sense, posture, motor skill, proprioception  to assist with LE, proximal hip, and core control in self care, mobility, lifting, ambulation and eccentric single leg control.      NMR and Therapeutic Activities:    [] (97479 or 83089) Provided verbal/tactile cueing for activities related to improving balance, coordination, kinesthetic sense, posture, motor skill, proprioception and motor activation to allow for proper function of core, proximal hip and LE with self care and ADLs and functional mobility.   [] (26013) Gait Re-education- Provided training and instruction to the patient for proper LE, core and proximal hip recruitment and positioning and eccentric body weight control with ambulation re-education including up and down stairs     Home Exercise Program:    [x] (46706) Reviewed/Progressed HEP activities related to strengthening, flexibility, endurance, ROM of core, proximal hip and LE for functional self-care, mobility, lifting and ambulation/stair navigation   [] (61834)Reviewed/Progressed HEP activities related to improving balance, coordination, kinesthetic sense, posture, motor skill, proprioception of core, proximal hip and LE for self care, mobility, lifting, and ambulation/stair navigation      Manual Treatments:  PROM / STM / Oscillations-Mobs:  G-I, II, III, IV (PA's, Inf., Post.)  [] (87021) Provided manual therapy to mobilize LE, proximal hip and/or LS spine soft tissue/joints for the purpose of modulating pain, promoting relaxation,  increasing ROM, reducing/eliminating soft tissue swelling/inflammation/restriction, improving soft tissue extensibility and allowing for proper ROM for normal function with self care, mobility, lifting and ambulation.      Individual Aquatic Therapy:  [x] (27830) Provided verbal and tactile cueing for strengthening, flexibility, ROM using the therapeutic properties of water (buoyancy, resistance) for improvements in core control, mobility and ambulation     Aquatic Group:  [x] (20118) Provided intermittent verbal and tactile cueing for strengthening, endurance, flexibility and ROM for 2-4 individuals that do not require one-on one patient contact by the therapist     Land Timed Code Treatment Minutes: 30   Land Total Treatment Minutes: 30     Individual Aquatic Minutes: 45   Aquatic Group Minutes:      [] EVAL (LOW) 81797 (typically 20 minutes face-to-face)  [] EVAL (MOD) 36956 (typically 30 minutes face-to-face)  [] EVAL (HIGH) 06621 (typically 45 minutes face-to-face)  [] RE-EVAL     [] KO(13056) x     [] Dry needle 1 or 2 Muscles (50338)  [] NMR (56395) x     [] Dry needle 3+ Muscles (84333)  [] Manual (09708) x     [] Ultrasound (72391) x  [] TA (32845) x  2   [] Mech Traction (83829)  [] ES(attended) (64501)     [] ES (un) (32597):   [] Vasopump (28228) [] Ionto (36033)   [x] Aquatic Ind (30263) x 3   [x] Aquatic Group (16251) x   [] Other:    Treatment/Activity Tolerance:  [] Patient tolerated treatment well [] Patient limited by fatigue  [x] Patient limited by pain  [] Patient limited by other medical complications  [x] Other:   7/15: Pain after aquatics: 3/10 Right hip, 0-1/10 LB  7/20: 2/10 after aquatics  7/27: 3-4/10 right hip after aquatics  8/3: 3/10 pain after aquatics   8/5: 3/10 hip pain after aquatics. Pt treatment 1:1 today. Prognosis: [x] Good [] Fair  [] Poor    Goals:   Patient stated goal: Some relief with movement  [] Progressing: [] Met: [] Not Met: [] Adjusted    Therapist goals for Patient:   Short Term Goals: To be achieved in: 2 weeks  1. Independent in HEP and progression per patient tolerance, in order to prevent re-injury. [] Progressing: [] Met: [] Not Met: [] Adjusted  2. Patient will have a decrease in pain to facilitate improvement in movement, function, and ADLs as indicated by Functional Deficits. [] Progressing: [] Met: [] Not Met: [] Adjusted    Long Term Goals: To be achieved in:  weeks  1. Disability index score of 20% or less for the LEFS to assist with reaching prior level of function. [] Progressing: [] Met: [] Not Met: [] Adjusted  2. Patient will demonstrate increased AROM to allow for proper joint functioning as indicated by patients Functional Deficits. [] Progressing: [] Met: [] Not Met: [] Adjusted  3. Patient will demonstrate an increase in Strength to good proximal hip strength and control to demonstrated good R LE control during gait  [] Progressing: [] Met: [] Not Met: [] Adjusted  4. Patient will amb short distances at home without the cane  [] Progressing: [] Met: [] Not Met: [] Adjusted  5. Decrease pain to be able to move / transfer with less difficulty  [] Progressing: [] Met: [] Not Met: [] Adjusted     Patient Requires Follow-up: [x] Yes  [] No    Plan:   [x] Continue per plan of care [] Alter current plan (see comments)  [] Plan of care initiated [] Hold pending MD visit [] Discharge    Plan for Next Session:  Add: Reach ball from pool to deck with right foot on step as tolerated.     Electronically signed by:  Joan Glez, PTA  [id number]      Note: If patient does not return for scheduled/recommended follow up visits, this note will serve as a discharge from care along with the most recent update on progress.

## 2021-08-10 ENCOUNTER — HOSPITAL ENCOUNTER (OUTPATIENT)
Dept: PHYSICAL THERAPY | Age: 46
Setting detail: THERAPIES SERIES
Discharge: HOME | End: 2021-08-10
Payer: MEDICAID

## 2021-08-10 PROCEDURE — 97113 AQUATIC THERAPY/EXERCISES: CPT

## 2021-08-10 NOTE — FLOWSHEET NOTE
Christiano 75, [facility]  [address] [facility], [facility]  Phone: [phone]   Fax:     [phone]      Physical Therapy Daily/Aquatic Flow Sheet   Date:  8/10/2021    Patient Name:  Richard Ramirez    :  1975  MRN: [id number]    Restrictions/Precautions:    Pertinent Medical History:Additional Pertinent Hx: Arthritis, asthma, CHF,GOPD, DM, HTN, Depression, Anxiety, Obesity    Medical/Treatment Diagnosis Information:   · Diagnosis: Chronic Pain Syndrome, OA Both knees  · Treatment Diagnosis: Main complaint is pain and weakness in R hip/ LE    Insurance/Certification information:   Quinn Harris  Physician Information:  CORINNA Sherwood  Plan of care signed (Y/N): Inbox    Date of Patient follow up with Physician:      Progress Report: []  Yes  [x]  No     Date Range for reporting period:  Beginnin/10/2021  Ending:      Progress report due (10 Rx/or 30 days whichever is less):     Recertification due (POC duration/ or 90 days whichever is less):    Visit # POC/Insurance Allowable Auth Needed    30 []Yes   [x]No     Latex Allergy:  [x]NO      []YES  Preferred Language for Healthcare:   [x]English       []Other:    Functional Scale:     Date assessed: at eval  Test: Mildred  Score:  33  (21)    Pain level: 5/10 right hip, 0/10 LB    History of Injury:  H/o OA in Both knees and R hip. States that her right hip pain got worse after having injection ~ 2 yrs ago. Subjective:  Main complaint is R  hip pain and R LE weakness, but also has pain in LB and B                                         Knees. Able to control pain in B knees and LB with pain meds, but not in R hip. Has had benefit from Aquatic PT in the past  : My pain is a 50/10! After the pool last time I was not hurting so bad. Today my right leg is painful. 7/15: After last time I got home and went to bed.   It is my hip that hurts more every night.   7/20: R hip pretty sore  7/22:  Hip is hurting. LB and knee are fine. Patient reports that she does not have much of a support system at home and that her  is not sympathetic to her needs  7/27: My hip is what is painful. 6/10 today  7/29: I am scheduled to see an ortho doctor on August 4 for my hip. I now have a knot in my right groin since Wednesday and it is sore all the time. 8/3: Overall the pain in my Right hip is about the same. I am going to the ortho doc tomorrow. 8/5: I saw the othopedic yesterday. Pt reports MD requested she lose weight before he can do surgery. Gave her an option of an injection which she refused based on previous outcome/experience. Pt states she has been losing wt but it is a slow process and she is just going to keep doing what she is doing now until able to have surgery. 8/10: I woke up at 4:00 this morning with a lot of hip pain. I also feel like I get \"stuck\" when I am going from sit to stand. It is so painful at that one spot.     Objective:    Observation:    Test measurements:R knee flex/ext 3/5/5, hip flex 3-/5                                        Knee ROM 0-105                                        Difficulty testing due to R hip pain    Land-based Visits Exercises/Activities:  Therapeutic Ex (43485)  Min: Resistance/Repetitions Notes                  Therapeutic Activity (17250) Min:     Reviewed home exer/activities     Re-eval          NMR re-education (46359) Min:                          Manual Intervention ([id number])  Min:                    Modalities  Min:               Aquatic Visits Exercises/Activities:  Aquatic Abbreviation Key  B= Belt DB= Dumbells T= Theratube   G=Gloves N= Noodles W= Weights   P= Paddles WW=Water Walker K= Kickboard        Transfers:   steps with bilat handrail      % Immersion: 75%            Ambulation:   Exercises UE:  HOLD today due to inc LE exer to match pt complaint/weakness    Forwards 4 + 2 Shoulder Shrugs     Lateral 2 Shoulder circles    Marching    Scapular Retraction    Retro  2 Rolling    Cariocas  Push Downs   Multifidus walkouts w/paddle  IR/ER     Punching   Stretching: bilat Rowing   Gastroc/Soleus  30 sec x 2 Elbow Flex/Ext   Hamstring  30 sec x 2 Shldr Flex/Ext   Knee flex stretch   Shldr Abd/Add   Piriformis   Horiz Abd/Add   Hip flexor    Arm Circles   SKTC   PNF Diagonals   DKTC  UE oscillations f/b, s/s    ITB   Wall Push-ups    Quad  Figure 8's    Mid back   Buoy pull/push downs    UT  Tband rows    Rhom  Tband lats    Post Shoulder  Lumbar Rot w/ paddles    Pec   Small ball pull downs                   diagonals             Cervical:       AROM Flex       AROM Extension     LEs exercises:  bilat AROM Side Bending    Marching  10 AROM Rotation    Heel Raises  10 Chin tucks    Toe Raises  10 Chin nods     Squats  10      Hamstring Curls  10      Hip Flexion  10 Balance:      Hip Abduction 10 SLS    Hip Circles 10 Tandem stance 30 sec x 2 as able   TA set  NBOS eyes open    Glut Set  NBOS eyes closed 30 sec with HHA prn   Hip Extension  Hand to Opposite Knee    Hip Adduction    Box Step  5x L/R   Hip IR   Noodle Stance     Hip ER  Stop/Go Gait    Fig 8's  Switch Gait      Foot on step bal 30 sec R/L         Seated: bilat Functional:    Ankle Pumps 30 Step up forward    Ankle circles 10 Step up lateral    Knee flex & ext 30 Step down    Hip Abd & Adduction 30 [place] squats    Bicycle  30 Crate Lifts    Add Set with ball 10 Lunges forward    LX stab with med ball throws  Lunges lateral    Ankle INV  Lunges retro    Ankle EV  Lower ab curl with noodle    Seated hip IR/ER as able 10 with assistance as tolerated Upper ab curl with ball      Med ball straight lifts      Med ball diagonal lifts      Foot on step reaching with ball, water to deck 10 R/L`     Foot onto/off of step  due to inc pain   Noodle:      Leg Press  Deep Water:    Noodle hang at wall  Jog    Noodle hang deep water Jumping Jacks    Noodle Bicycle  Heel to toe      Hand to opposite knee      Cross country skier      Rocking Horse      Other Therapeutic Activities:  Pt was educated on PT POC, Diagnosis, Prognosis, pathomechanics as well as frequency and duration of scheduling future physical therapy appointments. Time was also taken on this day to answer all patient questions and participation in PT. Reviewed appointment policy in detail with patient and patient verbalized understanding. Home Exercise Program:  Patient was instructed in the following for HEP:     . Patient verbalized/demonstrated understanding and was issued written handout. Charges: Therapeutic Exercise and NMR EXR  [] (21966) Provided verbal/tactile cueing for activities related to strengthening, flexibility, endurance, ROM for improvements in LE, proximal hip, and core control with self care, mobility, lifting, ambulation.  [] (91943) Provided verbal/tactile cueing for activities related to improving balance, coordination, kinesthetic sense, posture, motor skill, proprioception  to assist with LE, proximal hip, and core control in self care, mobility, lifting, ambulation and eccentric single leg control.      NMR and Therapeutic Activities:    [] (40713 or 27008) Provided verbal/tactile cueing for activities related to improving balance, coordination, kinesthetic sense, posture, motor skill, proprioception and motor activation to allow for proper function of core, proximal hip and LE with self care and ADLs and functional mobility.   [] (25145) Gait Re-education- Provided training and instruction to the patient for proper LE, core and proximal hip recruitment and positioning and eccentric body weight control with ambulation re-education including up and down stairs     Home Exercise Program:    [x] (65742) Reviewed/Progressed HEP activities related to strengthening, flexibility, endurance, ROM of core, proximal hip and LE for functional self-care, mobility, lifting and ambulation/stair navigation   [] (47175)Reviewed/Progressed HEP activities related to improving balance, coordination, kinesthetic sense, posture, motor skill, proprioception of core, proximal hip and LE for self care, mobility, lifting, and ambulation/stair navigation      Manual Treatments:  PROM / STM / Oscillations-Mobs:  G-I, II, III, IV (PA's, Inf., Post.)  [] (30947) Provided manual therapy to mobilize LE, proximal hip and/or LS spine soft tissue/joints for the purpose of modulating pain, promoting relaxation,  increasing ROM, reducing/eliminating soft tissue swelling/inflammation/restriction, improving soft tissue extensibility and allowing for proper ROM for normal function with self care, mobility, lifting and ambulation.      Individual Aquatic Therapy:  [x] (31667) Provided verbal and tactile cueing for strengthening, flexibility, ROM using the therapeutic properties of water (buoyancy, resistance) for improvements in core control, mobility and ambulation     Aquatic Group:  [x] (21590) Provided intermittent verbal and tactile cueing for strengthening, endurance, flexibility and ROM for 2-4 individuals that do not require one-on one patient contact by the therapist     Land Timed Code Treatment Minutes: 30   Land Total Treatment Minutes: 30     Individual Aquatic Minutes: 50   Aquatic Group Minutes:      [] EVAL (LOW) 34653 (typically 20 minutes face-to-face)  [] EVAL (MOD) 34190 (typically 30 minutes face-to-face)  [] EVAL (HIGH) 40781 (typically 45 minutes face-to-face)  [] RE-EVAL     [] DN(98594) x     [] Dry needle 1 or 2 Muscles (71637)  [] NMR (76526) x     [] Dry needle 3+ Muscles (97767)  [] Manual (57081) x     [] Ultrasound (62958) x  [] TA (19322) x  2   [] Mech Traction (72107)  [] ES(attended) (23526)     [] ES (un) (07038):   [] Vasopump (24266) [] Ionto (88259)   [x] Aquatic Ind (48025) x 3   [x] Aquatic Group (42031) x   [x] Other: Pt treatment 1:1 today.    Treatment/Activity Tolerance:  [] Patient tolerated treatment well [] Patient limited by fatigue  [x] Patient limited by pain  [] Patient limited by other medical complications  [x] Other:   7/15: Pain after aquatics: 3/10 Right hip, 0-1/10 LB  7/20: 2/10 after aquatics  7/27: 3-4/10 right hip after aquatics  8/3: 3/10 pain after aquatics   8/5: 3/10 hip pain after aquatics. Pt treatment 1:1 today. 8/10: 4/10 hip pain after aquatics. Discussed with pt continuing aquatics on her own using aquatic HEP provided by therapist. Encouraged pt to come as often as she would like. Suggested pt call therapist with any questions re: aquatic HEP, once she looks over the packet. Pt verbalized understanding and agreement. Prognosis: [x] Good [] Fair  [] Poor    Goals:   Patient stated goal: Some relief with movement  [] Progressing: [] Met: [] Not Met: [] Adjusted    Therapist goals for Patient:   Short Term Goals: To be achieved in: 2 weeks  1. Independent in HEP and progression per patient tolerance, in order to prevent re-injury. [] Progressing: [] Met: [] Not Met: [] Adjusted  2. Patient will have a decrease in pain to facilitate improvement in movement, function, and ADLs as indicated by Functional Deficits. [] Progressing: [] Met: [] Not Met: [] Adjusted    Long Term Goals: To be achieved in:  weeks  1. Disability index score of 20% or less for the LEFS to assist with reaching prior level of function. [] Progressing: [] Met: [] Not Met: [] Adjusted  2. Patient will demonstrate increased AROM to allow for proper joint functioning as indicated by patients Functional Deficits. [] Progressing: [] Met: [] Not Met: [] Adjusted  3. Patient will demonstrate an increase in Strength to good proximal hip strength and control to demonstrated good R LE control during gait  [] Progressing: [] Met: [] Not Met: [] Adjusted  4.  Patient will amb short distances at home without the cane  [] Progressing: [] Met: [] Not

## 2021-08-12 ENCOUNTER — HOSPITAL ENCOUNTER (OUTPATIENT)
Dept: PHYSICAL THERAPY | Age: 46
Setting detail: THERAPIES SERIES
Discharge: HOME | End: 2021-08-12
Payer: MEDICAID

## 2021-08-12 PROCEDURE — 97113 AQUATIC THERAPY/EXERCISES: CPT

## 2021-08-12 PROCEDURE — 97150 GROUP THERAPEUTIC PROCEDURES: CPT

## 2021-08-12 NOTE — FLOWSHEET NOTE
Christiano 75, [facility]  [address] [facility], [facility]  Phone: [phone]   Fax:     [phone]      Physical Therapy Daily/Aquatic Flow Sheet   Date:  2021    Patient Name:  Harpreet Banegas    :  1975  MRN: [id number]    Restrictions/Precautions:    Pertinent Medical History:Additional Pertinent Hx: Arthritis, asthma, CHF,GOPD, DM, HTN, Depression, Anxiety, Obesity    Medical/Treatment Diagnosis Information:   · Diagnosis: Chronic Pain Syndrome, OA Both knees  · Treatment Diagnosis: Main complaint is pain and weakness in R hip/ LE    Insurance/Certification information:   Celeste Grady  Physician Information:  CORINNA Clements  Plan of care signed (Y/N): Inbox    Date of Patient follow up with Physician:      Progress Report: []  Yes  [x]  No     Date Range for reporting period:  Beginnin2021  Ending:      Progress report due (10 Rx/or 30 days whichever is less):     Recertification due (POC duration/ or 90 days whichever is less):    Visit # POC/Insurance Allowable Auth Needed    30 []Yes   [x]No     Latex Allergy:  [x]NO      []YES  Preferred Language for Healthcare:   [x]English       []Other:    Functional Scale:     Date assessed: at eval  Test: Mildred  Score:  33  (21)    Pain level: 5/10 right hip, 0/10 LB    History of Injury:  H/o OA in Both knees and R hip. States that her right hip pain got worse after having injection ~ 2 yrs ago. Subjective:  Main complaint is R  hip pain and R LE weakness, but also has pain in LB and B                                         Knees. Able to control pain in B knees and LB with pain meds, but not in R hip. Has had benefit from Aquatic PT in the past  : My pain is a 50/10! After the pool last time I was not hurting so bad. Today my right leg is painful. 7/15: After last time I got home and went to bed.   It is my hip that hurts more every night.   7/20: R hip pretty sore  7/22:  Hip is hurting. LB and knee are fine. Patient reports that she does not have much of a support system at home and that her  is not sympathetic to her needs  7/27: My hip is what is painful. 6/10 today  7/29: I am scheduled to see an ortho doctor on August 4 for my hip. I now have a knot in my right groin since Wednesday and it is sore all the time. 8/3: Overall the pain in my Right hip is about the same. I am going to the ortho doc tomorrow. 8/5: I saw the othopedic yesterday. Pt reports MD requested she lose weight before he can do surgery. Gave her an option of an injection which she refused based on previous outcome/experience. Pt states she has been losing wt but it is a slow process and she is just going to keep doing what she is doing now until able to have surgery. 8/10: I woke up at 4:00 this morning with a lot of hip pain. I also feel like I get \"stuck\" when I am going from sit to stand. It is so painful at that one spot. 8-12-21 no new complaints.      Objective:    Observation:    Test measurements:R knee flex/ext 3/5/5, hip flex 3-/5                                        Knee ROM 0-105                                        Difficulty testing due to R hip pain    Land-based Visits Exercises/Activities:  Therapeutic Ex (05176)  Min: Resistance/Repetitions Notes                  Therapeutic Activity (96953) Min:     Reviewed home exer/activities     Re-eval          NMR re-education (86964) Min:                          Manual Intervention ([id number])  Min:                    Modalities  Min:               Aquatic Visits Exercises/Activities:  Aquatic Abbreviation Key  B= Belt DB= Dumbells T= Theratube   G=Gloves N= Noodles W= Weights   P= Paddles WW=Water Walker K= Kickboard        Transfers:   steps with bilat handrail      % Immersion: 75%            Ambulation:   Exercises UE:  HOLD today due to inc LE exer to match pt complaint/weakness    Forwards 4 + 2 Shoulder Shrugs     Lateral 2 Shoulder circles    Marching    Scapular Retraction    Retro  2 Rolling    Cariocas  Push Downs   Multifidus walkouts w/paddle  IR/ER     Punching   Stretching: bilat Rowing   Gastroc/Soleus  30 sec x 2 Elbow Flex/Ext   Hamstring  30 sec x 2 Shldr Flex/Ext   Knee flex stretch   Shldr Abd/Add   Piriformis   Horiz Abd/Add   Hip flexor    Arm Circles   SKTC   PNF Diagonals   DKTC  UE oscillations f/b, s/s    ITB   Wall Push-ups    Quad  Figure 8's    Mid back   Buoy pull/push downs    UT  Tband rows    Rhom  Tband lats    Post Shoulder  Lumbar Rot w/ paddles    Pec   Small ball pull downs                   diagonals             Cervical:       AROM Flex       AROM Extension     LEs exercises:  bilat AROM Side Bending    Marching  10 AROM Rotation    Heel Raises  10 Chin tucks    Toe Raises  10 Chin nods     Squats  10      Hamstring Curls  10      Hip Flexion  10 Balance:      Hip Abduction 10 SLS    Hip Circles 10 Tandem stance 30 sec x 2 as able   TA set  NBOS eyes open    Glut Set  NBOS eyes closed 30 sec with HHA prn   Hip Extension  Hand to Opposite Knee    Hip Adduction    Box Step  5x L/R   Hip IR   Noodle Stance     Hip ER  Stop/Go Gait    Fig 8's  Switch Gait      Foot on step bal 30 sec R/L         Seated: bilat Functional:    Ankle Pumps 30 Step up forward    Ankle circles 10 Step up lateral    Knee flex & ext 30 Step down    Hip Abd & Adduction 30 [place] squats    Bicycle  30 Crate Lifts    Add Set with ball 10 Lunges forward    LX stab with med ball throws  Lunges lateral    Ankle INV  Lunges retro    Ankle EV  Lower ab curl with noodle    Seated hip IR/ER as able 10 with assistance as tolerated Upper ab curl with ball      Med ball straight lifts      Med ball diagonal lifts      Foot on step reaching with ball, water to deck      Foot onto/off of step     Noodle:      Leg Press  Deep Water:    Noodle hang at wall  Jog    Noodle hang deep water  Jumping Jacks    Noodle Bicycle  Heel to toe      Hand to opposite knee      Cross country skier      Rocking Horse      Other Therapeutic Activities:  Pt was educated on PT POC, Diagnosis, Prognosis, pathomechanics as well as frequency and duration of scheduling future physical therapy appointments. Time was also taken on this day to answer all patient questions and participation in PT. Reviewed appointment policy in detail with patient and patient verbalized understanding. Home Exercise Program:  Patient was instructed in the following for HEP:     . Patient verbalized/demonstrated understanding and was issued written handout. Charges: Therapeutic Exercise and NMR EXR  [] (76114) Provided verbal/tactile cueing for activities related to strengthening, flexibility, endurance, ROM for improvements in LE, proximal hip, and core control with self care, mobility, lifting, ambulation.  [] (85347) Provided verbal/tactile cueing for activities related to improving balance, coordination, kinesthetic sense, posture, motor skill, proprioception  to assist with LE, proximal hip, and core control in self care, mobility, lifting, ambulation and eccentric single leg control.      NMR and Therapeutic Activities:    [] (20290 or 00079) Provided verbal/tactile cueing for activities related to improving balance, coordination, kinesthetic sense, posture, motor skill, proprioception and motor activation to allow for proper function of core, proximal hip and LE with self care and ADLs and functional mobility.   [] (71922) Gait Re-education- Provided training and instruction to the patient for proper LE, core and proximal hip recruitment and positioning and eccentric body weight control with ambulation re-education including up and down stairs     Home Exercise Program:    [x] (23084) Reviewed/Progressed HEP activities related to strengthening, flexibility, endurance, ROM of core, proximal hip and LE for functional self-care, mobility, lifting and ambulation/stair navigation   [] (05828)Reviewed/Progressed HEP activities related to improving balance, coordination, kinesthetic sense, posture, motor skill, proprioception of core, proximal hip and LE for self care, mobility, lifting, and ambulation/stair navigation      Manual Treatments:  PROM / STM / Oscillations-Mobs:  G-I, II, III, IV (PA's, Inf., Post.)  [] (96046) Provided manual therapy to mobilize LE, proximal hip and/or LS spine soft tissue/joints for the purpose of modulating pain, promoting relaxation,  increasing ROM, reducing/eliminating soft tissue swelling/inflammation/restriction, improving soft tissue extensibility and allowing for proper ROM for normal function with self care, mobility, lifting and ambulation. Individual Aquatic Therapy:  [x] (14819) Provided verbal and tactile cueing for strengthening, flexibility, ROM using the therapeutic properties of water (buoyancy, resistance) for improvements in core control, mobility and ambulation     Aquatic Group:  [x] (59092) Provided intermittent verbal and tactile cueing for strengthening, endurance, flexibility and ROM for 2-4 individuals that do not require one-on one patient contact by the therapist     Land Timed Code Treatment Minutes:    Land Total Treatment Minutes:       Individual Aquatic Minutes: 15   Aquatic Group Minutes: 30     [] EVAL (LOW) 60679 (typically 20 minutes face-to-face)  [] EVAL (MOD) 45334 (typically 30 minutes face-to-face)  [] EVAL (HIGH) 22200 (typically 45 minutes face-to-face)  [] RE-EVAL     [] VS(40119) x     [] Dry needle 1 or 2 Muscles (04462)  [] NMR (10348) x     [] Dry needle 3+ Muscles (46680)  [] Manual (84428) x     [] Ultrasound (90504) x  [] TA (84297) x  2   [] Mech Traction (79404)  [] ES(attended) (93084)     [] ES (un) (99920):   [] Vasopump (15108) [] Ionto (67332)   [x] Aquatic Ind (84695) x     [x] Aquatic Group (20659) x   [] Other:     Treatment/Activity Tolerance:  [] Patient tolerated treatment well [] Patient limited by fatigue  [x] Patient limited by pain  [] Patient limited by other medical complications  [x] Other:   7/15: Pain after aquatics: 3/10 Right hip, 0-1/10 LB  7/20: 2/10 after aquatics  7/27: 3-4/10 right hip after aquatics  8/3: 3/10 pain after aquatics   8/5: 3/10 hip pain after aquatics. Pt treatment 1:1 today. 8/10: 4/10 hip pain after aquatics. Discussed with pt continuing aquatics on her own using aquatic HEP provided by therapist. Encouraged pt to come as often as she would like. Suggested pt call therapist with any questions re: aquatic HEP, once she looks over the packet. Pt verbalized understanding and agreement. 8-12-21 Issued written aquatic HEP booklet,  [facility]  30 day pass. Reviewed aquatic exercise progression/precautions. Pt verbalized understanding. Prognosis: [x] Good [] Fair  [] Poor    Goals:   Patient stated goal: Some relief with movement  [] Progressing: [] Met: [] Not Met: [] Adjusted    Therapist goals for Patient:   Short Term Goals: To be achieved in: 2 weeks  1. Independent in HEP and progression per patient tolerance, in order to prevent re-injury. [] Progressing: [] Met: [] Not Met: [] Adjusted  2. Patient will have a decrease in pain to facilitate improvement in movement, function, and ADLs as indicated by Functional Deficits. [] Progressing: [] Met: [] Not Met: [] Adjusted    Long Term Goals: To be achieved in:  weeks  1. Disability index score of 20% or less for the LEFS to assist with reaching prior level of function. [] Progressing: [] Met: [] Not Met: [] Adjusted  2. Patient will demonstrate increased AROM to allow for proper joint functioning as indicated by patients Functional Deficits. [] Progressing: [] Met: [] Not Met: [] Adjusted  3.  Patient will demonstrate an increase in Strength to good proximal hip strength and control to demonstrated good R LE control during gait  [] Progressing: [] Met: [] Not Met: [] Adjusted  4. Patient will amb short distances at home without the cane  [] Progressing: [] Met: [] Not Met: [] Adjusted  5. Decrease pain to be able to move / transfer with less difficulty  [] Progressing: [] Met: [] Not Met: [] Adjusted     Patient Requires Follow-up: [x] Yes  [] No    Plan:   [x] Continue per plan of care [] Alter current plan (see comments)  [] Plan of care initiated [] Hold pending MD visit [x] Discharge    Plan for Next Session:       Electronically signed by:  Ezequiel Irby, PTA  [id number]      Note: If patient does not return for scheduled/recommended follow up visits, this note will serve as a discharge from care along with the most recent update on progress.

## 2021-08-25 ENCOUNTER — OFFICE VISIT (OUTPATIENT)
Dept: PAIN MANAGEMENT | Age: 46
End: 2021-08-25
Payer: MEDICAID

## 2021-08-25 VITALS
WEIGHT: 293 LBS | OXYGEN SATURATION: 97 % | BODY MASS INDEX: 56.87 KG/M2 | SYSTOLIC BLOOD PRESSURE: 142 MMHG | DIASTOLIC BLOOD PRESSURE: 92 MMHG | HEART RATE: 79 BPM | TEMPERATURE: 97.9 F

## 2021-08-25 DIAGNOSIS — I10 ESSENTIAL HYPERTENSION: ICD-10-CM

## 2021-08-25 DIAGNOSIS — G89.29 CHRONIC BILATERAL LOW BACK PAIN WITHOUT SCIATICA: ICD-10-CM

## 2021-08-25 DIAGNOSIS — M17.0 PRIMARY OSTEOARTHRITIS OF BOTH KNEES: ICD-10-CM

## 2021-08-25 DIAGNOSIS — M16.11 PRIMARY OSTEOARTHRITIS OF RIGHT HIP: ICD-10-CM

## 2021-08-25 DIAGNOSIS — M62.830 BACK SPASM: ICD-10-CM

## 2021-08-25 DIAGNOSIS — F32.A DEPRESSION, UNSPECIFIED DEPRESSION TYPE: ICD-10-CM

## 2021-08-25 DIAGNOSIS — E66.01 MORBID OBESITY WITH BMI OF 60.0-69.9, ADULT (HCC): ICD-10-CM

## 2021-08-25 DIAGNOSIS — G47.33 OSA TREATED WITH BIPAP: ICD-10-CM

## 2021-08-25 DIAGNOSIS — M54.50 CHRONIC BILATERAL LOW BACK PAIN WITHOUT SCIATICA: ICD-10-CM

## 2021-08-25 DIAGNOSIS — F51.01 PRIMARY INSOMNIA: ICD-10-CM

## 2021-08-25 DIAGNOSIS — G89.4 CHRONIC PAIN SYNDROME: ICD-10-CM

## 2021-08-25 PROCEDURE — G8427 DOCREV CUR MEDS BY ELIG CLIN: HCPCS | Performed by: NURSE PRACTITIONER

## 2021-08-25 PROCEDURE — 4004F PT TOBACCO SCREEN RCVD TLK: CPT | Performed by: NURSE PRACTITIONER

## 2021-08-25 PROCEDURE — G8417 CALC BMI ABV UP PARAM F/U: HCPCS | Performed by: NURSE PRACTITIONER

## 2021-08-25 PROCEDURE — 99213 OFFICE O/P EST LOW 20 MIN: CPT | Performed by: NURSE PRACTITIONER

## 2021-08-25 RX ORDER — METHOCARBAMOL 500 MG/1
500 TABLET, FILM COATED ORAL 2 TIMES DAILY
Qty: 60 TABLET | Refills: 0 | Status: SHIPPED | OUTPATIENT
Start: 2021-08-25 | End: 2021-09-22 | Stop reason: SDUPTHER

## 2021-08-25 RX ORDER — VENLAFAXINE HYDROCHLORIDE 75 MG/1
150 CAPSULE, EXTENDED RELEASE ORAL DAILY
Qty: 60 CAPSULE | Refills: 0 | Status: SHIPPED | OUTPATIENT
Start: 2021-08-25 | End: 2021-09-22 | Stop reason: SDUPTHER

## 2021-08-25 RX ORDER — HYDROCODONE BITARTRATE AND ACETAMINOPHEN 7.5; 325 MG/1; MG/1
1 TABLET ORAL EVERY 6 HOURS PRN
Qty: 120 TABLET | Refills: 0 | Status: SHIPPED | OUTPATIENT
Start: 2021-08-25 | End: 2021-09-22 | Stop reason: SDUPTHER

## 2021-08-25 RX ORDER — LIDOCAINE 50 MG/G
1 PATCH TOPICAL DAILY
Qty: 30 PATCH | Refills: 0 | Status: SHIPPED | OUTPATIENT
Start: 2021-08-25 | End: 2021-09-22 | Stop reason: SDUPTHER

## 2021-08-25 NOTE — PROGRESS NOTES
Richard Ramirez  1975  [id number]      HISTORY OF PRESENT ILLNESS:  Ms. Minna Parmar is a 50 y.o. female returns for a follow up visit for pain management  She has a diagnosis of   1. Chronic pain syndrome    2. Chronic bilateral low back pain without sciatica    3. Back spasm    4. Depression, unspecified depression type, mild    5. Primary osteoarthritis of both knees    6. Primary osteoarthritis of right hip    7. Primary insomnia    8. Morbid obesity with BMI of 60.0-69.9, adult ([facility] [address]    9. Essential hypertension    10. JOSE treated with BiPAP      On the Patients Pain Assessment form reviewed with the Medical Assistant:  She complains of pain in the both knee(s), bilateral lower back and right hip She rates the pain 5/10 and describes it as aching, stabbing. Current treatment regimen has helped relieve about 40% of the pain. She denies any side effects from the current pain regimen. Patient reports that since the last follow up visit the physical functioning is unchanged, family/social relationships are unchanged, mood is unchanged sleep patterns are better, and that the overall functioning is unchanged. Patient denies misusing/abusing her narcotic pain medications or using any illegal drugs. There are No indicators for possible drug abuse, addiction or diversion problems. Upon obtaining medical history from Ms. Minna Parmar states that pain is manageable on current pain therapy. Takes pain medications as prescribed. Completed aquatic PT last month. Mood is stable, sleep is fair with an average of 8 hours. Denies to having issues of constipation. Tolerating activities/house chores with moderate tenderness to the lower back. Working on smoking cessation    ALLERGIES: Patients list of allergies were reviewed     MEDICATIONS: Ms. Minna Parmar list of medications were reviewed. Her current medications are   Outpatient Medications Prior to Visit   Medication Sig Dispense Refill    torsemide (DEMADEX) 20 MG tablet low back pain without sciatica    3. Back spasm    4. Depression, unspecified depression type, mild    5. Primary osteoarthritis of both knees    6. Primary osteoarthritis of right hip    7. Primary insomnia    8. Morbid obesity with BMI of 60.0-69.9, adult ([facility] [address]    9. Essential hypertension    10. JOSE treated with BiPAP        PLAN:  Informed verbal consent was obtained  -Continue with Norco, Robaxin, Effexor, Lidocaine, Elavil  -Liang exercises/knee stretches recommended  -CBT techniques- relaxation therapies such as biofeedback, mindfulness based stress reduction, imagery, cognitive restructuring, problem solving discussed with patient  -Advised patient to quit smoking for  health related concerns and to improve the treatment outcomes. Education was given on quitting smoking and the use of different modalities including medications, hypnotherapy, counselling  and biofeedback. These were discussed with patient.  -Last UDS 3/4/21 Consistent  -Return in about 4 weeks (around 9/22/2021). Current Outpatient Medications   Medication Sig Dispense Refill    methocarbamol (ROBAXIN) 500 MG tablet Take 1 tablet by mouth 2 times daily 60 tablet 0    venlafaxine (EFFEXOR XR) 75 MG extended release capsule Take 2 capsules by mouth daily 60 capsule 0    lidocaine (LIDODERM) 5 % Place 1 patch onto the skin daily 12 hours on, 12 hours off. 30 patch 0    HYDROcodone-acetaminophen (NORCO) 7.5-325 MG per tablet Take 1 tablet by mouth every 6 hours as needed for Pain for up to 30 days. 120 tablet 0    torsemide (DEMADEX) 20 MG tablet TAKE TWO TABLETS BY MOUTH DAILY 60 tablet 11    methylPREDNISolone (MEDROL DOSEPACK) 4 MG tablet Take by mouth.  1 kit 0    metoprolol (LOPRESSOR) 100 MG tablet TAKE ONE TABLET BY MOUTH TWICE A DAY 60 tablet 3    TRULICITY 2.06 VN/1.8RD SOPN INJECT 0.75 MG UNDER THE SKIN ONCE WEEKLY 4 pen 3    cloNIDine (CATAPRES) 0.1 MG tablet TAKE THREE TABLETS BY MOUTH THREE TIMES A  tablet 3    oxybutynin (DITROPAN-XL) 5 MG extended release tablet TAKE ONE TABLET BY MOUTH DAILY 30 tablet 3    montelukast (SINGULAIR) 10 MG tablet TAKE ONE TABLET BY MOUTH ONCE NIGHTLY 30 tablet 3    amitriptyline (ELAVIL) 25 MG tablet TAKE ONE TABLET BY MOUTH ONCE NIGHTLY 30 tablet 1    albuterol sulfate  (90 Base) MCG/ACT inhaler INHALE TWO PUFFS BY MOUTH EVERY 6 HOURS AS NEEDED FOR WHEEZING 18 g 11    fluticasone-salmeterol (ADVAIR) 250-50 MCG/DOSE AEPB Takes 1 puff daily 2 Inhaler 11    metFORMIN (GLUCOPHAGE) 500 MG tablet TAKE ONE TABLET BY MOUTH TWICE A DAY WITH MEALS 180 tablet 1    spironolactone (ALDACTONE) 25 MG tablet TAKE TWO TABLETS BY MOUTH TWICE A  tablet 1    Blood Pressure Monitoring (B-D ASSURE BPM/AUTO ARM CUFF) MISC 1 Device by Does not apply route daily 1 each 0    varenicline (CHANTIX STARTING MONTH PAK) 0.5 MG X 11 & 1 MG X 42 tablet Take by mouth. 1 box 0    varenicline (CHANTIX CONTINUING MONTH PAK) 1 MG tablet Take 1 tablet by mouth 2 times daily 60 tablet 1    loratadine (CLARITIN) 10 MG tablet Take 1 tablet by mouth daily 30 tablet 1    NIFEdipine (PROCARDIA XL) 60 MG extended release tablet Take 1 tablet by mouth daily 30 tablet 5    Misc. Devices (RAISED TOILET SEAT) MISC Use daily as needed as directed 1 each 0    atorvastatin (LIPITOR) 20 MG tablet Take 1 tablet by mouth daily 90 tablet 3    albuterol-ipratropium (COMBIVENT RESPIMAT)  MCG/ACT AERS inhaler Inhale 1 puff into the lungs every 6 hours (Patient taking differently: Inhale 1 puff into the lungs every 6 hours Once daily) 1 Inhaler 5    omeprazole (PRILOSEC) 20 MG delayed release capsule Take 20 mg by mouth daily Indications: patient gets from [address] [place] [address]       acetaminophen (APAP EXTRA STRENGTH) 500 MG tablet Take 2 tablets by mouth every 6 hours as needed for Pain DO NOT TAKE WITH OTHER MEDICATIONS CONTAINING ACETAMINOPHEN.  30 tablet 0    Cholecalciferol (VITAMIN D3) 37729 UNITS CAPS Take 10,000 Units by mouth three times a week Takes on Mon Wed Fri        No current facility-administered medications for this visit. I will continue her current medication regimen  which is part of the above treatment schedule. It has been helping with Ms. Albaro Underwood chronic  medical problems which for this visit include:   Diagnoses of Chronic pain syndrome, Chronic bilateral low back pain without sciatica, Back spasm, Depression, unspecified depression type, mild, Primary osteoarthritis of both knees, Primary osteoarthritis of right hip, Primary insomnia, Morbid obesity with BMI of 60.0-69.9, adult ([address] Essential hypertension, and JOSE treated with BiPAP were pertinent to this visit. Risks and benefits of the medications and other alternative treatments  including no treatment were discussed with the patient. The common side effects of these medications were also explained to the patient. Informed verbal consent was obtained. Goals of current treatment regimen include improvement in pain, restoration of functioning- with focus on improvement in physical performance, general activity, work or disability,emotional distress, health care utilization and  decreased medication consumption. Will continue to monitor progress towards achieving/maintaining therapeutic goals with special emphasis on  1. Improvement in perceived interfernce  of pain with ADL's. Ability to do home exercises independently. Ability to do household chores indoor and/or outdoor work and social and leisure activities. Improve psychosocial and physical functioning. - she is showing progression towards this treatment goal with the current regimen. She was advised against drinking alcohol with the narcotic pain medicines, advised against driving or handling machinery while adjusting the dose of medicines or if having cognitive  issues related to the current medications. Risk of overdose and death, if medicines not taken as prescribed, were also discussed. If the patient develops new symptoms or if the symptoms worsen, the patient should call the office. While transcribing every attempt was made to maintain the accuracy of the note in terms of it's contents,there may have been some errors made inadvertently. Thank you for allowing me to participate in the care of this patient.     Ja Thrasher CNP    Cc: Amador Tapia MD

## 2021-08-25 NOTE — PATIENT INSTRUCTIONS
Patient Education        Back Stretches: Exercises  Introduction  Here are some examples of exercises for stretching your back. Start each exercise slowly. Ease off the exercise if you start to have pain. Your doctor or physical therapist will tell you when you can start these exercises and which ones will work best for you. How to do the exercises  Overhead stretch   1. Stand comfortably with your feet shoulder-width apart. 2. Looking straight ahead, raise both arms over your head and reach toward the ceiling. Do not allow your head to tilt back. 3. Hold for 15 to 30 seconds, then lower your arms to your sides. 4. Repeat 2 to 4 times. Side stretch   1. Stand comfortably with your feet shoulder-width apart. 2. Raise one arm over your head, and then lean to the other side. 3. Slide your hand down your leg as you let the weight of your arm gently stretch your side muscles. Hold for 15 to 30 seconds. 4. Repeat 2 to 4 times on each side. Press-up   1. Lie on your stomach, supporting your body with your forearms. 2. Press your elbows down into the floor to raise your upper back. As you do this, relax your stomach muscles and allow your back to arch without using your back muscles. As your press up, do not let your hips or pelvis come off the floor. 3. Hold for 15 to 30 seconds, then relax. 4. Repeat 2 to 4 times. Relax and rest   1. Lie on your back with a rolled towel under your neck and a pillow under your knees. Extend your arms comfortably to your sides. 2. Relax and breathe normally. 3. Remain in this position for about 10 minutes. 4. If you can, do this 2 or 3 times each day. Follow-up care is a key part of your treatment and safety. Be sure to make and go to all appointments, and call your doctor if you are having problems. It's also a good idea to know your test results and keep a list of the medicines you take. Where can you learn more? Go to [email or web address] org and sign in to your [employer] account. Enter R112 in the Powerset box to learn more about \"Back Stretches: Exercises. \"     If you do not have an account, please click on the \"Sign Up Now\" link. Current as of: November 16, 2020               Content Version: 12.9  © 4465-7928 [employer]wise, [employer]orporated. Care instructions adapted under license by [facility] ([facility]). If you have questions about a medical condition or this instruction, always ask your healthcare professional. [address] any warranty or liability for your use of this information.

## 2021-08-30 DIAGNOSIS — F51.01 PRIMARY INSOMNIA: ICD-10-CM

## 2021-08-30 DIAGNOSIS — G89.4 CHRONIC PAIN SYNDROME: ICD-10-CM

## 2021-08-30 RX ORDER — AMITRIPTYLINE HYDROCHLORIDE 25 MG/1
TABLET, FILM COATED ORAL
Qty: 30 TABLET | Refills: 1 | Status: SHIPPED | OUTPATIENT
Start: 2021-08-30 | End: 2021-10-01

## 2021-09-22 ENCOUNTER — OFFICE VISIT (OUTPATIENT)
Dept: PAIN MANAGEMENT | Age: 46
End: 2021-09-22
Payer: MEDICAID

## 2021-09-22 VITALS
BODY MASS INDEX: 50.02 KG/M2 | HEART RATE: 86 BPM | OXYGEN SATURATION: 95 % | WEIGHT: 293 LBS | DIASTOLIC BLOOD PRESSURE: 98 MMHG | HEIGHT: 64 IN | SYSTOLIC BLOOD PRESSURE: 184 MMHG

## 2021-09-22 DIAGNOSIS — M54.50 CHRONIC BILATERAL LOW BACK PAIN WITHOUT SCIATICA: ICD-10-CM

## 2021-09-22 DIAGNOSIS — G89.4 CHRONIC PAIN SYNDROME: ICD-10-CM

## 2021-09-22 DIAGNOSIS — M16.11 PRIMARY OSTEOARTHRITIS OF RIGHT HIP: ICD-10-CM

## 2021-09-22 DIAGNOSIS — G89.29 CHRONIC BILATERAL LOW BACK PAIN WITHOUT SCIATICA: ICD-10-CM

## 2021-09-22 DIAGNOSIS — M62.830 BACK SPASM: ICD-10-CM

## 2021-09-22 DIAGNOSIS — E66.01 MORBID OBESITY WITH BODY MASS INDEX OF 50.0-59.9 IN ADULT (HCC): ICD-10-CM

## 2021-09-22 DIAGNOSIS — F32.A DEPRESSION, UNSPECIFIED DEPRESSION TYPE: ICD-10-CM

## 2021-09-22 DIAGNOSIS — J44.9 COPD, MILD (HCC): ICD-10-CM

## 2021-09-22 DIAGNOSIS — M17.0 PRIMARY OSTEOARTHRITIS OF BOTH KNEES: ICD-10-CM

## 2021-09-22 PROCEDURE — G8417 CALC BMI ABV UP PARAM F/U: HCPCS | Performed by: NURSE PRACTITIONER

## 2021-09-22 PROCEDURE — 3023F SPIROM DOC REV: CPT | Performed by: NURSE PRACTITIONER

## 2021-09-22 PROCEDURE — 99213 OFFICE O/P EST LOW 20 MIN: CPT | Performed by: NURSE PRACTITIONER

## 2021-09-22 PROCEDURE — 4004F PT TOBACCO SCREEN RCVD TLK: CPT | Performed by: NURSE PRACTITIONER

## 2021-09-22 PROCEDURE — G8926 SPIRO NO PERF OR DOC: HCPCS | Performed by: NURSE PRACTITIONER

## 2021-09-22 PROCEDURE — G8428 CUR MEDS NOT DOCUMENT: HCPCS | Performed by: NURSE PRACTITIONER

## 2021-09-22 RX ORDER — HYDROCODONE BITARTRATE AND ACETAMINOPHEN 7.5; 325 MG/1; MG/1
1 TABLET ORAL EVERY 6 HOURS PRN
Qty: 120 TABLET | Refills: 0 | Status: SHIPPED | OUTPATIENT
Start: 2021-09-22 | End: 2021-10-20 | Stop reason: SDUPTHER

## 2021-09-22 RX ORDER — METHOCARBAMOL 500 MG/1
500 TABLET, FILM COATED ORAL 2 TIMES DAILY
Qty: 60 TABLET | Refills: 0 | Status: SHIPPED | OUTPATIENT
Start: 2021-09-22 | End: 2021-10-20 | Stop reason: SDUPTHER

## 2021-09-22 RX ORDER — VENLAFAXINE HYDROCHLORIDE 75 MG/1
150 CAPSULE, EXTENDED RELEASE ORAL DAILY
Qty: 60 CAPSULE | Refills: 0 | Status: SHIPPED | OUTPATIENT
Start: 2021-09-22 | End: 2021-10-20 | Stop reason: SDUPTHER

## 2021-09-22 RX ORDER — LIDOCAINE 50 MG/G
1 PATCH TOPICAL DAILY
Qty: 30 PATCH | Refills: 0 | Status: SHIPPED | OUTPATIENT
Start: 2021-09-22 | End: 2021-10-20 | Stop reason: SDUPTHER

## 2021-09-22 NOTE — PROGRESS NOTES
Ace Kendrick  1975  [id number]      HISTORY OF PRESENT ILLNESS:  Ms. Viky Mcclelland is a 48 y.o. female returns for a follow up visit for pain management  She has a diagnosis of   1. Chronic pain syndrome    2. Chronic bilateral low back pain without sciatica    3. Back spasm    4. Primary osteoarthritis of both knees    5. Primary osteoarthritis of right hip    6. Morbid obesity with body mass index of 50.0-59.9 in adult ([facility] [address]    7. COPD, mild ([facility] [address]    8. Depression, unspecified depression type, mild      On the Patients Pain Assessment form reviewed with the Medical Assistant:  She complains of pain in the lower back, right hip and bilateral knees. She rates the pain 6/10 and describes it as aching, pins and needles. Current treatment regimen has helped relieve about 30% of the pain. She denies any side effects from the current pain regimen. Patient reports that since the last follow up visit the physical functioning is unchanged, family/social relationships are unchanged, mood is better sleep patterns are unchanged, and that the overall functioning is unchanged. Patient denies misusing/abusing her narcotic pain medications or using any illegal drugs. Upon obtaining medical history from Ms. Viky Mcclelland states that pain is manageable on current pain therapy. Takes pain medications as prescribed. Mood is stable without anxiety. Sleep is fair with an average of 5-6 hours. Denies to having issues of constipation. Tolerating activities/house chores with moderate tenderness to the lower back, knees. Working on smoking cessation    ALLERGIES: Patients list of allergies were reviewed     MEDICATIONS: Ms. Viky Mcclelland list of medications were reviewed. Her current medications are   Outpatient Medications Prior to Visit   Medication Sig Dispense Refill    amitriptyline (ELAVIL) 25 MG tablet TAKE ONE TABLET BY MOUTH ONCE NIGHTLY 30 tablet 1    torsemide (DEMADEX) 20 MG tablet TAKE TWO TABLETS BY MOUTH DAILY 60 tablet 11    methylPREDNISolone (MEDROL DOSEPACK) 4 MG tablet Take by mouth. 1 kit 0    metoprolol (LOPRESSOR) 100 MG tablet TAKE ONE TABLET BY MOUTH TWICE A DAY 60 tablet 3    TRULICITY 4.90 GQ/1.1MW SOPN INJECT 0.75 MG UNDER THE SKIN ONCE WEEKLY 4 pen 3    cloNIDine (CATAPRES) 0.1 MG tablet TAKE THREE TABLETS BY MOUTH THREE TIMES A  tablet 3    oxybutynin (DITROPAN-XL) 5 MG extended release tablet TAKE ONE TABLET BY MOUTH DAILY 30 tablet 3    montelukast (SINGULAIR) 10 MG tablet TAKE ONE TABLET BY MOUTH ONCE NIGHTLY 30 tablet 3    albuterol sulfate  (90 Base) MCG/ACT inhaler INHALE TWO PUFFS BY MOUTH EVERY 6 HOURS AS NEEDED FOR WHEEZING 18 g 11    fluticasone-salmeterol (ADVAIR) 250-50 MCG/DOSE AEPB Takes 1 puff daily 2 Inhaler 11    metFORMIN (GLUCOPHAGE) 500 MG tablet TAKE ONE TABLET BY MOUTH TWICE A DAY WITH MEALS 180 tablet 1    spironolactone (ALDACTONE) 25 MG tablet TAKE TWO TABLETS BY MOUTH TWICE A  tablet 1    Blood Pressure Monitoring (B-D ASSURE BPM/AUTO ARM CUFF) MISC 1 Device by Does not apply route daily 1 each 0    varenicline (CHANTIX STARTING MONTH PAK) 0.5 MG X 11 & 1 MG X 42 tablet Take by mouth. 1 box 0    varenicline (CHANTIX CONTINUING MONTH PAK) 1 MG tablet Take 1 tablet by mouth 2 times daily 60 tablet 1    loratadine (CLARITIN) 10 MG tablet Take 1 tablet by mouth daily 30 tablet 1    NIFEdipine (PROCARDIA XL) 60 MG extended release tablet Take 1 tablet by mouth daily 30 tablet 5    Misc.  Devices (RAISED TOILET SEAT) MISC Use daily as needed as directed 1 each 0    atorvastatin (LIPITOR) 20 MG tablet Take 1 tablet by mouth daily 90 tablet 3    albuterol-ipratropium (COMBIVENT RESPIMAT)  MCG/ACT AERS inhaler Inhale 1 puff into the lungs every 6 hours (Patient taking differently: Inhale 1 puff into the lungs every 6 hours Once daily) 1 Inhaler 5    omeprazole (PRILOSEC) 20 MG delayed release capsule Take 20 mg by mouth daily Indications: [facility]       acetaminophen (APAP EXTRA STRENGTH) 500 MG tablet Take 2 tablets by mouth every 6 hours as needed for Pain DO NOT TAKE WITH OTHER MEDICATIONS CONTAINING ACETAMINOPHEN. 30 tablet 0    Cholecalciferol (VITAMIN D3) 09815 UNITS CAPS Take 10,000 Units by mouth three times a week Takes on Mon Wed Fri       methocarbamol (ROBAXIN) 500 MG tablet Take 1 tablet by mouth 2 times daily 60 tablet 0    venlafaxine (EFFEXOR XR) 75 MG extended release capsule Take 2 capsules by mouth daily 60 capsule 0    lidocaine (LIDODERM) 5 % Place 1 patch onto the skin daily 12 hours on, 12 hours off. 30 patch 0    HYDROcodone-acetaminophen (NORCO) 7.5-325 MG per tablet Take 1 tablet by mouth every 6 hours as needed for Pain for up to 30 days. 120 tablet 0     No facility-administered medications prior to visit. SOCIAL/FAMILY/PAST MEDICAL HISTORY: Ms. Richards Or, family and past medical history was reviewed. REVIEW OF SYSTEMS:    Respiratory: Negative for apnea, chest tightness and shortness of breath or change in baseline breathing. Gastrointestinal: Negative for nausea, vomiting, abdominal pain, diarrhea, constipation, blood in stool and abdominal distention. PHYSICAL EXAM:   Nursing note and vitals reviewed. BP (!) 184/98   Pulse 86   Ht 5' 4\" (1.626 m)   Wt (!) 354 lb (160.6 kg)   LMP  (LMP Unknown)   SpO2 95%   BMI 60.76 kg/m²   Constitutional: She appears well-developed and well-nourished. No acute distress. Skin: Skin is warm and dry, good turgor. No rash noted. She is not diaphoretic. Cardiovascular: Normal rate, regular rhythm, normal heart sounds, and does not have murmur. Pulmonary/Chest: Effort normal. No respiratory distress. She does not have wheezes in the lung fields. She has no rales. Neurological/Psychiatric:She is alert and oriented to person, place, and time.  Coordination is  Normal. Stable gait with the aid of a cane  Her mood isAppropriate and affect is Neutral/Euthymic(normal) . IMPRESSION:   1. Chronic pain syndrome    2. Chronic bilateral low back pain without sciatica    3. Back spasm    4. Primary osteoarthritis of both knees    5. Primary osteoarthritis of right hip    6. Morbid obesity with body mass index of 50.0-59.9 in adult ([facility] [address]    7. COPD, mild ([facility] [address]    8. Depression, unspecified depression type, mild        PLAN:  Informed verbal consent was obtained  -Continue with Norco, Robaxin, Effexor, Lidocaine, Elavil  -Liang exercises/knee stretches  -CBT techniques- relaxation therapies such as biofeedback, mindfulness based stress reduction, imagery, cognitive restructuring, problem solving discussed with patient  -Monitor BP, f/u with PCP if it continues to stay elevated or she becomes symptomatic with SOB, CP, syncopy. she is currently asymptomatic  -She was advised weight reduction, diet changes- 800-1200 fior diet, diet diary, exercising, nutritional  consult increased physical activity as tolerated  -Advised patient to quit smoking for  health related concerns and to improve the treatment outcomes. Education was given on quitting smoking and the use of different modalities including medications, hypnotherapy, counselling  and biofeedback. These were discussed with patient.  -Last UDS 4/7/21 Consistent  -Return in about 4 weeks (around 10/20/2021). -[place] was obtained and reviewed  for the last one year and no indicators of drug misuse  were found. Any other controlled substance prescriptions  seen on the record have been accounted for, I am aware of the patient receiving these medications. Khloe Seth [place] record will be rechecked as part of office protocol.      Current Outpatient Medications   Medication Sig Dispense Refill    methocarbamol (ROBAXIN) 500 MG tablet Take 1 tablet by mouth 2 times daily 60 tablet 0    venlafaxine (EFFEXOR XR) 75 MG extended release capsule Take 2 capsules by mouth daily 60 capsule 0    lidocaine (LIDODERM) 5 % Place 1 patch onto the skin daily 12 hours on, 12 hours off. 30 patch 0    HYDROcodone-acetaminophen (NORCO) 7.5-325 MG per tablet Take 1 tablet by mouth every 6 hours as needed for Pain for up to 30 days. 120 tablet 0    amitriptyline (ELAVIL) 25 MG tablet TAKE ONE TABLET BY MOUTH ONCE NIGHTLY 30 tablet 1    torsemide (DEMADEX) 20 MG tablet TAKE TWO TABLETS BY MOUTH DAILY 60 tablet 11    methylPREDNISolone (MEDROL DOSEPACK) 4 MG tablet Take by mouth. 1 kit 0    metoprolol (LOPRESSOR) 100 MG tablet TAKE ONE TABLET BY MOUTH TWICE A DAY 60 tablet 3    TRULICITY 0.84 US/9.9AZ SOPN INJECT 0.75 MG UNDER THE SKIN ONCE WEEKLY 4 pen 3    cloNIDine (CATAPRES) 0.1 MG tablet TAKE THREE TABLETS BY MOUTH THREE TIMES A  tablet 3    oxybutynin (DITROPAN-XL) 5 MG extended release tablet TAKE ONE TABLET BY MOUTH DAILY 30 tablet 3    montelukast (SINGULAIR) 10 MG tablet TAKE ONE TABLET BY MOUTH ONCE NIGHTLY 30 tablet 3    albuterol sulfate  (90 Base) MCG/ACT inhaler INHALE TWO PUFFS BY MOUTH EVERY 6 HOURS AS NEEDED FOR WHEEZING 18 g 11    fluticasone-salmeterol (ADVAIR) 250-50 MCG/DOSE AEPB Takes 1 puff daily 2 Inhaler 11    metFORMIN (GLUCOPHAGE) 500 MG tablet TAKE ONE TABLET BY MOUTH TWICE A DAY WITH MEALS 180 tablet 1    spironolactone (ALDACTONE) 25 MG tablet TAKE TWO TABLETS BY MOUTH TWICE A  tablet 1    Blood Pressure Monitoring (B-D ASSURE BPM/AUTO ARM CUFF) MISC 1 Device by Does not apply route daily 1 each 0    varenicline (CHANTIX STARTING MONTH PAK) 0.5 MG X 11 & 1 MG X 42 tablet Take by mouth. 1 box 0    varenicline (CHANTIX CONTINUING MONTH BRI) 1 MG tablet Take 1 tablet by mouth 2 times daily 60 tablet 1    loratadine (CLARITIN) 10 MG tablet Take 1 tablet by mouth daily 30 tablet 1    NIFEdipine (PROCARDIA XL) 60 MG extended release tablet Take 1 tablet by mouth daily 30 tablet 5    Misc.  Devices (RAISED TOILET SEAT) MISC Use daily as needed as directed 1 each 0    atorvastatin (LIPITOR) 20 MG tablet Take 1 tablet by mouth daily 90 tablet 3    albuterol-ipratropium (COMBIVENT RESPIMAT)  MCG/ACT AERS inhaler Inhale 1 puff into the lungs every 6 hours (Patient taking differently: Inhale 1 puff into the lungs every 6 hours Once daily) 1 Inhaler 5    omeprazole (PRILOSEC) 20 MG delayed release capsule Take 20 mg by mouth daily Indications: [facility] gets from [facility]       acetaminophen (APAP EXTRA STRENGTH) 500 MG tablet Take 2 tablets by mouth every 6 hours as needed for Pain DO NOT TAKE WITH OTHER MEDICATIONS CONTAINING ACETAMINOPHEN. 30 tablet 0    Cholecalciferol (VITAMIN D3) 64426 UNITS CAPS Take 10,000 Units by mouth three times a week Takes on Mon Wed Fri        No current facility-administered medications for this visit. I will continue her current medication regimen  which is part of the above treatment schedule. It has been helping with Ms. Rivera Fears chronic  medical problems which for this visit include:   Diagnoses of Chronic pain syndrome, Chronic bilateral low back pain without sciatica, Back spasm, Primary osteoarthritis of both knees, Primary osteoarthritis of right hip, Morbid obesity with body mass index of 50.0-59.9 in [facility]), COPD, mild ([facility] [address] and Depression, unspecified depression type, mild were pertinent to this visit. Risks and benefits of the medications and other alternative treatments  including no treatment were discussed with the patient. The common side effects of these medications were also explained to the patient. Informed verbal consent was obtained. Goals of current treatment regimen include improvement in pain, restoration of functioning- with focus on improvement in physical performance, general activity, work or disability,emotional distress, health care utilization and  decreased medication consumption. Will continue to monitor progress towards achieving/maintaining therapeutic goals with special emphasis on  1.

## 2021-09-22 NOTE — PATIENT INSTRUCTIONS
Patient Education        Back Stretches: Exercises  Introduction  Here are some examples of exercises for stretching your back. Start each exercise slowly. Ease off the exercise if you start to have pain. Your doctor or physical therapist will tell you when you can start these exercises and which ones will work best for you. How to do the exercises  Overhead stretch   1. Stand comfortably with your feet shoulder-width apart. 2. Looking straight ahead, raise both arms over your head and reach toward the ceiling. Do not allow your head to tilt back. 3. Hold for 15 to 30 seconds, then lower your arms to your sides. 4. Repeat 2 to 4 times. Side stretch   1. Stand comfortably with your feet shoulder-width apart. 2. Raise one arm over your head, and then lean to the other side. 3. Slide your hand down your leg as you let the weight of your arm gently stretch your side muscles. Hold for 15 to 30 seconds. 4. Repeat 2 to 4 times on each side. Press-up   1. Lie on your stomach, supporting your body with your forearms. 2. Press your elbows down into the floor to raise your upper back. As you do this, relax your stomach muscles and allow your back to arch without using your back muscles. As your press up, do not let your hips or pelvis come off the floor. 3. Hold for 15 to 30 seconds, then relax. 4. Repeat 2 to 4 times. Relax and rest   1. Lie on your back with a rolled towel under your neck and a pillow under your knees. Extend your arms comfortably to your sides. 2. Relax and breathe normally. 3. Remain in this position for about 10 minutes. 4. If you can, do this 2 or 3 times each day. Follow-up care is a key part of your treatment and safety. Be sure to make and go to all appointments, and call your doctor if you are having problems. It's also a good idea to know your test results and keep a list of the medicines you take. Where can you learn more? Go to https://margo.[email or web address] org and sign in to your [employer] [facility] account. Enter H067 in the [employer] box to learn more about \"Back Stretches: Exercises. \"     If you do not have an account, please click on the \"Sign Up Now\" link. Current as of: November 16, 2020               Content Version: 12.9  © 1437-9405 Healthwise, Incorporated. Care instructions adapted under license by [facility] ([facility]). If you have questions about a medical condition or this instruction, always ask your healthcare professional. [address] any warranty or liability for your use of this information.

## 2021-09-27 RX ORDER — NIFEDIPINE 60 MG/1
TABLET, EXTENDED RELEASE ORAL
Qty: 30 TABLET | Refills: 0 | Status: SHIPPED | OUTPATIENT
Start: 2021-09-27 | End: 2021-10-01

## 2021-09-27 NOTE — TELEPHONE ENCOUNTER
Last OV: 3/26/21  Next OV: 10/1/21  Last refill:3/26/21  Most recent Labs: 3/26/21  Last EKG (if needed):3/26/21

## 2021-09-29 NOTE — PROGRESS NOTES
[facility]  Cardiology Progress Note      Beatrice Landau  1975, 48 y.o.    CC: \"  Medicine makes me dizzy. \"            Cathi Sanders MD:    HPI:   This is a 48 y.o. female with a PMH significant for COPD, chronic diastolic heart failure, Hypertension, Hyperlipidemia, COPD and JOSE on BiPAP (managed by pulmonology), tobacco abuse and Type II Diabetes Mellitus. Today, patient returns in follow up. She self stopped Nifedipine d/t feelings of dizziness. Also says, she only takes Torsemide when she is at home due to frequent urination. Feels her blood pressure is better controled. Past Medical History:   Diagnosis Date    Anxiety     Arthritis     Asthma     Back pain     CHF (congestive heart failure) (HCC)     COPD (chronic obstructive pulmonary disease) (HCC)     Depression     Diabetes mellitus (HCC)     GERD (gastroesophageal reflux disease)     Hyperlipidemia     Hypertension     Obesity     Sleep apnea       Past Surgical History:   Procedure Laterality Date    CHOLECYSTECTOMY      HYSTERECTOMY      SKIN BIOPSY      TUBAL LIGATION      UPPER GASTROINTESTINAL ENDOSCOPY N/A 10/14/2019    EGD BIOPSY performed by Eduar Miller DO at [facility] ENDOSCOPY      Family History   Problem Relation Age of Onset    Breast Cancer Maternal Aunt       Social History     Tobacco Use    Smoking status: Current Every Day Smoker     Packs/day: 0.25     Years: 20.00     Pack years: 5.00     Types: Cigarettes     Start date: 1985     Last attempt to quit: 2020     Years since quittin.5    Smokeless tobacco: Former User    Tobacco comment: working on it   Vaping Use    Vaping Use: Never used   Substance Use Topics    Alcohol use:  Yes     Alcohol/week: 1.0 standard drinks     Types: 1 Glasses of wine per week     Comment: rare    Drug use: No     Allergies   Allergen Reactions    Latex          Review of Systems -   Constitutional: Negative for weight gain/loss; malaise, fever  Respiratory: Negative for Asthma;  cough and hemoptysis  Cardiovascular: Negative for palpitations,dizziness   Gastrointestinal: Negative for abd.pain; constipation/diarrhea;    Genitourinary: Negative for stones; hematuria; frequency hesitancy  Integumentt: Negative for rash or pruritis  Hematologic/lymphatic: Negative for blood dyscrasia; leukemia/lymphoma  Musculoskeletal: Negative for Connective tissue disease  Neurological:  Negative for Seizure   Behavioral/Psych:Negative for Bipolar disorder, Schizophrenia; Dementia  Endocrine: negative for thyroid, parathyroid disease    Physical Examination:       Vitals:    10/01/21 1454   BP: (!) 142/82   Pulse: 68   SpO2: 100%   Weight: (!) 350 lb (158.8 kg)   Height: 5' 4\" (1.626 m)       HEENT:  Face: Atraumatic, Conjunctiva: Pink; non icteric,  Mucous Memb:  Moist, No thyromegaly or Lymphadenopathy  Respiratory:  Resp Assessment: normal, Resp Auscultation: clear  Cardiovascular: Auscultation: nl S1 & S2, Palpation:  Nl PMI; No heaves or thrills, JVP:  normal  Abdomen: Soft, non-tender, Normal bowel sounds,  No organomegaly  Extremities: No Cyanosis or Clubbing  Neurological: Oriented to time, place, and person, Non-anxious  Psychiatric: Normal mood and affect  Skin: Warm and dry,  No rash seen     Current Outpatient Medications   Medication Sig Dispense Refill    Dulaglutide 1.5 MG/0.5ML SOPN Inject 1.5 mg into the skin once a week 4 pen 5    nicotine (NICODERM CQ) 14 MG/24HR Place 1 patch onto the skin daily for 14 days 28 patch 5    methocarbamol (ROBAXIN) 500 MG tablet Take 1 tablet by mouth 2 times daily 60 tablet 0    venlafaxine (EFFEXOR XR) 75 MG extended release capsule Take 2 capsules by mouth daily 60 capsule 0    lidocaine (LIDODERM) 5 % Place 1 patch onto the skin daily 12 hours on, 12 hours off. 30 patch 0    HYDROcodone-acetaminophen (NORCO) 7.5-325 MG per tablet Take 1 tablet by mouth every 6 hours as needed for Pain for up to 30 days.  106 Lisa Mcknight tablet 0    torsemide (DEMADEX) 20 MG tablet TAKE TWO TABLETS BY MOUTH DAILY 60 tablet 11    metoprolol (LOPRESSOR) 100 MG tablet TAKE ONE TABLET BY MOUTH TWICE A DAY 60 tablet 3    cloNIDine (CATAPRES) 0.1 MG tablet TAKE THREE TABLETS BY MOUTH THREE TIMES A  tablet 3    oxybutynin (DITROPAN-XL) 5 MG extended release tablet TAKE ONE TABLET BY MOUTH DAILY 30 tablet 3    montelukast (SINGULAIR) 10 MG tablet TAKE ONE TABLET BY MOUTH ONCE NIGHTLY 30 tablet 3    albuterol sulfate  (90 Base) MCG/ACT inhaler INHALE TWO PUFFS BY MOUTH EVERY 6 HOURS AS NEEDED FOR WHEEZING 18 g 11    fluticasone-salmeterol (ADVAIR) 250-50 MCG/DOSE AEPB Takes 1 puff daily 2 Inhaler 11    metFORMIN (GLUCOPHAGE) 500 MG tablet TAKE ONE TABLET BY MOUTH TWICE A DAY WITH MEALS 180 tablet 1    spironolactone (ALDACTONE) 25 MG tablet TAKE TWO TABLETS BY MOUTH TWICE A  tablet 1    Blood Pressure Monitoring (B-D ASSURE BPM/AUTO ARM CUFF) MISC 1 Device by Does not apply route daily 1 each 0    loratadine (CLARITIN) 10 MG tablet Take 1 tablet by mouth daily 30 tablet 1    Misc. Devices (RAISED TOILET SEAT) MISC Use daily as needed as directed 1 each 0    atorvastatin (LIPITOR) 20 MG tablet Take 1 tablet by mouth daily 90 tablet 3    omeprazole (PRILOSEC) 20 MG delayed release capsule Take 20 mg by mouth daily Indications: patient gets from [address] [facility] acetaminophen (APAP EXTRA STRENGTH) 500 MG tablet Take 2 tablets by mouth every 6 hours as needed for Pain DO NOT TAKE WITH OTHER MEDICATIONS CONTAINING ACETAMINOPHEN. 30 tablet 0     No current facility-administered medications for this visit. Labs:   Lab Results   Component Value Date    HDL 74 09/17/2019    LDLCALC 102 09/17/2019    TRIG 110 09/17/2019       ECHO:12/5/2017  Concentric LVH with normal systolic function. EF is  60%  Left atrium is of normal size.   Right ventricular systolic function is normal .    ASSESSMENT AND PLAN:    Essential Hypertension  BP is better controlled today   She checks BP at home averaging 404'F systolic   Stopped Nifedipine d/t dizziness. Will instead prescribe Chlorthalidone 25 mg daily  Repeat labs in 2-3 weeks. Again, reinforced low sodium diet     Chronic Diastolic Heart Failure  NYHA Class II  ECHO shows EF of 60%  Weight is stable   SOB is stable; has not increased   Continue medical management; aldactone, torsemide, metoprolol and ACE-I    JOSE  Non-compliant with wearing Bipap     Hyperlipidemia  On statin therapy     DM  Managed by PCP  On Metformin and ACE-I      Follow up in 6 months. Thank you very much for allowing me to participate in the care of your patient. Please do not hesitate to contact me if you have any questions. Sincerely,    Corky Baumgarten M.D  [facility], [address] [place] [address]  Ph: [phone]  Fax: [phone]    This note was scribed in the presence of Dr. Corky Baumgarten, MD by [facility]  Physician Attestation:  The scribes documentation has been prepared under my direction and personally reviewed by me in its entirety. I confirm that the note above accurately reflects all work, treatment, procedures, and medical decision making performed by me.

## 2021-10-01 ENCOUNTER — OFFICE VISIT (OUTPATIENT)
Dept: CARDIOLOGY CLINIC | Age: 46
End: 2021-10-01
Payer: MEDICAID

## 2021-10-01 ENCOUNTER — OFFICE VISIT (OUTPATIENT)
Dept: FAMILY MEDICINE CLINIC | Age: 46
End: 2021-10-01
Payer: MEDICAID

## 2021-10-01 VITALS
HEIGHT: 64 IN | WEIGHT: 293 LBS | RESPIRATION RATE: 18 BRPM | HEART RATE: 71 BPM | DIASTOLIC BLOOD PRESSURE: 79 MMHG | BODY MASS INDEX: 50.02 KG/M2 | OXYGEN SATURATION: 99 % | SYSTOLIC BLOOD PRESSURE: 140 MMHG

## 2021-10-01 VITALS
HEART RATE: 68 BPM | DIASTOLIC BLOOD PRESSURE: 82 MMHG | SYSTOLIC BLOOD PRESSURE: 142 MMHG | OXYGEN SATURATION: 100 % | WEIGHT: 293 LBS | BODY MASS INDEX: 50.02 KG/M2 | HEIGHT: 64 IN

## 2021-10-01 DIAGNOSIS — Z71.6 TOBACCO ABUSE COUNSELING: ICD-10-CM

## 2021-10-01 DIAGNOSIS — E78.5 HYPERLIPIDEMIA, UNSPECIFIED HYPERLIPIDEMIA TYPE: ICD-10-CM

## 2021-10-01 DIAGNOSIS — I10 ESSENTIAL HYPERTENSION: ICD-10-CM

## 2021-10-01 DIAGNOSIS — G47.33 OSA (OBSTRUCTIVE SLEEP APNEA): ICD-10-CM

## 2021-10-01 DIAGNOSIS — E66.01 MORBID OBESITY (HCC): ICD-10-CM

## 2021-10-01 DIAGNOSIS — E66.9 DIABETES MELLITUS TYPE 2 IN OBESE (HCC): Primary | ICD-10-CM

## 2021-10-01 DIAGNOSIS — Z86.39 H/O DIABETES MELLITUS: ICD-10-CM

## 2021-10-01 DIAGNOSIS — I10 ESSENTIAL HYPERTENSION: Primary | ICD-10-CM

## 2021-10-01 DIAGNOSIS — E11.69 DIABETES MELLITUS TYPE 2 IN OBESE (HCC): Primary | ICD-10-CM

## 2021-10-01 DIAGNOSIS — I50.32 CHRONIC DIASTOLIC HEART FAILURE (HCC): ICD-10-CM

## 2021-10-01 LAB — HBA1C MFR BLD: 5.6 %

## 2021-10-01 PROCEDURE — 4004F PT TOBACCO SCREEN RCVD TLK: CPT | Performed by: FAMILY MEDICINE

## 2021-10-01 PROCEDURE — G8484 FLU IMMUNIZE NO ADMIN: HCPCS | Performed by: FAMILY MEDICINE

## 2021-10-01 PROCEDURE — 99214 OFFICE O/P EST MOD 30 MIN: CPT | Performed by: INTERNAL MEDICINE

## 2021-10-01 PROCEDURE — 4004F PT TOBACCO SCREEN RCVD TLK: CPT | Performed by: INTERNAL MEDICINE

## 2021-10-01 PROCEDURE — G8417 CALC BMI ABV UP PARAM F/U: HCPCS | Performed by: FAMILY MEDICINE

## 2021-10-01 PROCEDURE — G8427 DOCREV CUR MEDS BY ELIG CLIN: HCPCS | Performed by: FAMILY MEDICINE

## 2021-10-01 PROCEDURE — 2022F DILAT RTA XM EVC RTNOPTHY: CPT | Performed by: FAMILY MEDICINE

## 2021-10-01 PROCEDURE — G8484 FLU IMMUNIZE NO ADMIN: HCPCS | Performed by: INTERNAL MEDICINE

## 2021-10-01 PROCEDURE — 83036 HEMOGLOBIN GLYCOSYLATED A1C: CPT | Performed by: FAMILY MEDICINE

## 2021-10-01 PROCEDURE — G8417 CALC BMI ABV UP PARAM F/U: HCPCS | Performed by: INTERNAL MEDICINE

## 2021-10-01 PROCEDURE — 99214 OFFICE O/P EST MOD 30 MIN: CPT | Performed by: FAMILY MEDICINE

## 2021-10-01 PROCEDURE — 3044F HG A1C LEVEL LT 7.0%: CPT | Performed by: FAMILY MEDICINE

## 2021-10-01 PROCEDURE — G8427 DOCREV CUR MEDS BY ELIG CLIN: HCPCS | Performed by: INTERNAL MEDICINE

## 2021-10-01 RX ORDER — NICOTINE 21 MG/24HR
1 PATCH, TRANSDERMAL 24 HOURS TRANSDERMAL DAILY
Qty: 28 PATCH | Refills: 5 | Status: SHIPPED | OUTPATIENT
Start: 2021-10-01 | End: 2022-03-22

## 2021-10-01 RX ORDER — CHLORTHALIDONE 25 MG/1
25 TABLET ORAL DAILY
Qty: 30 TABLET | Refills: 3 | Status: SHIPPED | OUTPATIENT
Start: 2021-10-01 | End: 2022-02-07

## 2021-10-01 NOTE — PATIENT INSTRUCTIONS

## 2021-10-01 NOTE — PROGRESS NOTES
10/1/2021    This is a 48 y.o. female   Chief Complaint   Patient presents with    Hypertension     Follow up for hypertension, Pt said she is taking her medicine and her blood pressure at home. Pt said when she first wake up its around 199/101, then after meds its 140/90    Dizziness     Pt said she has been getting dizzy lately. HPI   Diabetes  Lab Results   Component Value Date    LABA1C 5.8 04/08/2021     Lab Results   Component Value Date    .2 09/17/2019   A1c today 5.6  She remains on metformin. No longer on Trulicity due to insurance coverage     BP Readings from Last 3 Encounters:   10/01/21 (!) 140/79   09/30/21 (!) 193/110   09/22/21 (!) 184/98   reports elevated BPs noted above were after not taking BP medication.     Chronic HFpEF  - stable on current medications. SOB and swelling are stable   - she is on torsemide  - sees Cardiology later today     Urge incontinence  - notes improvement on oxybutynin. Main issue is getting to the bathroom due to her limited mobility from chronic pain and obesity  - requires supplies for this     Mobility / Chronic pain / Obesity  - continues to follow with pain management  - she ambulates with a cane  - her pain is primarily in her R hip and lower back. She has had a hip injection in the past  - she was doing aquatic therapy previously   - she has established with the [address] [place] [address], Dr. Deisy Bruce - hasn't been back this year     Tobacco  - had quit for a while. Restarted at beginning of year with some family stress. Has tried patches in the past, Chantix, and Wellbutrin.  Last visit tried Chantix again but it gave her nightmares.     Review of Systems     Current Outpatient Medications   Medication Sig Dispense Refill    Dulaglutide 1.5 MG/0.5ML SOPN Inject 1.5 mg into the skin once a week 4 pen 5    nicotine (NICODERM CQ) 14 MG/24HR Place 1 patch onto the skin daily for 14 days 28 patch 5    NIFEdipine (PROCARDIA XL) 60 MG extended release tablet TAKE ONE TABLET BY MOUTH DAILY 30 tablet 0    methocarbamol (ROBAXIN) 500 MG tablet Take 1 tablet by mouth 2 times daily 60 tablet 0    venlafaxine (EFFEXOR XR) 75 MG extended release capsule Take 2 capsules by mouth daily 60 capsule 0    lidocaine (LIDODERM) 5 % Place 1 patch onto the skin daily 12 hours on, 12 hours off. 30 patch 0    HYDROcodone-acetaminophen (NORCO) 7.5-325 MG per tablet Take 1 tablet by mouth every 6 hours as needed for Pain for up to 30 days. 120 tablet 0    torsemide (DEMADEX) 20 MG tablet TAKE TWO TABLETS BY MOUTH DAILY 60 tablet 11    metoprolol (LOPRESSOR) 100 MG tablet TAKE ONE TABLET BY MOUTH TWICE A DAY 60 tablet 3    cloNIDine (CATAPRES) 0.1 MG tablet TAKE THREE TABLETS BY MOUTH THREE TIMES A  tablet 3    oxybutynin (DITROPAN-XL) 5 MG extended release tablet TAKE ONE TABLET BY MOUTH DAILY 30 tablet 3    montelukast (SINGULAIR) 10 MG tablet TAKE ONE TABLET BY MOUTH ONCE NIGHTLY 30 tablet 3    albuterol sulfate  (90 Base) MCG/ACT inhaler INHALE TWO PUFFS BY MOUTH EVERY 6 HOURS AS NEEDED FOR WHEEZING 18 g 11    fluticasone-salmeterol (ADVAIR) 250-50 MCG/DOSE AEPB Takes 1 puff daily 2 Inhaler 11    metFORMIN (GLUCOPHAGE) 500 MG tablet TAKE ONE TABLET BY MOUTH TWICE A DAY WITH MEALS 180 tablet 1    spironolactone (ALDACTONE) 25 MG tablet TAKE TWO TABLETS BY MOUTH TWICE A  tablet 1    Blood Pressure Monitoring (B-D ASSURE BPM/AUTO ARM CUFF) MISC 1 Device by Does not apply route daily 1 each 0    loratadine (CLARITIN) 10 MG tablet Take 1 tablet by mouth daily 30 tablet 1    Misc. Devices (RAISED TOILET SEAT) MISC Use daily as needed as directed 1 each 0    atorvastatin (LIPITOR) 20 MG tablet Take 1 tablet by mouth daily 90 tablet 3    acetaminophen (APAP EXTRA STRENGTH) 500 MG tablet Take 2 tablets by mouth every 6 hours as needed for Pain DO NOT TAKE WITH OTHER MEDICATIONS CONTAINING ACETAMINOPHEN.  30 tablet 0    albuterol-ipratropium (COMBIVENT RESPIMAT)  MCG/ACT AERS inhaler Inhale 1 puff into the lungs every 6 hours (Patient not taking: Reported on 10/1/2021) 1 Inhaler 5    omeprazole (PRILOSEC) 20 MG delayed release capsule Take 20 mg by mouth daily Indications: [facility]  (Patient not taking: Reported on 10/1/2021)      Cholecalciferol (VITAMIN D3) 88308 UNITS CAPS Take 10,000 Units by mouth three times a week Takes on Mon Wed Fri  (Patient not taking: Reported on 10/1/2021)       No current facility-administered medications for this visit. BP (!) 140/79 (Site: Right Upper Arm, Position: Sitting, Cuff Size: Large Adult)   Pulse 71   Resp 18   Ht 5' 4\" (1.626 m)   Wt (!) 374 lb 6.4 oz (169.8 kg)   LMP  (LMP Unknown)   SpO2 99%   BMI 64.27 kg/m²     Physical Exam    Wt Readings from Last 3 Encounters:   10/01/21 (!) 374 lb 6.4 oz (169.8 kg)   09/30/21 (!) 350 lb (158.8 kg)   09/22/21 (!) 354 lb (160.6 kg)       BP Readings from Last 3 Encounters:   10/01/21 (!) 140/79   09/30/21 (!) 193/110   09/22/21 (!) 184/98       Assessment/Plan:  1. Diabetes mellitus type 2 in obese (HCC)  - POCT glycosylated hemoglobin (Hb A1C)  - Dulaglutide 1.5 MG/0.5ML SOPN; Inject 1.5 mg into the skin once a week  Dispense: 4 pen; Refill: 5    2. Essential hypertension    3. Morbid obesity (HCC)  - Dulaglutide 1.5 MG/0.5ML SOPN; Inject 1.5 mg into the skin once a week  Dispense: 4 pen; Refill: 5    4. Tobacco abuse counseling  - nicotine (NICODERM CQ) 14 MG/24HR; Place 1 patch onto the skin daily for 14 days  Dispense: 28 patch; Refill: 5    Diabetes remains well controlled - A1c of 5.6 today    Discussed weight and its impact on her other health conditions. Increase dose of Trulicity. Remains hesitant about considering gastric sleeve. Discussed considering carb reduction diet    BP mildly elevated. Typically better than other readings when able to take dose before coming in. Seeing Cardiology after this    Discussed tobacco use.  Had nightmares with Chantix.  Nicotine patches sent    Return in about 6 months (around 4/1/2022) for physical.

## 2021-10-19 DIAGNOSIS — I10 ESSENTIAL HYPERTENSION: ICD-10-CM

## 2021-10-19 RX ORDER — MONTELUKAST SODIUM 10 MG/1
TABLET ORAL
Qty: 30 TABLET | Refills: 3 | Status: SHIPPED | OUTPATIENT
Start: 2021-10-19 | End: 2022-02-24

## 2021-10-19 RX ORDER — CLONIDINE HYDROCHLORIDE 0.1 MG/1
TABLET ORAL
Qty: 270 TABLET | Refills: 3 | Status: SHIPPED | OUTPATIENT
Start: 2021-10-19 | End: 2022-06-21

## 2021-10-20 ENCOUNTER — OFFICE VISIT (OUTPATIENT)
Dept: PAIN MANAGEMENT | Age: 46
End: 2021-10-20
Payer: MEDICAID

## 2021-10-20 VITALS
HEART RATE: 72 BPM | BODY MASS INDEX: 50.02 KG/M2 | HEIGHT: 64 IN | OXYGEN SATURATION: 98 % | TEMPERATURE: 97.2 F | SYSTOLIC BLOOD PRESSURE: 173 MMHG | DIASTOLIC BLOOD PRESSURE: 101 MMHG | WEIGHT: 293 LBS

## 2021-10-20 DIAGNOSIS — E66.01 MORBID OBESITY (HCC): ICD-10-CM

## 2021-10-20 DIAGNOSIS — J44.9 COPD, MILD (HCC): ICD-10-CM

## 2021-10-20 DIAGNOSIS — M17.0 PRIMARY OSTEOARTHRITIS OF BOTH KNEES: ICD-10-CM

## 2021-10-20 DIAGNOSIS — M16.11 PRIMARY OSTEOARTHRITIS OF RIGHT HIP: ICD-10-CM

## 2021-10-20 DIAGNOSIS — M54.50 CHRONIC BILATERAL LOW BACK PAIN WITHOUT SCIATICA: ICD-10-CM

## 2021-10-20 DIAGNOSIS — G89.4 CHRONIC PAIN SYNDROME: ICD-10-CM

## 2021-10-20 DIAGNOSIS — M62.830 BACK SPASM: ICD-10-CM

## 2021-10-20 DIAGNOSIS — F32.A DEPRESSION, UNSPECIFIED DEPRESSION TYPE: ICD-10-CM

## 2021-10-20 DIAGNOSIS — G89.29 CHRONIC BILATERAL LOW BACK PAIN WITHOUT SCIATICA: ICD-10-CM

## 2021-10-20 PROCEDURE — 99213 OFFICE O/P EST LOW 20 MIN: CPT | Performed by: NURSE PRACTITIONER

## 2021-10-20 PROCEDURE — G8926 SPIRO NO PERF OR DOC: HCPCS | Performed by: NURSE PRACTITIONER

## 2021-10-20 PROCEDURE — G8427 DOCREV CUR MEDS BY ELIG CLIN: HCPCS | Performed by: NURSE PRACTITIONER

## 2021-10-20 PROCEDURE — 3023F SPIROM DOC REV: CPT | Performed by: NURSE PRACTITIONER

## 2021-10-20 PROCEDURE — G8417 CALC BMI ABV UP PARAM F/U: HCPCS | Performed by: NURSE PRACTITIONER

## 2021-10-20 PROCEDURE — G8484 FLU IMMUNIZE NO ADMIN: HCPCS | Performed by: NURSE PRACTITIONER

## 2021-10-20 PROCEDURE — 4004F PT TOBACCO SCREEN RCVD TLK: CPT | Performed by: NURSE PRACTITIONER

## 2021-10-20 RX ORDER — LIDOCAINE 50 MG/G
1 PATCH TOPICAL DAILY
Qty: 30 PATCH | Refills: 0 | Status: CANCELLED | OUTPATIENT
Start: 2021-10-20 | End: 2021-11-19

## 2021-10-20 RX ORDER — HYDROCODONE BITARTRATE AND ACETAMINOPHEN 7.5; 325 MG/1; MG/1
1 TABLET ORAL EVERY 6 HOURS PRN
Qty: 120 TABLET | Refills: 0 | Status: SHIPPED | OUTPATIENT
Start: 2021-10-20 | End: 2021-11-24 | Stop reason: SDUPTHER

## 2021-10-20 RX ORDER — VENLAFAXINE HYDROCHLORIDE 75 MG/1
150 CAPSULE, EXTENDED RELEASE ORAL DAILY
Qty: 60 CAPSULE | Refills: 0 | Status: SHIPPED | OUTPATIENT
Start: 2021-10-20 | End: 2021-11-24 | Stop reason: SDUPTHER

## 2021-10-20 RX ORDER — METHOCARBAMOL 500 MG/1
500 TABLET, FILM COATED ORAL 2 TIMES DAILY
Qty: 60 TABLET | Refills: 0 | Status: SHIPPED | OUTPATIENT
Start: 2021-10-20 | End: 2021-11-24 | Stop reason: SDUPTHER

## 2021-10-20 RX ORDER — LIDOCAINE 50 MG/G
1 PATCH TOPICAL DAILY
Qty: 30 PATCH | Refills: 0 | Status: SHIPPED | OUTPATIENT
Start: 2021-10-20 | End: 2021-11-24 | Stop reason: SDUPTHER

## 2021-10-20 NOTE — PROGRESS NOTES
Mercedes Mchugh  1975  [id number]      HISTORY OF PRESENT ILLNESS: Ms. Michaelyn Gosselin is a 48 y.o. female returns for a follow up visit for pain management  She has a diagnosis of   1. Chronic pain syndrome    2. Chronic bilateral low back pain without sciatica    3. Back spasm    4. Primary osteoarthritis of both knees    5. Primary osteoarthritis of right hip    6. Depression, unspecified depression type, mild    7. Morbid obesity ([address]    8. COPD, mild ([address]    . As per Information Obtained from the PADT (Patient Assessment and Documentation Tool)    She complains of pain in the ,lower back, right hip, both knees. She rates the pain 6/10 and describes it as aching, burning, pins and needles. Current treatment regimen has helped relieve about 30% of the pain. She denies any side effects from the current pain regimen. Patient reports that since the last follow up visit the physical functioning is unchanged, family/social relationships are unchanged, mood is unchanged sleep patterns are unchanged, and that the overall functioning is unchanged. Patient denies misusing/abusing her narcotic pain medications or using any illegal drugs. Upon obtaining medical history from Ms. Michaelyn Gosselin states that pain is manageable on current pain therapy. Takes pain medications as prescribed. Mood is stable without anxiety. Sleep is fair with an average of 5-6 hours. Denies to having issues of constipation. Tolerating activities/house chores with moderate tenderness to the lower back. Smokes 5 cigarettes a day    ALLERGIES: Patients list of allergies were reviewed     MEDICATIONS: Ms. Michaelyn Gosselin list of medications were reviewed. Her current medications are   Outpatient Medications Prior to Visit   Medication Sig Dispense Refill    montelukast (SINGULAIR) 10 MG tablet TAKE ONE TABLET BY MOUTH ONCE NIGHTLY 30 tablet 3    cloNIDine (CATAPRES) 0.1 MG tablet TAKE THREE TABLETS BY MOUTH THREE TIMES A  tablet 3    omeprazole (PRILOSEC) 20 MG delayed release capsule Take 1 capsule by mouth Daily 90 capsule 1    Blood Glucose Monitoring Suppl (TRUE METRIX METER) w/Device KIT 1 kit by Does not apply route 2 times daily 1 kit 0    blood glucose test strips (TRUE METRIX BLOOD GLUCOSE TEST) strip 1 each by In Vitro route 2 times daily 200 each 1    Lancets MISC 1 each by Does not apply route 2 times daily Dispense brand per insurance preference 200 each 1    Alcohol Swabs 70 % PADS 1 each by Does not apply route 2 times daily 200 each 0    vitamin D3 (CHOLECALCIFEROL) 25 MCG (1000 UT) TABS tablet Take 2 tablets by mouth three times a week 30 tablet 5    Dulaglutide 1.5 MG/0.5ML SOPN Inject 1.5 mg into the skin once a week 4 pen 5    chlorthalidone (HYGROTON) 25 MG tablet Take 1 tablet by mouth daily 30 tablet 3    torsemide (DEMADEX) 20 MG tablet TAKE TWO TABLETS BY MOUTH DAILY 60 tablet 11    metoprolol (LOPRESSOR) 100 MG tablet TAKE ONE TABLET BY MOUTH TWICE A DAY 60 tablet 3    oxybutynin (DITROPAN-XL) 5 MG extended release tablet TAKE ONE TABLET BY MOUTH DAILY 30 tablet 3    albuterol sulfate  (90 Base) MCG/ACT inhaler INHALE TWO PUFFS BY MOUTH EVERY 6 HOURS AS NEEDED FOR WHEEZING 18 g 11    fluticasone-salmeterol (ADVAIR) 250-50 MCG/DOSE AEPB Takes 1 puff daily 2 Inhaler 11    metFORMIN (GLUCOPHAGE) 500 MG tablet TAKE ONE TABLET BY MOUTH TWICE A DAY WITH MEALS 180 tablet 1    spironolactone (ALDACTONE) 25 MG tablet TAKE TWO TABLETS BY MOUTH TWICE A  tablet 1    Blood Pressure Monitoring (B-D ASSURE BPM/AUTO ARM CUFF) MISC 1 Device by Does not apply route daily 1 each 0    loratadine (CLARITIN) 10 MG tablet Take 1 tablet by mouth daily 30 tablet 1    Misc.  Devices (RAISED TOILET SEAT) MISC Use daily as needed as directed 1 each 0    atorvastatin (LIPITOR) 20 MG tablet Take 1 tablet by mouth daily 90 tablet 3    omeprazole (PRILOSEC) 20 MG delayed release capsule Take 20 mg by mouth daily Indications: [facility] gets [facility]       acetaminophen (APAP EXTRA STRENGTH) 500 MG tablet Take 2 tablets by mouth every 6 hours as needed for Pain DO NOT TAKE WITH OTHER MEDICATIONS CONTAINING ACETAMINOPHEN. 30 tablet 0    nicotine (NICODERM CQ) 14 MG/24HR Place 1 patch onto the skin daily for 14 days 28 patch 5    methocarbamol (ROBAXIN) 500 MG tablet Take 1 tablet by mouth 2 times daily 60 tablet 0    venlafaxine (EFFEXOR XR) 75 MG extended release capsule Take 2 capsules by mouth daily 60 capsule 0    lidocaine (LIDODERM) 5 % Place 1 patch onto the skin daily 12 hours on, 12 hours off. 30 patch 0    HYDROcodone-acetaminophen (NORCO) 7.5-325 MG per tablet Take 1 tablet by mouth every 6 hours as needed for Pain for up to 30 days. 120 tablet 0     No facility-administered medications prior to visit. SOCIAL/FAMILY/PAST MEDICAL HISTORY: Ms. Idalmis Hernandez, family and past medical history was reviewed. REVIEW OF SYSTEMS:    Respiratory: Negative for apnea, chest tightness and shortness of breath or change in baseline breathing. Gastrointestinal: Negative for nausea, vomiting, abdominal pain, diarrhea, constipation, blood in stool and abdominal distention. PHYSICAL EXAM:   Nursing note and vitals reviewed. BP (!) 173/101   Pulse 72   Temp 97.2 °F (36.2 °C)   Ht 5' 4\" (1.626 m)   Wt (!) 330 lb (149.7 kg)   LMP  (LMP Unknown)   SpO2 98%   BMI 56.64 kg/m²   Constitutional: She appears well-developed and well-nourished. No acute distress. Skin: Skin is warm and dry, good turgor. No rash noted. She is not diaphoretic. Cardiovascular: Normal rate, regular rhythm, normal heart sounds, and does not have murmur. Pulmonary/Chest: Effort normal. No respiratory distress. She does not have wheezes in the lung fields. She has no rales. Neurological/Psychiatric:She is alert and oriented to person, place, and time.  Coordination is  Normal. Stable gait with the aid of a cane Her mood isAppropriate and affect is Neutral/Euthymic(normal) . IMPRESSION:   1. Chronic pain syndrome    2. Chronic bilateral low back pain without sciatica    3. Back spasm    4. Primary osteoarthritis of both knees    5. Primary osteoarthritis of right hip    6. Depression, unspecified depression type, mild    7. Morbid obesity ([address]    8. COPD, mild ([facility]        PLAN:  Informed verbal consent was obtained  -Continue with Norco, Robaxin, Effexor, Lidocaine, Elavil  -Liang exercises/back stretches recommended  --Monitor BP, f/u with PCP if it continues to stay elevated or she becomes symptomatic with SOB, CP, syncopy. she is currently asymptomatic  -CBT techniques- relaxation therapies such as biofeedback, mindfulness based stress reduction, imagery, cognitive restructuring, problem solving discussed with patient  -She was advised weight reduction, diet changes- 800-1200 fior diet, diet diary, exercising, nutritional  consult increased physical activity as tolerated  -Last UDS 4/7/21 Consistent  -Return in about 4 weeks (around 11/17/2021). Current Outpatient Medications   Medication Sig Dispense Refill    venlafaxine (EFFEXOR XR) 75 MG extended release capsule Take 2 capsules by mouth daily 60 capsule 0    methocarbamol (ROBAXIN) 500 MG tablet Take 1 tablet by mouth 2 times daily 60 tablet 0    HYDROcodone-acetaminophen (NORCO) 7.5-325 MG per tablet Take 1 tablet by mouth every 6 hours as needed for Pain for up to 30 days. 120 tablet 0    lidocaine (LIDODERM) 5 % Place 1 patch onto the skin daily 12 hours on, 12 hours off.  30 patch 0    montelukast (SINGULAIR) 10 MG tablet TAKE ONE TABLET BY MOUTH ONCE NIGHTLY 30 tablet 3    cloNIDine (CATAPRES) 0.1 MG tablet TAKE THREE TABLETS BY MOUTH THREE TIMES A  tablet 3    omeprazole (PRILOSEC) 20 MG delayed release capsule Take 1 capsule by mouth Daily 90 capsule 1    Blood Glucose Monitoring Suppl (TRUE METRIX METER) w/Device KIT 1 kit by Does not apply route 2 times daily 1 kit 0    blood glucose test strips (TRUE METRIX BLOOD GLUCOSE TEST) strip 1 each by In Vitro route 2 times daily 200 each 1    Lancets MISC 1 each by Does not apply route 2 times daily Dispense brand per insurance preference 200 each 1    Alcohol Swabs 70 % PADS 1 each by Does not apply route 2 times daily 200 each 0    vitamin D3 (CHOLECALCIFEROL) 25 MCG (1000 UT) TABS tablet Take 2 tablets by mouth three times a week 30 tablet 5    Dulaglutide 1.5 MG/0.5ML SOPN Inject 1.5 mg into the skin once a week 4 pen 5    chlorthalidone (HYGROTON) 25 MG tablet Take 1 tablet by mouth daily 30 tablet 3    torsemide (DEMADEX) 20 MG tablet TAKE TWO TABLETS BY MOUTH DAILY 60 tablet 11    metoprolol (LOPRESSOR) 100 MG tablet TAKE ONE TABLET BY MOUTH TWICE A DAY 60 tablet 3    oxybutynin (DITROPAN-XL) 5 MG extended release tablet TAKE ONE TABLET BY MOUTH DAILY 30 tablet 3    albuterol sulfate  (90 Base) MCG/ACT inhaler INHALE TWO PUFFS BY MOUTH EVERY 6 HOURS AS NEEDED FOR WHEEZING 18 g 11    fluticasone-salmeterol (ADVAIR) 250-50 MCG/DOSE AEPB Takes 1 puff daily 2 Inhaler 11    metFORMIN (GLUCOPHAGE) 500 MG tablet TAKE ONE TABLET BY MOUTH TWICE A DAY WITH MEALS 180 tablet 1    spironolactone (ALDACTONE) 25 MG tablet TAKE TWO TABLETS BY MOUTH TWICE A  tablet 1    Blood Pressure Monitoring (B-D ASSURE BPM/AUTO ARM CUFF) MISC 1 Device by Does not apply route daily 1 each 0    loratadine (CLARITIN) 10 MG tablet Take 1 tablet by mouth daily 30 tablet 1    Misc.  Devices (RAISED TOILET SEAT) MISC Use daily as needed as directed 1 each 0    atorvastatin (LIPITOR) 20 MG tablet Take 1 tablet by mouth daily 90 tablet 3    omeprazole (PRILOSEC) 20 MG delayed release capsule Take 20 mg by mouth daily Indications: patient gets from [address] [facility] acetaminophen (APAP EXTRA STRENGTH) 500 MG tablet Take 2 tablets by mouth every 6 hours as needed for Pain DO NOT TAKE WITH OTHER MEDICATIONS CONTAINING ACETAMINOPHEN. 30 tablet 0    nicotine (NICODERM CQ) 14 MG/24HR Place 1 patch onto the skin daily for 14 days 28 patch 5     No current facility-administered medications for this visit. I will continue her current medication regimen  which is part of the above treatment schedule. It has been helping with Ms. Coleman Screws chronic  medical problems which for this visit include:   Diagnoses of Chronic pain syndrome, Chronic bilateral low back pain without sciatica, Back spasm, Primary osteoarthritis of both knees, Primary osteoarthritis of right hip, Depression, unspecified depression type, mild, Morbid obesity ([address] and COPD, mild ([address] were pertinent to this visit. Risks and benefits of the medications and other alternative treatments  including no treatment were discussed with the patient. The common side effects of these medications were also explained to the patient. Informed verbal consent was obtained. Goals of current treatment regimen include improvement in pain, restoration of functioning- with focus on improvement in physical performance, general activity, work or disability,emotional distress, health care utilization and  decreased medication consumption. Will continue to monitor progress towards achieving/maintaining therapeutic goals with special emphasis on  1. Improvement in perceived interfernce  of pain with ADL's. Ability to do home exercises independently. Ability to do household chores indoor and/or outdoor work and social and leisure activities. Improve psychosocial and physical functioning. - she is showing progression towards this treatment goal with the current regimen. She was advised against drinking alcohol with the narcotic pain medicines, advised against driving or handling machinery while adjusting the dose of medicines or if having cognitive  issues related to the current medications. Risk of overdose and death, if medicines not taken as prescribed, were also discussed. If the patient develops new symptoms or if the symptoms worsen, the patient should call the office. While transcribing every attempt was made to maintain the accuracy of the note in terms of it's contents,there may have been some errors made inadvertently. Thank you for allowing me to participate in the care of this patient. Marian Likes CNP.     Cc: Raven Rinaldi MD

## 2021-10-20 NOTE — PATIENT INSTRUCTIONS
Patient Education        Knee Arthritis: Exercises  Introduction  Here are some examples of exercises for you to try. The exercises may be suggested for a condition or for rehabilitation. Start each exercise slowly. Ease off the exercises if you start to have pain. You will be told when to start these exercises and which ones will work best for you. How to do the exercises  Knee flexion with heel slide    1. Lie on your back with your knees bent. 2. Slide your heel back by bending your affected knee as far as you can. Then hook your other foot around your ankle to help pull your heel even farther back. 3. Hold for about 6 seconds, then rest for up to 10 seconds. 4. Repeat 8 to 12 times. 5. Switch legs and repeat steps 1 through 4, even if only one knee is sore. Quad sets    1. Sit with your affected leg straight and supported on the floor or a firm bed. Place a small, rolled-up towel under your knee. Your other leg should be bent, with that foot flat on the floor. 2. Tighten the thigh muscles of your affected leg by pressing the back of your knee down into the towel. 3. Hold for about 6 seconds, then rest for up to 10 seconds. 4. Repeat 8 to 12 times. 5. Switch legs and repeat steps 1 through 4, even if only one knee is sore. Straight-leg raises to the front    1. Lie on your back with your good knee bent so that your foot rests flat on the floor. Your affected leg should be straight. Make sure that your low back has a normal curve. You should be able to slip your hand in between the floor and the small of your back, with your palm touching the floor and your back touching the back of your hand. 2. Tighten the thigh muscles in your affected leg by pressing the back of your knee flat down to the floor. Hold your knee straight. 3. Keeping the thigh muscles tight and your leg straight, lift your affected leg up so that your heel is about 12 inches off the floor.  Hold for about 6 seconds, then lower slowly. 4. Relax for up to 10 seconds between repetitions. 5. Repeat 8 to 12 times. 6. Switch legs and repeat steps 1 through 5, even if only one knee is sore. Active knee flexion    1. Lie on your stomach with your knees straight. If your kneecap is uncomfortable, roll up a washcloth and put it under your leg just above your kneecap. 2. Lift the foot of your affected leg by bending the knee so that you bring the foot up toward your buttock. If this motion hurts, try it without bending your knee quite as far. This may help you avoid any painful motion. 3. Slowly move your leg up and down. 4. Repeat 8 to 12 times. 5. Switch legs and repeat steps 1 through 4, even if only one knee is sore. Quadriceps stretch (facedown)    1. Lie flat on your stomach, and rest your face on the floor. 2. Wrap a towel or belt strap around the lower part of your affected leg. Then use the towel or belt strap to slowly pull your heel toward your buttock until you feel a stretch. 3. Hold for about 15 to 30 seconds, then relax your leg against the towel or belt strap. 4. Repeat 2 to 4 times. 5. Switch legs and repeat steps 1 through 4, even if only one knee is sore. Stationary exercise bike    1. If you do not have a stationary exercise bike at home, you can find one to ride at your local health club or community center. 2. Adjust the height of the bike seat so that your knee is slightly bent when your leg is extended downward. If your knee hurts when the pedal reaches the top, you can raise the seat so that your knee does not bend as much. 3. Start slowly. At first, try to do 5 to 10 minutes of cycling with little to no resistance. Then increase your time and the resistance bit by bit until you can do 20 to 30 minutes without pain. 4. If you start to have pain, rest your knee until your pain gets back to the level that is normal for you. Or cycle for less time or with less effort.   Follow-up care is a key part of your treatment and safety. Be sure to make and go to all appointments, and call your doctor if you are having problems. It's also a good idea to know your test results and keep a list of the medicines you take. Where can you learn more? Go to https://sherieb.[email or web address] org and sign in to your [employer] account. Enter C159 in the Cernium box to learn more about \"Knee Arthritis: Exercises. \"     If you do not have an account, please click on the \"Sign Up Now\" link. Current as of: July 1, 2021               Content Version: 13.0  © 5181-5597 Healthwise, Incorporated. Care instructions adapted under license by [facility] ([facility]). If you have questions about a medical condition or this instruction, always ask your healthcare professional. [address] any warranty or liability for your use of this information.

## 2021-10-30 DIAGNOSIS — E66.9 DIABETES MELLITUS TYPE 2 IN OBESE (HCC): ICD-10-CM

## 2021-10-30 DIAGNOSIS — E11.69 DIABETES MELLITUS TYPE 2 IN OBESE (HCC): ICD-10-CM

## 2021-11-01 RX ORDER — SPIRONOLACTONE 25 MG/1
TABLET ORAL
Qty: 360 TABLET | Refills: 0 | Status: SHIPPED | OUTPATIENT
Start: 2021-11-01 | End: 2022-02-07

## 2021-11-01 RX ORDER — NIFEDIPINE 60 MG/1
TABLET, EXTENDED RELEASE ORAL
Qty: 30 TABLET | Refills: 1 | OUTPATIENT
Start: 2021-11-01

## 2021-11-03 RX ORDER — AMITRIPTYLINE HYDROCHLORIDE 25 MG/1
TABLET, FILM COATED ORAL
Qty: 30 TABLET | Refills: 2 | Status: SHIPPED | OUTPATIENT
Start: 2021-11-03 | End: 2022-01-19 | Stop reason: SDUPTHER

## 2021-11-11 DIAGNOSIS — I10 ESSENTIAL HYPERTENSION: ICD-10-CM

## 2021-11-11 RX ORDER — METOPROLOL TARTRATE 100 MG/1
TABLET ORAL
Qty: 60 TABLET | Refills: 3 | Status: SHIPPED | OUTPATIENT
Start: 2021-11-11 | End: 2022-03-18

## 2021-11-21 DIAGNOSIS — J44.9 COPD, MILD (HCC): ICD-10-CM

## 2021-11-24 ENCOUNTER — OFFICE VISIT (OUTPATIENT)
Dept: PAIN MANAGEMENT | Age: 46
End: 2021-11-24
Payer: MEDICAID

## 2021-11-24 VITALS
HEIGHT: 64 IN | OXYGEN SATURATION: 97 % | WEIGHT: 293 LBS | BODY MASS INDEX: 50.02 KG/M2 | TEMPERATURE: 97.2 F | SYSTOLIC BLOOD PRESSURE: 144 MMHG | HEART RATE: 73 BPM | DIASTOLIC BLOOD PRESSURE: 90 MMHG

## 2021-11-24 DIAGNOSIS — G89.4 CHRONIC PAIN SYNDROME: ICD-10-CM

## 2021-11-24 DIAGNOSIS — G89.29 CHRONIC BILATERAL LOW BACK PAIN WITHOUT SCIATICA: ICD-10-CM

## 2021-11-24 DIAGNOSIS — M17.0 PRIMARY OSTEOARTHRITIS OF BOTH KNEES: ICD-10-CM

## 2021-11-24 DIAGNOSIS — M54.50 CHRONIC BILATERAL LOW BACK PAIN WITHOUT SCIATICA: ICD-10-CM

## 2021-11-24 DIAGNOSIS — G47.33 OSA (OBSTRUCTIVE SLEEP APNEA): ICD-10-CM

## 2021-11-24 DIAGNOSIS — M62.830 BACK SPASM: ICD-10-CM

## 2021-11-24 DIAGNOSIS — M16.11 PRIMARY OSTEOARTHRITIS OF RIGHT HIP: ICD-10-CM

## 2021-11-24 DIAGNOSIS — E66.01 MORBID OBESITY (HCC): ICD-10-CM

## 2021-11-24 DIAGNOSIS — F32.A DEPRESSION, UNSPECIFIED DEPRESSION TYPE: ICD-10-CM

## 2021-11-24 PROCEDURE — 4004F PT TOBACCO SCREEN RCVD TLK: CPT | Performed by: NURSE PRACTITIONER

## 2021-11-24 PROCEDURE — G8427 DOCREV CUR MEDS BY ELIG CLIN: HCPCS | Performed by: NURSE PRACTITIONER

## 2021-11-24 PROCEDURE — G8417 CALC BMI ABV UP PARAM F/U: HCPCS | Performed by: NURSE PRACTITIONER

## 2021-11-24 PROCEDURE — 99213 OFFICE O/P EST LOW 20 MIN: CPT | Performed by: NURSE PRACTITIONER

## 2021-11-24 PROCEDURE — G8484 FLU IMMUNIZE NO ADMIN: HCPCS | Performed by: NURSE PRACTITIONER

## 2021-11-24 RX ORDER — LIDOCAINE 50 MG/G
1 PATCH TOPICAL DAILY
Qty: 30 PATCH | Refills: 0 | Status: SHIPPED | OUTPATIENT
Start: 2021-11-24 | End: 2021-12-22 | Stop reason: SDUPTHER

## 2021-11-24 RX ORDER — METHOCARBAMOL 500 MG/1
500 TABLET, FILM COATED ORAL 2 TIMES DAILY
Qty: 60 TABLET | Refills: 0 | Status: SHIPPED | OUTPATIENT
Start: 2021-11-24 | End: 2021-12-22 | Stop reason: SDUPTHER

## 2021-11-24 RX ORDER — HYDROCODONE BITARTRATE AND ACETAMINOPHEN 7.5; 325 MG/1; MG/1
1 TABLET ORAL EVERY 6 HOURS PRN
Qty: 120 TABLET | Refills: 0 | Status: SHIPPED | OUTPATIENT
Start: 2021-11-24 | End: 2021-12-22 | Stop reason: SDUPTHER

## 2021-11-24 RX ORDER — VENLAFAXINE HYDROCHLORIDE 75 MG/1
150 CAPSULE, EXTENDED RELEASE ORAL DAILY
Qty: 60 CAPSULE | Refills: 0 | Status: SHIPPED | OUTPATIENT
Start: 2021-11-24 | End: 2022-01-19 | Stop reason: SDUPTHER

## 2021-11-24 NOTE — PATIENT INSTRUCTIONS
Patient Education        Knee Arthritis: Exercises  Introduction  Here are some examples of exercises for you to try. The exercises may be suggested for a condition or for rehabilitation. Start each exercise slowly. Ease off the exercises if you start to have pain. You will be told when to start these exercises and which ones will work best for you. How to do the exercises  Knee flexion with heel slide    1. Lie on your back with your knees bent. 2. Slide your heel back by bending your affected knee as far as you can. Then hook your other foot around your ankle to help pull your heel even farther back. 3. Hold for about 6 seconds, then rest for up to 10 seconds. 4. Repeat 8 to 12 times. 5. Switch legs and repeat steps 1 through 4, even if only one knee is sore. Quad sets    1. Sit with your affected leg straight and supported on the floor or a firm bed. Place a small, rolled-up towel under your knee. Your other leg should be bent, with that foot flat on the floor. 2. Tighten the thigh muscles of your affected leg by pressing the back of your knee down into the towel. 3. Hold for about 6 seconds, then rest for up to 10 seconds. 4. Repeat 8 to 12 times. 5. Switch legs and repeat steps 1 through 4, even if only one knee is sore. Straight-leg raises to the front    1. Lie on your back with your good knee bent so that your foot rests flat on the floor. Your affected leg should be straight. Make sure that your low back has a normal curve. You should be able to slip your hand in between the floor and the small of your back, with your palm touching the floor and your back touching the back of your hand. 2. Tighten the thigh muscles in your affected leg by pressing the back of your knee flat down to the floor. Hold your knee straight. 3. Keeping the thigh muscles tight and your leg straight, lift your affected leg up so that your heel is about 12 inches off the floor.  Hold for about 6 seconds, then lower treatment and safety. Be sure to make and go to all appointments, and call your doctor if you are having problems. It's also a good idea to know your test results and keep a list of the medicines you take. Where can you learn more? Go to https://margo.Aductions. org and sign in to your [employer] account. Enter C159 in the ClearGist box to learn more about \"Knee Arthritis: Exercises. \"     If you do not have an account, please click on the \"Sign Up Now\" link. Current as of: July 1, 2021               Content Version: 13.0  © 2006-2021 [employer]. Care instructions adapted under license by [facility] ([facility]). If you have questions about a medical condition or this instruction, always ask your healthcare professional. [address] any warranty or liability for your use of this information. Patient Education        Back Stretches: Exercises  Introduction  Here are some examples of exercises for stretching your back. Start each exercise slowly. Ease off the exercise if you start to have pain. Your doctor or physical therapist will tell you when you can start these exercises and which ones will work best for you. How to do the exercises  Overhead stretch    6. Stand comfortably with your feet shoulder-width apart. 7. Looking straight ahead, raise both arms over your head and reach toward the ceiling. Do not allow your head to tilt back. 8. Hold for 15 to 30 seconds, then lower your arms to your sides. 9. Repeat 2 to 4 times. Side stretch    6. Stand comfortably with your feet shoulder-width apart. 7. Raise one arm over your head, and then lean to the other side. 8. Slide your hand down your leg as you let the weight of your arm gently stretch your side muscles. Hold for 15 to 30 seconds. 9. Repeat 2 to 4 times on each side. Press-up    7. Lie on your stomach, supporting your body with your forearms.   8. Press your elbows down into the floor to raise your upper back. As you do this, relax your stomach muscles and allow your back to arch without using your back muscles. As your press up, do not let your hips or pelvis come off the floor. 9. Hold for 15 to 30 seconds, then relax. 10. Repeat 2 to 4 times. Relax and rest    6. Lie on your back with a rolled towel under your neck and a pillow under your knees. Extend your arms comfortably to your sides. 7. Relax and breathe normally. 8. Remain in this position for about 10 minutes. 9. If you can, do this 2 or 3 times each day. Follow-up care is a key part of your treatment and safety. Be sure to make and go to all appointments, and call your doctor if you are having problems. It's also a good idea to know your test results and keep a list of the medicines you take. Where can you learn more? Go to https://HistoSonics.Fotech. org and sign in to your [employer] account. Enter O963 in the Goodwall box to learn more about \"Back Stretches: Exercises. \"     If you do not have an account, please click on the \"Sign Up Now\" link. Current as of: July 1, 2021               Content Version: 13.0  © 2006-2021 Healthwise, Incorporated. Care instructions adapted under license by [facility] ([facility]). If you have questions about a medical condition or this instruction, always ask your healthcare professional. [place] [address] any warranty or liability for your use of this information.

## 2021-11-24 NOTE — PROGRESS NOTES
Vasquez Snyder  1975  [id number]      HISTORY OF PRESENT ILLNESS:  Ms. Enoch Bland is a 48 y.o. female returns for a follow up visit for pain management  She has a diagnosis of   1. Chronic pain syndrome    2. Primary osteoarthritis of both knees    3. Primary osteoarthritis of right hip    4. Chronic bilateral low back pain without sciatica    5. Back spasm    6. Depression, unspecified depression type, mild    7. JOSE (obstructive sleep apnea)    8. Morbid obesity ([address]      On the Patients Pain Assessment form:  She complains of pain in the lower back, right hip, both knees. She rates the pain 6/10 and describes it as aching, burning, numbness, stabbing. Current treatment regimen has helped relieve about 50% of the pain. She denies any side effects from the current pain regimen. Patient reports that since the last follow up visit the physical functioning is unchanged, family/social relationships are unchanged, mood is unchanged sleep patterns are unchanged, and that the overall functioning is unchanged. Patient denies misusing/abusing her narcotic pain medications or using any illegal drugs. Upon obtaining medical history from Ms. Enoch Bland states that pain is manageable on current pain therapy. Takes pain medications as prescribed. Mood is stable without anxiety. Sleep is fair with an average of 5-6 hours. Denies to having issues of constipation. Tolerating activities/house chores with moderate tenderness to the lower back. Working on smoking cessation    ALLERGIES: Patients list of allergies were reviewed     MEDICATIONS: Ms. Enoch Bland list of medications were reviewed. Her current medications are   Outpatient Medications Prior to Visit   Medication Sig Dispense Refill    fluticasone-salmeterol (ADVAIR) 250-50 MCG/DOSE AEPB INHALE ONE PUFF BY MOUTH EVERY 12 HOURS 2 each 5    metoprolol (LOPRESSOR) 100 MG tablet TAKE ONE TABLET BY MOUTH TWICE A DAY 60 tablet 3    amitriptyline (ELAVIL) 25 MG tablet TAKE ONE TABLET BY MOUTH ONCE NIGHTLY 30 tablet 2    loratadine (CLARITIN) 10 MG tablet TAKE ONE TABLET BY MOUTH DAILY 90 tablet 1    spironolactone (ALDACTONE) 25 MG tablet TAKE TWO TABLETS BY MOUTH TWICE A  tablet 0    metFORMIN (GLUCOPHAGE) 500 MG tablet TAKE ONE TABLET BY MOUTH TWICE A DAY WITH MEALS 180 tablet 0    montelukast (SINGULAIR) 10 MG tablet TAKE ONE TABLET BY MOUTH ONCE NIGHTLY 30 tablet 3    cloNIDine (CATAPRES) 0.1 MG tablet TAKE THREE TABLETS BY MOUTH THREE TIMES A  tablet 3    omeprazole (PRILOSEC) 20 MG delayed release capsule Take 1 capsule by mouth Daily 90 capsule 1    Blood Glucose Monitoring Suppl (TRUE METRIX METER) w/Device KIT 1 kit by Does not apply route 2 times daily 1 kit 0    blood glucose test strips (TRUE METRIX BLOOD GLUCOSE TEST) strip 1 each by In Vitro route 2 times daily 200 each 1    Lancets MISC 1 each by Does not apply route 2 times daily Dispense brand per insurance preference 200 each 1    Alcohol Swabs 70 % PADS 1 each by Does not apply route 2 times daily 200 each 0    vitamin D3 (CHOLECALCIFEROL) 25 MCG (1000 UT) TABS tablet Take 2 tablets by mouth three times a week 30 tablet 5    Dulaglutide 1.5 MG/0.5ML SOPN Inject 1.5 mg into the skin once a week 4 pen 5    chlorthalidone (HYGROTON) 25 MG tablet Take 1 tablet by mouth daily 30 tablet 3    torsemide (DEMADEX) 20 MG tablet TAKE TWO TABLETS BY MOUTH DAILY 60 tablet 11    oxybutynin (DITROPAN-XL) 5 MG extended release tablet TAKE ONE TABLET BY MOUTH DAILY 30 tablet 3    albuterol sulfate  (90 Base) MCG/ACT inhaler INHALE TWO PUFFS BY MOUTH EVERY 6 HOURS AS NEEDED FOR WHEEZING 18 g 11    Blood Pressure Monitoring (B-D ASSURE BPM/AUTO ARM CUFF) MISC 1 Device by Does not apply route daily 1 each 0    Misc.  Devices (RAISED TOILET SEAT) MISC Use daily as needed as directed 1 each 0    atorvastatin (LIPITOR) 20 MG tablet Take 1 tablet by mouth daily 90 tablet 3    omeprazole (PRILOSEC) 20 MG delayed release capsule Take 20 mg by mouth daily Indications: patient gets from [address] [facility] acetaminophen (APAP EXTRA STRENGTH) 500 MG tablet Take 2 tablets by mouth every 6 hours as needed for Pain DO NOT TAKE WITH OTHER MEDICATIONS CONTAINING ACETAMINOPHEN. 30 tablet 0    venlafaxine (EFFEXOR XR) 75 MG extended release capsule Take 2 capsules by mouth daily 60 capsule 0    methocarbamol (ROBAXIN) 500 MG tablet Take 1 tablet by mouth 2 times daily 60 tablet 0    nicotine (NICODERM CQ) 14 MG/24HR Place 1 patch onto the skin daily for 14 days 28 patch 5     No facility-administered medications prior to visit. SOCIAL/FAMILY/PAST MEDICAL HISTORY: Ms. Dannielle Monge, family and past medical history was reviewed. REVIEW OF SYSTEMS:    Respiratory: Negative for apnea, chest tightness and shortness of breath or change in baseline breathing. Gastrointestinal: Negative for nausea, vomiting, abdominal pain, diarrhea, constipation, blood in stool and abdominal distention. PHYSICAL EXAM:   Nursing note and vitals reviewed. BP (!) 144/90   Pulse 73   Temp 97.2 °F (36.2 °C)   Ht 5' 4\" (1.626 m)   Wt (!) 337 lb (152.9 kg)   LMP  (LMP Unknown)   SpO2 97%   BMI 57.85 kg/m²   Constitutional: She appears well-developed and well-nourished. No acute distress. Skin: Skin is warm and dry, good turgor. No rash noted. She is not diaphoretic. Cardiovascular: Normal rate, regular rhythm, normal heart sounds, and does not have murmur. Pulmonary/Chest: Effort normal. No respiratory distress. She does not have wheezes in the lung fields. She has no rales. Neurological/Psychiatric:She is alert and oriented to person, place, and time. Coordination is  Normal. Stable gait with the aid of a cane  Her mood isAppropriate and affect is Neutral/Euthymic(normal) . IMPRESSION:   1. Chronic pain syndrome    2. Primary osteoarthritis of both knees    3.  Primary osteoarthritis of right hip    4. Chronic bilateral low back pain without sciatica    5. Back spasm    6. Depression, unspecified depression type, mild    7. JOSE (obstructive sleep apnea)    8. Morbid obesity ([address]        PLAN:  Informed verbal consent was obtained  -Continue with Norco, Robaxin, Effexor, Lidocaine, Elavil  -Liang exercises/knee stretches recommended  -Monitor BP, f/u with PCP if it continues to stay elevated or she becomes symptomatic with SOB, CP, syncopy. she is currently asymptomatic  -CBT techniques- relaxation therapies such as biofeedback, mindfulness based stress reduction, imagery, cognitive restructuring, problem solving discussed with patient  -She was advised weight reduction, diet changes- 800-1200 fior diet, diet diary, exercising, nutritional  consult increased physical activity as tolerated  -Advised patient to quit smoking for  health related concerns and to improve the treatment outcomes. Education was given on quitting smoking and the use of different modalities including medications, hypnotherapy, counselling  and biofeedback. These were discussed with patient.  -Last UDS 4/7/21 Consistent  -Return in about 4 weeks (around 12/22/2021). Current Outpatient Medications   Medication Sig Dispense Refill    HYDROcodone-acetaminophen (NORCO) 7.5-325 MG per tablet Take 1 tablet by mouth every 6 hours as needed for Pain for up to 30 days. 120 tablet 0    lidocaine (LIDODERM) 5 % Place 1 patch onto the skin daily 12 hours on, 12 hours off.  30 patch 0    venlafaxine (EFFEXOR XR) 75 MG extended release capsule Take 2 capsules by mouth daily 60 capsule 0    methocarbamol (ROBAXIN) 500 MG tablet Take 1 tablet by mouth 2 times daily 60 tablet 0    fluticasone-salmeterol (ADVAIR) 250-50 MCG/DOSE AEPB INHALE ONE PUFF BY MOUTH EVERY 12 HOURS 2 each 5    metoprolol (LOPRESSOR) 100 MG tablet TAKE ONE TABLET BY MOUTH TWICE A DAY 60 tablet 3    amitriptyline (ELAVIL) 25 MG tablet TAKE ONE TABLET BY MOUTH ONCE NIGHTLY 30 tablet 2    loratadine (CLARITIN) 10 MG tablet TAKE ONE TABLET BY MOUTH DAILY 90 tablet 1    spironolactone (ALDACTONE) 25 MG tablet TAKE TWO TABLETS BY MOUTH TWICE A  tablet 0    metFORMIN (GLUCOPHAGE) 500 MG tablet TAKE ONE TABLET BY MOUTH TWICE A DAY WITH MEALS 180 tablet 0    montelukast (SINGULAIR) 10 MG tablet TAKE ONE TABLET BY MOUTH ONCE NIGHTLY 30 tablet 3    cloNIDine (CATAPRES) 0.1 MG tablet TAKE THREE TABLETS BY MOUTH THREE TIMES A  tablet 3    omeprazole (PRILOSEC) 20 MG delayed release capsule Take 1 capsule by mouth Daily 90 capsule 1    Blood Glucose Monitoring Suppl (TRUE METRIX METER) w/Device KIT 1 kit by Does not apply route 2 times daily 1 kit 0    blood glucose test strips (TRUE METRIX BLOOD GLUCOSE TEST) strip 1 each by In Vitro route 2 times daily 200 each 1    Lancets MISC 1 each by Does not apply route 2 times daily Dispense brand per insurance preference 200 each 1    Alcohol Swabs 70 % PADS 1 each by Does not apply route 2 times daily 200 each 0    vitamin D3 (CHOLECALCIFEROL) 25 MCG (1000 UT) TABS tablet Take 2 tablets by mouth three times a week 30 tablet 5    Dulaglutide 1.5 MG/0.5ML SOPN Inject 1.5 mg into the skin once a week 4 pen 5    chlorthalidone (HYGROTON) 25 MG tablet Take 1 tablet by mouth daily 30 tablet 3    torsemide (DEMADEX) 20 MG tablet TAKE TWO TABLETS BY MOUTH DAILY 60 tablet 11    oxybutynin (DITROPAN-XL) 5 MG extended release tablet TAKE ONE TABLET BY MOUTH DAILY 30 tablet 3    albuterol sulfate  (90 Base) MCG/ACT inhaler INHALE TWO PUFFS BY MOUTH EVERY 6 HOURS AS NEEDED FOR WHEEZING 18 g 11    Blood Pressure Monitoring (B-D ASSURE BPM/AUTO ARM CUFF) MISC 1 Device by Does not apply route daily 1 each 0    Misc.  Devices (RAISED TOILET SEAT) MISC Use daily as needed as directed 1 each 0    atorvastatin (LIPITOR) 20 MG tablet Take 1 tablet by mouth daily 90 tablet 3    omeprazole (PRILOSEC) 20 MG delayed release capsule Take 20 mg by mouth daily Indications: patient gets from [facility]       acetaminophen (APAP EXTRA STRENGTH) 500 MG tablet Take 2 tablets by mouth every 6 hours as needed for Pain DO NOT TAKE WITH OTHER MEDICATIONS CONTAINING ACETAMINOPHEN. 30 tablet 0    nicotine (NICODERM CQ) 14 MG/24HR Place 1 patch onto the skin daily for 14 days 28 patch 5     No current facility-administered medications for this visit. I will continue her current medication regimen  which is part of the above treatment schedule. It has been helping with Ms. Chloe Morales chronic  medical problems which for this visit include:   Diagnoses of Chronic pain syndrome, Primary osteoarthritis of both knees, Primary osteoarthritis of right hip, Chronic bilateral low back pain without sciatica, Back spasm, Depression, unspecified depression type, mild, JOSE (obstructive sleep apnea), and Morbid obesity ([address] were pertinent to this visit. Risks and benefits of the medications and other alternative treatments  including no treatment were discussed with the patient. The common side effects of these medications were also explained to the patient. Informed verbal consent was obtained. Goals of current treatment regimen include improvement in pain, restoration of functioning- with focus on improvement in physical performance, general activity, work or disability,emotional distress, health care utilization and  decreased medication consumption. Will continue to monitor progress towards achieving/maintaining therapeutic goals with special emphasis on  1. Improvement in perceived interfernce  of pain with ADL's. Ability to do home exercises independently. Ability to do household chores indoor and/or outdoor work and social and leisure activities. Improve psychosocial and physical functioning. - she is showing progression towards this treatment goal with the current regimen.     She was advised against drinking alcohol with the narcotic pain medicines, advised against driving or handling machinery while adjusting the dose of medicines or if having cognitive  issues related to the current medications. Risk of overdose and death, if medicines not taken as prescribed, were also discussed. If the patient develops new symptoms or if the symptoms worsen, the patient should call the office. While transcribing every attempt was made to maintain the accuracy of the note in terms of it's contents,there may have been some errors made inadvertently. Thank you for allowing me to participate in the care of this patient.     Alfredo Curtis CNP    Cc: Gabriela Graham MD

## 2021-11-29 DIAGNOSIS — E66.9 DIABETES MELLITUS TYPE 2 IN OBESE (HCC): ICD-10-CM

## 2021-11-29 DIAGNOSIS — E66.01 MORBID OBESITY WITH BODY MASS INDEX OF 50.0-59.9 IN ADULT (HCC): ICD-10-CM

## 2021-11-29 DIAGNOSIS — E11.69 DIABETES MELLITUS TYPE 2 IN OBESE (HCC): ICD-10-CM

## 2021-11-29 RX ORDER — DULAGLUTIDE 0.75 MG/.5ML
INJECTION, SOLUTION SUBCUTANEOUS
OUTPATIENT
Start: 2021-11-29

## 2021-12-07 ENCOUNTER — OFFICE VISIT (OUTPATIENT)
Dept: PULMONOLOGY | Age: 46
End: 2021-12-07
Payer: MEDICAID

## 2021-12-07 VITALS
SYSTOLIC BLOOD PRESSURE: 118 MMHG | WEIGHT: 293 LBS | OXYGEN SATURATION: 96 % | DIASTOLIC BLOOD PRESSURE: 84 MMHG | HEIGHT: 64 IN | TEMPERATURE: 96.9 F | HEART RATE: 70 BPM | BODY MASS INDEX: 50.02 KG/M2 | RESPIRATION RATE: 16 BRPM

## 2021-12-07 DIAGNOSIS — J01.00 ACUTE NON-RECURRENT MAXILLARY SINUSITIS: ICD-10-CM

## 2021-12-07 DIAGNOSIS — E11.69 DIABETES MELLITUS TYPE 2 IN OBESE (HCC): ICD-10-CM

## 2021-12-07 DIAGNOSIS — J30.89 OTHER ALLERGIC RHINITIS: ICD-10-CM

## 2021-12-07 DIAGNOSIS — J44.9 COPD, MILD (HCC): Primary | ICD-10-CM

## 2021-12-07 DIAGNOSIS — E66.01 MORBID OBESITY WITH BODY MASS INDEX OF 50.0-59.9 IN ADULT (HCC): ICD-10-CM

## 2021-12-07 DIAGNOSIS — R60.1 GENERALIZED EDEMA: ICD-10-CM

## 2021-12-07 DIAGNOSIS — E66.9 DIABETES MELLITUS TYPE 2 IN OBESE (HCC): ICD-10-CM

## 2021-12-07 DIAGNOSIS — G47.33 OSA (OBSTRUCTIVE SLEEP APNEA): ICD-10-CM

## 2021-12-07 DIAGNOSIS — I50.32 CHRONIC DIASTOLIC CHF (CONGESTIVE HEART FAILURE) (HCC): ICD-10-CM

## 2021-12-07 DIAGNOSIS — Z71.6 TOBACCO ABUSE COUNSELING: ICD-10-CM

## 2021-12-07 LAB
ANION GAP SERPL CALCULATED.3IONS-SCNC: 15 MMOL/L (ref 3–16)
BUN BLDV-MCNC: 9 MG/DL (ref 7–20)
CALCIUM SERPL-MCNC: 9.4 MG/DL (ref 8.3–10.6)
CHLORIDE BLD-SCNC: 100 MMOL/L (ref 99–110)
CO2: 26 MMOL/L (ref 21–32)
CREAT SERPL-MCNC: 0.8 MG/DL (ref 0.6–1.1)
CREATININE URINE: 177.4 MG/DL (ref 28–259)
GFR AFRICAN AMERICAN: >60
GFR NON-AFRICAN AMERICAN: >60
GLUCOSE BLD-MCNC: 77 MG/DL (ref 70–99)
MICROALBUMIN UR-MCNC: <1.2 MG/DL
MICROALBUMIN/CREAT UR-RTO: NORMAL MG/G (ref 0–30)
POTASSIUM SERPL-SCNC: 5.8 MMOL/L (ref 3.5–5.1)
SODIUM BLD-SCNC: 141 MMOL/L (ref 136–145)

## 2021-12-07 PROCEDURE — G8484 FLU IMMUNIZE NO ADMIN: HCPCS | Performed by: INTERNAL MEDICINE

## 2021-12-07 PROCEDURE — 4004F PT TOBACCO SCREEN RCVD TLK: CPT | Performed by: INTERNAL MEDICINE

## 2021-12-07 PROCEDURE — G8926 SPIRO NO PERF OR DOC: HCPCS | Performed by: INTERNAL MEDICINE

## 2021-12-07 PROCEDURE — G8417 CALC BMI ABV UP PARAM F/U: HCPCS | Performed by: INTERNAL MEDICINE

## 2021-12-07 PROCEDURE — G8427 DOCREV CUR MEDS BY ELIG CLIN: HCPCS | Performed by: INTERNAL MEDICINE

## 2021-12-07 PROCEDURE — 99214 OFFICE O/P EST MOD 30 MIN: CPT | Performed by: INTERNAL MEDICINE

## 2021-12-07 PROCEDURE — 3023F SPIROM DOC REV: CPT | Performed by: INTERNAL MEDICINE

## 2021-12-07 RX ORDER — NEBULIZER ACCESSORIES
1 KIT MISCELLANEOUS DAILY
Qty: 1 KIT | Refills: 0 | Status: SHIPPED | OUTPATIENT
Start: 2021-12-07

## 2021-12-07 RX ORDER — IPRATROPIUM BROMIDE AND ALBUTEROL SULFATE 2.5; .5 MG/3ML; MG/3ML
1 SOLUTION RESPIRATORY (INHALATION) EVERY 4 HOURS
Qty: 360 ML | Refills: 11 | Status: SHIPPED | OUTPATIENT
Start: 2021-12-07

## 2021-12-07 RX ORDER — BUDESONIDE 0.5 MG/2ML
1 INHALANT ORAL 2 TIMES DAILY
Qty: 120 ML | Refills: 11 | Status: SHIPPED | OUTPATIENT
Start: 2021-12-07 | End: 2022-09-15

## 2021-12-07 RX ORDER — AZITHROMYCIN 250 MG/1
TABLET, FILM COATED ORAL
Qty: 1 PACKET | Refills: 0 | Status: SHIPPED | OUTPATIENT
Start: 2021-12-07 | End: 2022-06-10 | Stop reason: ALTCHOICE

## 2021-12-07 ASSESSMENT — COPD QUESTIONNAIRES
QUESTION6_LEAVINGHOUSE: 3
QUESTION3_CHESTTIGHTNESS: 3
QUESTION8_ENERGYLEVEL: 2
QUESTION1_COUGHFREQUENCY: 3
CAT_TOTALSCORE: 24
QUESTION2_CHESTPHLEGM: 3
QUESTION7_SLEEPQUALITY: 3
QUESTION4_WALKINCLINE: 4
QUESTION5_HOMEACTIVITIES: 3

## 2021-12-07 ASSESSMENT — SLEEP AND FATIGUE QUESTIONNAIRES
HOW LIKELY ARE YOU TO NOD OFF OR FALL ASLEEP WHILE WATCHING TV: 0
HOW LIKELY ARE YOU TO NOD OFF OR FALL ASLEEP WHEN YOU ARE A PASSENGER IN A CAR FOR AN HOUR WITHOUT A BREAK: 0
ESS TOTAL SCORE: 2
HOW LIKELY ARE YOU TO NOD OFF OR FALL ASLEEP WHILE SITTING QUIETLY AFTER LUNCH WITHOUT ALCOHOL: 0
HOW LIKELY ARE YOU TO NOD OFF OR FALL ASLEEP WHILE SITTING AND TALKING TO SOMEONE: 0
HOW LIKELY ARE YOU TO NOD OFF OR FALL ASLEEP WHILE SITTING AND READING: 0
HOW LIKELY ARE YOU TO NOD OFF OR FALL ASLEEP IN A CAR, WHILE STOPPED FOR A FEW MINUTES IN TRAFFIC: 0
HOW LIKELY ARE YOU TO NOD OFF OR FALL ASLEEP WHILE LYING DOWN TO REST IN THE AFTERNOON WHEN CIRCUMSTANCES PERMIT: 2
HOW LIKELY ARE YOU TO NOD OFF OR FALL ASLEEP WHILE SITTING INACTIVE IN A PUBLIC PLACE: 0

## 2021-12-07 NOTE — PROGRESS NOTES
REASON FOR CONSULTATION/CC:    Chief Complaint   Patient presents with    COPD     f/u COPD    Sleep Apnea     not using bpap           PCP: Esteban Ramirez MD    HISTORY OF PRESENT ILLNESS: Beatrice Landau is a 48y.o. year old female with a history of JOSE who presents :          allergic rhinitis  Having increased symptoms and hx of nasal bleeding with nasal spray. Green sinus drainage. Copd  Advair . Changing to budesonide / Duoneb's      Tobacco abuse  3 cigarettes . Trying to quit. JOSE  Not using device. REVIEW OF SYSTEMS:  Constitutional: Negative for fever   HENT: Negative for sore throat  Respiratory: Negative for dyspnea, cough  Cardiovascular: Negative for chest pain  Gastrointestinal: Negative for vomiting, diarrhea   Skin: Negative for rash  Psychiatric/Behavorial: Negative for anxiety      Objective:   PHYSICAL EXAM:  Blood pressure 118/84, pulse 70, temperature 96.9 °F (36.1 °C), temperature source Infrared, resp. rate 16, height 5' 4\" (1.626 m), weight (!) 337 lb (152.9 kg), SpO2 96 %, not currently breastfeeding.'  Gen: No distress. Body mass index is 57.85 kg/m². ENT:   Resp: No accessory muscle use. No crackles. No wheezes. No rhonchi. CV: Regular rate. Regular rhythm. No murmur or rub. No edema. Skin: Warm, dry, normal texture and turgor. No nodule on exposed extremities. M/S: No cyanosis. No clubbing. No joint deformity. Psych: Oriented x 3. No anxiety. Awake. Alert. Intact judgement and insight. Good Mood / Affect. Memory appears in tact   . Data Reviewed:        Assessment:     ·  JOSE  · Obesity Body mass index is 57.85 kg/m². · allergic rhinitis   · COPD, mild, FEV1 76%. Decreased DLCO. Plan:      Problem List Items Addressed This Visit     Tobacco abuse counseling       She has done an excellent job of weaning and down to 3 cigarettes daily. Trying to quit completely.          Other allergic rhinitis      She continues to have significant symptoms of sinus congestion postnasal drip leading to cough. We discussed the next step of nasal sprays. She does not want to use nasal sprays. Therefore, we discussed alternatives of using the nebulizer via facemask reading [place] with budesonide twice daily to treat allergic rhinitis and COPD along with duo nebs. The ipratropium and the duo nebs will also improve sinus symptoms as well. She expressed understanding for this approach. Relevant Medications    budesonide (PULMICORT) 0.5 MG/2ML nebulizer suspension    ipratropium-albuterol (DUONEB) 0.5-2.5 (3) MG/3ML SOLN nebulizer solution    JOSE (obstructive sleep apnea)     Not using device         COPD, mild (HCC) - Primary      Currently on Advair and controlled. Secondary to worsening sinus symptoms sinus sprays, change Advair over to budesonide nebulized twice a day along with duo nebs. She will be using this over the fullface mask via nose. Education given on this and expressed understanding.          Relevant Medications    Respiratory Therapy Supplies (NEBULIZER/TUBING/MOUTHPIECE) KIT    budesonide (PULMICORT) 0.5 MG/2ML nebulizer suspension    ipratropium-albuterol (DUONEB) 0.5-2.5 (3) MG/3ML SOLN nebulizer solution      Other Visit Diagnoses     Acute non-recurrent maxillary sinusitis        Relevant Medications    azithromycin (ZITHROMAX) 250 MG tablet               [facility]  [phone]

## 2021-12-08 ENCOUNTER — TELEPHONE (OUTPATIENT)
Dept: PULMONOLOGY | Age: 46
End: 2021-12-08

## 2021-12-08 NOTE — ASSESSMENT & PLAN NOTE
Currently on Advair and controlled. Secondary to worsening sinus symptoms sinus sprays, change Advair over to budesonide nebulized twice a day along with duo nebs. She will be using this over the fullface mask via nose. Education given on this and expressed understanding.

## 2021-12-08 NOTE — TELEPHONE ENCOUNTER
Received fax from [facility] requesting a PA for budesonide nebulizer solution.   PA completed through [facility]  Awaiting response

## 2021-12-08 NOTE — ASSESSMENT & PLAN NOTE
She continues to have significant symptoms of sinus congestion postnasal drip leading to cough. We discussed the next step of nasal sprays. She does not want to use nasal sprays. Therefore, we discussed alternatives of using the nebulizer via facemask reading [place]andy with budesonide twice daily to treat allergic rhinitis and COPD along with duo nebs. The ipratropium and the duo nebs will also improve sinus symptoms as well. She expressed understanding for this approach.

## 2021-12-09 ENCOUNTER — TELEPHONE (OUTPATIENT)
Dept: PULMONOLOGY | Age: 46
End: 2021-12-09

## 2021-12-22 ENCOUNTER — OFFICE VISIT (OUTPATIENT)
Dept: PAIN MANAGEMENT | Age: 46
End: 2021-12-22
Payer: MEDICAID

## 2021-12-22 VITALS
SYSTOLIC BLOOD PRESSURE: 116 MMHG | DIASTOLIC BLOOD PRESSURE: 78 MMHG | TEMPERATURE: 97.2 F | HEART RATE: 73 BPM | WEIGHT: 293 LBS | BODY MASS INDEX: 50.02 KG/M2 | HEIGHT: 64 IN | OXYGEN SATURATION: 96 %

## 2021-12-22 DIAGNOSIS — E66.01 MORBID OBESITY (HCC): ICD-10-CM

## 2021-12-22 DIAGNOSIS — G89.29 CHRONIC BILATERAL LOW BACK PAIN WITHOUT SCIATICA: ICD-10-CM

## 2021-12-22 DIAGNOSIS — M54.50 CHRONIC BILATERAL LOW BACK PAIN WITHOUT SCIATICA: ICD-10-CM

## 2021-12-22 DIAGNOSIS — M17.0 PRIMARY OSTEOARTHRITIS OF BOTH KNEES: ICD-10-CM

## 2021-12-22 DIAGNOSIS — M62.830 BACK SPASM: ICD-10-CM

## 2021-12-22 DIAGNOSIS — G89.4 CHRONIC PAIN SYNDROME: ICD-10-CM

## 2021-12-22 DIAGNOSIS — G47.33 OSA (OBSTRUCTIVE SLEEP APNEA): ICD-10-CM

## 2021-12-22 DIAGNOSIS — M16.11 PRIMARY OSTEOARTHRITIS OF RIGHT HIP: ICD-10-CM

## 2021-12-22 PROCEDURE — G8417 CALC BMI ABV UP PARAM F/U: HCPCS | Performed by: NURSE PRACTITIONER

## 2021-12-22 PROCEDURE — G8484 FLU IMMUNIZE NO ADMIN: HCPCS | Performed by: NURSE PRACTITIONER

## 2021-12-22 PROCEDURE — G8427 DOCREV CUR MEDS BY ELIG CLIN: HCPCS | Performed by: NURSE PRACTITIONER

## 2021-12-22 PROCEDURE — 4004F PT TOBACCO SCREEN RCVD TLK: CPT | Performed by: NURSE PRACTITIONER

## 2021-12-22 PROCEDURE — 99213 OFFICE O/P EST LOW 20 MIN: CPT | Performed by: NURSE PRACTITIONER

## 2021-12-22 RX ORDER — LIDOCAINE 50 MG/G
1 PATCH TOPICAL DAILY
Qty: 30 PATCH | Refills: 0 | Status: SHIPPED | OUTPATIENT
Start: 2021-12-22 | End: 2022-01-19 | Stop reason: SDUPTHER

## 2021-12-22 RX ORDER — HYDROCODONE BITARTRATE AND ACETAMINOPHEN 7.5; 325 MG/1; MG/1
1 TABLET ORAL EVERY 6 HOURS PRN
Qty: 120 TABLET | Refills: 0 | Status: SHIPPED | OUTPATIENT
Start: 2021-12-22 | End: 2022-01-19 | Stop reason: SDUPTHER

## 2021-12-22 RX ORDER — METHOCARBAMOL 500 MG/1
500 TABLET, FILM COATED ORAL 2 TIMES DAILY
Qty: 60 TABLET | Refills: 0 | Status: SHIPPED | OUTPATIENT
Start: 2021-12-22 | End: 2022-01-19 | Stop reason: SDUPTHER

## 2021-12-22 NOTE — PROGRESS NOTES
Samiral Fall 1975  [id number]      HISTORY OF PRESENT ILLNESS:  Ms. Johan Briones is a 48 y.o. female returns for a follow up visit for pain management  She has a diagnosis of   1. Chronic pain syndrome    2. Primary osteoarthritis of both knees    3. Primary osteoarthritis of right hip    4. Chronic bilateral low back pain without sciatica    5. Back spasm    6. JOSE (obstructive sleep apnea)    7. Morbid obesity ([address]      On the Patients Pain Assessment form:  She complains of pain in the lower back, right hip and both knees She rates the pain 6/10 and describes it as aching, burning, numbness, stabbing. Current treatment regimen has helped relieve about 40% of the pain. She denies any side effects from the current pain regimen. Patient reports that since the last follow up visit the physical functioning is unchanged, family/social relationships are unchanged, mood is better sleep patterns are unchanged, and that the overall functioning is unchanged. Patient denies misusing/abusing her narcotic pain medications or using any illegal drugs. Upon obtaining medical history from Ms. Johan Briones states that pain is manageable on current pain therapy. Takes pain medications as prescribed. Mood is stable without anxiety. Sleep is fair with an average of 5-6 hours. Denies to having issues of constipation. Tolerating activities/house chores with moderate tenderness to the lower back. Working on smoking cessation    ALLERGIES: Patients list of allergies were reviewed     MEDICATIONS: Ms. Johan Briones list of medications were reviewed. Her current medications are   Outpatient Medications Prior to Visit   Medication Sig Dispense Refill    Respiratory Therapy Supplies (NEBULIZER/TUBING/MOUTHPIECE) KIT 1 kit by Does not apply route daily 1 kit 0    budesonide (PULMICORT) 0.5 MG/2ML nebulizer suspension Take 2 mLs by nebulization 2 times daily 120 mL 11    ipratropium-albuterol (DUONEB) 0.5-2.5 (3) MG/3ML SOLN nebulizer solution Inhale 3 mLs into the lungs every 4 hours 360 mL 11    azithromycin (ZITHROMAX) 250 MG tablet Take as directed 1 packet 0    venlafaxine (EFFEXOR XR) 75 MG extended release capsule Take 2 capsules by mouth daily 60 capsule 0    metoprolol (LOPRESSOR) 100 MG tablet TAKE ONE TABLET BY MOUTH TWICE A DAY 60 tablet 3    amitriptyline (ELAVIL) 25 MG tablet TAKE ONE TABLET BY MOUTH ONCE NIGHTLY 30 tablet 2    loratadine (CLARITIN) 10 MG tablet TAKE ONE TABLET BY MOUTH DAILY 90 tablet 1    spironolactone (ALDACTONE) 25 MG tablet TAKE TWO TABLETS BY MOUTH TWICE A  tablet 0    metFORMIN (GLUCOPHAGE) 500 MG tablet TAKE ONE TABLET BY MOUTH TWICE A DAY WITH MEALS 180 tablet 0    montelukast (SINGULAIR) 10 MG tablet TAKE ONE TABLET BY MOUTH ONCE NIGHTLY 30 tablet 3    cloNIDine (CATAPRES) 0.1 MG tablet TAKE THREE TABLETS BY MOUTH THREE TIMES A  tablet 3    omeprazole (PRILOSEC) 20 MG delayed release capsule Take 1 capsule by mouth Daily 90 capsule 1    Blood Glucose Monitoring Suppl (TRUE METRIX METER) w/Device KIT 1 kit by Does not apply route 2 times daily 1 kit 0    blood glucose test strips (TRUE METRIX BLOOD GLUCOSE TEST) strip 1 each by In Vitro route 2 times daily 200 each 1    Lancets MISC 1 each by Does not apply route 2 times daily Dispense brand per insurance preference 200 each 1    Alcohol Swabs 70 % PADS 1 each by Does not apply route 2 times daily 200 each 0    vitamin D3 (CHOLECALCIFEROL) 25 MCG (1000 UT) TABS tablet Take 2 tablets by mouth three times a week 30 tablet 5    Dulaglutide 1.5 MG/0.5ML SOPN Inject 1.5 mg into the skin once a week 4 pen 5    chlorthalidone (HYGROTON) 25 MG tablet Take 1 tablet by mouth daily 30 tablet 3    torsemide (DEMADEX) 20 MG tablet TAKE TWO TABLETS BY MOUTH DAILY 60 tablet 11    oxybutynin (DITROPAN-XL) 5 MG extended release tablet TAKE ONE TABLET BY MOUTH DAILY 30 tablet 3    albuterol sulfate  (90 Base) MCG/ACT inhaler INHALE TWO PUFFS BY MOUTH EVERY 6 HOURS AS NEEDED FOR WHEEZING 18 g 11    Blood Pressure Monitoring (B-D ASSURE BPM/AUTO ARM CUFF) MISC 1 Device by Does not apply route daily 1 each 0    Misc. Devices (RAISED TOILET SEAT) MISC Use daily as needed as directed 1 each 0    atorvastatin (LIPITOR) 20 MG tablet Take 1 tablet by mouth daily 90 tablet 3    acetaminophen (APAP EXTRA STRENGTH) 500 MG tablet Take 2 tablets by mouth every 6 hours as needed for Pain DO NOT TAKE WITH OTHER MEDICATIONS CONTAINING ACETAMINOPHEN. 30 tablet 0    HYDROcodone-acetaminophen (NORCO) 7.5-325 MG per tablet Take 1 tablet by mouth every 6 hours as needed for Pain for up to 30 days. 120 tablet 0    lidocaine (LIDODERM) 5 % Place 1 patch onto the skin daily 12 hours on, 12 hours off. 30 patch 0    methocarbamol (ROBAXIN) 500 MG tablet Take 1 tablet by mouth 2 times daily 60 tablet 0    nicotine (NICODERM CQ) 14 MG/24HR Place 1 patch onto the skin daily for 14 days 28 patch 5     No facility-administered medications prior to visit. SOCIAL/FAMILY/PAST MEDICAL HISTORY: Ms. Omaira Lennon, family and past medical history was reviewed. REVIEW OF SYSTEMS:    Respiratory: Negative for apnea, chest tightness and shortness of breath or change in baseline breathing. Gastrointestinal: Negative for nausea, vomiting, abdominal pain, diarrhea, constipation, blood in stool and abdominal distention. PHYSICAL EXAM:   Nursing note and vitals reviewed. /78   Pulse 73   Temp 97.2 °F (36.2 °C)   Ht 5' 4\" (1.626 m)   Wt (!) 339 lb (153.8 kg)   LMP  (LMP Unknown)   SpO2 96%   BMI 58.19 kg/m²   Constitutional: She appears well-developed and well-nourished. No acute distress. Skin: Skin is warm and dry, good turgor. No rash noted. She is not diaphoretic. Cardiovascular: Normal rate, regular rhythm, normal heart sounds, and does not have murmur. Pulmonary/Chest: Effort normal. No respiratory distress.  She does not have wheezes in the lung fields. She has no rales. Neurological/Psychiatric:She is alert and oriented to person, place, and time. Coordination is  Normal. Stable gait with the aid of a cane  Her mood isAppropriate and affect is Neutral/Euthymic(normal) . IMPRESSION:   1. Chronic pain syndrome    2. Primary osteoarthritis of both knees    3. Primary osteoarthritis of right hip    4. Chronic bilateral low back pain without sciatica    5. Back spasm    6. JOSE (obstructive sleep apnea)    7. Morbid obesity ([address]        PLAN:  Informed verbal consent was obtained  -Continue with Norco, Robaxin, Effexor, Lidocaine, Elavil  -Liang exercises/knee stretches recommended  -CBT techniques- relaxation therapies such as biofeedback, mindfulness based stress reduction, imagery, cognitive restructuring, problem solving discussed with patient  -She was advised weight reduction, diet changes- 800-1200 fior diet, diet diary, exercising, nutritional  consult increased physical activity as tolerated  -Advised patient to quit smoking for  health related concerns and to improve the treatment outcomes. Education was given on quitting smoking and the use of different modalities including medications, hypnotherapy, counselling  and biofeedback. These were discussed with patient.  -Last UDS 4/7/21 Consistent  -Return in about 4 weeks (around 1/19/2022). -[place] was obtained and reviewed  for the last one year and no indicators of drug misuse  were found. Any other controlled substance prescriptions  seen on the record have been accounted for, I am aware of the patient receiving these medications. Abhay Padmini [place] record will be rechecked as part of office protocol. Current Outpatient Medications   Medication Sig Dispense Refill    HYDROcodone-acetaminophen (NORCO) 7.5-325 MG per tablet Take 1 tablet by mouth every 6 hours as needed for Pain for up to 30 days.  120 tablet 0    lidocaine (LIDODERM) 5 % Place 1 patch onto the skin daily 12 hours on, 12 hours off.  30 patch 0    methocarbamol (ROBAXIN) 500 MG tablet Take 1 tablet by mouth 2 times daily 60 tablet 0    Respiratory Therapy Supplies (NEBULIZER/TUBING/MOUTHPIECE) KIT 1 kit by Does not apply route daily 1 kit 0    budesonide (PULMICORT) 0.5 MG/2ML nebulizer suspension Take 2 mLs by nebulization 2 times daily 120 mL 11    ipratropium-albuterol (DUONEB) 0.5-2.5 (3) MG/3ML SOLN nebulizer solution Inhale 3 mLs into the lungs every 4 hours 360 mL 11    azithromycin (ZITHROMAX) 250 MG tablet Take as directed 1 packet 0    venlafaxine (EFFEXOR XR) 75 MG extended release capsule Take 2 capsules by mouth daily 60 capsule 0    metoprolol (LOPRESSOR) 100 MG tablet TAKE ONE TABLET BY MOUTH TWICE A DAY 60 tablet 3    amitriptyline (ELAVIL) 25 MG tablet TAKE ONE TABLET BY MOUTH ONCE NIGHTLY 30 tablet 2    loratadine (CLARITIN) 10 MG tablet TAKE ONE TABLET BY MOUTH DAILY 90 tablet 1    spironolactone (ALDACTONE) 25 MG tablet TAKE TWO TABLETS BY MOUTH TWICE A  tablet 0    metFORMIN (GLUCOPHAGE) 500 MG tablet TAKE ONE TABLET BY MOUTH TWICE A DAY WITH MEALS 180 tablet 0    montelukast (SINGULAIR) 10 MG tablet TAKE ONE TABLET BY MOUTH ONCE NIGHTLY 30 tablet 3    cloNIDine (CATAPRES) 0.1 MG tablet TAKE THREE TABLETS BY MOUTH THREE TIMES A  tablet 3    omeprazole (PRILOSEC) 20 MG delayed release capsule Take 1 capsule by mouth Daily 90 capsule 1    Blood Glucose Monitoring Suppl (TRUE METRIX METER) w/Device KIT 1 kit by Does not apply route 2 times daily 1 kit 0    blood glucose test strips (TRUE METRIX BLOOD GLUCOSE TEST) strip 1 each by In Vitro route 2 times daily 200 each 1    Lancets MISC 1 each by Does not apply route 2 times daily Dispense brand per insurance preference 200 each 1    Alcohol Swabs 70 % PADS 1 each by Does not apply route 2 times daily 200 each 0    vitamin D3 (CHOLECALCIFEROL) 25 MCG (1000 UT) TABS tablet Take 2 tablets by mouth three times a week 30 tablet 5    Dulaglutide 1.5 MG/0.5ML SOPN Inject 1.5 mg into the skin once a week 4 pen 5    chlorthalidone (HYGROTON) 25 MG tablet Take 1 tablet by mouth daily 30 tablet 3    torsemide (DEMADEX) 20 MG tablet TAKE TWO TABLETS BY MOUTH DAILY 60 tablet 11    oxybutynin (DITROPAN-XL) 5 MG extended release tablet TAKE ONE TABLET BY MOUTH DAILY 30 tablet 3    albuterol sulfate  (90 Base) MCG/ACT inhaler INHALE TWO PUFFS BY MOUTH EVERY 6 HOURS AS NEEDED FOR WHEEZING 18 g 11    Blood Pressure Monitoring (B-D ASSURE BPM/AUTO ARM CUFF) MISC 1 Device by Does not apply route daily 1 each 0    Misc. Devices (RAISED TOILET SEAT) MISC Use daily as needed as directed 1 each 0    atorvastatin (LIPITOR) 20 MG tablet Take 1 tablet by mouth daily 90 tablet 3    acetaminophen (APAP EXTRA STRENGTH) 500 MG tablet Take 2 tablets by mouth every 6 hours as needed for Pain DO NOT TAKE WITH OTHER MEDICATIONS CONTAINING ACETAMINOPHEN. 30 tablet 0    doxycycline hyclate (VIBRA-TABS) 100 MG tablet Take 1 tablet by mouth 2 times daily for 7 days 14 tablet 0    metroNIDAZOLE (FLAGYL) 500 MG tablet Take 1 tablet by mouth 2 times daily for 7 days 14 tablet 0    nicotine (NICODERM CQ) 14 MG/24HR Place 1 patch onto the skin daily for 14 days 28 patch 5     No current facility-administered medications for this visit. I will continue her current medication regimen  which is part of the above treatment schedule. It has been helping with Ms. Jamal Farias chronic  medical problems which for this visit include:   Diagnoses of Chronic pain syndrome, Primary osteoarthritis of both knees, Primary osteoarthritis of right hip, Chronic bilateral low back pain without sciatica, Back spasm, JOSE (obstructive sleep apnea), and Morbid obesity ([address] were pertinent to this visit. Risks and benefits of the medications and other alternative treatments  including no treatment were discussed with the patient. The common side effects of these medications were also

## 2021-12-22 NOTE — PATIENT INSTRUCTIONS
Patient Education        Therapeutic Ball: Back Exercises  Introduction  Here are some examples of typical exercises for your condition. Start each exercise slowly. Ease off the exercise if you start to have pain. Your doctor or physical therapist will tell you when you can start these exercises and which ones will work best for you. To prepare, make sure that your ball is the right size for you. When inflated and firm, it should allow you to sit with your hips and knees bent at about a 90-degree angle (like the letter L). How to do the exercises  Seated position on ball    1. Use this exercise to get used to moving on the ball and to find your best sitting position. 2. Sit comfortably on the ball with your feet about hip-width apart. If you feel unsteady, rest your hands on the ball near your hips. 3. As you do this exercise, try to keep your shoulders and upper body relaxed and still. 4. Using your stomach and back muscles to move your pelvis, roll the ball forward. This will round your back. 5. Still using your stomach and back muscles, roll the ball back. You will arch your back. 6. Repeat this rounding-arching motion a few times. 7. Stop in between the two positions, where your back is not rounded or arched. This is called your neutral position. Pelvic rotation    1. Sit tall on the ball. 2. Slowly rotate your hips in a [place] pattern. Keep the movement focused at your hips. 3. Repeat, but [place] in the other direction. 4. Repeat 8 to 12 times. Postural sitting    1. Use this position to find a stable, relaxed posture on the ball. You can use this position as your starting point for other ball exercises. If you feel unsteady on the ball, start on a chair first.  2. Sit on a ball or chair, with your feet planted straight in front of you. 3. Imagine that a string at the top of your head is pulling you straight up.  Think of yourself as 2 inches taller than you are.  4. Slightly tuck your chin.  5. Keep your shoulders back and relaxed. Knee extension    1. Sit tall on the ball with your feet planted in front of you, hip-width apart. As you do this exercise, avoid slumping your shoulders and arching your back. 2. Rest your hands on the ball near your hip or a steady object next to you. (If you feel very stable on the ball, rest your hands in your lap or at your side.)  3. Slowly straighten one leg at the knee. Slowly lower it back down. Repeat with the other leg. 4. Repeat this exercise 8 to 12 times. Roll-ups    1. Lie on your back with your knees bent, feet resting on the floor. 2. Lay the ball on your thighs. Rest your hands up high on the ball. 3. Raising your head and shoulder blades, roll the ball up your thighs. Exhale as you roll up. 4. If this is hard on your neck, gently support your lower head and upper neck with one hand. Don't use that hand to pull your head up. 5. Repeat 8 to 12 times. Ball curls    1. Lie on your back with your ankles resting on the ball, knees straight. 2. Use your legs to roll the exercise ball toward you. Allow your knees to bend and move closer to your chest.  3. Pause briefly, and then roll the ball to the starting position. Try to keep the ball rolling straight. You will feel the muscles in your lower belly working. 4. Repeat 8 to 12 times. Bridge with ball under legs    1. Lie on your back with your legs up, calves resting on the ball. For more challenge, rest your heels on the ball. 2. Look up at the ceiling, and keep your chin relaxed. You can place a small pillow under your head or neck for comfort. 3. With your arms by your side, press your hands onto the floor for stability. 4. Tighten your belly muscles by pulling in your belly button toward your spine. 5. Push your heels down toward the floor, squeeze your buttocks, and lift your hips off the floor until your shoulders, hips, and knees are all in a straight line.   6. Try to keep the ball steady. Hold for about 6 seconds as you continue to breathe normally. 7. Slowly lower your hips back down to the floor. 8. Repeat 8 to 12 times. Ball curls with bridge    1. Start flat on your back with your ankles resting on the ball. 2. Look up at the ceiling, and keep your chin relaxed. You can place a small pillow under your head or neck for comfort. 3. With your arms by your side, press your hands onto the floor for stability. 4. Tighten your belly muscles by pulling in your belly button toward your spine. 5. Push your heels down toward the floor, squeeze your buttocks, and lift your hips off the floor until your shoulders, hips, and knees are all in a straight line. 6. While holding the bridge position, roll the ball toward you with your heels. Keep your hips as level as you can. 7. Pause briefly, and then roll the ball back out. Try to keep the ball rolling straight. You will feel the muscles in your lower belly working as you straighten your legs. 8. Lower your hips, and return to your starting position. 9. Repeat 8 to 12 times. 10. When you can keep your body and the ball steady throughout this exercise, you're ready for more challenge. Try keeping your hips raised while rolling the ball out, holding the bridge, and rolling back, a few times in a row. Praying consuelo    1. Kneel upright with the ball in front of you. 2. To start, clasp your hands together. Rest them on the ball in front of you. 3. As you do this exercise, keep your back and hips straight and tighten your belly and buttocks muscles. Keep your knees in place. 4. Press on the ball with your arms. Lean forward from the knees. This rolls the ball forward. You will bear most of your weight on your arms. 5. If your back starts to ache, you've gone too far. Pull back a bit. 6. Roll back to the start position. 7. Repeat 8 to 12 times. Walk-out plank on ball    1. Kneel over the ball.  Place your hands on the floor in front of you.  2. Walk your hands forward until your legs are straight on the ball. This is the plank position. 3. When in plank position, hold your body straight and tighten your belly and buttocks muscles. Keep your chin slightly tucked. 4. Roll as far forward as you can without losing your balance or letting your hips drop. You may stop with the ball under your thighs, or even under your knees or shins. 5. Hold a few seconds, then walk your hands back and return to the start position. 6. Repeat 8 to 12 times. Push-up with thighs on ball    1. Kneel over the ball. Place your hands on the floor in front of you. 2. Walk your hands forward until your legs are straight on the ball. This is the plank position. 3. When in plank position, hold your body straight and tighten your belly and buttocks muscles. Keep your chin slightly tucked. 4. Roll as far forward as you can without losing your balance or letting your hips drop. You may stop with the ball under your thighs, or even under your knees or shins. 5. Bend your elbows. Slowly lower your body toward the ground as far as you can without losing your balance. 6. If your wrists hurt, try moving your hands a little farther apart so they're not right under your shoulders. 7. Slowly straighten your arms. 8. Do 8 to 12 of these push-ups. Wall squat with ball    1. Stand facing away from a wall. Place your feet about shoulder-width apart. 2. Place the ball between your middle back and the wall. Move your feet out in front of you so they are about a foot in front of your hips. 3. Keep your arms at your sides, or put your hands on your hips. 4. Slowly squat down as if you are going to sit in a chair, rolling your back over the ball as you squat. The ball should move with you but stay pressed into the wall. 5. Be sure that your knees do not go in front of your toes as you squat. 6. Hold for 6 seconds. 7. Slowly rise to your standing position.   8. Repeat 8 to 12 times.  Child's pose with ball    1. Kneeling upright with your back straight, rest your hands on the ball in front of you. 2. Breathe out as you bend at the hips, and roll the ball forward. Lower your chest toward the ground, and drop your hips back toward your heels. 3. To stretch your upper back and shoulders, hold this position for 15 to 30 seconds. 4. Repeat 2 to 4 times. Follow-up care is a key part of your treatment and safety. Be sure to make and go to all appointments, and call your doctor if you are having problems. It's also a good idea to know your test results and keep a list of the medicines you take. Where can you learn more? Go to https://[employer]. org and sign in to your "[employer] account. Enter E013 in the [employer] box to learn more about \"Therapeutic Ball: Back Exercises. \"     If you do not have an account, please click on the \"Sign Up Now\" link. Current as of: July 1, 2021               Content Version: 13.1  © 2006-2021 [employer]. Care instructions adapted under license by [facility] ([facility]). If you have questions about a medical condition or this instruction, always ask your healthcare professional. [address] any warranty or liability for your use of this information.

## 2021-12-23 ENCOUNTER — HOSPITAL ENCOUNTER (EMERGENCY)
Age: 46
Discharge: HOME | End: 2021-12-23
Attending: STUDENT IN AN ORGANIZED HEALTH CARE EDUCATION/TRAINING PROGRAM
Payer: MEDICAID

## 2021-12-23 VITALS
HEIGHT: 64 IN | HEART RATE: 68 BPM | OXYGEN SATURATION: 99 % | TEMPERATURE: 97.6 F | SYSTOLIC BLOOD PRESSURE: 170 MMHG | BODY MASS INDEX: 50.02 KG/M2 | WEIGHT: 293 LBS | RESPIRATION RATE: 21 BRPM | DIASTOLIC BLOOD PRESSURE: 92 MMHG

## 2021-12-23 DIAGNOSIS — A59.9 TRICHOMONAS INFECTION: Primary | ICD-10-CM

## 2021-12-23 DIAGNOSIS — B96.89 BACTERIAL VAGINOSIS: ICD-10-CM

## 2021-12-23 DIAGNOSIS — N76.0 BACTERIAL VAGINOSIS: ICD-10-CM

## 2021-12-23 LAB
BACTERIA WET PREP: ABNORMAL
BACTERIA: ABNORMAL /HPF
BILIRUBIN URINE: NEGATIVE
BLOOD, URINE: NEGATIVE
CLARITY: CLEAR
CLUE CELLS: ABNORMAL
COLOR: YELLOW
EPITHELIAL CELLS WET PREP: ABNORMAL
EPITHELIAL CELLS, UA: ABNORMAL /HPF (ref 0–5)
GLUCOSE URINE: NEGATIVE MG/DL
HCG(URINE) PREGNANCY TEST: NEGATIVE
KETONES, URINE: NEGATIVE MG/DL
LEUKOCYTE ESTERASE, URINE: ABNORMAL
MICROSCOPIC EXAMINATION: YES
MUCUS: ABNORMAL /LPF
NITRITE, URINE: NEGATIVE
PH UA: 6 (ref 5–8)
PROTEIN UA: NEGATIVE MG/DL
RBC UA: ABNORMAL /HPF (ref 0–4)
RBC WET PREP: ABNORMAL
SOURCE WET PREP: ABNORMAL
SPECIFIC GRAVITY UA: 1.02 (ref 1–1.03)
TRICHOMONAS PREP: ABNORMAL
TRICHOMONAS: PRESENT /HPF
URINE REFLEX TO CULTURE: ABNORMAL
URINE TYPE: ABNORMAL
UROBILINOGEN, URINE: 0.2 E.U./DL
WBC UA: ABNORMAL /HPF (ref 0–5)
WBC WET PREP: ABNORMAL
YEAST WET PREP: ABNORMAL

## 2021-12-23 PROCEDURE — 87491 CHLMYD TRACH DNA AMP PROBE: CPT

## 2021-12-23 PROCEDURE — 81001 URINALYSIS AUTO W/SCOPE: CPT

## 2021-12-23 PROCEDURE — 84703 CHORIONIC GONADOTROPIN ASSAY: CPT

## 2021-12-23 PROCEDURE — 99283 EMERGENCY DEPT VISIT LOW MDM: CPT

## 2021-12-23 PROCEDURE — 87591 N.GONORRHOEAE DNA AMP PROB: CPT

## 2021-12-23 PROCEDURE — 96372 THER/PROPH/DIAG INJ SC/IM: CPT

## 2021-12-23 PROCEDURE — 6360000002 HC RX W HCPCS: Performed by: STUDENT IN AN ORGANIZED HEALTH CARE EDUCATION/TRAINING PROGRAM

## 2021-12-23 PROCEDURE — 87210 SMEAR WET MOUNT SALINE/INK: CPT

## 2021-12-23 PROCEDURE — 6370000000 HC RX 637 (ALT 250 FOR IP): Performed by: STUDENT IN AN ORGANIZED HEALTH CARE EDUCATION/TRAINING PROGRAM

## 2021-12-23 RX ORDER — DOXYCYCLINE HYCLATE 100 MG
100 TABLET ORAL 2 TIMES DAILY
Qty: 14 TABLET | Refills: 0 | Status: SHIPPED | OUTPATIENT
Start: 2021-12-23 | End: 2021-12-30

## 2021-12-23 RX ORDER — DOXYCYCLINE HYCLATE 100 MG
100 TABLET ORAL ONCE
Status: COMPLETED | OUTPATIENT
Start: 2021-12-23 | End: 2021-12-23

## 2021-12-23 RX ORDER — CEFTRIAXONE 1 G/1
1000 INJECTION, POWDER, FOR SOLUTION INTRAMUSCULAR; INTRAVENOUS ONCE
Status: COMPLETED | OUTPATIENT
Start: 2021-12-23 | End: 2021-12-23

## 2021-12-23 RX ORDER — METRONIDAZOLE 500 MG/1
500 TABLET ORAL 2 TIMES DAILY
Qty: 14 TABLET | Refills: 0 | Status: SHIPPED | OUTPATIENT
Start: 2021-12-23 | End: 2021-12-30

## 2021-12-23 RX ORDER — METRONIDAZOLE 500 MG/1
500 TABLET ORAL ONCE
Status: COMPLETED | OUTPATIENT
Start: 2021-12-23 | End: 2021-12-23

## 2021-12-23 RX ORDER — DOXYCYCLINE HYCLATE 100 MG
100 TABLET ORAL 2 TIMES DAILY
Qty: 14 TABLET | Refills: 0 | Status: SHIPPED | OUTPATIENT
Start: 2021-12-23 | End: 2021-12-23 | Stop reason: SDUPTHER

## 2021-12-23 RX ADMIN — DOXYCYCLINE HYCLATE 100 MG: 100 TABLET, COATED ORAL at 15:46

## 2021-12-23 RX ADMIN — CEFTRIAXONE 1000 MG: 1 INJECTION, POWDER, FOR SOLUTION INTRAMUSCULAR; INTRAVENOUS at 15:46

## 2021-12-23 RX ADMIN — METRONIDAZOLE 500 MG: 500 TABLET ORAL at 15:46

## 2021-12-23 ASSESSMENT — PAIN DESCRIPTION - LOCATION
LOCATION: VAGINA
LOCATION: VAGINA

## 2021-12-23 ASSESSMENT — PAIN DESCRIPTION - FREQUENCY
FREQUENCY: CONTINUOUS
FREQUENCY: CONTINUOUS

## 2021-12-23 ASSESSMENT — PAIN SCALES - GENERAL
PAINLEVEL_OUTOF10: 6
PAINLEVEL_OUTOF10: 5

## 2021-12-23 ASSESSMENT — PAIN DESCRIPTION - ORIENTATION
ORIENTATION: INNER
ORIENTATION: INNER

## 2021-12-23 ASSESSMENT — PAIN - FUNCTIONAL ASSESSMENT: PAIN_FUNCTIONAL_ASSESSMENT: 0-10

## 2021-12-23 ASSESSMENT — PAIN DESCRIPTION - ONSET
ONSET: GRADUAL
ONSET: GRADUAL

## 2021-12-23 ASSESSMENT — PAIN DESCRIPTION - PAIN TYPE
TYPE: ACUTE PAIN
TYPE: ACUTE PAIN

## 2021-12-23 ASSESSMENT — PAIN DESCRIPTION - DESCRIPTORS
DESCRIPTORS: ITCHING
DESCRIPTORS: ITCHING

## 2021-12-23 ASSESSMENT — PAIN DESCRIPTION - PROGRESSION
CLINICAL_PROGRESSION: GRADUALLY IMPROVING
CLINICAL_PROGRESSION: GRADUALLY WORSENING

## 2021-12-23 NOTE — ED NOTES
Discharge and education instructions reviewed. Patient verbalized understanding, teach-back successful. Patient denied questions at this time. No acute distress noted. Patient instructed to follow-up as noted - return to emergency department if symptoms worsen. Patient verbalized understanding. Discharged per EDMD with discharge instructions.         Markus Grady RN  12/23/21 [id number]

## 2021-12-23 NOTE — ED PROVIDER NOTES
Primary Care Physician: Kade Rocha MD   Attending Physician: No att. providers found     History   Chief Complaint   Patient presents with    Vaginal Itching     pt c/o vaginal discomfort and itching. pain 6/10        HPI   Azul France is a 48 y.o. female congestive heart failure, COPD, diabetes, hypertension, hyperlipidemia, morbid obesity who presents complaining of vaginal discomfort with itchiness. She is concerned that she might have had an STD from the  who is cheating on her. Denies any fevers chills nausea vomiting chest pain or shortness of breath. Past Medical History:   Diagnosis Date    Anxiety     Arthritis     Asthma     Back pain     CHF (congestive heart failure) (HCC)     COPD (chronic obstructive pulmonary disease) (HCC)     Depression     Diabetes mellitus (HCC)     GERD (gastroesophageal reflux disease)     Hyperlipidemia     Hypertension     Obesity     Sleep apnea         Past Surgical History:   Procedure Laterality Date    CHOLECYSTECTOMY      HYSTERECTOMY      SKIN BIOPSY      TUBAL LIGATION      UPPER GASTROINTESTINAL ENDOSCOPY N/A 10/14/2019    EGD BIOPSY performed by Judy Mix DO at [facility] ENDOSCOPY        Family History   Problem Relation Age of Onset    Breast Cancer Maternal Aunt         Social History     Socioeconomic History    Marital status:      Spouse name: None    Number of children: None    Years of education: None    Highest education level: None   Occupational History    Occupation: stay at home mom   Tobacco Use    Smoking status: Current Every Day Smoker     Packs/day: 0.50     Years: 20.00     Pack years: 10.00     Types: Cigarettes     Start date: 1985     Last attempt to quit: 2020     Years since quittin.8    Smokeless tobacco: Former User    Tobacco comment: working on it   Vaping Use    Vaping Use: Never used   Substance and Sexual Activity    Alcohol use:  Yes     Alcohol/week: 1.0 standard drink     Types: 1 Glasses of wine per week     Comment: rare    Drug use: No    Sexual activity: Yes     Partners: Male   Other Topics Concern    None   Social History Narrative    None     Social Determinants of Health     Financial Resource Strain:     Difficulty of Paying Living Expenses: Not on file   Food Insecurity:     Worried About Running Out of Food in the Last Year: Not on file    Francesca of Food in the Last Year: Not on file   Transportation Needs:     Lack of Transportation (Medical): Not on file    Lack of Transportation (Non-Medical): Not on file   Physical Activity:     Days of Exercise per Week: Not on file    Minutes of Exercise per Session: Not on file   Stress:     Feeling of Stress : Not on file   Social Connections:     Frequency of Communication with Friends and Family: Not on file    Frequency of Social Gatherings with Friends and Family: Not on file    Attends Islam Services: Not on file    Active Member of [address] [place] [address] or Organizations: Not on file    Attends Club or Organization Meetings: Not on file    Marital Status: Not on file   Intimate Partner Violence:     Fear of Current or Ex-Partner: Not on file    Emotionally Abused: Not on file    Physically Abused: Not on file    Sexually Abused: Not on file   Housing Stability:     Unable to Pay for Housing in the Last Year: Not on file    Number of [place] in the Last Year: Not on file    Unstable Housing in the Last Year: Not on file        Review of Systems   10 total systems reviewed and found to be negative unless otherwise noted in HPI     Physical Exam   BP (!) 170/92   Pulse 68   Temp 97.6 °F (36.4 °C) (Oral)   Resp 21   Ht 5' 4\" (1.626 m)   Wt (!) 345 lb 3.9 oz (156.6 kg)   LMP  (LMP Unknown)   SpO2 99%   BMI 59.26 kg/m²      CONSTITUTIONAL: Well appearing, in no acute distress   HEAD: atraumatic, normocephalic   EYES: PERRL, No injection, discharge or scleral icterus.    ENT: Moist mucous membranes. NECK: Normal ROM, NO LAD   CARDIOVASCULAR: Regular rate and rhythm. No murmurs or gallop. PULMONARY/CHEST: Airway patent. No retractions. Breath sounds clear with good air entry bilaterally. ABDOMEN: Soft, Non-distended and non-tender, without guarding or rebound. SKIN: Acyanotic, warm, dry   MUSCULOSKELETAL: No swelling, tenderness or deformity   NEUROLOGICAL: Awake and oriented x 3. Pulses intact. Grossly nonfocal   Nursing note and vitals reviewed.      ED Course & Medical Decision Making   Medications   metroNIDAZOLE (FLAGYL) tablet 500 mg (500 mg Oral Given 12/23/21 1546)   doxycycline hyclate (VIBRA-TABS) tablet 100 mg (100 mg Oral Given 12/23/21 1546)   cefTRIAXone (ROCEPHIN) injection 1,000 mg (1,000 mg IntraMUSCular Given 12/23/21 1546)      Labs Reviewed   WET PREP, GENITAL - Abnormal; Notable for the following components:       Result Value    Trichomonas Prep PRESENT  1+ (*)     Clue Cells, Wet Prep <1+ (*)     All other components within normal limits    Narrative:     Performed at:  [facility]  [address] [facility]   Phone [phone]   URINE RT REFLEX TO CULTURE - Abnormal; Notable for the following components:    Leukocyte Esterase, Urine MODERATE (*)     All other components within normal limits    Narrative:     Performed at:  [facility]  [address] [facility]   Phone [phone]   MICROSCOPIC URINALYSIS - Abnormal; Notable for the following components:    Mucus, UA Rare (*)     WBC, UA 6-9 (*)     Epithelial Cells, UA 6-10 (*)     Bacteria, UA Rare (*)     Trichomonas, UA Present (*)     All other components within normal limits    Narrative:     Performed at:  [facility]  [address] [facility]   Phone [phone]   C.TRACHOMATIS N.GONORRHOEAE DNA   PREGNANCY, URINE    Narrative: Performed at:  [facility] [facility]  [address], [facility]   Phone [phone]      No orders to display      PROCEDURES:   Procedures    ASSESSMENT AND PLAN:  Lina Canchola is a 48 y.o. female congestive heart failure, COPD, diabetes, hypertension, hyperlipidemia, morbid obesity who presents complaining of vaginal discomfort with itchiness. She is concerned that she might have had an STD from the  who is cheating on her. Denies any fevers chills nausea vomiting chest pain or shortness of breath. Exam was unremarkable. Pelvic exam was deferred per request of the patient. Cultures were obtained and she was positive for BV and trichomoniasis. Chlamydia and gonorrhea is pending. Patient was treated for BV and trichomonas. However she decided she wanted to be treated for gonorrhea and chlamydia as well. She was given a shot of IM 1000 mg ceftriaxone based on her weight she was discharged home with doxycycline for 7 days. We recommend treatment of her partner and no sexual activity for the next 7 days. ClINICAL IMPRESSION:  1. Trichomonas infection    2.  Bacterial vaginosis          PATIENT REFERRED TO:  Gregory Ramirez MD  [facility] [address]  [phone]    Schedule an appointment as soon as possible for a visit in 2 days        DISCHARGE MEDICATIONS:  Discharge Medication List as of 12/23/2021  3:50 PM      START taking these medications    Details   doxycycline hyclate (VIBRA-TABS) 100 MG tablet Take 1 tablet by mouth 2 times daily for 7 days, Disp-14 tablet, R-0Print      metroNIDAZOLE (FLAGYL) 500 MG tablet Take 1 tablet by mouth 2 times daily for 7 days, Disp-14 tablet, R-0Print           DISCONTINUED MEDICATIONS:  Discharge Medication List as of 12/23/2021  3:50 PM        DISPOSITION Decision To Discharge 12/23/2021 03:17:23 PM  -We have instructed the patient, Lina Canchola) to return to the ED or call her PCP if her pain/symptoms worsen. -Findings and recommendations explained to patient. She expressed understanding and agreed with the plan.    ___________________________________________________________________________________  _________________________________________________________________________________________  This record is transcribed utilizing voice recognition technology. There are inherent limitations in this technology. In addition, there may be limitations in editing of this report. If there are any discrepancies, please contact me directly.         Dilia Simon MD  12/23/21 3949

## 2021-12-24 LAB
C TRACH DNA GENITAL QL NAA+PROBE: NEGATIVE
N. GONORRHOEAE DNA: NEGATIVE

## 2021-12-29 ENCOUNTER — PATIENT MESSAGE (OUTPATIENT)
Dept: FAMILY MEDICINE CLINIC | Age: 46
End: 2021-12-29

## 2021-12-29 DIAGNOSIS — N89.8 PROBLEMATIC VAGINAL DISCHARGE: Primary | ICD-10-CM

## 2021-12-29 NOTE — TELEPHONE ENCOUNTER
From: Audie Ortez  To: Dr. Becki Rosas Day  Sent: 12/29/2021 10:26 AM EST  Subject: Question regarding WET PREP    What does this mean

## 2021-12-29 NOTE — TELEPHONE ENCOUNTER
Called and spoke to pt   After talking with  he advised that her  get tested   Pt would like a referral to OBGYN   Please advise   Thank you

## 2022-01-19 ENCOUNTER — VIRTUAL VISIT (OUTPATIENT)
Dept: PAIN MANAGEMENT | Age: 47
End: 2022-01-19
Payer: MEDICAID

## 2022-01-19 DIAGNOSIS — M62.830 BACK SPASM: ICD-10-CM

## 2022-01-19 DIAGNOSIS — G89.4 CHRONIC PAIN SYNDROME: ICD-10-CM

## 2022-01-19 DIAGNOSIS — M16.11 PRIMARY OSTEOARTHRITIS OF RIGHT HIP: ICD-10-CM

## 2022-01-19 DIAGNOSIS — M54.50 CHRONIC BILATERAL LOW BACK PAIN WITHOUT SCIATICA: ICD-10-CM

## 2022-01-19 DIAGNOSIS — E66.01 MORBID OBESITY (HCC): ICD-10-CM

## 2022-01-19 DIAGNOSIS — F32.A DEPRESSION, UNSPECIFIED DEPRESSION TYPE: ICD-10-CM

## 2022-01-19 DIAGNOSIS — G47.33 OSA (OBSTRUCTIVE SLEEP APNEA): ICD-10-CM

## 2022-01-19 DIAGNOSIS — M17.0 PRIMARY OSTEOARTHRITIS OF BOTH KNEES: ICD-10-CM

## 2022-01-19 DIAGNOSIS — G89.29 CHRONIC BILATERAL LOW BACK PAIN WITHOUT SCIATICA: ICD-10-CM

## 2022-01-19 PROCEDURE — 99213 OFFICE O/P EST LOW 20 MIN: CPT | Performed by: NURSE PRACTITIONER

## 2022-01-19 PROCEDURE — G8427 DOCREV CUR MEDS BY ELIG CLIN: HCPCS | Performed by: NURSE PRACTITIONER

## 2022-01-19 RX ORDER — VENLAFAXINE HYDROCHLORIDE 75 MG/1
150 CAPSULE, EXTENDED RELEASE ORAL DAILY
Qty: 60 CAPSULE | Refills: 0 | Status: SHIPPED | OUTPATIENT
Start: 2022-01-19 | End: 2022-02-16 | Stop reason: SDUPTHER

## 2022-01-19 RX ORDER — METHOCARBAMOL 500 MG/1
500 TABLET, FILM COATED ORAL 2 TIMES DAILY
Qty: 60 TABLET | Refills: 0 | Status: SHIPPED | OUTPATIENT
Start: 2022-01-19 | End: 2022-02-16 | Stop reason: SDUPTHER

## 2022-01-19 RX ORDER — AMITRIPTYLINE HYDROCHLORIDE 25 MG/1
TABLET, FILM COATED ORAL
Qty: 30 TABLET | Refills: 2 | Status: SHIPPED | OUTPATIENT
Start: 2022-01-19 | End: 2022-06-09

## 2022-01-19 RX ORDER — LIDOCAINE 50 MG/G
1 PATCH TOPICAL DAILY
Qty: 30 PATCH | Refills: 0 | Status: SHIPPED | OUTPATIENT
Start: 2022-01-19 | End: 2022-02-16 | Stop reason: SDUPTHER

## 2022-01-19 RX ORDER — HYDROCODONE BITARTRATE AND ACETAMINOPHEN 7.5; 325 MG/1; MG/1
1 TABLET ORAL EVERY 6 HOURS PRN
Qty: 120 TABLET | Refills: 0 | Status: SHIPPED | OUTPATIENT
Start: 2022-01-19 | End: 2022-02-16 | Stop reason: SDUPTHER

## 2022-01-19 NOTE — PROGRESS NOTES
TELE HEALTH VISIT (AUDIO-VISUAL)    Ms. Olive Esquivel, was evaluated through a synchronous (real-time) audio-video encounter. The patient (or guardian if applicable) is aware that this is a billable service, which includes applicable co-pays. This Virtual Visit was conducted with patient's (and/or legal guardian's) consent. The visit was conducted pursuant to the emergency declaration under the [address] [place] [address] and the [employer] Act. Patient identification was verified, and a caregiver was present when appropriate. The patient was located in a state where the provider was licensed to provide care. [address]  1975  [phone]    Ms. Olive Esquivel is being seen virtually for a follow up visit using [employer] me/whoactually Video visit/[employer] duo or face time  Informed verbal consent to the virtual visit was obtained from Ms. Olive Esquivel. Risks associated with HIPPA compliance with the virtual visit was explained to the patient. Ms. Olive Esquivel is at her home and Adi BERTRAND is in her home office. HISTORY OF PRESENT ILLNESS:  Ms. Olive Esquivel is a 48 y.o. female  being assessed for a follow up visit for pain management for evaluation of ongoing care regarding her symptoms and monitoring of compliance with long term use high risk medications. She has a diagnosis of   1. Chronic pain syndrome    2. Primary osteoarthritis of both knees    3. Primary osteoarthritis of right hip    4. Chronic bilateral low back pain without sciatica    5. Back spasm    6. JOSE (obstructive sleep apnea)    7. Morbid obesity ([address]    8. Depression, unspecified depression type, mild      On the Patients Pain Assessment form reviewed with the Medical Assistant:  She complains of pain in the back, both hips, and both knees . She rates the pain 10/10 and describes it as aching.  Current treatment regimen has helped relieve about 0% of the pain.  She denies any side effects from the current pain regimen. Patient reports that since the last follow up visit the physical functioning is unchanged, family/social relationships are unchanged, mood is worse sleep patterns are worse, and that the overall functioning is unchanged. Patient denies misusing/abusing her narcotic pain medications or using any illegal drugs. Upon obtaining medical history from Ms. Rj Navarro states that pain is manageable on current pain therapy. Takes pain medications as prescribed. Increased tenderness to the lower back due to moving to the new home. Mood is stable without anxiety. Sleep is fair with an average of 5-6 hours. Denies to having issues of constipation. Tolerating activities/house chores with moderate tenderness to the lower back. Working on smoking cessaton    ALLERGIES: Patients list of allergies were reviewed     MEDICATIONS: Ms. Rj Navarro list of medications were reviewed. Her current medications are   Outpatient Medications Prior to Visit   Medication Sig Dispense Refill    Respiratory Therapy Supplies (NEBULIZER/TUBING/MOUTHPIECE) KIT 1 kit by Does not apply route daily 1 kit 0    ipratropium-albuterol (DUONEB) 0.5-2.5 (3) MG/3ML SOLN nebulizer solution Inhale 3 mLs into the lungs every 4 hours 360 mL 11    azithromycin (ZITHROMAX) 250 MG tablet Take as directed 1 packet 0    metoprolol (LOPRESSOR) 100 MG tablet TAKE ONE TABLET BY MOUTH TWICE A DAY 60 tablet 3    loratadine (CLARITIN) 10 MG tablet TAKE ONE TABLET BY MOUTH DAILY 90 tablet 1    spironolactone (ALDACTONE) 25 MG tablet TAKE TWO TABLETS BY MOUTH TWICE A  tablet 0    metFORMIN (GLUCOPHAGE) 500 MG tablet TAKE ONE TABLET BY MOUTH TWICE A DAY WITH MEALS 180 tablet 0    montelukast (SINGULAIR) 10 MG tablet TAKE ONE TABLET BY MOUTH ONCE NIGHTLY 30 tablet 3    cloNIDine (CATAPRES) 0.1 MG tablet TAKE THREE TABLETS BY MOUTH THREE TIMES A  tablet 3    omeprazole (PRILOSEC) 20 MG delayed release capsule Take 1 capsule by mouth Daily 90 capsule 1    Blood Glucose Monitoring Suppl (TRUE METRIX METER) w/Device KIT 1 kit by Does not apply route 2 times daily 1 kit 0    blood glucose test strips (TRUE METRIX BLOOD GLUCOSE TEST) strip 1 each by In Vitro route 2 times daily 200 each 1    Lancets MISC 1 each by Does not apply route 2 times daily Dispense brand per insurance preference 200 each 1    Alcohol Swabs 70 % PADS 1 each by Does not apply route 2 times daily 200 each 0    vitamin D3 (CHOLECALCIFEROL) 25 MCG (1000 UT) TABS tablet Take 2 tablets by mouth three times a week 30 tablet 5    Dulaglutide 1.5 MG/0.5ML SOPN Inject 1.5 mg into the skin once a week 4 pen 5    chlorthalidone (HYGROTON) 25 MG tablet Take 1 tablet by mouth daily 30 tablet 3    torsemide (DEMADEX) 20 MG tablet TAKE TWO TABLETS BY MOUTH DAILY 60 tablet 11    oxybutynin (DITROPAN-XL) 5 MG extended release tablet TAKE ONE TABLET BY MOUTH DAILY 30 tablet 3    albuterol sulfate  (90 Base) MCG/ACT inhaler INHALE TWO PUFFS BY MOUTH EVERY 6 HOURS AS NEEDED FOR WHEEZING 18 g 11    Blood Pressure Monitoring (B-D ASSURE BPM/AUTO ARM CUFF) MISC 1 Device by Does not apply route daily 1 each 0    Misc. Devices (RAISED TOILET SEAT) MISC Use daily as needed as directed 1 each 0    atorvastatin (LIPITOR) 20 MG tablet Take 1 tablet by mouth daily 90 tablet 3    acetaminophen (APAP EXTRA STRENGTH) 500 MG tablet Take 2 tablets by mouth every 6 hours as needed for Pain DO NOT TAKE WITH OTHER MEDICATIONS CONTAINING ACETAMINOPHEN. 30 tablet 0    HYDROcodone-acetaminophen (NORCO) 7.5-325 MG per tablet Take 1 tablet by mouth every 6 hours as needed for Pain for up to 30 days. 120 tablet 0    lidocaine (LIDODERM) 5 % Place 1 patch onto the skin daily 12 hours on, 12 hours off.  30 patch 0    methocarbamol (ROBAXIN) 500 MG tablet Take 1 tablet by mouth 2 times daily 60 tablet 0    budesonide (PULMICORT) 0.5 MG/2ML nebulizer suspension Take 2 mLs by nebulization 2 times daily 120 mL 11    venlafaxine (EFFEXOR XR) 75 MG extended release capsule Take 2 capsules by mouth daily 60 capsule 0    amitriptyline (ELAVIL) 25 MG tablet TAKE ONE TABLET BY MOUTH ONCE NIGHTLY 30 tablet 2    nicotine (NICODERM CQ) 14 MG/24HR Place 1 patch onto the skin daily for 14 days 28 patch 5     No facility-administered medications prior to visit. SOCIAL/FAMILY/PAST MEDICAL HISTORY: Ms. Isaac Hankins, family and past medical history was reviewed. REVIEW OF SYSTEMS:    Respiratory: Negative for apnea, chest tightness and shortness of breath or change in baseline breathing. Gastrointestinal: Negative for nausea, vomiting, abdominal pain, diarrhea, constipation, blood in stool and abdominal distention. PHYSICAL EXAM:   Nursing note and vitals reviewed. LMP  (LMP Unknown)  as per patient  Constitutional: She appears well-developed and well-nourished. No acute distress. Skin: Skin appears to be warm and dry. No rashes or any other marks noted. She is not diaphoretic. Neurological/Psychiatric:She is alert and oriented to person, place, and time. Coordination is  normal.  Her mood isAppropriate and affect is Neutral/Euthymic(normal). Her behavior is normal.   thought content normal.   Musculoskeletal / Extremities: Gait is normal, assistive devices use: none. IMPRESSION:   1. Chronic pain syndrome    2. Primary osteoarthritis of both knees    3. Primary osteoarthritis of right hip    4. Chronic bilateral low back pain without sciatica    5. Back spasm    6. JOSE (obstructive sleep apnea)    7. Morbid obesity ([address]    8. Depression, unspecified depression type, mild        PLAN:  Informed verbal consent regarding treatment was obtained  -Continue with Norco, Robaxin, Effexor, Lidocaine, Elavil  -Liang exercises, back stretches recommended  -Advised patient to avoid lifting material over 30 pounds.  Maintain proper body lifting and bending techniques to avoid straining her back. Verbalized understanding  -CBT techniques- relaxation therapies such as biofeedback, mindfulness based stress reduction, imagery, cognitive restructuring, problem solving discussed with patient  -She was advised weight reduction, diet changes- 800-1200 fior diet, diet diary, exercising, nutritional  consult increased physical activity as tolerated  -Last UDS 4/7/21 Consistent  -Return in about 4 weeks (around 2/16/2022). Current Outpatient Medications   Medication Sig Dispense Refill    HYDROcodone-acetaminophen (NORCO) 7.5-325 MG per tablet Take 1 tablet by mouth every 6 hours as needed for Pain for up to 30 days. 120 tablet 0    lidocaine (LIDODERM) 5 % Place 1 patch onto the skin daily 12 hours on, 12 hours off.  30 patch 0    methocarbamol (ROBAXIN) 500 MG tablet Take 1 tablet by mouth 2 times daily 60 tablet 0    venlafaxine (EFFEXOR XR) 75 MG extended release capsule Take 2 capsules by mouth daily 60 capsule 0    amitriptyline (ELAVIL) 25 MG tablet TAKE ONE TABLET BY MOUTH ONCE NIGHTLY 30 tablet 2    Respiratory Therapy Supplies (NEBULIZER/TUBING/MOUTHPIECE) KIT 1 kit by Does not apply route daily 1 kit 0    ipratropium-albuterol (DUONEB) 0.5-2.5 (3) MG/3ML SOLN nebulizer solution Inhale 3 mLs into the lungs every 4 hours 360 mL 11    azithromycin (ZITHROMAX) 250 MG tablet Take as directed 1 packet 0    metoprolol (LOPRESSOR) 100 MG tablet TAKE ONE TABLET BY MOUTH TWICE A DAY 60 tablet 3    loratadine (CLARITIN) 10 MG tablet TAKE ONE TABLET BY MOUTH DAILY 90 tablet 1    spironolactone (ALDACTONE) 25 MG tablet TAKE TWO TABLETS BY MOUTH TWICE A  tablet 0    metFORMIN (GLUCOPHAGE) 500 MG tablet TAKE ONE TABLET BY MOUTH TWICE A DAY WITH MEALS 180 tablet 0    montelukast (SINGULAIR) 10 MG tablet TAKE ONE TABLET BY MOUTH ONCE NIGHTLY 30 tablet 3    cloNIDine (CATAPRES) 0.1 MG tablet TAKE THREE TABLETS BY MOUTH THREE TIMES A  tablet 3    omeprazole (PRILOSEC) 20 MG delayed release capsule Take 1 capsule by mouth Daily 90 capsule 1    Blood Glucose Monitoring Suppl (TRUE METRIX METER) w/Device KIT 1 kit by Does not apply route 2 times daily 1 kit 0    blood glucose test strips (TRUE METRIX BLOOD GLUCOSE TEST) strip 1 each by In Vitro route 2 times daily 200 each 1    Lancets MISC 1 each by Does not apply route 2 times daily Dispense brand per insurance preference 200 each 1    Alcohol Swabs 70 % PADS 1 each by Does not apply route 2 times daily 200 each 0    vitamin D3 (CHOLECALCIFEROL) 25 MCG (1000 UT) TABS tablet Take 2 tablets by mouth three times a week 30 tablet 5    Dulaglutide 1.5 MG/0.5ML SOPN Inject 1.5 mg into the skin once a week 4 pen 5    chlorthalidone (HYGROTON) 25 MG tablet Take 1 tablet by mouth daily 30 tablet 3    torsemide (DEMADEX) 20 MG tablet TAKE TWO TABLETS BY MOUTH DAILY 60 tablet 11    oxybutynin (DITROPAN-XL) 5 MG extended release tablet TAKE ONE TABLET BY MOUTH DAILY 30 tablet 3    albuterol sulfate  (90 Base) MCG/ACT inhaler INHALE TWO PUFFS BY MOUTH EVERY 6 HOURS AS NEEDED FOR WHEEZING 18 g 11    Blood Pressure Monitoring (B-D ASSURE BPM/AUTO ARM CUFF) MISC 1 Device by Does not apply route daily 1 each 0    Misc. Devices (RAISED TOILET SEAT) MISC Use daily as needed as directed 1 each 0    atorvastatin (LIPITOR) 20 MG tablet Take 1 tablet by mouth daily 90 tablet 3    acetaminophen (APAP EXTRA STRENGTH) 500 MG tablet Take 2 tablets by mouth every 6 hours as needed for Pain DO NOT TAKE WITH OTHER MEDICATIONS CONTAINING ACETAMINOPHEN. 30 tablet 0    budesonide (PULMICORT) 0.5 MG/2ML nebulizer suspension Take 2 mLs by nebulization 2 times daily 120 mL 11    nicotine (NICODERM CQ) 14 MG/24HR Place 1 patch onto the skin daily for 14 days 28 patch 5     No current facility-administered medications for this visit.      I will continue her current medication regimen  which is part of the above treatment schedule. It has been helping with Ms. Yury Arevalo chronic  medical problems which for this visit include:   Diagnoses of Chronic pain syndrome, Primary osteoarthritis of both knees, Primary osteoarthritis of right hip, Chronic bilateral low back pain without sciatica, Back spasm, JOSE (obstructive sleep apnea), Morbid obesity ([address] and Depression, unspecified depression type, mild were pertinent to this visit. Risks and benefits of the medications and other alternative treatments  including no treatment were discussed with the patient. The common side effects of these medications were also explained to the patient. Informed verbal consent was obtained. Goals of current treatment regimen include improvement in pain, restoration of functioning- with focus on improvement in physical performance, general activity, work or disability,emotional distress, health care utilization and  decreased medication consumption. Will continue to monitor progress towards achieving/maintaining therapeutic goals with special emphasis on  1. Improvement in perceived interfernce  of pain with ADL's. Ability to do home exercises independently. Ability to do household chores indoor and/or outdoor work and social and leisure activities. Improve psychosocial and physical functioning. - she is showing progression towards this treatment goal with the current regimen. She was advised against drinking alcohol with the narcotic pain medicines, advised against driving or handling machinery while adjusting the dose of medicines or if having cognitive  issues related to the current medications. Risk of overdose and death, if medicines not taken as prescribed, were also discussed. If the patient develops new symptoms or if the symptoms worsen, the patient should call the office. While transcribing every attempt was made to maintain the accuracy of the note in terms of it's contents,there may have been some errors made inadvertently.   Thank you for allowing me to participate in the care of this patient. Maureen Gallardo.  Ifeoma Ge APRN-CNP     Cc: Calvin Lauren MD

## 2022-01-19 NOTE — PATIENT INSTRUCTIONS
Patient Education        Back Stretches: Exercises  Introduction  Here are some examples of exercises for stretching your back. Start each exercise slowly. Ease off the exercise if you start to have pain. Your doctor or physical therapist will tell you when you can start these exercises and which ones will work best for you. How to do the exercises  Overhead stretch    1. Stand comfortably with your feet shoulder-width apart. 2. Looking straight ahead, raise both arms over your head and reach toward the ceiling. Do not allow your head to tilt back. 3. Hold for 15 to 30 seconds, then lower your arms to your sides. 4. Repeat 2 to 4 times. Side stretch    1. Stand comfortably with your feet shoulder-width apart. 2. Raise one arm over your head, and then lean to the other side. 3. Slide your hand down your leg as you let the weight of your arm gently stretch your side muscles. Hold for 15 to 30 seconds. 4. Repeat 2 to 4 times on each side. Press-up    1. Lie on your stomach, supporting your body with your forearms. 2. Press your elbows down into the floor to raise your upper back. As you do this, relax your stomach muscles and allow your back to arch without using your back muscles. As your press up, do not let your hips or pelvis come off the floor. 3. Hold for 15 to 30 seconds, then relax. 4. Repeat 2 to 4 times. Relax and rest    1. Lie on your back with a rolled towel under your neck and a pillow under your knees. Extend your arms comfortably to your sides. 2. Relax and breathe normally. 3. Remain in this position for about 10 minutes. 4. If you can, do this 2 or 3 times each day. Follow-up care is a key part of your treatment and safety. Be sure to make and go to all appointments, and call your doctor if you are having problems. It's also a good idea to know your test results and keep a list of the medicines you take. Where can you learn more? Go to https://sherieb.healthRxRevu. org and sign in to your [employer] account. Enter B809 in the [employer] box to learn more about \"Back Stretches: Exercises. \"     If you do not have an account, please click on the \"Sign Up Now\" link. Current as of: July 1, 2021               Content Version: 13.1  © 0756-4191 Healthwise, Incorporated. Care instructions adapted under license by [facility] ([facility]). If you have questions about a medical condition or this instruction, always ask your healthcare professional. [address] any warranty or liability for your use of this information.

## 2022-02-07 DIAGNOSIS — E66.9 DIABETES MELLITUS TYPE 2 IN OBESE (HCC): ICD-10-CM

## 2022-02-07 DIAGNOSIS — E87.5 HYPERKALEMIA: Primary | ICD-10-CM

## 2022-02-07 DIAGNOSIS — E11.69 DIABETES MELLITUS TYPE 2 IN OBESE (HCC): ICD-10-CM

## 2022-02-07 RX ORDER — SPIRONOLACTONE 25 MG/1
TABLET ORAL
Qty: 120 TABLET | Refills: 2 | Status: SHIPPED | OUTPATIENT
Start: 2022-02-07 | End: 2022-03-24

## 2022-02-07 RX ORDER — CHLORTHALIDONE 25 MG/1
TABLET ORAL
Qty: 60 TABLET | Refills: 0 | Status: SHIPPED | OUTPATIENT
Start: 2022-02-07 | End: 2022-04-11 | Stop reason: SDUPTHER

## 2022-02-07 NOTE — TELEPHONE ENCOUNTER
Last OV: 10/1/21  Next OV: 6 mth f/u 4/2022  Last refill:10/1/21  Most recent Labs: 12/7/21  Last EKG (if needed): 3/26/21

## 2022-02-16 ENCOUNTER — TELEMEDICINE (OUTPATIENT)
Dept: PAIN MANAGEMENT | Age: 47
End: 2022-02-16
Payer: MEDICAID

## 2022-02-16 DIAGNOSIS — F32.A DEPRESSION, UNSPECIFIED DEPRESSION TYPE: ICD-10-CM

## 2022-02-16 DIAGNOSIS — M16.11 PRIMARY OSTEOARTHRITIS OF RIGHT HIP: ICD-10-CM

## 2022-02-16 DIAGNOSIS — G47.33 OSA (OBSTRUCTIVE SLEEP APNEA): ICD-10-CM

## 2022-02-16 DIAGNOSIS — M17.0 PRIMARY OSTEOARTHRITIS OF BOTH KNEES: ICD-10-CM

## 2022-02-16 DIAGNOSIS — G89.4 CHRONIC PAIN SYNDROME: ICD-10-CM

## 2022-02-16 DIAGNOSIS — M62.830 BACK SPASM: ICD-10-CM

## 2022-02-16 DIAGNOSIS — E66.01 MORBID OBESITY (HCC): ICD-10-CM

## 2022-02-16 DIAGNOSIS — M54.50 CHRONIC BILATERAL LOW BACK PAIN WITHOUT SCIATICA: ICD-10-CM

## 2022-02-16 DIAGNOSIS — G89.29 CHRONIC BILATERAL LOW BACK PAIN WITHOUT SCIATICA: ICD-10-CM

## 2022-02-16 DIAGNOSIS — J44.9 COPD, MILD (HCC): ICD-10-CM

## 2022-02-16 PROCEDURE — G8428 CUR MEDS NOT DOCUMENT: HCPCS | Performed by: NURSE PRACTITIONER

## 2022-02-16 PROCEDURE — 99213 OFFICE O/P EST LOW 20 MIN: CPT | Performed by: NURSE PRACTITIONER

## 2022-02-16 RX ORDER — METHOCARBAMOL 500 MG/1
500 TABLET, FILM COATED ORAL 2 TIMES DAILY
Qty: 60 TABLET | Refills: 0 | Status: SHIPPED | OUTPATIENT
Start: 2022-02-16 | End: 2022-03-16 | Stop reason: SDUPTHER

## 2022-02-16 RX ORDER — LIDOCAINE 50 MG/G
1 PATCH TOPICAL DAILY
Qty: 30 PATCH | Refills: 0 | Status: SHIPPED | OUTPATIENT
Start: 2022-02-16 | End: 2022-03-16 | Stop reason: SDUPTHER

## 2022-02-16 RX ORDER — VENLAFAXINE HYDROCHLORIDE 75 MG/1
150 CAPSULE, EXTENDED RELEASE ORAL DAILY
Qty: 60 CAPSULE | Refills: 0 | Status: SHIPPED | OUTPATIENT
Start: 2022-02-16 | End: 2022-03-16 | Stop reason: SDUPTHER

## 2022-02-16 RX ORDER — HYDROCODONE BITARTRATE AND ACETAMINOPHEN 7.5; 325 MG/1; MG/1
1 TABLET ORAL EVERY 6 HOURS PRN
Qty: 120 TABLET | Refills: 0 | Status: SHIPPED | OUTPATIENT
Start: 2022-02-16 | End: 2022-03-16 | Stop reason: SDUPTHER

## 2022-02-16 NOTE — PATIENT INSTRUCTIONS
Patient Education        Therapeutic Ball: Back Exercises  Introduction  Here are some examples of typical exercises for your condition. Start each exercise slowly. Ease off the exercise if you start to have pain. Your doctor or physical therapist will tell you when you can start these exercises and which ones will work best for you. To prepare, make sure that your ball is the right size for you. When inflated and firm, it should allow you to sit with your hips and knees bent at about a 90-degree angle (like the letter L). How to do the exercises  Seated position on ball    1. Use this exercise to get used to moving on the ball and to find your best sitting position. 2. Sit comfortably on the ball with your feet about hip-width apart. If you feel unsteady, rest your hands on the ball near your hips. 3. As you do this exercise, try to keep your shoulders and upper body relaxed and still. 4. Using your stomach and back muscles to move your pelvis, roll the ball forward. This will round your back. 5. Still using your stomach and back muscles, roll the ball back. You will arch your back. 6. Repeat this rounding-arching motion a few times. 7. Stop in between the two positions, where your back is not rounded or arched. This is called your neutral position. Pelvic rotation    1. Sit tall on the ball. 2. Slowly rotate your hips in a [place] pattern. Keep the movement focused at your hips. 3. Repeat, but [place] in the other direction. 4. Repeat 8 to 12 times. Postural sitting    1. Use this position to find a stable, relaxed posture on the ball. You can use this position as your starting point for other ball exercises. If you feel unsteady on the ball, start on a chair first.  2. Sit on a ball or chair, with your feet planted straight in front of you. 3. Imagine that a string at the top of your head is pulling you straight up.  Think of yourself as 2 inches taller than you are.  4. Slightly tuck your chin.  5. Keep your shoulders back and relaxed. Knee extension    1. Sit tall on the ball with your feet planted in front of you, hip-width apart. As you do this exercise, avoid slumping your shoulders and arching your back. 2. Rest your hands on the ball near your hip or a steady object next to you. (If you feel very stable on the ball, rest your hands in your lap or at your side.)  3. Slowly straighten one leg at the knee. Slowly lower it back down. Repeat with the other leg. 4. Repeat this exercise 8 to 12 times. Roll-ups    1. Lie on your back with your knees bent, feet resting on the floor. 2. Lay the ball on your thighs. Rest your hands up high on the ball. 3. Raising your head and shoulder blades, roll the ball up your thighs. Exhale as you roll up. 4. If this is hard on your neck, gently support your lower head and upper neck with one hand. Don't use that hand to pull your head up. 5. Repeat 8 to 12 times. Ball curls    1. Lie on your back with your ankles resting on the ball, knees straight. 2. Use your legs to roll the exercise ball toward you. Allow your knees to bend and move closer to your chest.  3. Pause briefly, and then roll the ball to the starting position. Try to keep the ball rolling straight. You will feel the muscles in your lower belly working. 4. Repeat 8 to 12 times. Bridge with ball under legs    1. Lie on your back with your legs up, calves resting on the ball. For more challenge, rest your heels on the ball. 2. Look up at the ceiling, and keep your chin relaxed. You can place a small pillow under your head or neck for comfort. 3. With your arms by your side, press your hands onto the floor for stability. 4. Tighten your belly muscles by pulling in your belly button toward your spine. 5. Push your heels down toward the floor, squeeze your buttocks, and lift your hips off the floor until your shoulders, hips, and knees are all in a straight line.   6. Try to keep the ball steady. Hold for about 6 seconds as you continue to breathe normally. 7. Slowly lower your hips back down to the floor. 8. Repeat 8 to 12 times. Ball curls with bridge    1. Start flat on your back with your ankles resting on the ball. 2. Look up at the ceiling, and keep your chin relaxed. You can place a small pillow under your head or neck for comfort. 3. With your arms by your side, press your hands onto the floor for stability. 4. Tighten your belly muscles by pulling in your belly button toward your spine. 5. Push your heels down toward the floor, squeeze your buttocks, and lift your hips off the floor until your shoulders, hips, and knees are all in a straight line. 6. While holding the bridge position, roll the ball toward you with your heels. Keep your hips as level as you can. 7. Pause briefly, and then roll the ball back out. Try to keep the ball rolling straight. You will feel the muscles in your lower belly working as you straighten your legs. 8. Lower your hips, and return to your starting position. 9. Repeat 8 to 12 times. 10. When you can keep your body and the ball steady throughout this exercise, you're ready for more challenge. Try keeping your hips raised while rolling the ball out, holding the bridge, and rolling back, a few times in a row. Praying consuelo    1. Kneel upright with the ball in front of you. 2. To start, clasp your hands together. Rest them on the ball in front of you. 3. As you do this exercise, keep your back and hips straight and tighten your belly and buttocks muscles. Keep your knees in place. 4. Press on the ball with your arms. Lean forward from the knees. This rolls the ball forward. You will bear most of your weight on your arms. 5. If your back starts to ache, you've gone too far. Pull back a bit. 6. Roll back to the start position. 7. Repeat 8 to 12 times. Walk-out plank on ball    1. Kneel over the ball.  Place your hands on the floor in front of you.  2. Walk your hands forward until your legs are straight on the ball. This is the plank position. 3. When in plank position, hold your body straight and tighten your belly and buttocks muscles. Keep your chin slightly tucked. 4. Roll as far forward as you can without losing your balance or letting your hips drop. You may stop with the ball under your thighs, or even under your knees or shins. 5. Hold a few seconds, then walk your hands back and return to the start position. 6. Repeat 8 to 12 times. Push-up with thighs on ball    1. Kneel over the ball. Place your hands on the floor in front of you. 2. Walk your hands forward until your legs are straight on the ball. This is the plank position. 3. When in plank position, hold your body straight and tighten your belly and buttocks muscles. Keep your chin slightly tucked. 4. Roll as far forward as you can without losing your balance or letting your hips drop. You may stop with the ball under your thighs, or even under your knees or shins. 5. Bend your elbows. Slowly lower your body toward the ground as far as you can without losing your balance. 6. If your wrists hurt, try moving your hands a little farther apart so they're not right under your shoulders. 7. Slowly straighten your arms. 8. Do 8 to 12 of these push-ups. Wall squat with ball    1. Stand facing away from a wall. Place your feet about shoulder-width apart. 2. Place the ball between your middle back and the wall. Move your feet out in front of you so they are about a foot in front of your hips. 3. Keep your arms at your sides, or put your hands on your hips. 4. Slowly squat down as if you are going to sit in a chair, rolling your back over the ball as you squat. The ball should move with you but stay pressed into the wall. 5. Be sure that your knees do not go in front of your toes as you squat. 6. Hold for 6 seconds. 7. Slowly rise to your standing position.   8. Repeat 8 to 12 times.  Child's pose with ball    1. Kneeling upright with your back straight, rest your hands on the ball in front of you. 2. Breathe out as you bend at the hips, and roll the ball forward. Lower your chest toward the ground, and drop your hips back toward your heels. 3. To stretch your upper back and shoulders, hold this position for 15 to 30 seconds. 4. Repeat 2 to 4 times. Follow-up care is a key part of your treatment and safety. Be sure to make and go to all appointments, and call your doctor if you are having problems. It's also a good idea to know your test results and keep a list of the medicines you take. Where can you learn more? Go to https://I[employer] MedicalpeeROIeb.[employer] Hunt. org and sign in to your [employer] account. Enter K832 in the [employer] box to learn more about \"Therapeutic Ball: Back Exercises. \"     If you do not have an account, please click on the \"Sign Up Now\" link. Current as of: July 1, 2021               Content Version: 13.1  © 2006-2021 Healthwise, Incorporated. Care instructions adapted under license by [facility] ([facility]). If you have questions about a medical condition or this instruction, always ask your healthcare professional. [address] any warranty or liability for your use of this information.

## 2022-02-16 NOTE — PROGRESS NOTES
TELE HEALTH VISIT (AUDIO-VISUAL)    Paul Carpenter, was evaluated through a synchronous (real-time) audio-video encounter. The patient (or guardian if applicable) is aware that this is a billable service, which includes applicable co-pays. This Virtual Visit was conducted with patient's (and/or legal guardian's) consent. The visit was conducted pursuant to the emergency declaration under the [address] [place] [address] and the [employer] Act. Patient identification was verified, and a caregiver was present when appropriate. The patient was located in a state where the provider was licensed to provide care. Paul Carpenter  1975  [id number]    Ms. Sugey Licona is being seen virtually for a follow up visit using [employer] Video visit  Informed verbal consent to the virtual visit was obtained from Ms. Sugey Licona. Risks associated with HIPPA compliance with the virtual visit was explained to the patient. Ms. Sugey Licona is at her home and Maureen BERTRAND is in her office. HISTORY OF PRESENT ILLNESS:  Ms. Sugey Licona is a 48 y.o. female  being assessed for a follow up visit for pain management for evaluation of ongoing care regarding her symptoms and monitoring of compliance with long term use high risk medications. She has a diagnosis of   1. Chronic pain syndrome    2. Primary osteoarthritis of both knees    3. Primary osteoarthritis of right hip    4. Chronic bilateral low back pain without sciatica    5. Back spasm    6. Morbid obesity ([address]    7. JOSE (obstructive sleep apnea)    8. COPD, mild ([address]    9. Depression, unspecified depression type, mild      On the Patients Pain Assessment form reviewed with the Medical Assistant:  She complains of pain in the lower back, right hip and both knees. She rates the pain 6/10 and describes it as aching, burning, numbness, pins and needles.  Current treatment regimen has helped relieve about 40% of the pain. She denies any side effects from the current pain regimen. Patient reports that since the last follow up visit the physical functioning is unchanged, family/social relationships are unchanged, mood is unchanged sleep patterns are unchanged, and that the overall functioning is unchanged. Patient denies misusing/abusing her narcotic pain medications or using any illegal drugs. Upon obtaining medical history from Ms. Cleemnte Ochoa states that pain is manageable on current pain therapy. Takes pain medications as prescribed. Mood is stable without anxiety. Sleep is fair with an average of 5-6 hours. Denies to having issues of constipation. Tolerating activities/house chores with moderate tenderness to the lower back. ALLERGIES: Patients list of allergies were reviewed     MEDICATIONS: Ms. Clemente Ochoa list of medications were reviewed. Her current medications are   Outpatient Medications Prior to Visit   Medication Sig Dispense Refill    chlorthalidone (HYGROTON) 25 MG tablet TAKE ONE TABLET BY MOUTH DAILY 60 tablet 0    metFORMIN (GLUCOPHAGE) 500 MG tablet TAKE ONE TABLET BY MOUTH TWICE A DAY WITH MEALS 60 tablet 2    spironolactone (ALDACTONE) 25 MG tablet TAKE TWO TABLETS BY MOUTH TWICE A  tablet 2    amitriptyline (ELAVIL) 25 MG tablet TAKE ONE TABLET BY MOUTH ONCE NIGHTLY 30 tablet 2    HYDROcodone-acetaminophen (NORCO) 7.5-325 MG per tablet Take 1 tablet by mouth every 6 hours as needed for Pain for up to 30 days. 120 tablet 0    lidocaine (LIDODERM) 5 % Place 1 patch onto the skin daily 12 hours on, 12 hours off.  30 patch 0    methocarbamol (ROBAXIN) 500 MG tablet Take 1 tablet by mouth 2 times daily 60 tablet 0    venlafaxine (EFFEXOR XR) 75 MG extended release capsule Take 2 capsules by mouth daily 60 capsule 0    Respiratory Therapy Supplies (NEBULIZER/TUBING/MOUTHPIECE) KIT 1 kit by Does not apply route daily 1 kit 0    budesonide (PULMICORT) 0.5 MG/2ML nebulizer suspension Take 2 mLs by nebulization 2 times daily 120 mL 11    ipratropium-albuterol (DUONEB) 0.5-2.5 (3) MG/3ML SOLN nebulizer solution Inhale 3 mLs into the lungs every 4 hours 360 mL 11    azithromycin (ZITHROMAX) 250 MG tablet Take as directed 1 packet 0    metoprolol (LOPRESSOR) 100 MG tablet TAKE ONE TABLET BY MOUTH TWICE A DAY 60 tablet 3    loratadine (CLARITIN) 10 MG tablet TAKE ONE TABLET BY MOUTH DAILY 90 tablet 1    montelukast (SINGULAIR) 10 MG tablet TAKE ONE TABLET BY MOUTH ONCE NIGHTLY 30 tablet 3    cloNIDine (CATAPRES) 0.1 MG tablet TAKE THREE TABLETS BY MOUTH THREE TIMES A  tablet 3    omeprazole (PRILOSEC) 20 MG delayed release capsule Take 1 capsule by mouth Daily 90 capsule 1    Blood Glucose Monitoring Suppl (TRUE METRIX METER) w/Device KIT 1 kit by Does not apply route 2 times daily 1 kit 0    blood glucose test strips (TRUE METRIX BLOOD GLUCOSE TEST) strip 1 each by In Vitro route 2 times daily 200 each 1    Lancets MISC 1 each by Does not apply route 2 times daily Dispense brand per insurance preference 200 each 1    Alcohol Swabs 70 % PADS 1 each by Does not apply route 2 times daily 200 each 0    vitamin D3 (CHOLECALCIFEROL) 25 MCG (1000 UT) TABS tablet Take 2 tablets by mouth three times a week 30 tablet 5    Dulaglutide 1.5 MG/0.5ML SOPN Inject 1.5 mg into the skin once a week 4 pen 5    nicotine (NICODERM CQ) 14 MG/24HR Place 1 patch onto the skin daily for 14 days 28 patch 5    torsemide (DEMADEX) 20 MG tablet TAKE TWO TABLETS BY MOUTH DAILY 60 tablet 11    oxybutynin (DITROPAN-XL) 5 MG extended release tablet TAKE ONE TABLET BY MOUTH DAILY 30 tablet 3    albuterol sulfate  (90 Base) MCG/ACT inhaler INHALE TWO PUFFS BY MOUTH EVERY 6 HOURS AS NEEDED FOR WHEEZING 18 g 11    Blood Pressure Monitoring (B-D ASSURE BPM/AUTO ARM CUFF) MISC 1 Device by Does not apply route daily 1 each 0    Misc.  Devices (RAISED TOILET SEAT) MISC Use daily as needed as directed 1 each 0    atorvastatin (LIPITOR) 20 MG tablet Take 1 tablet by mouth daily 90 tablet 3    acetaminophen (APAP EXTRA STRENGTH) 500 MG tablet Take 2 tablets by mouth every 6 hours as needed for Pain DO NOT TAKE WITH OTHER MEDICATIONS CONTAINING ACETAMINOPHEN. 30 tablet 0     No facility-administered medications prior to visit. SOCIAL/FAMILY/PAST MEDICAL HISTORY: Ms. Kolton Peacock, family and past medical history was reviewed. REVIEW OF SYSTEMS:    Respiratory: Negative for apnea, chest tightness and shortness of breath or change in baseline breathing. Gastrointestinal: Negative for nausea, vomiting, abdominal pain, diarrhea, constipation, blood in stool and abdominal distention. PHYSICAL EXAM:   Nursing note and vitals reviewed. LMP  (LMP Unknown)  as per patient  Constitutional: She appears well-developed and well-nourished. No acute distress. Skin: Skin appears to be warm and dry. No rashes or any other marks noted. She is not diaphoretic. Neurological/Psychiatric:She is alert and oriented to person, place, and time. Coordination is  normal.  Her mood isAppropriate and affect is Neutral/Euthymic(normal). Her behavior is normal.   thought content normal.   Musculoskeletal / Extremities: Gait is normal, assistive devices use: none. IMPRESSION:   1. Chronic pain syndrome    2. Primary osteoarthritis of both knees    3. Primary osteoarthritis of right hip    4. Chronic bilateral low back pain without sciatica    5. Back spasm    6. Morbid obesity ([address]    7. JOSE (obstructive sleep apnea)    8. COPD, mild ([address]    9.  Depression, unspecified depression type, mild        PLAN:  Informed verbal consent regarding treatment was obtained  -Continue with Norco, Robaxin, Effexor, Lidocaine, Elavil  -Liang exercises, back stretches recommended  -CBT techniques- relaxation therapies such as biofeedback, mindfulness based stress reduction, imagery, cognitive restructuring, problem solving discussed with patient  -She was advised weight reduction, diet changes- 800-1200 fior diet, diet diary, exercising, nutritional  consult increased physical activity as tolerated  -Advised patient to quit smoking for  health related concerns and to improve the treatment outcomes. Education was given on quitting smoking and the use of different modalities including medications, hypnotherapy, counselling  and biofeedback. These were discussed with patient.  -Last UDS 4/7/21 Consistent  -Return in about 4 weeks (around 3/16/2022). Current Outpatient Medications   Medication Sig Dispense Refill    HYDROcodone-acetaminophen (NORCO) 7.5-325 MG per tablet Take 1 tablet by mouth every 6 hours as needed for Pain for up to 30 days. 120 tablet 0    lidocaine (LIDODERM) 5 % Place 1 patch onto the skin daily 12 hours on, 12 hours off.  30 patch 0    methocarbamol (ROBAXIN) 500 MG tablet Take 1 tablet by mouth 2 times daily 60 tablet 0    venlafaxine (EFFEXOR XR) 75 MG extended release capsule Take 2 capsules by mouth daily 60 capsule 0    chlorthalidone (HYGROTON) 25 MG tablet TAKE ONE TABLET BY MOUTH DAILY 60 tablet 0    metFORMIN (GLUCOPHAGE) 500 MG tablet TAKE ONE TABLET BY MOUTH TWICE A DAY WITH MEALS 60 tablet 2    spironolactone (ALDACTONE) 25 MG tablet TAKE TWO TABLETS BY MOUTH TWICE A  tablet 2    amitriptyline (ELAVIL) 25 MG tablet TAKE ONE TABLET BY MOUTH ONCE NIGHTLY 30 tablet 2    Respiratory Therapy Supplies (NEBULIZER/TUBING/MOUTHPIECE) KIT 1 kit by Does not apply route daily 1 kit 0    budesonide (PULMICORT) 0.5 MG/2ML nebulizer suspension Take 2 mLs by nebulization 2 times daily 120 mL 11    ipratropium-albuterol (DUONEB) 0.5-2.5 (3) MG/3ML SOLN nebulizer solution Inhale 3 mLs into the lungs every 4 hours 360 mL 11    azithromycin (ZITHROMAX) 250 MG tablet Take as directed 1 packet 0    metoprolol (LOPRESSOR) 100 MG tablet TAKE ONE TABLET BY MOUTH TWICE A DAY 60 tablet 3    loratadine (CLARITIN) 10 MG tablet TAKE ONE TABLET BY MOUTH DAILY 90 tablet 1    montelukast (SINGULAIR) 10 MG tablet TAKE ONE TABLET BY MOUTH ONCE NIGHTLY 30 tablet 3    cloNIDine (CATAPRES) 0.1 MG tablet TAKE THREE TABLETS BY MOUTH THREE TIMES A  tablet 3    omeprazole (PRILOSEC) 20 MG delayed release capsule Take 1 capsule by mouth Daily 90 capsule 1    Blood Glucose Monitoring Suppl (TRUE METRIX METER) w/Device KIT 1 kit by Does not apply route 2 times daily 1 kit 0    blood glucose test strips (TRUE METRIX BLOOD GLUCOSE TEST) strip 1 each by In Vitro route 2 times daily 200 each 1    Lancets MISC 1 each by Does not apply route 2 times daily Dispense brand per insurance preference 200 each 1    Alcohol Swabs 70 % PADS 1 each by Does not apply route 2 times daily 200 each 0    vitamin D3 (CHOLECALCIFEROL) 25 MCG (1000 UT) TABS tablet Take 2 tablets by mouth three times a week 30 tablet 5    Dulaglutide 1.5 MG/0.5ML SOPN Inject 1.5 mg into the skin once a week 4 pen 5    nicotine (NICODERM CQ) 14 MG/24HR Place 1 patch onto the skin daily for 14 days 28 patch 5    torsemide (DEMADEX) 20 MG tablet TAKE TWO TABLETS BY MOUTH DAILY 60 tablet 11    oxybutynin (DITROPAN-XL) 5 MG extended release tablet TAKE ONE TABLET BY MOUTH DAILY 30 tablet 3    albuterol sulfate  (90 Base) MCG/ACT inhaler INHALE TWO PUFFS BY MOUTH EVERY 6 HOURS AS NEEDED FOR WHEEZING 18 g 11    Blood Pressure Monitoring (B-D ASSURE BPM/AUTO ARM CUFF) MISC 1 Device by Does not apply route daily 1 each 0    Misc. Devices (RAISED TOILET SEAT) MISC Use daily as needed as directed 1 each 0    atorvastatin (LIPITOR) 20 MG tablet Take 1 tablet by mouth daily 90 tablet 3    acetaminophen (APAP EXTRA STRENGTH) 500 MG tablet Take 2 tablets by mouth every 6 hours as needed for Pain DO NOT TAKE WITH OTHER MEDICATIONS CONTAINING ACETAMINOPHEN. 30 tablet 0     No current facility-administered medications for this visit.      I will continue her current medication regimen  which is part of the above treatment schedule. It has been helping with Ms. Liz Pearce chronic  medical problems which for this visit include:   Diagnoses of Chronic pain syndrome, Primary osteoarthritis of both knees, Primary osteoarthritis of right hip, Chronic bilateral low back pain without sciatica, Back spasm, Morbid obesity ([address] JOSE (obstructive sleep apnea), COPD, mild ([address] and Depression, unspecified depression type, mild were pertinent to this visit. Risks and benefits of the medications and other alternative treatments  including no treatment were discussed with the patient. The common side effects of these medications were also explained to the patient. Informed verbal consent was obtained. Goals of current treatment regimen include improvement in pain, restoration of functioning- with focus on improvement in physical performance, general activity, work or disability,emotional distress, health care utilization and  decreased medication consumption. Will continue to monitor progress towards achieving/maintaining therapeutic goals with special emphasis on  1. Improvement in perceived interfernce  of pain with ADL's. Ability to do home exercises independently. Ability to do household chores indoor and/or outdoor work and social and leisure activities. Improve psychosocial and physical functioning. - she is showing progression towards this treatment goal with the current regimen. She was advised against drinking alcohol with the narcotic pain medicines, advised against driving or handling machinery while adjusting the dose of medicines or if having cognitive  issues related to the current medications. Risk of overdose and death, if medicines not taken as prescribed, were also discussed. If the patient develops new symptoms or if the symptoms worsen, the patient should call the office.     While transcribing every attempt was made to maintain the accuracy of the note in terms of it's contents,there may have been some errors made inadvertently. Thank you for allowing me to participate in the care of this patient. Lima Phillips.  Slim FRENCH-ROLANDA     Cc: Elvira Espinal MD

## 2022-02-24 DIAGNOSIS — I10 ESSENTIAL HYPERTENSION: ICD-10-CM

## 2022-02-24 RX ORDER — MONTELUKAST SODIUM 10 MG/1
TABLET ORAL
Qty: 90 TABLET | Refills: 1 | Status: SHIPPED | OUTPATIENT
Start: 2022-02-24 | End: 2022-09-07

## 2022-02-24 RX ORDER — CLONIDINE HYDROCHLORIDE 0.1 MG/1
TABLET ORAL
Qty: 270 TABLET | Refills: 3 | OUTPATIENT
Start: 2022-02-24

## 2022-02-24 NOTE — TELEPHONE ENCOUNTER
Please have her get clonidine refilled through her nephrologist - it's a very high dose that a specialist should manage. Thanks.

## 2022-03-16 ENCOUNTER — OFFICE VISIT (OUTPATIENT)
Dept: PAIN MANAGEMENT | Age: 47
End: 2022-03-16
Payer: MEDICAID

## 2022-03-16 VITALS
HEIGHT: 64 IN | OXYGEN SATURATION: 100 % | SYSTOLIC BLOOD PRESSURE: 205 MMHG | DIASTOLIC BLOOD PRESSURE: 120 MMHG | WEIGHT: 293 LBS | BODY MASS INDEX: 50.02 KG/M2 | TEMPERATURE: 97.2 F | HEART RATE: 83 BPM

## 2022-03-16 DIAGNOSIS — M16.11 PRIMARY OSTEOARTHRITIS OF RIGHT HIP: ICD-10-CM

## 2022-03-16 DIAGNOSIS — E66.01 MORBID OBESITY WITH BODY MASS INDEX OF 50.0-59.9 IN ADULT (HCC): ICD-10-CM

## 2022-03-16 DIAGNOSIS — M54.50 CHRONIC BILATERAL LOW BACK PAIN WITHOUT SCIATICA: ICD-10-CM

## 2022-03-16 DIAGNOSIS — G89.4 CHRONIC PAIN SYNDROME: ICD-10-CM

## 2022-03-16 DIAGNOSIS — M17.0 PRIMARY OSTEOARTHRITIS OF BOTH KNEES: ICD-10-CM

## 2022-03-16 DIAGNOSIS — M62.830 BACK SPASM: ICD-10-CM

## 2022-03-16 DIAGNOSIS — G47.33 OSA (OBSTRUCTIVE SLEEP APNEA): ICD-10-CM

## 2022-03-16 DIAGNOSIS — G89.29 CHRONIC BILATERAL LOW BACK PAIN WITHOUT SCIATICA: ICD-10-CM

## 2022-03-16 DIAGNOSIS — F41.9 ANXIETY: ICD-10-CM

## 2022-03-16 DIAGNOSIS — F32.0 CURRENT MILD EPISODE OF MAJOR DEPRESSIVE DISORDER WITHOUT PRIOR EPISODE (HCC): ICD-10-CM

## 2022-03-16 PROCEDURE — 99213 OFFICE O/P EST LOW 20 MIN: CPT | Performed by: NURSE PRACTITIONER

## 2022-03-16 PROCEDURE — G8417 CALC BMI ABV UP PARAM F/U: HCPCS | Performed by: NURSE PRACTITIONER

## 2022-03-16 PROCEDURE — G8427 DOCREV CUR MEDS BY ELIG CLIN: HCPCS | Performed by: NURSE PRACTITIONER

## 2022-03-16 PROCEDURE — 4004F PT TOBACCO SCREEN RCVD TLK: CPT | Performed by: NURSE PRACTITIONER

## 2022-03-16 PROCEDURE — G8484 FLU IMMUNIZE NO ADMIN: HCPCS | Performed by: NURSE PRACTITIONER

## 2022-03-16 RX ORDER — LIDOCAINE 50 MG/G
1 PATCH TOPICAL DAILY
Qty: 30 PATCH | Refills: 0 | Status: SHIPPED | OUTPATIENT
Start: 2022-03-16 | End: 2022-04-20 | Stop reason: SDUPTHER

## 2022-03-16 RX ORDER — VENLAFAXINE HYDROCHLORIDE 75 MG/1
150 CAPSULE, EXTENDED RELEASE ORAL DAILY
Qty: 60 CAPSULE | Refills: 0 | Status: SHIPPED | OUTPATIENT
Start: 2022-03-16 | End: 2022-04-20 | Stop reason: SDUPTHER

## 2022-03-16 RX ORDER — NALOXONE HYDROCHLORIDE 4 MG/.1ML
SPRAY NASAL
Qty: 1 EACH | Refills: 0 | Status: SHIPPED | OUTPATIENT
Start: 2022-03-16 | End: 2022-06-10 | Stop reason: ALTCHOICE

## 2022-03-16 RX ORDER — METHOCARBAMOL 500 MG/1
500 TABLET, FILM COATED ORAL 2 TIMES DAILY
Qty: 60 TABLET | Refills: 0 | Status: SHIPPED | OUTPATIENT
Start: 2022-03-16 | End: 2022-04-20 | Stop reason: SDUPTHER

## 2022-03-16 RX ORDER — HYDROCODONE BITARTRATE AND ACETAMINOPHEN 7.5; 325 MG/1; MG/1
1 TABLET ORAL EVERY 6 HOURS PRN
Qty: 120 TABLET | Refills: 0 | Status: SHIPPED | OUTPATIENT
Start: 2022-03-16 | End: 2022-04-20 | Stop reason: SDUPTHER

## 2022-03-16 NOTE — PATIENT INSTRUCTIONS
Patient Education        Back Stretches: Exercises  Introduction  Here are some examples of exercises for stretching your back. Start each exercise slowly. Ease off the exercise if you start to have pain. Your doctor or physical therapist will tell you when you can start these exercises and which ones will work best for you. How to do the exercises  Overhead stretch    1. Stand comfortably with your feet shoulder-width apart. 2. Looking straight ahead, raise both arms over your head and reach toward the ceiling. Do not allow your head to tilt back. 3. Hold for 15 to 30 seconds, then lower your arms to your sides. 4. Repeat 2 to 4 times. Side stretch    1. Stand comfortably with your feet shoulder-width apart. 2. Raise one arm over your head, and then lean to the other side. 3. Slide your hand down your leg as you let the weight of your arm gently stretch your side muscles. Hold for 15 to 30 seconds. 4. Repeat 2 to 4 times on each side. Press-up    1. Lie on your stomach, supporting your body with your forearms. 2. Press your elbows down into the floor to raise your upper back. As you do this, relax your stomach muscles and allow your back to arch without using your back muscles. As your press up, do not let your hips or pelvis come off the floor. 3. Hold for 15 to 30 seconds, then relax. 4. Repeat 2 to 4 times. Relax and rest    1. Lie on your back with a rolled towel under your neck and a pillow under your knees. Extend your arms comfortably to your sides. 2. Relax and breathe normally. 3. Remain in this position for about 10 minutes. 4. If you can, do this 2 or 3 times each day. Follow-up care is a key part of your treatment and safety. Be sure to make and go to all appointments, and call your doctor if you are having problems. It's also a good idea to know your test results and keep a list of the medicines you take. Where can you learn more? Go to [email or web address] and sign in to your [facility] account. Enter S622 in the [employer] box to learn more about \"Back Stretches: Exercises. \"     If you do not have an account, please click on the \"Sign Up Now\" link. Current as of: July 1, 2021               Content Version: 13.1  © 8820-3318 [employer]. Care instructions adapted under license by [facility] ([facility]). If you have questions about a medical condition or this instruction, always ask your healthcare professional. [address] any warranty or liability for your use of this information.

## 2022-03-16 NOTE — PROGRESS NOTES
Hernan Mishra  1975  [id number]      HISTORY OF PRESENT ILLNESS: Ms. Brooks Guaman is a 48 y.o. female returns for a follow up visit for pain management  She has a diagnosis of   1. Chronic pain syndrome    2. Primary osteoarthritis of both knees    3. Primary osteoarthritis of right hip    4. Chronic bilateral low back pain without sciatica    5. Back spasm    6. Anxiety    7. Current mild episode of major depressive disorder without prior episode ([address]    8. JOSE (obstructive sleep apnea)    9. Morbid obesity with body mass index of 50.0-59.9 in adult [facility])    . As per Information Obtained from the PADT (Patient Assessment and Documentation Tool)    She complains of pain in the lower back, right hip, both knees She rates the pain 6/10 and describes it as aching, burning, numbness, pins and needles, stabbing. Current treatment regimen has helped relieve about 50% of the pain. She denies any side effects from the current pain regimen. Patient reports that since the last follow up visit the physical functioning is unchanged, family/social relationships are unchanged, mood is unchanged sleep patterns are unchanged, and that the overall functioning is better. Patient denies misusing/abusing her narcotic pain medications or using any illegal drugs. Upon obtaining medical history from Ms. Brooks Guaman states that pain is manageable on current pain therapy. Takes pain medications as prescribed. Mood is stable without anxiety. Sleep is fair with an average of 5-6 hours. Denies to having issues of constipation. Tolerating activities/house chores with moderate tenderness to the lower back. Working on smoking cessation    ALLERGIES: Patients list of allergies were reviewed     MEDICATIONS: Ms. Brooks Guaman list of medications were reviewed. Her current medications are   Outpatient Medications Prior to Visit   Medication Sig Dispense Refill    montelukast (SINGULAIR) 10 MG tablet TAKE ONE TABLET BY MOUTH ONCE NIGHTLY 90 tablet 1    chlorthalidone (HYGROTON) 25 MG tablet TAKE ONE TABLET BY MOUTH DAILY 60 tablet 0    metFORMIN (GLUCOPHAGE) 500 MG tablet TAKE ONE TABLET BY MOUTH TWICE A DAY WITH MEALS 60 tablet 2    spironolactone (ALDACTONE) 25 MG tablet TAKE TWO TABLETS BY MOUTH TWICE A  tablet 2    amitriptyline (ELAVIL) 25 MG tablet TAKE ONE TABLET BY MOUTH ONCE NIGHTLY 30 tablet 2    Respiratory Therapy Supplies (NEBULIZER/TUBING/MOUTHPIECE) KIT 1 kit by Does not apply route daily 1 kit 0    ipratropium-albuterol (DUONEB) 0.5-2.5 (3) MG/3ML SOLN nebulizer solution Inhale 3 mLs into the lungs every 4 hours 360 mL 11    azithromycin (ZITHROMAX) 250 MG tablet Take as directed 1 packet 0    metoprolol (LOPRESSOR) 100 MG tablet TAKE ONE TABLET BY MOUTH TWICE A DAY 60 tablet 3    loratadine (CLARITIN) 10 MG tablet TAKE ONE TABLET BY MOUTH DAILY 90 tablet 1    cloNIDine (CATAPRES) 0.1 MG tablet TAKE THREE TABLETS BY MOUTH THREE TIMES A  tablet 3    omeprazole (PRILOSEC) 20 MG delayed release capsule Take 1 capsule by mouth Daily 90 capsule 1    Blood Glucose Monitoring Suppl (TRUE METRIX METER) w/Device KIT 1 kit by Does not apply route 2 times daily 1 kit 0    blood glucose test strips (TRUE METRIX BLOOD GLUCOSE TEST) strip 1 each by In Vitro route 2 times daily 200 each 1    Lancets MISC 1 each by Does not apply route 2 times daily Dispense brand per insurance preference 200 each 1    Alcohol Swabs 70 % PADS 1 each by Does not apply route 2 times daily 200 each 0    vitamin D3 (CHOLECALCIFEROL) 25 MCG (1000 UT) TABS tablet Take 2 tablets by mouth three times a week 30 tablet 5    Dulaglutide 1.5 MG/0.5ML SOPN Inject 1.5 mg into the skin once a week 4 pen 5    torsemide (DEMADEX) 20 MG tablet TAKE TWO TABLETS BY MOUTH DAILY 60 tablet 11    oxybutynin (DITROPAN-XL) 5 MG extended release tablet TAKE ONE TABLET BY MOUTH DAILY 30 tablet 3    albuterol sulfate  (90 Base) MCG/ACT inhaler INHALE TWO PUFFS BY MOUTH EVERY 6 HOURS AS NEEDED FOR WHEEZING 18 g 11    Blood Pressure Monitoring (B-D ASSURE BPM/AUTO ARM CUFF) MISC 1 Device by Does not apply route daily 1 each 0    Misc. Devices (RAISED TOILET SEAT) MISC Use daily as needed as directed 1 each 0    atorvastatin (LIPITOR) 20 MG tablet Take 1 tablet by mouth daily 90 tablet 3    acetaminophen (APAP EXTRA STRENGTH) 500 MG tablet Take 2 tablets by mouth every 6 hours as needed for Pain DO NOT TAKE WITH OTHER MEDICATIONS CONTAINING ACETAMINOPHEN. 30 tablet 0    HYDROcodone-acetaminophen (NORCO) 7.5-325 MG per tablet Take 1 tablet by mouth every 6 hours as needed for Pain for up to 30 days. 120 tablet 0    lidocaine (LIDODERM) 5 % Place 1 patch onto the skin daily 12 hours on, 12 hours off. 30 patch 0    methocarbamol (ROBAXIN) 500 MG tablet Take 1 tablet by mouth 2 times daily 60 tablet 0    venlafaxine (EFFEXOR XR) 75 MG extended release capsule Take 2 capsules by mouth daily 60 capsule 0    budesonide (PULMICORT) 0.5 MG/2ML nebulizer suspension Take 2 mLs by nebulization 2 times daily 120 mL 11    nicotine (NICODERM CQ) 14 MG/24HR Place 1 patch onto the skin daily for 14 days 28 patch 5     No facility-administered medications prior to visit. SOCIAL/FAMILY/PAST MEDICAL HISTORY: Ms. David Pearce, family and past medical history was reviewed. REVIEW OF SYSTEMS:    Respiratory: Negative for apnea, chest tightness and shortness of breath or change in baseline breathing. Gastrointestinal: Negative for nausea, vomiting, abdominal pain, diarrhea, constipation, blood in stool and abdominal distention. PHYSICAL EXAM:   Nursing note and vitals reviewed. BP (!) 205/120   Pulse 83   Temp 97.2 °F (36.2 °C)   Ht 5' 4\" (1.626 m)   Wt (!) 340 lb (154.2 kg)   LMP  (LMP Unknown)   SpO2 100%   BMI 58.36 kg/m²   Constitutional: She appears well-developed and well-nourished. No acute distress. Skin: Skin is warm and dry, good turgor.  No rash noted. She is not diaphoretic. Cardiovascular: Normal rate, regular rhythm, normal heart sounds, and does not have murmur. Pulmonary/Chest: Effort normal. No respiratory distress. She does not have wheezes in the lung fields. She has no rales. Neurological/Psychiatric:She is alert and oriented to person, place, and time. Coordination is  normal.  Her mood isAppropriate and affect is Neutral/Euthymic(normal) . Prescription pain medication monitoring:                  MEDD current = 30              ORT Score = 11 High risk              Other Risk factors  (mood) Anxiety/depression              Date of Last Medication Agreement: 3/16/22              Date Naloxone prescribed: 3/16/22              UDT:                          Date of last UDT: 4/7/21                          Adverse report: No;               OARRS:                          Checked today: Yes                          Adverse report: No    IMPRESSION:   1. Chronic pain syndrome    2. Primary osteoarthritis of both knees    3. Primary osteoarthritis of right hip    4. Chronic bilateral low back pain without sciatica    5. Back spasm    6. Anxiety    7. Current mild episode of major depressive disorder without prior episode ([address]    8. JOSE (obstructive sleep apnea)    9. Morbid obesity with body mass index of 50.0-59.9 in adult [facility])        PLAN:  Informed verbal consent was obtained:  -Patient will be maintained on current pain therapy  -Home exercises, Liang exercises/knee stretches recommended  -Monitor BP, f/u with PCP if it continues to stay elevated or she becomes symptomatic with SOB, CP, syncopy.  she is currently asymptomatic  -CBT techniques- relaxation therapies such as biofeedback, mindfulness based stress reduction, imagery, cognitive restructuring, problem solving discussed with patient  -She was advised weight reduction, diet changes- 800-1200 fior diet, diet diary, exercising, nutritional  consult increased physical tablet Take as directed 1 packet 0    loratadine (CLARITIN) 10 MG tablet TAKE ONE TABLET BY MOUTH DAILY 90 tablet 1    cloNIDine (CATAPRES) 0.1 MG tablet TAKE THREE TABLETS BY MOUTH THREE TIMES A  tablet 3    omeprazole (PRILOSEC) 20 MG delayed release capsule Take 1 capsule by mouth Daily 90 capsule 1    Blood Glucose Monitoring Suppl (TRUE METRIX METER) w/Device KIT 1 kit by Does not apply route 2 times daily 1 kit 0    blood glucose test strips (TRUE METRIX BLOOD GLUCOSE TEST) strip 1 each by In Vitro route 2 times daily 200 each 1    Lancets MISC 1 each by Does not apply route 2 times daily Dispense brand per insurance preference 200 each 1    Alcohol Swabs 70 % PADS 1 each by Does not apply route 2 times daily 200 each 0    vitamin D3 (CHOLECALCIFEROL) 25 MCG (1000 UT) TABS tablet Take 2 tablets by mouth three times a week 30 tablet 5    Dulaglutide 1.5 MG/0.5ML SOPN Inject 1.5 mg into the skin once a week 4 pen 5    torsemide (DEMADEX) 20 MG tablet TAKE TWO TABLETS BY MOUTH DAILY 60 tablet 11    oxybutynin (DITROPAN-XL) 5 MG extended release tablet TAKE ONE TABLET BY MOUTH DAILY 30 tablet 3    albuterol sulfate  (90 Base) MCG/ACT inhaler INHALE TWO PUFFS BY MOUTH EVERY 6 HOURS AS NEEDED FOR WHEEZING 18 g 11    Blood Pressure Monitoring (B-D ASSURE BPM/AUTO ARM CUFF) MISC 1 Device by Does not apply route daily 1 each 0    Misc. Devices (RAISED TOILET SEAT) MISC Use daily as needed as directed 1 each 0    atorvastatin (LIPITOR) 20 MG tablet Take 1 tablet by mouth daily 90 tablet 3    acetaminophen (APAP EXTRA STRENGTH) 500 MG tablet Take 2 tablets by mouth every 6 hours as needed for Pain DO NOT TAKE WITH OTHER MEDICATIONS CONTAINING ACETAMINOPHEN.  30 tablet 0    metoprolol (LOPRESSOR) 100 MG tablet TAKE ONE TABLET BY MOUTH TWICE A DAY 60 tablet 3    budesonide (PULMICORT) 0.5 MG/2ML nebulizer suspension Take 2 mLs by nebulization 2 times daily 120 mL 11    nicotine (Clance Rumps) 14 MG/24HR Place 1 patch onto the skin daily for 14 days 28 patch 5     No current facility-administered medications for this visit. I will continue her current medication regimen  which is part of the above treatment schedule. It has been helping with Ms. Juanito Pan chronic  medical problems which for this visit include:   Diagnoses of Chronic pain syndrome, Primary osteoarthritis of both knees, Primary osteoarthritis of right hip, Chronic bilateral low back pain without sciatica, Back spasm, Anxiety, Current mild episode of major depressive disorder without prior episode ([address] JOSE (obstructive sleep apnea), and Morbid obesity with body mass index of 50.0-59.9 in [facility]) were pertinent to this visit. Risks and benefits of the medications and other alternative treatments  including no treatment were discussed with the patient. The common side effects of these medications were also explained to the patient. Informed verbal consent was obtained. Goals of current treatment regimen include improvement in pain, restoration of functioning- with focus on improvement in physical performance, general activity, work or disability,emotional distress, health care utilization and  decreased medication consumption. Will continue to monitor progress towards achieving/maintaining therapeutic goals with special emphasis on  1. Improvement in perceived interfernce  of pain with ADL's. Ability to do home exercises independently. Ability to do household chores indoor and/or outdoor work and social and leisure activities. Improve psychosocial and physical functioning. - she is showing progression towards this treatment goal with the current regimen. She was advised against drinking alcohol with the narcotic pain medicines, advised against driving or handling machinery while adjusting the dose of medicines or if having cognitive  issues related to the current medications. Risk of overdose and death, if medicines not taken as prescribed, were also discussed. If the patient develops new symptoms or if the symptoms worsen, the patient should call the office. While transcribing every attempt was made to maintain the accuracy of the note in terms of it's contents,there may have been some errors made inadvertently. Thank you for allowing me to participate in the care of this patient. Haydee Larios CNP.     Cc: Christiano Johnson MD

## 2022-03-18 DIAGNOSIS — I10 ESSENTIAL HYPERTENSION: ICD-10-CM

## 2022-03-18 RX ORDER — METOPROLOL TARTRATE 100 MG/1
TABLET ORAL
Qty: 60 TABLET | Refills: 3 | Status: SHIPPED | OUTPATIENT
Start: 2022-03-18 | End: 2022-07-18

## 2022-03-22 ENCOUNTER — OFFICE VISIT (OUTPATIENT)
Dept: PULMONOLOGY | Age: 47
End: 2022-03-22
Payer: MEDICAID

## 2022-03-22 VITALS
SYSTOLIC BLOOD PRESSURE: 122 MMHG | BODY MASS INDEX: 50.02 KG/M2 | DIASTOLIC BLOOD PRESSURE: 82 MMHG | HEART RATE: 69 BPM | OXYGEN SATURATION: 98 % | TEMPERATURE: 97 F | HEIGHT: 64 IN | WEIGHT: 293 LBS

## 2022-03-22 DIAGNOSIS — J30.89 OTHER ALLERGIC RHINITIS: ICD-10-CM

## 2022-03-22 DIAGNOSIS — J44.9 COPD, MILD (HCC): ICD-10-CM

## 2022-03-22 DIAGNOSIS — G47.33 OSA (OBSTRUCTIVE SLEEP APNEA): ICD-10-CM

## 2022-03-22 DIAGNOSIS — Z71.6 TOBACCO ABUSE COUNSELING: ICD-10-CM

## 2022-03-22 PROBLEM — F33.41 MAJOR DEPRESSIVE DISORDER, RECURRENT, IN PARTIAL REMISSION (HCC): Status: ACTIVE | Noted: 2021-02-26

## 2022-03-22 PROBLEM — M25.551 CHRONIC RIGHT HIP PAIN: Status: ACTIVE | Noted: 2021-02-26

## 2022-03-22 PROBLEM — F17.210 CIGARETTE NICOTINE DEPENDENCE WITHOUT COMPLICATION: Status: ACTIVE | Noted: 2021-02-26

## 2022-03-22 PROBLEM — R26.9 ABNORMALITY OF GAIT AND MOBILITY: Status: ACTIVE | Noted: 2021-02-26

## 2022-03-22 PROBLEM — E78.5 HYPERLIPIDEMIA ASSOCIATED WITH TYPE 2 DIABETES MELLITUS (HCC): Status: ACTIVE | Noted: 2020-12-10

## 2022-03-22 PROBLEM — G89.29 CHRONIC RIGHT HIP PAIN: Status: ACTIVE | Noted: 2021-02-26

## 2022-03-22 PROBLEM — E11.69 HYPERLIPIDEMIA ASSOCIATED WITH TYPE 2 DIABETES MELLITUS (HCC): Status: ACTIVE | Noted: 2020-12-10

## 2022-03-22 PROBLEM — E11.42 DIABETIC POLYNEUROPATHY ASSOCIATED WITH TYPE 2 DIABETES MELLITUS (HCC): Status: ACTIVE | Noted: 2021-02-26

## 2022-03-22 PROBLEM — N39.46 MIXED STRESS AND URGE INCONTINENCE: Status: ACTIVE | Noted: 2020-12-10

## 2022-03-22 PROBLEM — F14.11 COCAINE ABUSE IN REMISSION (HCC): Status: ACTIVE | Noted: 2020-12-10

## 2022-03-22 PROCEDURE — G8417 CALC BMI ABV UP PARAM F/U: HCPCS | Performed by: INTERNAL MEDICINE

## 2022-03-22 PROCEDURE — G8484 FLU IMMUNIZE NO ADMIN: HCPCS | Performed by: INTERNAL MEDICINE

## 2022-03-22 PROCEDURE — G8427 DOCREV CUR MEDS BY ELIG CLIN: HCPCS | Performed by: INTERNAL MEDICINE

## 2022-03-22 PROCEDURE — 3023F SPIROM DOC REV: CPT | Performed by: INTERNAL MEDICINE

## 2022-03-22 PROCEDURE — 99214 OFFICE O/P EST MOD 30 MIN: CPT | Performed by: INTERNAL MEDICINE

## 2022-03-22 PROCEDURE — 4004F PT TOBACCO SCREEN RCVD TLK: CPT | Performed by: INTERNAL MEDICINE

## 2022-03-22 NOTE — PROGRESS NOTES
REASON FOR CONSULTATION/CC:    Chief Complaint   Patient presents with    Cough    COPD    Sleep Apnea           PCP: Raya Ramirez MD    HISTORY OF PRESENT ILLNESS: Dion Barnett is a 48y.o. year old female with a history of JOSE who presents :          COPD / cough  budesonide an Duoneb's  Neb. Improving cough. Greatest symptoms in the morning. Tobacco abuse. Back to to 10 cigarettes per day. allergic rhinitis  Improved control with medications. JOSE  Has not received replacement. Objective:   PHYSICAL EXAM:  Blood pressure 122/82, pulse 69, temperature 97 °F (36.1 °C), temperature source Infrared, height 5' 4\" (1.626 m), weight (!) 344 lb (156 kg), SpO2 98 %, not currently breastfeeding.'  Gen: No distress. Body mass index is 59.05 kg/m². ENT:   Resp: No accessory muscle use. No crackles. No wheezes. No rhonchi. CV: Regular rate. Regular rhythm. No murmur or rub. No edema. Skin: Warm, dry, normal texture and turgor. No nodule on exposed extremities. M/S: No cyanosis. No clubbing. No joint deformity. Psych: Oriented x 3. No anxiety. Awake. Alert. Intact judgement and insight. Good Mood / Affect. Memory appears in tact   . Data Reviewed:        Assessment:     ·  JOSE  · Obesity Body mass index is 59.05 kg/m². · allergic rhinitis   · COPD, mild, FEV1 76%. Decreased DLCO. Plan:      Problem List Items Addressed This Visit     Tobacco abuse counseling     Increased to 10 cigarettes per day. Advised to quit. Other allergic rhinitis       Controlled allergic rhinitis and COPD with budesonide and Duoneb's  Via nebulized face mask          JOSE (obstructive sleep apnea)      Not using bipap until receives replacement from [facility]i. Still waiting.               COPD, mild ([address]      Controlled allergic rhinitis and COPD with budesonide and Duoneb's  Via nebulized face mask                     [address] [facility]  [phone]

## 2022-03-29 ENCOUNTER — TELEPHONE (OUTPATIENT)
Dept: PAIN MANAGEMENT | Age: 47
End: 2022-03-29

## 2022-04-08 DIAGNOSIS — E66.9 DIABETES MELLITUS TYPE 2 IN OBESE (HCC): ICD-10-CM

## 2022-04-08 DIAGNOSIS — E66.01 MORBID OBESITY (HCC): ICD-10-CM

## 2022-04-08 DIAGNOSIS — E11.69 DIABETES MELLITUS TYPE 2 IN OBESE (HCC): ICD-10-CM

## 2022-04-08 RX ORDER — DULAGLUTIDE 1.5 MG/.5ML
INJECTION, SOLUTION SUBCUTANEOUS
Qty: 2 ML | Refills: 5 | Status: SHIPPED
Start: 2022-04-08 | End: 2022-06-09 | Stop reason: DRUGHIGH

## 2022-04-11 RX ORDER — CHLORTHALIDONE 25 MG/1
TABLET ORAL
Qty: 60 TABLET | Refills: 0 | Status: SHIPPED | OUTPATIENT
Start: 2022-04-11 | End: 2022-06-20

## 2022-04-11 RX ORDER — OMEPRAZOLE 20 MG/1
CAPSULE, DELAYED RELEASE ORAL
Qty: 90 CAPSULE | Refills: 1 | Status: SHIPPED | OUTPATIENT
Start: 2022-04-11

## 2022-04-11 NOTE — TELEPHONE ENCOUNTER
Last OV: 10/01/2021  Next OV: x  Last refill:  Most recent Labs: bmp 12/07/2021  Last EKG (if needed):03/26/2021

## 2022-04-20 ENCOUNTER — OFFICE VISIT (OUTPATIENT)
Dept: PAIN MANAGEMENT | Age: 47
End: 2022-04-20
Payer: MEDICAID

## 2022-04-20 VITALS
OXYGEN SATURATION: 96 % | BODY MASS INDEX: 50.02 KG/M2 | SYSTOLIC BLOOD PRESSURE: 139 MMHG | WEIGHT: 293 LBS | DIASTOLIC BLOOD PRESSURE: 82 MMHG | HEIGHT: 64 IN | TEMPERATURE: 96.4 F | HEART RATE: 71 BPM

## 2022-04-20 DIAGNOSIS — F33.41 MAJOR DEPRESSIVE DISORDER, RECURRENT, IN PARTIAL REMISSION (HCC): ICD-10-CM

## 2022-04-20 DIAGNOSIS — G89.29 CHRONIC BILATERAL LOW BACK PAIN WITHOUT SCIATICA: ICD-10-CM

## 2022-04-20 DIAGNOSIS — M17.0 PRIMARY OSTEOARTHRITIS OF BOTH KNEES: ICD-10-CM

## 2022-04-20 DIAGNOSIS — E11.42 DIABETIC POLYNEUROPATHY ASSOCIATED WITH TYPE 2 DIABETES MELLITUS (HCC): ICD-10-CM

## 2022-04-20 DIAGNOSIS — F41.9 ANXIETY: ICD-10-CM

## 2022-04-20 DIAGNOSIS — M54.50 CHRONIC BILATERAL LOW BACK PAIN WITHOUT SCIATICA: ICD-10-CM

## 2022-04-20 DIAGNOSIS — E66.01 MORBID OBESITY (HCC): ICD-10-CM

## 2022-04-20 DIAGNOSIS — M16.11 PRIMARY OSTEOARTHRITIS OF RIGHT HIP: ICD-10-CM

## 2022-04-20 DIAGNOSIS — G89.4 CHRONIC PAIN SYNDROME: ICD-10-CM

## 2022-04-20 DIAGNOSIS — M62.830 BACK SPASM: ICD-10-CM

## 2022-04-20 PROCEDURE — G8427 DOCREV CUR MEDS BY ELIG CLIN: HCPCS | Performed by: NURSE PRACTITIONER

## 2022-04-20 PROCEDURE — 4004F PT TOBACCO SCREEN RCVD TLK: CPT | Performed by: NURSE PRACTITIONER

## 2022-04-20 PROCEDURE — 99213 OFFICE O/P EST LOW 20 MIN: CPT | Performed by: NURSE PRACTITIONER

## 2022-04-20 PROCEDURE — G8417 CALC BMI ABV UP PARAM F/U: HCPCS | Performed by: NURSE PRACTITIONER

## 2022-04-20 PROCEDURE — 2022F DILAT RTA XM EVC RTNOPTHY: CPT | Performed by: NURSE PRACTITIONER

## 2022-04-20 PROCEDURE — 3046F HEMOGLOBIN A1C LEVEL >9.0%: CPT | Performed by: NURSE PRACTITIONER

## 2022-04-20 RX ORDER — LIDOCAINE 50 MG/G
1 PATCH TOPICAL DAILY
Qty: 30 PATCH | Refills: 0 | Status: SHIPPED | OUTPATIENT
Start: 2022-04-20 | End: 2022-05-20

## 2022-04-20 RX ORDER — VENLAFAXINE HYDROCHLORIDE 75 MG/1
150 CAPSULE, EXTENDED RELEASE ORAL DAILY
Qty: 60 CAPSULE | Refills: 0 | Status: SHIPPED | OUTPATIENT
Start: 2022-04-20 | End: 2022-06-21 | Stop reason: SDUPTHER

## 2022-04-20 RX ORDER — ACETAMINOPHEN 500 MG
1000 TABLET ORAL EVERY 6 HOURS PRN
Qty: 30 TABLET | Refills: 0 | Status: SHIPPED | OUTPATIENT
Start: 2022-04-20 | End: 2022-06-21 | Stop reason: SDUPTHER

## 2022-04-20 RX ORDER — METHOCARBAMOL 500 MG/1
500 TABLET, FILM COATED ORAL 2 TIMES DAILY
Qty: 60 TABLET | Refills: 0 | Status: SHIPPED | OUTPATIENT
Start: 2022-04-20 | End: 2022-06-21 | Stop reason: SDUPTHER

## 2022-04-20 RX ORDER — HYDROCODONE BITARTRATE AND ACETAMINOPHEN 7.5; 325 MG/1; MG/1
1 TABLET ORAL EVERY 6 HOURS PRN
Qty: 60 TABLET | Refills: 0 | Status: SHIPPED | OUTPATIENT
Start: 2022-04-20 | End: 2022-06-21 | Stop reason: SDUPTHER

## 2022-04-20 NOTE — PROGRESS NOTES
Dianelys Fritz  1975  [id number]      HISTORY OF PRESENT ILLNESS: Ms. Luis Alberto Desir is a 48 y.o. female returns for a follow up visit for pain management  She has a diagnosis of   1. Chronic pain syndrome    2. Primary osteoarthritis of both knees    3. Primary osteoarthritis of right hip    4. Chronic bilateral low back pain without sciatica    5. Back spasm    6. Diabetic polyneuropathy associated with type 2 diabetes mellitus ([facility]    7. Anxiety    8. Major depressive disorder, recurrent, in partial remission ([facility]    9. Morbid obesity ([facility] [facility]    . As per Information Obtained from the PADT (Patient Assessment and Documentation Tool)    She complains of pain in the lower back, both hips, knees She rates the pain 7/10 and describes it as aching, burning, numbness, pins and needles, stabbing. Current treatment regimen has helped relieve about 50% of the pain. She denies any side effects from the current pain regimen. Patient reports that since the last follow up visit the physical functioning is unchanged, family/social relationships are unchanged, mood is unchanged sleep patterns are unchanged, and that the overall functioning is unchanged. Patient denies misusing/abusing her narcotic pain medications or using any illegal drugs. Upon obtaining medical history from Ms. Luis Alberto Desir states that pain is manageable on current pain therapy. Takes pain medications as prescribed. Reports feeling like her right hip popped out while trying to stand from a sitting position. Denies direct trauma, reports icy hot/heating pad seemed to he help alleviate some of the pain. Mood is stable without anxiety. Sleep is fair with an average of 5-6 hours. Denies to having issues of constipation. Tolerating activities/house chores with moderate tenderness to the lower back, right hip. ALLERGIES: Patients list of allergies were reviewed     MEDICATIONS: Ms. Luis Alberto Desir list of medications were reviewed. Her current medications are Outpatient Medications Prior to Visit   Medication Sig Dispense Refill    omeprazole (PRILOSEC) 20 MG delayed release capsule TAKE ONE CAPSULE BY MOUTH DAILY 90 capsule 1    chlorthalidone (HYGROTON) 25 MG tablet TAKE ONE TABLET BY MOUTH DAILY 60 tablet 0    TRULICITY 1.5 EN/4.0YD SOPN INJECT 1.5 MG UNDER THE SKIN ONCE WEEKLY 2 mL 5    spironolactone (ALDACTONE) 50 MG tablet Take 1 tablet by mouth 2 times daily 60 tablet 3    metoprolol (LOPRESSOR) 100 MG tablet TAKE ONE TABLET BY MOUTH TWICE A DAY 60 tablet 3    naloxone (NARCAN) 4 MG/0.1ML LIQD nasal spray Use as directed 1 each 0    montelukast (SINGULAIR) 10 MG tablet TAKE ONE TABLET BY MOUTH ONCE NIGHTLY 90 tablet 1    metFORMIN (GLUCOPHAGE) 500 MG tablet TAKE ONE TABLET BY MOUTH TWICE A DAY WITH MEALS 60 tablet 2    amitriptyline (ELAVIL) 25 MG tablet TAKE ONE TABLET BY MOUTH ONCE NIGHTLY 30 tablet 2    Respiratory Therapy Supplies (NEBULIZER/TUBING/MOUTHPIECE) KIT 1 kit by Does not apply route daily 1 kit 0    ipratropium-albuterol (DUONEB) 0.5-2.5 (3) MG/3ML SOLN nebulizer solution Inhale 3 mLs into the lungs every 4 hours 360 mL 11    azithromycin (ZITHROMAX) 250 MG tablet Take as directed 1 packet 0    loratadine (CLARITIN) 10 MG tablet TAKE ONE TABLET BY MOUTH DAILY 90 tablet 1    cloNIDine (CATAPRES) 0.1 MG tablet TAKE THREE TABLETS BY MOUTH THREE TIMES A  tablet 3    Blood Glucose Monitoring Suppl (TRUE METRIX METER) w/Device KIT 1 kit by Does not apply route 2 times daily 1 kit 0    blood glucose test strips (TRUE METRIX BLOOD GLUCOSE TEST) strip 1 each by In Vitro route 2 times daily 200 each 1    Lancets MISC 1 each by Does not apply route 2 times daily Dispense brand per insurance preference 200 each 1    Alcohol Swabs 70 % PADS 1 each by Does not apply route 2 times daily 200 each 0    vitamin D3 (CHOLECALCIFEROL) 25 MCG (1000 UT) TABS tablet Take 2 tablets by mouth three times a week 30 tablet 5    torsemide (DEMADEX) 20 MG tablet TAKE TWO TABLETS BY MOUTH DAILY 60 tablet 11    oxybutynin (DITROPAN-XL) 5 MG extended release tablet TAKE ONE TABLET BY MOUTH DAILY 30 tablet 3    albuterol sulfate  (90 Base) MCG/ACT inhaler INHALE TWO PUFFS BY MOUTH EVERY 6 HOURS AS NEEDED FOR WHEEZING 18 g 11    Blood Pressure Monitoring (B-D ASSURE BPM/AUTO ARM CUFF) MISC 1 Device by Does not apply route daily 1 each 0    Misc. Devices (RAISED TOILET SEAT) MISC Use daily as needed as directed 1 each 0    atorvastatin (LIPITOR) 20 MG tablet Take 1 tablet by mouth daily 90 tablet 3    methocarbamol (ROBAXIN) 500 MG tablet Take 1 tablet by mouth 2 times daily 60 tablet 0    venlafaxine (EFFEXOR XR) 75 MG extended release capsule Take 2 capsules by mouth daily 60 capsule 0    budesonide (PULMICORT) 0.5 MG/2ML nebulizer suspension Take 2 mLs by nebulization 2 times daily 120 mL 11    acetaminophen (APAP EXTRA STRENGTH) 500 MG tablet Take 2 tablets by mouth every 6 hours as needed for Pain DO NOT TAKE WITH OTHER MEDICATIONS CONTAINING ACETAMINOPHEN. 30 tablet 0     No facility-administered medications prior to visit. SOCIAL/FAMILY/PAST MEDICAL HISTORY: Ms. Yuli Kim, family and past medical history was reviewed. REVIEW OF SYSTEMS:    Respiratory: Negative for apnea, chest tightness and shortness of breath or change in baseline breathing. Gastrointestinal: Negative for nausea, vomiting, abdominal pain, diarrhea, constipation, blood in stool and abdominal distention. PHYSICAL EXAM:   Nursing note and vitals reviewed. /82   Pulse 71   Temp 96.4 °F (35.8 °C)   Ht 5' 4\" (1.626 m)   Wt (!) 339 lb (153.8 kg)   LMP  (LMP Unknown)   SpO2 96%   BMI 58.19 kg/m²   Constitutional: She appears well-developed and well-nourished. No acute distress. Skin: Skin is warm and dry, good turgor. No rash noted. She is not diaphoretic.   Cardiovascular: Normal rate, regular rhythm, normal heart sounds, and does not have murmur. Pulmonary/Chest: Effort normal. No respiratory distress. She does not have wheezes in the lung fields. She has no rales. Neurological/Psychiatric:She is alert and oriented to person, place, and time. Coordination is  Normal. Pain to the right hips with ambulation, less with palpation/flexion. Negative for swelling. Her mood isAppropriate and affect is Neutral/Euthymic(normal) . Prescription pain medication monitoring:                  MEDD current = 30              ORT Score = 11 high risk              Other Risk factors  (mood) Depression/Anxiety              Date of Last Medication Agreement: 3/16/22              Date Naloxone prescribed: 3/16/22              UDT:                          Date of last UDT: 3/16/22                          Adverse report: No;               OARRS:                          Checked today: Yes                          Adverse report: No    IMPRESSION:   1. Chronic pain syndrome    2. Primary osteoarthritis of both knees    3. Primary osteoarthritis of right hip    4. Chronic bilateral low back pain without sciatica    5. Back spasm    6. Diabetic polyneuropathy associated with type 2 diabetes mellitus ([facility] [address]    7. Anxiety    8. Major depressive disorder, recurrent, in partial remission ([facility] [address]    9. Mor[facility] obesity ([facility]        PLAN:  Informed verbal consent was obtained:  -Patient will be prescribed for 15 days to wean off  -Liang exercises recommended  -Informed patient that opioids cannot be prescribed for her while using marijuana, she can otherwise be treated with non opioids if she chose to continue with marijuana. Advised to quit using marijuana if she hoped to obtain opioids therapy for pain.  Patient verbalized understanding, and opted to continue pursuing medical marijuana  -CBT techniques- relaxation therapies such as biofeedback, mindfulness based stress reduction, imagery, cognitive restructuring, problem solving discussed with patient  -She was advised weight reduction, diet changes- 800-1200 fior diet, diet diary, exercising, nutritional  consult increased physical activity as tolerated  -Last UDS 2/16/22 +THC  -Return in about 2 months (around 6/20/2022). Analgesic Plan:              Continue present regimen: Norco 7.5-325 mg tabs qid prn x15 days              Adjust dose of present analgesic: No              Switch analgesics: No              Add/Adjust concomitant therapy: Robaxin, Lidocaine, Elavil    Current Outpatient Medications   Medication Sig Dispense Refill    methocarbamol (ROBAXIN) 500 MG tablet Take 1 tablet by mouth 2 times daily 60 tablet 0    venlafaxine (EFFEXOR XR) 75 MG extended release capsule Take 2 capsules by mouth daily 60 capsule 0    acetaminophen (APAP EXTRA STRENGTH) 500 MG tablet Take 2 tablets by mouth every 6 hours as needed for Pain DO NOT TAKE WITH OTHER MEDICATIONS CONTAINING ACETAMINOPHEN. 30 tablet 0    lidocaine (LIDODERM) 5 % Place 1 patch onto the skin daily 12 hours on, 12 hours off. 30 patch 0    HYDROcodone-acetaminophen (NORCO) 7.5-325 MG per tablet Take 1 tablet by mouth every 6 hours as needed for Pain for up to 15 days.  60 tablet 0    omeprazole (PRILOSEC) 20 MG delayed release capsule TAKE ONE CAPSULE BY MOUTH DAILY 90 capsule 1    chlorthalidone (HYGROTON) 25 MG tablet TAKE ONE TABLET BY MOUTH DAILY 60 tablet 0    TRULICITY 1.5 TP/3.3BM SOPN INJECT 1.5 MG UNDER THE SKIN ONCE WEEKLY 2 mL 5    spironolactone (ALDACTONE) 50 MG tablet Take 1 tablet by mouth 2 times daily 60 tablet 3    metoprolol (LOPRESSOR) 100 MG tablet TAKE ONE TABLET BY MOUTH TWICE A DAY 60 tablet 3    naloxone (NARCAN) 4 MG/0.1ML LIQD nasal spray Use as directed 1 each 0    montelukast (SINGULAIR) 10 MG tablet TAKE ONE TABLET BY MOUTH ONCE NIGHTLY 90 tablet 1    metFORMIN (GLUCOPHAGE) 500 MG tablet TAKE ONE TABLET BY MOUTH TWICE A DAY WITH MEALS 60 tablet 2    amitriptyline (ELAVIL) 25 MG tablet TAKE ONE TABLET BY MOUTH ONCE NIGHTLY 30 tablet 2    Respiratory Therapy Supplies (NEBULIZER/TUBING/MOUTHPIECE) KIT 1 kit by Does not apply route daily 1 kit 0    ipratropium-albuterol (DUONEB) 0.5-2.5 (3) MG/3ML SOLN nebulizer solution Inhale 3 mLs into the lungs every 4 hours 360 mL 11    azithromycin (ZITHROMAX) 250 MG tablet Take as directed 1 packet 0    loratadine (CLARITIN) 10 MG tablet TAKE ONE TABLET BY MOUTH DAILY 90 tablet 1    cloNIDine (CATAPRES) 0.1 MG tablet TAKE THREE TABLETS BY MOUTH THREE TIMES A  tablet 3    Blood Glucose Monitoring Suppl (TRUE METRIX METER) w/Device KIT 1 kit by Does not apply route 2 times daily 1 kit 0    blood glucose test strips (TRUE METRIX BLOOD GLUCOSE TEST) strip 1 each by In Vitro route 2 times daily 200 each 1    Lancets MISC 1 each by Does not apply route 2 times daily Dispense brand per insurance preference 200 each 1    Alcohol Swabs 70 % PADS 1 each by Does not apply route 2 times daily 200 each 0    vitamin D3 (CHOLECALCIFEROL) 25 MCG (1000 UT) TABS tablet Take 2 tablets by mouth three times a week 30 tablet 5    torsemide (DEMADEX) 20 MG tablet TAKE TWO TABLETS BY MOUTH DAILY 60 tablet 11    oxybutynin (DITROPAN-XL) 5 MG extended release tablet TAKE ONE TABLET BY MOUTH DAILY 30 tablet 3    albuterol sulfate  (90 Base) MCG/ACT inhaler INHALE TWO PUFFS BY MOUTH EVERY 6 HOURS AS NEEDED FOR WHEEZING 18 g 11    Blood Pressure Monitoring (B-D ASSURE BPM/AUTO ARM CUFF) MISC 1 Device by Does not apply route daily 1 each 0    Misc. Devices (RAISED TOILET SEAT) MISC Use daily as needed as directed 1 each 0    atorvastatin (LIPITOR) 20 MG tablet Take 1 tablet by mouth daily 90 tablet 3    budesonide (PULMICORT) 0.5 MG/2ML nebulizer suspension Take 2 mLs by nebulization 2 times daily 120 mL 11     No current facility-administered medications for this visit. I will continue her current medication regimen  which is part of the above treatment schedule.  It has been helping with Ms. Bobbi Rao chronic  medical problems which for this visit include:   Diagnoses of Chronic pain syndrome, Primary osteoarthritis of both knees, Primary osteoarthritis of right hip, Chronic bilateral low back pain without sciatica, Back spasm, Diabetic polyneuropathy associated with type 2 diabetes mellitus ([address] Anxiety, Major depressive disorder, recurrent, in partial remission ([address] and Morbid obesity ([address] were pertinent to this visit. Risks and benefits of the medications and other alternative treatments  including no treatment were discussed with the patient. The common side effects of these medications were also explained to the patient. Informed verbal consent was obtained. Goals of current treatment regimen include improvement in pain, restoration of functioning- with focus on improvement in physical performance, general activity, work or disability,emotional distress, health care utilization and  decreased medication consumption. Will continue to monitor progress towards achieving/maintaining therapeutic goals with special emphasis on  1. Improvement in perceived interfernce  of pain with ADL's. Ability to do home exercises independently. Ability to do household chores indoor and/or outdoor work and social and leisure activities. Improve psychosocial and physical functioning. - she is showing progression towards this treatment goal with the current regimen. She was advised against drinking alcohol with the narcotic pain medicines, advised against driving or handling machinery while adjusting the dose of medicines or if having cognitive  issues related to the current medications. Risk of overdose and death, if medicines not taken as prescribed, were also discussed. If the patient develops new symptoms or if the symptoms worsen, the patient should call the office.     While transcribing every attempt was made to maintain the accuracy of the note in terms of it's contents,there may have been some errors made inadvertently. Thank you for allowing me to participate in the care of this patient. Gillian Pagan CNP.     Cc: Kelton Valentino MD

## 2022-04-28 ENCOUNTER — TELEPHONE (OUTPATIENT)
Dept: PAIN MANAGEMENT | Age: 47
End: 2022-04-28

## 2022-05-17 ENCOUNTER — HOSPITAL ENCOUNTER (OUTPATIENT)
Dept: WOMENS IMAGING | Age: 47
Discharge: HOME | End: 2022-05-17
Payer: MEDICAID

## 2022-05-17 DIAGNOSIS — Z12.31 VISIT FOR SCREENING MAMMOGRAM: ICD-10-CM

## 2022-05-17 PROCEDURE — 77067 SCR MAMMO BI INCL CAD: CPT

## 2022-05-19 DIAGNOSIS — E11.69 DIABETES MELLITUS TYPE 2 IN OBESE (HCC): ICD-10-CM

## 2022-05-19 DIAGNOSIS — E66.9 DIABETES MELLITUS TYPE 2 IN OBESE (HCC): ICD-10-CM

## 2022-05-19 RX ORDER — SPIRONOLACTONE 25 MG/1
TABLET ORAL
Qty: 120 TABLET | Refills: 0 | Status: SHIPPED | OUTPATIENT
Start: 2022-05-19 | End: 2022-06-09

## 2022-05-19 NOTE — TELEPHONE ENCOUNTER
Medication sent  Please have Vannessa get blood work for monitoring especially of potassium  Please have her schedule f/u with me this summer.   Thanks

## 2022-06-09 ENCOUNTER — OFFICE VISIT (OUTPATIENT)
Dept: FAMILY MEDICINE CLINIC | Age: 47
End: 2022-06-09
Payer: MEDICAID

## 2022-06-09 VITALS
HEART RATE: 104 BPM | BODY MASS INDEX: 50.02 KG/M2 | SYSTOLIC BLOOD PRESSURE: 154 MMHG | OXYGEN SATURATION: 94 % | WEIGHT: 293 LBS | RESPIRATION RATE: 16 BRPM | DIASTOLIC BLOOD PRESSURE: 90 MMHG | HEIGHT: 64 IN

## 2022-06-09 DIAGNOSIS — E66.9 DIABETES MELLITUS TYPE 2 IN OBESE (HCC): Primary | ICD-10-CM

## 2022-06-09 DIAGNOSIS — M16.11 PRIMARY OSTEOARTHRITIS OF RIGHT HIP: ICD-10-CM

## 2022-06-09 DIAGNOSIS — M17.0 PRIMARY OSTEOARTHRITIS OF BOTH KNEES: ICD-10-CM

## 2022-06-09 DIAGNOSIS — E11.69 DIABETES MELLITUS TYPE 2 IN OBESE (HCC): Primary | ICD-10-CM

## 2022-06-09 DIAGNOSIS — E66.01 MORBID OBESITY (HCC): ICD-10-CM

## 2022-06-09 DIAGNOSIS — I10 ESSENTIAL HYPERTENSION: ICD-10-CM

## 2022-06-09 DIAGNOSIS — I50.32 CHRONIC DIASTOLIC CHF (CONGESTIVE HEART FAILURE) (HCC): ICD-10-CM

## 2022-06-09 LAB — HBA1C MFR BLD: 5.7 %

## 2022-06-09 PROCEDURE — 83036 HEMOGLOBIN GLYCOSYLATED A1C: CPT | Performed by: FAMILY MEDICINE

## 2022-06-09 PROCEDURE — 2022F DILAT RTA XM EVC RTNOPTHY: CPT | Performed by: FAMILY MEDICINE

## 2022-06-09 PROCEDURE — 99214 OFFICE O/P EST MOD 30 MIN: CPT | Performed by: FAMILY MEDICINE

## 2022-06-09 PROCEDURE — 4004F PT TOBACCO SCREEN RCVD TLK: CPT | Performed by: FAMILY MEDICINE

## 2022-06-09 PROCEDURE — G8427 DOCREV CUR MEDS BY ELIG CLIN: HCPCS | Performed by: FAMILY MEDICINE

## 2022-06-09 PROCEDURE — G8417 CALC BMI ABV UP PARAM F/U: HCPCS | Performed by: FAMILY MEDICINE

## 2022-06-09 PROCEDURE — 3044F HG A1C LEVEL LT 7.0%: CPT | Performed by: FAMILY MEDICINE

## 2022-06-09 RX ORDER — CALCIUM CITRATE/VITAMIN D3 200MG-6.25
1 TABLET ORAL 2 TIMES DAILY
Qty: 200 EACH | Refills: 11 | Status: SHIPPED | OUTPATIENT
Start: 2022-06-09

## 2022-06-09 SDOH — ECONOMIC STABILITY: FOOD INSECURITY: WITHIN THE PAST 12 MONTHS, THE FOOD YOU BOUGHT JUST DIDN'T LAST AND YOU DIDN'T HAVE MONEY TO GET MORE.: NEVER TRUE

## 2022-06-09 SDOH — ECONOMIC STABILITY: FOOD INSECURITY: WITHIN THE PAST 12 MONTHS, YOU WORRIED THAT YOUR FOOD WOULD RUN OUT BEFORE YOU GOT MONEY TO BUY MORE.: NEVER TRUE

## 2022-06-09 ASSESSMENT — PATIENT HEALTH QUESTIONNAIRE - PHQ9
SUM OF ALL RESPONSES TO PHQ9 QUESTIONS 1 & 2: 1
7. TROUBLE CONCENTRATING ON THINGS, SUCH AS READING THE NEWSPAPER OR WATCHING TELEVISION: 0
2. FEELING DOWN, DEPRESSED OR HOPELESS: 1
SUM OF ALL RESPONSES TO PHQ QUESTIONS 1-9: 5
8. MOVING OR SPEAKING SO SLOWLY THAT OTHER PEOPLE COULD HAVE NOTICED. OR THE OPPOSITE, BEING SO FIGETY OR RESTLESS THAT YOU HAVE BEEN MOVING AROUND A LOT MORE THAN USUAL: 1
10. IF YOU CHECKED OFF ANY PROBLEMS, HOW DIFFICULT HAVE THESE PROBLEMS MADE IT FOR YOU TO DO YOUR WORK, TAKE CARE OF THINGS AT HOME, OR GET ALONG WITH OTHER PEOPLE: 0
9. THOUGHTS THAT YOU WOULD BE BETTER OFF DEAD, OR OF HURTING YOURSELF: 0
SUM OF ALL RESPONSES TO PHQ QUESTIONS 1-9: 5
4. FEELING TIRED OR HAVING LITTLE ENERGY: 1
6. FEELING BAD ABOUT YOURSELF - OR THAT YOU ARE A FAILURE OR HAVE LET YOURSELF OR YOUR FAMILY DOWN: 0
5. POOR APPETITE OR OVEREATING: 1
1. LITTLE INTEREST OR PLEASURE IN DOING THINGS: 0
SUM OF ALL RESPONSES TO PHQ QUESTIONS 1-9: 5
3. TROUBLE FALLING OR STAYING ASLEEP: 1
SUM OF ALL RESPONSES TO PHQ QUESTIONS 1-9: 5

## 2022-06-09 ASSESSMENT — SOCIAL DETERMINANTS OF HEALTH (SDOH): HOW HARD IS IT FOR YOU TO PAY FOR THE VERY BASICS LIKE FOOD, HOUSING, MEDICAL CARE, AND HEATING?: NOT HARD AT ALL

## 2022-06-09 NOTE — PROGRESS NOTES
[address]  Cardiology Progress Note      Dianelys Fritz  1975, 48 y.o.    CC: I was in the hospital end of may with bronchitis flare\"        Francisca Mix MD:    HPI:   This is a 48 y.o. female with a PMH significant for COPD, chronic diastolic heart failure, Hypertension, Hyperlipidemia, COPD and JOSE on BiPAP (managed by pulmonology), tobacco abuse and Type II Diabetes Mellitus. Today, patient returns in follow up after hospital admission for bronchitis flare. Denies any dyspnea, chest pain, palpitations and dizziness. She was given an inhaler and SOB improved. She missed her last two appointments and wanted to follow up. Past Medical History:   Diagnosis Date    Anxiety     Arthritis     Asthma     Back pain     CHF (congestive heart failure) (HCC)     COPD (chronic obstructive pulmonary disease) (HCC)     Depression     Diabetes mellitus (HCC)     GERD (gastroesophageal reflux disease)     Hyperlipidemia     Hypertension     Obesity     Sleep apnea       Past Surgical History:   Procedure Laterality Date    CHOLECYSTECTOMY      HYSTERECTOMY (CERVIX STATUS UNKNOWN)      SKIN BIOPSY      TUBAL LIGATION      UPPER GASTROINTESTINAL ENDOSCOPY N/A 10/14/2019    EGD BIOPSY performed by Sonia Covarrubias DO at [facility] ENDOSCOPY      Family History   Problem Relation Age of Onset    Breast Cancer Maternal Aunt       Social History     Tobacco Use    Smoking status: Current Every Day Smoker     Packs/day: 0.50     Years: 20.00     Pack years: 10.00     Types: Cigarettes     Start date: 1985     Last attempt to quit: 2020     Years since quittin.2    Smokeless tobacco: Former User    Tobacco comment: working on it   Vaping Use    Vaping Use: Never used   Substance Use Topics    Alcohol use:  Yes     Alcohol/week: 1.0 standard drink     Types: 1 Glasses of wine per week     Comment: rare    Drug use: No     Allergies   Allergen Reactions    Latex Review of Systems -   Constitutional: Negative for weight gain/loss; malaise, fever  Respiratory: Negative for Asthma;  cough and hemoptysis  Cardiovascular:  Negative for palpitations,dizziness   Gastrointestinal: Negative for abd.pain; constipation/diarrhea;    Genitourinary: Negative for stones; hematuria; frequency hesitancy  Integumentt: Negative for rash or pruritis  Hematologic/lymphatic: Negative for blood dyscrasia; leukemia/lymphoma  Musculoskeletal: Negative for Connective tissue disease  Neurological:  Negative for Seizure   Behavioral/Psych:Negative for Bipolar disorder, Schizophrenia; Dementia  Endocrine: negative for thyroid, parathyroid disease    Physical Examination:       Vitals:    06/10/22 0959   BP: 118/60   Pulse: 82   SpO2: 94%   Weight: (!) 321 lb 12.8 oz (146 kg)   Height: 5' 4\" (1.626 m)       HEENT:  Face: Atraumatic, Conjunctiva: Pink; non icteric,  Mucous Memb:  Moist, No thyromegaly or Lymphadenopathy  Respiratory:  Resp Assessment: normal, Resp Auscultation: clear  Cardiovascular: Auscultation: nl S1 & S2, Palpation:  Nl PMI;  No heaves or thrills, JVP:  normal  Abdomen: Soft, non-tender, Normal bowel sounds,  No organomegaly  Extremities: No Cyanosis or Clubbing  Neurological: Oriented to time, place, and person, Non-anxious  Psychiatric: Normal mood and affect  Skin: Warm and dry,  No rash seen     Current Outpatient Medications   Medication Sig Dispense Refill    blood glucose test strips (TRUE METRIX BLOOD GLUCOSE TEST) strip 1 each by In Vitro route 2 times daily 200 each 11    Dulaglutide 3 MG/0.5ML SOPN Inject 3 mg into the skin once a week 4 pen 5    metFORMIN (GLUCOPHAGE) 500 MG tablet TAKE ONE TABLET BY MOUTH TWICE A DAY WITH MEALS 60 tablet 2    methocarbamol (ROBAXIN) 500 MG tablet Take 1 tablet by mouth 2 times daily 60 tablet 0    venlafaxine (EFFEXOR XR) 75 MG extended release capsule Take 2 capsules by mouth daily 60 capsule 0    acetaminophen (APAP EXTRA STRENGTH) 500 MG tablet Take 2 tablets by mouth every 6 hours as needed for Pain DO NOT TAKE WITH OTHER MEDICATIONS CONTAINING ACETAMINOPHEN.  30 tablet 0    omeprazole (PRILOSEC) 20 MG delayed release capsule TAKE ONE CAPSULE BY MOUTH DAILY 90 capsule 1    chlorthalidone (HYGROTON) 25 MG tablet TAKE ONE TABLET BY MOUTH DAILY 60 tablet 0    spironolactone (ALDACTONE) 50 MG tablet Take 1 tablet by mouth 2 times daily 60 tablet 3    metoprolol (LOPRESSOR) 100 MG tablet TAKE ONE TABLET BY MOUTH TWICE A DAY 60 tablet 3    montelukast (SINGULAIR) 10 MG tablet TAKE ONE TABLET BY MOUTH ONCE NIGHTLY 90 tablet 1    Respiratory Therapy Supplies (NEBULIZER/TUBING/MOUTHPIECE) KIT 1 kit by Does not apply route daily 1 kit 0    budesonide (PULMICORT) 0.5 MG/2ML nebulizer suspension Take 2 mLs by nebulization 2 times daily 120 mL 11    ipratropium-albuterol (DUONEB) 0.5-2.5 (3) MG/3ML SOLN nebulizer solution Inhale 3 mLs into the lungs every 4 hours 360 mL 11    loratadine (CLARITIN) 10 MG tablet TAKE ONE TABLET BY MOUTH DAILY 90 tablet 1    cloNIDine (CATAPRES) 0.1 MG tablet TAKE THREE TABLETS BY MOUTH THREE TIMES A DAY (Patient taking differently: Take 0.3 mg by mouth in the morning, at noon, and at bedtime ) 270 tablet 3    Blood Glucose Monitoring Suppl (TRUE METRIX METER) w/Device KIT 1 kit by Does not apply route 2 times daily 1 kit 0    Lancets MISC 1 each by Does not apply route 2 times daily Dispense brand per insurance preference 200 each 1    Alcohol Swabs 70 % PADS 1 each by Does not apply route 2 times daily 200 each 0    torsemide (DEMADEX) 20 MG tablet TAKE TWO TABLETS BY MOUTH DAILY 60 tablet 11    oxybutynin (DITROPAN-XL) 5 MG extended release tablet TAKE ONE TABLET BY MOUTH DAILY 30 tablet 3    albuterol sulfate  (90 Base) MCG/ACT inhaler INHALE TWO PUFFS BY MOUTH EVERY 6 HOURS AS NEEDED FOR WHEEZING 18 g 11    Blood Pressure Monitoring (B-D ASSURE BPM/AUTO ARM CUFF) MISC 1 Device by Does not apply route daily 1 each 0    Misc. Devices (RAISED TOILET SEAT) MISC Use daily as needed as directed 1 each 0     No current facility-administered medications for this visit. Labs:   Lab Results   Component Value Date    HDL 74 2019    LDLCALC 102 2019    TRIG 110 2019     EK20   NSR   6/10/22  Sinus  Rhythm   -Poor R-wave progression -nonspecific -consider old anterior infarct. ECHO:2017  Concentric LVH with normal systolic function. EF is  60%  Left atrium is of normal size. Right ventricular systolic function is normal .      CXR:22 Patchy airspace disease right lower lung suspicious for developing pneumonia. ASSESSMENT AND PLAN:    Essential Hypertension  BP is controlled today   Continue current medical management    Chronic Diastolic Heart Failure  NYHA Class II  ECHO shows EF of 60%  Appears compensated  Continue medical management; aldactone, torsemide, metoprolol and ACE-I    JOSE  Non-compliant with wearing Bipap     Hyperlipidemia  On statin therapy     DM  Managed by PCP  On Metformin and ACE-I      Follow up as needed       Thank you very much for allowing me to participate in the care of your patient. Please do not hesitate to contact me if you have any questions. Sincerely,    Mandie Wong M.D  [facility], [address] [place] [address]  Ph: [phone]  Fax: [phone]    This note was scribed in the presence of Mandie Wong MD by Emilia Martinez RN   Physician Attestation:  The scribes documentation has been prepared under my direction and personally reviewed by me in its entirety. I confirm that the note above accurately reflects all work, treatment, procedures, and medical decision making performed by me.

## 2022-06-09 NOTE — PROGRESS NOTES
release capsule TAKE ONE CAPSULE BY MOUTH DAILY 90 capsule 1    chlorthalidone (HYGROTON) 25 MG tablet TAKE ONE TABLET BY MOUTH DAILY 60 tablet 0    metoprolol (LOPRESSOR) 100 MG tablet TAKE ONE TABLET BY MOUTH TWICE A DAY 60 tablet 3    naloxone (NARCAN) 4 MG/0.1ML LIQD nasal spray Use as directed 1 each 0    montelukast (SINGULAIR) 10 MG tablet TAKE ONE TABLET BY MOUTH ONCE NIGHTLY 90 tablet 1    Respiratory Therapy Supplies (NEBULIZER/TUBING/MOUTHPIECE) KIT 1 kit by Does not apply route daily 1 kit 0    ipratropium-albuterol (DUONEB) 0.5-2.5 (3) MG/3ML SOLN nebulizer solution Inhale 3 mLs into the lungs every 4 hours 360 mL 11    azithromycin (ZITHROMAX) 250 MG tablet Take as directed 1 packet 0    loratadine (CLARITIN) 10 MG tablet TAKE ONE TABLET BY MOUTH DAILY 90 tablet 1    cloNIDine (CATAPRES) 0.1 MG tablet TAKE THREE TABLETS BY MOUTH THREE TIMES A  tablet 3    Blood Glucose Monitoring Suppl (TRUE METRIX METER) w/Device KIT 1 kit by Does not apply route 2 times daily 1 kit 0    Lancets MISC 1 each by Does not apply route 2 times daily Dispense brand per insurance preference 200 each 1    Alcohol Swabs 70 % PADS 1 each by Does not apply route 2 times daily 200 each 0    vitamin D3 (CHOLECALCIFEROL) 25 MCG (1000 UT) TABS tablet Take 2 tablets by mouth three times a week 30 tablet 5    torsemide (DEMADEX) 20 MG tablet TAKE TWO TABLETS BY MOUTH DAILY 60 tablet 11    oxybutynin (DITROPAN-XL) 5 MG extended release tablet TAKE ONE TABLET BY MOUTH DAILY 30 tablet 3    albuterol sulfate  (90 Base) MCG/ACT inhaler INHALE TWO PUFFS BY MOUTH EVERY 6 HOURS AS NEEDED FOR WHEEZING 18 g 11    Blood Pressure Monitoring (B-D ASSURE BPM/AUTO ARM CUFF) MISC 1 Device by Does not apply route daily 1 each 0    Misc.  Devices (RAISED TOILET SEAT) MISC Use daily as needed as directed 1 each 0    atorvastatin (LIPITOR) 20 MG tablet Take 1 tablet by mouth daily 90 tablet 3    spironolactone (ALDACTONE) 50 MG tablet Take 1 tablet by mouth 2 times daily 60 tablet 3    budesonide (PULMICORT) 0.5 MG/2ML nebulizer suspension Take 2 mLs by nebulization 2 times daily 120 mL 11     No current facility-administered medications for this visit. BP (!) 154/90 (Site: Right Upper Arm, Position: Sitting, Cuff Size: Large Adult)   Pulse (!) 104   Resp 16   Ht 5' 4\" (1.626 m)   Wt (!) 320 lb 9.6 oz (145.4 kg)   LMP  (LMP Unknown)   SpO2 94%   BMI 55.03 kg/m²     Physical Exam    Wt Readings from Last 3 Encounters:   06/09/22 (!) 320 lb 9.6 oz (145.4 kg)   04/20/22 (!) 339 lb (153.8 kg)   03/24/22 (!) 344 lb 9.6 oz (156.3 kg)       BP Readings from Last 3 Encounters:   06/09/22 (!) 154/90   04/20/22 139/82   03/24/22 (!) 185/105         Assessment/Plan:  1. Diabetes mellitus type 2 in obese ([facility] [address]  Remains well controlled  - POCT glycosylated hemoglobin (Hb A1C)  - blood glucose test strips (TRUE METRIX BLOOD GLUCOSE TEST) strip; 1 each by In Vitro route 2 times daily  Dispense: 200 each; Refill: 11  - Dulaglutide 3 MG/0.5ML SOPN; Inject 3 mg into the skin once a week  Dispense: 4 pen; Refill: 5    2. Essential hypertension  Elevated. Had been out of spironolactone for a bit. Continue this and current regimen, following with Dr. Amber Solorzano    3. Chronic diastolic CHF (congestive heart failure) (HCC)  No signs of volume overload    4. Morbid obesity ([facility] [address]  Increase dose of Trulicity. Congratulated on weight loss    5. Primary osteoarthritis of right hip  Off pain medication at this time    6.  Primary osteoarthritis of both knees  As above    F/u 6 months, sooner if needed

## 2022-06-09 NOTE — PATIENT INSTRUCTIONS
Patient Education        Low Sodium Diet (2,000 Milligram): Care Instructions  Overview     Limiting sodium can be an important part of managing some health problems. The most common source of sodium is salt. People get most of the salt in their diet from canned, prepared, and packaged foods. Fast food and restaurant meals also are very high in sodium. Your doctor will probably limit your sodium to less than 2,000 milligrams (mg) a day. This limit counts all the sodium inprepared and packaged foods and any salt you add to your food. Follow-up care is a key part of your treatment and safety. Be sure to make and go to all appointments, and call your doctor if you are having problems. It's also a good idea to know your test results and keep alist of the medicines you take. How can you care for yourself at home? Read food labels   Read labels on cans and food packages. The labels tell you how much sodium is in each serving. Make sure that you look at the serving size. If you eat more than the serving size, you have eaten more sodium.  Food labels also tell you the Percent Daily Value for sodium. Choose products with low Percent Daily Values for sodium.  Be aware that sodium can come in forms other than salt, including monosodium glutamate (MSG), sodium citrate, and sodium bicarbonate (baking soda). MSG is often added to Asian food. When you eat out, you can sometimes ask for food without MSG or added salt. Buy low-sodium foods   Buy foods that are labeled \"unsalted\" (no salt added), \"sodium-free\" (less than 5 mg of sodium per serving), or \"low-sodium\" (140 mg or less of sodium per serving). Foods labeled \"reduced-sodium\" and \"light sodium\" may still have too much sodium. Be sure to read the label to see how much sodium you are getting.  Buy fresh vegetables, or frozen vegetables without added sauces. Buy low-sodium versions of canned vegetables, soups, and other canned goods.   Prepare low-sodium meals   Cut back on the amount of salt you use in cooking. This will help you adjust to the taste. Do not add salt after cooking. One teaspoon of salt has about 2,300 mg of sodium.  Take the salt shaker off the table.  Flavor your food with garlic, lemon juice, onion, vinegar, herbs, and spices. Do not use soy sauce, lite soy sauce, steak sauce, onion salt, garlic salt, celery salt, or ketchup on your food.  Use low-sodium salad dressings, sauces, and ketchup. Or make your own salad dressings and sauces without adding salt.  Use less salt (or none) when recipes call for it. You can often use half the salt a recipe calls for without losing flavor. Other foods such as rice, pasta, and grains do not need added salt.  Rinse canned vegetables, and cook them in fresh water. This removes somebut not allof the salt.  Avoid water that is naturally high in sodium or that has been treated with water softeners, which add sodium. If you buy bottled water, read the label and choose a sodium-free brand. Avoid high-sodium foods   Avoid eating:  ? Smoked, cured, salted, and canned meat, fish, and poultry. ? Ham, linda, hot dogs, and luncheon meats. ? Regular, hard, and processed cheese and regular peanut butter. ? Crackers with salted tops, and other salted snack foods such as pretzels, chips, and salted popcorn. ? Frozen prepared meals, unless labeled low-sodium. ? Canned and dried soups, broths, and bouillon, unless labeled sodium-free or low-sodium. ? Canned vegetables, unless labeled sodium-free or low-sodium. ? Western Natalia fries, pizza, tacos, and other fast foods. ? Pickles, olives, ketchup, and other condiments, especially soy sauce, unless labeled sodium-free or low-sodium. Where can you learn more? Go to https://margo.[employer]. org and sign in to your [employer] account. Enter V862 in the [facility] box to learn more about \"Low Sodium Diet (2,000 Milligram): Care Instructions. \" If you do not have an account, please click on the \"Sign Up Now\" link. Current as of: September 8, 2021               Content Version: 13.2  © 2006-2022 Healthwise, [employer]. Care instructions adapted under license by [facility] ([facility]). If you have questions about a medical condition or this instruction, always ask your healthcare professional. [address] any warranty or liability for your use of this information. Patient Education        Limiting Sodium With Heart Failure: Care Instructions  Your Care Instructions     Sodium causes your body to hold on to extra water. This may cause your heartfailure symptoms to get worse. Limiting sodium may help you feel better. People get most of their sodium from processed foods. Fast food and restaurant meals also tend to be very high in sodium. Your doctor may suggest that you limit sodium. Your doctor can tell you how much sodium is right for you. An example is less than 3,000 mg a day. This includes all the salt you eat incooked or packaged foods. Follow-up care is a key part of your treatment and safety. Be sure to make and go to all appointments, and call your doctor if you are having problems. It's also a good idea to know your test results and keep alist of the medicines you take. How can you care for yourself at home? Read food labels   Read food labels on cans and food packages. The labels tell you how much sodium is in each serving. Make sure that you look at the serving size. If you eat more than the serving size, you have eaten more sodium than is listed for one serving.  Food labels also tell you the Percent Daily Value for sodium. Choose products with low Percent Daily Values for sodium.  Be aware that sodium can come in forms other than salt, including monosodium glutamate (MSG), sodium citrate, and sodium bicarbonate (baking soda). MSG is often added to Asian food.  You can sometimes ask for food without MSG or salt.  Buy low-sodium foods   Buy foods that are labeled \"unsalted\" (no salt added), \"sodium-free\" (less than 5 mg of sodium per serving), or \"low-sodium\" (140 mg or less of sodium per serving). A food labeled \"light sodium\" has less than half of the full-sodium version of that food. Foods labeled \"reduced-sodium\" may still have too much sodium.  Buy fresh vegetables or plain, frozen vegetables. Buy low-sodium versions of canned vegetables, soups, and other canned goods. Prepare low-sodium meals   Use less salt each day when cooking. Reducing salt in this way will help you adjust to the taste. Do not add salt after cooking. Take the salt shaker off the table.  Flavor your food with garlic, lemon juice, onion, vinegar, herbs, and spices instead of salt. Do not use soy sauce, steak sauce, onion salt, garlic salt, or ketchup on your food.  Make your own salad dressings, sauces, and ketchup without adding salt.  Use less salt (or none) when recipes call for it. You can often use half the salt a recipe calls for without losing flavor. Other dishes like rice, pasta, and grains do not need added salt.  Rinse canned vegetables. This removes somebut not allof the salt.  Avoid water that has a naturally high sodium content or that has been treated with water softeners, which add sodium. If you buy bottled water, read the label and choose a sodium-free brand. Avoid high-sodium foods, such as:   Smoked, cured, salted, and canned meat, fish, and poultry.  Ham, linda, hot dogs, and luncheon meats.  Regular, hard, and processed cheese and regular peanut butter.  Crackers with salted tops.  Frozen prepared meals.  Canned and dried soups, broths, and bouillon, unless labeled sodium-free or low-sodium.  Canned vegetables, unless labeled sodium-free or low-sodium.  Salted snack foods such as chips and pretzels.  Western Natalia fries, pizza, tacos, and other fast foods.    Pickles, olives, ketchup, and other condiments, especially soy sauce, unless labeled sodium-free or low-sodium. If you cannot cook for yourself   Have family members or friends help you, or have someone cook low-sodium meals.  Check with your local senior nutrition program to find out where meals are served and whether they offer a low-sodium option. You can often find these programs through your local health department or hospital.   Have meals delivered to your home. Most [facility] have a Meals on [employer]. These programs provide one hot meal a day for older adults, delivered to their homes. Ask whether these meals are low-sodium. Let them know that you are on a low-sodium diet. Where can you learn more? Go to https://YourPlaceeb.PLUQ. org and sign in to your [employer] account. Enter A166 in the [facility] box to learn more about \"Limiting Sodium With Heart Failure: Care Instructions. \"     If you do not have an account, please click on the \"Sign Up Now\" link. Current as of: January 10, 2022               Content Version: 13.2  © 1306-4617 [employer]. Care instructions adapted under license by [facility] If you have questions about a medical condition or this instruction, always ask your healthcare professional. [address] any warranty or liability for your use of this information.

## 2022-06-10 ENCOUNTER — OFFICE VISIT (OUTPATIENT)
Dept: PULMONOLOGY | Age: 47
End: 2022-06-10
Payer: MEDICAID

## 2022-06-10 ENCOUNTER — OFFICE VISIT (OUTPATIENT)
Dept: CARDIOLOGY CLINIC | Age: 47
End: 2022-06-10
Payer: MEDICAID

## 2022-06-10 VITALS
TEMPERATURE: 96.9 F | OXYGEN SATURATION: 99 % | DIASTOLIC BLOOD PRESSURE: 80 MMHG | HEART RATE: 80 BPM | SYSTOLIC BLOOD PRESSURE: 115 MMHG | BODY MASS INDEX: 50.02 KG/M2 | HEIGHT: 64 IN | WEIGHT: 293 LBS | RESPIRATION RATE: 21 BRPM

## 2022-06-10 VITALS
OXYGEN SATURATION: 94 % | HEIGHT: 64 IN | HEART RATE: 82 BPM | BODY MASS INDEX: 50.02 KG/M2 | DIASTOLIC BLOOD PRESSURE: 60 MMHG | SYSTOLIC BLOOD PRESSURE: 118 MMHG | WEIGHT: 293 LBS

## 2022-06-10 DIAGNOSIS — I10 ESSENTIAL HYPERTENSION: Primary | ICD-10-CM

## 2022-06-10 DIAGNOSIS — F17.210 CIGARETTE NICOTINE DEPENDENCE WITHOUT COMPLICATION: ICD-10-CM

## 2022-06-10 DIAGNOSIS — J20.9 ACUTE BRONCHITIS, UNSPECIFIED ORGANISM: Primary | ICD-10-CM

## 2022-06-10 DIAGNOSIS — J98.4 CAVITATING MASS IN LEFT LOWER LUNG LOBE: ICD-10-CM

## 2022-06-10 DIAGNOSIS — J30.89 OTHER ALLERGIC RHINITIS: ICD-10-CM

## 2022-06-10 DIAGNOSIS — E04.9 THYROID GOITER: ICD-10-CM

## 2022-06-10 DIAGNOSIS — J44.9 COPD, MILD (HCC): ICD-10-CM

## 2022-06-10 PROCEDURE — 4004F PT TOBACCO SCREEN RCVD TLK: CPT | Performed by: INTERNAL MEDICINE

## 2022-06-10 PROCEDURE — G8427 DOCREV CUR MEDS BY ELIG CLIN: HCPCS | Performed by: INTERNAL MEDICINE

## 2022-06-10 PROCEDURE — 3023F SPIROM DOC REV: CPT | Performed by: INTERNAL MEDICINE

## 2022-06-10 PROCEDURE — 93000 ELECTROCARDIOGRAM COMPLETE: CPT | Performed by: INTERNAL MEDICINE

## 2022-06-10 PROCEDURE — G8417 CALC BMI ABV UP PARAM F/U: HCPCS | Performed by: INTERNAL MEDICINE

## 2022-06-10 PROCEDURE — 99214 OFFICE O/P EST MOD 30 MIN: CPT | Performed by: INTERNAL MEDICINE

## 2022-06-10 RX ORDER — FLUTICASONE FUROATE, UMECLIDINIUM BROMIDE AND VILANTEROL TRIFENATATE 200; 62.5; 25 UG/1; UG/1; UG/1
1 POWDER RESPIRATORY (INHALATION) DAILY
Qty: 1 EACH | Refills: 0 | COMMUNITY
Start: 2022-06-10 | End: 2022-09-15

## 2022-06-10 RX ORDER — PREDNISONE 10 MG/1
TABLET ORAL
Qty: 30 TABLET | Refills: 0 | Status: SHIPPED | OUTPATIENT
Start: 2022-06-10 | End: 2022-06-20

## 2022-06-10 RX ORDER — LEVOFLOXACIN 750 MG/1
750 TABLET ORAL DAILY
Qty: 7 TABLET | Refills: 0 | Status: SHIPPED | OUTPATIENT
Start: 2022-06-10 | End: 2022-06-17

## 2022-06-10 NOTE — PROGRESS NOTES
REASON FOR CONSULTATION/CC:    Chief Complaint   Patient presents with    Follow-up     lung nodule            PCP: Lisy Ramirez MD    HISTORY OF PRESENT ILLNESS: Sorin Coelho is a 48y.o. year old female with a history of JOSE who presents :             JOSE       Acute bronchitis  complains of cough and congested from sinus and chest.      Was seen in ER. tx with Duoneb's  And steroids. Treated with  Days, improved but not resolved. COPD / cough  budesonide and Duoneb's  Neb for sinus and chest.      allergic rhinitis  Increased drainage in the morning    Using nebs/ singular and Claritin. Tobacco abuse  Continues to smoke         Objective:   PHYSICAL EXAM:  Blood pressure 115/80, pulse 80, temperature 96.9 °F (36.1 °C), resp. rate 21, height 5' 4\" (1.626 m), weight (!) 320 lb 12.8 oz (145.5 kg), SpO2 99 %, not currently breastfeeding.'  Gen: No distress. Body mass index is 55.07 kg/m². ENT:   Resp: No accessory muscle use. No crackles. No wheezes. No rhonchi. CV: Regular rate. Regular rhythm. No murmur or rub. No edema. Skin: Warm, dry, normal texture and turgor. No nodule on exposed extremities. M/S: No cyanosis. No clubbing. No joint deformity. Psych: Oriented x 3. No anxiety. Awake. Alert. Intact judgement and insight. Good Mood / Affect. Memory appears in tact   . Data Reviewed:        Assessment:     ·  JOSE  · Obesity Body mass index is 55.07 kg/m². · allergic rhinitis   · COPD, mild, FEV1 76%. Decreased DLCO. · Goiter         Plan:      Problem List Items Addressed This Visit     Thyroid goiter      Goiter seen on CT chest.  Defer to primary care for additional work-up. Normal thyroid function test         Other allergic rhinitis      Currently using nebulized budesonide, duo nebs along with the Singulair and Claritin.          Relevant Medications    Fluticasone-Umeclidin-Vilant (TRELEGY ELLIPTA) 200-62.5-25 MCG/INH AEPB    COPD, mild ([address]      Recent exacerbation versus acute bronchitis versus pneumonia. Treat with sample of Trelegy along with prednisone taper and continue duo nebs. She will restart budesonide after completed. Levaquin x7 days         Relevant Medications    predniSONE (DELTASONE) 10 MG tablet    Fluticasone-Umeclidin-Vilant (TRELEGY ELLIPTA) 200-62.5-25 MCG/INH AEPB    Cigarette nicotine dependence without complication      Again discussed need for quitting smoking. 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  [facility] within [facility] was in 2018 with recent chest CT 2022 [facility]. This is not appreciably changed over time. Other multiple other cyst that are noted. This is likely secondary to smoking but rare lung disease such as DAVIS is also consideration but less likely. Check alpha-1 antitrypsin.            Other Visit Diagnoses     Acute bronchitis, unspecified organism    -  Primary    Relevant Medications    predniSONE (DELTASONE) 10 MG tablet    levoFLOXacin (LEVAQUIN) 750 MG tablet    Fluticasone-Umeclidin-Vilant (Woodard Killer) 914-34.1-15 MCG/INH AEPB               NENA JI [id number] [facility]  [phone]

## 2022-06-10 NOTE — ASSESSMENT & PLAN NOTE
Goiter seen on CT chest.  Defer to primary care for additional work-up.     Normal thyroid function test

## 2022-06-10 NOTE — ASSESSMENT & PLAN NOTE
Cavities been present since 2010 and has been monitor with CT chest.  Last chest within [facility] was in 2018 with recent chest CT 2022 [facility]. This is not appreciably changed over time. Other multiple other cyst that are noted. This is likely secondary to smoking but rare lung disease such as DAVIS is also consideration but less likely. Check alpha-1 antitrypsin.

## 2022-06-15 DIAGNOSIS — G89.4 CHRONIC PAIN SYNDROME: ICD-10-CM

## 2022-06-15 RX ORDER — SPIRONOLACTONE 25 MG/1
TABLET ORAL
Qty: 120 TABLET | Refills: 0 | Status: SHIPPED | OUTPATIENT
Start: 2022-06-15 | End: 2022-07-18

## 2022-06-15 NOTE — TELEPHONE ENCOUNTER
Needs potassium rechecked - very important to do this, it was high last time and this medicine can make them higher to dangerous levels.  Lab orders already previously placed by Dr Claudio Mills and myself

## 2022-06-20 RX ORDER — CHLORTHALIDONE 25 MG/1
TABLET ORAL
Qty: 90 TABLET | Refills: 3 | Status: SHIPPED | OUTPATIENT
Start: 2022-06-20

## 2022-06-21 ENCOUNTER — OFFICE VISIT (OUTPATIENT)
Dept: PAIN MANAGEMENT | Age: 47
End: 2022-06-21
Payer: MEDICAID

## 2022-06-21 VITALS
HEIGHT: 64 IN | DIASTOLIC BLOOD PRESSURE: 79 MMHG | TEMPERATURE: 98 F | SYSTOLIC BLOOD PRESSURE: 124 MMHG | OXYGEN SATURATION: 94 % | HEART RATE: 82 BPM | WEIGHT: 293 LBS | BODY MASS INDEX: 50.02 KG/M2

## 2022-06-21 DIAGNOSIS — E11.42 DIABETIC POLYNEUROPATHY ASSOCIATED WITH TYPE 2 DIABETES MELLITUS (HCC): ICD-10-CM

## 2022-06-21 DIAGNOSIS — E66.01 MORBID OBESITY (HCC): ICD-10-CM

## 2022-06-21 DIAGNOSIS — G89.29 CHRONIC BILATERAL LOW BACK PAIN WITHOUT SCIATICA: ICD-10-CM

## 2022-06-21 DIAGNOSIS — G89.4 CHRONIC PAIN SYNDROME: ICD-10-CM

## 2022-06-21 DIAGNOSIS — M16.11 PRIMARY OSTEOARTHRITIS OF RIGHT HIP: ICD-10-CM

## 2022-06-21 DIAGNOSIS — M54.50 CHRONIC BILATERAL LOW BACK PAIN WITHOUT SCIATICA: ICD-10-CM

## 2022-06-21 DIAGNOSIS — M17.0 PRIMARY OSTEOARTHRITIS OF BOTH KNEES: ICD-10-CM

## 2022-06-21 DIAGNOSIS — I10 ESSENTIAL HYPERTENSION: ICD-10-CM

## 2022-06-21 DIAGNOSIS — M62.830 BACK SPASM: ICD-10-CM

## 2022-06-21 DIAGNOSIS — F33.41 MAJOR DEPRESSIVE DISORDER, RECURRENT, IN PARTIAL REMISSION (HCC): ICD-10-CM

## 2022-06-21 PROCEDURE — 3044F HG A1C LEVEL LT 7.0%: CPT | Performed by: NURSE PRACTITIONER

## 2022-06-21 PROCEDURE — G8427 DOCREV CUR MEDS BY ELIG CLIN: HCPCS | Performed by: NURSE PRACTITIONER

## 2022-06-21 PROCEDURE — 4004F PT TOBACCO SCREEN RCVD TLK: CPT | Performed by: NURSE PRACTITIONER

## 2022-06-21 PROCEDURE — G8417 CALC BMI ABV UP PARAM F/U: HCPCS | Performed by: NURSE PRACTITIONER

## 2022-06-21 PROCEDURE — 2022F DILAT RTA XM EVC RTNOPTHY: CPT | Performed by: NURSE PRACTITIONER

## 2022-06-21 PROCEDURE — 99213 OFFICE O/P EST LOW 20 MIN: CPT | Performed by: NURSE PRACTITIONER

## 2022-06-21 RX ORDER — NALOXONE HYDROCHLORIDE 4 MG/.1ML
SPRAY NASAL
Qty: 1 EACH | Refills: 0 | Status: SHIPPED | OUTPATIENT
Start: 2022-06-21

## 2022-06-21 RX ORDER — METHOCARBAMOL 500 MG/1
500 TABLET, FILM COATED ORAL 2 TIMES DAILY
Qty: 60 TABLET | Refills: 0 | Status: SHIPPED | OUTPATIENT
Start: 2022-06-21 | End: 2022-07-27 | Stop reason: SDUPTHER

## 2022-06-21 RX ORDER — ACETAMINOPHEN 500 MG
1000 TABLET ORAL EVERY 6 HOURS PRN
Qty: 30 TABLET | Refills: 0 | Status: SHIPPED | OUTPATIENT
Start: 2022-06-21 | End: 2022-07-27 | Stop reason: SDUPTHER

## 2022-06-21 RX ORDER — VENLAFAXINE HYDROCHLORIDE 75 MG/1
150 CAPSULE, EXTENDED RELEASE ORAL DAILY
Qty: 60 CAPSULE | Refills: 0 | Status: SHIPPED | OUTPATIENT
Start: 2022-06-21 | End: 2022-07-27 | Stop reason: SDUPTHER

## 2022-06-21 RX ORDER — CLONIDINE HYDROCHLORIDE 0.1 MG/1
0.3 TABLET ORAL 3 TIMES DAILY
Qty: 270 TABLET | Refills: 0 | Status: SHIPPED | OUTPATIENT
Start: 2022-06-21 | End: 2022-06-23

## 2022-06-21 RX ORDER — HYDROCODONE BITARTRATE AND ACETAMINOPHEN 7.5; 325 MG/1; MG/1
1 TABLET ORAL EVERY 8 HOURS PRN
Qty: 30 TABLET | Refills: 0 | Status: SHIPPED | OUTPATIENT
Start: 2022-06-21 | End: 2022-07-27 | Stop reason: SDUPTHER

## 2022-06-21 NOTE — PROGRESS NOTES
[facility]  1975  [id number]      HISTORY OF PRESENT ILLNESS: Ms. Cornell Cruz is a 48 y.o. female returns for a follow up visit for pain management  She has a diagnosis of   1. Chronic pain syndrome    2. Primary osteoarthritis of both knees    3. Primary osteoarthritis of right hip    4. Chronic bilateral low back pain without sciatica    5. Back spasm    6. Diabetic polyneuropathy associated with type 2 diabetes mellitus ([facility] [address]    7. Morbid obesity ([facility] [address]    8. Major depressive disorder, recurrent, in partial remission ([facility] [address]      As per Information Obtained from the PADT (Patient Assessment and Documentation Tool)    She complains of pain in the neck,lower back, right hip, both knees She rates the pain 10/10 and describes it as aching, burning, stabbing. Current treatment regimen has helped relieve about 0% of the pain. She denies any side effects from the current pain regimen. Patient reports that since the last follow up visit the physical functioning is worse, family/social relationships are worse, mood is worse sleep patterns are worse, and that the overall functioning is worse. Patient denies misusing/abusing her narcotic pain medications or using any illegal drugs. Upon obtaining medical history from Ms. Cornell Cruz states that pain is not manageable. Patient has not been seen for 2 months due to use of marijuana. Reports that pain has been bothersome as she can not afford the medical marijuana. She is hoping to restart opioid therapy today. Mood/anxiety is stable. Sleep is fair with an average of 5-6 hours. Denies to having issues of constipation. Tolerating activities/house chores with moderate tenderness to the lower back, knees. Smokes 1 pack of cigarettes a day    ALLERGIES: Patients list of allergies were reviewed     MEDICATIONS: Ms. Cornell Cruz list of medications were reviewed. Her current medications are   Outpatient Medications Prior to Visit   Medication Sig Dispense Refill    chlorthalidone (HYGROTON) 25 MG tablet TAKE ONE TABLET BY MOUTH DAILY 90 tablet 3    spironolactone (ALDACTONE) 25 MG tablet TAKE TWO TABLETS BY MOUTH TWICE A  tablet 0    Fluticasone-Umeclidin-Vilant (TRELEGY ELLIPTA) 200-62.5-25 MCG/INH AEPB Inhale 1 Inhaler into the lungs daily 1 each 0    blood glucose test strips (TRUE METRIX BLOOD GLUCOSE TEST) strip 1 each by In Vitro route 2 times daily 200 each 11    Dulaglutide 3 MG/0.5ML SOPN Inject 3 mg into the skin once a week 4 pen 5    metFORMIN (GLUCOPHAGE) 500 MG tablet TAKE ONE TABLET BY MOUTH TWICE A DAY WITH MEALS 60 tablet 2    omeprazole (PRILOSEC) 20 MG delayed release capsule TAKE ONE CAPSULE BY MOUTH DAILY 90 capsule 1    metoprolol (LOPRESSOR) 100 MG tablet TAKE ONE TABLET BY MOUTH TWICE A DAY 60 tablet 3    montelukast (SINGULAIR) 10 MG tablet TAKE ONE TABLET BY MOUTH ONCE NIGHTLY 90 tablet 1    Respiratory Therapy Supplies (NEBULIZER/TUBING/MOUTHPIECE) KIT 1 kit by Does not apply route daily 1 kit 0    ipratropium-albuterol (DUONEB) 0.5-2.5 (3) MG/3ML SOLN nebulizer solution Inhale 3 mLs into the lungs every 4 hours 360 mL 11    loratadine (CLARITIN) 10 MG tablet TAKE ONE TABLET BY MOUTH DAILY 90 tablet 1    cloNIDine (CATAPRES) 0.1 MG tablet TAKE THREE TABLETS BY MOUTH THREE TIMES A DAY (Patient taking differently: Take 0.3 mg by mouth in the morning, at noon, and at bedtime ) 270 tablet 3    Blood Glucose Monitoring Suppl (TRUE METRIX METER) w/Device KIT 1 kit by Does not apply route 2 times daily 1 kit 0    Lancets MISC 1 each by Does not apply route 2 times daily Dispense brand per insurance preference 200 each 1    Alcohol Swabs 70 % PADS 1 each by Does not apply route 2 times daily 200 each 0    torsemide (DEMADEX) 20 MG tablet TAKE TWO TABLETS BY MOUTH DAILY 60 tablet 11    oxybutynin (DITROPAN-XL) 5 MG extended release tablet TAKE ONE TABLET BY MOUTH DAILY 30 tablet 3    albuterol sulfate  (90 Base) MCG/ACT inhaler INHALE TWO PUFFS BY MOUTH EVERY 6 HOURS AS NEEDED FOR WHEEZING 18 g 11    Blood Pressure Monitoring (B-D ASSURE BPM/AUTO ARM CUFF) MISC 1 Device by Does not apply route daily 1 each 0    Misc. Devices (RAISED TOILET SEAT) MISC Use daily as needed as directed 1 each 0    methocarbamol (ROBAXIN) 500 MG tablet Take 1 tablet by mouth 2 times daily 60 tablet 0    venlafaxine (EFFEXOR XR) 75 MG extended release capsule Take 2 capsules by mouth daily 60 capsule 0    acetaminophen (APAP EXTRA STRENGTH) 500 MG tablet Take 2 tablets by mouth every 6 hours as needed for Pain DO NOT TAKE WITH OTHER MEDICATIONS CONTAINING ACETAMINOPHEN. 30 tablet 0    spironolactone (ALDACTONE) 50 MG tablet Take 1 tablet by mouth 2 times daily 60 tablet 3    budesonide (PULMICORT) 0.5 MG/2ML nebulizer suspension Take 2 mLs by nebulization 2 times daily 120 mL 11     No facility-administered medications prior to visit. SOCIAL/FAMILY/PAST MEDICAL HISTORY: Ms. Barajas [place], family and past medical history was reviewed. REVIEW OF SYSTEMS:    Respiratory: Negative for apnea, chest tightness and shortness of breath or change in baseline breathing. Gastrointestinal: Negative for nausea, vomiting, abdominal pain, diarrhea, constipation, blood in stool and abdominal distention. PHYSICAL EXAM:   Nursing note and vitals reviewed. /79   Pulse 82   Temp 98 °F (36.7 °C)   Ht 5' 4\" (1.626 m)   Wt (!) 330 lb (149.7 kg)   LMP  (LMP Unknown)   SpO2 94%   BMI 56.64 kg/m²   Constitutional: She appears well-developed and well-nourished. No acute distress. Skin: Skin is warm and dry, good turgor. No rash noted. She is not diaphoretic. Cardiovascular: Normal rate, regular rhythm, normal heart sounds, and does not have murmur. Pulmonary/Chest: Effort normal. No respiratory distress. She does not have wheezes in the lung fields. She has no rales.      Neurological/Psychiatric:She is alert and oriented to person, place, and time. Coordination is  normal.  Her mood isAppropriate and affect is Neutral/Euthymic(normal) . Prescription pain medication monitoring:                  MEDD current = 30              ORT Score = 14 high risk              Other Risk factors - (mood) Depression              Date of Last Medication Agreement: 3/16/22              Date Naloxone prescribed: 6/21/22              UDT:                          Date of last UDT: 3/16/22                          Adverse report: No;               OARRS:                          Checked today: Yes                          Adverse report: No    IMPRESSION:   1. Chronic pain syndrome    2. Primary osteoarthritis of both knees    3. Primary osteoarthritis of right hip    4. Chronic bilateral low back pain without sciatica    5. Back spasm    6. Diabetic polyneuropathy associated with type 2 diabetes mellitus ([facility] [address]    7. Morbid obesity ([facility] [address]    8. Major depressive disorder, recurrent, in partial remission ([facility] [address]        PLAN:  Informed verbal consent was obtained:  - Patient's opioid therapy will be maintained with Norco 7.5-325 mg tabs tid prn  -Home exercises/Toyin exercises recommended  -Informed patient that opioids cannot be prescribed for her while using marijuana, she can otherwise be treated with non opioids if she chose to continue with marijuana. Advised to quit using marijuana if she hoped to obtain opioids therapy for pain. Patient verbalized understanding, and opted to quit using marijuana with hopes of being treated with opioids in the near future. She will be given another chance on opioid therapy under my care.   -CBT techniques- relaxation therapies such as biofeedback, mindfulness based stress reduction, imagery, cognitive restructuring, problem solving discussed with patient   -She was advised weight reduction, diet changes- 800-1200 fior diet, diet diary, exercising, nutritional  consult increased physical activity as tolerated   -Last UDS 3/16/22 consistent  -Return in about 4 weeks (around 7/19/2022). -OARRS record was obtained and reviewed  for the last one year and no indicators of drug misuse  were found. Any other controlled substance prescriptions  seen on the record have been accounted for, I am aware of the patient receiving these medications. Jesus Dawn OARRS record will be rechecked as part of office protocol. Analgesic Plan:              Continue present regimen: Norco 7.5-325 mg tabs tid prn x10 days pending UDS              Adjust dose of present analgesic: No              Switch analgesics: No              Add/Adjust concomitant therapy: Robaxin, Effexor, Tylenol, Narcan    Current Outpatient Medications   Medication Sig Dispense Refill    methocarbamol (ROBAXIN) 500 MG tablet Take 1 tablet by mouth 2 times daily 60 tablet 0    venlafaxine (EFFEXOR XR) 75 MG extended release capsule Take 2 capsules by mouth daily 60 capsule 0    acetaminophen (APAP EXTRA STRENGTH) 500 MG tablet Take 2 tablets by mouth every 6 hours as needed for Pain DO NOT TAKE WITH OTHER MEDICATIONS CONTAINING ACETAMINOPHEN. 30 tablet 0    HYDROcodone-acetaminophen (NORCO) 7.5-325 MG per tablet Take 1 tablet by mouth every 8 hours as needed for Pain for up to 10 days.  30 tablet 0    naloxone (NARCAN) 4 MG/0.1ML LIQD nasal spray Use as directed 1 each 0    chlorthalidone (HYGROTON) 25 MG tablet TAKE ONE TABLET BY MOUTH DAILY 90 tablet 3    spironolactone (ALDACTONE) 25 MG tablet TAKE TWO TABLETS BY MOUTH TWICE A  tablet 0    Fluticasone-Umeclidin-Vilant (TRELEGY ELLIPTA) 200-62.5-25 MCG/INH AEPB Inhale 1 Inhaler into the lungs daily 1 each 0    blood glucose test strips (TRUE METRIX BLOOD GLUCOSE TEST) strip 1 each by In Vitro route 2 times daily 200 each 11    Dulaglutide 3 MG/0.5ML SOPN Inject 3 mg into the skin once a week 4 pen 5    metFORMIN (GLUCOPHAGE) 500 MG tablet TAKE ONE TABLET BY MOUTH TWICE A DAY WITH MEALS 60 tablet 2    omeprazole (PRILOSEC) 20 MG delayed release capsule TAKE ONE CAPSULE BY MOUTH DAILY 90 capsule 1    metoprolol (LOPRESSOR) 100 MG tablet TAKE ONE TABLET BY MOUTH TWICE A DAY 60 tablet 3    montelukast (SINGULAIR) 10 MG tablet TAKE ONE TABLET BY MOUTH ONCE NIGHTLY 90 tablet 1    Respiratory Therapy Supplies (NEBULIZER/TUBING/MOUTHPIECE) KIT 1 kit by Does not apply route daily 1 kit 0    ipratropium-albuterol (DUONEB) 0.5-2.5 (3) MG/3ML SOLN nebulizer solution Inhale 3 mLs into the lungs every 4 hours 360 mL 11    loratadine (CLARITIN) 10 MG tablet TAKE ONE TABLET BY MOUTH DAILY 90 tablet 1    Blood Glucose Monitoring Suppl (TRUE METRIX METER) w/Device KIT 1 kit by Does not apply route 2 times daily 1 kit 0    Lancets MISC 1 each by Does not apply route 2 times daily Dispense brand per insurance preference 200 each 1    Alcohol Swabs 70 % PADS 1 each by Does not apply route 2 times daily 200 each 0    torsemide (DEMADEX) 20 MG tablet TAKE TWO TABLETS BY MOUTH DAILY 60 tablet 11    oxybutynin (DITROPAN-XL) 5 MG extended release tablet TAKE ONE TABLET BY MOUTH DAILY 30 tablet 3    albuterol sulfate  (90 Base) MCG/ACT inhaler INHALE TWO PUFFS BY MOUTH EVERY 6 HOURS AS NEEDED FOR WHEEZING 18 g 11    Blood Pressure Monitoring (B-D ASSURE BPM/AUTO ARM CUFF) MISC 1 Device by Does not apply route daily 1 each 0    Misc. Devices (RAISED TOILET SEAT) MISC Use daily as needed as directed 1 each 0    cloNIDine (CATAPRES) 0.1 MG tablet TAKE THREE TABLETS BY MOUTH THREE TIMES A  tablet 0    spironolactone (ALDACTONE) 50 MG tablet Take 1 tablet by mouth 2 times daily 60 tablet 3    budesonide (PULMICORT) 0.5 MG/2ML nebulizer suspension Take 2 mLs by nebulization 2 times daily 120 mL 11     No current facility-administered medications for this visit. I will continue her current medication regimen  which is part of the above treatment schedule.  It has been helping with Ms. Mc Angel chronic  medical problems which for this visit include:   Diagnoses of Chronic pain syndrome, Primary osteoarthritis of both knees, Primary osteoarthritis of right hip, Chronic bilateral low back pain without sciatica, Back spasm, Diabetic polyneuropathy associated with type 2 diabetes mellitus ([facility] [address] Morbid obesity ([facility] [address] and Major depressive disorder, recurrent, in partial remission ([facility] [address] were pertinent to this visit. Risks and benefits of the medications and other alternative treatments  including no treatment were discussed with the patient. The common side effects of these medications were also explained to the patient. Informed verbal consent was obtained. Goals of current treatment regimen include improvement in pain, restoration of functioning- with focus on improvement in physical performance, general activity, work or disability,emotional distress, health care utilization and  decreased medication consumption. Will continue to monitor progress towards achieving/maintaining therapeutic goals with special emphasis on  1. Improvement in perceived interfernce  of pain with ADL's. Ability to do home exercises independently. Ability to do household chores indoor and/or outdoor work and social and leisure activities. Improve psychosocial and physical functioning. - she is showing progression towards this treatment goal with the current regimen. She was advised against drinking alcohol with the narcotic pain medicines, advised against driving or handling machinery while adjusting the dose of medicines or if having cognitive  issues related to the current medications. Risk of overdose and death, if medicines not taken as prescribed, were also discussed. If the patient develops new symptoms or if the symptoms worsen, the patient should call the office. While transcribing every attempt was made to maintain the accuracy of the note in terms of it's contents,there may have been some errors made inadvertently.   Thank you for allowing me to participate in the care of this patient. Macy Amaya CNP.     Cc: Hyun Hector MD

## 2022-06-21 NOTE — PATIENT INSTRUCTIONS
Patient Education        Back Stretches: Exercises  Introduction  Here are some examples of exercises for stretching your back. Start eachexercise slowly. Ease off the exercise if you start to have pain. Your doctor or physical therapist will tell you when you can start theseexercises and which ones will work best for you. How to do the exercises  Overhead stretch    1. Stand comfortably with your feet shoulder-width apart. 2. Looking straight ahead, raise both arms over your head and reach toward the ceiling. Do not allow your head to tilt back. 3. Hold for 15 to 30 seconds, then lower your arms to your sides. 4. Repeat 2 to 4 times. Side stretch    1. Stand comfortably with your feet shoulder-width apart. 2. Raise one arm over your head, and then lean to the other side. 3. Slide your hand down your leg as you let the weight of your arm gently stretch your side muscles. Hold for 15 to 30 seconds. 4. Repeat 2 to 4 times on each side. Press-up    1. Lie on your stomach, supporting your body with your forearms. 2. Press your elbows down into the floor to raise your upper back. As you do this, relax your stomach muscles and allow your back to arch without using your back muscles. As your press up, do not let your hips or pelvis come off the floor. 3. Hold for 15 to 30 seconds, then relax. 4. Repeat 2 to 4 times. Relax and rest    1. Lie on your back with a rolled towel under your neck and a pillow under your knees. Extend your arms comfortably to your sides. 2. Relax and breathe normally. 3. Remain in this position for about 10 minutes. 4. If you can, do this 2 or 3 times each day. Follow-up care is a key part of your treatment and safety. Be sure to make and go to all appointments, and call your doctor if you are having problems. It's also a good idea to know your test results and keep alist of the medicines you take. Where can you learn more? Go to https://margo.healthPlanG. org and sign in to your [employer] account. Enter P334 in the XYZE box to learn more about \"Back Stretches: Exercises. \"     If you do not have an account, please click on the \"Sign Up Now\" link. Current as of: March 9, 2022               Content Version: 13.3  © 2006-2022 [employer]. Care instructions adapted under license by [facility]. If you have questions about a medical condition or this instruction, always ask your healthcare professional. [place] [address] any warranty or liability for your use of this information. Patient Education        Knee Arthritis: Exercises  Introduction  Here are some examples of exercises for you to try. The exercises may be suggested for a condition or for rehabilitation. Start each exercise slowly. Ease off the exercises if you start to have pain. You will be told when to start these exercises and which ones will work bestfor you. How to do the exercises  Knee flexion with heel slide    1. Lie on your back with your knees bent. 2. Slide your heel back by bending your affected knee as far as you can. Then hook your other foot around your ankle to help pull your heel even farther back. 3. Hold for about 6 seconds, then rest for up to 10 seconds. 4. Repeat 8 to 12 times. 5. Switch legs and repeat steps 1 through 4, even if only one knee is sore. Quad sets    1. Sit with your affected leg straight and supported on the floor or a firm bed. Place a small, rolled-up towel under your knee. Your other leg should be bent, with that foot flat on the floor. 2. Tighten the thigh muscles of your affected leg by pressing the back of your knee down into the towel. 3. Hold for about 6 seconds, then rest for up to 10 seconds. 4. Repeat 8 to 12 times. 5. Switch legs and repeat steps 1 through 4, even if only one knee is sore. Straight-leg raises to the front    1.  Lie on your back with your good knee bent so that your foot rests flat that your knee is slightly bent when your leg is extended downward. If your knee hurts when the pedal reaches the top, you can raise the seat so that your knee does not bend as much. 3. Start slowly. At first, try to do 5 to 10 minutes of cycling with little to no resistance. Then increase your time and the resistance bit by bit until you can do 20 to 30 minutes without pain. 4. If you start to have pain, rest your knee until your pain gets back to the level that is normal for you. Or cycle for less time or with less effort. Follow-up care is a key part of your treatment and safety. Be sure to make and go to all appointments, and call your doctor if you are having problems. It's also a good idea to know your test results and keep alist of the medicines you take. Where can you learn more? Go to https://[email or web address].[email or web address] org and sign in to your Cloud[employer] account. Enter C159 in the Provesica box to learn more about \"Knee Arthritis: Exercises. \"     If you do not have an account, please click on the \"Sign Up Now\" link. Current as of: March 9, 2022               Content Version: 13.3  © 2006-2022 Healthwise[employer] Incorporated. Care instructions adapted under license by [facility] ([facility]). If you have questions about a medical condition or this instruction, always ask your healthcare professional. [place] [address] any warranty or liability for your use of this information.

## 2022-06-22 ENCOUNTER — TELEPHONE (OUTPATIENT)
Dept: ADMINISTRATIVE | Age: 47
End: 2022-06-22

## 2022-06-22 NOTE — TELEPHONE ENCOUNTER
Submitted PA for Acetaminophen 500MG tablets Via CMM Key: [id number] - Rx #: [id number] STATUS: \"Your PA has been resolved, no additional PA is required\"

## 2022-06-23 DIAGNOSIS — I10 ESSENTIAL HYPERTENSION: ICD-10-CM

## 2022-06-23 RX ORDER — CLONIDINE HYDROCHLORIDE 0.1 MG/1
TABLET ORAL
Qty: 270 TABLET | Refills: 0 | Status: SHIPPED | OUTPATIENT
Start: 2022-06-23 | End: 2022-07-27

## 2022-07-05 DIAGNOSIS — N39.46 MIXED STRESS AND URGE INCONTINENCE: ICD-10-CM

## 2022-07-05 RX ORDER — OXYBUTYNIN CHLORIDE 5 MG/1
TABLET, EXTENDED RELEASE ORAL
Qty: 30 TABLET | Refills: 3 | Status: SHIPPED | OUTPATIENT
Start: 2022-07-05

## 2022-07-15 ENCOUNTER — PATIENT MESSAGE (OUTPATIENT)
Dept: PULMONOLOGY | Age: 47
End: 2022-07-15

## 2022-07-15 DIAGNOSIS — J44.9 COPD, MILD (HCC): Primary | ICD-10-CM

## 2022-07-15 RX ORDER — ALBUTEROL SULFATE 90 UG/1
2 AEROSOL, METERED RESPIRATORY (INHALATION) EVERY 6 HOURS PRN
Qty: 18 G | Refills: 11 | Status: SHIPPED | OUTPATIENT
Start: 2022-07-15 | End: 2022-07-18 | Stop reason: SDUPTHER

## 2022-07-16 DIAGNOSIS — I10 ESSENTIAL HYPERTENSION: ICD-10-CM

## 2022-07-17 ENCOUNTER — PATIENT MESSAGE (OUTPATIENT)
Dept: PULMONOLOGY | Age: 47
End: 2022-07-17

## 2022-07-17 DIAGNOSIS — J44.9 COPD, MILD (HCC): Primary | ICD-10-CM

## 2022-07-18 DIAGNOSIS — J44.9 COPD, MILD (HCC): ICD-10-CM

## 2022-07-18 RX ORDER — ALBUTEROL SULFATE 90 UG/1
2 AEROSOL, METERED RESPIRATORY (INHALATION) EVERY 6 HOURS PRN
Qty: 18 G | Refills: 11 | Status: SHIPPED | OUTPATIENT
Start: 2022-07-18

## 2022-07-18 RX ORDER — SPIRONOLACTONE 25 MG/1
25 TABLET ORAL 2 TIMES DAILY
Qty: 60 TABLET | Refills: 0 | Status: SHIPPED | OUTPATIENT
Start: 2022-07-18

## 2022-07-18 RX ORDER — METOPROLOL TARTRATE 100 MG/1
TABLET ORAL
Qty: 60 TABLET | Refills: 3 | Status: SHIPPED | OUTPATIENT
Start: 2022-07-18

## 2022-07-18 NOTE — TELEPHONE ENCOUNTER
Please ensure Vannessa gets repeat labs-  Very important to monitor her potassium and to make sure that her spironolactone is still safe to take with her levels.  I am decreasing the dose until we get the labs back

## 2022-07-18 NOTE — TELEPHONE ENCOUNTER
From: Dr. Edwige Yee  To: Florance Essex  Sent: 7/17/2022 1:35 PM EDT  Subject: refill    I am not sure why the did not answer but what is wrong with the quantity?

## 2022-07-20 DIAGNOSIS — I10 UNCONTROLLED HYPERTENSION: ICD-10-CM

## 2022-07-20 DIAGNOSIS — E11.69 DIABETES MELLITUS TYPE 2 IN OBESE (HCC): ICD-10-CM

## 2022-07-20 DIAGNOSIS — E66.9 DIABETES MELLITUS TYPE 2 IN OBESE (HCC): ICD-10-CM

## 2022-07-20 LAB
ANION GAP SERPL CALCULATED.3IONS-SCNC: 9 MMOL/L (ref 3–16)
BUN BLDV-MCNC: 7 MG/DL (ref 7–20)
CALCIUM SERPL-MCNC: 9.7 MG/DL (ref 8.3–10.6)
CHLORIDE BLD-SCNC: 103 MMOL/L (ref 99–110)
CO2: 29 MMOL/L (ref 21–32)
CREAT SERPL-MCNC: 1 MG/DL (ref 0.6–1.1)
GFR AFRICAN AMERICAN: >60
GFR NON-AFRICAN AMERICAN: 59
GLUCOSE BLD-MCNC: 114 MG/DL (ref 70–99)
POTASSIUM SERPL-SCNC: 4.9 MMOL/L (ref 3.5–5.1)
SODIUM BLD-SCNC: 141 MMOL/L (ref 136–145)

## 2022-07-27 ENCOUNTER — TELEPHONE (OUTPATIENT)
Dept: ADMINISTRATIVE | Age: 47
End: 2022-07-27

## 2022-07-27 ENCOUNTER — OFFICE VISIT (OUTPATIENT)
Dept: PAIN MANAGEMENT | Age: 47
End: 2022-07-27
Payer: MEDICAID

## 2022-07-27 VITALS
OXYGEN SATURATION: 100 % | WEIGHT: 293 LBS | DIASTOLIC BLOOD PRESSURE: 96 MMHG | HEART RATE: 73 BPM | TEMPERATURE: 97.9 F | SYSTOLIC BLOOD PRESSURE: 147 MMHG | BODY MASS INDEX: 50.02 KG/M2 | HEIGHT: 64 IN

## 2022-07-27 DIAGNOSIS — M17.0 PRIMARY OSTEOARTHRITIS OF BOTH KNEES: ICD-10-CM

## 2022-07-27 DIAGNOSIS — M16.11 PRIMARY OSTEOARTHRITIS OF RIGHT HIP: ICD-10-CM

## 2022-07-27 DIAGNOSIS — M62.830 BACK SPASM: ICD-10-CM

## 2022-07-27 DIAGNOSIS — G89.4 CHRONIC PAIN SYNDROME: ICD-10-CM

## 2022-07-27 DIAGNOSIS — F32.A DEPRESSION, UNSPECIFIED DEPRESSION TYPE: ICD-10-CM

## 2022-07-27 DIAGNOSIS — M54.50 CHRONIC BILATERAL LOW BACK PAIN WITHOUT SCIATICA: ICD-10-CM

## 2022-07-27 DIAGNOSIS — F41.9 ANXIETY: ICD-10-CM

## 2022-07-27 DIAGNOSIS — E11.42 DIABETIC POLYNEUROPATHY ASSOCIATED WITH TYPE 2 DIABETES MELLITUS (HCC): ICD-10-CM

## 2022-07-27 DIAGNOSIS — I10 ESSENTIAL HYPERTENSION: ICD-10-CM

## 2022-07-27 DIAGNOSIS — G89.29 CHRONIC BILATERAL LOW BACK PAIN WITHOUT SCIATICA: ICD-10-CM

## 2022-07-27 DIAGNOSIS — E66.01 MORBID OBESITY (HCC): ICD-10-CM

## 2022-07-27 PROCEDURE — 2022F DILAT RTA XM EVC RTNOPTHY: CPT | Performed by: NURSE PRACTITIONER

## 2022-07-27 PROCEDURE — 99214 OFFICE O/P EST MOD 30 MIN: CPT | Performed by: NURSE PRACTITIONER

## 2022-07-27 PROCEDURE — 4004F PT TOBACCO SCREEN RCVD TLK: CPT | Performed by: NURSE PRACTITIONER

## 2022-07-27 PROCEDURE — 3044F HG A1C LEVEL LT 7.0%: CPT | Performed by: NURSE PRACTITIONER

## 2022-07-27 PROCEDURE — G8427 DOCREV CUR MEDS BY ELIG CLIN: HCPCS | Performed by: NURSE PRACTITIONER

## 2022-07-27 PROCEDURE — G8417 CALC BMI ABV UP PARAM F/U: HCPCS | Performed by: NURSE PRACTITIONER

## 2022-07-27 RX ORDER — CLONIDINE HYDROCHLORIDE 0.1 MG/1
TABLET ORAL
Qty: 270 TABLET | Refills: 0 | Status: SHIPPED | OUTPATIENT
Start: 2022-07-27 | End: 2022-09-20

## 2022-07-27 RX ORDER — METHOCARBAMOL 500 MG/1
500 TABLET, FILM COATED ORAL 2 TIMES DAILY
Qty: 60 TABLET | Refills: 0 | Status: SHIPPED | OUTPATIENT
Start: 2022-07-27 | End: 2022-08-24 | Stop reason: SDUPTHER

## 2022-07-27 RX ORDER — VENLAFAXINE HYDROCHLORIDE 75 MG/1
150 CAPSULE, EXTENDED RELEASE ORAL DAILY
Qty: 60 CAPSULE | Refills: 0 | Status: SHIPPED | OUTPATIENT
Start: 2022-07-27 | End: 2022-08-24 | Stop reason: SDUPTHER

## 2022-07-27 RX ORDER — ACETAMINOPHEN 500 MG
1000 TABLET ORAL EVERY 6 HOURS PRN
Qty: 30 TABLET | Refills: 0 | Status: SHIPPED | OUTPATIENT
Start: 2022-07-27 | End: 2022-08-24 | Stop reason: SDUPTHER

## 2022-07-27 RX ORDER — HYDROCODONE BITARTRATE AND ACETAMINOPHEN 7.5; 325 MG/1; MG/1
1 TABLET ORAL EVERY 8 HOURS PRN
Qty: 45 TABLET | Refills: 0 | Status: SHIPPED | OUTPATIENT
Start: 2022-07-27 | End: 2022-08-11 | Stop reason: SDUPTHER

## 2022-07-27 NOTE — PROGRESS NOTES
Vesna Reason  1975  [id number]      HISTORY OF PRESENT ILLNESS: Ms. Ilya Uribe is a 48 y.o. female returns for a follow up visit for pain management  She has a diagnosis of   1. Chronic pain syndrome    2. Primary osteoarthritis of both knees    3. Primary osteoarthritis of right hip    4. Chronic bilateral low back pain without sciatica    5. Back spasm    6. Diabetic polyneuropathy associated with type 2 diabetes mellitus ([facility] [address]    7. Anxiety    8. Depression, unspecified depression type    9. [facility] ([facility]      As per Information Obtained from the PADT (Patient Assessment and Documentation Tool)    She complains of pain in the both knee(s), bilateral lower back, and right side of hip  She rates the pain 10/10 and describes it as aching, burning, numbness, pins and needles. Current treatment regimen has helped relieve about 0% of the pain. She denies any side effects from the current pain regimen. Patient reports that since the last follow up visit the physical functioning is worse, family/social relationships are unchanged, mood is worse sleep patterns are worse, and that the overall functioning is worse. Patient denies misusing/abusing her narcotic pain medications or using any illegal drugs. Upon obtaining medical history from Ms. Ilya Uribe states that pain is not manageable on current pain therapy. Has not had pain medications due to failed UDS, she has not used marijuana for almost 2 months. Mood/anxiety is stable. Sleep is fair with an average of 5-6 hours. Denies to having issues of constipation. Tolerating activities/house chores with moderate tenderness to the joints. ALLERGIES: Patients list of allergies were reviewed     MEDICATIONS: Ms. Ilya Uribe list of medications were reviewed. Her current medications are   Outpatient Medications Prior to Visit   Medication Sig Dispense Refill    cloNIDine (CATAPRES) 0.1 MG tablet TAKE THREE TABLETS BY MOUTH IN THE MORNING, AT NOON AND AT BEDTIME 270 tablet 0    metoprolol (LOPRESSOR) 100 MG tablet TAKE ONE TABLET BY MOUTH TWICE A DAY 60 tablet 3    spironolactone (ALDACTONE) 25 MG tablet Take 1 tablet by mouth in the morning and 1 tablet before bedtime.  60 tablet 0    albuterol sulfate HFA (PROAIR HFA) 108 (90 Base) MCG/ACT inhaler Inhale 2 puffs into the lungs every 6 hours as needed for Wheezing or Shortness of Breath 18 g 11    albuterol sulfate HFA (PROAIR HFA) 108 (90 Base) MCG/ACT inhaler Inhale 2 puffs into the lungs every 6 hours as needed for Wheezing or Shortness of Breath 18 g 11    oxybutynin (DITROPAN-XL) 5 MG extended release tablet TAKE ONE TABLET BY MOUTH DAILY 30 tablet 3    naloxone (NARCAN) 4 MG/0.1ML LIQD nasal spray Use as directed 1 each 0    chlorthalidone (HYGROTON) 25 MG tablet TAKE ONE TABLET BY MOUTH DAILY 90 tablet 3    Fluticasone-Umeclidin-Vilant (TRELEGY ELLIPTA) 200-62.5-25 MCG/INH AEPB Inhale 1 Inhaler into the lungs daily 1 each 0    blood glucose test strips (TRUE METRIX BLOOD GLUCOSE TEST) strip 1 each by In Vitro route 2 times daily 200 each 11    Dulaglutide 3 MG/0.5ML SOPN Inject 3 mg into the skin once a week 4 pen 5    metFORMIN (GLUCOPHAGE) 500 MG tablet TAKE ONE TABLET BY MOUTH TWICE A DAY WITH MEALS 60 tablet 2    omeprazole (PRILOSEC) 20 MG delayed release capsule TAKE ONE CAPSULE BY MOUTH DAILY 90 capsule 1    montelukast (SINGULAIR) 10 MG tablet TAKE ONE TABLET BY MOUTH ONCE NIGHTLY 90 tablet 1    Respiratory Therapy Supplies (NEBULIZER/TUBING/MOUTHPIECE) KIT 1 kit by Does not apply route daily 1 kit 0    ipratropium-albuterol (DUONEB) 0.5-2.5 (3) MG/3ML SOLN nebulizer solution Inhale 3 mLs into the lungs every 4 hours 360 mL 11    loratadine (CLARITIN) 10 MG tablet TAKE ONE TABLET BY MOUTH DAILY 90 tablet 1    Blood Glucose Monitoring Suppl (TRUE METRIX METER) w/Device KIT 1 kit by Does not apply route 2 times daily 1 kit 0    Lancets MISC 1 each by Does not apply route 2 times daily Dispense brand per insurance preference 200 each 1    Alcohol Swabs 70 % PADS 1 each by Does not apply route 2 times daily 200 each 0    torsemide (DEMADEX) 20 MG tablet TAKE TWO TABLETS BY MOUTH DAILY 60 tablet 11    albuterol sulfate  (90 Base) MCG/ACT inhaler INHALE TWO PUFFS BY MOUTH EVERY 6 HOURS AS NEEDED FOR WHEEZING 18 g 11    Blood Pressure Monitoring (B-D ASSURE BPM/AUTO ARM CUFF) MISC 1 Device by Does not apply route daily 1 each 0    Misc. Devices (RAISED TOILET SEAT) MISC Use daily as needed as directed 1 each 0    methocarbamol (ROBAXIN) 500 MG tablet Take 1 tablet by mouth 2 times daily 60 tablet 0    venlafaxine (EFFEXOR XR) 75 MG extended release capsule Take 2 capsules by mouth daily 60 capsule 0    acetaminophen (APAP EXTRA STRENGTH) 500 MG tablet Take 2 tablets by mouth every 6 hours as needed for Pain DO NOT TAKE WITH OTHER MEDICATIONS CONTAINING ACETAMINOPHEN. 30 tablet 0    spironolactone (ALDACTONE) 50 MG tablet Take 1 tablet by mouth 2 times daily 60 tablet 3    budesonide (PULMICORT) 0.5 MG/2ML nebulizer suspension Take 2 mLs by nebulization 2 times daily 120 mL 11     No facility-administered medications prior to visit. SOCIAL/FAMILY/PAST MEDICAL HISTORY: Ms. Manny Choudhary, family and past medical history was reviewed. REVIEW OF SYSTEMS:    Respiratory: Negative for apnea, chest tightness and shortness of breath or change in baseline breathing. Gastrointestinal: Negative for nausea, vomiting, abdominal pain, diarrhea, constipation, blood in stool and abdominal distention. PHYSICAL EXAM:   Nursing note and vitals reviewed. BP (!) 147/96   Pulse 73   Temp 97.9 °F (36.6 °C)   Ht 5' 4\" (1.626 m)   Wt (!) 327 lb (148.3 kg)   LMP  (LMP Unknown)   SpO2 100%   BMI 56.13 kg/m²   Constitutional: She appears well-developed and well-nourished. No acute distress. Skin: Skin is warm and dry, good turgor. No rash noted. She is not diaphoretic.   Cardiovascular: Normal rate, regular patient   -She was advised weight reduction, diet changes- 800-1200 fior diet, diet diary, exercising, nutritional  consult increased physical activity as tolerated   -Last UDS 6/21/22 inconsistent +THC  -Return in about 4 weeks (around 8/24/2022). Analgesic Plan:              Continue present regimen: Norco 7.5-325 mg tabs q8h prn x15 days pending UDS              Adjust dose of present analgesic: No              Switch analgesics: No              Add/Adjust concomitant therapy: Robaxin, Effexor, Tylenol, Narcan    Current Outpatient Medications   Medication Sig Dispense Refill    cloNIDine (CATAPRES) 0.1 MG tablet TAKE THREE TABLETS BY MOUTH IN THE MORNING, AT NOON AND AT BEDTIME 270 tablet 0    acetaminophen (APAP EXTRA STRENGTH) 500 MG tablet Take 2 tablets by mouth every 6 hours as needed for Pain DO NOT TAKE WITH OTHER MEDICATIONS CONTAINING ACETAMINOPHEN. 30 tablet 0    venlafaxine (EFFEXOR XR) 75 MG extended release capsule Take 2 capsules by mouth in the morning. 60 capsule 0    methocarbamol (ROBAXIN) 500 MG tablet Take 1 tablet by mouth in the morning and 1 tablet in the evening. 60 tablet 0    HYDROcodone-acetaminophen (NORCO) 7.5-325 MG per tablet Take 1 tablet by mouth every 8 hours as needed for Pain for up to 15 days. 45 tablet 0    metoprolol (LOPRESSOR) 100 MG tablet TAKE ONE TABLET BY MOUTH TWICE A DAY 60 tablet 3    spironolactone (ALDACTONE) 25 MG tablet Take 1 tablet by mouth in the morning and 1 tablet before bedtime.  60 tablet 0    albuterol sulfate HFA (PROAIR HFA) 108 (90 Base) MCG/ACT inhaler Inhale 2 puffs into the lungs every 6 hours as needed for Wheezing or Shortness of Breath 18 g 11    albuterol sulfate HFA (PROAIR HFA) 108 (90 Base) MCG/ACT inhaler Inhale 2 puffs into the lungs every 6 hours as needed for Wheezing or Shortness of Breath 18 g 11    oxybutynin (DITROPAN-XL) 5 MG extended release tablet TAKE ONE TABLET BY MOUTH DAILY 30 tablet 3    naloxone (NARCAN) 4 MG/0.1ML LIQD nasal spray Use as directed 1 each 0    chlorthalidone (HYGROTON) 25 MG tablet TAKE ONE TABLET BY MOUTH DAILY 90 tablet 3    Fluticasone-Umeclidin-Vilant (TRELEGY ELLIPTA) 200-62.5-25 MCG/INH AEPB Inhale 1 Inhaler into the lungs daily 1 each 0    blood glucose test strips (TRUE METRIX BLOOD GLUCOSE TEST) strip 1 each by In Vitro route 2 times daily 200 each 11    Dulaglutide 3 MG/0.5ML SOPN Inject 3 mg into the skin once a week 4 pen 5    metFORMIN (GLUCOPHAGE) 500 MG tablet TAKE ONE TABLET BY MOUTH TWICE A DAY WITH MEALS 60 tablet 2    omeprazole (PRILOSEC) 20 MG delayed release capsule TAKE ONE CAPSULE BY MOUTH DAILY 90 capsule 1    montelukast (SINGULAIR) 10 MG tablet TAKE ONE TABLET BY MOUTH ONCE NIGHTLY 90 tablet 1    Respiratory Therapy Supplies (NEBULIZER/TUBING/MOUTHPIECE) KIT 1 kit by Does not apply route daily 1 kit 0    ipratropium-albuterol (DUONEB) 0.5-2.5 (3) MG/3ML SOLN nebulizer solution Inhale 3 mLs into the lungs every 4 hours 360 mL 11    loratadine (CLARITIN) 10 MG tablet TAKE ONE TABLET BY MOUTH DAILY 90 tablet 1    Blood Glucose Monitoring Suppl (TRUE METRIX METER) w/Device KIT 1 kit by Does not apply route 2 times daily 1 kit 0    Lancets MISC 1 each by Does not apply route 2 times daily Dispense brand per insurance preference 200 each 1    Alcohol Swabs 70 % PADS 1 each by Does not apply route 2 times daily 200 each 0    torsemide (DEMADEX) 20 MG tablet TAKE TWO TABLETS BY MOUTH DAILY 60 tablet 11    albuterol sulfate  (90 Base) MCG/ACT inhaler INHALE TWO PUFFS BY MOUTH EVERY 6 HOURS AS NEEDED FOR WHEEZING 18 g 11    Blood Pressure Monitoring (B-D ASSURE BPM/AUTO ARM CUFF) MISC 1 Device by Does not apply route daily 1 each 0    Misc.  Devices (RAISED TOILET SEAT) MISC Use daily as needed as directed 1 each 0    spironolactone (ALDACTONE) 50 MG tablet Take 1 tablet by mouth 2 times daily 60 tablet 3    budesonide (PULMICORT) 0.5 MG/2ML nebulizer suspension Take 2 mLs by nebulization 2 were also discussed. If the patient develops new symptoms or if the symptoms worsen, the patient should call the office. While transcribing every attempt was made to maintain the accuracy of the note in terms of it's contents,there may have been some errors made inadvertently. Thank you for allowing me to participate in the care of this patient. Cayetano Dakin CNP.     Cc: Edita Crandall MD

## 2022-08-03 RX ORDER — VENLAFAXINE HYDROCHLORIDE 75 MG/1
CAPSULE, EXTENDED RELEASE ORAL
Qty: 60 CAPSULE | Refills: 0 | OUTPATIENT
Start: 2022-08-03

## 2022-08-04 ENCOUNTER — TELEPHONE (OUTPATIENT)
Dept: PAIN MANAGEMENT | Age: 47
End: 2022-08-04

## 2022-08-04 NOTE — TELEPHONE ENCOUNTER
Patient called and stated that she was told by Belén Miles at her last appointment that once she is 7 days out from her HYDROCODONE being out to give us a call so that she can get her medication refilled. Patient would like medication sent to [facility] [id number] [facility] [phone] (Ph: [phone])    Patient is requesting a callback. Please advise.

## 2022-08-05 ENCOUNTER — PATIENT MESSAGE (OUTPATIENT)
Dept: FAMILY MEDICINE CLINIC | Age: 47
End: 2022-08-05

## 2022-08-05 RX ORDER — VENLAFAXINE HYDROCHLORIDE 75 MG/1
CAPSULE, EXTENDED RELEASE ORAL
Qty: 60 CAPSULE | Refills: 0 | OUTPATIENT
Start: 2022-08-05

## 2022-08-05 NOTE — LETTER
[address] [facility] [address] [facility]  Phone: [phone]  Fax: [phone]    Memo Martinez MD,         August 8, 2022     Patient: Reyna Olson   YOB: 1975   Date of Visit: 8/5/2022       To Whom It May Concern: It is my medical opinion that [place] is unable to perform jury duty at this time due to significant and unresolved medical problems. If you have any questions or concerns, please don't hesitate to call.     Sincerely,        Memo Martinez MD

## 2022-08-05 NOTE — LETTER
[address] [facility] [address] [facility]  Phone: [phone]  Fax: [phone]    Jonathan Giles MD        August 8, 2022     Patient: See Boucher   YOB: 1975   Date of Visit: 8/5/2022       To Whom It May Concern: It is my medical opinion that [place] is unable to perform jury duty at this time due to significant and unresolved medical problems. If you have any questions or concerns, please don't hesitate to call.     Sincerely,        Jonathan Giles MD

## 2022-08-08 NOTE — TELEPHONE ENCOUNTER
Called pt she needs it printed and signed.   Printed it out and will put in the front she will  tomorrow

## 2022-08-08 NOTE — TELEPHONE ENCOUNTER
From: Yuly German  To: Dr. Mari Ramirez  Sent: 8/5/2022 11:05 PM EDT  Subject:  In need of letter for [address] is there any way I can get a letter so I can take down there they want me to do jury duty and I don't think that I am capable of doing that so I need a letter stating all my health issues so I can take down to the courthouse I really appreciate it thanks in advance

## 2022-08-09 ENCOUNTER — OFFICE VISIT (OUTPATIENT)
Dept: ORTHOPEDIC SURGERY | Age: 47
End: 2022-08-09
Payer: MEDICAID

## 2022-08-09 VITALS — BODY MASS INDEX: 50.02 KG/M2 | WEIGHT: 293 LBS | HEIGHT: 64 IN

## 2022-08-09 DIAGNOSIS — M17.12 PRIMARY OSTEOARTHRITIS OF LEFT KNEE: ICD-10-CM

## 2022-08-09 DIAGNOSIS — G89.4 CHRONIC PAIN SYNDROME: ICD-10-CM

## 2022-08-09 DIAGNOSIS — M16.11 PRIMARY OSTEOARTHRITIS OF RIGHT HIP: ICD-10-CM

## 2022-08-09 DIAGNOSIS — M17.11 PRIMARY OSTEOARTHRITIS OF RIGHT KNEE: ICD-10-CM

## 2022-08-09 DIAGNOSIS — G89.29 CHRONIC BILATERAL LOW BACK PAIN WITHOUT SCIATICA: ICD-10-CM

## 2022-08-09 DIAGNOSIS — M54.50 CHRONIC BILATERAL LOW BACK PAIN WITHOUT SCIATICA: ICD-10-CM

## 2022-08-09 DIAGNOSIS — M16.11 PRIMARY OSTEOARTHRITIS OF RIGHT HIP: Primary | ICD-10-CM

## 2022-08-09 DIAGNOSIS — M62.830 BACK SPASM: ICD-10-CM

## 2022-08-09 DIAGNOSIS — M17.0 PRIMARY OSTEOARTHRITIS OF BOTH KNEES: ICD-10-CM

## 2022-08-09 DIAGNOSIS — G89.4 CHRONIC PAIN SYNDROME: Primary | ICD-10-CM

## 2022-08-09 PROCEDURE — 99213 OFFICE O/P EST LOW 20 MIN: CPT | Performed by: ORTHOPAEDIC SURGERY

## 2022-08-09 PROCEDURE — 4004F PT TOBACCO SCREEN RCVD TLK: CPT | Performed by: ORTHOPAEDIC SURGERY

## 2022-08-09 PROCEDURE — G8427 DOCREV CUR MEDS BY ELIG CLIN: HCPCS | Performed by: ORTHOPAEDIC SURGERY

## 2022-08-09 PROCEDURE — G8417 CALC BMI ABV UP PARAM F/U: HCPCS | Performed by: ORTHOPAEDIC SURGERY

## 2022-08-09 RX ORDER — IBUPROFEN 800 MG/1
800 TABLET ORAL 2 TIMES DAILY WITH MEALS
Qty: 60 TABLET | Refills: 1 | Status: CANCELLED | OUTPATIENT
Start: 2022-08-09

## 2022-08-09 RX ORDER — IBUPROFEN 800 MG/1
800 TABLET ORAL 2 TIMES DAILY WITH MEALS
Qty: 60 TABLET | Refills: 1 | Status: SHIPPED | OUTPATIENT
Start: 2022-08-09

## 2022-08-09 NOTE — PROGRESS NOTES
Garfield Neely  [id number]  August 9, 2022    Chief Complaint   Patient presents with    Follow-up     Right hip pian       History: The patient is here in follow-up regarding her right hip. She did receive a right hip intra-articular injection by Dr. Digna Urena in January 2021. She did not respond favorably. She is under pain management. She is now under a pain contract with Dr. Rashid Valdez. She does take Norco 7.5 mg 3 times a day. She also takes Robaxin 500 mg twice a day. She has lost approximately 12 pounds since her last visit. The bariatric surgery was on hold for various reasons. We have treated her in the past for her knees and her knees are doing relatively well. The patient's  past medical history, medications, allergies,  family history, social history, and review of systems have been reviewed, and dated and are recorded in the chart. Vitals:  Ht 5' 4\" (1.626 m)   Wt (!) 327 lb (148.3 kg)   LMP  (LMP Unknown)   BMI 56.13 kg/m²     Physical: Ms. Garfield Neely appears well, she is in no apparent distress, she demonstrates appropriate mood & affect. She is alert and oriented to person, place and time. Examination of the right hip reveals moderate to severe pain with internal rotation of the hip. She has generalized tenderness to palpation about the joint line of the bilateral knees. Range of motion is from 0 degrees to 110 degrees bilaterally. Strength is 4+ to 5/5 for all muscle groups about the bilateral knee. There is patellofemoral crepitus with range of motion of the bilateral knee. Varus and valgus stressing of the knees reveals no evidence of instability. There is a small effusion in the bilateral knees. Anterior drawer and Lachman are negative bilaterally. Examination of the skin reveals no rashes, ulceration, or lesion, bilaterally in the lower extremities. Sensation to both lower extremities is grossly intact.  Exam of both feet reveals pedal pulses intact and brisk cap refill. Patient is able to dorsiflex and wiggle all toes. Deep tendon reflexes of the lower extremities are normal and symmetric. The patient has 90° of right hip flexion, 0° of internal rotation, and 25° of external rotation. She has significant pain with internal rotation of the right hip. She denies low back pain today. Straight-leg testing is negative bilaterally. Leg lengths appear symmetric. X-rays: AP pelvis and 2 views of the right hip obtained previously were extensively reviewed. The x-rays confirm moderate right hip osteoarthritis. 4 views of the right knee and 4 views of the left knee obtained in the office today were extensively reviewed. The x-rays confirm severe osteoarthritis of both knees. The changes are most severe within the patellofemoral compartment and medially bilaterally. Impression: #1 right hip osteoarthritis #2 bilateral knee osteoarthritis #3 morbid obesity    Plan: At this time, we did discuss our treatment options for the right hip at length. We will hold off on another injection since she did not respond well. She may continue taking the Robaxin. She may continue taking the hydrocodone. She was given a prescription for ibuprofen 800 mg twice a day with food. She will continue to work on weight loss. She will follow-up with me in 3 months and we will reassess her then.

## 2022-08-11 ENCOUNTER — TELEPHONE (OUTPATIENT)
Dept: PAIN MANAGEMENT | Age: 47
End: 2022-08-11

## 2022-08-11 DIAGNOSIS — M17.0 PRIMARY OSTEOARTHRITIS OF BOTH KNEES: ICD-10-CM

## 2022-08-11 DIAGNOSIS — G89.4 CHRONIC PAIN SYNDROME: ICD-10-CM

## 2022-08-11 DIAGNOSIS — M54.50 CHRONIC BILATERAL LOW BACK PAIN WITHOUT SCIATICA: ICD-10-CM

## 2022-08-11 DIAGNOSIS — M62.830 BACK SPASM: ICD-10-CM

## 2022-08-11 DIAGNOSIS — G89.29 CHRONIC BILATERAL LOW BACK PAIN WITHOUT SCIATICA: ICD-10-CM

## 2022-08-11 DIAGNOSIS — M16.11 PRIMARY OSTEOARTHRITIS OF RIGHT HIP: ICD-10-CM

## 2022-08-11 RX ORDER — HYDROCODONE BITARTRATE AND ACETAMINOPHEN 7.5; 325 MG/1; MG/1
1 TABLET ORAL EVERY 8 HOURS PRN
Qty: 45 TABLET | Refills: 0 | OUTPATIENT
Start: 2022-08-11 | End: 2022-08-26

## 2022-08-11 RX ORDER — HYDROCODONE BITARTRATE AND ACETAMINOPHEN 7.5; 325 MG/1; MG/1
1 TABLET ORAL EVERY 8 HOURS PRN
Qty: 42 TABLET | Refills: 0 | Status: SHIPPED | OUTPATIENT
Start: 2022-08-11 | End: 2022-08-24 | Stop reason: SDUPTHER

## 2022-08-11 NOTE — TELEPHONE ENCOUNTER
Patient called and stated she needs a refill on her HYDROCODONE. Patient uses [facility] [id number] - [id number], [facility] [phone]    Patient is requesting a callback. Please advise.

## 2022-08-12 ENCOUNTER — TELEPHONE (OUTPATIENT)
Dept: PAIN MANAGEMENT | Age: 47
End: 2022-08-12

## 2022-08-12 NOTE — TELEPHONE ENCOUNTER
Patient called and said she needs a PA on her     HYDROcodone-acetaminophen (NORCO) 7.5-325 MG per tablet

## 2022-08-14 DIAGNOSIS — E66.9 DIABETES MELLITUS TYPE 2 IN OBESE (HCC): ICD-10-CM

## 2022-08-14 DIAGNOSIS — E11.69 DIABETES MELLITUS TYPE 2 IN OBESE (HCC): ICD-10-CM

## 2022-08-15 NOTE — TELEPHONE ENCOUNTER
Submitted PA for HYDROCODONE  Via [facility] Key: [id number] STATUS: \" Your PA request has been closed. The request submitted does not require prior authorization.  \"

## 2022-08-23 DIAGNOSIS — G89.4 CHRONIC PAIN SYNDROME: ICD-10-CM

## 2022-08-24 ENCOUNTER — OFFICE VISIT (OUTPATIENT)
Dept: PAIN MANAGEMENT | Age: 47
End: 2022-08-24
Payer: MEDICAID

## 2022-08-24 VITALS
HEART RATE: 75 BPM | HEIGHT: 64 IN | OXYGEN SATURATION: 97 % | WEIGHT: 293 LBS | BODY MASS INDEX: 50.02 KG/M2 | TEMPERATURE: 97.5 F | SYSTOLIC BLOOD PRESSURE: 156 MMHG | DIASTOLIC BLOOD PRESSURE: 95 MMHG

## 2022-08-24 DIAGNOSIS — M54.50 CHRONIC BILATERAL LOW BACK PAIN WITHOUT SCIATICA: ICD-10-CM

## 2022-08-24 DIAGNOSIS — M62.830 BACK SPASM: ICD-10-CM

## 2022-08-24 DIAGNOSIS — G89.29 CHRONIC BILATERAL LOW BACK PAIN WITHOUT SCIATICA: ICD-10-CM

## 2022-08-24 DIAGNOSIS — E66.01 MORBID OBESITY (HCC): ICD-10-CM

## 2022-08-24 DIAGNOSIS — G89.4 CHRONIC PAIN SYNDROME: ICD-10-CM

## 2022-08-24 DIAGNOSIS — F41.9 ANXIETY: ICD-10-CM

## 2022-08-24 DIAGNOSIS — E11.42 DIABETIC POLYNEUROPATHY ASSOCIATED WITH TYPE 2 DIABETES MELLITUS (HCC): ICD-10-CM

## 2022-08-24 DIAGNOSIS — M17.0 PRIMARY OSTEOARTHRITIS OF BOTH KNEES: ICD-10-CM

## 2022-08-24 DIAGNOSIS — M16.11 PRIMARY OSTEOARTHRITIS OF RIGHT HIP: ICD-10-CM

## 2022-08-24 DIAGNOSIS — F33.41 MAJOR DEPRESSIVE DISORDER, RECURRENT, IN PARTIAL REMISSION (HCC): ICD-10-CM

## 2022-08-24 PROCEDURE — 2022F DILAT RTA XM EVC RTNOPTHY: CPT | Performed by: NURSE PRACTITIONER

## 2022-08-24 PROCEDURE — 99213 OFFICE O/P EST LOW 20 MIN: CPT | Performed by: NURSE PRACTITIONER

## 2022-08-24 PROCEDURE — G8427 DOCREV CUR MEDS BY ELIG CLIN: HCPCS | Performed by: NURSE PRACTITIONER

## 2022-08-24 PROCEDURE — 3044F HG A1C LEVEL LT 7.0%: CPT | Performed by: NURSE PRACTITIONER

## 2022-08-24 PROCEDURE — G8417 CALC BMI ABV UP PARAM F/U: HCPCS | Performed by: NURSE PRACTITIONER

## 2022-08-24 PROCEDURE — 4004F PT TOBACCO SCREEN RCVD TLK: CPT | Performed by: NURSE PRACTITIONER

## 2022-08-24 RX ORDER — METHOCARBAMOL 500 MG/1
500 TABLET, FILM COATED ORAL 2 TIMES DAILY
Qty: 60 TABLET | Refills: 0 | Status: SHIPPED | OUTPATIENT
Start: 2022-08-24 | End: 2022-09-27 | Stop reason: SDUPTHER

## 2022-08-24 RX ORDER — VENLAFAXINE HYDROCHLORIDE 75 MG/1
150 CAPSULE, EXTENDED RELEASE ORAL DAILY
Qty: 60 CAPSULE | Refills: 0 | Status: SHIPPED | OUTPATIENT
Start: 2022-08-24 | End: 2022-09-27 | Stop reason: SDUPTHER

## 2022-08-24 RX ORDER — HYDROCODONE BITARTRATE AND ACETAMINOPHEN 7.5; 325 MG/1; MG/1
1 TABLET ORAL EVERY 8 HOURS PRN
Qty: 90 TABLET | Refills: 0 | Status: SHIPPED | OUTPATIENT
Start: 2022-08-24 | End: 2022-09-27 | Stop reason: SDUPTHER

## 2022-08-24 RX ORDER — ACETAMINOPHEN 500 MG
1000 TABLET ORAL EVERY 6 HOURS PRN
Qty: 30 TABLET | Refills: 0 | Status: SHIPPED | OUTPATIENT
Start: 2022-08-24 | End: 2022-09-27 | Stop reason: SDUPTHER

## 2022-08-24 NOTE — PROGRESS NOTES
[facility]  1975  [id number]      HISTORY OF PRESENT ILLNESS: Ms. Jhonny Pedraza is a 48 y.o. female returns for a follow up visit for pain management  She has a diagnosis of   1. Chronic pain syndrome    2. Primary osteoarthritis of both knees    3. Primary osteoarthritis of right hip    4. Chronic bilateral low back pain without sciatica    5. Back spasm    6. Diabetic polyneuropathy associated with type 2 diabetes mellitus ([facility] [address]    7. Anxiety    8. Major depressive disorder, recurrent, in partial remission ([facility] [address]    9. Morbid obesity ([facility] [address]      As per Information Obtained from the PADT (Patient Assessment and Documentation Tool)    She complains of pain in the groin, both knee(s), and bilateral lower back She rates the pain 7/10 and describes it as aching, burning, numbness, pins and needles. Current treatment regimen has helped relieve about 30% of the pain. She denies any side effects from the current pain regimen. Patient reports that since the last follow up visit the physical functioning is better, family/social relationships are unchanged, mood is better sleep patterns are better, and that the overall functioning is unchanged. Patient denies misusing/abusing her narcotic pain medications or using any illegal drugs. Upon obtaining medical history from Ms. Jhonny Pedraza states that pain is manageable on current pain therapy. Takes the pain medications as prescribed. Mood/anxiety is stable. Sleep is fair with an average of 5-6 hours. Denies to having issues of constipation. Tolerating activities/house chores with moderate tenderness to the lower back. Working on smoking cessation    ALLERGIES: Patients list of allergies were reviewed     MEDICATIONS: Ms. Jhonny Pedraza list of medications were reviewed. Her current medications are   Outpatient Medications Prior to Visit   Medication Sig Dispense Refill    torsemide (DEMADEX) 20 MG tablet TAKE TWO TABLETS BY MOUTH DAILY 60 tablet 11    metFORMIN (GLUCOPHAGE) 500 MG tablet TAKE ONE TABLET BY MOUTH TWICE A DAY WITH MEALS 60 tablet 2    ibuprofen (ADVIL;MOTRIN) 800 MG tablet Take 1 tablet by mouth in the morning and 1 tablet in the evening. Take with meals. 60 tablet 1    cloNIDine (CATAPRES) 0.1 MG tablet TAKE THREE TABLETS BY MOUTH IN THE MORNING, AT NOON AND AT BEDTIME 270 tablet 0    metoprolol (LOPRESSOR) 100 MG tablet TAKE ONE TABLET BY MOUTH TWICE A DAY 60 tablet 3    spironolactone (ALDACTONE) 25 MG tablet Take 1 tablet by mouth in the morning and 1 tablet before bedtime.  60 tablet 0    albuterol sulfate HFA (PROAIR HFA) 108 (90 Base) MCG/ACT inhaler Inhale 2 puffs into the lungs every 6 hours as needed for Wheezing or Shortness of Breath 18 g 11    albuterol sulfate HFA (PROAIR HFA) 108 (90 Base) MCG/ACT inhaler Inhale 2 puffs into the lungs every 6 hours as needed for Wheezing or Shortness of Breath 18 g 11    oxybutynin (DITROPAN-XL) 5 MG extended release tablet TAKE ONE TABLET BY MOUTH DAILY 30 tablet 3    naloxone (NARCAN) 4 MG/0.1ML LIQD nasal spray Use as directed 1 each 0    chlorthalidone (HYGROTON) 25 MG tablet TAKE ONE TABLET BY MOUTH DAILY 90 tablet 3    Fluticasone-Umeclidin-Vilant (TRELEGY ELLIPTA) 200-62.5-25 MCG/INH AEPB Inhale 1 Inhaler into the lungs daily 1 each 0    blood glucose test strips (TRUE METRIX BLOOD GLUCOSE TEST) strip 1 each by In Vitro route 2 times daily 200 each 11    Dulaglutide 3 MG/0.5ML SOPN Inject 3 mg into the skin once a week 4 pen 5    omeprazole (PRILOSEC) 20 MG delayed release capsule TAKE ONE CAPSULE BY MOUTH DAILY 90 capsule 1    montelukast (SINGULAIR) 10 MG tablet TAKE ONE TABLET BY MOUTH ONCE NIGHTLY 90 tablet 1    Respiratory Therapy Supplies (NEBULIZER/TUBING/MOUTHPIECE) KIT 1 kit by Does not apply route daily 1 kit 0    ipratropium-albuterol (DUONEB) 0.5-2.5 (3) MG/3ML SOLN nebulizer solution Inhale 3 mLs into the lungs every 4 hours 360 mL 11    loratadine (CLARITIN) 10 MG tablet TAKE ONE TABLET BY MOUTH DAILY 90 tablet 1    Blood Glucose Monitoring Suppl (TRUE METRIX METER) w/Device KIT 1 kit by Does not apply route 2 times daily 1 kit 0    Lancets MISC 1 each by Does not apply route 2 times daily Dispense brand per insurance preference 200 each 1    Alcohol Swabs 70 % PADS 1 each by Does not apply route 2 times daily 200 each 0    albuterol sulfate  (90 Base) MCG/ACT inhaler INHALE TWO PUFFS BY MOUTH EVERY 6 HOURS AS NEEDED FOR WHEEZING 18 g 11    Blood Pressure Monitoring (B-D ASSURE BPM/AUTO ARM CUFF) MISC 1 Device by Does not apply route daily 1 each 0    Misc. Devices (RAISED TOILET SEAT) MISC Use daily as needed as directed 1 each 0    HYDROcodone-acetaminophen (NORCO) 7.5-325 MG per tablet Take 1 tablet by mouth every 8 hours as needed for Pain for up to 14 days. 42 tablet 0    acetaminophen (APAP EXTRA STRENGTH) 500 MG tablet Take 2 tablets by mouth every 6 hours as needed for Pain DO NOT TAKE WITH OTHER MEDICATIONS CONTAINING ACETAMINOPHEN. 30 tablet 0    venlafaxine (EFFEXOR XR) 75 MG extended release capsule Take 2 capsules by mouth in the morning. 60 capsule 0    methocarbamol (ROBAXIN) 500 MG tablet Take 1 tablet by mouth in the morning and 1 tablet in the evening. 60 tablet 0    spironolactone (ALDACTONE) 50 MG tablet Take 1 tablet by mouth 2 times daily 60 tablet 3    budesonide (PULMICORT) 0.5 MG/2ML nebulizer suspension Take 2 mLs by nebulization 2 times daily 120 mL 11     No facility-administered medications prior to visit. SOCIAL/FAMILY/PAST MEDICAL HISTORY: Ms. Dannielle Monge, family and past medical history was reviewed. REVIEW OF SYSTEMS:    Respiratory: Negative for apnea, chest tightness and shortness of breath or change in baseline breathing. Gastrointestinal: Negative for nausea, vomiting, abdominal pain, diarrhea, constipation, blood in stool and abdominal distention. PHYSICAL EXAM:   Nursing note and vitals reviewed.  BP (!) 156/95   Pulse 75   Temp 97.5 °F (36.4 °C)   Ht 5' 4\" (1.626 m)   Wt (!) 322 lb (146.1 kg)   LMP  (LMP Unknown)   SpO2 97%   BMI 55.27 kg/m²   Constitutional: She appears well-developed and well-nourished. No acute distress. Skin: Skin is warm and dry, good turgor. No rash noted. She is not diaphoretic. Cardiovascular: Normal rate, regular rhythm, normal heart sounds, and does not have murmur. Pulmonary/Chest: Effort normal. No respiratory distress. She does not have wheezes in the lung fields. She has no rales. Neurological/Psychiatric:She is alert and oriented to person, place, and time. Coordination is  normal.  Her mood isAppropriate and affect is Neutral/Euthymic(normal) . Prescription pain medication monitoring:                  MEDD current = 22.50              ORT Score = 11 high risk              Other Risk factors - (mood) Depression/Anxiety              Date of Last Medication Agreement: 3/16/22              Date Naloxone prescribed: 6/21/22              UDT:                          Date of last UDT: 6/21/22                          Adverse report: No;               OARRS:                          Checked today: Yes                          Adverse report: No    IMPRESSION:   1. Chronic pain syndrome    2. Primary osteoarthritis of both knees    3. Primary osteoarthritis of right hip    4. Chronic bilateral low back pain without sciatica    5. Back spasm    6. Diabetic polyneuropathy associated with type 2 diabetes mellitus ([facility] [address]    7. Anxiety    8. Major depressive disorder, recurrent, in partial remission ([facility] [address]    9.  Morbid obesity ([facility] [address]        PLAN:  Informed verbal consent was obtained:  - Patient's opioid therapy will be maintained at current dose  -Home exercises/Toyin exercises recommended  -CBT techniques- relaxation therapies such as biofeedback, mindfulness based stress reduction, imagery, cognitive restructuring, problem solving discussed with patient   -She was advised weight reduction, diet changes- 800-1200 fior diet, diet diary, exercising, nutritional  consult increased physical activity as tolerated   -Last UDS 6/21/22 consistent  -Return in about 4 weeks (around 9/21/2022). Analgesic Plan:              Continue present regimen: Norco 7.5-325 mg tabs q8h prn              Adjust dose of present analgesic: No              Switch analgesics: No              Add/Adjust concomitant therapy: Robaxin, Effexor, Tylenol, Narcan    Current Outpatient Medications   Medication Sig Dispense Refill    HYDROcodone-acetaminophen (NORCO) 7.5-325 MG per tablet Take 1 tablet by mouth every 8 hours as needed for Pain for up to 30 days. 90 tablet 0    methocarbamol (ROBAXIN) 500 MG tablet Take 1 tablet by mouth in the morning and 1 tablet in the evening. 60 tablet 0    venlafaxine (EFFEXOR XR) 75 MG extended release capsule Take 2 capsules by mouth daily 60 capsule 0    acetaminophen (APAP EXTRA STRENGTH) 500 MG tablet Take 2 tablets by mouth every 6 hours as needed for Pain DO NOT TAKE WITH OTHER MEDICATIONS CONTAINING ACETAMINOPHEN. 30 tablet 0    torsemide (DEMADEX) 20 MG tablet TAKE TWO TABLETS BY MOUTH DAILY 60 tablet 11    metFORMIN (GLUCOPHAGE) 500 MG tablet TAKE ONE TABLET BY MOUTH TWICE A DAY WITH MEALS 60 tablet 2    ibuprofen (ADVIL;MOTRIN) 800 MG tablet Take 1 tablet by mouth in the morning and 1 tablet in the evening. Take with meals. 60 tablet 1    cloNIDine (CATAPRES) 0.1 MG tablet TAKE THREE TABLETS BY MOUTH IN THE MORNING, AT NOON AND AT BEDTIME 270 tablet 0    metoprolol (LOPRESSOR) 100 MG tablet TAKE ONE TABLET BY MOUTH TWICE A DAY 60 tablet 3    spironolactone (ALDACTONE) 25 MG tablet Take 1 tablet by mouth in the morning and 1 tablet before bedtime.  60 tablet 0    albuterol sulfate HFA (PROAIR HFA) 108 (90 Base) MCG/ACT inhaler Inhale 2 puffs into the lungs every 6 hours as needed for Wheezing or Shortness of Breath 18 g 11    albuterol sulfate HFA (PROAIR HFA) 108 (90 Base) MCG/ACT inhaler Inhale 2 puffs into the lungs every 6 hours as needed for Wheezing or Shortness of Breath 18 g 11    oxybutynin (DITROPAN-XL) 5 MG extended release tablet TAKE ONE TABLET BY MOUTH DAILY 30 tablet 3    naloxone (NARCAN) 4 MG/0.1ML LIQD nasal spray Use as directed 1 each 0    chlorthalidone (HYGROTON) 25 MG tablet TAKE ONE TABLET BY MOUTH DAILY 90 tablet 3    Fluticasone-Umeclidin-Vilant (TRELEGY ELLIPTA) 200-62.5-25 MCG/INH AEPB Inhale 1 Inhaler into the lungs daily 1 each 0    blood glucose test strips (TRUE METRIX BLOOD GLUCOSE TEST) strip 1 each by In Vitro route 2 times daily 200 each 11    Dulaglutide 3 MG/0.5ML SOPN Inject 3 mg into the skin once a week 4 pen 5    omeprazole (PRILOSEC) 20 MG delayed release capsule TAKE ONE CAPSULE BY MOUTH DAILY 90 capsule 1    montelukast (SINGULAIR) 10 MG tablet TAKE ONE TABLET BY MOUTH ONCE NIGHTLY 90 tablet 1    Respiratory Therapy Supplies (NEBULIZER/TUBING/MOUTHPIECE) KIT 1 kit by Does not apply route daily 1 kit 0    ipratropium-albuterol (DUONEB) 0.5-2.5 (3) MG/3ML SOLN nebulizer solution Inhale 3 mLs into the lungs every 4 hours 360 mL 11    loratadine (CLARITIN) 10 MG tablet TAKE ONE TABLET BY MOUTH DAILY 90 tablet 1    Blood Glucose Monitoring Suppl (TRUE METRIX METER) w/Device KIT 1 kit by Does not apply route 2 times daily 1 kit 0    Lancets MISC 1 each by Does not apply route 2 times daily Dispense brand per insurance preference 200 each 1    Alcohol Swabs 70 % PADS 1 each by Does not apply route 2 times daily 200 each 0    albuterol sulfate  (90 Base) MCG/ACT inhaler INHALE TWO PUFFS BY MOUTH EVERY 6 HOURS AS NEEDED FOR WHEEZING 18 g 11    Blood Pressure Monitoring (B-D ASSURE BPM/AUTO ARM CUFF) MISC 1 Device by Does not apply route daily 1 each 0    Misc.  Devices (RAISED TOILET SEAT) MISC Use daily as needed as directed 1 each 0    spironolactone (ALDACTONE) 50 MG tablet Take 1 tablet by mouth 2 times daily 60 tablet 3    budesonide (PULMICORT) 0.5 MG/2ML nebulizer suspension Take 2 mLs by nebulization 2 times daily 120 mL 11     No current facility-administered medications for this visit. I will continue her current medication regimen  which is part of the above treatment schedule. It has been helping with Ms. Sony Chase chronic  medical problems which for this visit include:   Diagnoses of Chronic pain syndrome, Primary osteoarthritis of both knees, Primary osteoarthritis of right hip, Chronic bilateral low back pain without sciatica, Back spasm, Diabetic polyneuropathy associated with type 2 diabetes mellitus ([facility] [address] Anxiety, Major depressive disorder, recurrent, in partial remission ([facility] [address] and Morbid obesity ([facility] [address] were pertinent to this visit. Risks and benefits of the medications and other alternative treatments  including no treatment were discussed with the patient. The common side effects of these medications were also explained to the patient. Informed verbal consent was obtained. Goals of current treatment regimen include improvement in pain, restoration of functioning- with focus on improvement in physical performance, general activity, work or disability,emotional distress, health care utilization and  decreased medication consumption. Will continue to monitor progress towards achieving/maintaining therapeutic goals with special emphasis on  1. Improvement in perceived interfernce  of pain with ADL's. Ability to do home exercises independently. Ability to do household chores indoor and/or outdoor work and social and leisure activities. Improve psychosocial and physical functioning. - she is showing progression towards this treatment goal with the current regimen. She was advised against drinking alcohol with the narcotic pain medicines, advised against driving or handling machinery while adjusting the dose of medicines or if having cognitive  issues related to the current medications. Risk of overdose and death, if medicines not taken as

## 2022-08-26 RX ORDER — VENLAFAXINE HYDROCHLORIDE 75 MG/1
CAPSULE, EXTENDED RELEASE ORAL
Qty: 60 CAPSULE | Refills: 0 | OUTPATIENT
Start: 2022-08-26

## 2022-09-07 RX ORDER — MONTELUKAST SODIUM 10 MG/1
TABLET ORAL
Qty: 90 TABLET | Refills: 1 | Status: SHIPPED | OUTPATIENT
Start: 2022-09-07

## 2022-09-15 ENCOUNTER — OFFICE VISIT (OUTPATIENT)
Dept: PULMONOLOGY | Age: 47
End: 2022-09-15
Payer: MEDICAID

## 2022-09-15 ENCOUNTER — TELEPHONE (OUTPATIENT)
Dept: PULMONOLOGY | Age: 47
End: 2022-09-15

## 2022-09-15 VITALS
SYSTOLIC BLOOD PRESSURE: 115 MMHG | OXYGEN SATURATION: 98 % | TEMPERATURE: 96.9 F | BODY MASS INDEX: 50.02 KG/M2 | HEART RATE: 90 BPM | HEIGHT: 64 IN | DIASTOLIC BLOOD PRESSURE: 75 MMHG | WEIGHT: 293 LBS | RESPIRATION RATE: 21 BRPM

## 2022-09-15 DIAGNOSIS — J30.89 OTHER ALLERGIC RHINITIS: ICD-10-CM

## 2022-09-15 DIAGNOSIS — J44.9 COPD, MILD (HCC): Primary | ICD-10-CM

## 2022-09-15 DIAGNOSIS — Z71.6 TOBACCO ABUSE COUNSELING: ICD-10-CM

## 2022-09-15 DIAGNOSIS — G47.33 OSA (OBSTRUCTIVE SLEEP APNEA): ICD-10-CM

## 2022-09-15 PROCEDURE — G8417 CALC BMI ABV UP PARAM F/U: HCPCS | Performed by: INTERNAL MEDICINE

## 2022-09-15 PROCEDURE — 4004F PT TOBACCO SCREEN RCVD TLK: CPT | Performed by: INTERNAL MEDICINE

## 2022-09-15 PROCEDURE — G8427 DOCREV CUR MEDS BY ELIG CLIN: HCPCS | Performed by: INTERNAL MEDICINE

## 2022-09-15 PROCEDURE — 3023F SPIROM DOC REV: CPT | Performed by: INTERNAL MEDICINE

## 2022-09-15 PROCEDURE — 99214 OFFICE O/P EST MOD 30 MIN: CPT | Performed by: INTERNAL MEDICINE

## 2022-09-15 RX ORDER — FLUTICASONE FUROATE, UMECLIDINIUM BROMIDE AND VILANTEROL TRIFENATATE 100; 62.5; 25 UG/1; UG/1; UG/1
1 POWDER RESPIRATORY (INHALATION) DAILY
Qty: 1 EACH | Refills: 11 | Status: SHIPPED | OUTPATIENT
Start: 2022-09-15 | End: 2022-09-16

## 2022-09-15 ASSESSMENT — COPD QUESTIONNAIRES
QUESTION6_LEAVINGHOUSE: 1
QUESTION7_SLEEPQUALITY: 3
QUESTION5_HOMEACTIVITIES: 3
QUESTION8_ENERGYLEVEL: 2
QUESTION4_WALKINCLINE: 5
QUESTION3_CHESTTIGHTNESS: 3
QUESTION1_COUGHFREQUENCY: 3
QUESTION2_CHESTPHLEGM: 4
CAT_TOTALSCORE: 24

## 2022-09-15 NOTE — TELEPHONE ENCOUNTER
Kai Venegas called in from [facility] and said he can not get the bipap at all for the patient. She will need to go with another DME.

## 2022-09-15 NOTE — TELEPHONE ENCOUNTER
SW patient and she has chosen [address] as her DME. Order was faxed to [facility] as of today.

## 2022-09-15 NOTE — TELEPHONE ENCOUNTER
Pharmacy has notified us that Trelegy is not a covered medication under this patients plan.  Covered Formulary alternatives may include:   Advair Diskus   Anoro Ellipta   Flovent HFA  Incruse Ellipta   Spiriva Handihaler  Spiriva Respimat   Stiolto Respimat   Message routed to Dr. Charissa Grant

## 2022-09-16 ENCOUNTER — TELEPHONE (OUTPATIENT)
Dept: PULMONOLOGY | Age: 47
End: 2022-09-16

## 2022-09-16 DIAGNOSIS — J44.9 COPD, MILD (HCC): Primary | ICD-10-CM

## 2022-09-16 RX ORDER — FLUTICASONE PROPIONATE AND SALMETEROL 250; 50 UG/1; UG/1
1 POWDER RESPIRATORY (INHALATION) EVERY 12 HOURS
Qty: 60 EACH | Refills: 11 | Status: SHIPPED | OUTPATIENT
Start: 2022-09-16

## 2022-09-16 NOTE — ASSESSMENT & PLAN NOTE
Discussed control with albuterol / nebulized budesonide vs Trelegy. Trelegy sent in but appears to not be on formulary (as apposed to Epic). Change to Advair.

## 2022-09-16 NOTE — TELEPHONE ENCOUNTER
Patients insurance notified that Advair Diskus 250 is not on her formulary. Patient is covered on Advair HFA , Anoro Ellipta, Flovent HFA.

## 2022-09-20 DIAGNOSIS — I10 ESSENTIAL HYPERTENSION: ICD-10-CM

## 2022-09-20 RX ORDER — CLONIDINE HYDROCHLORIDE 0.1 MG/1
TABLET ORAL
Qty: 270 TABLET | Refills: 2 | Status: SHIPPED | OUTPATIENT
Start: 2022-09-20

## 2022-09-27 ENCOUNTER — OFFICE VISIT (OUTPATIENT)
Dept: PAIN MANAGEMENT | Age: 47
End: 2022-09-27
Payer: MEDICAID

## 2022-09-27 VITALS
OXYGEN SATURATION: 96 % | BODY MASS INDEX: 54.62 KG/M2 | TEMPERATURE: 97 F | HEART RATE: 75 BPM | HEIGHT: 64 IN | DIASTOLIC BLOOD PRESSURE: 89 MMHG | SYSTOLIC BLOOD PRESSURE: 144 MMHG

## 2022-09-27 DIAGNOSIS — F41.9 ANXIETY: ICD-10-CM

## 2022-09-27 DIAGNOSIS — M17.0 PRIMARY OSTEOARTHRITIS OF BOTH KNEES: ICD-10-CM

## 2022-09-27 DIAGNOSIS — G89.29 CHRONIC BILATERAL LOW BACK PAIN WITHOUT SCIATICA: ICD-10-CM

## 2022-09-27 DIAGNOSIS — M16.11 PRIMARY OSTEOARTHRITIS OF RIGHT HIP: ICD-10-CM

## 2022-09-27 DIAGNOSIS — M54.50 CHRONIC BILATERAL LOW BACK PAIN WITHOUT SCIATICA: ICD-10-CM

## 2022-09-27 DIAGNOSIS — G89.4 CHRONIC PAIN SYNDROME: ICD-10-CM

## 2022-09-27 DIAGNOSIS — E66.01 MORBID OBESITY (HCC): ICD-10-CM

## 2022-09-27 DIAGNOSIS — E11.42 DIABETIC POLYNEUROPATHY ASSOCIATED WITH TYPE 2 DIABETES MELLITUS (HCC): ICD-10-CM

## 2022-09-27 DIAGNOSIS — F33.41 MAJOR DEPRESSIVE DISORDER, RECURRENT, IN PARTIAL REMISSION (HCC): ICD-10-CM

## 2022-09-27 DIAGNOSIS — M62.830 BACK SPASM: ICD-10-CM

## 2022-09-27 PROCEDURE — G8427 DOCREV CUR MEDS BY ELIG CLIN: HCPCS | Performed by: NURSE PRACTITIONER

## 2022-09-27 PROCEDURE — 3044F HG A1C LEVEL LT 7.0%: CPT | Performed by: NURSE PRACTITIONER

## 2022-09-27 PROCEDURE — 99213 OFFICE O/P EST LOW 20 MIN: CPT | Performed by: NURSE PRACTITIONER

## 2022-09-27 PROCEDURE — 4004F PT TOBACCO SCREEN RCVD TLK: CPT | Performed by: NURSE PRACTITIONER

## 2022-09-27 PROCEDURE — 2022F DILAT RTA XM EVC RTNOPTHY: CPT | Performed by: NURSE PRACTITIONER

## 2022-09-27 PROCEDURE — G8417 CALC BMI ABV UP PARAM F/U: HCPCS | Performed by: NURSE PRACTITIONER

## 2022-09-27 RX ORDER — VENLAFAXINE HYDROCHLORIDE 75 MG/1
150 CAPSULE, EXTENDED RELEASE ORAL DAILY
Qty: 60 CAPSULE | Refills: 0 | Status: SHIPPED | OUTPATIENT
Start: 2022-09-27 | End: 2022-10-25 | Stop reason: SDUPTHER

## 2022-09-27 RX ORDER — METHOCARBAMOL 500 MG/1
500 TABLET, FILM COATED ORAL 2 TIMES DAILY
Qty: 60 TABLET | Refills: 0 | Status: SHIPPED | OUTPATIENT
Start: 2022-09-27 | End: 2022-10-25 | Stop reason: SDUPTHER

## 2022-09-27 RX ORDER — ACETAMINOPHEN 500 MG
1000 TABLET ORAL EVERY 6 HOURS PRN
Qty: 30 TABLET | Refills: 0 | Status: SHIPPED | OUTPATIENT
Start: 2022-09-27 | End: 2022-10-25 | Stop reason: SDUPTHER

## 2022-09-27 RX ORDER — HYDROCODONE BITARTRATE AND ACETAMINOPHEN 7.5; 325 MG/1; MG/1
1 TABLET ORAL EVERY 8 HOURS PRN
Qty: 90 TABLET | Refills: 0 | Status: SHIPPED | OUTPATIENT
Start: 2022-09-27 | End: 2022-10-25 | Stop reason: SDUPTHER

## 2022-09-27 NOTE — PROGRESS NOTES
Adarsh Evansa  1975  [id number]      HISTORY OF PRESENT ILLNESS: Ms. Julieth Milligan is a 50 y.o. female returns for a follow up visit for pain management  She has a diagnosis of   1. Chronic pain syndrome    2. Primary osteoarthritis of both knees    3. Primary osteoarthritis of right hip    4. Chronic bilateral low back pain without sciatica    5. Back spasm    6. Diabetic polyneuropathy associated with type 2 diabetes mellitus ([facility] [facility]    7. Morbid obesity ([facility]    8. Anxiety    9. Major depressive disorder, recurrent, in partial remission ([facility] [address]      As per Information Obtained from the PADT (Patient Assessment and Documentation Tool)    She complains of pain in the both knee(s), bilateral lower back, and left upper leg(s): upper She rates the pain 6/10 and describes it as aching, burning, numbness, pins and needles. Current treatment regimen has helped relieve about 60% of the pain. She denies any side effects from the current pain regimen. Patient reports that since the last follow up visit the physical functioning is better, family/social relationships are unchanged, mood is better sleep patterns are unchanged, and that the overall functioning is unchanged. Patient denies misusing/abusing her narcotic pain medications or using any illegal drugs. Upon obtaining medical history from Ms. Julieth Milligan states that pain is manageable on current pain therapy. Takes the pain medications as prescribed. Mood/anxiety is stable. Sleep is fair with an average of 5-6 hours. Denies to having issues of constipation. Tolerating activities/house chores with moderate tenderness to the lower back. Working on smoking cessation    ALLERGIES: Patients list of allergies were reviewed     MEDICATIONS: Ms. Julieth Milligan list of medications were reviewed. Her current medications are   Outpatient Medications Prior to Visit   Medication Sig Dispense Refill    cloNIDine (CATAPRES) 0.1 MG tablet TAKE THREE TABLETS BY MOUTH THREE TIMES A  tablet 2    fluticasone-salmeterol (ADVAIR DISKUS) 250-50 MCG/ACT AEPB diskus inhaler Inhale 1 puff into the lungs in the morning and 1 puff in the evening. 60 each 11    fluticasone-salmeterol (ADVAIR HFA) 230-21 MCG/ACT inhaler Inhale 2 puffs into the lungs 2 times daily 1 each 11    montelukast (SINGULAIR) 10 MG tablet TAKE ONE TABLET BY MOUTH ONCE NIGHTLY 90 tablet 1    torsemide (DEMADEX) 20 MG tablet TAKE TWO TABLETS BY MOUTH DAILY 60 tablet 11    metFORMIN (GLUCOPHAGE) 500 MG tablet TAKE ONE TABLET BY MOUTH TWICE A DAY WITH MEALS 60 tablet 2    ibuprofen (ADVIL;MOTRIN) 800 MG tablet Take 1 tablet by mouth in the morning and 1 tablet in the evening. Take with meals. 60 tablet 1    metoprolol (LOPRESSOR) 100 MG tablet TAKE ONE TABLET BY MOUTH TWICE A DAY 60 tablet 3    spironolactone (ALDACTONE) 25 MG tablet Take 1 tablet by mouth in the morning and 1 tablet before bedtime.  60 tablet 0    albuterol sulfate HFA (PROAIR HFA) 108 (90 Base) MCG/ACT inhaler Inhale 2 puffs into the lungs every 6 hours as needed for Wheezing or Shortness of Breath 18 g 11    albuterol sulfate HFA (PROAIR HFA) 108 (90 Base) MCG/ACT inhaler Inhale 2 puffs into the lungs every 6 hours as needed for Wheezing or Shortness of Breath 18 g 11    oxybutynin (DITROPAN-XL) 5 MG extended release tablet TAKE ONE TABLET BY MOUTH DAILY 30 tablet 3    naloxone (NARCAN) 4 MG/0.1ML LIQD nasal spray Use as directed 1 each 0    chlorthalidone (HYGROTON) 25 MG tablet TAKE ONE TABLET BY MOUTH DAILY 90 tablet 3    blood glucose test strips (TRUE METRIX BLOOD GLUCOSE TEST) strip 1 each by In Vitro route 2 times daily 200 each 11    Dulaglutide 3 MG/0.5ML SOPN Inject 3 mg into the skin once a week 4 pen 5    omeprazole (PRILOSEC) 20 MG delayed release capsule TAKE ONE CAPSULE BY MOUTH DAILY 90 capsule 1    Respiratory Therapy Supplies (NEBULIZER/TUBING/MOUTHPIECE) KIT 1 kit by Does not apply route daily 1 kit 0    ipratropium-albuterol (DUONEB) 0.5-2.5 (3) MG/3ML SOLN nebulizer solution Inhale 3 mLs into the lungs every 4 hours 360 mL 11    loratadine (CLARITIN) 10 MG tablet TAKE ONE TABLET BY MOUTH DAILY 90 tablet 1    Blood Glucose Monitoring Suppl (TRUE METRIX METER) w/Device KIT 1 kit by Does not apply route 2 times daily 1 kit 0    Lancets MISC 1 each by Does not apply route 2 times daily Dispense brand per insurance preference 200 each 1    Alcohol Swabs 70 % PADS 1 each by Does not apply route 2 times daily 200 each 0    albuterol sulfate  (90 Base) MCG/ACT inhaler INHALE TWO PUFFS BY MOUTH EVERY 6 HOURS AS NEEDED FOR WHEEZING 18 g 11    Blood Pressure Monitoring (B-D ASSURE BPM/AUTO ARM CUFF) MISC 1 Device by Does not apply route daily 1 each 0    Misc. Devices (RAISED TOILET SEAT) MISC Use daily as needed as directed 1 each 0    methocarbamol (ROBAXIN) 500 MG tablet Take 1 tablet by mouth in the morning and 1 tablet in the evening. 60 tablet 0    venlafaxine (EFFEXOR XR) 75 MG extended release capsule Take 2 capsules by mouth daily 60 capsule 0    acetaminophen (APAP EXTRA STRENGTH) 500 MG tablet Take 2 tablets by mouth every 6 hours as needed for Pain DO NOT TAKE WITH OTHER MEDICATIONS CONTAINING ACETAMINOPHEN. 30 tablet 0    spironolactone (ALDACTONE) 50 MG tablet Take 1 tablet by mouth 2 times daily 60 tablet 3     No facility-administered medications prior to visit. SOCIAL/FAMILY/PAST MEDICAL HISTORY: Ms. Patrick Hurley, family and past medical history was reviewed. REVIEW OF SYSTEMS:    Respiratory: Negative for apnea, chest tightness and shortness of breath or change in baseline breathing. Gastrointestinal: Negative for nausea, vomiting, abdominal pain, diarrhea, constipation, blood in stool and abdominal distention. PHYSICAL EXAM:   Nursing note and vitals reviewed.  BP (!) 144/89   Pulse 75   Temp 97 °F (36.1 °C)   Ht 5' 4\" (1.626 m)   LMP  (LMP Unknown)   SpO2 96%   BMI 54.62 kg/m²   Constitutional: She appears well-developed and well-nourished. No acute distress. Skin: Skin is warm and dry, good turgor. No rash noted. She is not diaphoretic. Cardiovascular: Normal rate, regular rhythm, normal heart sounds, and does not have murmur. Pulmonary/Chest: Effort normal. No respiratory distress. She does not have wheezes in the lung fields. She has no rales. Neurological/Psychiatric:She is alert and oriented to person, place, and time. Coordination is  normal.  Her mood isAppropriate and affect is Neutral/Euthymic(normal) . Prescription pain medication monitoring:                  MEDD current = 22.50              ORT Score = 11 high risk              Other Risk factors - (mood) Depression/Anxiety              Date of Last Medication Agreement: 3/16/22              Date Naloxone prescribed: 6/21/22              UDT:                          Date of last UDT: 6/21/22                          Adverse report: No              OARRS:                          Checked today: Yes                          Adverse report: No      IMPRESSION:   1. Chronic pain syndrome    2. Primary osteoarthritis of both knees    3. Primary osteoarthritis of right hip    4. Chronic bilateral low back pain without sciatica    5. Back spasm    6. Diabetic polyneuropathy associated with type 2 diabetes mellitus ([address]    7. Morbid obesity ([address]    8. Anxiety    9.  Major depressive disorder, recurrent, in partial remission ([address]        PLAN:  Informed verbal consent was obtained:  - Patient's opioid therapy will be maintained at current dose  -Home exercises/Toyin exercises recommended  -CBT techniques- relaxation therapies such as biofeedback, mindfulness based stress reduction, imagery, cognitive restructuring, problem solving discussed with patient   -She was advised weight reduction, diet changes- 800-1200 fior diet, diet diary, exercising, nutritional  consult increased physical activity as tolerated   -Advised patient to quit smoking for health related concerns and to improve the treatment outcomes. Education was given on quitting smoking and the use of different modalities including medications, hypnotherapy, counselling  and biofeedback. These were discussed with patient.   -Last UDS 6/21/22 consistent  -Return in about 4 weeks (around 10/25/2022). -[place] record was obtained and reviewed  for the last one year and no indicators of drug misuse  were found. Any other controlled substance prescriptions  seen on the record have been accounted for, I am aware of the patient receiving these medications. Chato Girard [place] record will be rechecked as part of office protocol. Analgesic Plan:              Continue present regimen: Norco 7.5-325 mg tabs q8h prn              Adjust dose of present analgesic: No              Switch analgesics: No              Add/Adjust concomitant therapy: Robaxin, Effexor, Tylenol, Narcan    I will continue her current medication regimen  which is part of the above treatment schedule. It has been helping with Ms. Leobardo Obrien chronic  medical problems which for this visit include:   Diagnoses of Chronic pain syndrome, Primary osteoarthritis of both knees, Primary osteoarthritis of right hip, Chronic bilateral low back pain without sciatica, Back spasm, Diabetic polyneuropathy associated with type 2 diabetes mellitus ([address] Morbid obesity ([address] Anxiety, and Major depressive disorder, recurrent, in partial remission ([address] were pertinent to this visit. Risks and benefits of the medications and other alternative treatments  including no treatment were discussed with the patient. The common side effects of these medications were also explained to the patient. Informed verbal consent was obtained.    Goals of current treatment regimen include improvement in pain, restoration of functioning- with focus on improvement in physical performance, general activity, work or disability,emotional distress, health care utilization and decreased medication consumption. Will continue to monitor progress towards achieving/maintaining therapeutic goals with special emphasis on  1. Improvement in perceived interfernce  of pain with ADL's. Ability to do home exercises independently. Ability to do household chores indoor and/or outdoor work and social and leisure activities. Improve psychosocial and physical functioning. - she is showing progression towards this treatment goal with the current regimen. She was advised against drinking alcohol with the narcotic pain medicines, advised against driving or handling machinery while adjusting the dose of medicines or if having cognitive  issues related to the current medications. Risk of overdose and death, if medicines not taken as prescribed, were also discussed. If the patient develops new symptoms or if the symptoms worsen, the patient should call the office. While transcribing every attempt was made to maintain the accuracy of the note in terms of it's contents,there may have been some errors made inadvertently. Thank you for allowing me to participate in the care of this patient. José Clemens CNP.     Cc: Remy Barrera MD

## 2022-09-28 ENCOUNTER — TELEPHONE (OUTPATIENT)
Dept: PAIN MANAGEMENT | Age: 47
End: 2022-09-28

## 2022-09-28 NOTE — TELEPHONE ENCOUNTER
Patient called and stated that she has been out of her HYDROCODONE since 09/25 and she needs a PA.      Patient uses [facility] [id number] - [id number], [address] [facility]  F [phone] (Ph: [phone])

## 2022-10-25 ENCOUNTER — OFFICE VISIT (OUTPATIENT)
Dept: PAIN MANAGEMENT | Age: 47
End: 2022-10-25
Payer: MEDICAID

## 2022-10-25 VITALS
SYSTOLIC BLOOD PRESSURE: 124 MMHG | OXYGEN SATURATION: 96 % | HEIGHT: 64 IN | BODY MASS INDEX: 50.02 KG/M2 | DIASTOLIC BLOOD PRESSURE: 80 MMHG | TEMPERATURE: 98 F | HEART RATE: 71 BPM | WEIGHT: 293 LBS

## 2022-10-25 DIAGNOSIS — M62.830 BACK SPASM: ICD-10-CM

## 2022-10-25 DIAGNOSIS — M17.0 PRIMARY OSTEOARTHRITIS OF BOTH KNEES: ICD-10-CM

## 2022-10-25 DIAGNOSIS — M54.50 CHRONIC BILATERAL LOW BACK PAIN WITHOUT SCIATICA: ICD-10-CM

## 2022-10-25 DIAGNOSIS — E66.01 MORBID OBESITY (HCC): ICD-10-CM

## 2022-10-25 DIAGNOSIS — M16.11 PRIMARY OSTEOARTHRITIS OF RIGHT HIP: ICD-10-CM

## 2022-10-25 DIAGNOSIS — E11.42 DIABETIC POLYNEUROPATHY ASSOCIATED WITH TYPE 2 DIABETES MELLITUS (HCC): ICD-10-CM

## 2022-10-25 DIAGNOSIS — G89.4 CHRONIC PAIN SYNDROME: ICD-10-CM

## 2022-10-25 DIAGNOSIS — F41.9 ANXIETY: ICD-10-CM

## 2022-10-25 DIAGNOSIS — F33.41 MAJOR DEPRESSIVE DISORDER, RECURRENT, IN PARTIAL REMISSION (HCC): ICD-10-CM

## 2022-10-25 DIAGNOSIS — G89.29 CHRONIC BILATERAL LOW BACK PAIN WITHOUT SCIATICA: ICD-10-CM

## 2022-10-25 PROCEDURE — 4004F PT TOBACCO SCREEN RCVD TLK: CPT | Performed by: NURSE PRACTITIONER

## 2022-10-25 PROCEDURE — 3078F DIAST BP <80 MM HG: CPT | Performed by: NURSE PRACTITIONER

## 2022-10-25 PROCEDURE — 2022F DILAT RTA XM EVC RTNOPTHY: CPT | Performed by: NURSE PRACTITIONER

## 2022-10-25 PROCEDURE — G8417 CALC BMI ABV UP PARAM F/U: HCPCS | Performed by: NURSE PRACTITIONER

## 2022-10-25 PROCEDURE — 3074F SYST BP LT 130 MM HG: CPT | Performed by: NURSE PRACTITIONER

## 2022-10-25 PROCEDURE — G8427 DOCREV CUR MEDS BY ELIG CLIN: HCPCS | Performed by: NURSE PRACTITIONER

## 2022-10-25 PROCEDURE — 99213 OFFICE O/P EST LOW 20 MIN: CPT | Performed by: NURSE PRACTITIONER

## 2022-10-25 PROCEDURE — 3044F HG A1C LEVEL LT 7.0%: CPT | Performed by: NURSE PRACTITIONER

## 2022-10-25 PROCEDURE — G8484 FLU IMMUNIZE NO ADMIN: HCPCS | Performed by: NURSE PRACTITIONER

## 2022-10-25 RX ORDER — METHOCARBAMOL 500 MG/1
500 TABLET, FILM COATED ORAL 2 TIMES DAILY
Qty: 60 TABLET | Refills: 0 | Status: SHIPPED | OUTPATIENT
Start: 2022-10-25 | End: 2022-11-22 | Stop reason: SDUPTHER

## 2022-10-25 RX ORDER — HYDROCODONE BITARTRATE AND ACETAMINOPHEN 7.5; 325 MG/1; MG/1
1 TABLET ORAL EVERY 8 HOURS PRN
Qty: 90 TABLET | Refills: 0 | Status: SHIPPED | OUTPATIENT
Start: 2022-10-25 | End: 2022-11-22 | Stop reason: SDUPTHER

## 2022-10-25 RX ORDER — VENLAFAXINE HYDROCHLORIDE 75 MG/1
150 CAPSULE, EXTENDED RELEASE ORAL DAILY
Qty: 60 CAPSULE | Refills: 0 | Status: SHIPPED | OUTPATIENT
Start: 2022-10-25 | End: 2022-11-22 | Stop reason: SDUPTHER

## 2022-10-25 RX ORDER — ACETAMINOPHEN 500 MG
1000 TABLET ORAL EVERY 6 HOURS PRN
Qty: 30 TABLET | Refills: 0 | Status: SHIPPED | OUTPATIENT
Start: 2022-10-25 | End: 2022-11-22 | Stop reason: SDUPTHER

## 2022-10-25 NOTE — PROGRESS NOTES
Felixjose d Kendra  1975  [id number]      HISTORY OF PRESENT ILLNESS: Ms. Kym Hernandez is a 50 y.o. female returns for a follow up visit for pain management  She has a diagnosis of   1. Chronic pain syndrome    2. Primary osteoarthritis of right hip    3. Primary osteoarthritis of both knees    4. Chronic bilateral low back pain without sciatica    5. Back spasm    6. Diabetic polyneuropathy associated with type 2 diabetes mellitus ([facility] [address]    7. Major depressive disorder, recurrent, in partial remission ([facility] [address]    8. Anxiety    9. Morbid obesity ([facility] [address]      As per Information Obtained from the PADT (Patient Assessment and Documentation Tool)    She complains of pain in the both knee(s), bilateral lower back, and left lower leg(s): upper She rates the pain 6/10 and describes it as aching, burning, numbness, pins and needles. Current treatment regimen has helped relieve about 60% of the pain. She denies any side effects from the current pain regimen. Patient reports that since the last follow up visit the physical functioning is better, family/social relationships are better, mood is unchanged sleep patterns are unchanged, and that the overall functioning is unchanged. Patient denies misusing/abusing her narcotic pain medications or using any illegal drugs. Upon obtaining medical history from Ms. Kym Hernandez states that pain is manageable on current pain therapy. Takes the pain medications as prescribed. Pain mainly to the left hip with activities. Mood/anxiety is stable. Sleep is fair with an average of 5-6 hours. Denies to having issues of constipation. Tolerating activities/house chores with moderate tenderness to the lower back, left hip. ALLERGIES: Patients list of allergies were reviewed     MEDICATIONS: Ms. Kym Hernandez list of medications were reviewed. Her current medications are   Outpatient Medications Prior to Visit   Medication Sig Dispense Refill    cloNIDine (CATAPRES) 0.1 MG tablet TAKE THREE TABLETS BY MOUTH THREE TIMES A  tablet 2    fluticasone-salmeterol (ADVAIR HFA) 230-21 MCG/ACT inhaler Inhale 2 puffs into the lungs 2 times daily 1 each 11    montelukast (SINGULAIR) 10 MG tablet TAKE ONE TABLET BY MOUTH ONCE NIGHTLY 90 tablet 1    torsemide (DEMADEX) 20 MG tablet TAKE TWO TABLETS BY MOUTH DAILY 60 tablet 11    metFORMIN (GLUCOPHAGE) 500 MG tablet TAKE ONE TABLET BY MOUTH TWICE A DAY WITH MEALS 60 tablet 2    ibuprofen (ADVIL;MOTRIN) 800 MG tablet Take 1 tablet by mouth in the morning and 1 tablet in the evening. Take with meals.  60 tablet 1    metoprolol (LOPRESSOR) 100 MG tablet TAKE ONE TABLET BY MOUTH TWICE A DAY 60 tablet 3    albuterol sulfate HFA (PROAIR HFA) 108 (90 Base) MCG/ACT inhaler Inhale 2 puffs into the lungs every 6 hours as needed for Wheezing or Shortness of Breath 18 g 11    albuterol sulfate HFA (PROAIR HFA) 108 (90 Base) MCG/ACT inhaler Inhale 2 puffs into the lungs every 6 hours as needed for Wheezing or Shortness of Breath 18 g 11    oxybutynin (DITROPAN-XL) 5 MG extended release tablet TAKE ONE TABLET BY MOUTH DAILY 30 tablet 3    naloxone (NARCAN) 4 MG/0.1ML LIQD nasal spray Use as directed 1 each 0    chlorthalidone (HYGROTON) 25 MG tablet TAKE ONE TABLET BY MOUTH DAILY 90 tablet 3    blood glucose test strips (TRUE METRIX BLOOD GLUCOSE TEST) strip 1 each by In Vitro route 2 times daily 200 each 11    Dulaglutide 3 MG/0.5ML SOPN Inject 3 mg into the skin once a week 4 pen 5    omeprazole (PRILOSEC) 20 MG delayed release capsule TAKE ONE CAPSULE BY MOUTH DAILY 90 capsule 1    Respiratory Therapy Supplies (NEBULIZER/TUBING/MOUTHPIECE) KIT 1 kit by Does not apply route daily 1 kit 0    ipratropium-albuterol (DUONEB) 0.5-2.5 (3) MG/3ML SOLN nebulizer solution Inhale 3 mLs into the lungs every 4 hours 360 mL 11    loratadine (CLARITIN) 10 MG tablet TAKE ONE TABLET BY MOUTH DAILY 90 tablet 1    Blood Glucose Monitoring Suppl (TRUE METRIX METER) w/Device KIT 1 kit by Does not apply route 2 times daily 1 kit 0    Lancets MISC 1 each by Does not apply route 2 times daily Dispense brand per insurance preference 200 each 1    Alcohol Swabs 70 % PADS 1 each by Does not apply route 2 times daily 200 each 0    albuterol sulfate  (90 Base) MCG/ACT inhaler INHALE TWO PUFFS BY MOUTH EVERY 6 HOURS AS NEEDED FOR WHEEZING 18 g 11    Blood Pressure Monitoring (B-D ASSURE BPM/AUTO ARM CUFF) MISC 1 Device by Does not apply route daily 1 each 0    Misc. Devices (RAISED TOILET SEAT) MISC Use daily as needed as directed 1 each 0    acetaminophen (APAP EXTRA STRENGTH) 500 MG tablet Take 2 tablets by mouth every 6 hours as needed for Pain DO NOT TAKE WITH OTHER MEDICATIONS CONTAINING ACETAMINOPHEN. 30 tablet 0    venlafaxine (EFFEXOR XR) 75 MG extended release capsule Take 2 capsules by mouth daily 60 capsule 0    methocarbamol (ROBAXIN) 500 MG tablet Take 1 tablet by mouth in the morning and 1 tablet in the evening. 60 tablet 0    HYDROcodone-acetaminophen (NORCO) 7.5-325 MG per tablet Take 1 tablet by mouth every 8 hours as needed for Pain for up to 30 days. 90 tablet 0    fluticasone-salmeterol (ADVAIR DISKUS) 250-50 MCG/ACT AEPB diskus inhaler Inhale 1 puff into the lungs in the morning and 1 puff in the evening. 60 each 11    spironolactone (ALDACTONE) 25 MG tablet Take 1 tablet by mouth in the morning and 1 tablet before bedtime. 60 tablet 0    spironolactone (ALDACTONE) 50 MG tablet Take 1 tablet by mouth 2 times daily 60 tablet 3     No facility-administered medications prior to visit. SOCIAL/FAMILY/PAST MEDICAL HISTORY: Ms. Zabrina Allan, family and past medical history was reviewed. REVIEW OF SYSTEMS:    Respiratory: Negative for apnea, chest tightness and shortness of breath or change in baseline breathing. Gastrointestinal: Negative for nausea, vomiting, abdominal pain, diarrhea, constipation, blood in stool and abdominal distention.        PHYSICAL EXAM:   Nursing note and vitals reviewed. /80   Pulse 71   Temp 98 °F (36.7 °C)   Ht 5' 4\" (1.626 m)   Wt (!) 329 lb (149.2 kg)   LMP  (LMP Unknown)   SpO2 96%   BMI 56.47 kg/m²   Constitutional: She appears well-developed and well-nourished. No acute distress. Skin: Skin is warm and dry, good turgor. No rash noted. She is not diaphoretic. Cardiovascular: Normal rate, regular rhythm, normal heart sounds, and does not have murmur. Pulmonary/Chest: Effort normal. No respiratory distress. She has wheezes in the lung fields. She has no rales. Neurological/Psychiatric:She is alert and oriented to person, place, and time. Coordination is  normal. +Left Hip pain Her mood isAppropriate and affect is Neutral/Euthymic(normal) . Prescription pain medication monitoring:                  MEDD current = 22.50              ORT Score = 11 high risk              Other Risk factors - (mood) Depression/Anxiety              Date of Last Medication Agreement: 3/16/22              Date Naloxone prescribed: 6/21/22              UDT:                          Date of last UDT: 6/21/22                          Adverse report: No              OARRS:                          Checked today: Yes                          Adverse report: No      IMPRESSION:   1. Chronic pain syndrome    2. Primary osteoarthritis of right hip    3. Primary osteoarthritis of both knees    4. Chronic bilateral low back pain without sciatica    5. Back spasm    6. Diabetic polyneuropathy associated with type 2 diabetes mellitus ([facility] [address]    7. Major depressive disorder, recurrent, in partial remission ([facility] [address]    8. Anxiety    9.  Morbid obesity ([facility] [address]        PLAN:  Informed verbal consent was obtained:  - Patient's opioid therapy will be maintained at current dose  -Home exercises/Toyin exercises recommended  -MRI of the right Hip  -CBT techniques- relaxation therapies such as biofeedback, mindfulness based stress reduction, imagery, cognitive restructuring, problem solving discussed with patient   -She was advised weight reduction, diet changes- 800-1200 fior diet, diet diary, exercising, nutritional  consult increased physical activity as tolerated   -Last UDS 6/21/22 consistent  -Return in about 4 weeks (around 11/22/2022). Analgesic Plan:              Continue present regimen: Norco 7.5-325 mg tabs q8h prn              Adjust dose of present analgesic: No              Switch analgesics: No              Add/Adjust concomitant therapy: Robaxin, Effexor, Tylenol, Narcan    I will continue her current medication regimen  which is part of the above treatment schedule. It has been helping with Ms. Bernard Leggett chronic  medical problems which for this visit include:   Diagnoses of Chronic pain syndrome, Primary osteoarthritis of right hip, Primary osteoarthritis of both knees, Chronic bilateral low back pain without sciatica, Back spasm, Diabetic polyneuropathy associated with type 2 diabetes mellitus ([address] Major depressive disorder, recurrent, in partial remission ([address] Anxiety, and Morbid obesity ([address] were pertinent to this visit. Risks and benefits of the medications and other alternative treatments  including no treatment were discussed with the patient. The common side effects of these medications were also explained to the patient. Informed verbal consent was obtained. Goals of current treatment regimen include improvement in pain, restoration of functioning- with focus on improvement in physical performance, general activity, work or disability,emotional distress, health care utilization and  decreased medication consumption. Will continue to monitor progress towards achieving/maintaining therapeutic goals with special emphasis on  1. Improvement in perceived interfernce  of pain with ADL's. Ability to do home exercises independently. Ability to do household chores indoor and/or outdoor work and social and leisure activities. Improve psychosocial and physical functioning. - she is showing progression towards this treatment goal with the current regimen. She was advised against drinking alcohol with the narcotic pain medicines, advised against driving or handling machinery while adjusting the dose of medicines or if having cognitive  issues related to the current medications. Risk of overdose and death, if medicines not taken as prescribed, were also discussed. If the patient develops new symptoms or if the symptoms worsen, the patient should call the office. While transcribing every attempt was made to maintain the accuracy of the note in terms of it's contents,there may have been some errors made inadvertently. Thank you for allowing me to participate in the care of this patient. Kapli Pacheco CNP.     Cc: Cory Pickett MD

## 2022-11-07 ENCOUNTER — HOSPITAL ENCOUNTER (OUTPATIENT)
Dept: MRI IMAGING | Age: 47
Discharge: HOME | End: 2022-11-07
Payer: MEDICAID

## 2022-11-07 DIAGNOSIS — G89.4 CHRONIC PAIN SYNDROME: ICD-10-CM

## 2022-11-07 DIAGNOSIS — M16.11 PRIMARY OSTEOARTHRITIS OF RIGHT HIP: ICD-10-CM

## 2022-11-07 PROCEDURE — 73721 MRI JNT OF LWR EXTRE W/O DYE: CPT

## 2022-11-16 ENCOUNTER — OFFICE VISIT (OUTPATIENT)
Dept: ORTHOPEDIC SURGERY | Age: 47
End: 2022-11-16
Payer: MEDICAID

## 2022-11-16 VITALS — BODY MASS INDEX: 50.02 KG/M2 | WEIGHT: 293 LBS | HEIGHT: 64 IN

## 2022-11-16 DIAGNOSIS — E66.01 MORBID OBESITY (HCC): ICD-10-CM

## 2022-11-16 DIAGNOSIS — M17.11 PRIMARY OSTEOARTHRITIS OF RIGHT KNEE: ICD-10-CM

## 2022-11-16 DIAGNOSIS — M16.11 PRIMARY OSTEOARTHRITIS OF RIGHT HIP: Primary | ICD-10-CM

## 2022-11-16 DIAGNOSIS — M17.12 PRIMARY OSTEOARTHRITIS OF LEFT KNEE: ICD-10-CM

## 2022-11-16 PROCEDURE — 4004F PT TOBACCO SCREEN RCVD TLK: CPT | Performed by: ORTHOPAEDIC SURGERY

## 2022-11-16 PROCEDURE — G8417 CALC BMI ABV UP PARAM F/U: HCPCS | Performed by: ORTHOPAEDIC SURGERY

## 2022-11-16 PROCEDURE — 99213 OFFICE O/P EST LOW 20 MIN: CPT | Performed by: ORTHOPAEDIC SURGERY

## 2022-11-16 PROCEDURE — G8484 FLU IMMUNIZE NO ADMIN: HCPCS | Performed by: ORTHOPAEDIC SURGERY

## 2022-11-16 PROCEDURE — G8428 CUR MEDS NOT DOCUMENT: HCPCS | Performed by: ORTHOPAEDIC SURGERY

## 2022-11-16 RX ORDER — IBUPROFEN 800 MG/1
800 TABLET ORAL 2 TIMES DAILY WITH MEALS
Qty: 60 TABLET | Refills: 2 | Status: CANCELLED | OUTPATIENT
Start: 2022-11-16

## 2022-11-16 NOTE — PROGRESS NOTES
Jj Gonzalez  [id number]  November 16, 2022    Chief Complaint   Patient presents with    Follow-up     Right hip       History: The patient is here in follow-up regarding her right hip. She did receive a right hip intra-articular injection by Dr. Julienne Skaggs in January 2021. She did not respond favorably. She is under pain management. She is now under a pain contract with Dr. Cruz Carter. She does take Norco 7.5 mg 3 times a day. She also takes Robaxin 500 mg twice a day. She does report losing a significant amount of weight since her last visit. The bariatric surgery was on hold for various reasons. The patient's  past medical history, medications, allergies,  family history, social history, and review of systems have been reviewed, and dated and are recorded in the chart. Vitals:  Ht 5' 4\" (1.626 m)   Wt (!) 325 lb (147.4 kg)   LMP  (LMP Unknown)   BMI 55.79 kg/m²     Physical: Ms. Jj Gonzalez appears well, she is in no apparent distress, she demonstrates appropriate mood & affect. She is alert and oriented to person, place and time. Examination of the right hip reveals moderate to severe pain with internal rotation of the hip. She has generalized tenderness to palpation about the joint line of the bilateral knees. Range of motion is from 0 degrees to 110 degrees bilaterally. Strength is 4+ to 5/5 for all muscle groups about the bilateral knee. There is patellofemoral crepitus with range of motion of the bilateral knee. Varus and valgus stressing of the knees reveals no evidence of instability. There is a small effusion in the bilateral knees. Anterior drawer and Lachman are negative bilaterally. Examination of the skin reveals no rashes, ulceration, or lesion, bilaterally in the lower extremities. Sensation to both lower extremities is grossly intact. Exam of both feet reveals pedal pulses intact and brisk cap refill. Patient is able to dorsiflex and wiggle all toes.  Deep tendon reflexes of the lower extremities are normal and symmetric. The patient has 90° of right hip flexion, 0° of internal rotation, and 25° of external rotation. She has significant pain with internal rotation of the right hip. She denies low back pain today. Straight-leg testing is negative bilaterally. Leg lengths appear symmetric. X-rays: AP pelvis and 2 views of the right hip obtained previously were extensively reviewed. The x-rays confirm moderate to severe right hip osteoarthritis. MRI of the right hip was extensively reviewed. The MRI confirms advanced osteoarthritis of the right hip. Impression: #1 right hip osteoarthritis #2 bilateral knee osteoarthritis #3 morbid obesity    Plan: At this time, we did discuss our treatment options for the right hip at length. We will hold off on another injection since she did not respond well. She may continue taking the Robaxin. She may continue taking the hydrocodone. She was given a prescription for ibuprofen 800 mg twice a day with food. She will continue to work on weight loss. She will follow-up with me in 3 months and we will reassess her then. I do think it would be todd that the patient pursue bariatric surgery.

## 2022-11-22 ENCOUNTER — OFFICE VISIT (OUTPATIENT)
Dept: PAIN MANAGEMENT | Age: 47
End: 2022-11-22
Payer: MEDICAID

## 2022-11-22 VITALS
BODY MASS INDEX: 50.02 KG/M2 | HEART RATE: 84 BPM | TEMPERATURE: 97.6 F | OXYGEN SATURATION: 97 % | DIASTOLIC BLOOD PRESSURE: 100 MMHG | WEIGHT: 293 LBS | HEIGHT: 64 IN | SYSTOLIC BLOOD PRESSURE: 187 MMHG

## 2022-11-22 DIAGNOSIS — F41.9 ANXIETY: ICD-10-CM

## 2022-11-22 DIAGNOSIS — E11.42 DIABETIC POLYNEUROPATHY ASSOCIATED WITH TYPE 2 DIABETES MELLITUS (HCC): ICD-10-CM

## 2022-11-22 DIAGNOSIS — F33.41 MAJOR DEPRESSIVE DISORDER, RECURRENT, IN PARTIAL REMISSION (HCC): ICD-10-CM

## 2022-11-22 DIAGNOSIS — G89.29 CHRONIC BILATERAL LOW BACK PAIN WITHOUT SCIATICA: ICD-10-CM

## 2022-11-22 DIAGNOSIS — M54.50 CHRONIC BILATERAL LOW BACK PAIN WITHOUT SCIATICA: ICD-10-CM

## 2022-11-22 DIAGNOSIS — M16.11 PRIMARY OSTEOARTHRITIS OF RIGHT HIP: ICD-10-CM

## 2022-11-22 DIAGNOSIS — M62.830 BACK SPASM: ICD-10-CM

## 2022-11-22 DIAGNOSIS — M17.0 PRIMARY OSTEOARTHRITIS OF BOTH KNEES: ICD-10-CM

## 2022-11-22 DIAGNOSIS — E66.01 MORBID OBESITY (HCC): ICD-10-CM

## 2022-11-22 DIAGNOSIS — G89.4 CHRONIC PAIN SYNDROME: ICD-10-CM

## 2022-11-22 PROCEDURE — G8427 DOCREV CUR MEDS BY ELIG CLIN: HCPCS | Performed by: NURSE PRACTITIONER

## 2022-11-22 PROCEDURE — G8417 CALC BMI ABV UP PARAM F/U: HCPCS | Performed by: NURSE PRACTITIONER

## 2022-11-22 PROCEDURE — 3078F DIAST BP <80 MM HG: CPT | Performed by: NURSE PRACTITIONER

## 2022-11-22 PROCEDURE — 3044F HG A1C LEVEL LT 7.0%: CPT | Performed by: NURSE PRACTITIONER

## 2022-11-22 PROCEDURE — 4004F PT TOBACCO SCREEN RCVD TLK: CPT | Performed by: NURSE PRACTITIONER

## 2022-11-22 PROCEDURE — 99214 OFFICE O/P EST MOD 30 MIN: CPT | Performed by: NURSE PRACTITIONER

## 2022-11-22 PROCEDURE — G8484 FLU IMMUNIZE NO ADMIN: HCPCS | Performed by: NURSE PRACTITIONER

## 2022-11-22 PROCEDURE — 2022F DILAT RTA XM EVC RTNOPTHY: CPT | Performed by: NURSE PRACTITIONER

## 2022-11-22 PROCEDURE — 3074F SYST BP LT 130 MM HG: CPT | Performed by: NURSE PRACTITIONER

## 2022-11-22 RX ORDER — VENLAFAXINE HYDROCHLORIDE 75 MG/1
150 CAPSULE, EXTENDED RELEASE ORAL DAILY
Qty: 60 CAPSULE | Refills: 0 | Status: SHIPPED | OUTPATIENT
Start: 2022-11-22

## 2022-11-22 RX ORDER — HYDROCODONE BITARTRATE AND ACETAMINOPHEN 7.5; 325 MG/1; MG/1
1 TABLET ORAL EVERY 8 HOURS PRN
Qty: 90 TABLET | Refills: 0 | Status: SHIPPED | OUTPATIENT
Start: 2022-11-22 | End: 2022-12-22

## 2022-11-22 RX ORDER — METHOCARBAMOL 500 MG/1
500 TABLET, FILM COATED ORAL 2 TIMES DAILY
Qty: 60 TABLET | Refills: 0 | Status: SHIPPED | OUTPATIENT
Start: 2022-11-22

## 2022-11-22 RX ORDER — ACETAMINOPHEN 500 MG
1000 TABLET ORAL EVERY 6 HOURS PRN
Qty: 30 TABLET | Refills: 0 | Status: SHIPPED | OUTPATIENT
Start: 2022-11-22

## 2022-11-22 NOTE — PROGRESS NOTES
Ashely Almzaan  1975  [id number]      HISTORY OF PRESENT ILLNESS: Ms. Harika Dela Cruz is a 50 y.o. female returns for a follow up visit for pain management  She has a diagnosis of   1. Chronic pain syndrome    2. Primary osteoarthritis of both knees    3. Primary osteoarthritis of right hip    4. Chronic bilateral low back pain without sciatica    5. Back spasm    6. Diabetic polyneuropathy associated with type 2 diabetes mellitus ([facility] [address]    7. Anxiety    8. Major depressive disorder, recurrent, in partial remission ([facility] [address]    9. Morbid obesity ([facility] [address]      As per Information Obtained from the PADT (Patient Assessment and Documentation Tool)    She complains of pain in the both knee(s), bilateral lower back, and right upper leg(s): upper She rates the pain 10/10 and describes it as aching, burning, numbness, pins and needles. Current treatment regimen has helped relieve about 40% of the pain. She denies any side effects from the current pain regimen. Patient reports that since the last follow up visit the physical functioning is unchanged, family/social relationships are unchanged, mood is unchanged sleep patterns are worse, and that the overall functioning is unchanged. Patient denies misusing/abusing her narcotic pain medications or using any illegal drugs. Upon obtaining medical history from Ms. Harika Dela Cruz states that pain is manageable on current pain therapy. Takes the pain medications as prescribed. Currently under the care of Dr. Garret Mays for arthritis to the knees. Mood/anxiety is stable. Sleep is fair with an average of 5-6 hours. Denies to having issues of constipation. Tolerating activities/house chores with moderate tenderness to the lower back, knees. Working on smoking cessation    ALLERGIES: Patients list of allergies were reviewed     MEDICATIONS: Ms. Harika Dela Cruz list of medications were reviewed. Her current medications are   Outpatient Medications Prior to Visit   Medication Sig Dispense Refill cloNIDine (CATAPRES) 0.1 MG tablet TAKE THREE TABLETS BY MOUTH THREE TIMES A  tablet 2    fluticasone-salmeterol (ADVAIR HFA) 230-21 MCG/ACT inhaler Inhale 2 puffs into the lungs 2 times daily 1 each 11    montelukast (SINGULAIR) 10 MG tablet TAKE ONE TABLET BY MOUTH ONCE NIGHTLY 90 tablet 1    torsemide (DEMADEX) 20 MG tablet TAKE TWO TABLETS BY MOUTH DAILY 60 tablet 11    metFORMIN (GLUCOPHAGE) 500 MG tablet TAKE ONE TABLET BY MOUTH TWICE A DAY WITH MEALS 60 tablet 2    ibuprofen (ADVIL;MOTRIN) 800 MG tablet Take 1 tablet by mouth in the morning and 1 tablet in the evening. Take with meals.  60 tablet 1    metoprolol (LOPRESSOR) 100 MG tablet TAKE ONE TABLET BY MOUTH TWICE A DAY 60 tablet 3    albuterol sulfate HFA (PROAIR HFA) 108 (90 Base) MCG/ACT inhaler Inhale 2 puffs into the lungs every 6 hours as needed for Wheezing or Shortness of Breath 18 g 11    albuterol sulfate HFA (PROAIR HFA) 108 (90 Base) MCG/ACT inhaler Inhale 2 puffs into the lungs every 6 hours as needed for Wheezing or Shortness of Breath 18 g 11    oxybutynin (DITROPAN-XL) 5 MG extended release tablet TAKE ONE TABLET BY MOUTH DAILY 30 tablet 3    naloxone (NARCAN) 4 MG/0.1ML LIQD nasal spray Use as directed 1 each 0    chlorthalidone (HYGROTON) 25 MG tablet TAKE ONE TABLET BY MOUTH DAILY 90 tablet 3    blood glucose test strips (TRUE METRIX BLOOD GLUCOSE TEST) strip 1 each by In Vitro route 2 times daily 200 each 11    Dulaglutide 3 MG/0.5ML SOPN Inject 3 mg into the skin once a week 4 pen 5    omeprazole (PRILOSEC) 20 MG delayed release capsule TAKE ONE CAPSULE BY MOUTH DAILY 90 capsule 1    Respiratory Therapy Supplies (NEBULIZER/TUBING/MOUTHPIECE) KIT 1 kit by Does not apply route daily 1 kit 0    ipratropium-albuterol (DUONEB) 0.5-2.5 (3) MG/3ML SOLN nebulizer solution Inhale 3 mLs into the lungs every 4 hours 360 mL 11    loratadine (CLARITIN) 10 MG tablet TAKE ONE TABLET BY MOUTH DAILY 90 tablet 1    Blood Glucose Monitoring Suppl (TRUE METRIX METER) w/Device KIT 1 kit by Does not apply route 2 times daily 1 kit 0    Lancets MISC 1 each by Does not apply route 2 times daily Dispense brand per insurance preference 200 each 1    Alcohol Swabs 70 % PADS 1 each by Does not apply route 2 times daily 200 each 0    albuterol sulfate  (90 Base) MCG/ACT inhaler INHALE TWO PUFFS BY MOUTH EVERY 6 HOURS AS NEEDED FOR WHEEZING 18 g 11    Blood Pressure Monitoring (B-D ASSURE BPM/AUTO ARM CUFF) MISC 1 Device by Does not apply route daily 1 each 0    Misc. Devices (RAISED TOILET SEAT) MISC Use daily as needed as directed 1 each 0    HYDROcodone-acetaminophen (NORCO) 7.5-325 MG per tablet Take 1 tablet by mouth every 8 hours as needed for Pain for up to 30 days. 90 tablet 0    acetaminophen (APAP EXTRA STRENGTH) 500 MG tablet Take 2 tablets by mouth every 6 hours as needed for Pain DO NOT TAKE WITH OTHER MEDICATIONS CONTAINING ACETAMINOPHEN. 30 tablet 0    venlafaxine (EFFEXOR XR) 75 MG extended release capsule Take 2 capsules by mouth daily 60 capsule 0    methocarbamol (ROBAXIN) 500 MG tablet Take 1 tablet by mouth in the morning and 1 tablet in the evening. 60 tablet 0    fluticasone-salmeterol (ADVAIR DISKUS) 250-50 MCG/ACT AEPB diskus inhaler Inhale 1 puff into the lungs in the morning and 1 puff in the evening. 60 each 11    spironolactone (ALDACTONE) 25 MG tablet Take 1 tablet by mouth in the morning and 1 tablet before bedtime. 60 tablet 0    spironolactone (ALDACTONE) 50 MG tablet Take 1 tablet by mouth 2 times daily 60 tablet 3     No facility-administered medications prior to visit. SOCIAL/FAMILY/PAST MEDICAL HISTORY: Ms. Sylvie Leger, family and past medical history was reviewed. REVIEW OF SYSTEMS:    Respiratory: Negative for apnea, chest tightness and shortness of breath or change in baseline breathing.     Gastrointestinal: Negative for nausea, vomiting, abdominal pain, diarrhea, constipation, blood in stool and abdominal distention. PHYSICAL EXAM:   Nursing note and vitals reviewed. BP (!) 187/100   Pulse 84   Temp 97.6 °F (36.4 °C)   Ht 5' 4\" (1.626 m)   Wt (!) 333 lb (151 kg)   LMP  (LMP Unknown)   SpO2 97%   BMI 57.16 kg/m²   Constitutional: She appears well-developed and well-nourished. No acute distress. Skin: Skin is warm and dry, good turgor. No rash noted. She is not diaphoretic. Cardiovascular: Normal rate, regular rhythm, normal heart sounds, and does not have murmur. Pulmonary/Chest: Effort normal. No respiratory distress. She does not have wheezes in the lung fields. She has no rales. Neurological/Psychiatric:She is alert and oriented to person, place, and time. Coordination is  normal.  Her mood isAppropriate and affect is Neutral/Euthymic(normal) . MRI of the right Hip 10/25/22:  Advanced osteoarthritis of the right hip. Small right hip effusion. RECOMMENDATIONS:   5 cm left ovarian simple-appearing cyst.     Prescription pain medication monitoring:                  MEDD current = 22.50              ORT Score = 11 high risk              Other Risk factors - (mood) Depression/Anxiety              Date of Last Medication Agreement: 3/16/22              Date Naloxone prescribed: 6/21/22              UDT:                          Date of last UDT: 6/21/22                          Adverse report: No              OARRS:                          Checked today: Yes                          Adverse report: No    IMPRESSION:   1. Chronic pain syndrome    2. Primary osteoarthritis of both knees    3. Primary osteoarthritis of right hip    4. Chronic bilateral low back pain without sciatica    5. Back spasm    6. Diabetic polyneuropathy associated with type 2 diabetes mellitus ([facility] [address]    7. Anxiety    8. Major depressive disorder, recurrent, in partial remission ([facility] [address]    9.  Morbid obesity ([facility] [address]        PLAN:  Informed verbal consent was obtained:  -Patient's opioid therapy will be maintained at current dose  -Home exercises/Toyin exercises recommended  -Reviewed MRI of the Right Hip with the patient. Working on weight loss with consideration of right hip surgery. She is not interested in injections  -Monitor BP, f/u with PCP if it continues to stay elevated or she becomes symptomatic with SOB, CP, syncopy. she is currently asymptomatic   -CBT techniques- relaxation therapies such as biofeedback, mindfulness based stress reduction, imagery, cognitive restructuring, problem solving discussed with patient   -She was advised weight reduction, diet changes- 800-1200 fior diet, diet diary, exercising, nutritional  consult increased physical activity as tolerated   -Last UDS 6/21/22 consistent  -Return in about 4 weeks (around 12/20/2022). Analgesic Plan:              Continue present regimen: Norco 7.5-325 mg tabs q8h prn              Adjust dose of present analgesic: No              Switch analgesics: No              Add/Adjust concomitant therapy: Robaxin, Effexor, Tylenol, Narcan    I will continue her current medication regimen  which is part of the above treatment schedule. It has been helping with Ms. Juliana Mendez chronic  medical problems which for this visit include:   Diagnoses of Chronic pain syndrome, Primary osteoarthritis of both knees, Primary osteoarthritis of right hip, Chronic bilateral low back pain without sciatica, Back spasm, Diabetic polyneuropathy associated with type 2 diabetes mellitus ([address] Anxiety, Major depressive disorder, recurrent, in partial remission ([address] and Morbid obesity ([address] were pertinent to this visit. Risks and benefits of the medications and other alternative treatments  including no treatment were discussed with the patient. The common side effects of these medications were also explained to the patient. Informed verbal consent was obtained.    Goals of current treatment regimen include improvement in pain, restoration of functioning- with focus on improvement in physical performance, general activity, work or disability,emotional distress, health care utilization and  decreased medication consumption. Will continue to monitor progress towards achieving/maintaining therapeutic goals with special emphasis on  1. Improvement in perceived interfernce  of pain with ADL's. Ability to do home exercises independently. Ability to do household chores indoor and/or outdoor work and social and leisure activities. Improve psychosocial and physical functioning. - she is showing progression towards this treatment goal with the current regimen. She was advised against drinking alcohol with the narcotic pain medicines, advised against driving or handling machinery while adjusting the dose of medicines or if having cognitive  issues related to the current medications. Risk of overdose and death, if medicines not taken as prescribed, were also discussed. If the patient develops new symptoms or if the symptoms worsen, the patient should call the office. While transcribing every attempt was made to maintain the accuracy of the note in terms of it's contents,there may have been some errors made inadvertently. Thank you for allowing me to participate in the care of this patient. Irma Hanson CNP.     Cc: Ángela Rangel MD

## 2022-11-23 NOTE — TELEPHONE ENCOUNTER
Received fax requesting PA for fluticasone-salmeterol  Completed through [employer] - awaiting results Spinal Block    Start time: 11/23/2022 12:05 PM  End time: 11/23/2022 12:10 PM  Performed by: Elidia Sher MD  Authorized by: Elidia Sher MD     Pre-procedure:   Indications: primary anesthetic  Preanesthetic Checklist: patient identified, risks and benefits discussed, site marked, patient being monitored and timeout performed    Timeout Time: 12:10 EST      Spinal Block:   Patient Position:  Seated  Prep Region:  Lumbar  Prep: chlorhexidine and patient draped      Location:  L3-4  Technique:  Single shot  Local: bupivacaine (PF) (MARCAINE) 0.75 % (7.5 mg/mL) Intrathecal - Intrathecal   7.5 mg - 11/23/2022 12:10:00 PM    Med Admin Time: 11/23/2022 12:10 PM    Needle:   Needle Type:  Pencil-tip  Needle Gauge:  25 G  Attempts:  1      Events: CSF confirmed, no blood with aspiration and no paresthesia        Assessment:  Insertion:  Uncomplicated  Patient tolerance:  Patient tolerated the procedure well with no immediate complications

## 2022-11-25 DIAGNOSIS — N39.46 MIXED STRESS AND URGE INCONTINENCE: ICD-10-CM

## 2022-11-25 DIAGNOSIS — E11.69 DIABETES MELLITUS TYPE 2 IN OBESE (HCC): ICD-10-CM

## 2022-11-25 DIAGNOSIS — E66.9 DIABETES MELLITUS TYPE 2 IN OBESE (HCC): ICD-10-CM

## 2022-11-25 RX ORDER — OXYBUTYNIN CHLORIDE 5 MG/1
TABLET, EXTENDED RELEASE ORAL
Qty: 30 TABLET | Refills: 3 | Status: SHIPPED | OUTPATIENT
Start: 2022-11-25

## 2022-11-28 RX ORDER — IBUPROFEN 800 MG/1
TABLET ORAL
Qty: 60 TABLET | Refills: 1 | Status: SHIPPED | OUTPATIENT
Start: 2022-11-28

## 2022-11-28 NOTE — TELEPHONE ENCOUNTER
I don't see that order was sent at last OV. Last saw on 11/16/22 for: #1 right hip osteoarthritis #2 bilateral knee osteoarthritis      Plan: At this time, we did discuss our treatment options for the right hip at length. We will hold off on another injection since she did not respond well. She may continue taking the Robaxin. She may continue taking the hydrocodone. She was given a prescription for ibuprofen 800 mg twice a day with food. She will continue to work on weight loss. She will follow-up with me in 3 months and we will reassess her then. I do think it would be todd that the patient pursue bariatric surgery.

## 2022-12-12 ENCOUNTER — OFFICE VISIT (OUTPATIENT)
Dept: FAMILY MEDICINE CLINIC | Age: 47
End: 2022-12-12
Payer: MEDICAID

## 2022-12-12 VITALS
OXYGEN SATURATION: 95 % | SYSTOLIC BLOOD PRESSURE: 150 MMHG | HEART RATE: 63 BPM | BODY MASS INDEX: 56.47 KG/M2 | DIASTOLIC BLOOD PRESSURE: 102 MMHG | RESPIRATION RATE: 16 BRPM | WEIGHT: 293 LBS

## 2022-12-12 DIAGNOSIS — Z00.00 WELL ADULT HEALTH CHECK: Primary | ICD-10-CM

## 2022-12-12 DIAGNOSIS — I10 ESSENTIAL HYPERTENSION: ICD-10-CM

## 2022-12-12 DIAGNOSIS — F33.41 MAJOR DEPRESSIVE DISORDER, RECURRENT, IN PARTIAL REMISSION (HCC): ICD-10-CM

## 2022-12-12 DIAGNOSIS — N39.46 MIXED STRESS AND URGE INCONTINENCE: ICD-10-CM

## 2022-12-12 DIAGNOSIS — E66.9 DIABETES MELLITUS TYPE 2 IN OBESE (HCC): ICD-10-CM

## 2022-12-12 DIAGNOSIS — E11.69 DIABETES MELLITUS TYPE 2 IN OBESE (HCC): ICD-10-CM

## 2022-12-12 DIAGNOSIS — Z12.11 COLON CANCER SCREENING: ICD-10-CM

## 2022-12-12 DIAGNOSIS — J44.9 COPD, MILD (HCC): ICD-10-CM

## 2022-12-12 DIAGNOSIS — G89.4 CHRONIC PAIN SYNDROME: ICD-10-CM

## 2022-12-12 LAB — HBA1C MFR BLD: 5.2 %

## 2022-12-12 PROCEDURE — G8427 DOCREV CUR MEDS BY ELIG CLIN: HCPCS | Performed by: FAMILY MEDICINE

## 2022-12-12 PROCEDURE — 3078F DIAST BP <80 MM HG: CPT | Performed by: FAMILY MEDICINE

## 2022-12-12 PROCEDURE — 99396 PREV VISIT EST AGE 40-64: CPT | Performed by: FAMILY MEDICINE

## 2022-12-12 PROCEDURE — 2022F DILAT RTA XM EVC RTNOPTHY: CPT | Performed by: FAMILY MEDICINE

## 2022-12-12 PROCEDURE — G8484 FLU IMMUNIZE NO ADMIN: HCPCS | Performed by: FAMILY MEDICINE

## 2022-12-12 PROCEDURE — G8417 CALC BMI ABV UP PARAM F/U: HCPCS | Performed by: FAMILY MEDICINE

## 2022-12-12 PROCEDURE — 3074F SYST BP LT 130 MM HG: CPT | Performed by: FAMILY MEDICINE

## 2022-12-12 PROCEDURE — 99213 OFFICE O/P EST LOW 20 MIN: CPT | Performed by: FAMILY MEDICINE

## 2022-12-12 PROCEDURE — 4004F PT TOBACCO SCREEN RCVD TLK: CPT | Performed by: FAMILY MEDICINE

## 2022-12-12 PROCEDURE — 83036 HEMOGLOBIN GLYCOSYLATED A1C: CPT | Performed by: FAMILY MEDICINE

## 2022-12-12 PROCEDURE — 3044F HG A1C LEVEL LT 7.0%: CPT | Performed by: FAMILY MEDICINE

## 2022-12-12 PROCEDURE — 3023F SPIROM DOC REV: CPT | Performed by: FAMILY MEDICINE

## 2022-12-12 RX ORDER — SPIRONOLACTONE 25 MG/1
25 TABLET ORAL DAILY
Qty: 90 TABLET | Refills: 1 | Status: SHIPPED | OUTPATIENT
Start: 2022-12-12

## 2022-12-12 RX ORDER — OMEPRAZOLE 20 MG/1
CAPSULE, DELAYED RELEASE ORAL
Qty: 90 CAPSULE | Refills: 1 | Status: SHIPPED | OUTPATIENT
Start: 2022-12-12

## 2022-12-12 RX ORDER — VENLAFAXINE HYDROCHLORIDE 75 MG/1
150 CAPSULE, EXTENDED RELEASE ORAL DAILY
Qty: 60 CAPSULE | Refills: 0 | Status: SHIPPED | OUTPATIENT
Start: 2022-12-12

## 2022-12-12 RX ORDER — ALBUTEROL SULFATE 90 UG/1
2 AEROSOL, METERED RESPIRATORY (INHALATION) EVERY 6 HOURS PRN
Qty: 18 G | Refills: 11 | Status: SHIPPED | OUTPATIENT
Start: 2022-12-12

## 2022-12-12 RX ORDER — OXYBUTYNIN CHLORIDE 5 MG/1
TABLET, EXTENDED RELEASE ORAL
Qty: 30 TABLET | Refills: 3 | Status: SHIPPED | OUTPATIENT
Start: 2022-12-12

## 2022-12-12 RX ORDER — METOPROLOL TARTRATE 100 MG/1
TABLET ORAL
Qty: 180 TABLET | Refills: 1 | Status: SHIPPED | OUTPATIENT
Start: 2022-12-12

## 2022-12-12 RX ORDER — CHLORTHALIDONE 25 MG/1
TABLET ORAL
Qty: 90 TABLET | Refills: 1 | Status: SHIPPED | OUTPATIENT
Start: 2022-12-12

## 2022-12-12 RX ORDER — MONTELUKAST SODIUM 10 MG/1
TABLET ORAL
Qty: 90 TABLET | Refills: 1 | Status: SHIPPED | OUTPATIENT
Start: 2022-12-12

## 2022-12-12 RX ORDER — CLONIDINE HYDROCHLORIDE 0.1 MG/1
TABLET ORAL
Qty: 270 TABLET | Refills: 2 | Status: SHIPPED | OUTPATIENT
Start: 2022-12-12

## 2022-12-12 RX ORDER — LORATADINE 10 MG/1
TABLET ORAL
Qty: 90 TABLET | Refills: 3 | Status: SHIPPED | OUTPATIENT
Start: 2022-12-12

## 2022-12-12 NOTE — PROGRESS NOTES
12/12/2022    This is a 50 y.o. female   Chief Complaint   Patient presents with    Diabetes    Hypertension    Depression     Chronic pain of Rt hip causing lack of sleep, feels bad she can't keep up with grand kids. HPI    Chronic R hip pain  - has seen Ortho. Reports considered for hip replacement but was told needed more weight loss  - has had steroid injections in the past  - she sees pain management and takes hydrocodone for this and other chronic pain (OA of multiple other sites)    Diabetes  Hemoglobin A1C   Date Value Ref Range Status   12/12/2022 5.2 % Final   A1c today 5.2  On Trulicity and metformin  No GI side effects    HTN  BP Readings from Last 3 Encounters:   12/12/22 (!) 150/102   11/22/22 (!) 187/100   10/25/22 124/80   Notes her BP tends to run high, and tends to go higher when in pain  - saw Nephrology for BP / Anabella Jake  - she is on clonidine, metoprolol, and chlorthalidone  - no longer on lisinopril    Weight  - stays active with her grandkids. Reports down overall about 100lbs from 2014 (weight about 425lb at that time)    Preventive  - mammogram 5/2022  - pap smear UTD with [facility]  - ok with getting colonoscopy    Depression  - on Effexor.  Reports home stressors but that she is doing ok overall    Review of Systems     Current Outpatient Medications   Medication Sig Dispense Refill    loratadine (CLARITIN) 10 MG tablet TAKE ONE TABLET BY MOUTH DAILY 90 tablet 3    venlafaxine (EFFEXOR XR) 75 MG extended release capsule Take 2 capsules by mouth daily 60 capsule 0    oxybutynin (DITROPAN-XL) 5 MG extended release tablet TAKE ONE TABLET BY MOUTH DAILY 30 tablet 3    omeprazole (PRILOSEC) 20 MG delayed release capsule TAKE ONE CAPSULE BY MOUTH DAILY 90 capsule 1    montelukast (SINGULAIR) 10 MG tablet TAKE ONE TABLET BY MOUTH ONCE NIGHTLY 90 tablet 1    metoprolol (LOPRESSOR) 100 MG tablet TAKE ONE TABLET BY MOUTH TWICE A  tablet 1    metFORMIN (GLUCOPHAGE) 500 MG tablet TAKE ONE TABLET BY MOUTH TWICE A DAY WITH MEALS 60 tablet 2    Dulaglutide 3 MG/0.5ML SOPN Inject 3 mg into the skin once a week 4 Adjustable Dose Pre-filled Pen Syringe 5    cloNIDine (CATAPRES) 0.1 MG tablet TAKE THREE TABLETS BY MOUTH THREE TIMES A  tablet 2    chlorthalidone (HYGROTON) 25 MG tablet TAKE ONE TABLET BY MOUTH DAILY 90 tablet 1    albuterol sulfate HFA (PROAIR HFA) 108 (90 Base) MCG/ACT inhaler Inhale 2 puffs into the lungs every 6 hours as needed for Wheezing or Shortness of Breath 18 g 11    spironolactone (ALDACTONE) 25 MG tablet Take 1 tablet by mouth daily 90 tablet 1    ibuprofen (ADVIL;MOTRIN) 800 MG tablet TAKE ONE TABLET BY MOUTH EVERY MORNING AND TAKE ONE TABLET BY MOUTH EVERY EVENING WITH MEALS 60 tablet 1    HYDROcodone-acetaminophen (NORCO) 7.5-325 MG per tablet Take 1 tablet by mouth every 8 hours as needed for Pain for up to 30 days. 90 tablet 0    acetaminophen (APAP EXTRA STRENGTH) 500 MG tablet Take 2 tablets by mouth every 6 hours as needed for Pain DO NOT TAKE WITH OTHER MEDICATIONS CONTAINING ACETAMINOPHEN. 30 tablet 0    methocarbamol (ROBAXIN) 500 MG tablet Take 1 tablet by mouth in the morning and 1 tablet in the evening.  60 tablet 0    albuterol sulfate HFA (PROAIR HFA) 108 (90 Base) MCG/ACT inhaler Inhale 2 puffs into the lungs every 6 hours as needed for Wheezing or Shortness of Breath 18 g 11    blood glucose test strips (TRUE METRIX BLOOD GLUCOSE TEST) strip 1 each by In Vitro route 2 times daily 200 each 11    Respiratory Therapy Supplies (NEBULIZER/TUBING/MOUTHPIECE) KIT 1 kit by Does not apply route daily 1 kit 0    ipratropium-albuterol (DUONEB) 0.5-2.5 (3) MG/3ML SOLN nebulizer solution Inhale 3 mLs into the lungs every 4 hours 360 mL 11    Blood Glucose Monitoring Suppl (TRUE METRIX METER) w/Device KIT 1 kit by Does not apply route 2 times daily 1 kit 0    Lancets MISC 1 each by Does not apply route 2 times daily Dispense brand per insurance preference 200 each 1 Alcohol Swabs 70 % PADS 1 each by Does not apply route 2 times daily 200 each 0    albuterol sulfate  (90 Base) MCG/ACT inhaler INHALE TWO PUFFS BY MOUTH EVERY 6 HOURS AS NEEDED FOR WHEEZING 18 g 11    Blood Pressure Monitoring (B-D ASSURE BPM/AUTO ARM CUFF) MISC 1 Device by Does not apply route daily 1 each 0    Misc. Devices (RAISED TOILET SEAT) MISC Use daily as needed as directed 1 each 0    torsemide (DEMADEX) 20 MG tablet TAKE TWO TABLETS BY MOUTH DAILY (Patient not taking: Reported on 12/12/2022) 60 tablet 11    naloxone (NARCAN) 4 MG/0.1ML LIQD nasal spray Use as directed (Patient not taking: Reported on 12/12/2022) 1 each 0     No current facility-administered medications for this visit. BP (!) 150/102   Pulse 63   Resp 16   Wt (!) 329 lb (149.2 kg)   LMP  (LMP Unknown)   SpO2 95%   BMI 56.47 kg/m²     Physical Exam  Constitutional:       Appearance: She is well-developed. HENT:      Head: Normocephalic and atraumatic. Cardiovascular:      Rate and Rhythm: Normal rate and regular rhythm. Heart sounds: Normal heart sounds. Pulmonary:      Effort: Pulmonary effort is normal.      Breath sounds: Normal breath sounds. Musculoskeletal:      Cervical back: Normal range of motion. Comments: Trace edema bilateral lower legs, chronic   Skin:     General: Skin is warm and dry. Findings: No rash. Neurological:      Mental Status: She is alert and oriented to person, place, and time. Deep Tendon Reflexes: Reflexes are normal and symmetric. Wt Readings from Last 3 Encounters:   12/12/22 (!) 329 lb (149.2 kg)   11/22/22 (!) 333 lb (151 kg)   11/16/22 (!) 325 lb (147.4 kg)     BP Readings from Last 3 Encounters:   12/12/22 (!) 150/102   11/22/22 (!) 187/100   10/25/22 124/80         Assessment/Plan:  1. Well adult health check  Preventive: mammogram and pap smear UTD  Referral for colonoscopy  - LIPID PANEL; Future    2. Essential hypertension  Uncontrolled.  Add spironolactone and check BMP 1 week after starting. Continue other meds and seeing Nephrology  - metoprolol (LOPRESSOR) 100 MG tablet; TAKE ONE TABLET BY MOUTH TWICE A DAY  Dispense: 180 tablet; Refill: 1  - cloNIDine (CATAPRES) 0.1 MG tablet; TAKE THREE TABLETS BY MOUTH THREE TIMES A DAY  Dispense: 270 tablet; Refill: 2  - chlorthalidone (HYGROTON) 25 MG tablet; TAKE ONE TABLET BY MOUTH DAILY  Dispense: 90 tablet; Refill: 1  - spironolactone (ALDACTONE) 25 MG tablet; Take 1 tablet by mouth daily  Dispense: 90 tablet; Refill: 1  - Basic Metabolic Panel; Future    3. Chronic pain syndrome  - venlafaxine (EFFEXOR XR) 75 MG extended release capsule; Take 2 capsules by mouth daily  Dispense: 60 capsule; Refill: 0    4. Mixed stress and urge incontinence  - oxybutynin (DITROPAN-XL) 5 MG extended release tablet; TAKE ONE TABLET BY MOUTH DAILY  Dispense: 30 tablet; Refill: 3    5. Diabetes mellitus type 2 in obese [facility])  Congratulated patient on excellent A1c control. Continue current medicines to help address insulin resistance and hopefully weight  - metFORMIN (GLUCOPHAGE) 500 MG tablet; TAKE ONE TABLET BY MOUTH TWICE A DAY WITH MEALS  Dispense: 60 tablet; Refill: 2  - Dulaglutide 3 MG/0.5ML SOPN; Inject 3 mg into the skin once a week  Dispense: 4 Adjustable Dose Pre-filled Pen Syringe; Refill: 5  - POCT glycosylated hemoglobin (Hb A1C)    6. COPD, mild (HCC)  - albuterol sulfate HFA (PROAIR HFA) 108 (90 Base) MCG/ACT inhaler; Inhale 2 puffs into the lungs every 6 hours as needed for Wheezing or Shortness of Breath  Dispense: 18 g; Refill: 11    7. Colon cancer screening  Referral placed for colonoscopy  - [facility] - Sam Malone MD, Gastroenterology, [facility]    8.  Major depressive disorder, recurrent, in partial remission ([facility] [address]  She reports stability on her current regimen

## 2022-12-16 ENCOUNTER — OFFICE VISIT (OUTPATIENT)
Dept: PULMONOLOGY | Age: 47
End: 2022-12-16
Payer: MEDICAID

## 2022-12-16 VITALS
RESPIRATION RATE: 16 BRPM | OXYGEN SATURATION: 98 % | SYSTOLIC BLOOD PRESSURE: 130 MMHG | BODY MASS INDEX: 50.02 KG/M2 | DIASTOLIC BLOOD PRESSURE: 80 MMHG | HEART RATE: 76 BPM | HEIGHT: 64 IN | WEIGHT: 293 LBS | TEMPERATURE: 97.3 F

## 2022-12-16 DIAGNOSIS — E66.01 MORBID OBESITY (HCC): ICD-10-CM

## 2022-12-16 DIAGNOSIS — G47.33 OSA (OBSTRUCTIVE SLEEP APNEA): ICD-10-CM

## 2022-12-16 DIAGNOSIS — J44.9 COPD, MILD (HCC): ICD-10-CM

## 2022-12-16 DIAGNOSIS — Z71.6 TOBACCO ABUSE COUNSELING: ICD-10-CM

## 2022-12-16 PROCEDURE — G8427 DOCREV CUR MEDS BY ELIG CLIN: HCPCS | Performed by: INTERNAL MEDICINE

## 2022-12-16 PROCEDURE — 3078F DIAST BP <80 MM HG: CPT | Performed by: INTERNAL MEDICINE

## 2022-12-16 PROCEDURE — 3074F SYST BP LT 130 MM HG: CPT | Performed by: INTERNAL MEDICINE

## 2022-12-16 PROCEDURE — G8484 FLU IMMUNIZE NO ADMIN: HCPCS | Performed by: INTERNAL MEDICINE

## 2022-12-16 PROCEDURE — 99214 OFFICE O/P EST MOD 30 MIN: CPT | Performed by: INTERNAL MEDICINE

## 2022-12-16 PROCEDURE — G8417 CALC BMI ABV UP PARAM F/U: HCPCS | Performed by: INTERNAL MEDICINE

## 2022-12-16 PROCEDURE — 3023F SPIROM DOC REV: CPT | Performed by: INTERNAL MEDICINE

## 2022-12-16 PROCEDURE — 4004F PT TOBACCO SCREEN RCVD TLK: CPT | Performed by: INTERNAL MEDICINE

## 2022-12-16 NOTE — PROGRESS NOTES
REASON FOR CONSULTATION/CC:    Chief Complaint   Patient presents with    COPD           PCP: Salma Hope MD    HISTORY OF PRESENT ILLNESS: Isela Walker is a 50y.o. year old female with a history of JOSE who presents :           allergic rhinitis   Claritin and Singulair. Using nebulizer.  / Duoneb's    Controlled. Copd    albuterol   Using Trelegy       She is not using budesonide neblizer. Tobacco abuse  Continues to smoke. At 1/2 ppd. Not ready to quit. Lot of family stress. JOSE  Still on recall list.       Obesity                Objective:   PHYSICAL EXAM:  Blood pressure 130/80, pulse 76, temperature 97.3 °F (36.3 °C), temperature source Infrared, resp. rate 16, height 5' 4\" (1.626 m), weight (!) 386 lb 9.6 oz (175.4 kg), SpO2 98 %, not currently breastfeeding.'    Gen: No distress. Body mass index is 66.36 kg/m². ENT:   Resp: No accessory muscle use. No crackles. No wheezes. No rhonchi. CV: Regular rate. Regular rhythm. No murmur or rub. No edema. Skin: Warm, dry, normal texture and turgor. No nodule on exposed extremities. M/S: No cyanosis. No clubbing. No joint deformity. Psych: Oriented x 3. No anxiety. Awake. Alert. Intact judgement and insight. Good Mood / Affect. Memory appears in tact   . Data Reviewed:        Assessment:      JOSE  Obesity Body mass index is 66.36 kg/m². allergic rhinitis   COPD, mild, FEV1 76%. Decreased DLCO. Goiter         Plan:      Problem List Items Addressed This Visit       Tobacco abuse counseling      Still smoking. Not ready to quit. Start lung cancer screening next year. JOSE (obstructive sleep apnea)     Still waiting on recall. Morbid obesity ([address]      Still uncontrolled.           COPD, mild ([address]      Trelegy and albuterol                        [facility]  [phone] Potassium, Serum 6.3./yes yes Activated Partial Thromboplastin Time 143.9./yes

## 2022-12-20 ENCOUNTER — OFFICE VISIT (OUTPATIENT)
Dept: PAIN MANAGEMENT | Age: 47
End: 2022-12-20

## 2022-12-20 VITALS
HEIGHT: 64 IN | BODY MASS INDEX: 50.02 KG/M2 | WEIGHT: 293 LBS | HEART RATE: 97 BPM | SYSTOLIC BLOOD PRESSURE: 155 MMHG | TEMPERATURE: 97.3 F | DIASTOLIC BLOOD PRESSURE: 92 MMHG

## 2022-12-20 DIAGNOSIS — E11.42 DIABETIC POLYNEUROPATHY ASSOCIATED WITH TYPE 2 DIABETES MELLITUS (HCC): ICD-10-CM

## 2022-12-20 DIAGNOSIS — M17.0 PRIMARY OSTEOARTHRITIS OF BOTH KNEES: ICD-10-CM

## 2022-12-20 DIAGNOSIS — M62.830 BACK SPASM: ICD-10-CM

## 2022-12-20 DIAGNOSIS — M16.11 PRIMARY OSTEOARTHRITIS OF RIGHT HIP: ICD-10-CM

## 2022-12-20 DIAGNOSIS — E66.01 MORBID OBESITY (HCC): ICD-10-CM

## 2022-12-20 DIAGNOSIS — M54.50 CHRONIC BILATERAL LOW BACK PAIN WITHOUT SCIATICA: ICD-10-CM

## 2022-12-20 DIAGNOSIS — G89.4 CHRONIC PAIN SYNDROME: ICD-10-CM

## 2022-12-20 DIAGNOSIS — G89.29 CHRONIC BILATERAL LOW BACK PAIN WITHOUT SCIATICA: ICD-10-CM

## 2022-12-20 DIAGNOSIS — F41.9 ANXIETY: ICD-10-CM

## 2022-12-20 RX ORDER — HYDROCODONE BITARTRATE AND ACETAMINOPHEN 7.5; 325 MG/1; MG/1
1 TABLET ORAL EVERY 8 HOURS PRN
Qty: 90 TABLET | Refills: 0 | Status: SHIPPED | OUTPATIENT
Start: 2022-12-20 | End: 2023-01-19

## 2022-12-20 RX ORDER — TIZANIDINE 4 MG/1
4 TABLET ORAL EVERY 12 HOURS
Qty: 60 TABLET | Refills: 0 | Status: SHIPPED | OUTPATIENT
Start: 2022-12-20 | End: 2023-01-19

## 2022-12-20 RX ORDER — ACETAMINOPHEN 500 MG
1000 TABLET ORAL EVERY 6 HOURS PRN
Qty: 30 TABLET | Refills: 0 | Status: SHIPPED | OUTPATIENT
Start: 2022-12-20

## 2022-12-20 NOTE — PROGRESS NOTES
Major Egan  1975  [id number]      HISTORY OF PRESENT ILLNESS: Ms. Heydi Oconnell is a 50 y.o. female returns for a follow up visit for pain management  She has a diagnosis of   1. Chronic pain syndrome    2. Primary osteoarthritis of both knees    3. Primary osteoarthritis of right hip    4. Chronic bilateral low back pain without sciatica    5. Back spasm    6. Diabetic polyneuropathy associated with type 2 diabetes mellitus ([facility] [address]    7. Anxiety    8. Morbid obesity ([facility] [address]      As per Information Obtained from the PADT (Patient Assessment and Documentation Tool)    She complains of pain in the both knee(s), right leg(s): upper, and bilateral lower back She rates the pain 10/10 and describes it as aching, burning, numbness, pins and needles. Current treatment regimen has helped relieve about 40% of the pain. She denies any side effects from the current pain regimen. Patient reports that since the last follow up visit the physical functioning is worse, family/social relationships are unchanged, mood is worse sleep patterns are worse, and that the overall functioning is worse. Patient denies misusing/abusing her narcotic pain medications or using any illegal drugs. Upon obtaining medical history from Ms. Heydi Oconnell states that pain is manageable on current pain therapy. Takes the pain medications as prescribed. Reports having increased tenderness to the joints due to to cold weather. Does not believe the Zanaflex, is working. Mood/anxiety is stable. Sleep is fair with an average of 5-6 hours. Denies to having issues of constipation. Tolerating activities/house chores with moderate tenderness to the lower back. ALLERGIES: Patients list of allergies were reviewed     MEDICATIONS: Ms. Heydi Oconnell list of medications were reviewed. Her current medications are   Outpatient Medications Prior to Visit   Medication Sig Dispense Refill    loratadine (CLARITIN) 10 MG tablet TAKE ONE TABLET BY MOUTH DAILY 90 tablet 3 venlafaxine (EFFEXOR XR) 75 MG extended release capsule Take 2 capsules by mouth daily 60 capsule 0    oxybutynin (DITROPAN-XL) 5 MG extended release tablet TAKE ONE TABLET BY MOUTH DAILY 30 tablet 3    omeprazole (PRILOSEC) 20 MG delayed release capsule TAKE ONE CAPSULE BY MOUTH DAILY 90 capsule 1    montelukast (SINGULAIR) 10 MG tablet TAKE ONE TABLET BY MOUTH ONCE NIGHTLY 90 tablet 1    metoprolol (LOPRESSOR) 100 MG tablet TAKE ONE TABLET BY MOUTH TWICE A  tablet 1    metFORMIN (GLUCOPHAGE) 500 MG tablet TAKE ONE TABLET BY MOUTH TWICE A DAY WITH MEALS 60 tablet 2    Dulaglutide 3 MG/0.5ML SOPN Inject 3 mg into the skin once a week 4 Adjustable Dose Pre-filled Pen Syringe 5    cloNIDine (CATAPRES) 0.1 MG tablet TAKE THREE TABLETS BY MOUTH THREE TIMES A  tablet 2    chlorthalidone (HYGROTON) 25 MG tablet TAKE ONE TABLET BY MOUTH DAILY 90 tablet 1    albuterol sulfate HFA (PROAIR HFA) 108 (90 Base) MCG/ACT inhaler Inhale 2 puffs into the lungs every 6 hours as needed for Wheezing or Shortness of Breath 18 g 11    spironolactone (ALDACTONE) 25 MG tablet Take 1 tablet by mouth daily 90 tablet 1    ibuprofen (ADVIL;MOTRIN) 800 MG tablet TAKE ONE TABLET BY MOUTH EVERY MORNING AND TAKE ONE TABLET BY MOUTH EVERY EVENING WITH MEALS 60 tablet 1    torsemide (DEMADEX) 20 MG tablet TAKE TWO TABLETS BY MOUTH DAILY 60 tablet 11    albuterol sulfate HFA (PROAIR HFA) 108 (90 Base) MCG/ACT inhaler Inhale 2 puffs into the lungs every 6 hours as needed for Wheezing or Shortness of Breath 18 g 11    blood glucose test strips (TRUE METRIX BLOOD GLUCOSE TEST) strip 1 each by In Vitro route 2 times daily 200 each 11    Respiratory Therapy Supplies (NEBULIZER/TUBING/MOUTHPIECE) KIT 1 kit by Does not apply route daily 1 kit 0    ipratropium-albuterol (DUONEB) 0.5-2.5 (3) MG/3ML SOLN nebulizer solution Inhale 3 mLs into the lungs every 4 hours 360 mL 11    Blood Glucose Monitoring Suppl (TRUE METRIX METER) w/Device KIT 1 kit by Does not apply route 2 times daily 1 kit 0    Lancets MISC 1 each by Does not apply route 2 times daily Dispense brand per insurance preference 200 each 1    Alcohol Swabs 70 % PADS 1 each by Does not apply route 2 times daily 200 each 0    albuterol sulfate  (90 Base) MCG/ACT inhaler INHALE TWO PUFFS BY MOUTH EVERY 6 HOURS AS NEEDED FOR WHEEZING 18 g 11    Blood Pressure Monitoring (B-D ASSURE BPM/AUTO ARM CUFF) MISC 1 Device by Does not apply route daily 1 each 0    Misc. Devices (RAISED TOILET SEAT) MISC Use daily as needed as directed 1 each 0    HYDROcodone-acetaminophen (NORCO) 7.5-325 MG per tablet Take 1 tablet by mouth every 8 hours as needed for Pain for up to 30 days. 90 tablet 0    acetaminophen (APAP EXTRA STRENGTH) 500 MG tablet Take 2 tablets by mouth every 6 hours as needed for Pain DO NOT TAKE WITH OTHER MEDICATIONS CONTAINING ACETAMINOPHEN. 30 tablet 0    methocarbamol (ROBAXIN) 500 MG tablet Take 1 tablet by mouth in the morning and 1 tablet in the evening. 60 tablet 0    naloxone (NARCAN) 4 MG/0.1ML LIQD nasal spray Use as directed (Patient not taking: Reported on 12/12/2022) 1 each 0     No facility-administered medications prior to visit. SOCIAL/FAMILY/PAST MEDICAL HISTORY: Ms. Curry Tuttle, family and past medical history was reviewed. REVIEW OF SYSTEMS:    Respiratory: Negative for apnea, chest tightness and shortness of breath or change in baseline breathing. Gastrointestinal: Negative for nausea, vomiting, abdominal pain, diarrhea, constipation, blood in stool and abdominal distention. PHYSICAL EXAM:   Nursing note and vitals reviewed. BP (!) 155/92   Pulse 97   Temp 97.3 °F (36.3 °C)   Ht 5' 4\" (1.626 m)   Wt (!) 339 lb (153.8 kg)   LMP  (LMP Unknown)   BMI 58.19 kg/m²   Constitutional: She appears well-developed and well-nourished. No acute distress. Skin: Skin is warm and dry, good turgor. No rash noted. She is not diaphoretic.   Cardiovascular: Normal rate, regular rhythm, normal heart sounds, and does not have murmur. Pulmonary/Chest: Effort normal. No respiratory distress. She does not have wheezes in the lung fields. She has no rales. Neurological/Psychiatric:She is alert and oriented to person, place, and time. Coordination is  normal.  Her mood isAppropriate and affect is Neutral/Euthymic(normal) . Prescription pain medication monitoring:                  MEDD current = 22.50              ORT Score = 11 high risk              Other Risk factors - (mood) Depression/Anxiety              Date of Last Medication Agreement: 3/16/22              Date Naloxone prescribed: 6/21/22              UDT:                          Date of last UDT: 6/21/22                          Adverse report: No              OARRS:                          Checked today: Yes                          Adverse report: No    IMPRESSION:   1. Chronic pain syndrome    2. Primary osteoarthritis of both knees    3. Primary osteoarthritis of right hip    4. Chronic bilateral low back pain without sciatica    5. Back spasm    6. Diabetic polyneuropathy associated with type 2 diabetes mellitus ([facility] [address]    7. Anxiety    8. Morbid obesity ([facility]        PLAN:  Informed verbal consent was obtained:  -Patient's opioid therapy will be maintained at current dose  -D/c Robaxin, Zanaflex 4 mg tabs orally bid  -Home exercises/Toyin exercises recommended  -CBT techniques- relaxation therapies such as biofeedback, mindfulness based stress reduction, imagery, cognitive restructuring, problem solving discussed with patient   -She was advised weight reduction, diet changes- 800-1200 fior diet, diet diary, exercising, nutritional  consult increased physical activity as tolerated   -Last UDS 6/21/22 consistent  -Return in about 4 weeks (around 1/17/2023). -[place]AR[place] record was obtained and reviewed  for the last one year and no indicators of drug misuse  were found.  Any other controlled substance prescriptions  seen on the record have been accounted for, I am aware of the patient receiving these medications. Steven Coppola OARRS record will be rechecked as part of office protocol. Analgesic Plan:              Continue present regimen: Norco 7.5-325 mg tabs q8h prn              Adjust dose of present analgesic: No              Switch analgesics: No              Add/Adjust concomitant therapy: Zanaflex, Effexor, Tylenol, Narcan    I will continue her current medication regimen  which is part of the above treatment schedule. It has been helping with Ms. Devante Robles chronic  medical problems which for this visit include:   Diagnoses of Chronic pain syndrome, Primary osteoarthritis of both knees, Primary osteoarthritis of right hip, Chronic bilateral low back pain without sciatica, Back spasm, Diabetic polyneuropathy associated with type 2 diabetes mellitus ([address] Anxiety, and Morbid obesity ([address] were pertinent to this visit. Risks and benefits of the medications and other alternative treatments  including no treatment were discussed with the patient. The common side effects of these medications were also explained to the patient. Informed verbal consent was obtained. Goals of current treatment regimen include improvement in pain, restoration of functioning- with focus on improvement in physical performance, general activity, work or disability,emotional distress, health care utilization and  decreased medication consumption. Will continue to monitor progress towards achieving/maintaining therapeutic goals with special emphasis on  1. Improvement in perceived interfernce  of pain with ADL's. Ability to do home exercises independently. Ability to do household chores indoor and/or outdoor work and social and leisure activities. Improve psychosocial and physical functioning. - she is showing progression towards this treatment goal with the current regimen.      She was advised against drinking alcohol with the narcotic pain medicines, advised against driving or handling machinery while adjusting the dose of medicines or if having cognitive  issues related to the current medications. Risk of overdose and death, if medicines not taken as prescribed, were also discussed. If the patient develops new symptoms or if the symptoms worsen, the patient should call the office. While transcribing every attempt was made to maintain the accuracy of the note in terms of it's contents,there may have been some errors made inadvertently. Thank you for allowing me to participate in the care of this patient. Alethea Duron CNP.     Cc: Jose D Berger MD

## 2022-12-30 ENCOUNTER — HOSPITAL ENCOUNTER (EMERGENCY)
Age: 47
Discharge: HOME | End: 2022-12-30
Payer: MEDICAID

## 2022-12-30 VITALS
WEIGHT: 293 LBS | TEMPERATURE: 98.1 F | BODY MASS INDEX: 50.02 KG/M2 | RESPIRATION RATE: 23 BRPM | DIASTOLIC BLOOD PRESSURE: 105 MMHG | HEART RATE: 82 BPM | OXYGEN SATURATION: 97 % | HEIGHT: 64 IN | SYSTOLIC BLOOD PRESSURE: 180 MMHG

## 2022-12-30 DIAGNOSIS — I10 ESSENTIAL HYPERTENSION: ICD-10-CM

## 2022-12-30 DIAGNOSIS — G89.29 ACUTE EXACERBATION OF CHRONIC LOW BACK PAIN: Primary | ICD-10-CM

## 2022-12-30 DIAGNOSIS — M54.50 ACUTE EXACERBATION OF CHRONIC LOW BACK PAIN: Primary | ICD-10-CM

## 2022-12-30 DIAGNOSIS — M54.31 RIGHT SCIATIC NERVE PAIN: ICD-10-CM

## 2022-12-30 LAB
ANION GAP SERPL CALCULATED.3IONS-SCNC: 12 MMOL/L (ref 3–16)
BASOPHILS ABSOLUTE: 0.1 K/UL (ref 0–0.2)
BASOPHILS RELATIVE PERCENT: 0.8 %
BUN BLDV-MCNC: 16 MG/DL (ref 7–20)
CALCIUM SERPL-MCNC: 9.3 MG/DL (ref 8.3–10.6)
CHLORIDE BLD-SCNC: 95 MMOL/L (ref 99–110)
CO2: 30 MMOL/L (ref 21–32)
CREAT SERPL-MCNC: 1 MG/DL (ref 0.6–1.1)
EOSINOPHILS ABSOLUTE: 0.2 K/UL (ref 0–0.6)
EOSINOPHILS RELATIVE PERCENT: 2.1 %
GFR SERPL CREATININE-BSD FRML MDRD: >60 ML/MIN/{1.73_M2}
GLUCOSE BLD-MCNC: 87 MG/DL (ref 70–99)
HCT VFR BLD CALC: 42.8 % (ref 36–48)
HEMOGLOBIN: 14.3 G/DL (ref 12–16)
LYMPHOCYTES ABSOLUTE: 3 K/UL (ref 1–5.1)
LYMPHOCYTES RELATIVE PERCENT: 41.2 %
MCH RBC QN AUTO: 32.8 PG (ref 26–34)
MCHC RBC AUTO-ENTMCNC: 33.4 G/DL (ref 31–36)
MCV RBC AUTO: 98.2 FL (ref 80–100)
MONOCYTES ABSOLUTE: 0.6 K/UL (ref 0–1.3)
MONOCYTES RELATIVE PERCENT: 8.8 %
NEUTROPHILS ABSOLUTE: 3.4 K/UL (ref 1.7–7.7)
NEUTROPHILS RELATIVE PERCENT: 47.1 %
PDW BLD-RTO: 13.7 % (ref 12.4–15.4)
PLATELET # BLD: 275 K/UL (ref 135–450)
PMV BLD AUTO: 9.2 FL (ref 5–10.5)
POTASSIUM REFLEX MAGNESIUM: 4.3 MMOL/L (ref 3.5–5.1)
RBC # BLD: 4.35 M/UL (ref 4–5.2)
SODIUM BLD-SCNC: 137 MMOL/L (ref 136–145)
WBC # BLD: 7.2 K/UL (ref 4–11)

## 2022-12-30 PROCEDURE — 99284 EMERGENCY DEPT VISIT MOD MDM: CPT

## 2022-12-30 PROCEDURE — 85025 COMPLETE CBC W/AUTO DIFF WBC: CPT

## 2022-12-30 PROCEDURE — 96374 THER/PROPH/DIAG INJ IV PUSH: CPT

## 2022-12-30 PROCEDURE — 96375 TX/PRO/DX INJ NEW DRUG ADDON: CPT

## 2022-12-30 PROCEDURE — 6370000000 HC RX 637 (ALT 250 FOR IP): Performed by: PHYSICIAN ASSISTANT

## 2022-12-30 PROCEDURE — 6360000002 HC RX W HCPCS: Performed by: PHYSICIAN ASSISTANT

## 2022-12-30 PROCEDURE — 80048 BASIC METABOLIC PNL TOTAL CA: CPT

## 2022-12-30 RX ORDER — GABAPENTIN 300 MG/1
300 CAPSULE ORAL EVERY 12 HOURS PRN
Qty: 10 CAPSULE | Refills: 0 | Status: SHIPPED | OUTPATIENT
Start: 2022-12-30 | End: 2023-01-04

## 2022-12-30 RX ORDER — METHOCARBAMOL 100 MG/ML
1000 INJECTION, SOLUTION INTRAMUSCULAR; INTRAVENOUS ONCE
Status: COMPLETED | OUTPATIENT
Start: 2022-12-30 | End: 2022-12-30

## 2022-12-30 RX ORDER — ONDANSETRON 2 MG/ML
4 INJECTION INTRAMUSCULAR; INTRAVENOUS ONCE
Status: COMPLETED | OUTPATIENT
Start: 2022-12-30 | End: 2022-12-30

## 2022-12-30 RX ORDER — HYDROCODONE BITARTRATE AND ACETAMINOPHEN 7.5; 325 MG/1; MG/1
1 TABLET ORAL
Status: COMPLETED | OUTPATIENT
Start: 2022-12-30 | End: 2022-12-30

## 2022-12-30 RX ORDER — CLONIDINE HYDROCHLORIDE 0.1 MG/1
0.3 TABLET ORAL ONCE
Status: COMPLETED | OUTPATIENT
Start: 2022-12-30 | End: 2022-12-30

## 2022-12-30 RX ADMIN — ONDANSETRON 4 MG: 2 INJECTION INTRAMUSCULAR; INTRAVENOUS at 17:04

## 2022-12-30 RX ADMIN — CLONIDINE HYDROCHLORIDE 0.3 MG: 0.1 TABLET ORAL at 17:04

## 2022-12-30 RX ADMIN — METHOCARBAMOL 1000 MG: 100 INJECTION, SOLUTION INTRAMUSCULAR; INTRAVENOUS at 17:05

## 2022-12-30 RX ADMIN — HYDROMORPHONE HYDROCHLORIDE 0.5 MG: 1 INJECTION, SOLUTION INTRAMUSCULAR; INTRAVENOUS; SUBCUTANEOUS at 17:05

## 2022-12-30 RX ADMIN — HYDROCODONE BITARTRATE AND ACETAMINOPHEN 1 TABLET: 7.5; 325 TABLET ORAL at 18:35

## 2022-12-30 ASSESSMENT — PAIN SCALES - GENERAL
PAINLEVEL_OUTOF10: 10
PAINLEVEL_OUTOF10: 10
PAINLEVEL_OUTOF10: 8
PAINLEVEL_OUTOF10: 8

## 2022-12-30 ASSESSMENT — PAIN DESCRIPTION - ORIENTATION
ORIENTATION: RIGHT

## 2022-12-30 ASSESSMENT — PAIN - FUNCTIONAL ASSESSMENT
PAIN_FUNCTIONAL_ASSESSMENT: PREVENTS OR INTERFERES WITH ALL ACTIVE AND SOME PASSIVE ACTIVITIES
PAIN_FUNCTIONAL_ASSESSMENT: 0-10

## 2022-12-30 ASSESSMENT — PAIN DESCRIPTION - DESCRIPTORS
DESCRIPTORS: SHARP;SHOOTING
DESCRIPTORS: SHARP;SHOOTING
DESCRIPTORS: SHARP;SHOOTING;STABBING

## 2022-12-30 ASSESSMENT — PAIN DESCRIPTION - FREQUENCY
FREQUENCY: CONTINUOUS
FREQUENCY: CONTINUOUS

## 2022-12-30 ASSESSMENT — PAIN DESCRIPTION - PAIN TYPE
TYPE: ACUTE PAIN
TYPE: ACUTE PAIN

## 2022-12-30 ASSESSMENT — PAIN DESCRIPTION - LOCATION
LOCATION: HIP;LEG
LOCATION: HIP

## 2022-12-30 NOTE — DISCHARGE INSTRUCTIONS
Ambulate with care using your cane, use your medications as written as well as medication written here, cold compresses 15 to 20 minutes a few times daily, and monitor for gradual improvement. Call your family doctor and back specialist on Monday morning to arrange further outpatient care and treatment.   Return to the emergency department for difficulty breathing, unable to take liquids, acute one-sided paralysis, or other emergency worsening or concern

## 2022-12-30 NOTE — ED PROVIDER NOTES
**ADVANCED PRACTICE PROVIDER, I HAVE EVALUATED THIS PATIENT**        [address]      Pt Name: Lorelei Dozier  HQZ:[id number]  [place] 1975  Date of evaluation: 12/30/2022  Provider: Lossie Kocher, PA-C  Note Started: 6:18 PM EST 12/30/2022        Chief Complaint:    Chief Complaint   Patient presents with    Hip Pain     Right side and includes RLE to foot. Nursing Notes, Past Medical Hx, Past Surgical Hx, Social Hx, Allergies, and Family Hx were all reviewed and agreed with or any disagreements were addressed in the HPI.    HPI: (Location, Duration, Timing, Severity, Quality, Assoc Sx, Context, Modifying factors)    Chief Complaint of worst pain of from the back to hip and right lower extremity    This is a  50 y.o. female who presents stating that she has had an acute worsening in her chronic low back pain since seeing her pain specialist on the 20th of this month. It started on the 21st when her heat went out and she states that it has been terrible ever since. Her heat just came back on but she still is having the worsened lower back pain radiating into the right lower extremity area. It is worse with movement and better at rest.  No burning in urination or difficulty passing urine. No abdominal pain or vomiting. No fevers or chills or fall or contusion. No extremity acute loss of range of motion or strength. However pain does make it more difficult to stand up and get around. She has been using her cane to some extent at home. Her walker is too big for her living area she states. She denies fevers or chills, bowel or bladder incontinence, inability to urinate, numbness or tingling to the perineal area, nausea or vomiting. Her pain is worse with range of motion and movement better with rest.  Patient states that she did not take her afternoon blood pressure medicine.   She did take her chronic pain medication [place] 7.5 around 12 PM today and also took 1 this morning and her morning muscle relaxer. PastMedical/Surgical History:      Diagnosis Date    Anxiety     Arthritis     Asthma     Back pain     CHF (congestive heart failure) ([facility])     COPD (chronic obstructive pulmonary disease) ([address]     Depression     Diabetes mellitus ([address]     GERD (gastroesophageal reflux disease)     Hyperlipidemia     Hypertension     Obesity     Sleep apnea          Procedure Laterality Date    CHOLECYSTECTOMY  1997    HYSTERECTOMY (CERVIX STATUS UNKNOWN)  2008    SKIN BIOPSY  2015    TUBAL LIGATION      UPPER GASTROINTESTINAL ENDOSCOPY N/A 10/14/2019    EGD BIOPSY performed by Taj Montes De Oca DO at [address] ENDOSCOPY       Medications:  Previous Medications    ACETAMINOPHEN (APAP EXTRA STRENGTH) 500 MG TABLET    Take 2 tablets by mouth every 6 hours as needed for Pain DO NOT TAKE WITH OTHER MEDICATIONS CONTAINING ACETAMINOPHEN.     ALBUTEROL SULFATE HFA (PROAIR HFA) 108 (90 BASE) MCG/ACT INHALER    Inhale 2 puffs into the lungs every 6 hours as needed for Wheezing or Shortness of Breath    ALBUTEROL SULFATE HFA (PROAIR HFA) 108 (90 BASE) MCG/ACT INHALER    Inhale 2 puffs into the lungs every 6 hours as needed for Wheezing or Shortness of Breath    ALBUTEROL SULFATE  (90 BASE) MCG/ACT INHALER    INHALE TWO PUFFS BY MOUTH EVERY 6 HOURS AS NEEDED FOR WHEEZING    ALCOHOL SWABS 70 % PADS    1 each by Does not apply route 2 times daily    BLOOD GLUCOSE MONITORING SUPPL (TRUE METRIX METER) W/DEVICE KIT    1 kit by Does not apply route 2 times daily    BLOOD GLUCOSE TEST STRIPS (TRUE METRIX BLOOD GLUCOSE TEST) STRIP    1 each by In Vitro route 2 times daily    BLOOD PRESSURE MONITORING (B-D ASSURE BPM/AUTO ARM CUFF) MISC    1 Device by Does not apply route daily    CHLORTHALIDONE (HYGROTON) 25 MG TABLET    TAKE ONE TABLET BY MOUTH DAILY    CLONIDINE (CATAPRES) 0.1 MG TABLET    TAKE THREE TABLETS BY MOUTH THREE TIMES A DAY    DULAGLUTIDE 3 MG/0.5ML SOPN    Inject 3 mg into the skin once a week    HYDROCODONE-ACETAMINOPHEN (NORCO) 7.5-325 MG PER TABLET    Take 1 tablet by mouth every 8 hours as needed for Pain for up to 30 days. IBUPROFEN (ADVIL;MOTRIN) 800 MG TABLET    TAKE ONE TABLET BY MOUTH EVERY MORNING AND TAKE ONE TABLET BY MOUTH EVERY EVENING WITH MEALS    IPRATROPIUM-ALBUTEROL (DUONEB) 0.5-2.5 (3) MG/3ML SOLN NEBULIZER SOLUTION    Inhale 3 mLs into the lungs every 4 hours    LANCETS MISC    1 each by Does not apply route 2 times daily Dispense brand per insurance preference    LORATADINE (CLARITIN) 10 MG TABLET    TAKE ONE TABLET BY MOUTH DAILY    METFORMIN (GLUCOPHAGE) 500 MG TABLET    TAKE ONE TABLET BY MOUTH TWICE A DAY WITH MEALS    METOPROLOL (LOPRESSOR) 100 MG TABLET    TAKE ONE TABLET BY MOUTH TWICE A DAY    MISC. DEVICES (RAISED TOILET SEAT) MISC    Use daily as needed as directed    MONTELUKAST (SINGULAIR) 10 MG TABLET    TAKE ONE TABLET BY MOUTH ONCE NIGHTLY    NALOXONE (NARCAN) 4 MG/0.1ML LIQD NASAL SPRAY    Use as directed    OMEPRAZOLE (PRILOSEC) 20 MG DELAYED RELEASE CAPSULE    TAKE ONE CAPSULE BY MOUTH DAILY    OXYBUTYNIN (DITROPAN-XL) 5 MG EXTENDED RELEASE TABLET    TAKE ONE TABLET BY MOUTH DAILY    RESPIRATORY THERAPY SUPPLIES (NEBULIZER/TUBING/MOUTHPIECE) KIT    1 kit by Does not apply route daily    SPIRONOLACTONE (ALDACTONE) 25 MG TABLET    Take 1 tablet by mouth daily    TIZANIDINE (ZANAFLEX) 4 MG TABLET    Take 1 tablet by mouth in the morning and 1 tablet in the evening. TORSEMIDE (DEMADEX) 20 MG TABLET    TAKE TWO TABLETS BY MOUTH DAILY    VENLAFAXINE (EFFEXOR XR) 75 MG EXTENDED RELEASE CAPSULE    Take 2 capsules by mouth daily         Review of Systems:  (2-9 systems needed)  Review of Systems  Positive history as above in no acute fall contusion or twisting injury headache vision change neck or stiffness shortness of breath or chest pain.   No new onset incontinence or retention of bowel or bladder saddle numbness or increased heat or redness throughout including in the low back or any bruising or abrasions. No CVA tenderness. No saddle numbness or tingling. Distal pulses 2+ bilaterally at radialis and dorsalis pedis. Skin:     General: Skin is warm and dry. Capillary Refill: Capillary refill takes less than 2 seconds. Neurological:      Mental Status: She is alert and oriented to person, place, and time. Mental status is at baseline. Sensory: No sensory deficit. Motor: No weakness. Coordination: Coordination normal.   Psychiatric:         Mood and Affect: Mood normal.         Behavior: Behavior normal.       MEDICAL DECISION MAKING    Vitals:    Vitals:    12/30/22 1550 12/30/22 1704   BP: (!) 182/104 (!) 176/93   Pulse: 92    Resp: 18    Temp: 98.1 °F (36.7 °C)    TempSrc: Oral    SpO2: 100%    Weight: (!) 331 lb 9.2 oz (150.4 kg)    Height: 5' 4\" (1.626 m)        LABS:  Labs Reviewed   BASIC METABOLIC PANEL W/ REFLEX TO MG FOR LOW K - Abnormal; Notable for the following components:       Result Value    Chloride 95 (*)     All other components within normal limits   CBC WITH AUTO DIFFERENTIAL        RADIOLOGY:   Non-plain film images such as CT, Ultrasound and MRI are read by the radiologist. Sabina Mendoza PA-C have directly visualized the radiologic plain film image(s) with the below findings:      Interpretation per the Radiologist below, if available at the time of this note:    No orders to display     No results found. No results found.     MEDICAL DECISION MAKING / ED COURSE:      PROCEDURES:   Procedures    None    Patient was given:  Medications   HYDROcodone-acetaminophen (NORCO) 7.5-325 MG per tablet 1 tablet (has no administration in time range)   HYDROmorphone (DILAUDID) injection 0.5 mg (0.5 mg IntraVENous Given 12/30/22 1705)   methocarbamol (ROBAXIN) injection 1,000 mg (1,000 mg IntraVENous Given 12/30/22 1705)   ondansetron (ZOFRAN) injection 4 mg (4 mg IntraVENous Given 12/30/22 1704)   cloNIDine (CATAPRES) tablet 0.3 mg (0.3 mg Oral Given 12/30/22 1704)     Patient presents as above for evaluation and treatment and medications are given for pain and discomfort as well as patient's normal afternoon blood pressure medication dose. With this patient's blood pressure does trend down significantly. Also on recheck patient's pain is significantly decreased from her baseline which she states is 10 out of 10 every day. This is consistent with the electronic medical record note of her pain management healthcare provider whom she saw this month on the 20th and indicates patient's baseline is 10 out of 10 pain including in hips and knees and lower extremities. Differential diagnosis this patient includes lumbosacral radiculopathy, lumbar radiculopathy, paraspinal abscess, cauda equina, urinary tract infection, kidney stone, and lumbar strain or lumbosacral strain. The patient has no fevers or chills, no bilateral radiculopathy,  is not tachycardic or having urinary symptoms at this time. Baseline labs are done that showed no acute kidney injury or indication of infection at all. I am not suspicious of acute ischemic issue based on exam and see no indication of acute neurologic deficit. I suspect this is acute exacerbation of chronic low back pain with right sciatic nerve pain as well. Patient on recheck indicates that pain overall is significantly better but she feels like she is having a spasm in the right leg. We will give an ice pack as well as one of her daily pain medication tablets here. Otherwise patient without evidence of acute system compromise requiring further inpatient care and treatment. We will discharge her home with the following discharge instructions recommended and agreed upon with patient.     Ambulate with care using your cane, use your medications as written as well as medication written here, cold compresses 15 to 20 minutes a few times daily, and monitor for gradual improvement. Call your family doctor and back specialist on Monday morning to arrange further outpatient care and treatment. Return to the emergency department for difficulty breathing, unable to take liquids, acute one-sided paralysis, or other emergency worsening or concern    The patient tolerated their visit well. I evaluated the patient. The physician was available for consultation as needed. The patient and / or the family were informed of the results of any tests, a time was given to answer questions, a plan was proposed and they agreed with plan. I am the Primary Clinician of Record. CLINICAL IMPRESSION:  1. Acute exacerbation of chronic low back pain    2. Right sciatic nerve pain    3. Essential hypertension        DISPOSITION Decision To Discharge 12/30/2022 06:13:46 PM      PATIENT REFERRED TO:  MD Raymond V[address] [facility]  [phone]    Call       [facility]  [address]  [phone]            DISCHARGE MEDICATIONS:  New Prescriptions    GABAPENTIN (NEURONTIN) 300 MG CAPSULE    Take 1 capsule by mouth every 12 hours as needed (for worse pain) for up to 5 days.        DISCONTINUED MEDICATIONS:  Discontinued Medications    No medications on file              (Please note the MDM and HPI sections of this note were completed with a voice recognition program.  Efforts were made to edit the dictations but occasionally words are mis-transcribed.)    Electronically signed, Manuela Martinez PA-C,          Manuela Martinez PA-C  12/30/22 [id number]

## 2022-12-31 NOTE — ED NOTES
Discharge and education instructions reviewed. Patient verbalized understanding, teach-back successful. Patient denied questions at this time. No acute distress noted. Patient instructed to follow-up as noted - return to emergency department if symptoms worsen. Patient verbalized understanding. Discharged per EDMD with discharged instructions.        Chance Maldonado RN  12/30/22 [id number]

## 2023-01-12 ENCOUNTER — TELEMEDICINE (OUTPATIENT)
Dept: FAMILY MEDICINE CLINIC | Age: 48
End: 2023-01-12

## 2023-01-12 DIAGNOSIS — M54.31 RIGHT SIDED SCIATICA: Primary | ICD-10-CM

## 2023-01-12 DIAGNOSIS — M16.11 OSTEOARTHRITIS OF RIGHT HIP, UNSPECIFIED OSTEOARTHRITIS TYPE: ICD-10-CM

## 2023-01-12 RX ORDER — GABAPENTIN 300 MG/1
300 CAPSULE ORAL EVERY 12 HOURS PRN
Qty: 30 CAPSULE | Refills: 0 | Status: SHIPPED | OUTPATIENT
Start: 2023-01-12 | End: 2023-01-27

## 2023-01-12 NOTE — PROGRESS NOTES
1/12/2023    This is a 50 y.o. female   Chief Complaint   Patient presents with    Hip Pain     Patient is here for a ER follow up. She is having right side hip pain, she was at [address] on 12/30/23 and 1/7/23     HPI    Virtual visit to f/u for low back and hip pain    She went to the ED for this pain on 12/30 and on 1/7. There she was given medication to help control the pain, no red flags were noted on exam (reviewed notes) and encouraged to f/u with pain management and Ortho. -  notes sensation of burning pain in her buttocks area on the R, travels all the way down to her feet  - also has pain in the R groin and hip area  - currently using a TENS unit, ice. Follows with pain management specialist    Review of Systems     Current Outpatient Medications   Medication Sig Dispense Refill    gabapentin (NEURONTIN) 300 MG capsule Take 1 capsule by mouth every 12 hours as needed (for worse pain) for up to 5 days. 10 capsule 0    HYDROcodone-acetaminophen (NORCO) 7.5-325 MG per tablet Take 1 tablet by mouth every 8 hours as needed for Pain for up to 30 days. 90 tablet 0    acetaminophen (APAP EXTRA STRENGTH) 500 MG tablet Take 2 tablets by mouth every 6 hours as needed for Pain DO NOT TAKE WITH OTHER MEDICATIONS CONTAINING ACETAMINOPHEN. 30 tablet 0    tiZANidine (ZANAFLEX) 4 MG tablet Take 1 tablet by mouth in the morning and 1 tablet in the evening.  60 tablet 0    venlafaxine (EFFEXOR XR) 75 MG extended release capsule Take 2 capsules by mouth daily 60 capsule 0    oxybutynin (DITROPAN-XL) 5 MG extended release tablet TAKE ONE TABLET BY MOUTH DAILY 30 tablet 3    omeprazole (PRILOSEC) 20 MG delayed release capsule TAKE ONE CAPSULE BY MOUTH DAILY 90 capsule 1    montelukast (SINGULAIR) 10 MG tablet TAKE ONE TABLET BY MOUTH ONCE NIGHTLY 90 tablet 1    metoprolol (LOPRESSOR) 100 MG tablet TAKE ONE TABLET BY MOUTH TWICE A  tablet 1    metFORMIN (GLUCOPHAGE) 500 MG tablet TAKE ONE TABLET BY MOUTH TWICE A DAY WITH MEALS 60 tablet 2    Dulaglutide 3 MG/0.5ML SOPN Inject 3 mg into the skin once a week 4 Adjustable Dose Pre-filled Pen Syringe 5    cloNIDine (CATAPRES) 0.1 MG tablet TAKE THREE TABLETS BY MOUTH THREE TIMES A  tablet 2    chlorthalidone (HYGROTON) 25 MG tablet TAKE ONE TABLET BY MOUTH DAILY 90 tablet 1    albuterol sulfate HFA (PROAIR HFA) 108 (90 Base) MCG/ACT inhaler Inhale 2 puffs into the lungs every 6 hours as needed for Wheezing or Shortness of Breath 18 g 11    spironolactone (ALDACTONE) 25 MG tablet Take 1 tablet by mouth daily 90 tablet 1    ibuprofen (ADVIL;MOTRIN) 800 MG tablet TAKE ONE TABLET BY MOUTH EVERY MORNING AND TAKE ONE TABLET BY MOUTH EVERY EVENING WITH MEALS 60 tablet 1    torsemide (DEMADEX) 20 MG tablet TAKE TWO TABLETS BY MOUTH DAILY 60 tablet 11    albuterol sulfate HFA (PROAIR HFA) 108 (90 Base) MCG/ACT inhaler Inhale 2 puffs into the lungs every 6 hours as needed for Wheezing or Shortness of Breath 18 g 11    blood glucose test strips (TRUE METRIX BLOOD GLUCOSE TEST) strip 1 each by In Vitro route 2 times daily 200 each 11    Respiratory Therapy Supplies (NEBULIZER/TUBING/MOUTHPIECE) KIT 1 kit by Does not apply route daily 1 kit 0    ipratropium-albuterol (DUONEB) 0.5-2.5 (3) MG/3ML SOLN nebulizer solution Inhale 3 mLs into the lungs every 4 hours 360 mL 11    Blood Glucose Monitoring Suppl (TRUE METRIX METER) w/Device KIT 1 kit by Does not apply route 2 times daily 1 kit 0    Alcohol Swabs 70 % PADS 1 each by Does not apply route 2 times daily 200 each 0    albuterol sulfate  (90 Base) MCG/ACT inhaler INHALE TWO PUFFS BY MOUTH EVERY 6 HOURS AS NEEDED FOR WHEEZING 18 g 11    Blood Pressure Monitoring (B-D ASSURE BPM/AUTO ARM CUFF) MISC 1 Device by Does not apply route daily 1 each 0    Misc.  Devices (RAISED TOILET SEAT) MISC Use daily as needed as directed 1 each 0    loratadine (CLARITIN) 10 MG tablet TAKE ONE TABLET BY MOUTH DAILY 90 tablet 3    naloxone (NARCAN) 4 MG/0.1ML LIQD nasal spray Use as directed (Patient not taking: Reported on 12/12/2022) 1 each 0    Lancets MISC 1 each by Does not apply route 2 times daily Dispense brand per insurance preference 200 each 1     No current facility-administered medications for this visit. LMP  (LMP Unknown)     Physical Exam    Wt Readings from Last 3 Encounters:   12/30/22 (!) 331 lb 9.2 oz (150.4 kg)   12/20/22 (!) 339 lb (153.8 kg)   12/16/22 (!) 386 lb 9.6 oz (175.4 kg)     BP Readings from Last 3 Encounters:   12/30/22 (!) 180/105   12/20/22 (!) 155/92   12/16/22 130/80     Assessment/Plan:  1. Right sided sciatica  - AFL - Ana Luisa Bah MD, Physical Medicine and Rehabilitation, [facility]    2. Osteoarthritis of right hip, unspecified osteoarthritis type  - AFL - Ana Luisa Bah MD, Physical Medicine and Rehabilitation    I encouraged her to continue to follow with pain management. For her newer sciatica symptoms, I'm referring her to PM&R for consideration of a steroid injection (defer decision for need for MRI to their in-person eval. Reviewing ER notes show no physical exam red flag signs of saddle anesthesia, weakness, etc). Also referring to our PT group for potential rehab    Clayton Lefort, was evaluated through a synchronous (real-time) audio-video encounter. The patient (or guardian if applicable) is aware that this is a billable service, which includes applicable co-pays. This Virtual Visit was conducted with patient's (and/or legal guardian's) consent. The visit was conducted pursuant to the emergency declaration under the [address], [facility] authority and the [employer] General Act. Patient identification was verified, and a caregiver was present when appropriate. The patient was located at Home: [address] [facility]  [address] [facility] [address].    Provider was located at [facility] ([address] [place] [address]): [address] [place] [address] [address] [place] [address]. Total time spent for this encounter: Not billed by time    --Lavenia Klinefelter, MD on 1/12/2023 at 12:13 PM    An electronic signature was used to authenticate this note.

## 2023-01-17 ENCOUNTER — OFFICE VISIT (OUTPATIENT)
Dept: PAIN MANAGEMENT | Age: 48
End: 2023-01-17
Payer: MEDICAID

## 2023-01-17 ENCOUNTER — HOSPITAL ENCOUNTER (OUTPATIENT)
Dept: PHYSICAL THERAPY | Age: 48
Setting detail: THERAPIES SERIES
Discharge: HOME | End: 2023-01-17
Payer: MEDICAID

## 2023-01-17 VITALS
HEART RATE: 69 BPM | DIASTOLIC BLOOD PRESSURE: 85 MMHG | SYSTOLIC BLOOD PRESSURE: 135 MMHG | BODY MASS INDEX: 56.91 KG/M2 | HEIGHT: 64 IN | OXYGEN SATURATION: 96 % | TEMPERATURE: 97.7 F

## 2023-01-17 DIAGNOSIS — G89.29 CHRONIC BILATERAL LOW BACK PAIN WITHOUT SCIATICA: ICD-10-CM

## 2023-01-17 DIAGNOSIS — M16.11 PRIMARY OSTEOARTHRITIS OF RIGHT HIP: ICD-10-CM

## 2023-01-17 DIAGNOSIS — F41.9 ANXIETY: ICD-10-CM

## 2023-01-17 DIAGNOSIS — M54.50 CHRONIC BILATERAL LOW BACK PAIN WITHOUT SCIATICA: ICD-10-CM

## 2023-01-17 DIAGNOSIS — G89.4 CHRONIC PAIN SYNDROME: ICD-10-CM

## 2023-01-17 DIAGNOSIS — E66.01 CLASS 3 SEVERE OBESITY DUE TO EXCESS CALORIES WITH SERIOUS COMORBIDITY AND BODY MASS INDEX (BMI) OF 50.0 TO 59.9 IN ADULT (HCC): ICD-10-CM

## 2023-01-17 DIAGNOSIS — F32.A DEPRESSION, UNSPECIFIED DEPRESSION TYPE: ICD-10-CM

## 2023-01-17 DIAGNOSIS — M62.830 BACK SPASM: ICD-10-CM

## 2023-01-17 DIAGNOSIS — M17.0 PRIMARY OSTEOARTHRITIS OF BOTH KNEES: ICD-10-CM

## 2023-01-17 DIAGNOSIS — E11.42 DIABETIC POLYNEUROPATHY ASSOCIATED WITH TYPE 2 DIABETES MELLITUS (HCC): ICD-10-CM

## 2023-01-17 PROCEDURE — 3078F DIAST BP <80 MM HG: CPT | Performed by: NURSE PRACTITIONER

## 2023-01-17 PROCEDURE — G8427 DOCREV CUR MEDS BY ELIG CLIN: HCPCS | Performed by: NURSE PRACTITIONER

## 2023-01-17 PROCEDURE — 99214 OFFICE O/P EST MOD 30 MIN: CPT | Performed by: NURSE PRACTITIONER

## 2023-01-17 PROCEDURE — 3046F HEMOGLOBIN A1C LEVEL >9.0%: CPT | Performed by: NURSE PRACTITIONER

## 2023-01-17 PROCEDURE — 97161 PT EVAL LOW COMPLEX 20 MIN: CPT

## 2023-01-17 PROCEDURE — 4004F PT TOBACCO SCREEN RCVD TLK: CPT | Performed by: NURSE PRACTITIONER

## 2023-01-17 PROCEDURE — 2022F DILAT RTA XM EVC RTNOPTHY: CPT | Performed by: NURSE PRACTITIONER

## 2023-01-17 PROCEDURE — G8484 FLU IMMUNIZE NO ADMIN: HCPCS | Performed by: NURSE PRACTITIONER

## 2023-01-17 PROCEDURE — 97530 THERAPEUTIC ACTIVITIES: CPT

## 2023-01-17 PROCEDURE — 3074F SYST BP LT 130 MM HG: CPT | Performed by: NURSE PRACTITIONER

## 2023-01-17 PROCEDURE — G8417 CALC BMI ABV UP PARAM F/U: HCPCS | Performed by: NURSE PRACTITIONER

## 2023-01-17 RX ORDER — ACETAMINOPHEN 500 MG
1000 TABLET ORAL EVERY 6 HOURS PRN
Qty: 30 TABLET | Refills: 0 | Status: SHIPPED | OUTPATIENT
Start: 2023-01-17

## 2023-01-17 RX ORDER — HYDROCODONE BITARTRATE AND ACETAMINOPHEN 7.5; 325 MG/1; MG/1
1 TABLET ORAL EVERY 8 HOURS PRN
Qty: 90 TABLET | Refills: 0 | Status: SHIPPED | OUTPATIENT
Start: 2023-01-17 | End: 2023-02-16

## 2023-01-17 RX ORDER — TIZANIDINE 4 MG/1
4 TABLET ORAL EVERY 12 HOURS
Qty: 60 TABLET | Refills: 0 | Status: SHIPPED | OUTPATIENT
Start: 2023-01-17 | End: 2023-02-16

## 2023-01-17 NOTE — FLOWSHEET NOTE
[address], [place]  [address] [facility]  Phone: [phone]   Fax:     [phone]      Physical Therapy Daily/Aquatic Flow Sheet   Date:  2023    Patient Name:  Trent Ramirez    :  1975  MRN: [id number]    Restrictions/Precautions:    Pertinent Medical History:Additional Pertinent Hx: asthma, OA, COPD, CHF, DM, HTN, anxiety/depression, obesity    Medical/Treatment Diagnosis Information:   Medical Diagnosis: Right sided sciatica [M54.31]  Osteoarthritis of right hip, unspecified osteoarthritis type [M16.11]  Treatment Diagnosis: pain, dec ROM and strength limiting gait and ADLs    Insurance/Certification information:  PT Insurance Information: Agus Breaux 30 vpcy no auth  Physician Information:  Tommy Haile MD  Plan of care signed (Y/N): inbox    Date of Patient follow up with Physician:      Progress Report: []  Yes  [x]  No     Date Range for reporting period:  Beginnin2023  Ending:      Progress report due (10 Rx/or 30 days whichever is less): 12     Recertification due (POC duration/ or 90 days whichever is less):      Visit # POC/Insurance Allowable Auth Needed     30 vpcy []Yes   [x]No     Latex Allergy:  [x]NO      []YES  Preferred Language for Healthcare:   [x]English       []Other:    Functional Scale:     Date assessed: at eval  Test:Oswestry  Score:30    Pain level: 7/10    History of Injury: Pt with progressive OA pain R hip with new onset of R LE sciatica that stems from the hip/groin and radiates up hip to buttock and back and thigh. She went to ER for near fall after hip locked up on 22 and 23. Consideration for possible R THR  but needs to lose 80 pounds. Had steroid injection in R hip years ago with inc c/o. Pt has [address] membership but unable to use b/c fear of falling/staying mostly housebound.       Pt amb with std cane in R hand with sig limp and several standing breaks [facility] benefits of walker -  pt says she owns one but it is too old, rusted and worn out; will request script for wheeled walker with seat and give to pt when rec'd    Subjective:  see eval     Objective:   Observation:   Test measurements:    Land-based Visits Exercises/Activities:  Therapeutic Ex (44920)  Min: Resistance/Repetitions Notes                  Therapeutic Activity (81096) Min:25 See below                   NMR re-education (18676) Min:                          Manual Intervention ([address] [facility])  Min:                    Modalities  Min:               Aquatic Visits Exercises/Activities:  Aquatic Abbreviation Key  B= Belt DB= Dumbells T= Theratube   G=Gloves N= Noodles W= Weights   P= Paddles WW=Water Walker K= Kickboard        Transfers:         % Immersion:             Ambulation:   Exercises UE:      Forwards  Shoulder Shrugs     Lateral  Shoulder circles     Marching    Scapular Retraction      Retro   Rolling      Cariocas  Push Downs    Multifidus walkouts w/paddle  IR/ER      Punching    Stretching:  Rowing    Gastroc/Soleus   Elbow Flex/Ext    Hamstring   Shldr Flex/Ext     Knee flex stretch   Shldr Abd/Add    Piriformis   Horiz Abd/Add     Hip flexor    Arm Circles     SKTC   PNF Diagonals    DKTC  UE oscillations f/b, s/s    ITB   Wall Push-ups    Quad  Figure 8's    Mid back   Buoy pull/push downs    UT  Tband rows    Rhom  Tband lats    Post Shoulder  Lumbar Rot w/ paddles    Pec   Small ball pull downs                   diagonals             Cervical:       AROM Flex       AROM Extension     LEs exercises:   AROM Side Bending    Marching   AROM Rotation    Heel Raises   Chin tucks    Toe Raises   Chin nods     Squats        Hamstring Curls        Hip Flexion   Balance:      Hip Abduction  SLS    Hip Circles  Tandem stance    TA set  NBOS eyes open    Glut Set  NBOS eyes closed    Hip Extension  Hand to Opposite Knee    Hip Adduction    Box Step     Hip IR   Noodle Stance     Hip ER  Stop/Go Gait    Fig 8's  Switch Gait                Seated:  Functional:    Ankle Pumps  Step up forward    Ankle circles  Step up lateral    Knee flex & ext  Step down    Hip Abd & Adduction  Lawrence squats    Bicycle   Crate Lifts    Add Set with ball  Lunges forward    LX stab with med ball throws  Lunges lateral    Ankle INV  Lunges retro    Ankle EV  Lower ab curl with noodle      Upper ab curl with ball      Med ball straight lifts      Med ball diagonal lifts      Hydrorider          Noodle:      Leg Press  Deep Water:    Noodle hang at wall  Jog    Noodle hang deep water  Jumping Jacks    Noodle Bicycle  Heel to toe      Hand to opposite knee      Cross country skier      Rocking Horse      Other Therapeutic Activities:  Pt was educated on PT POC, Diagnosis, Prognosis, pathomechanics as well as frequency and duration of scheduling future physical therapy appointments. Time was also taken on this day to answer all patient questions and participation in PT. Reviewed appointment policy in detail with patient and patient verbalized understanding. X 25 min Included benefits of Aquatic PT vs land based PT; importance of use of wh walker now for dec stress on R hip and protection from falls. Pt declined pool tour - member of [employer]; issued paperwork/pool info. Home Exercise Program:  Patient was instructed in the following for HEP:     . Patient verbalized/demonstrated understanding and was issued written handout. Charges:   Therapeutic Exercise and NMR EXR  [] (65686) Provided verbal/tactile cueing for activities related to strengthening, flexibility, endurance, ROM for improvements in LE, proximal hip, and core control with self care, mobility, lifting, ambulation.  [] (78481) Provided verbal/tactile cueing for activities related to improving balance, coordination, kinesthetic sense, posture, motor skill, proprioception  to assist with LE, proximal hip, and core control in self care, mobility, lifting, ambulation and eccentric single leg control. NMR and Therapeutic Activities:    [x] (87742 or 80822) Provided verbal/tactile cueing for activities related to improving balance, coordination, kinesthetic sense, posture, motor skill, proprioception and motor activation to allow for proper function of core, proximal hip and LE with self care and ADLs and functional mobility.   [] (22115) Gait Re-education- Provided training and instruction to the patient for proper LE, core and proximal hip recruitment and positioning and eccentric body weight control with ambulation re-education including up and down stairs     Home Exercise Program:    [x] (32262) Reviewed/Progressed HEP activities related to strengthening, flexibility, endurance, ROM of core, proximal hip and LE for functional self-care, mobility, lifting and ambulation/stair navigation   [] (85888)Reviewed/Progressed HEP activities related to improving balance, coordination, kinesthetic sense, posture, motor skill, proprioception of core, proximal hip and LE for self care, mobility, lifting, and ambulation/stair navigation      Manual Treatments:  PROM / STM / Oscillations-Mobs:  G-I, II, III, IV (PA's, Inf., Post.)  [] (90706) Provided manual therapy to mobilize LE, proximal hip and/or LS spine soft tissue/joints for the purpose of modulating pain, promoting relaxation,  increasing ROM, reducing/eliminating soft tissue swelling/inflammation/restriction, improving soft tissue extensibility and allowing for proper ROM for normal function with self care, mobility, lifting and ambulation.      Individual Aquatic Therapy:  [] (71969) Provided verbal and tactile cueing for strengthening, flexibility, ROM using the therapeutic properties of water (buoyancy, resistance) for improvements in core control, mobility and ambulation     Aquatic Group:  [] (37722) Provided intermittent verbal and tactile cueing for strengthening, endurance, flexibility and ROM for 2-4 individuals that do not require one-on one patient contact by the therapist       Land Timed Code Treatment Minutes: 25   Land Total Treatment Minutes: [place] [address] Minutes:    Aquatic Group Minutes:      [x] EVAL (LOW) 06388 (typically 20 minutes face-to-face)  [] EVAL (MOD) 36606 (typically 30 minutes face-to-face)  [] EVAL (HIGH) 43033 (typically 45 minutes face-to-face)  [] RE-EVAL     [] JX(72044) x     [] Dry needle 1 or 2 Muscles (99989)  [] NMR (29702) x     [] Dry needle 3+ Muscles (55063)  [] Manual (12871) x     [] Ultrasound (22256) x  [x] TA (83533) x  2   [] Mech Traction (84463)  [] ES(attended) (20206)     [] ES (un) (45121):   [] Vasopump (78295) [] Ionto (02349)   [] Aquatic Ind (79277) x    [] Aquatic Group (75853) x   [] Other:    Treatment/Activity Tolerance:  [] Patient tolerated treatment well [] Patient limited by fatigue  [x] Patient limited by pain  [] Patient limited by other medical complications  [] Other:     Prognosis: [x] Good [] Fair  [] Poor    GOALS:  Patient stated goal: \"standing\"  [] Progressing: [] Met: [] Not Met: [] Adjusted  Therapist goals for Patient:   Short Term Goals: To be achieved in: 2 weeks  1. Independent in HEP and progression per patient tolerance, in order to prevent re-injury. [] Progressing: [] Met: [] Not Met: [] Adjusted  2. Patient will have a decrease in pain by 20-30% to facilitate improvement in movement, function, and ADLs as indicated by Functional Deficits. [] Progressing: [] Met: [] Not Met: [] Adjusted  3. Patient will misty 40 min pool ex. [] Progressing: [] Met: [] Not Met: [] Adjusted  4. Patient will amb w/ wheeled walker with improved tech/WB/balance. [] Progressing: [] Met: [] Not Met: [] Adjusted     Long Term Goals: To be achieved in: at d/c  1. Increase Oswestry functional outcome score by 10 points to assist with reaching prior level of function. [] Progressing: [] Met: [] Not Met: [] Adjusted  2.  Patient will have a decrease in pain by 40-50% to facilitate improvement in movement, function, and ADLs as indicated by Functional Deficits. [] Progressing: [] Met: [] Not Met: [] Adjusted  3. Patient will demonstrate increased AROM and LE flex to min-mod dec to allow for proper joint functioning as indicated by patients Functional Deficits. [] Progressing: [] Met: [] Not Met: [] Adjusted  4. Patient will demonstrate an increase in Strength to 4/5 R LE to allow for proper functional mobility as indicated by patients Functional Deficits. [] Progressing: [] Met: [] Not Met: [] Adjusted  5. Patient will return to sleeping in bed without increased symptoms or restriction. [] Progressing: [] Met: [] Not Met: [] Adjusted  6. \"Walking\"(patient specific functional goal)    [] Progressing: [] Met: [] Not Met: [] Adjusted         Patient Requires Follow-up: [x] Yes  [] No    Plan:   [x] Continue per plan of care [] Alter current plan (see comments)  [] Plan of care initiated [] Hold pending MD visit [] Discharge    Plan for Next Session:      Electronically signed by:  Rodrigo Rodriguez, PT MPT [id number]    Note: If patient does not return for scheduled/recommended follow up visits, this note will serve as a discharge from care along with the most recent update on progress.

## 2023-01-17 NOTE — PLAN OF CARE
[facility], [facility]  [address] [facility] [place], [facility]  Phone: [phone]   Fax:     [phone]                                                       Physical Therapy Certification    Dear Terrie Snellen, MD,    We had the pleasure of evaluating the following patient for physical therapy services at [facility]. A summary of our findings can be found in the initial assessment below. This includes our plan of care. If you have any questions or concerns regarding these findings, please do not hesitate to contact me at the office phone number checked above. Thank you for the referral.       Physician Signature:_______________________________Date:__________________  By signing above (or electronic signature), therapists plan is approved by physician        Patient: Alex Watson   : 1975   MRN: [id number]  Referring Physician: Terrie Snellen, MD      Evaluation Date: 2023      Medical Diagnosis Information:  Medical Diagnosis: Right sided sciatica [M54.31]  Osteoarthritis of right hip, unspecified osteoarthritis type [M16.11]   Treatment Diagnosis: pain, dec ROM and strength limiting gait and ADLs                                         Insurance information: PT Insurance Information: [employer] Form 30 vpcy no auth       Precautions/ Contra-indications:   Latex Allergy:  []NO      [x]YES  Preferred Language for Healthcare:   [x]English       []other:    C-SSRS Triggered by Intake questionnaire (Past 2 wk assessment ):   [] No, Questionnaire did not trigger screening. [x] Yes, Patient intake triggered C-SSRS Screening      [x] C-SSRS Screening completed  [x] PCP notified via Epic   C-SSRS Triggered by Intake questionnaire:     YES NO   In the past month, have you wished you were dead or wished you could go to sleep and not wake up?   X   In the past month, have you had any thoughts of killing yourself? X                    Lifetime   YES NO   Have you ever done anything, started to do anything, or prepared to do anything to end your life? X             Past 3 months    X         SUBJECTIVE: Patient stated complaint:  Pt with progressive OA pain R hip with new onset of R LE sciatica that stems from the hip/groin and radiates up hip to buttock and back and thigh. She went to ER for near fall after hip locked up on 12/30/22 and 1/7/23. Consideration for possible R THR  but needs to lose 80 pounds. Had steroid injection in R hip years ago with inc c/o. Pt has [facility] [employer] membership but unable to use b/c fear of falling/staying mostly housebound.      Relevant Medical History:Additional Pertinent Hx: asthma, OA, COPD, CHF, DM, HTN, anxiety/depression, obesity  Functional Scale/Score: MODIFIED OSWESTRY = raw 30    Pain Scale: 7-8/10  Easing factors: ice packs and heat    Provocative factors: WB activities      Type: [x]Constant   []Intermittent  []Radiating []Localized []other:     Numbness/Tingling: in back > leg    Occupation/School: NA    Living Status/Prior Level of Function: Independent with ADLs and IADLs,     OBJECTIVE:   Palpation: mod TTP R back, lat hip and into front of R thigh/groin    Functional Mobility/Transfers: independent but significant pain and dec WB on R/limp; pt sleeps in recliner - unable to get in/out of bed; unable to step into tub to shower/bathe - doing sponge baths      Posture: dec WB on R LE; shifted to L     Gait: (include devices/WB status) with std cane in R hand with sig limp; edu on benefits of walker -  pt says she owns one but it is too old, rusted and worn out; will request script for wheeled walker with seat     Bandages/Dressings/Incisions: NA    Repeated Movements:NT     ROM  Comments   Lumbar Flex Mod dec  pn   Lumbar Ext Max dec  pn     ROM LEFT RIGHT Comments   Lumbar Side Bend Mod dec  Max dec  Pn both    Lumbar Rotation      Quadrant      Hip Flexion      Hip Abd      Hip ER      Hip IR      Hip Extension      Knee Ext      Knee Flex      Hamstring Flex      Piriformis      Steven test       Seated flex testing   Hip flex   Hip abd   HS         Max dec  Min dec   Pn  Pn  Pn  Declined laying flat due to pain and limitations with bed mobility       Myotomes/Strength Left  Right Comments   [x]ALL NORMAL MYOTOMES      Hip Abd      Hip Ext      Hip flexion (L1-L2 femoral) 5 2    Knee extension (L2-L4 femoral) 5 3-    Knee flexion (S1 sciatic) 5 4-    Dorsiflexion (L4-L5 deep peroneal) 5 4-    Great Toe Ext (L5 deep peroneal nerve)      Ankle Eversion (S1-S2 super peroneal)      Ankle PF(S1-S2 tibial)      Multifidus      Transverse Ab        Dermatomes Normal Abnormal Comments   []ALL NORMAL   Reports tingling in back and intermittently in LE         inguinal area (L1)  [] []    anterior mid-thigh (L2) [] []    distal ant thigh/med knee (L3) [] []    medial lower leg and foot (L4) [] []    lateral lower leg and foot (L5) [] []    posterior calf (S1) [] []    medial calcaneus (S2) [] []      Reflexes: NT Normal Abnormal Comments   []ALL NORMAL            S1-2 Seated achilles [] []    S1-2 Prone knee bend [] []    L3-4 Patellar tendon [] []    C5-6 Biceps [] []    C6 Brachioradialis [] []    C7-8 Triceps [] []    Clonus [] []    Babinski [] []    Shepherd's [] []      Joint mobility: NT   []Normal    []Hypo   []Hyper      Orthopedic Special Tests: NT  - pt declining laying flat today due to pain and difficulty with bed mobility   Neural dynamic tension testing Normal Abnormal Comments   Slump Test  - Degree of knee flexion:  [] []    SLR  [] []    0-30 [] []    30-70 [] []    Femoral nerve (L2-4) [] []       Normal Abnormal N/A Comments   Fwd Bend-aberrant or innominate mvmt) [] [] []    Trendelenburg [] [] []    Kemps/Quadrant [] [] []    Stork [] [] []    MIGUELANGEL/Dion [] [] []    Hip scour [] [] []    Supine to sit [] [] []    Prone knee bend [] [] [] Hip thrust [] [] []    SI distraction/compression [] [] []    Sacral Spring/thrust [] [] []               [x] Patient history, allergies, meds reviewed. Medical chart reviewed. See intake form. Review Of Systems (ROS):  [x]Performed Review of systems (Integumentary, CardioPulmonary, Neurological) by intake and observation. Intake form has been scanned into medical record. Patient has been instructed to contact their primary care physician regarding ROS issues if not already being addressed at this time. Co-morbidities/Complexities (which will affect course of rehabilitation):   []None           Arthritic conditions   []Rheumatoid arthritis (M05.9)  []Osteoarthritis (M19.91)   Cardiovascular conditions   []Hypertension (I10)  []Hyperlipidemia (E78.5)  []Angina pectoris (I20)  []Atherosclerosis (I70)  []CVA Musculoskeletal conditions   []Disc pathology   []Congenital spine pathologies   []Prior surgical intervention  []Osteoporosis (M81.8)  []Osteopenia (M85.8)   Endocrine conditions   []Hypothyroid (E03.9)  []Hyperthyroid Gastrointestinal conditions   []Constipation (Y51.47)   Metabolic conditions   []Morbid obesity (E66.01)  []Diabetes type 1(E10.65) or 2 (E11.65)   []Neuropathy (G60.9)     Pulmonary conditions   []Asthma (J45)  []Coughing   []COPD (J44.9)   Psychological Disorders  []Anxiety (F41.9)  []Depression (F32.9)   []Other:   [x]Other:     See PMH listed above       Barriers to/and or personal factors that will affect rehab potential:              []Age  []Sex    []Smoker              []Motivation/Lack of Motivation                        [x]Co-Morbidities              []Cognitive Function, education/learning barriers              []Environmental, home barriers              []profession/work barriers  []past PT/medical experience  []other:  Justification:     Falls Risk Assessment (30 days):   [] Falls Risk assessed and no intervention required.   [x] Falls Risk assessed and Patient requires intervention due to being higher risk   TUG score (>12s at risk):     [x] Falls education provided, including: home assessment; use of walker vs cane         ASSESSMENT:   Functional Impairments:     [x]Noted lumbar/proximal hip hypomobility   []Noted lumbosacral and/or generalized hypermobility   [x]Decreased Lumbosacral/hip/LE functional ROM   [x]Decreased core/proximal hip strength and neuromuscular control    [x]Decreased LE functional strength    []Abnormal reflexes/sensation/myotomal/dermatomal deficits  [x]Reduced balance/proprioceptive control    []other:      Functional Activity Limitations (from functional questionnaire and intake)   [x]Reduced ability to tolerate prolonged functional positions   [x]Reduced ability or difficulty with changes of positions or transfers between positions   [x]Reduced ability to maintain good posture and demonstrate good body mechanics with sitting, bending, and lifting   [x]Reduced ability to sleep   [x] Reduced ability or tolerance with driving and/or computer work   [x]Reduced ability to perform lifting, reaching, carrying tasks   [x]Reduced ability to squat   [x]Reduced ability to forward bend   [x]Reduced ability to ambulate prolonged functional periods/distances/surfaces   [x]Reduced ability to ascend/descend stairs   []other:       Participation Restrictions   [x]Reduced participation in self care activities   [x]Reduced participation in home management activities   []Reduced participation in work activities   [x]Reduced participation in social activities. []Reduced participation in sport/recreational activities. Classification:   []Signs/symptoms consistent with Lumbar instability/stabilization subgroup. []Signs/symptoms consistent with Lumbar mobilization/manipulation subgroup, myotomes and dermatomes intact. Meets manipulation criteria.     []Signs/symptoms consistent with Lumbar direction specific/centralization subgroup   []Signs/symptoms consistent with Lumbar traction subgroup       []Signs/symptoms consistent with lumbar facet dysfunction   []Signs/symptoms consistent with lumbar stenosis type dysfunction   []Signs/symptoms consistent with nerve root involvement including myotome & dermatome dysfunction   []Signs/symptoms consistent with post-surgical status including: decreased ROM, strength and function. []signs/symptoms consistent with pathology which may benefit from Dry needling     [x]other:  signs/symptoms consistent with R hip OA and lumbar spine c/o due to severe antalgic gait     Prognosis/Rehab Potential:      []Excellent   [x]Good    []Fair   []Poor    Tolerance of evaluation/treatment:    []Excellent   [x]Good    []Fair   []Poor     Physical Therapy Evaluation Complexity Justification  [x] A history of present problem with:  [] no personal factors and/or comorbidities that impact the plan of care;  [x]1-2 personal factors and/or comorbidities that impact the plan of care  []3 personal factors and/or comorbidities that impact the plan of care  [x] An examination of body systems using standardized tests and measures addressing any of the following: body structures and functions (impairments), activity limitations, and/or participation restrictions;:  [] a total of 1-2 or more elements   [] a total of 3 or more elements   [x] a total of 4 or more elements   [x] A clinical presentation with:  [x] stable and/or uncomplicated characteristics   [] evolving clinical presentation with changing characteristics  [] unstable and unpredictable characteristics;   [x] Clinical decision making of [x] low, [] moderate, [] high complexity using standardized patient assessment instrument and/or measurable assessment of functional outcome.     [x] EVAL (LOW) 98847 (typically 20 minutes face-to-face)  [] EVAL (MOD) 32285 (typically 30 minutes face-to-face)  [] EVAL (HIGH) 91421 (typically 45 minutes face-to-face)  [] RE-EVAL     PLAN: Begin PT focusing on: proximal hip mobilizations, LB mobs, LB core activation, proximal hip activation, and HEP    Frequency/Duration:  2-3 days per week for 4-8 Weeks Aquatic PT: Interventions:  [x]  Therapeutic exercise including: strength training, ROM, for LE, Glutes and core   [x]  NMR activation and proprioception for glutes , LE and Core   [x]  Manual therapy as indicated for Hip complex, LE and spine to include: Dry Needling/IASTM, STM, PROM, Gr I-IV mobilizations, manipulation. [x]  Modalities as needed that may include: thermal agents, E-stim, Biofeedback, US, iontophoresis as indicated  [x]  Patient education on joint protection, postural re-education, activity modification, progression of HEP. [x]  Aquatic exercise including: strength training, ROM and balance for LE, Glutes and core     HEP instruction: see flowsheet     GOALS:  Patient stated goal: \"standing\"  [] Progressing: [] Met: [] Not Met: [] Adjusted  Therapist goals for Patient:   Short Term Goals: To be achieved in: 2 weeks  1. Independent in HEP and progression per patient tolerance, in order to prevent re-injury. [] Progressing: [] Met: [] Not Met: [] Adjusted  2. Patient will have a decrease in pain by 20-30% to facilitate improvement in movement, function, and ADLs as indicated by Functional Deficits. [] Progressing: [] Met: [] Not Met: [] Adjusted  3. Patient will misty 40 min pool ex. [] Progressing: [] Met: [] Not Met: [] Adjusted  4. Patient will amb w/ wheeled walker with improved tech/WB/balance. [] Progressing: [] Met: [] Not Met: [] Adjusted    Long Term Goals: To be achieved in: at d/c  1. Increase Oswestry functional outcome score by 10 points to assist with reaching prior level of function. [] Progressing: [] Met: [] Not Met: [] Adjusted  2. Patient will have a decrease in pain by 40-50% to facilitate improvement in movement, function, and ADLs as indicated by Functional Deficits. [] Progressing: [] Met: [] Not Met: [] Adjusted  3. Patient will demonstrate increased AROM and LE flex to min-mod dec to allow for proper joint functioning as indicated by patients Functional Deficits. [] Progressing: [] Met: [] Not Met: [] Adjusted  4. Patient will demonstrate an increase in Strength to 4/5 R LE to allow for proper functional mobility as indicated by patients Functional Deficits. [] Progressing: [] Met: [] Not Met: [] Adjusted  5. Patient will return to sleeping in bed without increased symptoms or restriction. [] Progressing: [] Met: [] Not Met: [] Adjusted  6. \"Walking\"(patient specific functional goal)    [] Progressing: [] Met: [] Not Met: [] Adjusted     Electronically signed by:  Mike Gray, PTMPT [id number]        Note: If patient does not return for scheduled/recommended follow up visits, this note will serve as a discharge from care along with the most recent update on progress.

## 2023-01-17 NOTE — PROGRESS NOTES
Ford Salcedo  1975  [id number]      HISTORY OF PRESENT ILLNESS: Ms. Natasha Fair is a 50 y.o. female returns for a follow up visit for pain management  She has a diagnosis of   1. Chronic pain syndrome    2. Primary osteoarthritis of both knees    3. Primary osteoarthritis of right hip    4. Chronic bilateral low back pain without sciatica    5. Back spasm    6. Diabetic polyneuropathy associated with type 2 diabetes mellitus ([address]    7. Depression, unspecified depression type    8. Anxiety    9. Class 3 severe obesity due to excess calories with serious comorbidity and body mass index (BMI) of 50.0 to 59.9 in [facility])      As per Information Obtained from the PADT (Patient Assessment and Documentation Tool)    She complains of pain in the  lower back, right hip, both knees  She rates the pain 10/10 and describes it as aching, burning, numbness, pins and needles, stabbing. Current treatment regimen has helped relieve about 30% of the pain. She denies any side effects from the current pain regimen. Patient reports that since the last follow up visit the physical functioning is worse, family/social relationships are unchanged, mood is worse sleep patterns are worse, and that the overall functioning is worse. Patient denies misusing/abusing her narcotic pain medications or using any illegal drugs. Upon obtaining medical history from Ms. Natasha Fair states that pain is not manageable on current pain therapy. Takes the pain medications as prescribed. Reports having been treated in the ER for Lumbar pain as well as Hip pain on 2 occasion. Contacted Dr. Juan David Lacey office recommended she schedule an appointment for Lumbar spine pain, was previously treated fhip pain. Mood/anxiety is stable. Sleep is fair with an average of 5-6 hours. Denies to having issues of constipation. Tolerating activities/house chores with moderate tenderness to the lower back.      ALLERGIES: Patients list of allergies were reviewed MEDICATIONS: Ms. Jana Stephen list of medications were reviewed. Her current medications are   Outpatient Medications Prior to Visit   Medication Sig Dispense Refill    gabapentin (NEURONTIN) 300 MG capsule Take 1 capsule by mouth every 12 hours as needed (for worse pain) for up to 15 days.  30 capsule 0    loratadine (CLARITIN) 10 MG tablet TAKE ONE TABLET BY MOUTH DAILY 90 tablet 3    venlafaxine (EFFEXOR XR) 75 MG extended release capsule Take 2 capsules by mouth daily 60 capsule 0    oxybutynin (DITROPAN-XL) 5 MG extended release tablet TAKE ONE TABLET BY MOUTH DAILY 30 tablet 3    omeprazole (PRILOSEC) 20 MG delayed release capsule TAKE ONE CAPSULE BY MOUTH DAILY 90 capsule 1    montelukast (SINGULAIR) 10 MG tablet TAKE ONE TABLET BY MOUTH ONCE NIGHTLY 90 tablet 1    metoprolol (LOPRESSOR) 100 MG tablet TAKE ONE TABLET BY MOUTH TWICE A  tablet 1    metFORMIN (GLUCOPHAGE) 500 MG tablet TAKE ONE TABLET BY MOUTH TWICE A DAY WITH MEALS 60 tablet 2    Dulaglutide 3 MG/0.5ML SOPN Inject 3 mg into the skin once a week 4 Adjustable Dose Pre-filled Pen Syringe 5    cloNIDine (CATAPRES) 0.1 MG tablet TAKE THREE TABLETS BY MOUTH THREE TIMES A  tablet 2    chlorthalidone (HYGROTON) 25 MG tablet TAKE ONE TABLET BY MOUTH DAILY 90 tablet 1    albuterol sulfate HFA (PROAIR HFA) 108 (90 Base) MCG/ACT inhaler Inhale 2 puffs into the lungs every 6 hours as needed for Wheezing or Shortness of Breath 18 g 11    spironolactone (ALDACTONE) 25 MG tablet Take 1 tablet by mouth daily 90 tablet 1    ibuprofen (ADVIL;MOTRIN) 800 MG tablet TAKE ONE TABLET BY MOUTH EVERY MORNING AND TAKE ONE TABLET BY MOUTH EVERY EVENING WITH MEALS 60 tablet 1    torsemide (DEMADEX) 20 MG tablet TAKE TWO TABLETS BY MOUTH DAILY 60 tablet 11    albuterol sulfate HFA (PROAIR HFA) 108 (90 Base) MCG/ACT inhaler Inhale 2 puffs into the lungs every 6 hours as needed for Wheezing or Shortness of Breath 18 g 11    blood glucose test strips (TRUE METRIX BLOOD GLUCOSE TEST) strip 1 each by In Vitro route 2 times daily 200 each 11    Respiratory Therapy Supplies (NEBULIZER/TUBING/MOUTHPIECE) KIT 1 kit by Does not apply route daily 1 kit 0    ipratropium-albuterol (DUONEB) 0.5-2.5 (3) MG/3ML SOLN nebulizer solution Inhale 3 mLs into the lungs every 4 hours 360 mL 11    Blood Glucose Monitoring Suppl (TRUE METRIX METER) w/Device KIT 1 kit by Does not apply route 2 times daily 1 kit 0    Lancets MISC 1 each by Does not apply route 2 times daily Dispense brand per insurance preference 200 each 1    Alcohol Swabs 70 % PADS 1 each by Does not apply route 2 times daily 200 each 0    albuterol sulfate  (90 Base) MCG/ACT inhaler INHALE TWO PUFFS BY MOUTH EVERY 6 HOURS AS NEEDED FOR WHEEZING 18 g 11    Blood Pressure Monitoring (B-D ASSURE BPM/AUTO ARM CUFF) MISC 1 Device by Does not apply route daily 1 each 0    Misc. Devices (RAISED TOILET SEAT) MISC Use daily as needed as directed 1 each 0    HYDROcodone-acetaminophen (NORCO) 7.5-325 MG per tablet Take 1 tablet by mouth every 8 hours as needed for Pain for up to 30 days. 90 tablet 0    acetaminophen (APAP EXTRA STRENGTH) 500 MG tablet Take 2 tablets by mouth every 6 hours as needed for Pain DO NOT TAKE WITH OTHER MEDICATIONS CONTAINING ACETAMINOPHEN. 30 tablet 0    tiZANidine (ZANAFLEX) 4 MG tablet Take 1 tablet by mouth in the morning and 1 tablet in the evening. 60 tablet 0    naloxone (NARCAN) 4 MG/0.1ML LIQD nasal spray Use as directed (Patient not taking: Reported on 12/12/2022) 1 each 0     No facility-administered medications prior to visit. SOCIAL/FAMILY/PAST MEDICAL HISTORY: Ms. Linda Carreon, family and past medical history was reviewed. REVIEW OF SYSTEMS:    Respiratory: Negative for apnea, chest tightness and shortness of breath or change in baseline breathing. Gastrointestinal: Negative for nausea, vomiting, abdominal pain, diarrhea, constipation, blood in stool and abdominal distention. PHYSICAL EXAM:   Nursing note and vitals reviewed. /85   Pulse 69   Temp 97.7 °F (36.5 °C)   Ht 5' 4\" (1.626 m)   LMP  (LMP Unknown)   SpO2 96%   BMI 56.91 kg/m²   Constitutional: She appears well-developed and well-nourished. No acute distress. Skin: Skin is warm and dry, good turgor. No rash noted. She is not diaphoretic. Cardiovascular: Normal rate, regular rhythm, normal heart sounds, and does not have murmur. Pulmonary/Chest: Effort normal. No respiratory distress. She does not have wheezes in the lung fields. She has no rales. Neurological/Psychiatric:She is alert and oriented to person, place, and time. Coordination is  normal. +Lumbar spine pain Her mood isAppropriate and affect is Neutral/Euthymic(normal) . Prescription pain medication monitoring:                  MEDD current = 22.50              ORT Score = 11 high risk              Other Risk factors - (mood) Depression/Anxiety              Date of Last Medication Agreement: 3/16/22              Date Naloxone prescribed: 6/21/22              UDT:                          Date of last UDT: 6/21/22                          Adverse report: No              OARRS:                          Checked today: Yes                          Adverse report: No    IMPRESSION:   1. Chronic pain syndrome    2. Primary osteoarthritis of both knees    3. Primary osteoarthritis of right hip    4. Chronic bilateral low back pain without sciatica    5. Back spasm    6. Diabetic polyneuropathy associated with type 2 diabetes mellitus ([facility] [address]    7. Depression, unspecified depression type    8. Anxiety    9.  Class 3 severe obesity due to excess calories with serious comorbidity and body mass index (BMI) of 50.0 to 59.9 in adult [facility])        PLAN:  Informed verbal consent was obtained:  -Patient's opioid therapy will be maintained at current dose  -Home exercises/Toyin exercises recommended  -CT scan of the Lumbar spine, recommended she f/u with orthopedics for arthritis to the right Hip  -Referral will be sent to [facility] for eval/tx  -CBT techniques- relaxation therapies such as biofeedback, mindfulness based stress reduction, imagery, cognitive restructuring, problem solving discussed with patient   -She was advised weight reduction, diet changes- 800-1200 fior diet, diet diary, exercising, nutritional  consult increased physical activity as tolerated   -Last UDS 6/21/22 consistent  -Return in about 4 weeks (around 2/14/2023). Analgesic Plan:              Continue present regimen: Norco 7.5-325 mg tabs q8h prn              Adjust dose of present analgesic: No              Switch analgesics: No              Add/Adjust concomitant therapy: Zanaflex, Effexor, Tylenol, Narcan    I will continue her current medication regimen  which is part of the above treatment schedule. It has been helping with Ms. Drake Champ chronic  medical problems which for this visit include:   Diagnoses of Chronic pain syndrome, Primary osteoarthritis of both knees, Primary osteoarthritis of right hip, Chronic bilateral low back pain without sciatica, Back spasm, Diabetic polyneuropathy associated with type 2 diabetes mellitus ([address] Depression, unspecified depression type, Anxiety, and Class 3 severe obesity due to excess calories with serious comorbidity and body mass index (BMI) of 50.0 to 59.9 in [facility]) were pertinent to this visit. Risks and benefits of the medications and other alternative treatments  including no treatment were discussed with the patient. The common side effects of these medications were also explained to the patient. Informed verbal consent was obtained. Goals of current treatment regimen include improvement in pain, restoration of functioning- with focus on improvement in physical performance, general activity, work or disability,emotional distress, health care utilization and  decreased medication consumption.  Will continue to monitor progress towards achieving/maintaining therapeutic goals with special emphasis on  1. Improvement in perceived interfernce  of pain with ADL's. Ability to do home exercises independently. Ability to do household chores indoor and/or outdoor work and social and leisure activities. Improve psychosocial and physical functioning. - she is showing progression towards this treatment goal with the current regimen. She was advised against drinking alcohol with the narcotic pain medicines, advised against driving or handling machinery while adjusting the dose of medicines or if having cognitive  issues related to the current medications. Risk of overdose and death, if medicines not taken as prescribed, were also discussed. If the patient develops new symptoms or if the symptoms worsen, the patient should call the office. While transcribing every attempt was made to maintain the accuracy of the note in terms of it's contents,there may have been some errors made inadvertently. Thank you for allowing me to participate in the care of this patient. Cameron Dotson CNP.     Cc: Bertrand Meléndez MD

## 2023-01-17 NOTE — PLAN OF CARE
[address], [place]  [address] [facility]  Phone: [phone]   Fax:     [phone]    Physical Therapy Prescription    Date: 2023    Patient Name: Jcarlos Cox  : 1975  MRN: [id number]    Diagnosis:Right sided sciatica [M54.31]  Osteoarthritis of right hip, unspecified osteoarthritis type [M16.11]  Treatment Diagnosis: Treatment Diagnosis: pain, dec ROM and strength limiting gait and ADLs    Referring Physician: Deanne Salter MD    Drug Allergies:    With your approval we would like to add the following to the patient's current PT treatment:    [] Aquatic Exercise    [] TENS Unit        [] [facility] Cervical Traction Unit        [] [facility] Lumbar Traction Unit    [] Diann Shoemaker        [x] Rolling/Standard Walker - possible bariatric walker; walker with seat         [] Iontophoresis: Dexamethasone 4mg/ml injectable-30 ml vial     Quantity: 1 vial for iontophoresis     As needed        Electronically signed by:  Olamide Garg, PT MPT [id number]    If you have any questions or concerns, please don't hesitate to call.   Thank you for your referral.    Physician Signature:________________________________Date:__________________  By signing above, therapists plan is approved by physician

## 2023-01-19 ENCOUNTER — TELEPHONE (OUTPATIENT)
Dept: PAIN MANAGEMENT | Age: 48
End: 2023-01-19

## 2023-01-19 ENCOUNTER — APPOINTMENT (OUTPATIENT)
Dept: PHYSICAL THERAPY | Age: 48
End: 2023-01-19
Payer: MEDICAID

## 2023-01-19 ENCOUNTER — HOSPITAL ENCOUNTER (OUTPATIENT)
Dept: PHYSICAL THERAPY | Age: 48
Setting detail: THERAPIES SERIES
Discharge: HOME | End: 2023-01-19
Payer: MEDICAID

## 2023-01-19 PROCEDURE — 97113 AQUATIC THERAPY/EXERCISES: CPT

## 2023-01-19 NOTE — FLOWSHEET NOTE
[address] 75, [facility]  [address] [facility], [facility]  Phone: [phone]   Fax:     [phone]      Physical Therapy Daily/Aquatic Flow Sheet   Date:  2023    Patient Name:  Davdi Garcia    :  1975  MRN: [id number]    Restrictions/Precautions:    Pertinent Medical History:Additional Pertinent Hx: asthma, OA, COPD, CHF, DM, HTN, anxiety/depression, obesity    Medical/Treatment Diagnosis Information:   Medical Diagnosis: Right sided sciatica [M54.31]  Osteoarthritis of right hip, unspecified osteoarthritis type [M16.11]  Treatment Diagnosis: pain, dec ROM and strength limiting gait and ADLs    Insurance/Certification information:  PT Insurance Information: Hank Ding 30 vpcy no auth  Physician Information:  Esau Guillermo MD  Plan of care signed (Y/N): inbox    Date of Patient follow up with Physician:      Progress Report: []  Yes  [x]  No     Date Range for reporting period:  Beginnin2023  Ending:      Progress report due (10 Rx/or 30 days whichever is less):      Recertification due (POC duration/ or 90 days whichever is less):      Visit # POC/Insurance Allowable Auth Needed     30 vpcy []Yes   [x]No     Latex Allergy:  [x]NO      []YES  Preferred Language for Healthcare:   [x]English       []Other:    Functional Scale:     Date assessed: at eval  Test:Oswestry  Score:30    Pain level: R hip/ thigh 4/10                            after Rx 3/10    History of Injury: Pt with progressive OA pain R hip with new onset of R LE sciatica that stems from the hip/groin and radiates up hip to buttock and back and thigh. She went to ER for near fall after hip locked up on 22 and 23. Consideration for possible R THR  but needs to lose 80 pounds. Had steroid injection in R hip years ago with inc c/o. Pt has [address] membership but unable to use b/c fear of falling/staying mostly housebound.       Pt amb with std cane in R hand with sig limp and several standing breaks needed to walk back to dept; edu on benefits of walker -  pt says she owns one but it is too old, rusted and worn out; will request script for wheeled walker with seat and give to pt when rec'd    Subjective:   1-19-23 to pool with walking stick. To get CT scan LB. Objective:   Observation:   Test measurements:    Land-based Visits Exercises/Activities: NO LAND RX TODAY  Therapeutic Ex (71879)  Min: Resistance/Repetitions Notes                  Therapeutic Activity (45537) Min:25 See below                   NMR re-education (42016) Min:                          Manual Intervention ([id number])  Min:                    Modalities  Min:               Aquatic Visits Exercises/Activities:  Aquatic Abbreviation Key  B= Belt DB= Dumbells T= Theratube   G=Gloves N= Noodles W= Weights   P= Paddles WW=Water Walker K= Kickboard        Transfers:   Steps ind B HR 1 at a time      % Immersion: 75%            Ambulation:  Laps ind Exercises UE:      Forwards 3 Shoulder Shrugs     Lateral  Shoulder circles     Marching    Scapular Retraction      Retro   Rolling      Cariocas  Push Downs    Multifidus walkouts w/paddle  IR/ER      Punching    Stretching: B 30 sec  Rowing    Gastroc  2 Elbow Flex/Ext    Hamstring   Shldr Flex/Ext     Knee flex stretch   Shldr Abd/Add    Piriformis   Horiz Abd/Add     Hip flexor    Arm Circles     SKTC   PNF Diagonals    DKTC  UE oscillations f/b, s/s    ITB   Wall Push-ups    Quad  Figure 8's    Mid back   Buoy pull/push downs    UT  Tband rows    Rhom  Tband lats    Post Shoulder  Lumbar Rot w/ paddles    Pec   Small ball pull downs                   diagonals             Cervical:       AROM Flex       AROM Extension     LEs exercises:  B AROM Side Bending    Marching  10 AROM Rotation    Heel Raises  10 Chin tucks    Toe Raises  10 Chin nods     Squats  10      Hamstring Curls  10      Hip Flexion  10 Balance:      Hip Abduction 10 SLS    Hip Circles 10 Tandem stance    TA set 10 NBOS eyes open    Glut Set 10 NBOS eyes closed    Hip Extension  Hand to Opposite Knee    Hip Adduction    Box Step     Hip IR   Noodle Stance     Hip ER  Stop/Go Gait    Fig 8's  Switch Gait                Seated: B Functional:    Ankle Pumps  Step up forward    Ankle circles  Step up lateral    Knee flex & ext  Step down    Hip Abd & Adduction  Lowden squats    Bicycle   Crate Lifts    Add Set with ball  Lunges forward    LX stab with med ball throws  Lunges lateral    Ankle INV  Lunges retro    Ankle EV  Lower ab curl with noodle      Upper ab curl with ball      Med ball straight lifts      Med ball diagonal lifts    Jet massage /R hip/ LB 1 min  Hydrorider          Noodle:      Leg Press  Deep Water:    hang at wall 1 min   relief  Jog    Noodle hang deep water  Jumping Jacks    Noodle Bicycle  Heel to toe      Hand to opposite knee      Cross country skier      Rocking Horse      Other Therapeutic Activities:  Pt was educated on PT POC, Diagnosis, Prognosis, pathomechanics as well as frequency and duration of scheduling future physical therapy appointments. Time was also taken on this day to answer all patient questions and participation in PT. Reviewed appointment policy in detail with patient and patient verbalized understanding. X 25 min Included benefits of Aquatic PT vs land based PT; importance of use of wh walker now for dec stress on R hip and protection from falls. Pt declined pool tour - member of [employer]; issued paperwork/pool info. Home Exercise Program:  Patient was instructed in the following for HEP:     . Patient verbalized/demonstrated understanding and was issued written handout. Charges:   Therapeutic Exercise and NMR EXR  [] (71579) Provided verbal/tactile cueing for activities related to strengthening, flexibility, endurance, ROM for improvements in LE, proximal hip, and core control with self care, mobility, lifting, ambulation.  [] (46633) Provided verbal/tactile cueing for activities related to improving balance, coordination, kinesthetic sense, posture, motor skill, proprioception  to assist with LE, proximal hip, and core control in self care, mobility, lifting, ambulation and eccentric single leg control. NMR and Therapeutic Activities:    [x] (22844 or 88059) Provided verbal/tactile cueing for activities related to improving balance, coordination, kinesthetic sense, posture, motor skill, proprioception and motor activation to allow for proper function of core, proximal hip and LE with self care and ADLs and functional mobility.   [] (36589) Gait Re-education- Provided training and instruction to the patient for proper LE, core and proximal hip recruitment and positioning and eccentric body weight control with ambulation re-education including up and down stairs     Home Exercise Program:    [x] (48500) Reviewed/Progressed HEP activities related to strengthening, flexibility, endurance, ROM of core, proximal hip and LE for functional self-care, mobility, lifting and ambulation/stair navigation   [] (61375)Reviewed/Progressed HEP activities related to improving balance, coordination, kinesthetic sense, posture, motor skill, proprioception of core, proximal hip and LE for self care, mobility, lifting, and ambulation/stair navigation      Manual Treatments:  PROM / STM / Oscillations-Mobs:  G-I, II, III, IV (PA's, Inf., Post.)  [] (92671) Provided manual therapy to mobilize LE, proximal hip and/or LS spine soft tissue/joints for the purpose of modulating pain, promoting relaxation,  increasing ROM, reducing/eliminating soft tissue swelling/inflammation/restriction, improving soft tissue extensibility and allowing for proper ROM for normal function with self care, mobility, lifting and ambulation.      Individual Aquatic Therapy:  [x] (26973) Provided verbal and tactile cueing for strengthening, flexibility, ROM using the therapeutic properties of water (buoyancy, resistance) for improvements in core control, mobility and ambulation     Aquatic Group:  [x] (30942) Provided intermittent verbal and tactile cueing for strengthening, endurance, flexibility and ROM for 2-4 individuals that do not require one-on one patient contact by the therapist       Land Timed Code Treatment Minutes: 0   Land Total Treatment Minutes: 0     Individual Aquatic Minutes: 30   Aquatic Group Minutes: 0     [] EVAL (LOW) 30039 (typically 20 minutes face-to-face)  [] EVAL (MOD) 42275 (typically 30 minutes face-to-face)  [] EVAL (HIGH) 30179 (typically 45 minutes face-to-face)  [] RE-EVAL     [] XN(46606) x     [] Dry needle 1 or 2 Muscles (54018)  [] NMR (20536) x     [] Dry needle 3+ Muscles (57316)  [] Manual (91435) x     [] Ultrasound (21377) x   []TA (87511) x    [] Mech Traction (32910)  [] ES(attended) (81562)     [] ES (un) (06252):   [] Vasopump (58911) [] Ionto (53899)   [x] Aquatic Ind (79940) x  2  [] Aquatic Group (43361) x   [] Other:    Treatment/Activity Tolerance:  [] Patient tolerated treatment well [] Patient limited by fatigue  [x] Patient limited by pain  [] Patient limited by other medical complications  [] Other:  ASSESSMENT: Pt would benefit from skilled aquatic therapy to  pain, increase ROM, flexibility, balance, endurance, gait, to improve function and ADL's. Tolerated above initial aquatic ex with decreased pain 3/10 afterwards. Issued signed Rx for wheeled walker with seat. Assisted to locker room for safety due to fall risk. Prognosis: [x] Good [] Fair  [] Poor    GOALS:  Patient stated goal: \"standing\"  [] Progressing: [] Met: [] Not Met: [] Adjusted  Therapist goals for Patient:   Short Term Goals: To be achieved in: 2 weeks  1. Independent in HEP and progression per patient tolerance, in order to prevent re-injury. [] Progressing: [] Met: [] Not Met: [] Adjusted  2.  Patient will have a decrease in pain by 20-30% to facilitate improvement in movement, function, and ADLs as indicated by Functional Deficits. [] Progressing: [] Met: [] Not Met: [] Adjusted  3. Patient will misty 40 min pool ex. [] Progressing: [] Met: [] Not Met: [] Adjusted  4. Patient will amb w/ wheeled walker with improved tech/WB/balance. [] Progressing: [] Met: [] Not Met: [] Adjusted     Long Term Goals: To be achieved in: at d/c  1. Increase Oswestry functional outcome score by 10 points to assist with reaching prior level of function. [] Progressing: [] Met: [] Not Met: [] Adjusted  2. Patient will have a decrease in pain by 40-50% to facilitate improvement in movement, function, and ADLs as indicated by Functional Deficits. [] Progressing: [] Met: [] Not Met: [] Adjusted  3. Patient will demonstrate increased AROM and LE flex to min-mod dec to allow for proper joint functioning as indicated by patients Functional Deficits. [] Progressing: [] Met: [] Not Met: [] Adjusted  4. Patient will demonstrate an increase in Strength to 4/5 R LE to allow for proper functional mobility as indicated by patients Functional Deficits. [] Progressing: [] Met: [] Not Met: [] Adjusted  5. Patient will return to sleeping in bed without increased symptoms or restriction. [] Progressing: [] Met: [] Not Met: [] Adjusted  6. \"Walking\"(patient specific functional goal)    [] Progressing: [] Met: [] Not Met: [] Adjusted         Patient Requires Follow-up: [x] Yes  [] No    Plan:   [x] Continue per plan of care [] Alter current plan (see comments)  [] Plan of care initiated [] Hold pending MD visit [] Discharge    Plan for Next Session:  Gradually progress aquatic exercise as tolerated to increase   ROM, strength, and endurance to improve ADL's and decrease pain. ADD: increase gt, seated LE ex, UE with L-S stab, HSS gentle as tolerated.            Electronically signed by:  Luis Woods,     Note: If patient does not return for scheduled/recommended follow up visits, this note will serve as a discharge from care along with the most recent update on progress.

## 2023-01-19 NOTE — TELEPHONE ENCOUNTER
Patient called and said that her MRI needs a new PA submmitted to the insurance.  She said her insurance denied it because there wasn't paperwork with reasoning of why she needs the MRI

## 2023-01-20 NOTE — PROGRESS NOTES
[facility] [facility] time____0800________        Surgery time______0930______    Take the following medications with a sip of water: Follow your MD/Surgeons pre-procedure instructions regarding your medications     Do not eat or drink anything after 12:00 midnight except for your prep prior to your surgery. This includes water chewing gum, mints and ice chips. You may brush your teeth and gargle the morning of your surgery, but do not swallow the water     Please see your family doctor/pediatrician for a history and physical and/or concerning medications. Bring any test results/reports from your physicians office. If you are under the care of a heart doctor or specialist doctor, please be aware that you may be asked to them for clearance    You may be asked to stop blood thinners such as Coumadin, Plavix, Fragmin, Lovenox, etc., or any anti-inflammatories such as:  Aspirin, Ibuprofen, Advil, Naproxen prior to your surgery. We also ask that you stop any OTC medications such as fish oil, vitamin E, glucosamine, garlic, Multivitamins, COQ 10, etc.    We ask that you do not smoke 24 hours prior to surgery  We ask that you do not  drink any alcoholic beverages 24 hours prior to surgery     You must make arrangements for a responsible adult to take you home after your surgery. For your safety you will not be allowed to leave alone or drive yourself home. Your surgery will be cancelled if you do not have a ride home. Also for your safety, it is strongly suggested that someone stay with you the first 24 hours after your surgery. A parent or legal guardian must accompany a child scheduled for surgery and plan to stay at the hospital until the child is discharged. Please do not bring other children with you. For your comfort, please wear simple loose fitting clothing to the hospital.  Please do not bring valuables.     Do not wear any make-up or nail polish on your fingers or toes      For your safety, please do not wear any jewelry or body piercing's on the day of surgery. All jewelry must be removed. If you have dentures, they will be removed before going to operating room. For your convenience, we will provide you with a container. If you wear contact lenses or glasses, they will be removed, please bring a case for them. If you have a living will and a durable power of  for healthcare, please bring in a copy. As part of our patient safety program to minimize surgical site infections, we ask you to do the following:    Please notify your surgeon if you develop any illness between         now and the  day of your surgery. This includes a cough, cold, fever, sore throat, nausea,         or vomiting, and diarrhea, etc.   Please notify your surgeon if you experience dizziness, shortness         of breath or blurred vision between now and the time of your surgery. Do not shave your operative site 96 hours prior to surgery. For face and neck surgery, men may use an electric razor 48 hours   prior to surgery. You may shower the night before surgery or the morning of   your surgery with an antibacterial soap. You will need to bring a photo ID and insurance card    [facility] has an onsite pharmacy, would you like to utilize our pharmacy     If you will be staying overnight and use a C-pap machine, please bring   your C-pap to hospital     Our goal is to provide you with excellent care, therefore, visitors will be limited to two(2) in the room at a time so that we may focus on providing this care for you. Please contact pre-admission testing if you have any further questions.                  [facility] phone number:  [phone] PAT fax number:  [phone]  Please note these are generalized instructions for all surgical cases, you may be provided with more specific instructions according to your surgery. C-Difficile admission screening and protocol:       * Admitted with diarrhea? [] YES    [x]  NO     *Prior history of C-Diff. In last 3 months? [] YES    [x]  NO     *Antibiotic use in the past 6-8 weeks? [x]  NO    []  YES                 If yes, which ANTIBIOTIC AND REASON______     *Prior hospitalization or nursing home in the last month? []  YES    [x]  NO        SAFETY FIRST. .call before you fall

## 2023-01-23 NOTE — TELEPHONE ENCOUNTER
Spoke with pt letting her know that I will speak with PKA in the am about the gabapentine and steroid, and per PKA the MRI would be approved if she fu with the therapy, which pt has scheduled pool therapy. Pt states ins denied pt for the MRI because it was w/o contrast and not enough info for why pt needs an MRI

## 2023-01-24 ENCOUNTER — APPOINTMENT (OUTPATIENT)
Dept: PHYSICAL THERAPY | Age: 48
End: 2023-01-24
Payer: MEDICAID

## 2023-01-24 ENCOUNTER — HOSPITAL ENCOUNTER (OUTPATIENT)
Dept: PHYSICAL THERAPY | Age: 48
Setting detail: THERAPIES SERIES
Discharge: HOME | End: 2023-01-24
Payer: MEDICAID

## 2023-01-24 PROCEDURE — 97113 AQUATIC THERAPY/EXERCISES: CPT

## 2023-01-24 RX ORDER — GABAPENTIN 300 MG/1
300 CAPSULE ORAL 3 TIMES DAILY
Qty: 270 CAPSULE | Refills: 0 | Status: SHIPPED | OUTPATIENT
Start: 2023-01-24 | End: 2023-04-24

## 2023-01-24 NOTE — FLOWSHEET NOTE
[address], [place]  [address] [facility]  Phone: [phone]   Fax:     [phone]      Physical Therapy Daily/Aquatic Flow Sheet   Date:  2023    Patient Name:  Tushar Cam    :  1975  MRN: [id number]    Restrictions/Precautions:    Pertinent Medical History:Additional Pertinent Hx: asthma, OA, COPD, CHF, DM, HTN, anxiety/depression, obesity    Medical/Treatment Diagnosis Information:   Medical Diagnosis: Right sided sciatica [M54.31]  Osteoarthritis of right hip, unspecified osteoarthritis type [M16.11]  Treatment Diagnosis: pain, dec ROM and strength limiting gait and ADLs    Insurance/Certification information:  PT Insurance Information: [employer] 30 vpcy no auth  Physician Information:  Sherryle Blitz, MD  Plan of care signed (Y/N): inbox    Date of Patient follow up with Physician:      Progress Report: []  Yes  [x]  No     Date Range for reporting period:  Beginnin2023  Ending:      Progress report due (10 Rx/or 30 days whichever is less):      Recertification due (POC duration/ or 90 days whichever is less):      Visit # POC/Insurance Allowable Auth Needed   3 /8  30 vpcy []Yes   [x]No     Latex Allergy:  [x]NO      []YES  Preferred Language for Healthcare:   [x]English       []Other:    Functional Scale:     Date assessed: at eval  Test:Oswestry  Score:30    Pain level: R hip/ thigh 4/10                            after Rx 3/10    History of Injury: Pt with progressive OA pain R hip with new onset of R LE sciatica that stems from the hip/groin and radiates up hip to buttock and back and thigh. She went to ER for near fall after hip locked up on 22 and 23. Consideration for possible R THR  but needs to lose 80 pounds. Had steroid injection in R hip years ago with inc c/o. Pt has [address] membership but unable to use b/c fear of falling/staying mostly housebound.       Pt amb with std cane in R hand with sig limp and several standing breaks needed to walk back to dept; edu on benefits of walker -  pt says she owns one but it is too old, rusted and worn out; will request script for wheeled walker with seat and give to pt when rec'd    Subjective:   1-19-23 to pool with walking stick. To get CT scan LB.   1/24: Did pretty good after first session. Pain 4/10 at present and that is w/o gabapentin because the doctor's office has not called it in, yet. Patient reports that her right hip feels like it's pushing up into her and that she feels like she is \"crooked\" (leaning to the right).  Lost script for walker            Objective:   Observation:   Test measurements:    Land-based Visits Exercises/Activities: NO LAND RX TODAY  Therapeutic Ex (06455)  Min: Resistance/Repetitions Notes                  Therapeutic Activity (41619) Min:25 See below                   NMR re-education (45667) Min:                          Manual Intervention ([id number])  Min:                    Modalities  Min:               Aquatic Visits Exercises/Activities:  Aquatic Abbreviation Key  B= Belt DB= Dumbells T= Theratube   G=Gloves N= Noodles W= Weights   P= Paddles WW=Water Walker K= Kickboard        Transfers:   Steps ind B HR 1 at a time      % Immersion: 75%            Ambulation:  Laps ind Exercises UE:      Forwards 4 Shoulder Shrugs     Lateral  Shoulder circles     Marching    Scapular Retraction      Retro   Rolling      [place]  Push Downs    Multifidus walkouts w/paddle  IR/ER      Punching    Stretching: B 30 sec  Rowing    Gastroc  2 (modified) Elbow Flex/Ext    Hamstring   Shldr Flex/Ext     Knee flex stretch   Shldr Abd/Add    Piriformis   Horiz Abd/Add     Hip flexor    Arm Circles     SKTC   PNF Diagonals    DKTC  UE oscillations f/b, s/s    ITB   Wall Push-ups    Quad  Figure 8's    Mid back   Buoy pull/push downs    UT  Tband rows    Rhom  Tband lats    Post Shoulder  Lumbar Rot w/ paddles    Pec   Small ball pull downs                   diagonals             Cervical:       AROM Flex       AROM Extension     LEs exercises:  B AROM Side Bending    Marching  10 AROM Rotation    Heel Raises  10 Chin tucks    Toe Raises  10 Chin nods     Squats  10      Hamstring Curls  10      Hip Flexion  10 Balance: Hip Abduction 10 SLS    Hip Circles 10 Tandem stance    TA set 10 NBOS eyes open    Glut Set 10 NBOS eyes closed    Hip Extension  Hand to Opposite Knee    Hip Adduction    Box Step     Hip IR   Noodle Stance     Hip ER  Stop/Go Gait    Fig 8's  Switch Gait                Seated: B Functional:    Ankle Pumps 10 Step up forward    Ankle circles 10 Step up lateral    Knee flex & ext 10 Step down    Hip Abd & Adduction 10 [place] squats    Bicycle  10 Crate Lifts    Add Set with ball  Lunges forward    LX stab with med ball throws  Lunges lateral    Ankle INV  Lunges retro    Ankle EV  Lower ab curl with noodle      Upper ab curl with ball      Med ball straight lifts      Med ball diagonal lifts    Jet massage /R hip/ LB 1 min  Hydrorider          Noodle:      Leg Press  Deep Water:    hang at wall 1 min   relief  Jog    Noodle hang deep water  Jumping Jacks    Noodle Bicycle  Heel to toe      Hand to opposite knee      Cross country skier      Rocking Horse      Other Therapeutic Activities:  Pt was educated on PT POC, Diagnosis, Prognosis, pathomechanics as well as frequency and duration of scheduling future physical therapy appointments. Time was also taken on this day to answer all patient questions and participation in PT. Reviewed appointment policy in detail with patient and patient verbalized understanding. X 25 min Included benefits of Aquatic PT vs land based PT; importance of use of wh walker now for dec stress on R hip and protection from falls. Pt declined pool tour - member of [employer]; issued paperwork/pool info.       Home Exercise Program:  Patient was instructed in the following for HEP:     . Patient verbalized/demonstrated understanding and was issued written handout. Charges: Therapeutic Exercise and NMR EXR  [] (62379) Provided verbal/tactile cueing for activities related to strengthening, flexibility, endurance, ROM for improvements in LE, proximal hip, and core control with self care, mobility, lifting, ambulation.  [] (41559) Provided verbal/tactile cueing for activities related to improving balance, coordination, kinesthetic sense, posture, motor skill, proprioception  to assist with LE, proximal hip, and core control in self care, mobility, lifting, ambulation and eccentric single leg control.      NMR and Therapeutic Activities:    [x] (11096 or 61617) Provided verbal/tactile cueing for activities related to improving balance, coordination, kinesthetic sense, posture, motor skill, proprioception and motor activation to allow for proper function of core, proximal hip and LE with self care and ADLs and functional mobility.   [] (79925) Gait Re-education- Provided training and instruction to the patient for proper LE, core and proximal hip recruitment and positioning and eccentric body weight control with ambulation re-education including up and down stairs     Home Exercise Program:    [x] (66954) Reviewed/Progressed HEP activities related to strengthening, flexibility, endurance, ROM of core, proximal hip and LE for functional self-care, mobility, lifting and ambulation/stair navigation   [] (50577)Reviewed/Progressed HEP activities related to improving balance, coordination, kinesthetic sense, posture, motor skill, proprioception of core, proximal hip and LE for self care, mobility, lifting, and ambulation/stair navigation      Manual Treatments:  PROM / STM / Oscillations-Mobs:  G-I, II, III, IV (PA's, Inf., Post.)  [] (37540) Provided manual therapy to mobilize LE, proximal hip and/or LS spine soft tissue/joints for the purpose of modulating pain, promoting relaxation,  increasing ROM, reducing/eliminating soft tissue swelling/inflammation/restriction, improving soft tissue extensibility and allowing for proper ROM for normal function with self care, mobility, lifting and ambulation. Individual Aquatic Therapy:  [x] (61212) Provided verbal and tactile cueing for strengthening, flexibility, ROM using the therapeutic properties of water (buoyancy, resistance) for improvements in core control, mobility and ambulation     Aquatic Group:  [x] (43642) Provided intermittent verbal and tactile cueing for strengthening, endurance, flexibility and ROM for 2-4 individuals that do not require one-on one patient contact by the therapist       Land Timed Code Treatment Minutes: 0   Land Total Treatment Minutes: 0     Individual Aquatic Minutes: 40   Aquatic Group Minutes: 0     [] EVAL (LOW) 33431 (typically 20 minutes face-to-face)  [] EVAL (MOD) 85348 (typically 30 minutes face-to-face)  [] EVAL (HIGH) 60150 (typically 45 minutes face-to-face)  [] RE-EVAL     [] IS(97136) x     [] Dry needle 1 or 2 Muscles (81627)  [] NMR (77736) x     [] Dry needle 3+ Muscles (45693)  [] Manual (99260) x     [] Ultrasound (14139) x   []TA (08315) x    [] Mech Traction (54994)  [] ES(attended) (72631)     [] ES (un) (22940):   [] Vasopump (20296) [] Ionto (21244)   [x] Aquatic Ind () x  3  [] Aquatic Group (79766) x   [] Other:    Treatment/Activity Tolerance:  [] Patient tolerated treatment well [] Patient limited by fatigue  [x] Patient limited by pain  [] Patient limited by other medical complications  [] Other:  ASSESSMENT: Pt would benefit from skilled aquatic therapy to  pain, increase ROM, flexibility, balance, endurance, gait, to improve function and ADL's. Tolerated above initial aquatic ex with decreased pain 3/10 afterwards. Issued signed Rx for wheeled walker with seat. Assisted to locker room for safety due to fall risk. : Decreased pain while in the pool.  Cues with exercises for technique and to prevent increased pain. Modification to gastroc stretch to decrease R hip pain          Prognosis: [x] Good [] Fair  [] Poor    GOALS:  Patient stated goal: \"standing\"  [] Progressing: [] Met: [] Not Met: [] Adjusted  Therapist goals for Patient:   Short Term Goals: To be achieved in: 2 weeks  1. Independent in HEP and progression per patient tolerance, in order to prevent re-injury. [] Progressing: [] Met: [] Not Met: [] Adjusted  2. Patient will have a decrease in pain by 20-30% to facilitate improvement in movement, function, and ADLs as indicated by Functional Deficits. [] Progressing: [] Met: [] Not Met: [] Adjusted  3. Patient will misty 40 min pool ex. [] Progressing: [] Met: [] Not Met: [] Adjusted  4. Patient will amb w/ wheeled walker with improved tech/WB/balance. [] Progressing: [] Met: [] Not Met: [] Adjusted     Long Term Goals: To be achieved in: at d/c  1. Increase Oswestry functional outcome score by 10 points to assist with reaching prior level of function. [] Progressing: [] Met: [] Not Met: [] Adjusted  2. Patient will have a decrease in pain by 40-50% to facilitate improvement in movement, function, and ADLs as indicated by Functional Deficits. [] Progressing: [] Met: [] Not Met: [] Adjusted  3. Patient will demonstrate increased AROM and LE flex to min-mod dec to allow for proper joint functioning as indicated by patients Functional Deficits. [] Progressing: [] Met: [] Not Met: [] Adjusted  4. Patient will demonstrate an increase in Strength to 4/5 R LE to allow for proper functional mobility as indicated by patients Functional Deficits. [] Progressing: [] Met: [] Not Met: [] Adjusted  5. Patient will return to sleeping in bed without increased symptoms or restriction. [] Progressing: [] Met: [] Not Met: [] Adjusted  6.  \"Walking\"(patient specific functional goal)    [] Progressing: [] Met: [] Not Met: [] Adjusted         Patient Requires Follow-up: [x] Yes  [] No    Plan:   [x] Continue per plan of care [] Alter current plan (see comments)  [] Plan of care initiated [] Hold pending MD visit [] Discharge    Plan for Next Session:  Gradually progress aquatic exercise as tolerated to increase   ROM, strength, and endurance to improve ADL's and decrease pain. ADD: increase gt, seated LE ex, UE with L-S stab, HSS gentle as tolerated. Electronically signed by:  Katya Rivas, PTA [id number]    Note: If patient does not return for scheduled/recommended follow up visits, this note will serve as a discharge from care along with the most recent update on progress.

## 2023-01-25 NOTE — TELEPHONE ENCOUNTER
Let pt know in a mychart encounter that the ins stated that since the MRI has already been completed, that we can not submit a new PA

## 2023-01-26 ENCOUNTER — APPOINTMENT (OUTPATIENT)
Dept: PHYSICAL THERAPY | Age: 48
End: 2023-01-26
Payer: MEDICAID

## 2023-01-26 ENCOUNTER — HOSPITAL ENCOUNTER (OUTPATIENT)
Dept: PHYSICAL THERAPY | Age: 48
Setting detail: THERAPIES SERIES
Discharge: HOME | End: 2023-01-26
Payer: MEDICAID

## 2023-01-26 PROCEDURE — 97150 GROUP THERAPEUTIC PROCEDURES: CPT

## 2023-01-26 PROCEDURE — 97113 AQUATIC THERAPY/EXERCISES: CPT

## 2023-01-26 NOTE — FLOWSHEET NOTE
[address] 75, [facility]  [address] [facility] [place], [facility]  Phone: [phone]   Fax:     [phone]      Physical Therapy Daily/Aquatic Flow Sheet   Date:  2023    Patient Name:  lAexy Eastman    :  1975  MRN: [id number]    Restrictions/Precautions:    Pertinent Medical History:Additional Pertinent Hx: asthma, OA, COPD, CHF, DM, HTN, anxiety/depression, obesity    Medical/Treatment Diagnosis Information:   Medical Diagnosis: Right sided sciatica [M54.31]  Osteoarthritis of right hip, unspecified osteoarthritis type [M16.11]  Treatment Diagnosis: pain, dec ROM and strength limiting gait and ADLs    Insurance/Certification information:  PT Insurance Information: Sherron Joseph 30 vpcy no auth  Physician Information:  Hamilton Felipe MD  Plan of care signed (Y/N): inbox    Date of Patient follow up with Physician:      Progress Report: []  Yes  [x]  No     Date Range for reporting period:  Beginnin2023  Ending:      Progress report due (10 Rx/or 30 days whichever is less): 25     Recertification due (POC duration/ or 90 days whichever is less):      Visit # POC/Insurance Allowable Auth Needed     30 vpcy []Yes   [x]No     Latex Allergy:  [x]NO      []YES  Preferred Language for Healthcare:   [x]English       []Other:    Functional Scale:     Date assessed: at eval  Test:Oswestry  Score:30    Pain level: R hip/ thigh /10                            after Rx 3/10    History of Injury: Pt with progressive OA pain R hip with new onset of R LE sciatica that stems from the hip/groin and radiates up hip to buttock and back and thigh. She went to ER for near fall after hip locked up on 22 and 23. Consideration for possible R THR  but needs to lose 80 pounds. Had steroid injection in R hip years ago with inc c/o. Pt has [employer] membership but unable to use b/c fear of falling/staying mostly housebound.       Pt amb with std cane in R hand with sig limp and several standing breaks [facility]; edu on benefits of walker -  pt says she owns one but it is too old, rusted and worn out; will request script for wheeled walker with seat and give to pt when rec'd    Subjective:   1-19-23 to pool with walking stick. To get CT scan LB.   1/24: Did pretty good after first session. Pain 4/10 at present and that is w/o gabapentin because the doctor's office has not called it in, yet. Patient reports that her right hip feels like it's pushing up into her and that she feels like she is \"crooked\" (leaning to the right). Lost script for walker  1/26: Did good after last session, until about 8:00 that evening. Thinks it was just the cold that moved in. Cold always makes her hurt. Still having trouble getting in/out of car.  Using cane for assistance lifting her leg            Objective:   Observation:   Test measurements:    Land-based Visits Exercises/Activities: NO LAND RX TODAY  Therapeutic Ex (06671)  Min: Resistance/Repetitions Notes                  Therapeutic Activity (77682) Min:25 See below                   NMR re-education (24127) Min:                          Manual Intervention ([phone])  Min:                    Modalities  Min:               Aquatic Visits Exercises/Activities:  Aquatic Abbreviation Key  B= Belt DB= Dumbells T= Theratube   G=Gloves N= Noodles W= Weights   P= Paddles WW=Water Walker K= Kickboard        Transfers:   Steps ind B HR 1 at a time      % Immersion: 75%            Ambulation:  Laps ind Exercises UE:      Forwards 4 Shoulder Shrugs     Lateral  Shoulder circles  10    Marching    Scapular Retraction  10    Retro   Rolling  10    [place]  Push Downs  10   Multifidus walkouts w/paddle  IR/ER  10     Punching     Stretching: B 30 sec  Rowing  10   Gastroc  2 (modified) Elbow Flex/Ext  10   Hamstring   Shldr Flex/Ext     Knee flex stretch   Shldr Abd/Add     Piriformis   Horiz Abd/Add     Hip flexor    Arm Circles SKTC   PNF Diagonals     DKTC  UE oscillations f/b, s/s     ITB   Wall Push-ups    Quad  Figure 8's    Mid back   Buoy pull/push downs    UT  Tband rows    Rhom  Tband lats    Post Shoulder  Lumbar Rot w/ paddles    Pec   Small ball pull downs                   diagonals             Cervical:       AROM Flex       AROM Extension     LEs exercises:  B AROM Side Bending    Marching  10 AROM Rotation    Heel Raises  10 Chin tucks    Toe Raises  10 Chin nods     Squats  10      Hamstring Curls  10      Hip Flexion  10 Balance: Hip Abduction 10 SLS    Hip Circles 10 Tandem stance    TA set 10 NBOS eyes open    Glut Set 10 NBOS eyes closed    Hip Extension  Hand to Opposite Knee    Hip Adduction    Box Step     Hip IR   Noodle Stance     Hip ER  Stop/Go Gait    Fig 8's  Switch Gait                Seated: B Functional:    Ankle Pumps 15 Step up forward    Ankle circles 10 Step up lateral    Knee flex & ext 15 Step down    Hip Abd & Adduction 15 [place] squats    Bicycle  15 Crate Lifts    Add Set with ball 10 Lunges forward    LX stab with med ball throws  Lunges lateral    Ankle INV  Lunges retro    Ankle EV  Lower ab curl with noodle      Upper ab curl with ball      Med ball straight lifts      Med ball diagonal lifts    Jet massage /R hip/ LB 1 min  Hydrorider          Noodle:      Leg Press  Deep Water:    hang at wall 1 min   relief  Jog    Noodle hang deep water  Jumping Jacks    Noodle Bicycle  Heel to toe      Hand to opposite knee      Cross country skier      Rocking Horse      Other Therapeutic Activities:  Pt was educated on PT POC, Diagnosis, Prognosis, pathomechanics as well as frequency and duration of scheduling future physical therapy appointments. Time was also taken on this day to answer all patient questions and participation in PT. Reviewed appointment policy in detail with patient and patient verbalized understanding.  X 25 min Included benefits of Aquatic PT vs land based PT; importance of use of wh walker now for dec stress on R hip and protection from falls. Pt declined pool tour - member of [employer]; issued paperwork/pool info. Home Exercise Program:  Patient was instructed in the following for HEP:     . Patient verbalized/demonstrated understanding and was issued written handout. Charges: Therapeutic Exercise and NMR EXR  [] (88736) Provided verbal/tactile cueing for activities related to strengthening, flexibility, endurance, ROM for improvements in LE, proximal hip, and core control with self care, mobility, lifting, ambulation.  [] (90399) Provided verbal/tactile cueing for activities related to improving balance, coordination, kinesthetic sense, posture, motor skill, proprioception  to assist with LE, proximal hip, and core control in self care, mobility, lifting, ambulation and eccentric single leg control.      NMR and Therapeutic Activities:    [x] (53554 or 96314) Provided verbal/tactile cueing for activities related to improving balance, coordination, kinesthetic sense, posture, motor skill, proprioception and motor activation to allow for proper function of core, proximal hip and LE with self care and ADLs and functional mobility.   [] (08334) Gait Re-education- Provided training and instruction to the patient for proper LE, core and proximal hip recruitment and positioning and eccentric body weight control with ambulation re-education including up and down stairs     Home Exercise Program:    [x] (63936) Reviewed/Progressed HEP activities related to strengthening, flexibility, endurance, ROM of core, proximal hip and LE for functional self-care, mobility, lifting and ambulation/stair navigation   [] (47055)Reviewed/Progressed HEP activities related to improving balance, coordination, kinesthetic sense, posture, motor skill, proprioception of core, proximal hip and LE for self care, mobility, lifting, and ambulation/stair navigation      Manual Treatments:  PROM / STM / Oscillations-Mobs:  G-I, II, III, IV (PA's, Inf., Post.)  [] (72253) Provided manual therapy to mobilize LE, proximal hip and/or LS spine soft tissue/joints for the purpose of modulating pain, promoting relaxation,  increasing ROM, reducing/eliminating soft tissue swelling/inflammation/restriction, improving soft tissue extensibility and allowing for proper ROM for normal function with self care, mobility, lifting and ambulation. Individual Aquatic Therapy:  [x] (98792) Provided verbal and tactile cueing for strengthening, flexibility, ROM using the therapeutic properties of water (buoyancy, resistance) for improvements in core control, mobility and ambulation     Aquatic Group:  [x] (30768) Provided intermittent verbal and tactile cueing for strengthening, endurance, flexibility and ROM for 2-4 individuals that do not require one-on one patient contact by the therapist       Land Timed Code Treatment Minutes: 0   Land Total Treatment Minutes: 0     Individual Aquatic Minutes: 30   Aquatic Group Minutes: 10     [] EVAL (LOW) 69565 (typically 20 minutes face-to-face)  [] EVAL (MOD) 10301 (typically 30 minutes face-to-face)  [] EVAL (HIGH) 35876 (typically 45 minutes face-to-face)  [] RE-EVAL     [] RX(35248) x     [] Dry needle 1 or 2 Muscles (45422)  [] NMR (15249) x     [] Dry needle 3+ Muscles (45382)  [] Manual (99339) x     [] Ultrasound (38377) x   []TA (40228) x    [] Mech Traction (18592)  [] ES(attended) (50152)     [] ES (un) (95370):   [] Vasopump (25258) [] Ionto (28824)   [x] Aquatic Ind (25449) x  2  [x] Aquatic Group (85101) x   [] Other:    Treatment/Activity Tolerance:  [] Patient tolerated treatment well [] Patient limited by fatigue  [x] Patient limited by pain  [] Patient limited by other medical complications  [] Other:  ASSESSMENT: Pt would benefit from skilled aquatic therapy to  pain, increase ROM, flexibility, balance, endurance, gait, to improve function and ADL's.    Tolerated above initial aquatic ex with decreased pain 3/10 afterwards. Issued signed Rx for wheeled walker with seat. Assisted to locker room for safety due to fall risk. 1/24: Decreased pain while in the pool. Cues with exercises for technique and to prevent increased pain. Modification to gastroc stretch to decrease R hip pain  1/26: Tolerated exercise progressions well. Patient reported decreased pain after session. Prognosis: [x] Good [] Fair  [] Poor    GOALS:  Patient stated goal: \"standing\"  [] Progressing: [] Met: [] Not Met: [] Adjusted  Therapist goals for Patient:   Short Term Goals: To be achieved in: 2 weeks  1. Independent in HEP and progression per patient tolerance, in order to prevent re-injury. [] Progressing: [] Met: [] Not Met: [] Adjusted  2. Patient will have a decrease in pain by 20-30% to facilitate improvement in movement, function, and ADLs as indicated by Functional Deficits. [] Progressing: [] Met: [] Not Met: [] Adjusted  3. Patient will misty 40 min pool ex. [] Progressing: [] Met: [] Not Met: [] Adjusted  4. Patient will amb w/ wheeled walker with improved tech/WB/balance. [] Progressing: [] Met: [] Not Met: [] Adjusted     Long Term Goals: To be achieved in: at d/c  1. Increase Oswestry functional outcome score by 10 points to assist with reaching prior level of function. [] Progressing: [] Met: [] Not Met: [] Adjusted  2. Patient will have a decrease in pain by 40-50% to facilitate improvement in movement, function, and ADLs as indicated by Functional Deficits. [] Progressing: [] Met: [] Not Met: [] Adjusted  3. Patient will demonstrate increased AROM and LE flex to min-mod dec to allow for proper joint functioning as indicated by patients Functional Deficits. [] Progressing: [] Met: [] Not Met: [] Adjusted  4. Patient will demonstrate an increase in Strength to 4/5 R LE to allow for proper functional mobility as indicated by patients Functional Deficits.    [] Progressing: [] Met: [] Not Met: [] Adjusted  5. Patient will return to sleeping in bed without increased symptoms or restriction.   [] Progressing: [] Met: [] Not Met: [] Adjusted  6. \"Walking\"(patient specific functional goal)    [] Progressing: [] Met: [] Not Met: [] Adjusted         Patient Requires Follow-up: [x] Yes  [] No    Plan:   [x] Continue per plan of care [] Alter current plan (see comments)  [] Plan of care initiated [] Hold pending MD visit [] Discharge    Plan for Next Session:  Gradually progress aquatic exercise as tolerated to increase   ROM, strength, and endurance to improve ADL's and decrease pain.      ADD: increase gt, UE with L-S stab, HSS gentle as tolerated.           Electronically signed by:  Laura Sears, PTA [id number]    Note: If patient does not return for scheduled/recommended follow up visits, this note will serve as a discharge from care along with the most recent update on progress.

## 2023-01-31 ENCOUNTER — APPOINTMENT (OUTPATIENT)
Dept: PHYSICAL THERAPY | Age: 48
End: 2023-01-31
Payer: MEDICAID

## 2023-01-31 ENCOUNTER — HOSPITAL ENCOUNTER (OUTPATIENT)
Dept: PHYSICAL THERAPY | Age: 48
Setting detail: THERAPIES SERIES
Discharge: HOME | End: 2023-01-31
Payer: MEDICAID

## 2023-02-01 ENCOUNTER — ANESTHESIA EVENT (OUTPATIENT)
Dept: ENDOSCOPY | Age: 48
End: 2023-02-01
Payer: MEDICAID

## 2023-02-02 ENCOUNTER — HOSPITAL ENCOUNTER (OUTPATIENT)
Age: 48
Setting detail: OUTPATIENT SURGERY
Discharge: HOME | End: 2023-02-02
Attending: INTERNAL MEDICINE | Admitting: INTERNAL MEDICINE
Payer: MEDICAID

## 2023-02-02 ENCOUNTER — APPOINTMENT (OUTPATIENT)
Dept: PHYSICAL THERAPY | Age: 48
End: 2023-02-02
Payer: MEDICAID

## 2023-02-02 ENCOUNTER — ANESTHESIA (OUTPATIENT)
Dept: ENDOSCOPY | Age: 48
End: 2023-02-02
Payer: MEDICAID

## 2023-02-02 VITALS
HEART RATE: 69 BPM | HEIGHT: 64 IN | TEMPERATURE: 97.4 F | WEIGHT: 293 LBS | DIASTOLIC BLOOD PRESSURE: 85 MMHG | BODY MASS INDEX: 50.02 KG/M2 | RESPIRATION RATE: 18 BRPM | OXYGEN SATURATION: 98 % | SYSTOLIC BLOOD PRESSURE: 126 MMHG

## 2023-02-02 DIAGNOSIS — Z12.11 COLON CANCER SCREENING: ICD-10-CM

## 2023-02-02 PROBLEM — Z86.010 HISTORY OF COLONOSCOPY WITH POLYPECTOMY: Status: ACTIVE | Noted: 2023-02-02

## 2023-02-02 PROBLEM — Z86.0100 HISTORY OF COLONOSCOPY WITH POLYPECTOMY: Status: ACTIVE | Noted: 2023-02-02

## 2023-02-02 PROBLEM — Z98.890 HISTORY OF COLONOSCOPY WITH POLYPECTOMY: Status: ACTIVE | Noted: 2023-02-02

## 2023-02-02 LAB
GLUCOSE BLD-MCNC: 100 MG/DL (ref 70–99)
GLUCOSE BLD-MCNC: 95 MG/DL (ref 70–99)
PERFORMED ON: ABNORMAL
PERFORMED ON: NORMAL

## 2023-02-02 PROCEDURE — 2500000003 HC RX 250 WO HCPCS

## 2023-02-02 PROCEDURE — 7100000000 HC PACU RECOVERY - FIRST 15 MIN: Performed by: INTERNAL MEDICINE

## 2023-02-02 PROCEDURE — 3700000001 HC ADD 15 MINUTES (ANESTHESIA): Performed by: INTERNAL MEDICINE

## 2023-02-02 PROCEDURE — 2709999900 HC NON-CHARGEABLE SUPPLY: Performed by: INTERNAL MEDICINE

## 2023-02-02 PROCEDURE — 2580000003 HC RX 258: Performed by: ANESTHESIOLOGY

## 2023-02-02 PROCEDURE — 7100000011 HC PHASE II RECOVERY - ADDTL 15 MIN: Performed by: INTERNAL MEDICINE

## 2023-02-02 PROCEDURE — 3700000000 HC ANESTHESIA ATTENDED CARE: Performed by: INTERNAL MEDICINE

## 2023-02-02 PROCEDURE — 88305 TISSUE EXAM BY PATHOLOGIST: CPT

## 2023-02-02 PROCEDURE — 7100000001 HC PACU RECOVERY - ADDTL 15 MIN: Performed by: INTERNAL MEDICINE

## 2023-02-02 PROCEDURE — 6360000002 HC RX W HCPCS

## 2023-02-02 PROCEDURE — 3609010600 HC COLONOSCOPY POLYPECTOMY SNARE/COLD BIOPSY: Performed by: INTERNAL MEDICINE

## 2023-02-02 PROCEDURE — 7100000010 HC PHASE II RECOVERY - FIRST 15 MIN: Performed by: INTERNAL MEDICINE

## 2023-02-02 RX ORDER — SODIUM CHLORIDE 9 MG/ML
INJECTION, SOLUTION INTRAVENOUS PRN
Status: DISCONTINUED | OUTPATIENT
Start: 2023-02-02 | End: 2023-02-02 | Stop reason: HOSPADM

## 2023-02-02 RX ORDER — SODIUM CHLORIDE 0.9 % (FLUSH) 0.9 %
5-40 SYRINGE (ML) INJECTION PRN
Status: DISCONTINUED | OUTPATIENT
Start: 2023-02-02 | End: 2023-02-02 | Stop reason: HOSPADM

## 2023-02-02 RX ORDER — ONDANSETRON 2 MG/ML
4 INJECTION INTRAMUSCULAR; INTRAVENOUS
Status: DISCONTINUED | OUTPATIENT
Start: 2023-02-02 | End: 2023-02-02 | Stop reason: HOSPADM

## 2023-02-02 RX ORDER — PROPOFOL 10 MG/ML
INJECTION, EMULSION INTRAVENOUS CONTINUOUS PRN
Status: DISCONTINUED | OUTPATIENT
Start: 2023-02-02 | End: 2023-02-02 | Stop reason: SDUPTHER

## 2023-02-02 RX ORDER — LIDOCAINE HYDROCHLORIDE 20 MG/ML
INJECTION, SOLUTION EPIDURAL; INFILTRATION; INTRACAUDAL; PERINEURAL PRN
Status: DISCONTINUED | OUTPATIENT
Start: 2023-02-02 | End: 2023-02-02 | Stop reason: SDUPTHER

## 2023-02-02 RX ORDER — SODIUM CHLORIDE 0.9 % (FLUSH) 0.9 %
5-40 SYRINGE (ML) INJECTION EVERY 12 HOURS SCHEDULED
Status: DISCONTINUED | OUTPATIENT
Start: 2023-02-02 | End: 2023-02-02 | Stop reason: HOSPADM

## 2023-02-02 RX ORDER — PROPOFOL 10 MG/ML
INJECTION, EMULSION INTRAVENOUS PRN
Status: DISCONTINUED | OUTPATIENT
Start: 2023-02-02 | End: 2023-02-02 | Stop reason: SDUPTHER

## 2023-02-02 RX ADMIN — LIDOCAINE HYDROCHLORIDE 100 MG: 20 INJECTION, SOLUTION EPIDURAL; INFILTRATION; INTRACAUDAL; PERINEURAL at 08:50

## 2023-02-02 RX ADMIN — PROPOFOL 100 MG: 10 INJECTION, EMULSION INTRAVENOUS at 08:50

## 2023-02-02 RX ADMIN — SODIUM CHLORIDE: 9 INJECTION, SOLUTION INTRAVENOUS at 08:37

## 2023-02-02 RX ADMIN — PROPOFOL 180 MCG/KG/MIN: 10 INJECTION, EMULSION INTRAVENOUS at 08:50

## 2023-02-02 ASSESSMENT — PAIN DESCRIPTION - DESCRIPTORS: DESCRIPTORS: ACHING

## 2023-02-02 ASSESSMENT — PAIN SCALES - WONG BAKER: WONGBAKER_NUMERICALRESPONSE: 0

## 2023-02-02 ASSESSMENT — PAIN - FUNCTIONAL ASSESSMENT
PAIN_FUNCTIONAL_ASSESSMENT: PREVENTS OR INTERFERES SOME ACTIVE ACTIVITIES AND ADLS
PAIN_FUNCTIONAL_ASSESSMENT: 0-10

## 2023-02-02 ASSESSMENT — PAIN SCALES - GENERAL
PAINLEVEL_OUTOF10: 0
PAINLEVEL_OUTOF10: 0

## 2023-02-02 ASSESSMENT — LIFESTYLE VARIABLES: SMOKING_STATUS: 1

## 2023-02-02 NOTE — DISCHARGE INSTRUCTIONS
We removed 1 tiny polyp today. Recommendations:  Await pathology. Follow up with primary care physician. Repeat colonoscopy in 7-10 years pending pathology. The patient had biopsies taken today. The patient should call for results in 7 days if they have not heard from our office. Our number is [phone]. Discharge Instructions for Colonoscopy     Colonoscopy is a visual exam of the lining of the large intestine, also called the bowel or colon, with a colonoscope. A colonoscope is a flexible tube with a light and a viewing device. It allows the doctor to view the inside of the colon through a tiny video camera. Colonoscopy is performed for many reasons: unexplained anemia , pain, diarrhea , bloody stools, cancer screening, among many other reasons. Complications from a colonoscopy are rare. Some possible serious complications include perforated bowel (which might require surgery) and bleeding (which could require blood transfusion ). Minor complications include bloating, gas, and cramping that can last for 1-2 days after the procedure. Because air is put into your colon during the procedure, it is normal to pass large amounts of air from your rectum. You may not have a bowel movement for 1-3 days after the procedure. What You Will Need:  Someone to drive you home after the procedure     Steps to Take:  [address] when you get home. Because the sedative will make you drowsy, don't drive, operate machinery, or make important decisions the day of the procedure. Feelings of bloating, gas, or cramping may persist for 24 hours. Diet -  Try sips of water first. If tolerated, resume bland food (scrambled eggs, toast, soup) first.  If tolerated, resume regular diet or the diet recommended by your physician. Do not drink alcohol for 24 hours. Physical Activity -  Ask your doctor when you will be able to return to work.    Do not drive, operate heavy machinery, or do activities that require coordination or balance for 24 hours.   Otherwise, return to your normal routine as soon as you are comfortable to do so, which is usually the next day after the procedure.   Medications - When taking medications, it's important to:   Take your medication as directed, not more, not less, not at a different time.   Do not stop taking them without consulting your healthcare provider.   Don't share them with anyone else.   Know what effects and side effects to expect, and report them to your healthcare provider.   If you are taking more than one drug, even if it is an over-the-counter medication, herb, or dietary supplement, be sure to check with a physician or pharmacist about drug interactions.   Plan ahead for refills so you don't run out.   Lifestyle Changes - The results of your colonoscopy will determine if any lifestyle changes are necessary.     Follow-up:  The doctor will usually give you a preliminary report after the medication wears off and you are more alert. The results from a biopsy can take as long as 1-2 weeks to be completed.   Schedule a follow-up appointment as directed by your doctor.   You should schedule a follow-up colonoscopy as recommended by your doctor.     Call Your Doctor If Any of the Following Occurs:  Bleeding from your rectum; notify your doctor if you pass a teaspoonful or more of blood   Black, tarry stools   Severe abdominal pain   Hard, swollen abdomen   Signs of infection, including fever or chills   Inability to pass gas or stool   Coughing, shortness of breath, chest pain, severe nausea or vomiting     In case of an emergency, call 911 immediately.

## 2023-02-02 NOTE — PROGRESS NOTES
Patient tolerated PO intake. Denies nausea or pain. Discharge instructions reviewed with . No questions at this time.      Electronically signed by Keven Rosales RN on 2/2/2023 at 9:56 AM

## 2023-02-02 NOTE — OP NOTE
Colonoscopy Procedure Note      Patient: Ileana Benson  : 1975  Acct#:     Procedure: Colonoscopy with polypectomy (cold snare)    Date:  2023    Surgeon:  Les Nur MD    Referring Physician:  Leela Jaffe MD    Previous Colonoscopy: NO  Date: N/A  Greater than 3 years: N/A    Preoperative Diagnosis:    Colon cancer screening, average risk     Postoperative Diagnosis:    - 3mm sessile polyp in the rectum resected with cold snare polypectomy   - Mild sigmoid diverticulosis   - small internal hemorrhoids     Consent:  The patient or their legal guardian has signed a consent, and is aware of the potential risks, benefits, alternatives, and potential complications of this procedure. These include, but are not limited to hemorrhage, bleeding, post procedural pain, perforation, phlebitis, aspiration, hypotension, hypoxia, cardiovascular events such as arryhthmia, and possibly death. Additionally, the possibility of missed colonic polyps and interval colon cancer was discussed in the consent. Anesthesia:  Agents given by the anesthesia service during MAC    Endoscope used:   CF H190L    Procedure: An informed consent was obtained from the patient after explanation of indications, benefits, possible risks and complications of the procedure. The patient was then taken to the endoscopy suite, placed in the left lateral decubitus position, and the above IV anesthesia was administered. A digital rectal examination was performed and revealed negative without mass, lesions or tenderness. The patient was placed in the left lateral position and monitored continuously with ECG tracing, pulse oximetry monitoring and direct observations. Medications were administered incrementally over the course of the procedure to achieve an adequate level of conscious sedation.  After digital examination of the rectum was performed, the Olympus CF H190L was inserted into the rectum and advanced under direct vision to the cecum, which was identified by the appendiceal orifice and ileocecal valve . The procedure was considered nomore difficult than average. Additional maneuvers used to reach the cecum: none. Overall the patient tolerated the procedure well, without undue discomfort, hypotension or desaturation. The patient was adequately recovered in the endoscopy suite and was discharged to home. The preparation was excellent. During withdrawal examination, the final quality of the prep was [facility] Bowel Prep Scale: Right Colon: Grade 3 Transverse Colon: Grade 3 Left Colon: Grade 3     Additional rinsing and suctioning were not necessary to obtain adequate views. A careful inspection was made as the colonoscope was withdrawn. This did include a retroflexed evaluation of the rectum. Findings and interventions are described below. Appropriate photo documentation was obtained. The scope was then withdrawn back through the cecum, ascending, transverse, descending, sigmoid colon, and rectum. Careful circumferential examination of the mucosa in these areas demonstrated:    Cecum: Normal    Ascending Colon: normal    Transverse Colon: normal    Descending Colon: normal    Sigmoid Colon: mild diverticulosis     Rectum: 3mm sessile polyp in the rectum resected with cold snare polypectomy     The scope was then withdrawn into the rectum and retroflexed. The retroflexed view of the anal verge and rectum demonstrates small internal hemorrhoids . The scope was straightened, the colon was decompressed and the scope was withdrawn from the patient. The patient tolerated the procedure well and was taken to the PACU in good condition. Estimated blood loss: None    ID Type Source Tests Collected by Time Destination   A : Rectal polyp Tissue Rectum SURGICAL PATHOLOGY Les Nur MD 2/2/2023 [id number]          Impression:  See post-procedure diagnoses. Recommendations:  Await pathology.   Repeat colonoscopy in 7-10 years. Follow up with primary care physician. Repeat colonoscopy in 7-10 years p ending pathology. The patient had biopsies taken today. The patient should call for results in 7 days if they have not heard from our office. Our number is [phone].      Kinza Antonio MD  [address]  2/2/2023  [phone]

## 2023-02-02 NOTE — ANESTHESIA PRE PROCEDURE
Department of Anesthesiology  Preprocedure Note       Name:  Terry Shelton   Age:  50 y.o.  :  1975                                          MRN:  [id number]         Date:  2023      Surgeon: Javon Newsome):  Ioana Morel MD    Procedure: Procedure(s):  COLONOSCOPY    Medications prior to admission:   Prior to Admission medications    Medication Sig Start Date End Date Taking? Authorizing Provider   gabapentin (NEURONTIN) 300 MG capsule Take 1 capsule by mouth 3 times daily for 90 days. 23  Juan Ramirez MD   acetaminophen (APAP EXTRA STRENGTH) 500 MG tablet Take 2 tablets by mouth every 6 hours as needed for Pain DO NOT TAKE WITH OTHER MEDICATIONS CONTAINING ACETAMINOPHEN. 23   GILLIAN Gentile CNP   HYDROcodone-acetaminophen (NORCO) 7.5-325 MG per tablet Take 1 tablet by mouth every 8 hours as needed for Pain for up to 30 days. 23  GILLIAN Gentile CNP   tiZANidine (ZANAFLEX) 4 MG tablet Take 1 tablet by mouth in the morning and 1 tablet in the evening.  23  GILLIAN Gentile CNP   loratadine (CLARITIN) 10 MG tablet TAKE ONE TABLET BY MOUTH DAILY 22   Juan Ramirez MD   venlafaxine (EFFEXOR XR) 75 MG extended release capsule Take 2 capsules by mouth daily 22   uJan Ramirez MD   oxybutynin (DITROPAN-XL) 5 MG extended release tablet TAKE ONE TABLET BY MOUTH DAILY 22   Juan Ramirez MD   omeprazole (PRILOSEC) 20 MG delayed release capsule TAKE ONE CAPSULE BY MOUTH DAILY 22   Juan Ramirez MD   montelukast (SINGULAIR) 10 MG tablet TAKE ONE TABLET BY MOUTH ONCE NIGHTLY 22   Juan Ramirez MD   metoprolol (LOPRESSOR) 100 MG tablet TAKE ONE TABLET BY MOUTH TWICE A DAY 22   Juan Ramirez MD   metFORMIN (GLUCOPHAGE) 500 MG tablet TAKE ONE TABLET BY MOUTH TWICE A DAY WITH MEALS 22   Juan Ramirez MD   Dulaglutide 3 MG/0.5ML SOPN Inject 3 mg into the skin once a week 22   Juan Ramirez MD   cloNIDine (CATAPRES) 0.1 MG tablet TAKE THREE TABLETS BY MOUTH THREE TIMES A DAY 12/12/22   [facility] MD Ashley   chlorthalidone (HYGROTON) 25 MG tablet TAKE ONE TABLET BY MOUTH DAILY 12/12/22   [facility] MD Ashley   spironolactone (ALDACTONE) 25 MG tablet Take 1 tablet by mouth daily 12/12/22   Echevarria Moh Day, MD   ibuprofen (ADVIL;MOTRIN) 800 MG tablet TAKE ONE TABLET BY MOUTH EVERY MORNING AND TAKE ONE TABLET BY MOUTH EVERY EVENING WITH MEALS  Patient taking differently: every 6 hours as needed 11/28/22   Suzanne Beck MD   torsemide (DEMADEX) 20 MG tablet TAKE TWO TABLETS BY MOUTH DAILY 8/15/22   Verona Cornejo MD   naloxone [facility]) 4 MG/0.1ML LIQD nasal spray Use as directed  Patient not taking: Reported on 12/12/2022 6/21/22   GILLIAN Phillips - CNP   blood glucose test strips (TRUE METRIX BLOOD GLUCOSE TEST) strip 1 each by In Vitro route 2 times daily 6/9/22   [facility] MD Ashley   Respiratory Therapy Supplies (NEBULIZER/TUBING/MOUTHPIECE) KIT 1 kit by Does not apply route daily 12/7/21   Queen Fan MD   ipratropium-albuterol (DUONEB) 0.5-2.5 (3) MG/3ML SOLN nebulizer solution Inhale 3 mLs into the lungs every 4 hours  Patient taking differently: Inhale 1 vial into the lungs every 4 hours as needed 12/7/21   Queen Fan MD   Blood Glucose Monitoring Suppl (TRUE METRIX METER) w/Device KIT 1 kit by Does not apply route 2 times daily 10/6/21   Gisel Simeon MD   Lancets MISC 1 each by Does not apply route 2 times daily Dispense brand per insurance preference 10/6/21   Gisel Simeon MD   Alcohol Swabs 70 % PADS 1 each by Does not apply route 2 times daily 10/6/21   Gisel Simeon MD   albuterol sulfate  (90 Base) MCG/ACT inhaler INHALE TWO PUFFS BY MOUTH EVERY 6 HOURS AS NEEDED FOR WHEEZING 6/9/21   Queen Fan MD   Blood Pressure Monitoring (B-D ASSURE BPM/AUTO ARM CUFF) MISC 1 Device by Does not apply route daily 4/9/21   Dwayne Saenz MD   Misc.  Devices (RAISED TOILET SEAT) MISC Use daily as needed as directed 12/4/20   [facility] Alvarez Ramirez MD       Current medications:    Current Facility-Administered Medications   Medication Dose Route Frequency Provider Last Rate Last Admin    sodium chloride flush 0.9 % injection 5-40 mL  5-40 mL IntraVENous 2 times per day Leilani Pettit MD        sodium chloride flush 0.9 % injection 5-40 mL  5-40 mL IntraVENous PRN Leilani Pettit MD        0.9 % sodium chloride infusion   IntraVENous PRN Leilani Pettit MD           Allergies: Allergies   Allergen Reactions    Latex Hives and Swelling       Problem List:    Patient Active Problem List   Diagnosis Code    Osteoarthritis of left knee M17.12    Essential hypertension I10    CHF (congestive heart failure) ([facility]) I50.9    Osteoarthritis of both knees M17.0    Hyperlipidemia E78.5    Cavitating mass in left lower lung lobe J98.4    Cavitary lesion of lung J98.4    COPD, mild ([facility]) J44.9    Chronic diastolic CHF (congestive heart failure) ([facility]) I50.32    JOSE (obstructive sleep apnea) G47.33    Tobacco abuse counseling Z71.6    Pneumatocele of lung J98.4    Chronic pain syndrome G89.4    Primary osteoarthritis of right hip M16.11    Chronic bilateral low back pain without sciatica M54.50, G89.29    Diabetes mellitus type 2 in obese ([facility]) E11.69, E66.9    Chronic GERD K21.9    Morbid obesity ([facility]) E66.01    Other allergic rhinitis J30.89    Urge incontinence N39.41    Depression F32. A    Anxiety F41.9    Abnormality of gait and mobility R26.9    Chronic right hip pain M25.551, G89.29    Cigarette nicotine dependence without complication L28.725    Cocaine abuse in remission ([facility] [address] F14.11    Diabetic polyneuropathy associated with type 2 diabetes mellitus ([facility]) E11.42    Hyperlipidemia associated with type 2 diabetes mellitus ([facility]) E11.69, E78.5    Major depressive disorder, recurrent, in partial remission ([facility]) F33.41    Mixed stress and urge incontinence N39.46    Thyroid goiter E04.9 Past Medical History:        Diagnosis Date    Anxiety     Arthritis     Asthma     Back pain     CHF (congestive heart failure) (HCC)     COPD (chronic obstructive pulmonary disease) (HCC)     Depression     Diabetes mellitus (HCC)     GERD (gastroesophageal reflux disease)     Hyperlipidemia     Hypertension     Obesity     Sleep apnea     no C-PAP SINCE WT LOSS PER PT       Past Surgical History:        Procedure Laterality Date    CHOLECYSTECTOMY      HYSTERECTOMY (CERVIX STATUS UNKNOWN)      SKIN BIOPSY  2015    TUBAL LIGATION      UPPER GASTROINTESTINAL ENDOSCOPY N/A 10/14/2019    EGD BIOPSY performed by Cecile Keating DO at [address] [facility] History:    Social History     Tobacco Use    Smoking status: Every Day     Packs/day: 0.50     Years: 20.00     Pack years: 10.00     Types: Cigarettes     Start date: 1985     Last attempt to quit: 2020     Years since quittin.9    Smokeless tobacco: Former    Tobacco comments:     Started back smoking in    Substance Use Topics    Alcohol use:  Yes     Alcohol/week: 1.0 standard drink     Types: 1 Glasses of wine per week     Comment: rare                                Ready to quit: Not Answered  Counseling given: Not Answered  Tobacco comments: Started back smoking in       Vital Signs (Current):   Vitals:    23 1642   Weight: (!) 330 lb (149.7 kg)   Height: 5' 4\" (1.626 m)                                              BP Readings from Last 3 Encounters:   23 135/85   22 (!) 180/105   22 (!) 155/92       NPO Status: Time of last liquid consumption: 0100 (prep finished at 0100)                        Time of last solid consumption: 0900                        Date of last liquid consumption: 23                        Date of last solid food consumption: 23    BMI:   Wt Readings from Last 3 Encounters:   23 (!) 330 lb (149.7 kg)   22 (!) 331 lb 9.2 oz (150.4 kg)   12/20/22 (!) 339 lb (153.8 kg)     Body mass index is 56.64 kg/m². CBC:   Lab Results   Component Value Date/Time    WBC 7.2 12/30/2022 05:15 PM    RBC 4.35 12/30/2022 05:15 PM    HGB 14.3 12/30/2022 05:15 PM    HCT 42.8 12/30/2022 05:15 PM    MCV 98.2 12/30/2022 05:15 PM    RDW 13.7 12/30/2022 05:15 PM     12/30/2022 05:15 PM       CMP:   Lab Results   Component Value Date/Time     12/30/2022 05:15 PM    K 4.3 12/30/2022 05:15 PM    CL 95 12/30/2022 05:15 PM    CO2 30 12/30/2022 05:15 PM    BUN 16 12/30/2022 05:15 PM    CREATININE 1.0 12/30/2022 05:15 PM    GFRAA >60 07/20/2022 02:05 PM    GFRAA >60 05/06/2013 05:40 AM    AGRATIO 1.2 07/16/2020 12:30 PM    LABGLOM >60 12/30/2022 05:15 PM    GLUCOSE 87 12/30/2022 05:15 PM    PROT 6.9 07/16/2020 12:30 PM    PROT 7.1 08/29/2012 05:20 AM    CALCIUM 9.3 12/30/2022 05:15 PM    BILITOT 0.4 07/16/2020 12:30 PM    ALKPHOS 65 07/16/2020 12:30 PM    AST 11 07/16/2020 12:30 PM    ALT 9 07/16/2020 12:30 PM       POC Tests: No results for input(s): POCGLU, POCNA, POCK, POCCL, POCBUN, POCHEMO, POCHCT in the last 72 hours.     Coags:   Lab Results   Component Value Date/Time    PROTIME 11.5 10/03/2014 05:33 AM    INR 1.06 10/03/2014 05:33 AM    APTT 32.6 05/05/2013 11:53 AM       HCG (If Applicable):   Lab Results   Component Value Date    PREGTESTUR Negative 12/23/2021        ABGs:   Lab Results   Component Value Date/Time    PHART 7.358 08/27/2018 08:51 AM    PO2ART 85.8 08/27/2018 08:51 AM    OFN1YOS 44.0 08/27/2018 08:51 AM    NAF3XQK 24.1 08/27/2018 08:51 AM    BEART -1.0 08/27/2018 08:51 AM    X2VFIXEL 96.8 08/27/2018 08:51 AM        Type & Screen (If Applicable):  No results found for: LABABO, LABRH    Drug/Infectious Status (If Applicable):  No results found for: HIV, HEPCAB    COVID-19 Screening (If Applicable): No results found for: COVID19        Anesthesia Evaluation  Patient summary reviewed no history of anesthetic complications:   Airway: Mallampati: II  TM distance: >3 FB   Neck ROM: full  Mouth opening: > = 3 FB   Dental:      Comment: Missing teeth    Pulmonary:   (+) COPD:  sleep apnea (bipap):  decreased breath sounds asthma: current smoker                           Cardiovascular:    (+) hypertension:, CHF:,         Rhythm: regular  Rate: normal                    Neuro/Psych:   (+) neuromuscular disease:, psychiatric history:depression/anxiety             GI/Hepatic/Renal:   (+) GERD:, bowel prep, morbid obesity          Endo/Other:    (+) DiabetesType II DM, , : arthritis:., .                 Abdominal:   (+) obese,           Vascular: Other Findings:           Anesthesia Plan      MAC     ASA 4       Induction: intravenous. Anesthetic plan and risks discussed with patient. Plan discussed with CRNA. This pre-anesthesia assessment may be used as a history and physical.    DOS STAFF ADDENDUM:    Pt seen and examined, chart reviewed (including anesthesia, drug and allergy history). No interval changes to history and physical examination. Anesthetic plan, risks, benefits, alternatives, and personnel involved discussed with patient. Patient verbalized an understanding and agrees to proceed.       Kenna Avelar MD  February 2, 2023  8:26 AM      Kenna Avelar MD   2/2/2023

## 2023-02-02 NOTE — H&P
Pre-operative History and Physical    Patient: Daniela Miles  : 1975  Acct#:     Intended Procedure:  Colonoscopy     HISTORY OF PRESENT ILLNESS:  The patient is a 50 y.o. female  who presents for/due to colon cancer screening       Past Medical History:        Diagnosis Date    Anxiety     Arthritis     Asthma     Back pain     CHF (congestive heart failure) (HCC)     COPD (chronic obstructive pulmonary disease) ([facility] [address]     Depression     Diabetes mellitus ([facility] [address]     GERD (gastroesophageal reflux disease)     Hyperlipidemia     Hypertension     Obesity     Sleep apnea     no C-PAP SINCE WT LOSS PER PT     Past Surgical History:        Procedure Laterality Date    CHOLECYSTECTOMY      HYSTERECTOMY (CERVIX STATUS UNKNOWN)      SKIN BIOPSY      TUBAL LIGATION      UPPER GASTROINTESTINAL ENDOSCOPY N/A 10/14/2019    EGD BIOPSY performed by Derrick Silva DO at [facility] ENDOSCOPY     Medications Prior to Admission:   No current facility-administered medications on file prior to encounter.      Current Outpatient Medications on File Prior to Encounter   Medication Sig Dispense Refill    loratadine (CLARITIN) 10 MG tablet TAKE ONE TABLET BY MOUTH DAILY 90 tablet 3    venlafaxine (EFFEXOR XR) 75 MG extended release capsule Take 2 capsules by mouth daily 60 capsule 0    oxybutynin (DITROPAN-XL) 5 MG extended release tablet TAKE ONE TABLET BY MOUTH DAILY 30 tablet 3    omeprazole (PRILOSEC) 20 MG delayed release capsule TAKE ONE CAPSULE BY MOUTH DAILY 90 capsule 1    montelukast (SINGULAIR) 10 MG tablet TAKE ONE TABLET BY MOUTH ONCE NIGHTLY 90 tablet 1    metoprolol (LOPRESSOR) 100 MG tablet TAKE ONE TABLET BY MOUTH TWICE A  tablet 1    metFORMIN (GLUCOPHAGE) 500 MG tablet TAKE ONE TABLET BY MOUTH TWICE A DAY WITH MEALS 60 tablet 2    Dulaglutide 3 MG/0.5ML SOPN Inject 3 mg into the skin once a week 4 Adjustable Dose Pre-filled Pen Syringe 5    cloNIDine (CATAPRES) 0.1 MG tablet TAKE THREE TABLETS BY MOUTH THREE TIMES A  tablet 2    chlorthalidone (HYGROTON) 25 MG tablet TAKE ONE TABLET BY MOUTH DAILY 90 tablet 1    spironolactone (ALDACTONE) 25 MG tablet Take 1 tablet by mouth daily 90 tablet 1    ibuprofen (ADVIL;MOTRIN) 800 MG tablet TAKE ONE TABLET BY MOUTH EVERY MORNING AND TAKE ONE TABLET BY MOUTH EVERY EVENING WITH MEALS (Patient taking differently: every 6 hours as needed) 60 tablet 1    torsemide (DEMADEX) 20 MG tablet TAKE TWO TABLETS BY MOUTH DAILY 60 tablet 11    naloxone (NARCAN) 4 MG/0.1ML LIQD nasal spray Use as directed (Patient not taking: Reported on 12/12/2022) 1 each 0    blood glucose test strips (TRUE METRIX BLOOD GLUCOSE TEST) strip 1 each by In Vitro route 2 times daily 200 each 11    Respiratory Therapy Supplies (NEBULIZER/TUBING/MOUTHPIECE) KIT 1 kit by Does not apply route daily 1 kit 0    ipratropium-albuterol (DUONEB) 0.5-2.5 (3) MG/3ML SOLN nebulizer solution Inhale 3 mLs into the lungs every 4 hours (Patient taking differently: Inhale 1 vial into the lungs every 4 hours as needed) 360 mL 11    Blood Glucose Monitoring Suppl (TRUE METRIX METER) w/Device KIT 1 kit by Does not apply route 2 times daily 1 kit 0    Lancets MISC 1 each by Does not apply route 2 times daily Dispense brand per insurance preference 200 each 1    Alcohol Swabs 70 % PADS 1 each by Does not apply route 2 times daily 200 each 0    albuterol sulfate  (90 Base) MCG/ACT inhaler INHALE TWO PUFFS BY MOUTH EVERY 6 HOURS AS NEEDED FOR WHEEZING 18 g 11    Blood Pressure Monitoring (B-D ASSURE BPM/AUTO ARM CUFF) MISC 1 Device by Does not apply route daily 1 each 0    Misc. Devices (RAISED TOILET SEAT) MISC Use daily as needed as directed 1 each 0        Allergies:  Latex    Social History:   TOBACCO:   reports that she has been smoking cigarettes. She started smoking about 38 years ago. She has a 10.00 pack-year smoking history.  She has quit using smokeless tobacco.  ETOH:   reports current alcohol use of about 1.0 standard drink per week. DRUGS:   reports no history of drug use. PHYSICAL EXAM:      Vital Signs: BP (!) 136/90   Pulse 87   Temp 98.6 °F (37 °C) (Temporal)   Resp 16   Ht 5' 4\" (1.626 m)   Wt (!) 326 lb 15.1 oz (148.3 kg)   LMP  (LMP Unknown)   SpO2 94%   BMI 56.12 kg/m²    Airway: No stridor or wheezing noted. Good air movement  Pulmonary: without wheezes. Clear to auscultation  Cardiac:regular rate and rhythm without loud murmurs  Abdomen:soft, nontender,  Bowel sounds present    Pre-Procedure Assessment / Plan:  1) Colon cancer screening     ASA Grade:  ASA 4 - Patient with severe systemic disease that is a constant threat to life  Mallampati Classification:  Class II    Level of Sedation Plan:Deep sedation    Post Procedure plan: Return to same level of care    I assessed the patient and find that the patient is in satisfactory condition to proceed with the planned procedure and sedation plan. I have explained the risk, benefits, and alternatives to the procedure; the patient understands and agrees to proceed. The patient was counseled at length about the risks of chica Covid-19 during their perioperative period and any recovery window from their procedure. The patient was made aware that chica Covid-19  may worsen their prognosis for recovering from their procedure  and lend to a higher morbidity and/or mortality risk. All material risks, benefits, and reasonable alternatives including postponing the procedure were discussed. The patient does wish to proceed with the procedure at this time.       Corazon Arboleda MD  2/2/2023

## 2023-02-02 NOTE — PROGRESS NOTES
Pt discharged to home. Transported in wheelchair. Accompanied by spouse. Transported in personal vehicle. Discharge instructions and personal belongings given to pt. Explanation of discharge medications and instructions understood by verbal statement. No questions, comments or concerns at this time.        Electronically signed by Margarita Florian RN on 2/2/2023 at 10:11 AM

## 2023-02-02 NOTE — ANESTHESIA POSTPROCEDURE EVALUATION
Department of Anesthesiology  Postprocedure Note    Patient: Daniela Miles  MRN: [id number]  YOB: 1975  Date of evaluation: 2/2/2023      Procedure Summary     Date: 02/02/23 Room / Location: [address] [place] [address] / [address] [place] [address]    Anesthesia Start: 7127 Anesthesia Stop: 6326    Procedure: COLONOSCOPY POLYPECTOMY SNARE/COLD BIOPSY Diagnosis:       Colon cancer screening      (COLON CANCER SCREENING)    Surgeons: Jasson Alvarez MD Responsible Provider: Brett Duff MD    Anesthesia Type: MAC ASA Status: 4          Anesthesia Type: No value filed.     Bean Phase I: Bean Score: 10    Bean Phase II:        Anesthesia Post Evaluation    Patient location during evaluation: PACU  Patient participation: complete - patient participated  Level of consciousness: awake and alert  Airway patency: patent  Nausea & Vomiting: no nausea and no vomiting  Complications: no  Cardiovascular status: hemodynamically stable  Respiratory status: acceptable  Hydration status: stable

## 2023-02-02 NOTE — PROGRESS NOTES
Patient arrived to phase 2. Denies pain. Abdomen soft.        Electronically signed by Reyne Boxer, RN on 2/2/2023 at 9:35 AM

## 2023-02-07 ENCOUNTER — HOSPITAL ENCOUNTER (OUTPATIENT)
Dept: PHYSICAL THERAPY | Age: 48
Setting detail: THERAPIES SERIES
Discharge: HOME | End: 2023-02-07
Payer: MEDICAID

## 2023-02-07 ENCOUNTER — APPOINTMENT (OUTPATIENT)
Dept: PHYSICAL THERAPY | Age: 48
End: 2023-02-07
Payer: MEDICAID

## 2023-02-07 PROCEDURE — 97113 AQUATIC THERAPY/EXERCISES: CPT

## 2023-02-07 PROCEDURE — 97150 GROUP THERAPEUTIC PROCEDURES: CPT

## 2023-02-07 NOTE — FLOWSHEET NOTE
[address] 75, [facility]  [address] [facility] [place], [facility]  Phone: [phone]   Fax:     [phone]      Physical Therapy Daily/Aquatic Flow Sheet   Date:  2023    Patient Name:  Beatrice Garland    :  1975  MRN: [id number]    Restrictions/Precautions:    Pertinent Medical History:Additional Pertinent Hx: asthma, OA, COPD, CHF, DM, HTN, anxiety/depression, obesity    Medical/Treatment Diagnosis Information:   Medical Diagnosis: Right sided sciatica [M54.31]  Osteoarthritis of right hip, unspecified osteoarthritis type [M16.11]  Treatment Diagnosis: pain, dec ROM and strength limiting gait and ADLs    Insurance/Certification information:  PT Insurance Information: Malachi Oquendo 30 vpcy no auth  Physician Information:  Jacob Worrell MD  Plan of care signed (Y/N): inbox    Date of Patient follow up with Physician:      Progress Report: []  Yes  [x]  No     Date Range for reporting period:  Beginnin2023  Ending:      Progress report due (10 Rx/or 30 days whichever is less):      Recertification due (POC duration/ or 90 days whichever is less):      Visit # POC/Insurance Allowable Auth Needed     30 vpcy []Yes   [x]No     Latex Allergy:  [x]NO      []YES  Preferred Language for Healthcare:   [x]English       []Other:    Functional Scale:     Date assessed: at eval  Test:Oswestry  Score:30    Pain level: LBP   4/10                                                  0/10                   R hip/ thigh 6/10                            after Rx 510    History of Injury: Pt with progressive OA pain R hip with new onset of R LE sciatica that stems from the hip/groin and radiates up hip to buttock and back and thigh. She went to ER for near fall after hip locked up on 22 and 23. Consideration for possible R THR  but needs to lose 80 pounds. Had steroid injection in R hip years ago with inc c/o.   Pt has [employer] membership but unable to use b/c fear of falling/staying mostly housebound. Pt amb with std cane in R hand with sig limp and several standing breaks needed to walk back to dept; edu on benefits of walker -  pt says she owns one but it is too old, rusted and worn out; will request script for wheeled walker with seat and give to pt when rec'd    Subjective:   1-19-23 to pool with walking stick. To get CT scan LB.   1/24: Did pretty good after first session. Pain 4/10 at present and that is w/o gabapentin because the doctor's office has not called it in, yet. Patient reports that her right hip feels like it's pushing up into her and that she feels like she is \"crooked\" (leaning to the right). Lost script for walker  1/26: Did good after last session, until about 8:00 that evening. Thinks it was just the cold that moved in. Cold always makes her hurt. Still having trouble getting in/out of car. Using cane for assistance lifting her leg  2-7-23 Still has not got rollator walker. To pool with st cane with antalgic/ limp  gait.              Objective:   Observation:   Test measurements:    Land-based Visits Exercises/Activities: NO LAND RX TODAY  Therapeutic Ex (65295)  Min: Resistance/Repetitions Notes                  Therapeutic Activity (43187) Min:25 See below                   NMR re-education (18401) Min:                          Manual Intervention (52025)  Min:                    Modalities  Min:               Aquatic Visits Exercises/Activities:  Aquatic Abbreviation Key  B= Belt DB= Dumbells T= Theratube   G=Gloves N= Noodles W= Weights   P= Paddles WW=Water Walker K= Kickboard        Transfers:   Steps ind B HR 1 at a time      % Immersion: 75%            Ambulation:  Laps ind Exercises UE:      Forwards 4 Shoulder Shrugs     Lateral  Shoulder circles  10    Marching    Scapular Retraction  10    Retro   Rolling  10    [place]as  Push Downs  10   Multifidus walkouts w/paddle  IR/ER  10     Punching     Stretching: B 30 sec  Rowing 10   Gastroc  2  Elbow Flex/Ext  10   Hamstring   Shldr Flex/Ext  10   Knee flex stretch   Shldr Abd/Add  10   Piriformis   Horiz Abd/Add  10   Hip flexor    Arm Circles  10   SKTC   PNF Diagonals  10   DKTC  UE oscillations f/b, s/s     ITB   Wall Push-ups    Quad  Figure 8's    Mid back   Buoy pull/push downs    UT  Tband rows    Rhom  Tband lats    Post Shoulder  Lumbar Rot w/ paddles    Pec   Small ball pull downs                   diagonals             Cervical:       AROM Flex       AROM Extension     LEs exercises:  B AROM Side Bending    Marching  10 AROM Rotation    Heel Raises  10 Chin tucks    Toe Raises  10 Chin nods     Squats  10      Hamstring Curls  10      Hip Flexion  10 Balance: Hip Abduction 10 SLS    Hip Circles 10 Tandem stance    TA set 10 NBOS eyes open    Glut Set 10 NBOS eyes closed    Hip Extension  Hand to Opposite Knee    Hip Adduction    Box Step     Hip IR   Noodle Stance     Hip ER  Stop/Go Gait    Fig 8's  Switch Gait                Seated: B Functional:    Ankle Pumps 20 Step up forward    Ankle circles 10 Step up lateral    Knee flex & ext 20 Step down    Hip Abd & Adduction 20 [place] squats    Bicycle  20 Crate Lifts    Add Set with ball 10 Lunges forward    LX stab with med ball throws  Lunges lateral    Ankle INV  Lunges retro    Ankle EV  Lower ab curl with noodle      Upper ab curl with ball      Med ball straight lifts      Med ball diagonal lifts    Jet massage /R hip/ LB 1 min  Hydrorider          Noodle:      Leg Press  Deep Water:    hang at wall 1 min   relief  Jog    Noodle hang deep water  Jumping Jacks    Noodle Bicycle  Heel to toe      Hand to opposite knee      Cross country skier      Rocking Horse      Other Therapeutic Activities:  Pt was educated on PT POC, Diagnosis, Prognosis, pathomechanics as well as frequency and duration of scheduling future physical therapy appointments.  Time was also taken on this day to answer all patient questions and participation in PT. Reviewed appointment policy in detail with patient and patient verbalized understanding. X 25 min Included benefits of Aquatic PT vs land based PT; importance of use of wh walker now for dec stress on R hip and protection from falls. Pt declined pool tour - member of [employer]; issued paperwork/pool info. Home Exercise Program:  Patient was instructed in the following for HEP:     . Patient verbalized/demonstrated understanding and was issued written handout. Charges: Therapeutic Exercise and NMR EXR  [] (45582) Provided verbal/tactile cueing for activities related to strengthening, flexibility, endurance, ROM for improvements in LE, proximal hip, and core control with self care, mobility, lifting, ambulation.  [] (00320) Provided verbal/tactile cueing for activities related to improving balance, coordination, kinesthetic sense, posture, motor skill, proprioception  to assist with LE, proximal hip, and core control in self care, mobility, lifting, ambulation and eccentric single leg control.      NMR and Therapeutic Activities:    [x] (88594 or 98424) Provided verbal/tactile cueing for activities related to improving balance, coordination, kinesthetic sense, posture, motor skill, proprioception and motor activation to allow for proper function of core, proximal hip and LE with self care and ADLs and functional mobility.   [] (36679) Gait Re-education- Provided training and instruction to the patient for proper LE, core and proximal hip recruitment and positioning and eccentric body weight control with ambulation re-education including up and down stairs     Home Exercise Program:    [x] (97090) Reviewed/Progressed HEP activities related to strengthening, flexibility, endurance, ROM of core, proximal hip and LE for functional self-care, mobility, lifting and ambulation/stair navigation   [] (81159)Reviewed/Progressed HEP activities related to improving balance, coordination, kinesthetic sense, posture, motor skill, proprioception of core, proximal hip and LE for self care, mobility, lifting, and ambulation/stair navigation      Manual Treatments:  PROM / STM / Oscillations-Mobs:  G-I, II, III, IV (PA's, Inf., Post.)  [] (13640) Provided manual therapy to mobilize LE, proximal hip and/or LS spine soft tissue/joints for the purpose of modulating pain, promoting relaxation,  increasing ROM, reducing/eliminating soft tissue swelling/inflammation/restriction, improving soft tissue extensibility and allowing for proper ROM for normal function with self care, mobility, lifting and ambulation.      Individual Aquatic Therapy:  [x] (13650) Provided verbal and tactile cueing for strengthening, flexibility, ROM using the therapeutic properties of water (buoyancy, resistance) for improvements in core control, mobility and ambulation     Aquatic Group:  [x] (79285) Provided intermittent verbal and tactile cueing for strengthening, endurance, flexibility and ROM for 2-4 individuals that do not require one-on one patient contact by the therapist       Land Timed Code Treatment Minutes: 0   Land Total Treatment Minutes: 0     Individual Aquatic Minutes: 25   Aquatic Group Minutes: 15     [] EVAL (LOW) 40453 (typically 20 minutes face-to-face)  [] EVAL (MOD) 30593 (typically 30 minutes face-to-face)  [] EVAL (HIGH) 89776 (typically 45 minutes face-to-face)  [] RE-EVAL     [] JN(53024) x     [] Dry needle 1 or 2 Muscles (20548)  [] NMR (66994) x     [] Dry needle 3+ Muscles (19577)  [] Manual (94696) x     [] Ultrasound (05627) x   []TA (60255) x    [] Mech Traction (10522)  [] ES(attended) (15959)     [] ES (un) (86580):   [] Vasopump (77432) [] Ionto (64847)   [x] Aquatic Ind (15048) x  2  [x] Aquatic Group (87075) x   [] Other:    Treatment/Activity Tolerance:  [] Patient tolerated treatment well [] Patient limited by fatigue  [x] Patient limited by pain  [] Patient limited by other medical complications  [] Other: ASSESSMENT: Pt would benefit from skilled aquatic therapy to  pain, increase ROM, flexibility, balance, endurance, gait, to improve function and ADL's. Tolerated above initial aquatic ex with decreased pain 3/10 afterwards. Issued signed Rx for wheeled walker with seat. Assisted to locker room for safety due to fall risk. : Decreased pain while in the pool. Cues with exercises for technique and to prevent increased pain. Modification to gastroc stretch to decrease R hip pain  : Tolerated exercise progressions well. Patient reported decreased pain after session. 23 reinforced getting rollator to assist with gt/ pain. Decreased pain in water @ 25 5 WB. Prognosis: [x] Good [] Fair  [] Poor    GOALS:  Patient stated goal: \"standing\"  [] Progressing: [] Met: [] Not Met: [] Adjusted  Therapist goals for Patient:   Short Term Goals: To be achieved in: 2 weeks  1. Independent in HEP and progression per patient tolerance, in order to prevent re-injury. [] Progressing: [] Met: [] Not Met: [] Adjusted  2. Patient will have a decrease in pain by 20-30% to facilitate improvement in movement, function, and ADLs as indicated by Functional Deficits. [] Progressing: [] Met: [] Not Met: [] Adjusted  3. Patient will misty 40 min pool ex. [] Progressing: [] Met: [] Not Met: [] Adjusted  4. Patient will amb w/ wheeled walker with improved tech/WB/balance. [] Progressing: [] Met: [] Not Met: [] Adjusted     Long Term Goals: To be achieved in: at d/c  1. Increase Oswestry functional outcome score by 10 points to assist with reaching prior level of function. [] Progressing: [] Met: [] Not Met: [] Adjusted  2. Patient will have a decrease in pain by 40-50% to facilitate improvement in movement, function, and ADLs as indicated by Functional Deficits. [] Progressing: [] Met: [] Not Met: [] Adjusted  3.  Patient will demonstrate increased AROM and LE flex to min-mod dec to allow for proper joint functioning as indicated by patients Functional Deficits. [] Progressing: [] Met: [] Not Met: [] Adjusted  4. Patient will demonstrate an increase in Strength to 4/5 R LE to allow for proper functional mobility as indicated by patients Functional Deficits. [] Progressing: [] Met: [] Not Met: [] Adjusted  5. Patient will return to sleeping in bed without increased symptoms or restriction. [] Progressing: [] Met: [] Not Met: [] Adjusted  6. \"Walking\"(patient specific functional goal)    [] Progressing: [] Met: [] Not Met: [] Adjusted         Patient Requires Follow-up: [x] Yes  [] No    Plan:   [x] Continue per plan of care [] Alter current plan (see comments)  [] Plan of care initiated [] Hold pending MD visit [] Discharge    Plan for Next Session:  Gradually progress aquatic exercise as tolerated to increase   ROM, strength, and endurance to improve ADL's and decrease pain. ADD: increase gt, UE with L-S stab, HSS deep,  gentle as tolerated. Electronically signed by:  Sharon Rodriguez,     Note: If patient does not return for scheduled/recommended follow up visits, this note will serve as a discharge from care along with the most recent update on progress.

## 2023-02-09 ENCOUNTER — HOSPITAL ENCOUNTER (OUTPATIENT)
Dept: PHYSICAL THERAPY | Age: 48
Setting detail: THERAPIES SERIES
End: 2023-02-09
Payer: MEDICAID

## 2023-02-09 ENCOUNTER — APPOINTMENT (OUTPATIENT)
Dept: PHYSICAL THERAPY | Age: 48
End: 2023-02-09
Payer: MEDICAID

## 2023-02-09 ENCOUNTER — HOSPITAL ENCOUNTER (OUTPATIENT)
Dept: PHYSICAL THERAPY | Age: 48
Setting detail: THERAPIES SERIES
Discharge: HOME | End: 2023-02-09
Payer: MEDICAID

## 2023-02-09 NOTE — FLOWSHEET NOTE
[place] [facility], [facility]  [address] [facility] [place], [facility]  Phone: [phone]   Fax:     [phone]    Physical Therapy  Cancellation/No-show Note  Patient Name:  Marian Puente  :  1975   Date:  2023  Cancelled visits to date: 2  No-shows to date: 0    Patient status for today's appointment patient:  [x]  Cancelled  []  Rescheduled appointment  []  No-show     Reason given by patient:  []  Patient ill  []  Conflicting appointment  []  No transportation    []  Conflict with work  []  No reason given  []  Other:     Comments:  not feeling well    Phone call information:   []  Phone call made today to patient at _ time at number provided:      []  Patient answered, conversation as follows:    []  Patient did not answer, message left as follows:    [x]  Phone call not made today    Electronically signed by:  Monserrat Barrios, PTA  [id number]

## 2023-02-10 ENCOUNTER — TELEPHONE (OUTPATIENT)
Dept: PAIN MANAGEMENT | Age: 48
End: 2023-02-10

## 2023-02-10 NOTE — TELEPHONE ENCOUNTER
Patient is getting a cat scan done and needs a PA with Sheri Brownlee done before she gets this done .     Please advise

## 2023-02-14 ENCOUNTER — HOSPITAL ENCOUNTER (OUTPATIENT)
Dept: PHYSICAL THERAPY | Age: 48
Setting detail: THERAPIES SERIES
End: 2023-02-14
Payer: MEDICAID

## 2023-02-14 ENCOUNTER — HOSPITAL ENCOUNTER (OUTPATIENT)
Dept: PHYSICAL THERAPY | Age: 48
Setting detail: THERAPIES SERIES
Discharge: HOME | End: 2023-02-14
Payer: MEDICAID

## 2023-02-15 NOTE — TELEPHONE ENCOUNTER
Spoke with pt, letting her know per [facility] to call central scheduling since she is going to [address] to get the [address] for the PA. Pt is very upset that [facility] did not and will not cover for her previous MRI. I reminded the pt to not get the Cat Scan until she knows it is going to be covered so she does not have a bill.

## 2023-02-17 ENCOUNTER — PATIENT MESSAGE (OUTPATIENT)
Dept: FAMILY MEDICINE CLINIC | Age: 48
End: 2023-02-17

## 2023-02-17 ENCOUNTER — HOSPITAL ENCOUNTER (OUTPATIENT)
Dept: CT IMAGING | Age: 48
Discharge: HOME | End: 2023-02-17
Payer: MEDICAID

## 2023-02-17 DIAGNOSIS — M17.0 PRIMARY OSTEOARTHRITIS OF BOTH KNEES: ICD-10-CM

## 2023-02-17 DIAGNOSIS — G89.4 CHRONIC PAIN SYNDROME: ICD-10-CM

## 2023-02-17 DIAGNOSIS — M16.11 PRIMARY OSTEOARTHRITIS OF RIGHT HIP: Primary | ICD-10-CM

## 2023-02-17 DIAGNOSIS — M54.50 CHRONIC BILATERAL LOW BACK PAIN WITHOUT SCIATICA: ICD-10-CM

## 2023-02-17 DIAGNOSIS — G89.29 CHRONIC BILATERAL LOW BACK PAIN WITHOUT SCIATICA: ICD-10-CM

## 2023-02-17 PROCEDURE — 72131 CT LUMBAR SPINE W/O DYE: CPT

## 2023-02-21 ENCOUNTER — OFFICE VISIT (OUTPATIENT)
Dept: PAIN MANAGEMENT | Age: 48
End: 2023-02-21

## 2023-02-21 VITALS
OXYGEN SATURATION: 100 % | DIASTOLIC BLOOD PRESSURE: 99 MMHG | HEART RATE: 87 BPM | TEMPERATURE: 97.6 F | SYSTOLIC BLOOD PRESSURE: 186 MMHG

## 2023-02-21 DIAGNOSIS — E11.42 DIABETIC POLYNEUROPATHY ASSOCIATED WITH TYPE 2 DIABETES MELLITUS (HCC): ICD-10-CM

## 2023-02-21 DIAGNOSIS — M54.50 CHRONIC BILATERAL LOW BACK PAIN WITHOUT SCIATICA: ICD-10-CM

## 2023-02-21 DIAGNOSIS — F32.A DEPRESSION, UNSPECIFIED DEPRESSION TYPE: ICD-10-CM

## 2023-02-21 DIAGNOSIS — F41.9 ANXIETY: ICD-10-CM

## 2023-02-21 DIAGNOSIS — M17.0 PRIMARY OSTEOARTHRITIS OF BOTH KNEES: ICD-10-CM

## 2023-02-21 DIAGNOSIS — M62.830 BACK SPASM: ICD-10-CM

## 2023-02-21 DIAGNOSIS — G89.29 CHRONIC BILATERAL LOW BACK PAIN WITHOUT SCIATICA: ICD-10-CM

## 2023-02-21 DIAGNOSIS — M16.11 PRIMARY OSTEOARTHRITIS OF RIGHT HIP: ICD-10-CM

## 2023-02-21 DIAGNOSIS — E66.01 MORBID OBESITY (HCC): ICD-10-CM

## 2023-02-21 DIAGNOSIS — G89.4 CHRONIC PAIN SYNDROME: ICD-10-CM

## 2023-02-21 RX ORDER — HYDROCODONE BITARTRATE AND ACETAMINOPHEN 7.5; 325 MG/1; MG/1
1 TABLET ORAL EVERY 8 HOURS PRN
Qty: 90 TABLET | Refills: 0 | Status: SHIPPED | OUTPATIENT
Start: 2023-02-21 | End: 2023-03-23

## 2023-02-21 RX ORDER — TIZANIDINE 4 MG/1
4 TABLET ORAL EVERY 12 HOURS
Qty: 60 TABLET | Refills: 0 | Status: SHIPPED | OUTPATIENT
Start: 2023-02-21 | End: 2023-03-23

## 2023-02-21 NOTE — PROGRESS NOTES
Byron Antunez  1975  [id number]      HISTORY OF PRESENT ILLNESS: Ms. Radha Villareal is a 50 y.o. female returns for a follow up visit for pain management  She has a diagnosis of   1. Chronic pain syndrome    2. Primary osteoarthritis of right hip    3. Primary osteoarthritis of both knees    4. Chronic bilateral low back pain without sciatica    5. Back spasm    6. Diabetic polyneuropathy associated with type 2 diabetes mellitus ([facility]    7. Anxiety    8. Depression, unspecified depression type    9. Morbid obesity ([facility]      As per Information Obtained from the PADT (Patient Assessment and Documentation Tool)    She complains of pain in the  lower back, right hip, both knees  She rates the pain 10/10 and describes it as aching, burning, numbness, pins and needles, stabbing. Current treatment regimen has helped relieve about 30% of the pain. She denies any side effects from the current pain regimen. Patient reports that since the last follow up visit the physical functioning is worse, family/social relationships are worse, mood is worse, sleep patterns are worse, and that the overall functioning is worse. Patient denies misusing/abusing her narcotic pain medications or using any illegal drugs. Upon obtaining medical history from Ms. Radha Villareal states that pain is not manageable on current pain therapy. Takes the pain medications as prescribed. Reports having increased pain to the right lower extremity, she is not able to  about well. Was not able to complete ordered PT. Mood/anxiety is stable. Sleep is fair with an average of 5-6 hours. Denies to having issues of constipation. Tolerating activities/house chores with moderate tenderness to the lower back, right Hip. ALLERGIES: Patients list of allergies were reviewed     MEDICATIONS: Ms. Radha Villareal list of medications were reviewed. Her current medications are   Outpatient Medications Prior to Visit   Medication Sig Dispense Refill    gabapentin (NEURONTIN) 300 MG capsule Take 1 capsule by mouth 3 times daily for 90 days. 270 capsule 0    acetaminophen (APAP EXTRA STRENGTH) 500 MG tablet Take 2 tablets by mouth every 6 hours as needed for Pain DO NOT TAKE WITH OTHER MEDICATIONS CONTAINING ACETAMINOPHEN.  30 tablet 0    loratadine (CLARITIN) 10 MG tablet TAKE ONE TABLET BY MOUTH DAILY 90 tablet 3    oxybutynin (DITROPAN-XL) 5 MG extended release tablet TAKE ONE TABLET BY MOUTH DAILY 30 tablet 3    omeprazole (PRILOSEC) 20 MG delayed release capsule TAKE ONE CAPSULE BY MOUTH DAILY 90 capsule 1    montelukast (SINGULAIR) 10 MG tablet TAKE ONE TABLET BY MOUTH ONCE NIGHTLY 90 tablet 1    metoprolol (LOPRESSOR) 100 MG tablet TAKE ONE TABLET BY MOUTH TWICE A  tablet 1    metFORMIN (GLUCOPHAGE) 500 MG tablet TAKE ONE TABLET BY MOUTH TWICE A DAY WITH MEALS 60 tablet 2    Dulaglutide 3 MG/0.5ML SOPN Inject 3 mg into the skin once a week 4 Adjustable Dose Pre-filled Pen Syringe 5    cloNIDine (CATAPRES) 0.1 MG tablet TAKE THREE TABLETS BY MOUTH THREE TIMES A  tablet 2    chlorthalidone (HYGROTON) 25 MG tablet TAKE ONE TABLET BY MOUTH DAILY 90 tablet 1    spironolactone (ALDACTONE) 25 MG tablet Take 1 tablet by mouth daily 90 tablet 1    venlafaxine (EFFEXOR XR) 75 MG extended release capsule Take 2 capsules by mouth daily 60 capsule 0    ibuprofen (ADVIL;MOTRIN) 800 MG tablet TAKE ONE TABLET BY MOUTH EVERY MORNING AND TAKE ONE TABLET BY MOUTH EVERY EVENING WITH MEALS (Patient taking differently: every 6 hours as needed) 60 tablet 1    torsemide (DEMADEX) 20 MG tablet TAKE TWO TABLETS BY MOUTH DAILY 60 tablet 11    blood glucose test strips (TRUE METRIX BLOOD GLUCOSE TEST) strip 1 each by In Vitro route 2 times daily 200 each 11    Respiratory Therapy Supplies (NEBULIZER/TUBING/MOUTHPIECE) KIT 1 kit by Does not apply route daily 1 kit 0    ipratropium-albuterol (DUONEB) 0.5-2.5 (3) MG/3ML SOLN nebulizer solution Inhale 3 mLs into the lungs every 4 hours (Patient taking differently: Inhale 1 vial into the lungs every 4 hours as needed) 360 mL 11    Blood Glucose Monitoring Suppl (TRUE METRIX METER) w/Device KIT 1 kit by Does not apply route 2 times daily 1 kit 0    Lancets MISC 1 each by Does not apply route 2 times daily Dispense brand per insurance preference 200 each 1    Alcohol Swabs 70 % PADS 1 each by Does not apply route 2 times daily 200 each 0    albuterol sulfate  (90 Base) MCG/ACT inhaler INHALE TWO PUFFS BY MOUTH EVERY 6 HOURS AS NEEDED FOR WHEEZING 18 g 11    Blood Pressure Monitoring (B-D ASSURE BPM/AUTO ARM CUFF) MISC 1 Device by Does not apply route daily 1 each 0    Misc. Devices (RAISED TOILET SEAT) MISC Use daily as needed as directed 1 each 0    naloxone (NARCAN) 4 MG/0.1ML LIQD nasal spray Use as directed (Patient not taking: Reported on 12/12/2022) 1 each 0     No facility-administered medications prior to visit. SOCIAL/FAMILY/PAST MEDICAL HISTORY: Ms. Pretty Linares, family and past medical history was reviewed. REVIEW OF SYSTEMS:    Respiratory: Negative for apnea, chest tightness and shortness of breath or change in baseline breathing. Gastrointestinal: Negative for nausea, vomiting, abdominal pain, diarrhea, constipation, blood in stool and abdominal distention. PHYSICAL EXAM:   Nursing note and vitals reviewed. BP (!) 186/99   Pulse 87   Temp 97.6 °F (36.4 °C)   LMP  (LMP Unknown)   SpO2 100%   Constitutional: She appears well-developed and well-nourished. No acute distress. Skin: Skin is warm and dry, good turgor. No rash noted. She is not diaphoretic. Cardiovascular: Normal rate, regular rhythm, normal heart sounds, and does not have murmur. Pulmonary/Chest: Effort normal. No respiratory distress. She does not have wheezes in the lung fields. She has no rales. Neurological/Psychiatric:She is alert and oriented to person, place, and time.  Coordination is  normal. +Lumbar pain, Right Hip pain  Her mood isAppropriate and affect is Sad . CT scan of the Lumbar spine 2/17/23:  No acute osseous abnormality. MRI of the Right hip 11/7/22:  Advanced osteoarthritis of the right hip. Small right hip effusion. RECOMMENDATIONS:   5 cm left ovarian simple-appearing cyst.       No follow-up imaging is recommended. Prescription pain medication monitoring:                  MEDD current = 22.50              ORT Score = 11 High risk              Other Risk factors - (mood) Depression/Anxiety              Date of Last Medication Agreement: 3/16/22              Date Naloxone prescribed: 6/21/22              UDT:                          Date of last UDT: 6/21/22                          Adverse report: No              OARRS:                          Checked today: Yes                          Adverse report: No    IMPRESSION:   1. Chronic pain syndrome    2. Primary osteoarthritis of right hip    3. Primary osteoarthritis of both knees    4. Chronic bilateral low back pain without sciatica    5. Back spasm    6. Diabetic polyneuropathy associated with type 2 diabetes mellitus ([facility]    7. Anxiety    8. Depression, unspecified depression type    9. Morbid obesity ([facility]        PLAN:  Informed verbal consent was obtained:  -Patient's opioid therapy will be maintained at current dose  -Home exercises/Toyin exercises recommended  -Reviewed CT scan of the Lumbar spine/Mri of the right Hip with the patient  -Referral will be sent to Dr. Janice Baptiste for right Hip arthritis  -Monitor BP, f/u with PCP if it continues to stay elevated or she becomes symptomatic with SOB, CP, syncopy. she is currently asymptomatic   -Patient did request for more or higher doses of narcotics through the medical assistant after the visit. Request was denied. Patient cried during the entire visit making it difficult to communicate. Recommended patient f/u ith orthopedics for injections to the hips, perhaps reconsider f/u with bariatrics.  Will re-evaluate on next o.v. Pill count will be planned  -CBT techniques- relaxation therapies such as biofeedback, mindfulness based stress reduction, imagery, cognitive restructuring, problem solving discussed with patient   -She was advised weight reduction, diet changes- 800-1200 fior diet, diet diary, exercising, nutritional  consult increased physical activity as tolerated   -Last UDS 6/21/22 consistent  -Return in about 4 weeks (around 3/21/2023). Analgesic Plan:              Continue present regimen: Norco 7.5-325 mg tabs q8h prn              Adjust dose of present analgesic: No              Switch analgesics: No              Add/Adjust concomitant therapy: Zanaflex, Efexor, Tylenol, Narcan    I will continue her current medication regimen  which is part of the above treatment schedule. It has been helping with Ms. Annita Valdez chronic  medical problems which for this visit include:   Diagnoses of Chronic pain syndrome, Primary osteoarthritis of right hip, Primary osteoarthritis of both knees, Chronic bilateral low back pain without sciatica, Back spasm, Diabetic polyneuropathy associated with type 2 diabetes mellitus ([address] Anxiety, Depression, unspecified depression type, and Morbid obesity ([address] were pertinent to this visit. Risks and benefits of the medications and other alternative treatments  including no treatment were discussed with the patient. The common side effects of these medications were also explained to the patient. Informed verbal consent was obtained. Goals of current treatment regimen include improvement in pain, restoration of functioning- with focus on improvement in physical performance, general activity, work or disability,emotional distress, health care utilization and  decreased medication consumption. Will continue to monitor progress towards achieving/maintaining therapeutic goals with special emphasis on  1. Improvement in perceived interfernce  of pain with ADL's.  Ability to do home exercises independently. Ability to do household chores indoor and/or outdoor work and social and leisure activities.Improve psychosocial and physical functioning. - she is not showing any significant progress/or showing regression  towards this goal and reassessment and adjustment of goals/treatment have been made.     She was advised against drinking alcohol with the narcotic pain medicines, advised against driving or handling machinery while adjusting the dose of medicines or if having cognitive  issues related to the current medications.Risk of overdose and death, if medicines not taken as prescribed, were also discussed. If the patient develops new symptoms or if the symptoms worsen, the patient should call the office.    While transcribing every attempt was made to maintain the accuracy of the note in terms of it's contents,there may have been some errors made inadvertently.  Thank you for allowing me to participate in the care of this patient.    Rajwinder Navarrete CNP.    Cc: Dr.Paul CHARO Ramirez MD

## 2023-02-22 RX ORDER — VENLAFAXINE HYDROCHLORIDE 75 MG/1
CAPSULE, EXTENDED RELEASE ORAL
Qty: 60 CAPSULE | Refills: 3 | Status: SHIPPED | OUTPATIENT
Start: 2023-02-22

## 2023-02-22 NOTE — TELEPHONE ENCOUNTER
Pt has been seeing Hezekiah Harada and she states her pain meds she is taking her body has gotten use to it. She will not change her med and the pt is in tears because she is in so much pain. She is hardly walking,  so she needs to go somewhere else. She is with [facility] in [address] [place] [address].

## 2023-02-22 NOTE — TELEPHONE ENCOUNTER
From: Esme Jordan  To: Dr. Conor Elliott Day  Sent: 2/17/2023 2:33 PM EST  Subject: Question regarding CT Lumbar Spine Without Contrast    What does this mean Doctor Day

## 2023-03-03 SDOH — HEALTH STABILITY: PHYSICAL HEALTH: ON AVERAGE, HOW MANY DAYS PER WEEK DO YOU ENGAGE IN MODERATE TO STRENUOUS EXERCISE (LIKE A BRISK WALK)?: 0 DAYS

## 2023-03-03 SDOH — HEALTH STABILITY: PHYSICAL HEALTH: ON AVERAGE, HOW MANY MINUTES DO YOU ENGAGE IN EXERCISE AT THIS LEVEL?: 0 MIN

## 2023-03-06 ENCOUNTER — OFFICE VISIT (OUTPATIENT)
Dept: ORTHOPEDIC SURGERY | Age: 48
End: 2023-03-06

## 2023-03-06 VITALS — BODY MASS INDEX: 51.91 KG/M2 | HEIGHT: 63 IN | WEIGHT: 293 LBS

## 2023-03-06 DIAGNOSIS — M16.11 PRIMARY OSTEOARTHRITIS OF RIGHT HIP: Primary | ICD-10-CM

## 2023-03-07 NOTE — PROGRESS NOTES
[facility]  Office Visit    Chief Complaint: Right hip pain    HPI:  Piyush Gibson is a 50 y.o. who is here for evaluation of right hip pain. She has seen Dr. Alexus Quinn and Dr. Laura Schmidt in the past.  She reports worsening pain over the last few months. She has had pain for multiple years. She did have an ultrasound-guided steroid injection in about 2015 that actually caused more pain. She currently takes gabapentin, Vicodin, Tylenol for pain. She is in pain management. She walks with use of a cane. She rates pain as up to 10/10. The pain is in her groin and is worse with weightbearing activity. BMI is 60.       Patient Active Problem List   Diagnosis    Osteoarthritis of left knee    Essential hypertension    CHF (congestive heart failure) (HCC)    Osteoarthritis of both knees    Hyperlipidemia    Cavitating mass in left lower lung lobe    Cavitary lesion of lung    COPD, mild (HCC)    Chronic diastolic CHF (congestive heart failure) (HCC)    JOSE (obstructive sleep apnea)    Tobacco abuse counseling    Pneumatocele of lung    Chronic pain syndrome    Primary osteoarthritis of right hip    Chronic bilateral low back pain without sciatica    Diabetes mellitus type 2 in obese (HCC)    Chronic GERD    Morbid obesity ([address]    Other allergic rhinitis    Urge incontinence    Depression    Anxiety    Abnormality of gait and mobility    Chronic right hip pain    Cigarette nicotine dependence without complication    Cocaine abuse in remission ([address]    Diabetic polyneuropathy associated with type 2 diabetes mellitus ([address]    Hyperlipidemia associated with type 2 diabetes mellitus ([address]    Major depressive disorder, recurrent, in partial remission ([address]    Mixed stress and urge incontinence    Thyroid goiter    History of colonoscopy with polypectomy - 2023, rpt 2030       ROS:  Constitutional: denies fever, chills, weight loss  MSK: denies pain in other joints, muscle aches  Neurological: denies numbness, tingling, weakness    Exam:  Height 5' 3\" (1.6 m), weight (!) 344 lb (156 kg)    Appearance: sitting in exam room chair, appears to be in no acute distress, awake and alert  Resp: unlabored breathing on room air  Skin: warm, dry and intact with out erythema or significant increased temperature  Neuro: grossly intact both lower extremities. Intact sensation to light touch. Motor exam 4+ to 5/5 in all major motor groups. RLE: Examination demonstrates pain with logroll and Stinchfield. There is brisk capillary refill. Strength is 5/5 in hamstrings, quads, hip flexors. Imaging:  Prior right hip radiographs and MRI were reviewed today. She has severe bone-on-bone osteoarthritis of the right hip joint with erosion of the femoral head. Assessment:  Severe right hip osteoarthritis  Morbid obesity with BMI 60    Plan:  We discussed the diagnosis and treatment options. She does have severe osteoarthritis for which a hip replacement would help. However, her BMI is 60 and she carries most of her weight around her thigh and hips. This would make surgery extremely challenging and she is at high risk of complication including infection. These reasons, I recommended continued management with medications and steroid injection. She would like to proceed with a right hip steroid injection in the near future. She was also referred to GI nutrition services to help with weight loss. Total time spent on today's encounter was at least 25 minutes. This time included reviewing prior notes, radiographs, and lab results when available, reviewing history obtained by medical assistant, performing history and physical exam, reviewing tests/radiographs with the patient, counseling the patient, ordering medications or tests, documentation in the electronic health record, and coordination of care.     This dictation was done with Dragon dictation and may contain mechanical errors related to translation.

## 2023-03-10 ENCOUNTER — HOSPITAL ENCOUNTER (OUTPATIENT)
Dept: GENERAL RADIOLOGY | Age: 48
Discharge: HOME | End: 2023-03-10
Payer: MEDICAID

## 2023-03-10 ENCOUNTER — OFFICE VISIT (OUTPATIENT)
Dept: ORTHOPEDIC SURGERY | Age: 48
End: 2023-03-10

## 2023-03-10 DIAGNOSIS — M25.559 HIP PAIN: ICD-10-CM

## 2023-03-10 DIAGNOSIS — M16.11 PRIMARY OSTEOARTHRITIS OF RIGHT HIP: Primary | ICD-10-CM

## 2023-03-10 DIAGNOSIS — M16.11 PRIMARY OSTEOARTHRITIS OF RIGHT HIP: ICD-10-CM

## 2023-03-10 PROCEDURE — 6360000002 HC RX W HCPCS

## 2023-03-10 PROCEDURE — 20610 DRAIN/INJ JOINT/BURSA W/O US: CPT

## 2023-03-10 PROCEDURE — 2500000003 HC RX 250 WO HCPCS

## 2023-03-10 PROCEDURE — 77002 NEEDLE LOCALIZATION BY XRAY: CPT

## 2023-03-14 ENCOUNTER — TELEPHONE (OUTPATIENT)
Dept: FAMILY MEDICINE CLINIC | Age: 48
End: 2023-03-14

## 2023-03-21 ENCOUNTER — OFFICE VISIT (OUTPATIENT)
Dept: PAIN MANAGEMENT | Age: 48
End: 2023-03-21

## 2023-03-21 VITALS
TEMPERATURE: 97.5 F | DIASTOLIC BLOOD PRESSURE: 86 MMHG | OXYGEN SATURATION: 99 % | SYSTOLIC BLOOD PRESSURE: 164 MMHG | HEART RATE: 73 BPM

## 2023-03-21 DIAGNOSIS — J30.89 OTHER ALLERGIC RHINITIS: ICD-10-CM

## 2023-03-21 DIAGNOSIS — J44.9 COPD, MILD (HCC): ICD-10-CM

## 2023-03-21 DIAGNOSIS — R26.9 ABNORMALITY OF GAIT AND MOBILITY: ICD-10-CM

## 2023-03-21 DIAGNOSIS — G89.29 CHRONIC BILATERAL LOW BACK PAIN WITHOUT SCIATICA: ICD-10-CM

## 2023-03-21 DIAGNOSIS — M16.11 PRIMARY OSTEOARTHRITIS OF RIGHT HIP: ICD-10-CM

## 2023-03-21 DIAGNOSIS — E66.01 MORBID OBESITY (HCC): ICD-10-CM

## 2023-03-21 DIAGNOSIS — F41.9 ANXIETY: ICD-10-CM

## 2023-03-21 DIAGNOSIS — M62.830 BACK SPASM: ICD-10-CM

## 2023-03-21 DIAGNOSIS — M17.0 PRIMARY OSTEOARTHRITIS OF BOTH KNEES: ICD-10-CM

## 2023-03-21 DIAGNOSIS — F33.41 MAJOR DEPRESSIVE DISORDER, RECURRENT, IN PARTIAL REMISSION (HCC): ICD-10-CM

## 2023-03-21 DIAGNOSIS — M54.50 CHRONIC BILATERAL LOW BACK PAIN WITHOUT SCIATICA: ICD-10-CM

## 2023-03-21 DIAGNOSIS — E11.42 DIABETIC POLYNEUROPATHY ASSOCIATED WITH TYPE 2 DIABETES MELLITUS (HCC): ICD-10-CM

## 2023-03-21 DIAGNOSIS — G89.4 CHRONIC PAIN SYNDROME: ICD-10-CM

## 2023-03-21 RX ORDER — HYDROCODONE BITARTRATE AND ACETAMINOPHEN 7.5; 325 MG/1; MG/1
1 TABLET ORAL EVERY 8 HOURS PRN
Qty: 90 TABLET | Refills: 0 | Status: SHIPPED | OUTPATIENT
Start: 2023-03-21 | End: 2023-04-20

## 2023-03-21 RX ORDER — IPRATROPIUM BROMIDE AND ALBUTEROL SULFATE 2.5; .5 MG/3ML; MG/3ML
SOLUTION RESPIRATORY (INHALATION)
Qty: 180 ML | Refills: 11 | Status: SHIPPED | OUTPATIENT
Start: 2023-03-21

## 2023-03-21 RX ORDER — TIZANIDINE 4 MG/1
4 TABLET ORAL EVERY 12 HOURS
Qty: 60 TABLET | Refills: 0 | Status: SHIPPED | OUTPATIENT
Start: 2023-03-21 | End: 2023-04-20

## 2023-03-21 RX ORDER — ACETAMINOPHEN 500 MG
1000 TABLET ORAL EVERY 6 HOURS PRN
Qty: 30 TABLET | Refills: 0 | Status: SHIPPED | OUTPATIENT
Start: 2023-03-21

## 2023-03-21 RX ORDER — IBUPROFEN 800 MG/1
TABLET ORAL
Qty: 60 TABLET | Refills: 1 | OUTPATIENT
Start: 2023-03-21

## 2023-03-21 NOTE — PROGRESS NOTES
interfernce  of pain with ADL's. Ability to do home exercises independently. Ability to do household chores indoor and/or outdoor work and social and leisure activities. Improve psychosocial and physical functioning. - she is showing progression towards this treatment goal with the current regimen. She was advised against drinking alcohol with the narcotic pain medicines, advised against driving or handling machinery while adjusting the dose of medicines or if having cognitive  issues related to the current medications. Risk of overdose and death, if medicines not taken as prescribed, were also discussed. If the patient develops new symptoms or if the symptoms worsen, the patient should call the office. While transcribing every attempt was made to maintain the accuracy of the note in terms of it's contents,there may have been some errors made inadvertently. Thank you for allowing me to participate in the care of this patient. Kiah Steinberg CNP.     Cc: Franck Go MD

## 2023-03-22 ENCOUNTER — TELEPHONE (OUTPATIENT)
Dept: PAIN MANAGEMENT | Age: 48
End: 2023-03-22

## 2023-03-27 ENCOUNTER — TELEPHONE (OUTPATIENT)
Dept: PAIN MANAGEMENT | Age: 48
End: 2023-03-27

## 2023-03-27 ENCOUNTER — TELEPHONE (OUTPATIENT)
Dept: PULMONOLOGY | Age: 48
End: 2023-03-27

## 2023-03-27 NOTE — TELEPHONE ENCOUNTER
Patient Is wondering if an order for a walker has been sent to [facility]. She cannot start her physical therapy without it

## 2023-03-27 NOTE — TELEPHONE ENCOUNTER
Vannessa says her nose is bleeding every morning, her mouth is dry, she's talked to [facility]. They've asked her all the questions about the humidity, temperature, etc and they've said for us to recommend a new mask for her and send them the order. Please review. She has nasal pillows, they're comforable, but her mouth is opening, so she wants a full face mask she thinks. Thank you.

## 2023-03-27 NOTE — PROGRESS NOTES
treatment of this patients condition.     Gennaro Urrutia MD      NPI: [id number]      Order Signed Date: 03/27/23    Electronically signed by Gennaro Urrutia MD on 3/27/2023 at 4:04 PM

## 2023-03-28 ENCOUNTER — HOSPITAL ENCOUNTER (OUTPATIENT)
Dept: PHYSICAL THERAPY | Age: 48
Setting detail: THERAPIES SERIES
Discharge: HOME | End: 2023-03-28
Payer: MEDICAID

## 2023-03-28 ENCOUNTER — TELEPHONE (OUTPATIENT)
Dept: FAMILY MEDICINE CLINIC | Age: 48
End: 2023-03-28

## 2023-03-28 PROCEDURE — 97530 THERAPEUTIC ACTIVITIES: CPT

## 2023-03-28 NOTE — TELEPHONE ENCOUNTER
Order for new supplies faxed to [facility]. Pt has been informed and will reach out to [facility] for her order.

## 2023-03-28 NOTE — PLAN OF CARE
Dumbells T= Theratube   G=Gloves N= Noodles W= Weights   P= Paddles WW=Water Walker K= Kickboard        Transfers:   Steps ind B HR 1 at a time      % Immersion: 75%            Ambulation:  Laps ind Exercises UE:      Forwards 4 Shoulder Shrugs     Lateral  Shoulder circles  10    Marching    Scapular Retraction  10    Retro   Rolling  10    [place]ari[place]as  Push Downs  10   Multifidus walkouts w/paddle  IR/ER  10     Punching     Stretching: B 30 sec  Rowing  10   Gastroc  2  Elbow Flex/Ext  10   Hamstring   Shldr Flex/Ext  10   Knee flex stretch   Shldr Abd/Add  10   Piriformis   Horiz Abd/Add  10   Hip flexor    Arm Circles  10   SKTC   PNF Diagonals  10   DKTC  UE oscillations f/b, s/s     ITB   Wall Push-ups    Quad  Figure 8's    Mid back   Buoy pull/push downs    UT  Tband rows    Rhom  Tband lats    Post Shoulder  Lumbar Rot w/ paddles    Pec   Small ball pull downs                   diagonals             Cervical:       AROM Flex       AROM Extension     LEs exercises:  B AROM Side Bending    Marching  10 AROM Rotation    Heel Raises  10 Chin tucks    Toe Raises  10 Chin nods     Squats  10      Hamstring Curls  10      Hip Flexion  10 Balance:      Hip Abduction 10 SLS    Hip Circles 10 Tandem stance    TA set 10 NBOS eyes open    Glut Set 10 NBOS eyes closed    Hip Extension  Hand to Opposite Knee    Hip Adduction    Box Step     Hip IR   Noodle Stance     Hip ER  Stop/Go Gait    Fig 8's  Switch Gait                Seated: B Functional:    Ankle Pumps 20 Step up forward    Ankle circles 10 Step up lateral    Knee flex & ext 20 Step down    Hip Abd & Adduction 20 Seat Pleasant squats    Bicycle  20 Crate Lifts    Add Set with ball 10 Lunges forward    LX stab with med ball throws  Lunges lateral    Ankle INV  Lunges retro    Ankle EV  Lower ab curl with noodle      Upper ab curl with ball      Med ball straight lifts      Med ball diagonal lifts    Jet massage /R hip/ LB 1 min  Hydrorider          Noodle:      Leg

## 2023-03-28 NOTE — TELEPHONE ENCOUNTER
Pt called in stated she needs a pa for her walker   She stated  already wrote the prescription       Fax is [phone]

## 2023-03-29 NOTE — TELEPHONE ENCOUNTER
Discussed with the patient who informed me that walker was ordered by her therapist. Informed the patient that the goal is not to get her dependent on a walker but to improve her mobility/activity level. Hopefully consider returning to bariatrics with continued PT with hopes to manage her weight which seems to greatly contribute to her ailments at a BMI of 60. Will discuss this with her therapist, advised patient to have her therapist contact us to verify the need for walker chronically.

## 2023-03-30 ENCOUNTER — HOSPITAL ENCOUNTER (OUTPATIENT)
Dept: PHYSICAL THERAPY | Age: 48
Setting detail: THERAPIES SERIES
Discharge: HOME | End: 2023-03-30
Payer: MEDICAID

## 2023-03-30 NOTE — FLOWSHEET NOTE
[facility] [facility], [facility]  [address] [facility] [place], [facility]  Phone: [phone]   Fax:     [phone]    Physical Therapy  Cancellation/No-show Note  Patient Name:  Nata Brito  :  1975   Date:  2023    Since re-starting PT 3-28-23    Cancelled visits to date: 1    No-shows to date: 0    Patient status for today's appointment patient:  [x]  Cancelled  []  Rescheduled appointment  []  No-show     Reason given by patient:  []  Patient ill  []  Conflicting appointment  []  No transportation    []  Conflict with work  []  No reason given  [x]  Other:     Comments:  no     Phone call information:   []  Phone call made today to patient at _ time at number provided:      []  Patient answered, conversation as follows:    []  Patient did not answer, message left as follows:    [x]  Phone call not made today    Electronically signed by:  Breanne Torres, PTA  [id number]

## 2023-04-04 ENCOUNTER — HOSPITAL ENCOUNTER (OUTPATIENT)
Dept: PHYSICAL THERAPY | Age: 48
Setting detail: THERAPIES SERIES
Discharge: HOME | End: 2023-04-04
Payer: MEDICAID

## 2023-04-04 PROCEDURE — 97150 GROUP THERAPEUTIC PROCEDURES: CPT

## 2023-04-04 PROCEDURE — 97113 AQUATIC THERAPY/EXERCISES: CPT

## 2023-04-04 NOTE — FLOWSHEET NOTE
treatment well [] Patient limited by fatigue  [x] Patient limited by pain  [] Patient limited by other medical complications  [] Other:  ASSESSMENT: Pt would benefit from skilled aquatic therapy to  pain, increase ROM, flexibility, balance, endurance, gait, to improve function and ADL's. Tolerated above initial aquatic ex with decreased pain 3/10 afterwards. Issued signed Rx for wheeled walker with seat. Assisted to locker room for safety due to fall risk. : Decreased pain while in the pool. Cues with exercises for technique and to prevent increased pain. Modification to gastroc stretch to decrease R hip pain  : Tolerated exercise progressions well. Patient reported decreased pain after session. 23 reinforced getting rollator to assist with gt/ pain. Decreased pain in water @ 25 % WB.  23 resumed aquatic ex as above with decreased pain afterwards.            Prognosis: [x] Good [] Fair  [] Poor    GOALS:  *updated 3/28/23  Reeval today, pt was on hold per her decision to follow up with MD and for further imaging  CT of R hip scan negative; had hip injection on 3/6 with a little help (Inc feeling and less N/T in R LE since injection)  Ready to resume Aquatic PT  Typical ADLs 4/10 but pain can inc to 9-10/10 depending on activity and this happens several times a week  Pt amb with cane - trouble getting insurance approval to get a walker; pt limits excursions b/c she is afraid of her R LE giving out and falling  MMT: R - hip flex 2/5; knee ext 3/5, HS 4-/5, DF 4-/5  Pt notes during her 5 pool sessions (23-23) mobility improved while in the pool with pain relief but just wasn't seeing a lot of carry over yet  Buckling of leg happens daily javon by the end of the day, no falls recently   Tolerated 40 min at last session   Functional improvement since shot: sit -stand a little better; and standing misty around her house is a little better   Learning how modify and pace herself better   pt

## 2023-04-06 ENCOUNTER — HOSPITAL ENCOUNTER (OUTPATIENT)
Dept: PHYSICAL THERAPY | Age: 48
Setting detail: THERAPIES SERIES
Discharge: HOME | End: 2023-04-06
Payer: MEDICAID

## 2023-04-06 PROCEDURE — 97113 AQUATIC THERAPY/EXERCISES: CPT

## 2023-04-06 PROCEDURE — 97150 GROUP THERAPEUTIC PROCEDURES: CPT

## 2023-04-06 NOTE — FLOWSHEET NOTE
[] ES (un) (70931):   [] Vasopump (94974) [] Ionto (89934)   [x] Aquatic Ind (78226) x    [x] Aquatic Group (47841) x   [] Other:    Treatment/Activity Tolerance:  [] Patient tolerated treatment well [] Patient limited by fatigue  [x] Patient limited by pain  [] Patient limited by other medical complications  [] Other:  ASSESSMENT: Pt would benefit from skilled aquatic therapy to  pain, increase ROM, flexibility, balance, endurance, gait, to improve function and ADL's. Tolerated above initial aquatic ex with decreased pain 3/10 afterwards. Issued signed Rx for wheeled walker with seat. Assisted to locker room for safety due to fall risk. : Decreased pain while in the pool. Cues with exercises for technique and to prevent increased pain. Modification to gastroc stretch to decrease R hip pain  : Tolerated exercise progressions well. Patient reported decreased pain after session. 23 reinforced getting rollator to assist with gt/ pain. Decreased pain in water @ 25 % WB.  23 resumed aquatic ex as above with decreased pain afterwards. 23 tolerated above ex with decreased pain afterwards.           Prognosis: [x] Good [] Fair  [] Poor    GOALS:  *updated 3/28/23  Reeval today, pt was on hold per her decision to follow up with MD and for further imaging  CT of R hip scan negative; had hip injection on 3/6 with a little help (Inc feeling and less N/T in R LE since injection)  Ready to resume Aquatic PT  Typical ADLs 4/10 but pain can inc to 9-10/10 depending on activity and this happens several times a week  Pt amb with cane - trouble getting insurance approval to get a walker; pt limits excursions b/c she is afraid of her R LE giving out and falling  MMT: R - hip flex 2/5; knee ext 3/5, HS 4-/5, DF 4-/5  Pt notes during her 5 pool sessions (23-23) mobility improved while in the pool with pain relief but just wasn't seeing a lot of carry over yet  Buckling of leg happens daily javon

## 2023-04-11 ENCOUNTER — APPOINTMENT (OUTPATIENT)
Dept: PHYSICAL THERAPY | Age: 48
End: 2023-04-11
Payer: MEDICAID

## 2023-04-18 ENCOUNTER — OFFICE VISIT (OUTPATIENT)
Dept: PAIN MANAGEMENT | Age: 48
End: 2023-04-18

## 2023-04-18 ENCOUNTER — HOSPITAL ENCOUNTER (OUTPATIENT)
Dept: PHYSICAL THERAPY | Age: 48
Setting detail: THERAPIES SERIES
Discharge: HOME | End: 2023-04-18
Payer: MEDICAID

## 2023-04-18 VITALS
WEIGHT: 293 LBS | DIASTOLIC BLOOD PRESSURE: 84 MMHG | HEART RATE: 81 BPM | SYSTOLIC BLOOD PRESSURE: 139 MMHG | TEMPERATURE: 98.1 F | HEIGHT: 63 IN | OXYGEN SATURATION: 100 % | BODY MASS INDEX: 51.91 KG/M2

## 2023-04-18 DIAGNOSIS — M16.11 PRIMARY OSTEOARTHRITIS OF RIGHT HIP: ICD-10-CM

## 2023-04-18 DIAGNOSIS — F41.9 ANXIETY: ICD-10-CM

## 2023-04-18 DIAGNOSIS — M54.50 CHRONIC BILATERAL LOW BACK PAIN WITHOUT SCIATICA: ICD-10-CM

## 2023-04-18 DIAGNOSIS — F32.A DEPRESSION, UNSPECIFIED DEPRESSION TYPE: ICD-10-CM

## 2023-04-18 DIAGNOSIS — M62.830 BACK SPASM: ICD-10-CM

## 2023-04-18 DIAGNOSIS — G89.4 CHRONIC PAIN SYNDROME: ICD-10-CM

## 2023-04-18 DIAGNOSIS — E66.01 MORBID OBESITY (HCC): ICD-10-CM

## 2023-04-18 DIAGNOSIS — G89.29 CHRONIC BILATERAL LOW BACK PAIN WITHOUT SCIATICA: ICD-10-CM

## 2023-04-18 DIAGNOSIS — M17.0 PRIMARY OSTEOARTHRITIS OF BOTH KNEES: ICD-10-CM

## 2023-04-18 RX ORDER — TIZANIDINE 4 MG/1
4 TABLET ORAL EVERY 12 HOURS
Qty: 60 TABLET | Refills: 0 | Status: SHIPPED | OUTPATIENT
Start: 2023-04-18 | End: 2023-05-18

## 2023-04-18 RX ORDER — ACETAMINOPHEN 500 MG
1000 TABLET ORAL EVERY 6 HOURS PRN
Qty: 30 TABLET | Refills: 0 | Status: SHIPPED | OUTPATIENT
Start: 2023-04-18

## 2023-04-18 RX ORDER — HYDROCODONE BITARTRATE AND ACETAMINOPHEN 7.5; 325 MG/1; MG/1
1 TABLET ORAL EVERY 8 HOURS PRN
Qty: 90 TABLET | Refills: 0 | Status: SHIPPED | OUTPATIENT
Start: 2023-04-18 | End: 2023-05-18

## 2023-04-18 NOTE — FLOWSHEET NOTE
[place] [facility], [facility]  [address] [facility] [facility] [place], [facility]  Phone: [phone]   Fax:     [phone]    Physical Therapy  Cancellation/No-show Note  Patient Name:  Rody Weinstein  :  1975   Date:  2023    Since re-starting PT 3-28-23    Cancelled visits to date: 2  No-shows to date: 0    Patient status for today's appointment patient:  [x]  Cancelled  []  Rescheduled appointment  []  No-show     Reason given by patient:  []  Patient ill  []  Conflicting appointment  []  No transportation    []  Conflict with work  []  No reason given  [x]  Other:     Comments:  still at pain clinic    Phone call information:   []  Phone call made today to patient at _ time at number provided:      []  Patient answered, conversation as follows:    []  Patient did not answer, message left as follows:    [x]  Phone call not made today    Electronically signed by:  Suzy Burleson, PTA  [id number]

## 2023-04-18 NOTE — PROGRESS NOTES
pain syndrome, Primary osteoarthritis of both knees, Primary osteoarthritis of right hip, Chronic bilateral low back pain without sciatica, Back spasm, Anxiety, Depression, unspecified depression type, and Morbid obesity ([address] were pertinent to this visit. Risks and benefits of the medications and other alternative treatments  including no treatment were discussed with the patient. The common side effects of these medications were also explained to the patient. Informed verbal consent was obtained. Goals of current treatment regimen include improvement in pain, restoration of functioning- with focus on improvement in physical performance, general activity, work or disability,emotional distress, health care utilization and  decreased medication consumption. Will continue to monitor progress towards achieving/maintaining therapeutic goals with special emphasis on  1. Improvement in perceived interfernce  of pain with ADL's. Ability to do home exercises independently. Ability to do household chores indoor and/or outdoor work and social and leisure activities. Improve psychosocial and physical functioning. - she is showing progression towards this treatment goal with the current regimen. She was advised against drinking alcohol with the narcotic pain medicines, advised against driving or handling machinery while adjusting the dose of medicines or if having cognitive  issues related to the current medications. Risk of overdose and death, if medicines not taken as prescribed, were also discussed. If the patient develops new symptoms or if the symptoms worsen, the patient should call the office. While transcribing every attempt was made to maintain the accuracy of the note in terms of it's contents,there may have been some errors made inadvertently. Thank you for allowing me to participate in the care of this patient. Damaris Martinez CNP.     Cc: Taina Davis MD

## 2023-04-19 RX ORDER — BUDESONIDE 0.5 MG/2ML
INHALANT ORAL
Qty: 120 ML | OUTPATIENT
Start: 2023-04-19

## 2023-04-20 ENCOUNTER — APPOINTMENT (OUTPATIENT)
Dept: PHYSICAL THERAPY | Age: 48
End: 2023-04-20
Payer: MEDICAID

## 2023-04-20 ENCOUNTER — HOSPITAL ENCOUNTER (OUTPATIENT)
Dept: PHYSICAL THERAPY | Age: 48
Setting detail: THERAPIES SERIES
Discharge: HOME | End: 2023-04-20
Payer: MEDICAID

## 2023-04-20 PROCEDURE — 97113 AQUATIC THERAPY/EXERCISES: CPT

## 2023-04-20 NOTE — FLOWSHEET NOTE
function, and ADLs as indicated by Functional Deficits. []  [x] Progressing: [] Met: [] Not Met:  Adjusted  3. Patient will demonstrate increased AROM and LE flex to min-mod dec to allow for proper joint functioning as indicated by patients Functional Deficits. [x] Progressing: [] Met: [] Not Met: [] Adjusted  4. Patient will demonstrate an increase in Strength to 4/5 R LE to allow for proper functional mobility as indicated by patients Functional Deficits. [x] Progressing: [] Met: [] Not Met: [] Adjusted  5. Patient will return to sleeping in bed without increased symptoms or restriction. [x] Progressing: [] Met: [] Not Met: [] Adjusted  6. \"Walking\"(patient specific functional goal)    [x] Progressing: [] Met: [] Not Met: [] Adjusted         Patient Requires Follow-up: [x] Yes  [] No    Plan: Resume Aquatic PT  [x] Continue per plan of care [] Alter current plan (see comments)  [] Plan of care initiated [] Hold pending MD visit [] Discharge    Plan for Next Session:  Gradually progress aquatic exercise as tolerated to increase   ROM, strength, and endurance to improve ADL's and decrease pain. ADD: increase gt, seated ex, , squats,   gentle as tolerated. Electronically signed by:  Negro Velasquez,     Note: If patient does not return for scheduled/recommended follow up visits, this note will serve as a discharge from care along with the most recent update on progress.

## 2023-04-25 ENCOUNTER — HOSPITAL ENCOUNTER (OUTPATIENT)
Dept: PHYSICAL THERAPY | Age: 48
Setting detail: THERAPIES SERIES
Discharge: HOME | End: 2023-04-25
Payer: MEDICAID

## 2023-04-25 PROCEDURE — 97150 GROUP THERAPEUTIC PROCEDURES: CPT

## 2023-04-25 PROCEDURE — 97113 AQUATIC THERAPY/EXERCISES: CPT

## 2023-04-25 NOTE — FLOWSHEET NOTE
[address], [place]  [address] [facility]  Phone: [phone]   Fax:     [phone]      Physical Therapy Daily/Aquatic Flow Sheet   Date:  2023    Patient Name:  Rolando Carlton    :  1975  MRN: [id number]    Restrictions/Precautions:    Pertinent Medical History:Additional Pertinent Hx: asthma, OA, COPD, CHF, DM, HTN, anxiety/depression, obesity    Medical/Treatment Diagnosis Information:   Medical Diagnosis: Right sided sciatica [M54.31]  Osteoarthritis of right hip, unspecified osteoarthritis type [M16.11]  Treatment Diagnosis: pain, dec ROM and strength limiting gait and ADLs    Insurance/Certification information:  PT Insurance Information: [employer] 30 vpcy no auth  Physician Information:  Babatunde Flores MD  Plan of care signed (Y/N): Y    Date of Patient follow up with Physician:      Progress Report: []  Yes  [x]  No     Date Range for reporting period:  Beginnin2023  Ending:      Progress report due (10 Rx/or 30 days whichever is less):      Recertification due (POC duration/ or 90 days whichever is less):      Visit # POC/Insurance Allowable Auth Needed     5 prior visits  30 vpcy []Yes   [x]No     Latex Allergy:  [x]NO      []YES  Preferred Language for Healthcare:   [x]English       []Other:    Functional Scale:     Date assessed: at eval  Test:Oswestry  Score:30  Updated Modified Oswestry on 3/28/23: 27    Pain level: LBP   3/10                                                  0/10                   R hip 3/10                            after Rx            0/10                   R knee  4/10                                                  0/10    History of Injury: Pt with progressive OA pain R hip with new onset of R LE sciatica that stems from the hip/groin and radiates up hip to buttock and back and thigh. She went to ER for near fall after hip locked up on 22 and 23.   Consideration

## 2023-04-27 ENCOUNTER — HOSPITAL ENCOUNTER (OUTPATIENT)
Dept: PHYSICAL THERAPY | Age: 48
Setting detail: THERAPIES SERIES
Discharge: HOME | End: 2023-04-27
Payer: MEDICAID

## 2023-04-27 PROCEDURE — 97150 GROUP THERAPEUTIC PROCEDURES: CPT

## 2023-04-27 PROCEDURE — 97530 THERAPEUTIC ACTIVITIES: CPT

## 2023-04-27 PROCEDURE — 97113 AQUATIC THERAPY/EXERCISES: CPT

## 2023-05-01 RX ORDER — GABAPENTIN 300 MG/1
CAPSULE ORAL
Qty: 90 CAPSULE | Refills: 2 | Status: SHIPPED | OUTPATIENT
Start: 2023-05-01 | End: 2023-07-30

## 2023-05-04 ENCOUNTER — TELEPHONE (OUTPATIENT)
Dept: FAMILY MEDICINE CLINIC | Age: 48
End: 2023-05-04

## 2023-05-04 NOTE — TELEPHONE ENCOUNTER
Pt called in needs a statement in regards to a apartment she needs a first floor     Pt is willing to  at the office

## 2023-05-12 DIAGNOSIS — E11.69 DIABETES MELLITUS TYPE 2 IN OBESE (HCC): ICD-10-CM

## 2023-05-12 DIAGNOSIS — E66.9 DIABETES MELLITUS TYPE 2 IN OBESE (HCC): ICD-10-CM

## 2023-05-12 RX ORDER — DULAGLUTIDE 1.5 MG/.5ML
INJECTION, SOLUTION SUBCUTANEOUS
Qty: 2 ML | Refills: 2 | OUTPATIENT
Start: 2023-05-12

## 2023-05-16 ENCOUNTER — OFFICE VISIT (OUTPATIENT)
Dept: PAIN MANAGEMENT | Age: 48
End: 2023-05-16

## 2023-05-16 VITALS
WEIGHT: 293 LBS | BODY MASS INDEX: 51.91 KG/M2 | HEART RATE: 87 BPM | HEIGHT: 63 IN | TEMPERATURE: 97.2 F | DIASTOLIC BLOOD PRESSURE: 94 MMHG | SYSTOLIC BLOOD PRESSURE: 161 MMHG | OXYGEN SATURATION: 99 %

## 2023-05-16 DIAGNOSIS — M62.830 BACK SPASM: ICD-10-CM

## 2023-05-16 DIAGNOSIS — R26.9 ABNORMALITY OF GAIT AND MOBILITY: ICD-10-CM

## 2023-05-16 DIAGNOSIS — F32.A DEPRESSION, UNSPECIFIED DEPRESSION TYPE: ICD-10-CM

## 2023-05-16 DIAGNOSIS — G89.29 CHRONIC BILATERAL LOW BACK PAIN WITHOUT SCIATICA: ICD-10-CM

## 2023-05-16 DIAGNOSIS — M54.50 CHRONIC BILATERAL LOW BACK PAIN WITHOUT SCIATICA: ICD-10-CM

## 2023-05-16 DIAGNOSIS — M16.11 PRIMARY OSTEOARTHRITIS OF RIGHT HIP: ICD-10-CM

## 2023-05-16 DIAGNOSIS — G89.4 CHRONIC PAIN SYNDROME: ICD-10-CM

## 2023-05-16 DIAGNOSIS — M17.0 PRIMARY OSTEOARTHRITIS OF BOTH KNEES: ICD-10-CM

## 2023-05-16 RX ORDER — ACETAMINOPHEN 500 MG
1000 TABLET ORAL EVERY 6 HOURS PRN
Qty: 30 TABLET | Refills: 0 | Status: SHIPPED | OUTPATIENT
Start: 2023-05-16

## 2023-05-16 RX ORDER — TIZANIDINE 4 MG/1
4 TABLET ORAL EVERY 12 HOURS
Qty: 60 TABLET | Refills: 0 | Status: SHIPPED | OUTPATIENT
Start: 2023-05-16 | End: 2023-06-15

## 2023-05-16 RX ORDER — HYDROCODONE BITARTRATE AND ACETAMINOPHEN 7.5; 325 MG/1; MG/1
1 TABLET ORAL EVERY 8 HOURS PRN
Qty: 90 TABLET | Refills: 0 | Status: SHIPPED | OUTPATIENT
Start: 2023-05-16 | End: 2023-06-15

## 2023-05-16 NOTE — PROGRESS NOTES
Add/Adjust concomitant therapy: Zanaflex, Effexor, Neurontin, Tylenol, Narcan    I will continue her current medication regimen  which is part of the above treatment schedule. It has been helping with Ms. Taisha Jonas chronic  medical problems which for this visit include:   Diagnoses of Chronic pain syndrome, Primary osteoarthritis of both knees, Primary osteoarthritis of right hip, Chronic bilateral low back pain without sciatica, Back spasm, Depression, unspecified depression type, and Abnormality of gait and mobility were pertinent to this visit. Risks and benefits of the medications and other alternative treatments  including no treatment were discussed with the patient. The common side effects of these medications were also explained to the patient. Informed verbal consent was obtained. Goals of current treatment regimen include improvement in pain, restoration of functioning- with focus on improvement in physical performance, general activity, work or disability,emotional distress, health care utilization and  decreased medication consumption. Will continue to monitor progress towards achieving/maintaining therapeutic goals with special emphasis on  1. Improvement in perceived interfernce  of pain with ADL's. Ability to do home exercises independently. Ability to do household chores indoor and/or outdoor work and social and leisure activities. Improve psychosocial and physical functioning. - she is showing progression towards this treatment goal with the current regimen. She was advised against drinking alcohol with the narcotic pain medicines, advised against driving or handling machinery while adjusting the dose of medicines or if having cognitive  issues related to the current medications. Risk of overdose and death, if medicines not taken as prescribed, were also discussed. If the patient develops new symptoms or if the symptoms worsen, the patient should call the office.     While transcribing every

## 2023-06-27 RX ORDER — IBUPROFEN 800 MG/1
TABLET ORAL
Qty: 60 TABLET | Refills: 1 | OUTPATIENT
Start: 2023-06-27

## 2023-07-18 ENCOUNTER — OFFICE VISIT (OUTPATIENT)
Dept: PAIN MANAGEMENT | Age: 48
End: 2023-07-18
Payer: MEDICAID

## 2023-07-18 VITALS
SYSTOLIC BLOOD PRESSURE: 139 MMHG | OXYGEN SATURATION: 96 % | TEMPERATURE: 97.2 F | HEART RATE: 71 BPM | DIASTOLIC BLOOD PRESSURE: 94 MMHG

## 2023-07-18 DIAGNOSIS — E66.01 CLASS 3 SEVERE OBESITY DUE TO EXCESS CALORIES WITHOUT SERIOUS COMORBIDITY WITH BODY MASS INDEX (BMI) OF 60.0 TO 69.9 IN ADULT (HCC): ICD-10-CM

## 2023-07-18 DIAGNOSIS — M16.11 PRIMARY OSTEOARTHRITIS OF RIGHT HIP: ICD-10-CM

## 2023-07-18 DIAGNOSIS — M17.0 PRIMARY OSTEOARTHRITIS OF BOTH KNEES: ICD-10-CM

## 2023-07-18 DIAGNOSIS — M54.50 CHRONIC BILATERAL LOW BACK PAIN WITHOUT SCIATICA: ICD-10-CM

## 2023-07-18 DIAGNOSIS — F32.A DEPRESSION, UNSPECIFIED DEPRESSION TYPE: ICD-10-CM

## 2023-07-18 DIAGNOSIS — G89.4 CHRONIC PAIN SYNDROME: ICD-10-CM

## 2023-07-18 DIAGNOSIS — F41.9 ANXIETY: ICD-10-CM

## 2023-07-18 DIAGNOSIS — G89.29 CHRONIC BILATERAL LOW BACK PAIN WITHOUT SCIATICA: ICD-10-CM

## 2023-07-18 DIAGNOSIS — M62.830 BACK SPASM: ICD-10-CM

## 2023-07-18 PROCEDURE — G8417 CALC BMI ABV UP PARAM F/U: HCPCS | Performed by: NURSE PRACTITIONER

## 2023-07-18 PROCEDURE — G8427 DOCREV CUR MEDS BY ELIG CLIN: HCPCS | Performed by: NURSE PRACTITIONER

## 2023-07-18 PROCEDURE — 4004F PT TOBACCO SCREEN RCVD TLK: CPT | Performed by: NURSE PRACTITIONER

## 2023-07-18 PROCEDURE — 3074F SYST BP LT 130 MM HG: CPT | Performed by: NURSE PRACTITIONER

## 2023-07-18 PROCEDURE — 99214 OFFICE O/P EST MOD 30 MIN: CPT | Performed by: NURSE PRACTITIONER

## 2023-07-18 PROCEDURE — 3078F DIAST BP <80 MM HG: CPT | Performed by: NURSE PRACTITIONER

## 2023-07-18 RX ORDER — VENLAFAXINE HYDROCHLORIDE 75 MG/1
150 CAPSULE, EXTENDED RELEASE ORAL DAILY
Qty: 60 CAPSULE | Refills: 3 | Status: SHIPPED | OUTPATIENT
Start: 2023-07-18

## 2023-07-18 RX ORDER — HYDROCODONE BITARTRATE AND ACETAMINOPHEN 7.5; 325 MG/1; MG/1
1 TABLET ORAL EVERY 8 HOURS PRN
Qty: 90 TABLET | Refills: 0 | Status: SHIPPED | OUTPATIENT
Start: 2023-07-18 | End: 2023-08-17

## 2023-07-18 RX ORDER — TIZANIDINE 4 MG/1
4 TABLET ORAL EVERY 12 HOURS
Qty: 60 TABLET | Refills: 0 | Status: SHIPPED | OUTPATIENT
Start: 2023-07-18 | End: 2023-08-17

## 2023-07-18 NOTE — PROGRESS NOTES
Gale Enzo  1975  [id number]    HISTORY OF PRESENT ILLNESS: Ms. Perez Tee is a 50 y.o. female returns for a follow up visit for pain management  She has a diagnosis of   1. Chronic pain syndrome    2. Chronic bilateral low back pain without sciatica    3. Primary osteoarthritis of right hip    4. Primary osteoarthritis of both knees    5. Back spasm    6. Anxiety    7. Depression, unspecified depression type    8. Class 3 severe obesity due to excess calories without serious comorbidity with body mass index (BMI) of 60.0 to 69.9 in [facility])      As per Information Obtained from the PADT (Patient Assessment and Documentation Tool)    She complains of pain in the  lower back, right hip, both knees  She rates the pain 6/10 and describes it as aching, burning, numbness, pins and needles. Current treatment regimen has helped relieve about 40% of the pain. She denies any side effects from the current pain regimen. Patient reports that since the last follow up visit the physical functioning is unchanged, family/social relationships are unchanged, mood is unchanged sleep patterns are unchanged, and that the overall functioning is unchanged. Patient denies misusing/abusing her narcotic pain medications or using any illegal drugs. Upon obtaining medical history from Ms. Perez Tee states that pain is manageable on current pain therapy. Takes the pain medications as prescribed. Mood/anxiety is stable. Sleep is fair with an average of 5-6 hours. Denies to having issues of constipation. Tolerating activities/house chores with moderate tenderness to the lower back. ALLERGIES: Patients list of allergies were reviewed     MEDICATIONS: Ms. Perez Tee list of medications were reviewed. Her current medications are   Outpatient Medications Prior to Visit   Medication Sig Dispense Refill    acetaminophen (APAP EXTRA STRENGTH) 500 MG tablet Take 2 tablets by mouth every 6 hours as needed for Pain DO NOT TAKE WITH

## 2023-08-04 ENCOUNTER — OFFICE VISIT (OUTPATIENT)
Dept: PULMONOLOGY | Age: 48
End: 2023-08-04
Payer: MEDICAID

## 2023-08-04 VITALS
SYSTOLIC BLOOD PRESSURE: 130 MMHG | HEIGHT: 64 IN | BODY MASS INDEX: 50.02 KG/M2 | TEMPERATURE: 97.3 F | WEIGHT: 293 LBS | HEART RATE: 82 BPM | RESPIRATION RATE: 18 BRPM | OXYGEN SATURATION: 93 % | DIASTOLIC BLOOD PRESSURE: 80 MMHG

## 2023-08-04 DIAGNOSIS — G47.33 OSA (OBSTRUCTIVE SLEEP APNEA): ICD-10-CM

## 2023-08-04 DIAGNOSIS — E66.01 MORBID OBESITY (HCC): ICD-10-CM

## 2023-08-04 DIAGNOSIS — R05.3 CHRONIC COUGH: Primary | ICD-10-CM

## 2023-08-04 DIAGNOSIS — J30.89 OTHER ALLERGIC RHINITIS: ICD-10-CM

## 2023-08-04 DIAGNOSIS — J44.9 COPD, MILD (HCC): ICD-10-CM

## 2023-08-04 PROCEDURE — 99214 OFFICE O/P EST MOD 30 MIN: CPT | Performed by: INTERNAL MEDICINE

## 2023-08-04 PROCEDURE — G8417 CALC BMI ABV UP PARAM F/U: HCPCS | Performed by: INTERNAL MEDICINE

## 2023-08-04 PROCEDURE — 3075F SYST BP GE 130 - 139MM HG: CPT | Performed by: INTERNAL MEDICINE

## 2023-08-04 PROCEDURE — 4004F PT TOBACCO SCREEN RCVD TLK: CPT | Performed by: INTERNAL MEDICINE

## 2023-08-04 PROCEDURE — 3023F SPIROM DOC REV: CPT | Performed by: INTERNAL MEDICINE

## 2023-08-04 PROCEDURE — 3079F DIAST BP 80-89 MM HG: CPT | Performed by: INTERNAL MEDICINE

## 2023-08-04 PROCEDURE — G8427 DOCREV CUR MEDS BY ELIG CLIN: HCPCS | Performed by: INTERNAL MEDICINE

## 2023-08-04 ASSESSMENT — SLEEP AND FATIGUE QUESTIONNAIRES
HOW LIKELY ARE YOU TO NOD OFF OR FALL ASLEEP WHILE WATCHING TV: 2
HOW LIKELY ARE YOU TO NOD OFF OR FALL ASLEEP WHILE SITTING AND TALKING TO SOMEONE: 0
HOW LIKELY ARE YOU TO NOD OFF OR FALL ASLEEP WHILE SITTING INACTIVE IN A PUBLIC PLACE: 0
HOW LIKELY ARE YOU TO NOD OFF OR FALL ASLEEP WHILE SITTING QUIETLY AFTER LUNCH WITHOUT ALCOHOL: 0
HOW LIKELY ARE YOU TO NOD OFF OR FALL ASLEEP WHILE LYING DOWN TO REST IN THE AFTERNOON WHEN CIRCUMSTANCES PERMIT: 1
HOW LIKELY ARE YOU TO NOD OFF OR FALL ASLEEP IN A CAR, WHILE STOPPED FOR A FEW MINUTES IN TRAFFIC: 0
ESS TOTAL SCORE: 5
HOW LIKELY ARE YOU TO NOD OFF OR FALL ASLEEP WHEN YOU ARE A PASSENGER IN A CAR FOR AN HOUR WITHOUT A BREAK: 0
HOW LIKELY ARE YOU TO NOD OFF OR FALL ASLEEP WHILE SITTING AND READING: 2

## 2023-08-04 NOTE — ASSESSMENT & PLAN NOTE
Continues to have increased symptoms despite Claritin and Singulair and using nebulizer, DuoNebs, with full facemask. We discussed using Astelin versus Flonase nasal spray. She has not aversion to using nasal sprays. Therefore, we discussed using a nebulizer with nebulized budesonide. She received significant improvement with Trelegy and does not want to stop this medication at this time. Therefore, no change.

## 2023-08-04 NOTE — ASSESSMENT & PLAN NOTE
She is currently not using her device. She states she stopped secondary to having nosebleeds. She believes secondary to the nasal mask and would like to return to a full facemask. Prescription for full facemask was sent approximately 6 months ago. She was advised to call medical services company to obtain a new device. Further, education given on humidifier and tube temperature. This will likely improve sinus dryness and decrease epistaxis.

## 2023-08-04 NOTE — ASSESSMENT & PLAN NOTE
Continues on Trelegy with albuterol. She has increased cough with postnasal drip. Discussed changing all medications over to nebulizer. As she believes the Trelegy is helping and would like to stay with this.

## 2023-08-10 ENCOUNTER — PATIENT MESSAGE (OUTPATIENT)
Dept: PULMONOLOGY | Age: 48
End: 2023-08-10

## 2023-08-10 NOTE — TELEPHONE ENCOUNTER
Phone call to patient regarding another appointment. She stated she sent the message to the wrong office.

## 2023-08-14 ENCOUNTER — HOSPITAL ENCOUNTER (OUTPATIENT)
Dept: CT IMAGING | Age: 48
Discharge: HOME | End: 2023-08-14
Attending: INTERNAL MEDICINE
Payer: MEDICAID

## 2023-08-14 DIAGNOSIS — R05.3 CHRONIC COUGH: ICD-10-CM

## 2023-08-14 PROCEDURE — 71250 CT THORAX DX C-: CPT

## 2023-08-15 ENCOUNTER — OFFICE VISIT (OUTPATIENT)
Dept: PAIN MANAGEMENT | Age: 48
End: 2023-08-15

## 2023-08-15 VITALS
TEMPERATURE: 97.2 F | SYSTOLIC BLOOD PRESSURE: 148 MMHG | DIASTOLIC BLOOD PRESSURE: 90 MMHG | HEART RATE: 70 BPM | OXYGEN SATURATION: 96 %

## 2023-08-15 DIAGNOSIS — M54.50 CHRONIC BILATERAL LOW BACK PAIN WITHOUT SCIATICA: ICD-10-CM

## 2023-08-15 DIAGNOSIS — M62.830 BACK SPASM: ICD-10-CM

## 2023-08-15 DIAGNOSIS — E66.01 MORBID OBESITY WITH BODY MASS INDEX OF 50.0-59.9 IN ADULT (HCC): ICD-10-CM

## 2023-08-15 DIAGNOSIS — F41.9 ANXIETY: ICD-10-CM

## 2023-08-15 DIAGNOSIS — M17.0 PRIMARY OSTEOARTHRITIS OF BOTH KNEES: ICD-10-CM

## 2023-08-15 DIAGNOSIS — M16.11 PRIMARY OSTEOARTHRITIS OF RIGHT HIP: ICD-10-CM

## 2023-08-15 DIAGNOSIS — G89.4 CHRONIC PAIN SYNDROME: ICD-10-CM

## 2023-08-15 DIAGNOSIS — G89.29 CHRONIC BILATERAL LOW BACK PAIN WITHOUT SCIATICA: ICD-10-CM

## 2023-08-15 DIAGNOSIS — F33.41 MAJOR DEPRESSIVE DISORDER, RECURRENT, IN PARTIAL REMISSION (HCC): ICD-10-CM

## 2023-08-15 DIAGNOSIS — E11.42 DIABETIC POLYNEUROPATHY ASSOCIATED WITH TYPE 2 DIABETES MELLITUS (HCC): ICD-10-CM

## 2023-08-15 RX ORDER — NALOXONE HYDROCHLORIDE 4 MG/.1ML
SPRAY NASAL
Qty: 1 EACH | Refills: 0 | Status: SHIPPED | OUTPATIENT
Start: 2023-08-15

## 2023-08-15 RX ORDER — HYDROCODONE BITARTRATE AND ACETAMINOPHEN 7.5; 325 MG/1; MG/1
1 TABLET ORAL EVERY 8 HOURS PRN
Qty: 90 TABLET | Refills: 0 | Status: SHIPPED | OUTPATIENT
Start: 2023-08-15 | End: 2023-09-14

## 2023-08-15 RX ORDER — TIZANIDINE 4 MG/1
4 TABLET ORAL EVERY 12 HOURS
Qty: 60 TABLET | Refills: 0 | Status: SHIPPED | OUTPATIENT
Start: 2023-08-15 | End: 2023-09-14

## 2023-08-15 RX ORDER — ACETAMINOPHEN 500 MG
1000 TABLET ORAL EVERY 6 HOURS PRN
Qty: 30 TABLET | Refills: 0 | Status: SHIPPED | OUTPATIENT
Start: 2023-08-15

## 2023-08-15 NOTE — PROGRESS NOTES
Mehrdadsally Pereyra  1975  [id number]    HISTORY OF PRESENT ILLNESS: Ms. Parker Odell is a 50 y.o. female returns for a follow up visit for pain management  She has a diagnosis of   1. Chronic pain syndrome    2. Chronic bilateral low back pain without sciatica    3. Back spasm    4. Primary osteoarthritis of right hip    5. Primary osteoarthritis of both knees    6. Diabetic polyneuropathy associated with type 2 diabetes mellitus ([address])    7. Major depressive disorder, recurrent, in partial remission ([address])    8. Anxiety    9. Morbid obesity with body mass index of 50.0-59.9 in [facility])      As per Information Obtained from the PADT (Patient Assessment and Documentation Tool)    She complains of pain in the  lower back, both hips, both knees  She rates the pain 5/10 and describes it as aching, burning, numbness, pins and needles. Current treatment regimen has helped relieve about 50% of the pain. She denies any side effects from the current pain regimen. Patient reports that since the last follow up visit the physical functioning is unchanged, family/social relationships are unchanged, mood is unchanged sleep patterns are unchanged, and that the overall functioning is unchanged. Patient denies misusing/abusing her narcotic pain medications or using any illegal drugs. Upon obtaining medical history from Ms. Parker Odell states that pain is manageable on current pain therapy. Takes the pain medications as prescribed. Currently wearing compression socks to the RLE  de to swelling. Mood/anxiety is stable. Sleep is fair with an average of 5-6 hours. Denies to having issues of constipation. Tolerating activities/house chores with moderate tenderness to the lower back. ALLERGIES: Patients list of allergies were reviewed     MEDICATIONS: Ms. Parker Odell list of medications were reviewed. Her current medications are   Outpatient Medications Prior to Visit   Medication Sig Dispense Refill    venlafaxine (EFFEXOR XR) 75 MG

## 2023-08-16 DIAGNOSIS — E84.0 CYSTIC FIBROSIS OF THE LUNG (HCC): Primary | ICD-10-CM

## 2023-08-28 DIAGNOSIS — E84.0 CYSTIC FIBROSIS OF THE LUNG (HCC): ICD-10-CM

## 2023-08-28 RX ORDER — GABAPENTIN 300 MG/1
CAPSULE ORAL
Qty: 90 CAPSULE | Refills: 2 | Status: SHIPPED | OUTPATIENT
Start: 2023-08-28 | End: 2023-11-26

## 2023-09-01 LAB — ANA SER QL IA: NEGATIVE

## 2023-09-11 ENCOUNTER — OFFICE VISIT (OUTPATIENT)
Dept: ORTHOPEDIC SURGERY | Age: 48
End: 2023-09-11
Payer: MEDICAID

## 2023-09-11 ENCOUNTER — TELEPHONE (OUTPATIENT)
Dept: FAMILY MEDICINE CLINIC | Age: 48
End: 2023-09-11

## 2023-09-11 VITALS — WEIGHT: 293 LBS | HEIGHT: 64 IN | RESPIRATION RATE: 16 BRPM | BODY MASS INDEX: 50.02 KG/M2

## 2023-09-11 DIAGNOSIS — M16.11 PRIMARY OSTEOARTHRITIS OF RIGHT HIP: Primary | ICD-10-CM

## 2023-09-11 PROCEDURE — 99213 OFFICE O/P EST LOW 20 MIN: CPT | Performed by: PHYSICIAN ASSISTANT

## 2023-09-11 PROCEDURE — 4004F PT TOBACCO SCREEN RCVD TLK: CPT | Performed by: PHYSICIAN ASSISTANT

## 2023-09-11 PROCEDURE — G8427 DOCREV CUR MEDS BY ELIG CLIN: HCPCS | Performed by: PHYSICIAN ASSISTANT

## 2023-09-11 PROCEDURE — G8417 CALC BMI ABV UP PARAM F/U: HCPCS | Performed by: PHYSICIAN ASSISTANT

## 2023-09-11 NOTE — TELEPHONE ENCOUNTER
Received paperwork for incontinence and health supplies  Needs appt to update this before I can sign

## 2023-09-11 NOTE — TELEPHONE ENCOUNTER
Patient is calling stating that [facility] sent us an order for her incontinence supplies and has not heard back from us    Have we received anything?     Please Advise

## 2023-09-12 ENCOUNTER — OFFICE VISIT (OUTPATIENT)
Dept: PAIN MANAGEMENT | Age: 48
End: 2023-09-12
Payer: MEDICAID

## 2023-09-12 VITALS
WEIGHT: 293 LBS | TEMPERATURE: 97.2 F | DIASTOLIC BLOOD PRESSURE: 99 MMHG | SYSTOLIC BLOOD PRESSURE: 172 MMHG | OXYGEN SATURATION: 95 % | HEIGHT: 64 IN | BODY MASS INDEX: 50.02 KG/M2 | HEART RATE: 77 BPM

## 2023-09-12 DIAGNOSIS — M17.0 PRIMARY OSTEOARTHRITIS OF BOTH KNEES: ICD-10-CM

## 2023-09-12 DIAGNOSIS — M16.11 PRIMARY OSTEOARTHRITIS OF RIGHT HIP: ICD-10-CM

## 2023-09-12 DIAGNOSIS — E11.42 DIABETIC POLYNEUROPATHY ASSOCIATED WITH TYPE 2 DIABETES MELLITUS (HCC): ICD-10-CM

## 2023-09-12 DIAGNOSIS — F41.9 ANXIETY: ICD-10-CM

## 2023-09-12 DIAGNOSIS — E66.01 MORBID OBESITY WITH BODY MASS INDEX OF 50.0-59.9 IN ADULT (HCC): ICD-10-CM

## 2023-09-12 DIAGNOSIS — M62.830 BACK SPASM: ICD-10-CM

## 2023-09-12 DIAGNOSIS — G89.4 CHRONIC PAIN SYNDROME: ICD-10-CM

## 2023-09-12 DIAGNOSIS — M54.50 CHRONIC BILATERAL LOW BACK PAIN WITHOUT SCIATICA: ICD-10-CM

## 2023-09-12 DIAGNOSIS — G89.29 CHRONIC BILATERAL LOW BACK PAIN WITHOUT SCIATICA: ICD-10-CM

## 2023-09-12 PROCEDURE — 3046F HEMOGLOBIN A1C LEVEL >9.0%: CPT | Performed by: NURSE PRACTITIONER

## 2023-09-12 PROCEDURE — 4004F PT TOBACCO SCREEN RCVD TLK: CPT | Performed by: NURSE PRACTITIONER

## 2023-09-12 PROCEDURE — 3080F DIAST BP >= 90 MM HG: CPT | Performed by: NURSE PRACTITIONER

## 2023-09-12 PROCEDURE — 99213 OFFICE O/P EST LOW 20 MIN: CPT | Performed by: NURSE PRACTITIONER

## 2023-09-12 PROCEDURE — 3077F SYST BP >= 140 MM HG: CPT | Performed by: NURSE PRACTITIONER

## 2023-09-12 PROCEDURE — 2022F DILAT RTA XM EVC RTNOPTHY: CPT | Performed by: NURSE PRACTITIONER

## 2023-09-12 PROCEDURE — G8417 CALC BMI ABV UP PARAM F/U: HCPCS | Performed by: NURSE PRACTITIONER

## 2023-09-12 PROCEDURE — G8427 DOCREV CUR MEDS BY ELIG CLIN: HCPCS | Performed by: NURSE PRACTITIONER

## 2023-09-12 RX ORDER — ACETAMINOPHEN 500 MG
1000 TABLET ORAL EVERY 6 HOURS PRN
Qty: 30 TABLET | Refills: 0 | Status: SHIPPED | OUTPATIENT
Start: 2023-09-12

## 2023-09-12 RX ORDER — TIZANIDINE 4 MG/1
4 TABLET ORAL EVERY 12 HOURS
Qty: 60 TABLET | Refills: 0 | Status: SHIPPED | OUTPATIENT
Start: 2023-09-12 | End: 2023-10-12

## 2023-09-12 RX ORDER — HYDROCODONE BITARTRATE AND ACETAMINOPHEN 7.5; 325 MG/1; MG/1
1 TABLET ORAL EVERY 8 HOURS PRN
Qty: 90 TABLET | Refills: 0 | Status: SHIPPED | OUTPATIENT
Start: 2023-09-12 | End: 2023-10-12

## 2023-09-12 NOTE — PROGRESS NOTES
every attempt was made to maintain the accuracy of the note in terms of it's contents,there may have been some errors made inadvertently. Thank you for allowing me to participate in the care of this patient. Ladona Gosselin CNP.     Cc: Oziel Toure MD

## 2023-09-14 ENCOUNTER — OFFICE VISIT (OUTPATIENT)
Dept: ORTHOPEDIC SURGERY | Age: 48
End: 2023-09-14

## 2023-09-14 ENCOUNTER — HOSPITAL ENCOUNTER (OUTPATIENT)
Dept: GENERAL RADIOLOGY | Age: 48
Discharge: HOME | End: 2023-09-14
Attending: ORTHOPAEDIC SURGERY
Payer: MEDICAID

## 2023-09-14 ENCOUNTER — TELEPHONE (OUTPATIENT)
Dept: PAIN MANAGEMENT | Age: 48
End: 2023-09-14

## 2023-09-14 ENCOUNTER — TELEPHONE (OUTPATIENT)
Dept: PULMONOLOGY | Age: 48
End: 2023-09-14

## 2023-09-14 ENCOUNTER — OFFICE VISIT (OUTPATIENT)
Dept: FAMILY MEDICINE CLINIC | Age: 48
End: 2023-09-14
Payer: MEDICAID

## 2023-09-14 VITALS
WEIGHT: 293 LBS | DIASTOLIC BLOOD PRESSURE: 84 MMHG | HEIGHT: 64 IN | SYSTOLIC BLOOD PRESSURE: 126 MMHG | RESPIRATION RATE: 16 BRPM | OXYGEN SATURATION: 97 % | HEART RATE: 77 BPM | BODY MASS INDEX: 50.02 KG/M2

## 2023-09-14 DIAGNOSIS — E66.9 DIABETES MELLITUS TYPE 2 IN OBESE (HCC): ICD-10-CM

## 2023-09-14 DIAGNOSIS — E11.69 DIABETES MELLITUS TYPE 2 IN OBESE (HCC): ICD-10-CM

## 2023-09-14 DIAGNOSIS — F33.41 MAJOR DEPRESSIVE DISORDER, RECURRENT, IN PARTIAL REMISSION (HCC): Primary | ICD-10-CM

## 2023-09-14 DIAGNOSIS — J44.9 COPD, MILD (HCC): ICD-10-CM

## 2023-09-14 DIAGNOSIS — N39.41 URGE INCONTINENCE: ICD-10-CM

## 2023-09-14 DIAGNOSIS — M16.11 PRIMARY OSTEOARTHRITIS OF RIGHT HIP: ICD-10-CM

## 2023-09-14 DIAGNOSIS — Z12.31 BREAST CANCER SCREENING BY MAMMOGRAM: ICD-10-CM

## 2023-09-14 DIAGNOSIS — J30.89 OTHER ALLERGIC RHINITIS: ICD-10-CM

## 2023-09-14 DIAGNOSIS — I10 ESSENTIAL HYPERTENSION: ICD-10-CM

## 2023-09-14 DIAGNOSIS — M16.11 PRIMARY OSTEOARTHRITIS OF RIGHT HIP: Primary | ICD-10-CM

## 2023-09-14 LAB
ALBUMIN SERPL-MCNC: 4.4 G/DL (ref 3.4–5)
ALBUMIN/GLOB SERPL: 1.7 {RATIO} (ref 1.1–2.2)
ALP SERPL-CCNC: 84 U/L (ref 40–129)
ALT SERPL-CCNC: 20 U/L (ref 10–40)
ANION GAP SERPL CALCULATED.3IONS-SCNC: 9 MMOL/L (ref 3–16)
AST SERPL-CCNC: 27 U/L (ref 15–37)
BILIRUB SERPL-MCNC: <0.2 MG/DL (ref 0–1)
BUN SERPL-MCNC: 9 MG/DL (ref 7–20)
CALCIUM SERPL-MCNC: 9.8 MG/DL (ref 8.3–10.6)
CHLORIDE SERPL-SCNC: 101 MMOL/L (ref 99–110)
CHOLEST SERPL-MCNC: 189 MG/DL (ref 0–199)
CO2 SERPL-SCNC: 32 MMOL/L (ref 21–32)
CREAT SERPL-MCNC: 0.9 MG/DL (ref 0.6–1.1)
CREAT UR-MCNC: 300.9 MG/DL (ref 28–259)
GFR SERPLBLD CREATININE-BSD FMLA CKD-EPI: >60 ML/MIN/{1.73_M2}
GLUCOSE SERPL-MCNC: 86 MG/DL (ref 70–99)
HDLC SERPL-MCNC: 59 MG/DL (ref 40–60)
LDLC SERPL CALC-MCNC: 108 MG/DL
MICROALBUMIN UR DL<=1MG/L-MCNC: <1.2 MG/DL
MICROALBUMIN/CREAT UR: ABNORMAL MG/G (ref 0–30)
POTASSIUM SERPL-SCNC: 5.7 MMOL/L (ref 3.5–5.1)
PROT SERPL-MCNC: 7 G/DL (ref 6.4–8.2)
SODIUM SERPL-SCNC: 142 MMOL/L (ref 136–145)
TRIGL SERPL-MCNC: 109 MG/DL (ref 0–150)
VLDLC SERPL CALC-MCNC: 22 MG/DL

## 2023-09-14 PROCEDURE — 3046F HEMOGLOBIN A1C LEVEL >9.0%: CPT | Performed by: FAMILY MEDICINE

## 2023-09-14 PROCEDURE — 6360000002 HC RX W HCPCS

## 2023-09-14 PROCEDURE — 4004F PT TOBACCO SCREEN RCVD TLK: CPT | Performed by: FAMILY MEDICINE

## 2023-09-14 PROCEDURE — 3074F SYST BP LT 130 MM HG: CPT | Performed by: FAMILY MEDICINE

## 2023-09-14 PROCEDURE — G8427 DOCREV CUR MEDS BY ELIG CLIN: HCPCS | Performed by: FAMILY MEDICINE

## 2023-09-14 PROCEDURE — 2022F DILAT RTA XM EVC RTNOPTHY: CPT | Performed by: FAMILY MEDICINE

## 2023-09-14 PROCEDURE — 20610 DRAIN/INJ JOINT/BURSA W/O US: CPT

## 2023-09-14 PROCEDURE — 99214 OFFICE O/P EST MOD 30 MIN: CPT | Performed by: FAMILY MEDICINE

## 2023-09-14 PROCEDURE — G8417 CALC BMI ABV UP PARAM F/U: HCPCS | Performed by: FAMILY MEDICINE

## 2023-09-14 PROCEDURE — 3079F DIAST BP 80-89 MM HG: CPT | Performed by: FAMILY MEDICINE

## 2023-09-14 PROCEDURE — 77002 NEEDLE LOCALIZATION BY XRAY: CPT

## 2023-09-14 RX ORDER — IPRATROPIUM BROMIDE AND ALBUTEROL SULFATE 2.5; .5 MG/3ML; MG/3ML
SOLUTION RESPIRATORY (INHALATION)
Qty: 180 ML | Refills: 11 | Status: SHIPPED | OUTPATIENT
Start: 2023-09-14

## 2023-09-14 RX ORDER — BUSPIRONE HYDROCHLORIDE 7.5 MG/1
7.5 TABLET ORAL 2 TIMES DAILY
Qty: 60 TABLET | Refills: 2 | Status: SHIPPED | OUTPATIENT
Start: 2023-09-14 | End: 2023-12-13

## 2023-09-14 NOTE — TELEPHONE ENCOUNTER
Patient called to check the check the status of the PA of her medication Norco. Patient would like a call back.     Please be advised

## 2023-09-14 NOTE — TELEPHONE ENCOUNTER
Spoke with pt letting her know that the PA dept is working on it and it is pending, someone will call her once it is approved.

## 2023-09-14 NOTE — TELEPHONE ENCOUNTER
Submitted PA for Hydrocodone-Acetaminophen Via [facility]: [id number] STATUS: PENDING. Follow up done daily; if no response in three days we will refax for status check. If another three days goes by with no response we will call the insurance for status.

## 2023-09-14 NOTE — PROGRESS NOTES
WITH OTHER MEDICATIONS CONTAINING ACETAMINOPHEN.  30 tablet 0    gabapentin (NEURONTIN) 300 MG capsule TAKE ONE CAPSULE BY MOUTH THREE TIMES A DAY 90 capsule 2    naloxone (NARCAN) 4 MG/0.1ML LIQD nasal spray Use as directed 1 each 0    venlafaxine (EFFEXOR XR) 75 MG extended release capsule Take 2 capsules by mouth daily 60 capsule 3    Dulaglutide 3 MG/0.5ML SOPN Inject 3 mg into the skin once a week 4 Adjustable Dose Pre-filled Pen Syringe 5    ipratropium-albuterol (DUONEB) 0.5-2.5 (3) MG/3ML SOLN nebulizer solution INHALE THREE MILLILITERS  (1 VIAL) IA NEBULIZATION BY MOUTH EVERY 4 HOURS 180 mL 11    loratadine (CLARITIN) 10 MG tablet TAKE ONE TABLET BY MOUTH DAILY 90 tablet 3    oxybutynin (DITROPAN-XL) 5 MG extended release tablet TAKE ONE TABLET BY MOUTH DAILY 30 tablet 3    omeprazole (PRILOSEC) 20 MG delayed release capsule TAKE ONE CAPSULE BY MOUTH DAILY 90 capsule 1    montelukast (SINGULAIR) 10 MG tablet TAKE ONE TABLET BY MOUTH ONCE NIGHTLY 90 tablet 1    metoprolol (LOPRESSOR) 100 MG tablet TAKE ONE TABLET BY MOUTH TWICE A  tablet 1    metFORMIN (GLUCOPHAGE) 500 MG tablet TAKE ONE TABLET BY MOUTH TWICE A DAY WITH MEALS 60 tablet 2    cloNIDine (CATAPRES) 0.1 MG tablet TAKE THREE TABLETS BY MOUTH THREE TIMES A  tablet 2    chlorthalidone (HYGROTON) 25 MG tablet TAKE ONE TABLET BY MOUTH DAILY 90 tablet 1    spironolactone (ALDACTONE) 25 MG tablet Take 1 tablet by mouth daily 90 tablet 1    ibuprofen (ADVIL;MOTRIN) 800 MG tablet TAKE ONE TABLET BY MOUTH EVERY MORNING AND TAKE ONE TABLET BY MOUTH EVERY EVENING WITH MEALS (Patient taking differently: every 6 hours as needed) 60 tablet 1    torsemide (DEMADEX) 20 MG tablet TAKE TWO TABLETS BY MOUTH DAILY 60 tablet 11    blood glucose test strips (TRUE METRIX BLOOD GLUCOSE TEST) strip 1 each by In Vitro route 2 times daily 200 each 11    Respiratory Therapy Supplies (NEBULIZER/TUBING/MOUTHPIECE) KIT 1 kit by Does not apply route daily 1 kit 0

## 2023-09-14 NOTE — TELEPHONE ENCOUNTER
Corky stopped in this morning to say that at her appointment she forgot to tell Dr Moraima Omalley that she is out of neb solution. Please send neb solution to [facility] [address]. Thank you!

## 2023-09-15 DIAGNOSIS — E87.5 HYPERKALEMIA: Primary | ICD-10-CM

## 2023-09-15 LAB
EST. AVERAGE GLUCOSE BLD GHB EST-MCNC: 116.9 MG/DL
HBA1C MFR BLD: 5.7 %

## 2023-09-16 NOTE — PROGRESS NOTES
This dictation was done with [employer] dictation and may contain mechanical errors related to translation. Resp. rate 16, height 5' 4\" (1.626 m), weight (!) 332 lb 8 oz (150.8 kg), not currently breastfeeding. This is a very pleasant 50 female who who is here in follow-up for her right hip. She had an intra-articular injection into her right hip more than 6 months ago and really worked good up until about 2 weeks ago. Her current medical condition has her losing weight but is still out of 58 BMI. I sent her for up-to-date x-rays including AP pelvis and 2 view right hip. Radiographs show that the joint space narrowing is continuing and there is less joint space and there was a previous x-ray. There are still no fracture there is a cam and pincer lesion there is normal-appearing bone with mild to moderate osteoarthritis present in the right hip. Impression is right hip osteoarthritis.     Discussion about short long-term expectations and we feel that it still not appropriate to proceed to hip placement this point we will continue to lose weight and to help her at this point we will schedule the joint injection with Dr. Mendez Henson sometime in the near future

## 2023-09-18 ENCOUNTER — TELEPHONE (OUTPATIENT)
Dept: FAMILY MEDICINE CLINIC | Age: 48
End: 2023-09-18

## 2023-09-18 NOTE — TELEPHONE ENCOUNTER
Dillan from [employer], pt needs script for scale and pulse ox.    Fax to Randy Vickers at [phone]    Please advise  [facility] can be reached at [phone]

## 2023-09-21 ENCOUNTER — TELEPHONE (OUTPATIENT)
Dept: FAMILY MEDICINE CLINIC | Age: 48
End: 2023-09-21

## 2023-09-21 NOTE — TELEPHONE ENCOUNTER
Please call and give message below to patient that was sent via [address] [place] [address], not yet read:    Hi [place],     Your potassium level was high so I would repeat this at the lab in the next few days. I have ordered a repeat level for you. Your blood sugar is slightly elevated in the prediabetes range. Cholesterol is a bit elevated, I put a note in so that we can discuss further at your next appointment, considering cholesterol medication.      Sincerely,  Dr. Luis Carlos Dc

## 2023-09-23 DIAGNOSIS — I10 ESSENTIAL HYPERTENSION: ICD-10-CM

## 2023-09-25 RX ORDER — CHLORTHALIDONE 25 MG/1
TABLET ORAL
Qty: 90 TABLET | Refills: 1 | Status: SHIPPED | OUTPATIENT
Start: 2023-09-25

## 2023-10-05 DIAGNOSIS — G89.4 CHRONIC PAIN SYNDROME: ICD-10-CM

## 2023-10-05 DIAGNOSIS — I10 ESSENTIAL HYPERTENSION: ICD-10-CM

## 2023-10-05 DIAGNOSIS — E66.9 DIABETES MELLITUS TYPE 2 IN OBESE (HCC): ICD-10-CM

## 2023-10-05 DIAGNOSIS — J44.9 COPD, MILD (HCC): ICD-10-CM

## 2023-10-05 DIAGNOSIS — M62.830 BACK SPASM: ICD-10-CM

## 2023-10-05 DIAGNOSIS — E11.69 DIABETES MELLITUS TYPE 2 IN OBESE (HCC): ICD-10-CM

## 2023-10-05 RX ORDER — MONTELUKAST SODIUM 10 MG/1
TABLET ORAL
Qty: 90 TABLET | Refills: 1 | Status: SHIPPED | OUTPATIENT
Start: 2023-10-05

## 2023-10-05 RX ORDER — OMEPRAZOLE 20 MG/1
CAPSULE, DELAYED RELEASE ORAL
Qty: 90 CAPSULE | Refills: 1 | Status: SHIPPED | OUTPATIENT
Start: 2023-10-05

## 2023-10-05 RX ORDER — FLUTICASONE FUROATE, UMECLIDINIUM BROMIDE AND VILANTEROL TRIFENATATE 100; 62.5; 25 UG/1; UG/1; UG/1
POWDER RESPIRATORY (INHALATION)
Qty: 1 EACH | Refills: 11 | Status: SHIPPED | OUTPATIENT
Start: 2023-10-05

## 2023-10-05 RX ORDER — IBUPROFEN 800 MG/1
TABLET ORAL
Qty: 60 TABLET | Refills: 1 | OUTPATIENT
Start: 2023-10-05

## 2023-10-05 RX ORDER — CLONIDINE HYDROCHLORIDE 0.1 MG/1
TABLET ORAL
Qty: 270 TABLET | Refills: 2 | Status: SHIPPED | OUTPATIENT
Start: 2023-10-05 | End: 2023-10-06 | Stop reason: SDUPTHER

## 2023-10-05 RX ORDER — SPIRONOLACTONE 25 MG/1
25 TABLET ORAL DAILY
Qty: 90 TABLET | Refills: 1 | Status: SHIPPED | OUTPATIENT
Start: 2023-10-05

## 2023-10-05 RX ORDER — METOPROLOL TARTRATE 100 MG/1
TABLET ORAL
Qty: 180 TABLET | Refills: 1 | Status: SHIPPED | OUTPATIENT
Start: 2023-10-05

## 2023-10-05 RX ORDER — CALCIUM CITRATE/VITAMIN D3 200MG-6.25
TABLET ORAL
Qty: 50 STRIP | Refills: 5 | Status: SHIPPED | OUTPATIENT
Start: 2023-10-05

## 2023-10-06 RX ORDER — CLONIDINE HYDROCHLORIDE 0.1 MG/1
TABLET ORAL
Qty: 270 TABLET | Refills: 2 | Status: SHIPPED | OUTPATIENT
Start: 2023-10-06

## 2023-10-10 ENCOUNTER — HOSPITAL ENCOUNTER (OUTPATIENT)
Dept: WOMENS IMAGING | Age: 48
Discharge: HOME | End: 2023-10-10
Attending: FAMILY MEDICINE
Payer: MEDICAID

## 2023-10-10 ENCOUNTER — TELEPHONE (OUTPATIENT)
Dept: PAIN MANAGEMENT | Age: 48
End: 2023-10-10

## 2023-10-10 VITALS — HEIGHT: 64 IN | BODY MASS INDEX: 50.02 KG/M2 | WEIGHT: 293 LBS

## 2023-10-10 DIAGNOSIS — M16.11 PRIMARY OSTEOARTHRITIS OF RIGHT HIP: ICD-10-CM

## 2023-10-10 DIAGNOSIS — Z12.31 BREAST CANCER SCREENING BY MAMMOGRAM: ICD-10-CM

## 2023-10-10 DIAGNOSIS — M54.50 CHRONIC BILATERAL LOW BACK PAIN WITHOUT SCIATICA: ICD-10-CM

## 2023-10-10 DIAGNOSIS — G89.4 CHRONIC PAIN SYNDROME: ICD-10-CM

## 2023-10-10 DIAGNOSIS — G89.29 CHRONIC BILATERAL LOW BACK PAIN WITHOUT SCIATICA: ICD-10-CM

## 2023-10-10 DIAGNOSIS — M17.0 PRIMARY OSTEOARTHRITIS OF BOTH KNEES: ICD-10-CM

## 2023-10-10 DIAGNOSIS — M62.830 BACK SPASM: ICD-10-CM

## 2023-10-10 PROCEDURE — 77067 SCR MAMMO BI INCL CAD: CPT

## 2023-10-10 RX ORDER — HYDROCODONE BITARTRATE AND ACETAMINOPHEN 7.5; 325 MG/1; MG/1
1 TABLET ORAL EVERY 8 HOURS PRN
Qty: 12 TABLET | Refills: 0 | Status: SHIPPED | OUTPATIENT
Start: 2023-10-10 | End: 2023-10-14

## 2023-10-10 NOTE — TELEPHONE ENCOUNTER
Patient called stating that she needs a extension on her Norco medication to be sent to her preferred pharmacy. Patient requesting a call back when medication has been sent. Please be advised.

## 2023-10-16 ENCOUNTER — PATIENT MESSAGE (OUTPATIENT)
Dept: FAMILY MEDICINE CLINIC | Age: 48
End: 2023-10-16

## 2023-10-17 ENCOUNTER — OFFICE VISIT (OUTPATIENT)
Dept: PAIN MANAGEMENT | Age: 48
End: 2023-10-17
Payer: MEDICAID

## 2023-10-17 VITALS
TEMPERATURE: 97.4 F | HEART RATE: 83 BPM | OXYGEN SATURATION: 99 % | SYSTOLIC BLOOD PRESSURE: 150 MMHG | DIASTOLIC BLOOD PRESSURE: 95 MMHG

## 2023-10-17 DIAGNOSIS — M16.11 PRIMARY OSTEOARTHRITIS OF RIGHT HIP: ICD-10-CM

## 2023-10-17 DIAGNOSIS — E66.01 MORBID OBESITY WITH BODY MASS INDEX OF 50.0-59.9 IN ADULT (HCC): ICD-10-CM

## 2023-10-17 DIAGNOSIS — G89.29 CHRONIC BILATERAL LOW BACK PAIN WITHOUT SCIATICA: ICD-10-CM

## 2023-10-17 DIAGNOSIS — F32.A DEPRESSION, UNSPECIFIED DEPRESSION TYPE: ICD-10-CM

## 2023-10-17 DIAGNOSIS — M54.50 CHRONIC BILATERAL LOW BACK PAIN WITHOUT SCIATICA: ICD-10-CM

## 2023-10-17 DIAGNOSIS — M62.830 BACK SPASM: ICD-10-CM

## 2023-10-17 DIAGNOSIS — J44.9 COPD, MILD (HCC): ICD-10-CM

## 2023-10-17 DIAGNOSIS — F41.9 ANXIETY: ICD-10-CM

## 2023-10-17 DIAGNOSIS — G89.4 CHRONIC PAIN SYNDROME: ICD-10-CM

## 2023-10-17 DIAGNOSIS — M17.0 PRIMARY OSTEOARTHRITIS OF BOTH KNEES: ICD-10-CM

## 2023-10-17 DIAGNOSIS — E11.42 DIABETIC POLYNEUROPATHY ASSOCIATED WITH TYPE 2 DIABETES MELLITUS (HCC): ICD-10-CM

## 2023-10-17 PROCEDURE — 99214 OFFICE O/P EST MOD 30 MIN: CPT | Performed by: NURSE PRACTITIONER

## 2023-10-17 PROCEDURE — 3044F HG A1C LEVEL LT 7.0%: CPT | Performed by: NURSE PRACTITIONER

## 2023-10-17 PROCEDURE — G8484 FLU IMMUNIZE NO ADMIN: HCPCS | Performed by: NURSE PRACTITIONER

## 2023-10-17 PROCEDURE — G8427 DOCREV CUR MEDS BY ELIG CLIN: HCPCS | Performed by: NURSE PRACTITIONER

## 2023-10-17 PROCEDURE — 3078F DIAST BP <80 MM HG: CPT | Performed by: NURSE PRACTITIONER

## 2023-10-17 PROCEDURE — 3074F SYST BP LT 130 MM HG: CPT | Performed by: NURSE PRACTITIONER

## 2023-10-17 PROCEDURE — 3023F SPIROM DOC REV: CPT | Performed by: NURSE PRACTITIONER

## 2023-10-17 PROCEDURE — 3017F COLORECTAL CA SCREEN DOC REV: CPT | Performed by: NURSE PRACTITIONER

## 2023-10-17 PROCEDURE — 2022F DILAT RTA XM EVC RTNOPTHY: CPT | Performed by: NURSE PRACTITIONER

## 2023-10-17 PROCEDURE — G8417 CALC BMI ABV UP PARAM F/U: HCPCS | Performed by: NURSE PRACTITIONER

## 2023-10-17 PROCEDURE — 4004F PT TOBACCO SCREEN RCVD TLK: CPT | Performed by: NURSE PRACTITIONER

## 2023-10-17 RX ORDER — ACETAMINOPHEN 500 MG
1000 TABLET ORAL EVERY 6 HOURS PRN
Qty: 30 TABLET | Refills: 0 | Status: SHIPPED | OUTPATIENT
Start: 2023-10-17

## 2023-10-17 RX ORDER — HYDROCODONE BITARTRATE AND ACETAMINOPHEN 7.5; 325 MG/1; MG/1
1 TABLET ORAL EVERY 8 HOURS PRN
Qty: 90 TABLET | Refills: 0 | Status: SHIPPED | OUTPATIENT
Start: 2023-10-17 | End: 2023-11-16

## 2023-10-17 RX ORDER — ARIPIPRAZOLE 2 MG/1
2 TABLET ORAL DAILY
Qty: 30 TABLET | Refills: 1 | Status: SHIPPED | OUTPATIENT
Start: 2023-10-17

## 2023-10-17 RX ORDER — TIZANIDINE 4 MG/1
4 TABLET ORAL EVERY 12 HOURS
Qty: 60 TABLET | Refills: 0 | Status: SHIPPED | OUTPATIENT
Start: 2023-10-17 | End: 2023-11-16

## 2023-10-17 RX ORDER — BUSPIRONE HYDROCHLORIDE 5 MG/1
5 TABLET ORAL 3 TIMES DAILY
COMMUNITY

## 2023-10-17 NOTE — PROGRESS NOTES
Iggy Rao  1975  [id number]    HISTORY OF PRESENT ILLNESS: Ms. Braulio Fry is a 46 y.o. female returns for a follow up visit for pain management  She has a diagnosis of   1. Chronic pain syndrome    2. Primary osteoarthritis of both knees    3. Primary osteoarthritis of right hip    4. Chronic bilateral low back pain without sciatica    5. Back spasm    6. Diabetic polyneuropathy associated with type 2 diabetes mellitus ([address])    7. Depression, unspecified depression type    8. Anxiety    9. Morbid obesity with body mass index of 50.0-59.9 in adult ([address])    10. COPD, mild ([address])      As per Information Obtained from the PADT (Patient Assessment and Documentation Tool)    She complains of pain in the  lower back, right hip, both knees  She rates the pain 5/10 and describes it as aching. Current treatment regimen has helped relieve about 40% of the pain. She denies any side effects from the current pain regimen. Patient reports that since the last follow up visit the physical functioning is unchanged, family/social relationships are unchanged, mood is unchanged sleep patterns are unchanged, and that the overall functioning is unchanged. Patient denies misusing/abusing her narcotic pain medications or using any illegal drugs. Upon obtaining medical history from Ms. Braulio Fry states that pain is manageable on current pain therapy. Takes the pain medications as prescribed. Mood/anxiety is stable. Sleep is fair with an average of 5-6 hours. Denies to having issues of constipation. Tolerating activities/house chores with moderate tenderness to the lower back. Working on smoking cessation    ALLERGIES: Patients list of allergies were reviewed     MEDICATIONS: Ms. Braulio Fry list of medications were reviewed. Her current medications are   Outpatient Medications Prior to Visit   Medication Sig Dispense Refill    ARIPiprazole (ABILIFY) 2 MG tablet Take 1 tablet by mouth daily 30 tablet 1    busPIRone (BUSPAR) 5 MG

## 2023-10-19 ENCOUNTER — TELEPHONE (OUTPATIENT)
Dept: FAMILY MEDICINE CLINIC | Age: 48
End: 2023-10-19

## 2023-10-19 NOTE — TELEPHONE ENCOUNTER
Pt called in has questions on the buspirone 5 mg   Pt wants to know when she should stop taking the medication and start the new medication the Abilify 2 mg  ?       Please advise

## 2023-10-30 ENCOUNTER — TELEPHONE (OUTPATIENT)
Dept: FAMILY MEDICINE CLINIC | Age: 48
End: 2023-10-30

## 2023-10-30 DIAGNOSIS — I50.32 CHRONIC DIASTOLIC CHF (CONGESTIVE HEART FAILURE) (HCC): Primary | ICD-10-CM

## 2023-10-30 NOTE — TELEPHONE ENCOUNTER
Dillan from [facility], she would like to know if a script can be sent to [facility] in [address] [facility] for a lift chair.  Fax [phone]    Please advise  Lashanda Gibson can be reached at [phone]

## 2023-10-31 RX ORDER — TIZANIDINE 4 MG/1
TABLET ORAL
Qty: 60 TABLET | Refills: 0 | OUTPATIENT
Start: 2023-10-31

## 2023-10-31 RX ORDER — IBUPROFEN 800 MG/1
TABLET ORAL
Qty: 60 TABLET | Refills: 1 | OUTPATIENT
Start: 2023-10-31

## 2023-11-04 DIAGNOSIS — E66.9 DIABETES MELLITUS TYPE 2 IN OBESE (HCC): ICD-10-CM

## 2023-11-04 DIAGNOSIS — N39.46 MIXED STRESS AND URGE INCONTINENCE: ICD-10-CM

## 2023-11-04 DIAGNOSIS — E11.69 DIABETES MELLITUS TYPE 2 IN OBESE (HCC): ICD-10-CM

## 2023-11-06 RX ORDER — OXYBUTYNIN CHLORIDE 5 MG/1
TABLET, EXTENDED RELEASE ORAL
Qty: 90 TABLET | Refills: 1 | Status: SHIPPED | OUTPATIENT
Start: 2023-11-06

## 2023-11-14 ENCOUNTER — OFFICE VISIT (OUTPATIENT)
Dept: PAIN MANAGEMENT | Age: 48
End: 2023-11-14

## 2023-11-14 VITALS
SYSTOLIC BLOOD PRESSURE: 173 MMHG | TEMPERATURE: 97.4 F | DIASTOLIC BLOOD PRESSURE: 94 MMHG | WEIGHT: 293 LBS | HEART RATE: 88 BPM | OXYGEN SATURATION: 100 % | BODY MASS INDEX: 58.19 KG/M2

## 2023-11-14 DIAGNOSIS — F32.A DEPRESSION, UNSPECIFIED DEPRESSION TYPE: ICD-10-CM

## 2023-11-14 DIAGNOSIS — E11.42 DIABETIC POLYNEUROPATHY ASSOCIATED WITH TYPE 2 DIABETES MELLITUS (HCC): ICD-10-CM

## 2023-11-14 DIAGNOSIS — R26.9 ABNORMALITY OF GAIT AND MOBILITY: ICD-10-CM

## 2023-11-14 DIAGNOSIS — M54.50 CHRONIC BILATERAL LOW BACK PAIN WITHOUT SCIATICA: ICD-10-CM

## 2023-11-14 DIAGNOSIS — G89.4 CHRONIC PAIN SYNDROME: ICD-10-CM

## 2023-11-14 DIAGNOSIS — F41.9 ANXIETY: ICD-10-CM

## 2023-11-14 DIAGNOSIS — E66.01 MORBID OBESITY WITH BODY MASS INDEX OF 50.0-59.9 IN ADULT (HCC): ICD-10-CM

## 2023-11-14 DIAGNOSIS — G89.29 CHRONIC BILATERAL LOW BACK PAIN WITHOUT SCIATICA: ICD-10-CM

## 2023-11-14 DIAGNOSIS — M62.830 BACK SPASM: ICD-10-CM

## 2023-11-14 DIAGNOSIS — M17.0 PRIMARY OSTEOARTHRITIS OF BOTH KNEES: ICD-10-CM

## 2023-11-14 DIAGNOSIS — M16.11 PRIMARY OSTEOARTHRITIS OF RIGHT HIP: ICD-10-CM

## 2023-11-14 RX ORDER — ACETAMINOPHEN 500 MG
1000 TABLET ORAL EVERY 6 HOURS PRN
Qty: 30 TABLET | Refills: 0 | Status: SHIPPED | OUTPATIENT
Start: 2023-11-14

## 2023-11-14 RX ORDER — VENLAFAXINE HYDROCHLORIDE 75 MG/1
150 CAPSULE, EXTENDED RELEASE ORAL DAILY
Qty: 60 CAPSULE | Refills: 3 | Status: SHIPPED | OUTPATIENT
Start: 2023-11-14

## 2023-11-14 RX ORDER — TIZANIDINE 4 MG/1
4 TABLET ORAL EVERY 12 HOURS
Qty: 60 TABLET | Refills: 0 | Status: SHIPPED | OUTPATIENT
Start: 2023-11-14 | End: 2023-12-14

## 2023-11-14 RX ORDER — HYDROCODONE BITARTRATE AND ACETAMINOPHEN 7.5; 325 MG/1; MG/1
1 TABLET ORAL EVERY 6 HOURS PRN
Qty: 112 TABLET | Refills: 0 | Status: SHIPPED | OUTPATIENT
Start: 2023-11-14 | End: 2023-12-12

## 2023-11-14 RX ORDER — HYDROCODONE BITARTRATE AND ACETAMINOPHEN 7.5; 325 MG/1; MG/1
1 TABLET ORAL EVERY 8 HOURS PRN
Qty: 90 TABLET | Refills: 0 | Status: SHIPPED | OUTPATIENT
Start: 2023-11-14 | End: 2023-11-14 | Stop reason: SDUPTHER

## 2023-11-14 NOTE — PROGRESS NOTES
Sig Dispense Refill    metFORMIN (GLUCOPHAGE) 500 MG tablet TAKE ONE TABLET BY MOUTH TWICE A DAY WITH MEALS 180 tablet 1    oxybutynin (DITROPAN-XL) 5 MG extended release tablet TAKE ONE TABLET BY MOUTH DAILY 90 tablet 1    Lift Chair MISC by Does not apply route [phone] 1 each 0    ARIPiprazole (ABILIFY) 2 MG tablet Take 1 tablet by mouth daily 30 tablet 1    busPIRone (BUSPAR) 5 MG tablet Take 1 tablet by mouth 3 times daily      acetaminophen (APAP EXTRA STRENGTH) 500 MG tablet Take 2 tablets by mouth every 6 hours as needed for Pain DO NOT TAKE WITH OTHER MEDICATIONS CONTAINING ACETAMINOPHEN.  30 tablet 0    cloNIDine (CATAPRES) 0.1 MG tablet TAKE THREE TABLETS BY MOUTH THREE TIMES A  tablet 2    montelukast (SINGULAIR) 10 MG tablet TAKE ONE TABLET BY MOUTH ONCE NIGHTLY 90 tablet 1    TRUE METRIX BLOOD GLUCOSE TEST strip USE ONE STRIP TO TEST TWICE A DAY 50 strip 5    metoprolol (LOPRESSOR) 100 MG tablet TAKE ONE TABLET BY MOUTH TWICE A  tablet 1    spironolactone (ALDACTONE) 25 MG tablet TAKE ONE TABLET BY MOUTH DAILY 90 tablet 1    TRELEGY ELLIPTA 100-62.5-25 MCG/ACT AEPB inhaler INHALE ONE PUFF BY MOUTH DAILY 1 each 11    omeprazole (PRILOSEC) 20 MG delayed release capsule TAKE ONE CAPSULE BY MOUTH DAILY 90 capsule 1    chlorthalidone (HYGROTON) 25 MG tablet TAKE ONE TABLET BY MOUTH DAILY 90 tablet 1    ipratropium 0.5 mg-albuterol 2.5 mg (DUONEB) 0.5-2.5 (3) MG/3ML SOLN nebulizer solution INHALE THREE MILLILITERS  (1 VIAL) IA NEBULIZATION BY MOUTH EVERY 4 HOURS 180 mL 11    gabapentin (NEURONTIN) 300 MG capsule TAKE ONE CAPSULE BY MOUTH THREE TIMES A DAY 90 capsule 2    naloxone (NARCAN) 4 MG/0.1ML LIQD nasal spray Use as directed 1 each 0    Dulaglutide 3 MG/0.5ML SOPN Inject 3 mg into the skin once a week 4 Adjustable Dose Pre-filled Pen Syringe 5    loratadine (CLARITIN) 10 MG tablet TAKE ONE TABLET BY MOUTH DAILY 90 tablet 3    ibuprofen (ADVIL;MOTRIN) 800 MG tablet TAKE ONE TABLET

## 2023-11-15 ENCOUNTER — TELEPHONE (OUTPATIENT)
Dept: PAIN MANAGEMENT | Age: 48
End: 2023-11-15

## 2023-11-15 NOTE — TELEPHONE ENCOUNTER
Submitted PA for Hydrocodone-Acetaminophen Via [facility] Key: [id number] STATUS: PENDING.    Follow up done daily; if no response in three days we will refax for status check.  If another three days goes by with no response we will call the insurance for status.

## 2023-11-21 ENCOUNTER — TELEPHONE (OUTPATIENT)
Dept: PAIN MANAGEMENT | Age: 48
End: 2023-11-21

## 2023-11-21 NOTE — TELEPHONE ENCOUNTER
Pt states that [facility] had agreed to increase her medication hyrocodone for 3 times a day to 4 times a day. [facility] only sent in a quantity good enough for 3 times a day.

## 2023-11-21 NOTE — TELEPHONE ENCOUNTER
Spoke with pt letting her know per [facility] to go ahead and take them 4x a day, and [facility] will send the remainder.

## 2023-11-22 ENCOUNTER — TELEPHONE (OUTPATIENT)
Dept: FAMILY MEDICINE CLINIC | Age: 48
End: 2023-11-22

## 2023-11-22 NOTE — TELEPHONE ENCOUNTER
Dillan called in from [facility] wanting a CPAP machine sent to [facility] I asked if the pt has a pulmonologist she stated  yes advised to contact them

## 2023-12-01 ENCOUNTER — TELEPHONE (OUTPATIENT)
Dept: PAIN MANAGEMENT | Age: 48
End: 2023-12-01

## 2023-12-01 ENCOUNTER — OFFICE VISIT (OUTPATIENT)
Dept: CARDIOLOGY CLINIC | Age: 48
End: 2023-12-01
Payer: MEDICAID

## 2023-12-01 VITALS
DIASTOLIC BLOOD PRESSURE: 90 MMHG | WEIGHT: 293 LBS | OXYGEN SATURATION: 97 % | HEART RATE: 73 BPM | BODY MASS INDEX: 50.02 KG/M2 | SYSTOLIC BLOOD PRESSURE: 148 MMHG | HEIGHT: 64 IN

## 2023-12-01 DIAGNOSIS — M17.0 PRIMARY OSTEOARTHRITIS OF BOTH KNEES: ICD-10-CM

## 2023-12-01 DIAGNOSIS — I10 ESSENTIAL HYPERTENSION: Primary | ICD-10-CM

## 2023-12-01 DIAGNOSIS — G89.4 CHRONIC PAIN SYNDROME: ICD-10-CM

## 2023-12-01 DIAGNOSIS — M54.50 CHRONIC BILATERAL LOW BACK PAIN WITHOUT SCIATICA: ICD-10-CM

## 2023-12-01 DIAGNOSIS — G89.29 CHRONIC BILATERAL LOW BACK PAIN WITHOUT SCIATICA: ICD-10-CM

## 2023-12-01 DIAGNOSIS — M62.830 BACK SPASM: ICD-10-CM

## 2023-12-01 DIAGNOSIS — M16.11 PRIMARY OSTEOARTHRITIS OF RIGHT HIP: ICD-10-CM

## 2023-12-01 PROCEDURE — 3077F SYST BP >= 140 MM HG: CPT | Performed by: INTERNAL MEDICINE

## 2023-12-01 PROCEDURE — G8484 FLU IMMUNIZE NO ADMIN: HCPCS | Performed by: INTERNAL MEDICINE

## 2023-12-01 PROCEDURE — 3080F DIAST BP >= 90 MM HG: CPT | Performed by: INTERNAL MEDICINE

## 2023-12-01 PROCEDURE — 93000 ELECTROCARDIOGRAM COMPLETE: CPT | Performed by: INTERNAL MEDICINE

## 2023-12-01 PROCEDURE — 3017F COLORECTAL CA SCREEN DOC REV: CPT | Performed by: INTERNAL MEDICINE

## 2023-12-01 PROCEDURE — G8427 DOCREV CUR MEDS BY ELIG CLIN: HCPCS | Performed by: INTERNAL MEDICINE

## 2023-12-01 PROCEDURE — G8417 CALC BMI ABV UP PARAM F/U: HCPCS | Performed by: INTERNAL MEDICINE

## 2023-12-01 PROCEDURE — 99214 OFFICE O/P EST MOD 30 MIN: CPT | Performed by: INTERNAL MEDICINE

## 2023-12-01 PROCEDURE — 4004F PT TOBACCO SCREEN RCVD TLK: CPT | Performed by: INTERNAL MEDICINE

## 2023-12-01 RX ORDER — CHLORTHALIDONE 25 MG/1
25 TABLET ORAL DAILY
Qty: 90 TABLET | Refills: 5 | Status: SHIPPED | OUTPATIENT
Start: 2023-12-01

## 2023-12-01 RX ORDER — SPIRONOLACTONE 25 MG/1
25 TABLET ORAL DAILY
Qty: 90 TABLET | Refills: 5 | Status: SHIPPED | OUTPATIENT
Start: 2023-12-01

## 2023-12-01 RX ORDER — METOPROLOL SUCCINATE 100 MG/1
100 TABLET, EXTENDED RELEASE ORAL DAILY
Qty: 90 TABLET | Refills: 5 | Status: SHIPPED | OUTPATIENT
Start: 2023-12-01

## 2023-12-01 NOTE — PROGRESS NOTES
[facility], [place]  Cardiology Progress Note      Kaite Villegas  1975, 46 y.o.    CC: Diastolic heart failure        Day, Kendell Aragon MD:    HPI:   This is a 46 y.o. female with a PMH significant for COPD, chronic diastolic heart failure, Hypertension, Hyperlipidemia, COPD and JOSE on BiPAP (managed by pulmonology), tobacco abuse and Type II Diabetes Mellitus. Home cardiac medications include metoprolol 100 mg, clonidine point 3 mg spironolactone 25, chlorthalidone 25, torsemide 40    Patient has no complaints. But I get a sense that she is not taking her medicines because her blood pressure is very high      Past Medical History:   Diagnosis Date    Anxiety     Arthritis     Asthma     Back pain     CHF (congestive heart failure) (HCC)     COPD (chronic obstructive pulmonary disease) (HCC)     Depression     Diabetes mellitus (HCC)     GERD (gastroesophageal reflux disease)     Hyperlipidemia     Hypertension     Obesity     Sleep apnea     no C-PAP SINCE WT LOSS PER PT      Past Surgical History:   Procedure Laterality Date    CHOLECYSTECTOMY  1997    COLONOSCOPY N/A 2/2/2023    COLONOSCOPY POLYPECTOMY SNARE/COLD BIOPSY performed by Parul Ojeda MD at [address] [place] [address] ([address] [place] [address])  2008    SKIN BIOPSY  2015    TUBAL LIGATION      UPPER GASTROINTESTINAL ENDOSCOPY N/A 10/14/2019    EGD BIOPSY performed by Fabio Irene DO at [facility] ENDOSCOPY      Family History   Problem Relation Age of Onset    Breast Cancer Maternal Aunt       Social History     Tobacco Use    Smoking status: Every Day     Packs/day: 0.50     Years: 38.00     Additional pack years: 0.00     Total pack years: 19.00     Types: Cigarettes     Start date: 1/1/1985     Passive exposure: Past    Smokeless tobacco: Former    Tobacco comments:     Started back smoking in 2022   Vaping Use    Vaping Use: Never used   Substance Use Topics    Alcohol use:  Yes     Alcohol/week: 1.0 standard drink of alcohol     Types: 1

## 2023-12-01 NOTE — TELEPHONE ENCOUNTER
Patient called stating that she only have 90 pills of her pain medication and she is missing the remaining 22 pills that hold her over to her next schedule visit. Patient requesting a call back. Please be advised.

## 2023-12-04 ENCOUNTER — OFFICE VISIT (OUTPATIENT)
Dept: PULMONOLOGY | Age: 48
End: 2023-12-04
Payer: MEDICAID

## 2023-12-04 VITALS
WEIGHT: 293 LBS | RESPIRATION RATE: 18 BRPM | TEMPERATURE: 97.6 F | OXYGEN SATURATION: 92 % | HEART RATE: 88 BPM | BODY MASS INDEX: 50.02 KG/M2 | DIASTOLIC BLOOD PRESSURE: 98 MMHG | SYSTOLIC BLOOD PRESSURE: 150 MMHG | HEIGHT: 64 IN

## 2023-12-04 DIAGNOSIS — Z71.6 TOBACCO ABUSE COUNSELING: ICD-10-CM

## 2023-12-04 DIAGNOSIS — R91.1 PULMONARY NODULE SEEN ON IMAGING STUDY: Primary | ICD-10-CM

## 2023-12-04 DIAGNOSIS — E66.01 MORBID OBESITY (HCC): ICD-10-CM

## 2023-12-04 DIAGNOSIS — J44.9 COPD, MILD (HCC): ICD-10-CM

## 2023-12-04 DIAGNOSIS — G47.33 OSA (OBSTRUCTIVE SLEEP APNEA): ICD-10-CM

## 2023-12-04 PROCEDURE — 3077F SYST BP >= 140 MM HG: CPT | Performed by: INTERNAL MEDICINE

## 2023-12-04 PROCEDURE — G8417 CALC BMI ABV UP PARAM F/U: HCPCS | Performed by: INTERNAL MEDICINE

## 2023-12-04 PROCEDURE — 3017F COLORECTAL CA SCREEN DOC REV: CPT | Performed by: INTERNAL MEDICINE

## 2023-12-04 PROCEDURE — G8427 DOCREV CUR MEDS BY ELIG CLIN: HCPCS | Performed by: INTERNAL MEDICINE

## 2023-12-04 PROCEDURE — 3080F DIAST BP >= 90 MM HG: CPT | Performed by: INTERNAL MEDICINE

## 2023-12-04 PROCEDURE — 4004F PT TOBACCO SCREEN RCVD TLK: CPT | Performed by: INTERNAL MEDICINE

## 2023-12-04 PROCEDURE — 3023F SPIROM DOC REV: CPT | Performed by: INTERNAL MEDICINE

## 2023-12-04 PROCEDURE — 99214 OFFICE O/P EST MOD 30 MIN: CPT | Performed by: INTERNAL MEDICINE

## 2023-12-04 PROCEDURE — G8484 FLU IMMUNIZE NO ADMIN: HCPCS | Performed by: INTERNAL MEDICINE

## 2023-12-04 RX ORDER — HYDROCODONE BITARTRATE AND ACETAMINOPHEN 7.5; 325 MG/1; MG/1
1 TABLET ORAL EVERY 6 HOURS PRN
Qty: 36 TABLET | Refills: 0 | Status: SHIPPED | OUTPATIENT
Start: 2023-12-04 | End: 2023-12-13

## 2023-12-04 ASSESSMENT — SLEEP AND FATIGUE QUESTIONNAIRES
ESS TOTAL SCORE: 4
HOW LIKELY ARE YOU TO NOD OFF OR FALL ASLEEP WHILE LYING DOWN TO REST IN THE AFTERNOON WHEN CIRCUMSTANCES PERMIT: 1
HOW LIKELY ARE YOU TO NOD OFF OR FALL ASLEEP WHILE SITTING INACTIVE IN A PUBLIC PLACE: 0
HOW LIKELY ARE YOU TO NOD OFF OR FALL ASLEEP WHILE WATCHING TV: 0
HOW LIKELY ARE YOU TO NOD OFF OR FALL ASLEEP WHEN YOU ARE A PASSENGER IN A CAR FOR AN HOUR WITHOUT A BREAK: 0
HOW LIKELY ARE YOU TO NOD OFF OR FALL ASLEEP WHILE SITTING AND READING: 1
HOW LIKELY ARE YOU TO NOD OFF OR FALL ASLEEP IN A CAR, WHILE STOPPED FOR A FEW MINUTES IN TRAFFIC: 0
HOW LIKELY ARE YOU TO NOD OFF OR FALL ASLEEP WHILE SITTING QUIETLY AFTER LUNCH WITHOUT ALCOHOL: 0
HOW LIKELY ARE YOU TO NOD OFF OR FALL ASLEEP WHILE SITTING AND TALKING TO SOMEONE: 2

## 2023-12-04 NOTE — TELEPHONE ENCOUNTER
Spoke with the pt letting her know that [facility]

## 2023-12-04 NOTE — ASSESSMENT & PLAN NOTE
Still not using it. Not clear why she has not received full face mask. Another order written. MA to follow up.

## 2023-12-12 ENCOUNTER — OFFICE VISIT (OUTPATIENT)
Dept: PAIN MANAGEMENT | Age: 48
End: 2023-12-12
Payer: MEDICAID

## 2023-12-12 VITALS
TEMPERATURE: 97.2 F | DIASTOLIC BLOOD PRESSURE: 85 MMHG | SYSTOLIC BLOOD PRESSURE: 133 MMHG | BODY MASS INDEX: 57.5 KG/M2 | HEART RATE: 86 BPM | WEIGHT: 293 LBS | OXYGEN SATURATION: 98 %

## 2023-12-12 DIAGNOSIS — M54.50 CHRONIC BILATERAL LOW BACK PAIN WITHOUT SCIATICA: ICD-10-CM

## 2023-12-12 DIAGNOSIS — G89.29 CHRONIC BILATERAL LOW BACK PAIN WITHOUT SCIATICA: ICD-10-CM

## 2023-12-12 DIAGNOSIS — F41.9 ANXIETY: ICD-10-CM

## 2023-12-12 DIAGNOSIS — E11.42 DIABETIC POLYNEUROPATHY ASSOCIATED WITH TYPE 2 DIABETES MELLITUS (HCC): ICD-10-CM

## 2023-12-12 DIAGNOSIS — G89.4 CHRONIC PAIN SYNDROME: ICD-10-CM

## 2023-12-12 DIAGNOSIS — M17.0 PRIMARY OSTEOARTHRITIS OF BOTH KNEES: ICD-10-CM

## 2023-12-12 DIAGNOSIS — M16.11 PRIMARY OSTEOARTHRITIS OF RIGHT HIP: ICD-10-CM

## 2023-12-12 DIAGNOSIS — E66.01 MORBID OBESITY WITH BODY MASS INDEX OF 50.0-59.9 IN ADULT (HCC): ICD-10-CM

## 2023-12-12 DIAGNOSIS — F32.A DEPRESSION, UNSPECIFIED DEPRESSION TYPE: ICD-10-CM

## 2023-12-12 DIAGNOSIS — M62.830 BACK SPASM: ICD-10-CM

## 2023-12-12 PROCEDURE — 2022F DILAT RTA XM EVC RTNOPTHY: CPT | Performed by: NURSE PRACTITIONER

## 2023-12-12 PROCEDURE — 4004F PT TOBACCO SCREEN RCVD TLK: CPT | Performed by: NURSE PRACTITIONER

## 2023-12-12 PROCEDURE — 3078F DIAST BP <80 MM HG: CPT | Performed by: NURSE PRACTITIONER

## 2023-12-12 PROCEDURE — G8484 FLU IMMUNIZE NO ADMIN: HCPCS | Performed by: NURSE PRACTITIONER

## 2023-12-12 PROCEDURE — G8427 DOCREV CUR MEDS BY ELIG CLIN: HCPCS | Performed by: NURSE PRACTITIONER

## 2023-12-12 PROCEDURE — 3044F HG A1C LEVEL LT 7.0%: CPT | Performed by: NURSE PRACTITIONER

## 2023-12-12 PROCEDURE — 3017F COLORECTAL CA SCREEN DOC REV: CPT | Performed by: NURSE PRACTITIONER

## 2023-12-12 PROCEDURE — 99214 OFFICE O/P EST MOD 30 MIN: CPT | Performed by: NURSE PRACTITIONER

## 2023-12-12 PROCEDURE — 3074F SYST BP LT 130 MM HG: CPT | Performed by: NURSE PRACTITIONER

## 2023-12-12 PROCEDURE — G8417 CALC BMI ABV UP PARAM F/U: HCPCS | Performed by: NURSE PRACTITIONER

## 2023-12-12 RX ORDER — LIDOCAINE 36 MG/1
1 PATCH TOPICAL DAILY
Qty: 30 PATCH | Refills: 0 | Status: SHIPPED | OUTPATIENT
Start: 2023-12-12 | End: 2024-01-11

## 2023-12-12 RX ORDER — ACETAMINOPHEN 500 MG
1000 TABLET ORAL EVERY 6 HOURS PRN
Qty: 30 TABLET | Refills: 0 | Status: SHIPPED | OUTPATIENT
Start: 2023-12-12

## 2023-12-12 RX ORDER — LIDOCAINE 36 MG/1
1 PATCH TOPICAL DAILY
Qty: 30 PATCH | Refills: 0 | Status: SHIPPED | OUTPATIENT
Start: 2023-12-12 | End: 2023-12-12 | Stop reason: SDUPTHER

## 2023-12-12 RX ORDER — HYDROCODONE BITARTRATE AND ACETAMINOPHEN 7.5; 325 MG/1; MG/1
1 TABLET ORAL EVERY 6 HOURS PRN
Qty: 112 TABLET | Refills: 0 | Status: SHIPPED | OUTPATIENT
Start: 2023-12-12 | End: 2024-01-09

## 2023-12-12 RX ORDER — TIZANIDINE 4 MG/1
4 TABLET ORAL EVERY 12 HOURS
Qty: 60 TABLET | Refills: 0 | Status: SHIPPED | OUTPATIENT
Start: 2023-12-12 | End: 2024-01-11

## 2023-12-12 NOTE — PROGRESS NOTES
Nura Lopesoy  1975  [id number]    HISTORY OF PRESENT ILLNESS: Ms. Kadie Christensen is a 46 y.o. female returns for a follow up visit for pain management  She has a diagnosis of   1. Chronic pain syndrome    2. Primary osteoarthritis of both knees    3. Primary osteoarthritis of right hip    4. Chronic bilateral low back pain without sciatica    5. Back spasm    6. Diabetic polyneuropathy associated with type 2 diabetes mellitus ([address])    7. Anxiety    8. Depression, unspecified depression type    9. Morbid obesity with body mass index of 50.0-59.9 in [facility])      As per Information Obtained from the PADT (Patient Assessment and Documentation Tool)    She complains of pain in the  lower right back, right lower side, both knees  She rates the pain 5/10 and describes it as aching, burning, numbness, pins and needles. Current treatment regimen has helped relieve about 60% of the pain. She denies any side effects from the current pain regimen. Patient reports that since the last follow up visit the physical functioning is unchanged, family/social relationships are unchanged, mood is unchanged sleep patterns are unchanged, and that the overall functioning is unchanged. Patient denies misusing/abusing her narcotic pain medications or using any illegal drugs. Upon obtaining medical history from Ms. Kadie Christensen states that pain is manageable on current pain therapy. Takes the pain medications as prescribed. Mood/anxiety is stable. Sleep is fair with an average of 5-6 hours. Denies to having issues of constipation. Tolerating activities/house chores with moderate tenderness to the lower back. Working on smoking cessation    ALLERGIES: Patients list of allergies were reviewed     MEDICATIONS: Ms. Kadie Christensen list of medications were reviewed. Her current medications are   Outpatient Medications Prior to Visit   Medication Sig Dispense Refill    metoprolol succinate (TOPROL XL) 100 MG extended release tablet Take 1 tablet

## 2023-12-13 ENCOUNTER — TELEPHONE (OUTPATIENT)
Dept: ADMINISTRATIVE | Age: 48
End: 2023-12-13

## 2023-12-13 NOTE — TELEPHONE ENCOUNTER
Submitted PA for ZTlido 1.8% patches  Via [facility] Key: [id number] STATUS: PENDING.    Follow up done daily; if no response in three days we will refax for status check.  If another three days goes by with no response we will call the insurance for status.

## 2023-12-13 NOTE — TELEPHONE ENCOUNTER
The medication is APPROVED. Letter available in media.     Informed pt her medication was approved and she should be able to get her medication filled at the pharmacy without issue.     If this requires a response please respond to the pool ( P [facility]X [facility] MEDICATION PRE-AUTH).

## 2023-12-22 ENCOUNTER — TELEPHONE (OUTPATIENT)
Dept: PAIN MANAGEMENT | Age: 48
End: 2023-12-22

## 2023-12-22 NOTE — TELEPHONE ENCOUNTER
Received PA from [facility].  Submitted PA for Hydrocodone-Acetaminophen Via [facility] Key: [id number] STATUS: PENDING.    Follow up done daily; if no decision with in three days we will refax.  If another three days goes by with no decision will call the insurance for status.

## 2023-12-22 NOTE — TELEPHONE ENCOUNTER
This medication is APPROVED.  9/14/2023-3/11/2024.    [facility] has been notified.   Thank you!

## 2024-01-09 ENCOUNTER — OFFICE VISIT (OUTPATIENT)
Dept: PAIN MANAGEMENT | Age: 49
End: 2024-01-09
Payer: MEDICAID

## 2024-01-09 VITALS
SYSTOLIC BLOOD PRESSURE: 166 MMHG | DIASTOLIC BLOOD PRESSURE: 100 MMHG | TEMPERATURE: 97.5 F | OXYGEN SATURATION: 93 % | HEART RATE: 91 BPM

## 2024-01-09 DIAGNOSIS — M17.0 PRIMARY OSTEOARTHRITIS OF BOTH KNEES: ICD-10-CM

## 2024-01-09 DIAGNOSIS — M62.830 BACK SPASM: ICD-10-CM

## 2024-01-09 DIAGNOSIS — F41.9 ANXIETY: ICD-10-CM

## 2024-01-09 DIAGNOSIS — E11.42 DIABETIC POLYNEUROPATHY ASSOCIATED WITH TYPE 2 DIABETES MELLITUS (HCC): ICD-10-CM

## 2024-01-09 DIAGNOSIS — G89.29 CHRONIC BILATERAL LOW BACK PAIN WITHOUT SCIATICA: ICD-10-CM

## 2024-01-09 DIAGNOSIS — F32.A DEPRESSION, UNSPECIFIED DEPRESSION TYPE: ICD-10-CM

## 2024-01-09 DIAGNOSIS — M54.50 CHRONIC BILATERAL LOW BACK PAIN WITHOUT SCIATICA: ICD-10-CM

## 2024-01-09 DIAGNOSIS — M16.11 PRIMARY OSTEOARTHRITIS OF RIGHT HIP: ICD-10-CM

## 2024-01-09 DIAGNOSIS — E66.01 MORBID OBESITY WITH BODY MASS INDEX OF 50.0-59.9 IN ADULT (HCC): ICD-10-CM

## 2024-01-09 DIAGNOSIS — G89.4 CHRONIC PAIN SYNDROME: ICD-10-CM

## 2024-01-09 PROCEDURE — G8417 CALC BMI ABV UP PARAM F/U: HCPCS | Performed by: NURSE PRACTITIONER

## 2024-01-09 PROCEDURE — 4004F PT TOBACCO SCREEN RCVD TLK: CPT | Performed by: NURSE PRACTITIONER

## 2024-01-09 PROCEDURE — 3080F DIAST BP >= 90 MM HG: CPT | Performed by: NURSE PRACTITIONER

## 2024-01-09 PROCEDURE — 2022F DILAT RTA XM EVC RTNOPTHY: CPT | Performed by: NURSE PRACTITIONER

## 2024-01-09 PROCEDURE — 3017F COLORECTAL CA SCREEN DOC REV: CPT | Performed by: NURSE PRACTITIONER

## 2024-01-09 PROCEDURE — 3046F HEMOGLOBIN A1C LEVEL >9.0%: CPT | Performed by: NURSE PRACTITIONER

## 2024-01-09 PROCEDURE — G8484 FLU IMMUNIZE NO ADMIN: HCPCS | Performed by: NURSE PRACTITIONER

## 2024-01-09 PROCEDURE — 3077F SYST BP >= 140 MM HG: CPT | Performed by: NURSE PRACTITIONER

## 2024-01-09 PROCEDURE — G8427 DOCREV CUR MEDS BY ELIG CLIN: HCPCS | Performed by: NURSE PRACTITIONER

## 2024-01-09 PROCEDURE — 99214 OFFICE O/P EST MOD 30 MIN: CPT | Performed by: NURSE PRACTITIONER

## 2024-01-09 RX ORDER — TIZANIDINE 4 MG/1
4 TABLET ORAL EVERY 12 HOURS
Qty: 60 TABLET | Refills: 0 | Status: SHIPPED | OUTPATIENT
Start: 2024-01-09 | End: 2024-02-08

## 2024-01-09 RX ORDER — GABAPENTIN 300 MG/1
300 CAPSULE ORAL 3 TIMES DAILY
Qty: 90 CAPSULE | Refills: 2 | Status: SHIPPED | OUTPATIENT
Start: 2024-01-09 | End: 2024-04-08

## 2024-01-09 RX ORDER — LIDOCAINE 36 MG/1
1 PATCH TOPICAL DAILY
Qty: 30 PATCH | Refills: 0 | Status: SHIPPED | OUTPATIENT
Start: 2024-01-09 | End: 2024-02-08

## 2024-01-09 RX ORDER — ACETAMINOPHEN 500 MG
1000 TABLET ORAL EVERY 6 HOURS PRN
Qty: 30 TABLET | Refills: 0 | Status: SHIPPED | OUTPATIENT
Start: 2024-01-09

## 2024-01-09 RX ORDER — HYDROCODONE BITARTRATE AND ACETAMINOPHEN 7.5; 325 MG/1; MG/1
1 TABLET ORAL EVERY 6 HOURS PRN
Qty: 112 TABLET | Refills: 0 | Status: SHIPPED | OUTPATIENT
Start: 2024-01-09 | End: 2024-02-06

## 2024-01-09 NOTE — PROGRESS NOTES
monitoring:                  MEDD current = 22.50              ORT Score = 2 low risk              Other Risk factors - (mood) Depression/Anxiety              Date of Last Medication Agreement: 1/17/23              Date Naloxone prescribed: 8/15/23              UDT:                          Date of last UDT: 2/21/23                          Adverse report: No              OARRS:                          Checked today: Yes                          Adverse report: No    IMPRESSION:   1. Chronic pain syndrome    2. Primary osteoarthritis of both knees    3. Primary osteoarthritis of right hip    4. Chronic bilateral low back pain without sciatica    5. Back spasm    6. Diabetic polyneuropathy associated with type 2 diabetes mellitus (HCC)    7. Anxiety    8. Depression, unspecified depression type    9. Morbid obesity with body mass index of 50.0-59.9 in adult (HCC)      PLAN:  Informed verbal consent was obtained:  -Patient's opioid therapy will be maintained at current dose  -Home exercises/Toyin exercises recommended  -Pain contract was reviewed today as well as renewed. This includes passing UDS/Pill count to enable on-going opioid therapy. Obtaining opioids outside the pain contract can only be done upon approval/verification with the pain provider in unique circumstances such as surgery. Patient verbalized understanding    -Recommended that patient monitor BP, f/u with PCP if it continues to stay elevated or she becomes symptomatic with SOB, CP, syncopy. Advised to maintain compliance with prescribed antihypertensives. she is currently asymptomatic   -CBT techniques- relaxation therapies such as biofeedback, mindfulness based stress reduction, imagery, cognitive restructuring, problem solving discussed with patient   -She was advised weight reduction, diet changes- 800-1200 fior diet, diet diary, exercising, nutritional  consult increased physical activity as tolerated   -Last UDS 2/21/23 consistent  -Return in

## 2024-02-06 ENCOUNTER — OFFICE VISIT (OUTPATIENT)
Dept: PAIN MANAGEMENT | Age: 49
End: 2024-02-06

## 2024-02-06 VITALS
WEIGHT: 293 LBS | BODY MASS INDEX: 56.64 KG/M2 | SYSTOLIC BLOOD PRESSURE: 181 MMHG | TEMPERATURE: 97.5 F | HEART RATE: 77 BPM | OXYGEN SATURATION: 97 % | DIASTOLIC BLOOD PRESSURE: 103 MMHG

## 2024-02-06 DIAGNOSIS — E66.01 MORBID OBESITY WITH BODY MASS INDEX OF 50.0-59.9 IN ADULT (HCC): ICD-10-CM

## 2024-02-06 DIAGNOSIS — M17.0 PRIMARY OSTEOARTHRITIS OF BOTH KNEES: ICD-10-CM

## 2024-02-06 DIAGNOSIS — G89.4 CHRONIC PAIN SYNDROME: ICD-10-CM

## 2024-02-06 DIAGNOSIS — M54.50 CHRONIC BILATERAL LOW BACK PAIN WITHOUT SCIATICA: ICD-10-CM

## 2024-02-06 DIAGNOSIS — F41.9 ANXIETY: ICD-10-CM

## 2024-02-06 DIAGNOSIS — E11.42 DIABETIC POLYNEUROPATHY ASSOCIATED WITH TYPE 2 DIABETES MELLITUS (HCC): ICD-10-CM

## 2024-02-06 DIAGNOSIS — M62.830 BACK SPASM: ICD-10-CM

## 2024-02-06 DIAGNOSIS — J44.9 COPD, MILD (HCC): ICD-10-CM

## 2024-02-06 DIAGNOSIS — M16.11 PRIMARY OSTEOARTHRITIS OF RIGHT HIP: ICD-10-CM

## 2024-02-06 DIAGNOSIS — Z51.81 ENCOUNTER FOR THERAPEUTIC DRUG MONITORING: ICD-10-CM

## 2024-02-06 DIAGNOSIS — G89.29 CHRONIC BILATERAL LOW BACK PAIN WITHOUT SCIATICA: ICD-10-CM

## 2024-02-06 RX ORDER — VENLAFAXINE HYDROCHLORIDE 75 MG/1
150 CAPSULE, EXTENDED RELEASE ORAL DAILY
Qty: 60 CAPSULE | Refills: 3 | Status: SHIPPED | OUTPATIENT
Start: 2024-02-06

## 2024-02-06 RX ORDER — TIZANIDINE 4 MG/1
4 TABLET ORAL EVERY 12 HOURS
Qty: 60 TABLET | Refills: 0 | Status: SHIPPED | OUTPATIENT
Start: 2024-02-06 | End: 2024-03-07

## 2024-02-06 RX ORDER — METHYLPREDNISOLONE 4 MG/1
TABLET ORAL
Qty: 1 KIT | Refills: 0 | Status: SHIPPED | OUTPATIENT
Start: 2024-02-06 | End: 2024-02-12

## 2024-02-06 RX ORDER — ACETAMINOPHEN 500 MG
1000 TABLET ORAL EVERY 6 HOURS PRN
Qty: 30 TABLET | Refills: 0 | Status: SHIPPED | OUTPATIENT
Start: 2024-02-06

## 2024-02-06 RX ORDER — HYDROCODONE BITARTRATE AND ACETAMINOPHEN 7.5; 325 MG/1; MG/1
1 TABLET ORAL EVERY 6 HOURS PRN
Qty: 112 TABLET | Refills: 0 | Status: SHIPPED | OUTPATIENT
Start: 2024-02-06 | End: 2024-03-05

## 2024-02-06 RX ORDER — GABAPENTIN 300 MG/1
300 CAPSULE ORAL 3 TIMES DAILY
Qty: 90 CAPSULE | Refills: 2 | Status: SHIPPED | OUTPATIENT
Start: 2024-02-06 | End: 2024-05-06

## 2024-02-06 RX ORDER — LIDOCAINE 36 MG/1
1 PATCH TOPICAL DAILY
Qty: 30 PATCH | Refills: 0 | Status: SHIPPED | OUTPATIENT
Start: 2024-02-06 | End: 2024-03-07

## 2024-02-06 NOTE — PROGRESS NOTES
Continue present regimen: Yes: Norco 7.5-325 mg tabs orally q6h prn              Adjust dose of present analgesic: No              Switch analgesics: No              Add/Adjust concomitant therapy: No: continue with Zanaflex, Effexor, Neurontin, Tylenol, Narcan    I will continue her current medication regimen  which is part of the above treatment schedule. It has been helping with Ms. Jett's chronic  medical problems which for this visit include:   Diagnoses of Chronic pain syndrome, Primary osteoarthritis of both knees, Primary osteoarthritis of right hip, Chronic bilateral low back pain without sciatica, Back spasm, Diabetic polyneuropathy associated with type 2 diabetes mellitus (HCC), Anxiety, Morbid obesity with body mass index of 50.0-59.9 in adult (HCC), COPD, mild (HCC), and Encounter for therapeutic drug monitoring were pertinent to this visit.   Risks and benefits of the medications and other alternative treatments  including no treatment were discussed with the patient.The common side effects of these medications were also explained to the patient.  Informed verbal consent was obtained.   Goals of current treatment regimen include improvement in pain, restoration of functioning- with focus on improvement in physical performance, general activity, work or disability,emotional distress, health care utilization and  decreased medication consumption. Will continue to monitor progress towards achieving/maintaining therapeutic goals with special emphasis on  1. Improvement in perceived interfernce  of pain with ADL's. Ability to do home exercises independently. Ability to do household chores indoor and/or outdoor work and social and leisure activities.Improve psychosocial and physical functioning. - she is showing progression towards this treatment goal with the current regimen.     She was advised against drinking alcohol with the narcotic pain medicines, advised against driving or handling machinery while

## 2024-03-04 ENCOUNTER — TELEPHONE (OUTPATIENT)
Dept: PULMONOLOGY | Age: 49
End: 2024-03-04

## 2024-03-04 NOTE — TELEPHONE ENCOUNTER
Sharla with [facility] wants to know the status of the CMN that she sent over last week for PAP supplies. I don't have it on the fax machine. Is it on Guero's desk? Thank you!

## 2024-03-11 ENCOUNTER — HOSPITAL ENCOUNTER (EMERGENCY)
Age: 49
Discharge: HOME | End: 2024-03-11
Attending: STUDENT IN AN ORGANIZED HEALTH CARE EDUCATION/TRAINING PROGRAM
Payer: MEDICAID

## 2024-03-11 VITALS
DIASTOLIC BLOOD PRESSURE: 87 MMHG | HEART RATE: 82 BPM | OXYGEN SATURATION: 97 % | TEMPERATURE: 98 F | RESPIRATION RATE: 18 BRPM | SYSTOLIC BLOOD PRESSURE: 143 MMHG

## 2024-03-11 DIAGNOSIS — M54.50 CHRONIC RIGHT-SIDED LOW BACK PAIN WITHOUT SCIATICA: ICD-10-CM

## 2024-03-11 DIAGNOSIS — M25.551 RIGHT HIP PAIN: Primary | ICD-10-CM

## 2024-03-11 DIAGNOSIS — G89.29 CHRONIC RIGHT-SIDED LOW BACK PAIN WITHOUT SCIATICA: ICD-10-CM

## 2024-03-11 PROCEDURE — 96372 THER/PROPH/DIAG INJ SC/IM: CPT

## 2024-03-11 PROCEDURE — 99284 EMERGENCY DEPT VISIT MOD MDM: CPT

## 2024-03-11 PROCEDURE — 6360000002 HC RX W HCPCS: Performed by: STUDENT IN AN ORGANIZED HEALTH CARE EDUCATION/TRAINING PROGRAM

## 2024-03-11 RX ORDER — CYCLOBENZAPRINE HCL 5 MG
10 TABLET ORAL 3 TIMES DAILY PRN
Qty: 30 TABLET | Refills: 0 | Status: SHIPPED | OUTPATIENT
Start: 2024-03-11 | End: 2024-03-21

## 2024-03-11 RX ORDER — IBUPROFEN 600 MG/1
600 TABLET ORAL EVERY 6 HOURS PRN
Qty: 30 TABLET | Refills: 0 | Status: SHIPPED | OUTPATIENT
Start: 2024-03-11

## 2024-03-11 RX ORDER — LIDOCAINE 50 MG/G
1 PATCH TOPICAL DAILY
Qty: 10 PATCH | Refills: 0 | Status: SHIPPED | OUTPATIENT
Start: 2024-03-11 | End: 2024-03-21

## 2024-03-11 RX ORDER — KETOROLAC TROMETHAMINE 30 MG/ML
30 INJECTION, SOLUTION INTRAMUSCULAR; INTRAVENOUS ONCE
Status: COMPLETED | OUTPATIENT
Start: 2024-03-11 | End: 2024-03-11

## 2024-03-11 RX ORDER — ACETAMINOPHEN 325 MG/1
650 TABLET ORAL EVERY 6 HOURS PRN
Qty: 30 TABLET | Refills: 0 | Status: SHIPPED | OUTPATIENT
Start: 2024-03-11

## 2024-03-11 RX ADMIN — KETOROLAC TROMETHAMINE 30 MG: 30 INJECTION, SOLUTION INTRAMUSCULAR at 13:51

## 2024-03-11 ASSESSMENT — PAIN SCALES - GENERAL
PAINLEVEL_OUTOF10: 9
PAINLEVEL_OUTOF10: 9

## 2024-03-11 ASSESSMENT — PAIN - FUNCTIONAL ASSESSMENT: PAIN_FUNCTIONAL_ASSESSMENT: 0-10

## 2024-03-11 NOTE — ED NOTES
Patient presents to ED with R hip and back pain. States she has a hx of osteoarthritis. Denies new injury. States that she was taking vicodins and being seen by pain management, but states she ate an edible and was dismissed from the practice. States she hasn't had pain medication in a month, states she's trying to get in with her PCP for pain medications. Vitals WNL. GCS 15.

## 2024-03-12 NOTE — ED PROVIDER NOTES
[facility]    CHIEF COMPLAINT  Hip Pain and Back Pain       HISTORY OF PRESENT ILLNESS  Odalis Jett is a 48 y.o. female presenting to the ED for right hip pain and chronic right back pain.  Patient states at baseline she has chronic right hip pain however it is worsened over the last week.  Patient is still able to walk without extreme difficulty.  Patient denies any falls or trauma.  Patient also complains of back spasms in her right lower lumbar region.  Patient also states that the spasms are consistent with her right hip spasms.  Patient states she has a tightening sensation in her right hip and right back which is consistent with her chronic pain.  Patient states she was recently fired from prior pain management doctor for tested positive for marijuana.  Currently patient has no medications prescribed.  As of right now she gets right hip injections from Dr. Rhodes.  Patient was told that she would need a hip replacement however she has not been able to make the weight loss goal yet in order to undergo surgery.  Patient denies fever, urinary retention, saddle anesthesia, lower extremity weakness    - History obtained from: Patient   - Limitations to history: none    I have reviewed the following from the nursing documentation:    Past Medical History:   Diagnosis Date    Anxiety     Arthritis     Asthma     Back pain     CHF (congestive heart failure) (HCC)     COPD (chronic obstructive pulmonary disease) (HCC)     Depression     Diabetes mellitus (HCC)     GERD (gastroesophageal reflux disease)     Hyperlipidemia     Hypertension     Obesity     Sleep apnea     no C-PAP SINCE WT LOSS PER PT     Past Surgical History:   Procedure Laterality Date    CHOLECYSTECTOMY  1997    COLONOSCOPY N/A 2/2/2023    COLONOSCOPY POLYPECTOMY SNARE/COLD BIOPSY performed by Christy Gonzalez MD at [facility] ENDOSCOPY    HYSTERECTOMY (CERVIX STATUS UNKNOWN)  2008    SKIN BIOPSY  2015    TUBAL LIGATION

## 2024-03-20 ENCOUNTER — OFFICE VISIT (OUTPATIENT)
Dept: FAMILY MEDICINE CLINIC | Age: 49
End: 2024-03-20
Payer: MEDICAID

## 2024-03-20 ENCOUNTER — TELEPHONE (OUTPATIENT)
Dept: PAIN MANAGEMENT | Age: 49
End: 2024-03-20

## 2024-03-20 VITALS
OXYGEN SATURATION: 94 % | RESPIRATION RATE: 20 BRPM | BODY MASS INDEX: 60.73 KG/M2 | DIASTOLIC BLOOD PRESSURE: 80 MMHG | WEIGHT: 293 LBS | TEMPERATURE: 97.8 F | SYSTOLIC BLOOD PRESSURE: 120 MMHG | HEART RATE: 78 BPM

## 2024-03-20 DIAGNOSIS — R73.03 PREDIABETES: ICD-10-CM

## 2024-03-20 DIAGNOSIS — G89.29 CHRONIC JOINT PAIN: Primary | ICD-10-CM

## 2024-03-20 DIAGNOSIS — I50.32 CHRONIC DIASTOLIC CHF (CONGESTIVE HEART FAILURE) (HCC): ICD-10-CM

## 2024-03-20 DIAGNOSIS — E11.42 DIABETIC POLYNEUROPATHY ASSOCIATED WITH TYPE 2 DIABETES MELLITUS (HCC): ICD-10-CM

## 2024-03-20 DIAGNOSIS — M25.50 CHRONIC JOINT PAIN: Primary | ICD-10-CM

## 2024-03-20 LAB
ANION GAP SERPL CALCULATED.3IONS-SCNC: 6 MMOL/L (ref 3–16)
BUN SERPL-MCNC: 9 MG/DL (ref 7–20)
CALCIUM SERPL-MCNC: 9.5 MG/DL (ref 8.3–10.6)
CHLORIDE SERPL-SCNC: 101 MMOL/L (ref 99–110)
CO2 SERPL-SCNC: 33 MMOL/L (ref 21–32)
CREAT SERPL-MCNC: 0.9 MG/DL (ref 0.6–1.1)
GFR SERPLBLD CREATININE-BSD FMLA CKD-EPI: >60 ML/MIN/{1.73_M2}
GLUCOSE SERPL-MCNC: 85 MG/DL (ref 70–99)
NT-PROBNP SERPL-MCNC: 126 PG/ML (ref 0–124)
POTASSIUM SERPL-SCNC: 5.2 MMOL/L (ref 3.5–5.1)
SODIUM SERPL-SCNC: 140 MMOL/L (ref 136–145)

## 2024-03-20 PROCEDURE — 2022F DILAT RTA XM EVC RTNOPTHY: CPT | Performed by: FAMILY MEDICINE

## 2024-03-20 PROCEDURE — 3017F COLORECTAL CA SCREEN DOC REV: CPT | Performed by: FAMILY MEDICINE

## 2024-03-20 PROCEDURE — 99214 OFFICE O/P EST MOD 30 MIN: CPT | Performed by: FAMILY MEDICINE

## 2024-03-20 PROCEDURE — 4004F PT TOBACCO SCREEN RCVD TLK: CPT | Performed by: FAMILY MEDICINE

## 2024-03-20 PROCEDURE — G8417 CALC BMI ABV UP PARAM F/U: HCPCS | Performed by: FAMILY MEDICINE

## 2024-03-20 PROCEDURE — 3046F HEMOGLOBIN A1C LEVEL >9.0%: CPT | Performed by: FAMILY MEDICINE

## 2024-03-20 PROCEDURE — G8484 FLU IMMUNIZE NO ADMIN: HCPCS | Performed by: FAMILY MEDICINE

## 2024-03-20 PROCEDURE — G8427 DOCREV CUR MEDS BY ELIG CLIN: HCPCS | Performed by: FAMILY MEDICINE

## 2024-03-20 PROCEDURE — 3079F DIAST BP 80-89 MM HG: CPT | Performed by: FAMILY MEDICINE

## 2024-03-20 PROCEDURE — 3074F SYST BP LT 130 MM HG: CPT | Performed by: FAMILY MEDICINE

## 2024-03-20 RX ORDER — GABAPENTIN 300 MG/1
300 CAPSULE ORAL 3 TIMES DAILY
Qty: 90 CAPSULE | Refills: 2 | Status: SHIPPED | OUTPATIENT
Start: 2024-03-20 | End: 2024-06-18

## 2024-03-20 ASSESSMENT — PATIENT HEALTH QUESTIONNAIRE - PHQ9
4. FEELING TIRED OR HAVING LITTLE ENERGY: SEVERAL DAYS
3. TROUBLE FALLING OR STAYING ASLEEP: MORE THAN HALF THE DAYS
1. LITTLE INTEREST OR PLEASURE IN DOING THINGS: SEVERAL DAYS
3. TROUBLE FALLING OR STAYING ASLEEP: MORE THAN HALF THE DAYS
SUM OF ALL RESPONSES TO PHQ QUESTIONS 1-9: 6
2. FEELING DOWN, DEPRESSED OR HOPELESS: SEVERAL DAYS
SUM OF ALL RESPONSES TO PHQ9 QUESTIONS 1 & 2: 2
SUM OF ALL RESPONSES TO PHQ QUESTIONS 1-9: 6
2. FEELING DOWN, DEPRESSED OR HOPELESS: SEVERAL DAYS
8. MOVING OR SPEAKING SO SLOWLY THAT OTHER PEOPLE COULD HAVE NOTICED. OR THE OPPOSITE - BEING SO FIDGETY OR RESTLESS THAT YOU HAVE BEEN MOVING AROUND A LOT MORE THAN USUAL: NOT AT ALL
SUM OF ALL RESPONSES TO PHQ QUESTIONS 1-9: 6
5. POOR APPETITE OR OVEREATING: NOT AT ALL
7. TROUBLE CONCENTRATING ON THINGS, SUCH AS READING THE NEWSPAPER OR WATCHING TELEVISION: SEVERAL DAYS
4. FEELING TIRED OR HAVING LITTLE ENERGY: SEVERAL DAYS
5. POOR APPETITE OR OVEREATING: NOT AT ALL
6. FEELING BAD ABOUT YOURSELF - OR THAT YOU ARE A FAILURE OR HAVE LET YOURSELF OR YOUR FAMILY DOWN: NOT AT ALL
7. TROUBLE CONCENTRATING ON THINGS, SUCH AS READING THE NEWSPAPER OR WATCHING TELEVISION: SEVERAL DAYS
9. THOUGHTS THAT YOU WOULD BE BETTER OFF DEAD, OR OF HURTING YOURSELF: NOT AT ALL
6. FEELING BAD ABOUT YOURSELF - OR THAT YOU ARE A FAILURE OR HAVE LET YOURSELF OR YOUR FAMILY DOWN: NOT AT ALL
10. IF YOU CHECKED OFF ANY PROBLEMS, HOW DIFFICULT HAVE THESE PROBLEMS MADE IT FOR YOU TO DO YOUR WORK, TAKE CARE OF THINGS AT HOME, OR GET ALONG WITH OTHER PEOPLE: NOT DIFFICULT AT ALL
1. LITTLE INTEREST OR PLEASURE IN DOING THINGS: SEVERAL DAYS
10. IF YOU CHECKED OFF ANY PROBLEMS, HOW DIFFICULT HAVE THESE PROBLEMS MADE IT FOR YOU TO DO YOUR WORK, TAKE CARE OF THINGS AT HOME, OR GET ALONG WITH OTHER PEOPLE: NOT DIFFICULT AT ALL
8. MOVING OR SPEAKING SO SLOWLY THAT OTHER PEOPLE COULD HAVE NOTICED. OR THE OPPOSITE, BEING SO FIGETY OR RESTLESS THAT YOU HAVE BEEN MOVING AROUND A LOT MORE THAN USUAL: NOT AT ALL
SUM OF ALL RESPONSES TO PHQ QUESTIONS 1-9: 6
9. THOUGHTS THAT YOU WOULD BE BETTER OFF DEAD, OR OF HURTING YOURSELF: NOT AT ALL
SUM OF ALL RESPONSES TO PHQ QUESTIONS 1-9: 6

## 2024-03-20 NOTE — TELEPHONE ENCOUNTER
Pt would like her last imaging form [facility] faxed to her new pain management.     Dr. Carl Baez   Fax: [phone]

## 2024-03-20 NOTE — PROGRESS NOTES
Lower Arm, Position: Sitting, Cuff Size: Large Adult)   Pulse 78   Temp 97.8 °F (36.6 °C) (Oral)   Resp 20   Wt (!) 160.5 kg (353 lb 12.8 oz)   LMP  (LMP Unknown)   SpO2 94%   BMI 60.73 kg/m²     Physical Exam  Cardiovascular:      Rate and Rhythm: Normal rate and regular rhythm.   Pulmonary:      Effort: Pulmonary effort is normal.      Comments: Faint expiratory wheezes bilateral lower lobes  Skin:     Comments: Trace to 1+ edema bilateral lower extremities up to mid shin         Wt Readings from Last 3 Encounters:   03/20/24 (!) 160.5 kg (353 lb 12.8 oz)   02/06/24 (!) 149.7 kg (330 lb)   12/12/23 (!) 152 kg (335 lb)     BP Readings from Last 3 Encounters:   03/20/24 120/80   03/11/24 (!) 143/87   02/06/24 (!) 181/103     Assessment/Plan:  1. Chronic joint pain  Reviewed methods of addressing pain including PT, injection, medication.  Referral to pain management.  Discussed weight loss, more details below  - External Referral To Pain Medicine    2. Diabetic polyneuropathy associated with type 2 diabetes mellitus (HCC)  Okay to restart gabapentin  - gabapentin (NEURONTIN) 300 MG capsule; Take 1 capsule by mouth in the morning, at noon, and at bedtime for 90 days.  Dispense: 90 capsule; Refill: 2  - External Referral To Pain Medicine    3. BMI 60.0-69.9, adult (HCC)  Remains on Trulicity.  We discussed diet, particularly stress eating    4. Prediabetes  Check A1c, we discussed diet and stress eating  - Hemoglobin A1C; Future    5. Chronic diastolic CHF (congestive heart failure) ([facility])  Concern for possible volume overload.  She has had significant weight gain over the past couple of months and has signs and symptoms of possible fluid retention.  No severe change in shortness of breath that would warrant hospitalization at this time.  Check chest x-ray and labs.  Would consider potential increase in diuretic if needed based on results.  - XR CHEST (2 VW); Future  - Brain Natriuretic Peptide; Future  - Basic

## 2024-03-20 NOTE — TELEPHONE ENCOUNTER
Spoke with pt letting her know that she will need to come in and fill out a release of info form. I let pt know that they wont be at [facility] today, but there should be someone there tomorrow and possibly fri.     Pt verbalized understanding.

## 2024-03-21 ENCOUNTER — HOSPITAL ENCOUNTER (OUTPATIENT)
Dept: GENERAL RADIOLOGY | Age: 49
Discharge: HOME | End: 2024-03-21
Attending: FAMILY MEDICINE
Payer: MEDICAID

## 2024-03-21 ENCOUNTER — HOSPITAL ENCOUNTER (OUTPATIENT)
Age: 49
Discharge: HOME | End: 2024-03-21
Payer: MEDICAID

## 2024-03-21 DIAGNOSIS — R80.9 PROTEINURIA, UNSPECIFIED TYPE: ICD-10-CM

## 2024-03-21 DIAGNOSIS — I50.32 CHRONIC DIASTOLIC CHF (CONGESTIVE HEART FAILURE) (HCC): ICD-10-CM

## 2024-03-21 LAB
BACTERIA URNS QL MICRO: NORMAL /HPF
BILIRUB UR QL STRIP.AUTO: NEGATIVE
CLARITY UR: CLEAR
COLOR UR: YELLOW
CREAT UR-MCNC: 123.2 MG/DL (ref 28–259)
CREAT UR-MCNC: 125.1 MG/DL (ref 28–259)
EPI CELLS #/AREA URNS AUTO: 2 /HPF (ref 0–5)
EST. AVERAGE GLUCOSE BLD GHB EST-MCNC: 125.5 MG/DL
GLUCOSE UR STRIP.AUTO-MCNC: NEGATIVE MG/DL
HBA1C MFR BLD: 6 %
HGB UR QL STRIP.AUTO: NEGATIVE
HYALINE CASTS #/AREA URNS AUTO: 0 /LPF (ref 0–8)
KETONES UR STRIP.AUTO-MCNC: NEGATIVE MG/DL
LEUKOCYTE ESTERASE UR QL STRIP.AUTO: NEGATIVE
MICROALBUMIN UR DL<=1MG/L-MCNC: <1.2 MG/DL
MICROALBUMIN/CREAT UR: NORMAL MG/G (ref 0–30)
NITRITE UR QL STRIP.AUTO: NEGATIVE
PH UR STRIP.AUTO: 7 [PH] (ref 5–8)
PROT UR STRIP.AUTO-MCNC: NEGATIVE MG/DL
PROT UR-MCNC: 9 MG/DL
PROT/CREAT UR-RTO: 0.1 MG/DL
RBC CLUMPS #/AREA URNS AUTO: 1 /HPF (ref 0–4)
SP GR UR STRIP.AUTO: 1.02 (ref 1–1.03)
UA DIPSTICK W REFLEX MICRO PNL UR: NORMAL
URN SPEC COLLECT METH UR: NORMAL
UROBILINOGEN UR STRIP-ACNC: 1 E.U./DL
WBC #/AREA URNS AUTO: 1 /HPF (ref 0–5)

## 2024-03-21 PROCEDURE — 71046 X-RAY EXAM CHEST 2 VIEWS: CPT

## 2024-03-27 RX ORDER — LORATADINE 10 MG/1
TABLET ORAL
Qty: 90 TABLET | Refills: 3 | Status: SHIPPED | OUTPATIENT
Start: 2024-03-27

## 2024-03-28 ENCOUNTER — HOSPITAL ENCOUNTER (OUTPATIENT)
Dept: CT IMAGING | Age: 49
Discharge: HOME | End: 2024-03-28
Attending: INTERNAL MEDICINE
Payer: MEDICAID

## 2024-03-28 DIAGNOSIS — R91.1 PULMONARY NODULE SEEN ON IMAGING STUDY: ICD-10-CM

## 2024-03-28 PROCEDURE — 71250 CT THORAX DX C-: CPT

## 2024-04-09 ENCOUNTER — TELEPHONE (OUTPATIENT)
Dept: FAMILY MEDICINE CLINIC | Age: 49
End: 2024-04-09

## 2024-04-09 DIAGNOSIS — M54.50 CHRONIC BILATERAL LOW BACK PAIN WITHOUT SCIATICA: ICD-10-CM

## 2024-04-09 DIAGNOSIS — M17.0 PRIMARY OSTEOARTHRITIS OF BOTH KNEES: Primary | ICD-10-CM

## 2024-04-09 DIAGNOSIS — G89.29 CHRONIC BILATERAL LOW BACK PAIN WITHOUT SCIATICA: ICD-10-CM

## 2024-04-09 NOTE — TELEPHONE ENCOUNTER
Patient is calling in stating that she needs a new pain specialist     She said she spoke to Dr. Ramirez at her last appointment in March    Please Advise

## 2024-04-15 ENCOUNTER — PATIENT MESSAGE (OUTPATIENT)
Dept: ORTHOPEDIC SURGERY | Age: 49
End: 2024-04-15

## 2024-04-15 DIAGNOSIS — M16.11 PRIMARY OSTEOARTHRITIS OF RIGHT HIP: Primary | ICD-10-CM

## 2024-04-16 NOTE — TELEPHONE ENCOUNTER
From: Odalis Jett  To: Dr. Johnie Gill  Sent: 4/15/2024 1:25 PM EDT  Subject: Right hip    The shot didn't last long I am in pain

## 2024-04-17 ENCOUNTER — TELEPHONE (OUTPATIENT)
Dept: ORTHOPEDIC SURGERY | Age: 49
End: 2024-04-17

## 2024-04-17 NOTE — TELEPHONE ENCOUNTER
General Question     Subject: HIP INJ  Patient and /or Facility Request: Odalis Jett   Contact Number: [phone]     PT CLLED TO REQ [facility] FOR HIP INJ DONE IN SX SETTING    PLEASE CLL TO ADV

## 2024-04-19 DIAGNOSIS — G89.4 CHRONIC PAIN SYNDROME: ICD-10-CM

## 2024-04-19 DIAGNOSIS — M16.11 PRIMARY OSTEOARTHRITIS OF RIGHT HIP: ICD-10-CM

## 2024-04-19 DIAGNOSIS — G89.29 CHRONIC BILATERAL LOW BACK PAIN WITHOUT SCIATICA: ICD-10-CM

## 2024-04-19 DIAGNOSIS — M54.50 CHRONIC BILATERAL LOW BACK PAIN WITHOUT SCIATICA: ICD-10-CM

## 2024-04-19 DIAGNOSIS — M17.0 PRIMARY OSTEOARTHRITIS OF BOTH KNEES: ICD-10-CM

## 2024-04-19 DIAGNOSIS — M62.830 BACK SPASM: ICD-10-CM

## 2024-04-19 RX ORDER — ACETAMINOPHEN 500 MG
TABLET ORAL
Qty: 30 TABLET | Refills: 0 | OUTPATIENT
Start: 2024-04-19

## 2024-04-25 ENCOUNTER — HOSPITAL ENCOUNTER (OUTPATIENT)
Dept: GENERAL RADIOLOGY | Age: 49
Discharge: HOME | End: 2024-04-25
Attending: ORTHOPAEDIC SURGERY
Payer: MEDICAID

## 2024-04-25 ENCOUNTER — OFFICE VISIT (OUTPATIENT)
Dept: ORTHOPEDIC SURGERY | Age: 49
End: 2024-04-25

## 2024-04-25 DIAGNOSIS — M16.11 PRIMARY OSTEOARTHRITIS OF RIGHT HIP: ICD-10-CM

## 2024-04-25 DIAGNOSIS — M16.11 PRIMARY OSTEOARTHRITIS OF RIGHT HIP: Primary | ICD-10-CM

## 2024-04-25 PROCEDURE — 6360000002 HC RX W HCPCS

## 2024-04-25 PROCEDURE — 20610 DRAIN/INJ JOINT/BURSA W/O US: CPT

## 2024-04-25 PROCEDURE — 77002 NEEDLE LOCALIZATION BY XRAY: CPT

## 2024-05-06 ENCOUNTER — OFFICE VISIT (OUTPATIENT)
Dept: PULMONOLOGY | Age: 49
End: 2024-05-06
Payer: MEDICAID

## 2024-05-06 VITALS
DIASTOLIC BLOOD PRESSURE: 92 MMHG | WEIGHT: 293 LBS | TEMPERATURE: 97.6 F | HEART RATE: 110 BPM | RESPIRATION RATE: 18 BRPM | HEIGHT: 64 IN | OXYGEN SATURATION: 92 % | BODY MASS INDEX: 50.02 KG/M2 | SYSTOLIC BLOOD PRESSURE: 138 MMHG

## 2024-05-06 DIAGNOSIS — J44.9 COPD, MILD (HCC): ICD-10-CM

## 2024-05-06 DIAGNOSIS — J30.89 OTHER ALLERGIC RHINITIS: ICD-10-CM

## 2024-05-06 DIAGNOSIS — J44.1 COPD EXACERBATION (HCC): Primary | ICD-10-CM

## 2024-05-06 DIAGNOSIS — G47.33 OSA (OBSTRUCTIVE SLEEP APNEA): ICD-10-CM

## 2024-05-06 DIAGNOSIS — E66.01 MORBID OBESITY (HCC): ICD-10-CM

## 2024-05-06 PROCEDURE — G8427 DOCREV CUR MEDS BY ELIG CLIN: HCPCS | Performed by: INTERNAL MEDICINE

## 2024-05-06 PROCEDURE — 3075F SYST BP GE 130 - 139MM HG: CPT | Performed by: INTERNAL MEDICINE

## 2024-05-06 PROCEDURE — 3017F COLORECTAL CA SCREEN DOC REV: CPT | Performed by: INTERNAL MEDICINE

## 2024-05-06 PROCEDURE — 3080F DIAST BP >= 90 MM HG: CPT | Performed by: INTERNAL MEDICINE

## 2024-05-06 PROCEDURE — 3023F SPIROM DOC REV: CPT | Performed by: INTERNAL MEDICINE

## 2024-05-06 PROCEDURE — G8417 CALC BMI ABV UP PARAM F/U: HCPCS | Performed by: INTERNAL MEDICINE

## 2024-05-06 PROCEDURE — 4004F PT TOBACCO SCREEN RCVD TLK: CPT | Performed by: INTERNAL MEDICINE

## 2024-05-06 PROCEDURE — 99214 OFFICE O/P EST MOD 30 MIN: CPT | Performed by: INTERNAL MEDICINE

## 2024-05-06 RX ORDER — PREDNISONE 10 MG/1
TABLET ORAL
Qty: 30 TABLET | Refills: 0 | Status: SHIPPED | OUTPATIENT
Start: 2024-05-06 | End: 2024-05-16

## 2024-05-06 RX ORDER — DOXYCYCLINE HYCLATE 100 MG
100 TABLET ORAL 2 TIMES DAILY
Qty: 14 TABLET | Refills: 0 | Status: SHIPPED | OUTPATIENT
Start: 2024-05-06 | End: 2024-05-13

## 2024-05-06 ASSESSMENT — SLEEP AND FATIGUE QUESTIONNAIRES
HOW LIKELY ARE YOU TO NOD OFF OR FALL ASLEEP IN A CAR, WHILE STOPPED FOR A FEW MINUTES IN TRAFFIC: WOULD NEVER DOZE
HOW LIKELY ARE YOU TO NOD OFF OR FALL ASLEEP WHEN YOU ARE A PASSENGER IN A CAR FOR AN HOUR WITHOUT A BREAK: WOULD NEVER DOZE
HOW LIKELY ARE YOU TO NOD OFF OR FALL ASLEEP WHILE SITTING AND READING: MODERATE CHANCE OF DOZING
HOW LIKELY ARE YOU TO NOD OFF OR FALL ASLEEP WHILE SITTING AND TALKING TO SOMEONE: MODERATE CHANCE OF DOZING
HOW LIKELY ARE YOU TO NOD OFF OR FALL ASLEEP WHILE WATCHING TV: MODERATE CHANCE OF DOZING
HOW LIKELY ARE YOU TO NOD OFF OR FALL ASLEEP WHILE SITTING QUIETLY AFTER LUNCH WITHOUT ALCOHOL: HIGH CHANCE OF DOZING
HOW LIKELY ARE YOU TO NOD OFF OR FALL ASLEEP WHILE LYING DOWN TO REST IN THE AFTERNOON WHEN CIRCUMSTANCES PERMIT: HIGH CHANCE OF DOZING
ESS TOTAL SCORE: 12
HOW LIKELY ARE YOU TO NOD OFF OR FALL ASLEEP WHILE SITTING INACTIVE IN A PUBLIC PLACE: WOULD NEVER DOZE

## 2024-05-06 NOTE — PROGRESS NOTES
REASON FOR CONSULTATION/CC:    Chief Complaint   Patient presents with    Sleep Apnea     SOB chest hurts 60 puffs left in in hauler can't afford to go to ER breathing treatment not working           PCP: Tad Ramirez MD    HISTORY OF PRESENT ILLNESS: Odalis Jett is a 48 y.o. year old female with a history of JOSE who presents :          Tobacco abuse               For three weeks having shortness of breath with dyspnea on exertion limiited to 30 feet.     Currently with sinus congesiton and drainage.       Has 4 dogs.    JOSE  Has not used device.  Does not have equipment.      Obesity   Uncontrolled.     COPD  Trelegy   Albuterol - now using three to 4 times per day.   Duoneb's  - states not working. Causing her to cough up mucus.      allergic rhinitis   Claritin and Singulair.   Using nebulizer via mask - believes she is only receiving albuterol.  Duoneb's  prescribed.            Objective:   PHYSICAL EXAM:  Blood pressure (!) 138/92, pulse (!) 110, temperature 97.6 °F (36.4 °C), resp. rate 18, height 1.626 m (5' 4\"), weight (!) 158.5 kg (349 lb 6.4 oz), SpO2 92 %, not currently breastfeeding.'    Gen: No distress.  Body mass index is 59.97 kg/m².   ENT:   Resp: No accessory muscle use. No crackles. No wheezes. No rhonchi.    CV: Regular rate. Regular rhythm. No murmur or rub. No edema.   Skin: Warm, dry, normal texture and turgor. No nodule on exposed extremities.   M/S: No cyanosis. No clubbing. No joint deformity.  Psych: Oriented x 3. No anxiety.  Awake. Alert. Intact judgement and insight. Good Mood / Affect.  Memory appears in tact   .     Data Reviewed:        Assessment:      JOSE  Obesity Body mass index is 59.97 kg/m².   allergic rhinitis   COPD, mild, FEV1 76%.  Decreased DLCO.  Goiter         Plan:      Problem List Items Addressed This Visit       Other allergic rhinitis     Claritin and Singulair.  Worsening control may be secondary to illness versus not having ipratropium and nebulizer.  She

## 2024-05-07 NOTE — ASSESSMENT & PLAN NOTE
Trelegy  albuterol prn -significant increase in albuterol.  She believes that she has albuterol only in her nebulizer.  However, my last prescription was DuoNebs.  She will check on this.    Currently in exacerbation.  Prednisone and antibiotics sent.    Oxygen currently 92%.  She will monitor this with home pulse ox.

## 2024-05-07 NOTE — ASSESSMENT & PLAN NOTE
Claritin and Singulair.  Worsening control may be secondary to illness versus not having ipratropium and nebulizer.  She will check on this.

## 2024-05-08 ENCOUNTER — TELEPHONE (OUTPATIENT)
Dept: FAMILY MEDICINE CLINIC | Age: 49
End: 2024-05-08

## 2024-05-08 RX ORDER — MEDICAL SUPPLY, MISCELLANEOUS
1 EACH MISCELLANEOUS DAILY
Qty: 1 EACH | Refills: 0 | Status: SHIPPED | OUTPATIENT
Start: 2024-05-08

## 2024-05-08 NOTE — TELEPHONE ENCOUNTER
[facility] calling in, stated that pt is in need of a new bp cuff.   Pt would like script faxed to [facility] at [phone]    Please advise  Dillan can be reached at [phone]

## 2024-05-17 RX ORDER — OMEPRAZOLE 20 MG/1
CAPSULE, DELAYED RELEASE ORAL
Qty: 90 CAPSULE | Refills: 1 | Status: SHIPPED | OUTPATIENT
Start: 2024-05-17

## 2024-05-17 RX ORDER — MONTELUKAST SODIUM 10 MG/1
TABLET ORAL
Qty: 90 TABLET | Refills: 1 | Status: SHIPPED | OUTPATIENT
Start: 2024-05-17

## 2024-05-28 RX ORDER — ARIPIPRAZOLE 2 MG/1
2 TABLET ORAL DAILY
Qty: 90 TABLET | Refills: 0 | Status: SHIPPED | OUTPATIENT
Start: 2024-05-28

## 2024-05-29 ENCOUNTER — TELEPHONE (OUTPATIENT)
Dept: FAMILY MEDICINE CLINIC | Age: 49
End: 2024-05-29

## 2024-05-29 NOTE — TELEPHONE ENCOUNTER
----- Message from Odalis Jtet sent at 5/29/2024 11:09 AM EDT -----  Regarding: Doctors letter   Contact: [phone]  Yes that would be fine

## 2024-05-31 ENCOUNTER — TELEPHONE (OUTPATIENT)
Dept: FAMILY MEDICINE CLINIC | Age: 49
End: 2024-05-31

## 2024-05-31 NOTE — TELEPHONE ENCOUNTER
See message from 5/8/24    Dillan from [facility] is calling inquiring about this patients blood pressure cuff    She wants to know why [facility] said it was canceled?    Please Advise

## 2024-06-06 NOTE — TELEPHONE ENCOUNTER
What we have to do is send a new order for the blood pressure cuff [facility] had cancelled it because we did not provide information that as needed at that time     Please fax the new one to [facility]       Thank you !

## 2024-06-06 NOTE — TELEPHONE ENCOUNTER
Spoke with brandi we didn't get anything done in [place] so they cancelled our order will [facility]ax [facility] and wait for the link from [place]

## 2024-06-06 NOTE — TELEPHONE ENCOUNTER
Dillan called in from [facility]  was told by [facility] the order for blood cuff was cancelled since we dnd right now he is not in network   Please advise       Dillan [phone]  Fax is [phone] [facility]

## 2024-06-07 NOTE — TELEPHONE ENCOUNTER
Dillan from [facility] called in said the order for the BP cuff needs to have a diagnosis code and a signature     Please advise

## 2024-07-08 DIAGNOSIS — G89.4 CHRONIC PAIN SYNDROME: ICD-10-CM

## 2024-07-08 DIAGNOSIS — M62.830 BACK SPASM: ICD-10-CM

## 2024-07-08 RX ORDER — TIZANIDINE 4 MG/1
TABLET ORAL
Qty: 60 TABLET | Refills: 0 | OUTPATIENT
Start: 2024-07-08

## 2024-07-12 ENCOUNTER — OFFICE VISIT (OUTPATIENT)
Dept: FAMILY MEDICINE CLINIC | Age: 49
End: 2024-07-12
Payer: MEDICAID

## 2024-07-12 VITALS
TEMPERATURE: 98.1 F | OXYGEN SATURATION: 96 % | WEIGHT: 293 LBS | BODY MASS INDEX: 62.89 KG/M2 | HEART RATE: 85 BPM | SYSTOLIC BLOOD PRESSURE: 140 MMHG | DIASTOLIC BLOOD PRESSURE: 86 MMHG | RESPIRATION RATE: 18 BRPM

## 2024-07-12 DIAGNOSIS — R73.03 PREDIABETES: ICD-10-CM

## 2024-07-12 DIAGNOSIS — R63.5 WEIGHT GAIN: ICD-10-CM

## 2024-07-12 DIAGNOSIS — I10 ESSENTIAL HYPERTENSION: Primary | ICD-10-CM

## 2024-07-12 DIAGNOSIS — I50.32 CHRONIC DIASTOLIC CHF (CONGESTIVE HEART FAILURE) (HCC): ICD-10-CM

## 2024-07-12 PROCEDURE — G8427 DOCREV CUR MEDS BY ELIG CLIN: HCPCS | Performed by: FAMILY MEDICINE

## 2024-07-12 PROCEDURE — 99214 OFFICE O/P EST MOD 30 MIN: CPT | Performed by: FAMILY MEDICINE

## 2024-07-12 PROCEDURE — 4004F PT TOBACCO SCREEN RCVD TLK: CPT | Performed by: FAMILY MEDICINE

## 2024-07-12 PROCEDURE — G8417 CALC BMI ABV UP PARAM F/U: HCPCS | Performed by: FAMILY MEDICINE

## 2024-07-12 PROCEDURE — 3077F SYST BP >= 140 MM HG: CPT | Performed by: FAMILY MEDICINE

## 2024-07-12 PROCEDURE — 3017F COLORECTAL CA SCREEN DOC REV: CPT | Performed by: FAMILY MEDICINE

## 2024-07-12 PROCEDURE — 3079F DIAST BP 80-89 MM HG: CPT | Performed by: FAMILY MEDICINE

## 2024-07-12 RX ORDER — TIZANIDINE 4 MG/1
4 TABLET ORAL EVERY 6 HOURS PRN
COMMUNITY
Start: 2024-06-12 | End: 2024-07-12

## 2024-07-12 RX ORDER — TIZANIDINE 4 MG/1
4 TABLET ORAL 2 TIMES DAILY PRN
Qty: 60 TABLET | Refills: 5 | Status: SHIPPED | OUTPATIENT
Start: 2024-07-12

## 2024-07-12 RX ORDER — GUAIFENESIN 600 MG/1
600 TABLET, EXTENDED RELEASE ORAL 2 TIMES DAILY
Qty: 30 TABLET | Refills: 5 | Status: SHIPPED | OUTPATIENT
Start: 2024-07-12

## 2024-07-12 NOTE — PROGRESS NOTES
7/12/2024    This is a 48 y.o. female   Chief Complaint   Patient presents with    Other     Routine check up.  Patient fell yesterday. Fell in daughter's doorway. Pain in both hip and her lower back.  Has taken Vicodin with no relief.   Patient needs refill on her Tizanidine, Ozempic     HPI    Here for follow up    Socially, she has been busy. Her daughter got her own place back and her grandchildren have moved out.she had her grandkids at her place for 9 months.    HTN  BP Readings from Last 3 Encounters:   07/12/24 (!) 140/86   05/06/24 (!) 138/92   03/21/24 (!) 177/96   Struggles to take nighttime meds consistently    Has noticed a 16 pound weight gain over the past couple of months. She feels more pressure in her legs and notes her clothes are tighter. She notes feeling a little short of breath.  - she does have known diastolic heart failure. Is prescribed torsemide 40mg BID (she believes, is uncertain of dose). Reports recently taking it more once per day instead of twice per day.  - she does have a new scale at home    Prediabetes  Previously on Trulicity. Interested in starting Ozempic. Already on metformin    Review of Systems     Current Outpatient Medications   Medication Sig Dispense Refill    Semaglutide,0.25 or 0.5MG/DOS, 2 MG/1.5ML SOPN 0.25mg subq weekly for 4 weeks, followed by 0.5mg subq weekly 1.5 mL 3    guaiFENesin (MUCINEX) 600 MG extended release tablet Take 1 tablet by mouth 2 times daily 30 tablet 5    tiZANidine (ZANAFLEX) 4 MG tablet Take 1 tablet by mouth 2 times daily as needed (back pain) 60 tablet 5    ARIPiprazole (ABILIFY) 2 MG tablet TAKE 1 TABLET BY MOUTH DAILY 90 tablet 0    montelukast (SINGULAIR) 10 MG tablet TAKE ONE TABLET BY MOUTH ONCE NIGHTLY 90 tablet 1    omeprazole (PRILOSEC) 20 MG delayed release capsule TAKE 1 CAPSULE BY MOUTH DAILY 90 capsule 1    Blood Pressure Monitoring (B-D ASSURE BPM/AUTO ARM CUFF) MISC 1 Device by Does not apply route daily 1 each 0

## 2024-07-15 ENCOUNTER — TELEPHONE (OUTPATIENT)
Dept: ADMINISTRATIVE | Age: 49
End: 2024-07-15

## 2024-07-16 ENCOUNTER — APPOINTMENT (OUTPATIENT)
Dept: GENERAL RADIOLOGY | Age: 49
End: 2024-07-16
Payer: MEDICAID

## 2024-07-16 ENCOUNTER — HOSPITAL ENCOUNTER (EMERGENCY)
Age: 49
Discharge: HOME | End: 2024-07-17
Attending: EMERGENCY MEDICINE
Payer: MEDICAID

## 2024-07-16 DIAGNOSIS — S39.012A ACUTE MYOFASCIAL STRAIN OF LUMBAR REGION, INITIAL ENCOUNTER: ICD-10-CM

## 2024-07-16 DIAGNOSIS — S30.0XXA CONTUSION OF SACRUM, INITIAL ENCOUNTER: ICD-10-CM

## 2024-07-16 DIAGNOSIS — M16.11 OSTEOARTHRITIS OF RIGHT HIP, UNSPECIFIED OSTEOARTHRITIS TYPE: ICD-10-CM

## 2024-07-16 DIAGNOSIS — S70.01XA CONTUSION OF RIGHT HIP, INITIAL ENCOUNTER: ICD-10-CM

## 2024-07-16 DIAGNOSIS — S16.1XXA ACUTE CERVICAL MYOFASCIAL STRAIN, INITIAL ENCOUNTER: Primary | ICD-10-CM

## 2024-07-16 PROCEDURE — 6360000002 HC RX W HCPCS: Performed by: EMERGENCY MEDICINE

## 2024-07-16 PROCEDURE — 99284 EMERGENCY DEPT VISIT MOD MDM: CPT

## 2024-07-16 PROCEDURE — 6370000000 HC RX 637 (ALT 250 FOR IP): Performed by: EMERGENCY MEDICINE

## 2024-07-16 PROCEDURE — 96372 THER/PROPH/DIAG INJ SC/IM: CPT

## 2024-07-16 PROCEDURE — 73552 X-RAY EXAM OF FEMUR 2/>: CPT

## 2024-07-16 RX ORDER — HYDROCODONE BITARTRATE AND ACETAMINOPHEN 5; 325 MG/1; MG/1
2 TABLET ORAL ONCE
Status: COMPLETED | OUTPATIENT
Start: 2024-07-16 | End: 2024-07-16

## 2024-07-16 RX ORDER — KETOROLAC TROMETHAMINE 30 MG/ML
30 INJECTION, SOLUTION INTRAMUSCULAR; INTRAVENOUS ONCE
Status: COMPLETED | OUTPATIENT
Start: 2024-07-16 | End: 2024-07-16

## 2024-07-16 RX ADMIN — HYDROCODONE BITARTRATE AND ACETAMINOPHEN 2 TABLET: 5; 325 TABLET ORAL at 23:40

## 2024-07-16 RX ADMIN — KETOROLAC TROMETHAMINE 30 MG: 30 INJECTION, SOLUTION INTRAMUSCULAR at 23:40

## 2024-07-16 ASSESSMENT — PAIN DESCRIPTION - DESCRIPTORS: DESCRIPTORS: DISCOMFORT

## 2024-07-16 ASSESSMENT — PAIN DESCRIPTION - LOCATION: LOCATION: BUTTOCKS;HIP

## 2024-07-16 ASSESSMENT — PAIN - FUNCTIONAL ASSESSMENT: PAIN_FUNCTIONAL_ASSESSMENT: ACTIVITIES ARE NOT PREVENTED

## 2024-07-16 ASSESSMENT — PAIN DESCRIPTION - ORIENTATION: ORIENTATION: RIGHT

## 2024-07-16 ASSESSMENT — PAIN SCALES - GENERAL: PAINLEVEL_OUTOF10: 10

## 2024-07-17 ENCOUNTER — APPOINTMENT (OUTPATIENT)
Dept: CT IMAGING | Age: 49
End: 2024-07-17
Payer: MEDICAID

## 2024-07-17 VITALS
WEIGHT: 293 LBS | OXYGEN SATURATION: 99 % | DIASTOLIC BLOOD PRESSURE: 69 MMHG | HEART RATE: 86 BPM | SYSTOLIC BLOOD PRESSURE: 144 MMHG | TEMPERATURE: 98.3 F | BODY MASS INDEX: 63.99 KG/M2 | RESPIRATION RATE: 18 BRPM

## 2024-07-17 DIAGNOSIS — N39.46 MIXED STRESS AND URGE INCONTINENCE: ICD-10-CM

## 2024-07-17 PROCEDURE — 72131 CT LUMBAR SPINE W/O DYE: CPT

## 2024-07-17 PROCEDURE — 72192 CT PELVIS W/O DYE: CPT

## 2024-07-17 PROCEDURE — 72125 CT NECK SPINE W/O DYE: CPT

## 2024-07-17 PROCEDURE — 72128 CT CHEST SPINE W/O DYE: CPT

## 2024-07-17 RX ORDER — LIDOCAINE 50 MG/G
1 PATCH TOPICAL DAILY
Qty: 10 PATCH | Refills: 0 | Status: SHIPPED | OUTPATIENT
Start: 2024-07-17 | End: 2024-07-27

## 2024-07-17 ASSESSMENT — PAIN SCALES - GENERAL
PAINLEVEL_OUTOF10: 8
PAINLEVEL_OUTOF10: 8

## 2024-07-17 ASSESSMENT — PAIN DESCRIPTION - PAIN TYPE
TYPE: ACUTE PAIN;CHRONIC PAIN
TYPE: ACUTE PAIN;CHRONIC PAIN

## 2024-07-17 ASSESSMENT — PAIN DESCRIPTION - LOCATION
LOCATION: BACK;HIP
LOCATION: BACK;HIP

## 2024-07-17 ASSESSMENT — PAIN - FUNCTIONAL ASSESSMENT
PAIN_FUNCTIONAL_ASSESSMENT: ACTIVITIES ARE NOT PREVENTED
PAIN_FUNCTIONAL_ASSESSMENT: ACTIVITIES ARE NOT PREVENTED
PAIN_FUNCTIONAL_ASSESSMENT: 0-10

## 2024-07-17 ASSESSMENT — PAIN DESCRIPTION - ONSET
ONSET: ON-GOING
ONSET: ON-GOING

## 2024-07-17 ASSESSMENT — PAIN DESCRIPTION - DESCRIPTORS
DESCRIPTORS: ACHING;DISCOMFORT
DESCRIPTORS: DISCOMFORT

## 2024-07-17 ASSESSMENT — PAIN DESCRIPTION - ORIENTATION
ORIENTATION: RIGHT
ORIENTATION: RIGHT

## 2024-07-17 NOTE — DISCHARGE INSTRUCTIONS
Follow-up with your orthopedic surgeon as soon as possible regarding right hip pain.    Continue medications as prescribed.    Follow-up with your pain management physician as scheduled.Follow-up with a primary care physician in 1 to 2 days for reexamination.  If condition worsens or new symptoms develop, return immediately to the emergency department.  Use ice to the affected areas.  Avoid heat or heating pads.  No lifting greater than 5 pounds.  Avoid strenuous lifting or heavy physical activity for 1 week.  Do not drive or operate machinery while taking pain medication or muscle relaxants.  May cause drowsiness.

## 2024-07-17 NOTE — ED NOTES
Pt was at [facility] office 2 days ago. Pt states she was told she didn't break anything. Pt states she is having a hard time walking. She states she is having right hip pain that is burning into her buttock area. Pt states she took her 7.5 mg Vicodin with little relief. Last taken at 5 PM

## 2024-07-17 NOTE — ED PROVIDER NOTES
[facility]  EMERGENCY DEPARTMENT ENCOUNTER      Pt Name: Odalis Jett  MRN: [id number]  Birthdate 1975  Date of evaluation: 7/16/2024  Provider: ABRAHAM MCKENZIE DO    CHIEF COMPLAINT       Chief Complaint   Patient presents with    Fall     Sitting in XL wheelchair.  Pt reports legs gave out and she fell on 7/10/24.  Pt states she fell onto buttocks onto concrete step adjacent to door frame.   Pain at 10 to post neck, upper/middle/lower back, and right hip.   Pt walks with a cane.   Hx of chronic pain and takes vicodin from pain clinic.  Took tylenol at noon and vicodin at 1700.    Pain         HISTORY OF PRESENT ILLNESS   (Location/Symptom, Timing/Onset, Context/Setting, Quality, Duration, Modifying Factors, Severity)  Note limiting factors.   Odalis Jett is a 48 y.o. female who presents to the emergency department with a complaint of injury sustained in a fall that occurred 6 days ago on Wednesday.  She states she was at her daughter's house and she was walking out the front door, stepped down with her right leg, and caught her left leg as she was stepping out the door.  This caused her to fall backwards and land on her buttocks.  She complains of pain extending from the base of her neck down to the tailbone area.  Most of her pain is in the tailbone and pelvis area that radiates into the right inguinal area.  She complains of pain with weightbearing on the right hip.  She is known to have a history of osteoarthritis of the right hip and is in pain management for that.  She has been taking Norco without relief.    She normally ambulates with a cane.  She is able to ambulate but has significant pain in the upper and lower back and in the right hip with walking.    She did not hit her head.  She does not take any anticoagulants.  She denies any preceding dizziness or lightheadedness.  She denies any loss of consciousness.  She denies any headache, vision or

## 2024-07-17 NOTE — ED NOTES
2115-2130   triage being completed and then out to lobby to wait for room.     Sitting in XL wheelchair.  Pt reports legs gave out and she fell on 7/10/24.  Pt states she fell onto buttocks onto concrete step adjacent to door frame.   Pain at 10 to post neck, upper/middle/lower back, and right hip.   Pt walks with a cane.   Hx of chronic pain and takes vicodin from pain clinic.  Took tylenol at noon and vicodin at 1700.

## 2024-07-18 DIAGNOSIS — E66.9 TYPE 2 DIABETES MELLITUS WITH OBESITY (HCC): ICD-10-CM

## 2024-07-18 DIAGNOSIS — E11.69 TYPE 2 DIABETES MELLITUS WITH OBESITY (HCC): ICD-10-CM

## 2024-07-18 RX ORDER — OXYBUTYNIN CHLORIDE 5 MG/1
TABLET, EXTENDED RELEASE ORAL
Qty: 90 TABLET | Refills: 1 | Status: SHIPPED | OUTPATIENT
Start: 2024-07-18

## 2024-07-19 ENCOUNTER — TELEMEDICINE (OUTPATIENT)
Dept: FAMILY MEDICINE CLINIC | Age: 49
End: 2024-07-19
Payer: MEDICAID

## 2024-07-19 DIAGNOSIS — I10 ESSENTIAL HYPERTENSION: ICD-10-CM

## 2024-07-19 DIAGNOSIS — R23.2 HOT FLASHES: Primary | ICD-10-CM

## 2024-07-19 PROCEDURE — G8427 DOCREV CUR MEDS BY ELIG CLIN: HCPCS | Performed by: FAMILY MEDICINE

## 2024-07-19 PROCEDURE — 99214 OFFICE O/P EST MOD 30 MIN: CPT | Performed by: FAMILY MEDICINE

## 2024-07-19 PROCEDURE — 3017F COLORECTAL CA SCREEN DOC REV: CPT | Performed by: FAMILY MEDICINE

## 2024-07-19 RX ORDER — CLONIDINE HYDROCHLORIDE 0.1 MG/1
TABLET ORAL
Qty: 360 TABLET | Refills: 0 | Status: SHIPPED
Start: 2024-07-19

## 2024-07-19 NOTE — PROGRESS NOTES
Odalis Jett (:  1975) is a 48 y.o. female,Established patient, here for evaluation of the following chief complaint(s):  Thyroid Problem (Pt has been having hot flashes this has been going on x 1 week, she gets really hot and sweaty.  She is also having night sweats )    Assessment & Plan   1. Hot flashes  -     CBC with Auto Differential; Future  -     Sedimentation Rate; Future  -     C-Reactive Protein; Future  -     TSH with Reflex to FT4; Future  2. Essential hypertension  -     cloNIDine (CATAPRES) 0.1 MG tablet; TAKE TWO TABLETS BY MOUTH TWO TIMES A DAY, Disp-360 tablet, R-0NO PRINT    Hot flashes are likely due to menopause.  Unable to be certain based on prior hysterectomy.    She is already on venlafaxine and gabapentin.  I do not feel she is a good candidate for estrogen therapy based on her comorbidities.  She is prescribed clonidine but takes it sporadically.  She will work to take this consistently to see if it helps with her symptoms.  We did discuss that based on her comorbid conditions and other medications including metoprolol that stopping clonidine suddenly after taking it regularly can have severe and significant side effects      Subjective   HPI  Virtual visit for sweats and hot flashes    Going on for about 6 weeks  Often gets symptoms in the middle of the night and again in the afternoon  Gets sweaty and feels hot from the inside out  - hx of hysterectomy in  (ovaries left in)    Denies feeling sick or ill  No loss of appetite  Wt Readings from Last 3 Encounters:   24 (!) 169.1 kg (372 lb 12.8 oz)   24 (!) 166.2 kg (366 lb 6.4 oz)   24 (!) 158.5 kg (349 lb 6.4 oz)   No weight loss    She is already on venlafaxine through her pain management specialist.   Gabapentin 300mg TID as well    For her high BP, she is prescribed clonidine but takes this irregularly    Review of Systems       Objective   Physical Exam     BP Readings from Last 3 Encounters:

## 2024-07-29 ENCOUNTER — OFFICE VISIT (OUTPATIENT)
Dept: ORTHOPEDIC SURGERY | Age: 49
End: 2024-07-29
Payer: MEDICAID

## 2024-07-29 VITALS — BODY MASS INDEX: 50.02 KG/M2 | HEIGHT: 64 IN | WEIGHT: 293 LBS

## 2024-07-29 DIAGNOSIS — M16.11 PRIMARY OSTEOARTHRITIS OF RIGHT HIP: Primary | ICD-10-CM

## 2024-07-29 PROCEDURE — 4004F PT TOBACCO SCREEN RCVD TLK: CPT | Performed by: ORTHOPAEDIC SURGERY

## 2024-07-29 PROCEDURE — 99213 OFFICE O/P EST LOW 20 MIN: CPT | Performed by: ORTHOPAEDIC SURGERY

## 2024-07-29 PROCEDURE — G8417 CALC BMI ABV UP PARAM F/U: HCPCS | Performed by: ORTHOPAEDIC SURGERY

## 2024-07-29 PROCEDURE — 3017F COLORECTAL CA SCREEN DOC REV: CPT | Performed by: ORTHOPAEDIC SURGERY

## 2024-07-29 PROCEDURE — G8427 DOCREV CUR MEDS BY ELIG CLIN: HCPCS | Performed by: ORTHOPAEDIC SURGERY

## 2024-07-29 NOTE — PROGRESS NOTES
[facility]  Office Visit    Chief Complaint: Right hip pain    HPI:  Odalis Jett is a 48 y.o. who is here in follow-up of right hip pain.  She has undergone 3 intra-articular steroid injections in the last 1.5 years for this pain.  These injections are typically helpful.  Her most recent injection was just over 3 months ago.  She had good relief of symptoms until she fell on July 10, 2024.  She was seen in the ER for this issue.  She is now having severe right hip and thigh pain.  She is taking Vicodin.  She rates pain as 10/10.  She is walking with use of a walking stick.  The pain is in her groin and is worse with weightbearing activity.  BMI is 64.      Patient Active Problem List   Diagnosis    Osteoarthritis of left knee    Essential hypertension    CHF (congestive heart failure) (HCC)    Osteoarthritis of both knees    Hyperlipidemia    Pulmonary nodule seen on imaging study    Cavitary lesion of lung    COPD, mild (HCC)    Chronic diastolic CHF (congestive heart failure) (HCC)    JOSE (obstructive sleep apnea)    Tobacco abuse counseling    Pneumatocele of lung    Chronic pain syndrome    Primary osteoarthritis of right hip    Chronic bilateral low back pain without sciatica    Diabetes mellitus type 2 in obese    Chronic GERD    Morbid obesity (HCC)    Other allergic rhinitis    Urge incontinence    Depression    Anxiety    Abnormality of gait and mobility    Chronic right hip pain    Cigarette nicotine dependence without complication    Cocaine abuse in remission (HCC)    Diabetic polyneuropathy associated with type 2 diabetes mellitus (HCC)    Hyperlipidemia associated with type 2 diabetes mellitus (HCC)    Major depressive disorder, recurrent, in partial remission (HCC)    Mixed stress and urge incontinence    Thyroid goiter    History of colonoscopy with polypectomy - 2023, rpt 2030       ROS:  Constitutional: denies fever, chills, weight loss  MSK: denies pain in

## 2024-07-31 ENCOUNTER — TELEPHONE (OUTPATIENT)
Dept: ORTHOPEDIC SURGERY | Age: 49
End: 2024-07-31

## 2024-07-31 NOTE — TELEPHONE ENCOUNTER
Pt was scheduled for hip inj on 8-1 but called and said she had one at the pain management ofc on 7-30.   She is canceling hers with [facility], would this be the same inj??

## 2024-08-05 ENCOUNTER — HOSPITAL ENCOUNTER (EMERGENCY)
Age: 49
Discharge: HOME | End: 2024-08-05
Attending: EMERGENCY MEDICINE
Payer: MEDICAID

## 2024-08-05 VITALS
WEIGHT: 293 LBS | DIASTOLIC BLOOD PRESSURE: 86 MMHG | HEIGHT: 64 IN | RESPIRATION RATE: 21 BRPM | OXYGEN SATURATION: 96 % | SYSTOLIC BLOOD PRESSURE: 157 MMHG | TEMPERATURE: 98 F | HEART RATE: 91 BPM | BODY MASS INDEX: 50.02 KG/M2

## 2024-08-05 DIAGNOSIS — M25.551 RIGHT HIP PAIN: Primary | ICD-10-CM

## 2024-08-05 PROCEDURE — 96372 THER/PROPH/DIAG INJ SC/IM: CPT

## 2024-08-05 PROCEDURE — 99283 EMERGENCY DEPT VISIT LOW MDM: CPT

## 2024-08-05 PROCEDURE — 6370000000 HC RX 637 (ALT 250 FOR IP): Performed by: EMERGENCY MEDICINE

## 2024-08-05 PROCEDURE — 6360000002 HC RX W HCPCS: Performed by: EMERGENCY MEDICINE

## 2024-08-05 RX ORDER — METHOCARBAMOL 750 MG/1
750 TABLET, FILM COATED ORAL 4 TIMES DAILY
Qty: 40 TABLET | Refills: 0 | Status: SHIPPED | OUTPATIENT
Start: 2024-08-05 | End: 2024-08-15

## 2024-08-05 RX ORDER — METHOCARBAMOL 750 MG/1
1500 TABLET, FILM COATED ORAL ONCE
Status: COMPLETED | OUTPATIENT
Start: 2024-08-05 | End: 2024-08-05

## 2024-08-05 RX ORDER — KETOROLAC TROMETHAMINE 30 MG/ML
30 INJECTION, SOLUTION INTRAMUSCULAR; INTRAVENOUS ONCE
Status: COMPLETED | OUTPATIENT
Start: 2024-08-05 | End: 2024-08-05

## 2024-08-05 RX ORDER — MELOXICAM 7.5 MG/1
7.5 TABLET ORAL DAILY
Qty: 30 TABLET | Refills: 3 | Status: SHIPPED | OUTPATIENT
Start: 2024-08-05

## 2024-08-05 RX ADMIN — KETOROLAC TROMETHAMINE 30 MG: 30 INJECTION, SOLUTION INTRAMUSCULAR at 21:57

## 2024-08-05 RX ADMIN — METHOCARBAMOL TABLETS 1500 MG: 750 TABLET, COATED ORAL at 21:57

## 2024-08-05 ASSESSMENT — PAIN SCALES - GENERAL
PAINLEVEL_OUTOF10: 6
PAINLEVEL_OUTOF10: 10

## 2024-08-05 ASSESSMENT — LIFESTYLE VARIABLES
HOW OFTEN DO YOU HAVE A DRINK CONTAINING ALCOHOL: NEVER
HOW MANY STANDARD DRINKS CONTAINING ALCOHOL DO YOU HAVE ON A TYPICAL DAY: PATIENT DOES NOT DRINK

## 2024-08-05 ASSESSMENT — PAIN DESCRIPTION - ORIENTATION
ORIENTATION: RIGHT
ORIENTATION: RIGHT

## 2024-08-05 ASSESSMENT — PAIN DESCRIPTION - LOCATION
LOCATION: HIP
LOCATION: HIP

## 2024-08-06 ENCOUNTER — CARE COORDINATION (OUTPATIENT)
Dept: CARE COORDINATION | Age: 49
End: 2024-08-06

## 2024-08-06 DIAGNOSIS — E11.42 DIABETIC POLYNEUROPATHY ASSOCIATED WITH TYPE 2 DIABETES MELLITUS (HCC): ICD-10-CM

## 2024-08-06 DIAGNOSIS — I10 ESSENTIAL HYPERTENSION: Primary | ICD-10-CM

## 2024-08-06 DIAGNOSIS — J44.9 COPD, MILD (HCC): ICD-10-CM

## 2024-08-06 DIAGNOSIS — I50.20 SYSTOLIC CONGESTIVE HEART FAILURE, UNSPECIFIED HF CHRONICITY (HCC): ICD-10-CM

## 2024-08-06 NOTE — CARE COORDINATION
Ambulatory Care Coordination Note     2024 10:40 AM     Patient Current Location:  Home: [address] [place] [id number]     This patient was received as a referral from Ambulatory Care Manager .    ACM contacted the patient by telephone. Verified name and  with patient as identifiers. Provided introduction to self, and explanation of the ACM role.   Patient accepted care management services at this time.          ACM: Elizabeth Gallagher RN     Challenges to be reviewed by the provider   Additional needs identified to be addressed with provider Yes  DME-Pt is in need of a suly rollator.  Would you please place the order and have staff send to [employer].  Pt has no preference as long as they are in network.                Method of communication with provider: chart routing.    Care Summary Note: ACM spoke to pt who is agreeable to ongoing outreaches from ACM and RPM.  Pt ED f/u made r/t ongoing hip pain.  Pt had recent fall, which has made pain worse.  Pt is seeing pain management and Ortho.  Pt last injection was .  Pt has been told she needs to lose 80 lbs to qualify for hip surgery.  Pt agreeable to RD referral for assistance.  Pt weight was 360 lbs, /86, .  Pt checks BG BID, has a script for semaglutide, but states there is an issue with insurance approval through insurance.  AC will research.  Pt is struggling to make it to the bathroom due to pain.  ACM requested a rollator from PCP, no preference for DME company.  Pt states she gets up several times/night, is noncompliant with bipap and reports sleeping in her chair most days.  ACM encouraged compliance w/Bipap.  Pt has a nebulizer and it is likely in need of new filter and tubing, as pt reports it is 10 years old.  AC will send message to pul for updated supplies to be sent to DME (likely [employer] since pt gets Bipap from them).  Education sent via [employer].  Pt also has several dogs, 2 of which are younger 3 yo beau/mastiff

## 2024-08-06 NOTE — PROGRESS NOTES
Remote Patient Monitoring Treatment Plan    Received request from [facility]/CTN Elizabeth Gallagher, RN   to order remote patient monitoring for in home monitoring of CHF; Condition managed by Dr. Edgard Ocampo ([facility]).  COPD; Condition managed by Dr. Ryder Jack ([facility]).  Diabetes; Condition managed by PCP.  HTN; Condition managed by PCP.  and order completed.     Patient will be monitoring blood pressure   glucose  pulse ox   weight  survey questions.      Patient will engage in Remote Patient Monitoring each day to develop the skills necessary for self management.       RPM Care Team Responsibilities:   Alerts will be reviewed daily and addressed within 2-4 hours during operational hours (Monday -Friday 9 am-4 pm)  Alert response and intervention documented in patient medical record  Alert response escalated to PCP per protocol and documented in patient medical record  Patient monitored over approximately  days  Discharge from program based on self-management readiness    See care coordination encounters for additional details.

## 2024-08-07 ENCOUNTER — CARE COORDINATION (OUTPATIENT)
Dept: PRIMARY CARE CLINIC | Age: 49
End: 2024-08-07

## 2024-08-07 NOTE — CARE COORDINATION
Rpm Kit Order    Remote Patient Kit Ordering Note      Date/Time:  8/7/2024 10:22 AM      CCSS placed phone call to patient/family today to notify of RPM kit order; patient/family was available; discussed the following topics below and all questions answered.    [x] [facility]  [x] Patient will receive package over the next 1-3 business days. Someone 21 years or older must be present to sign for UPS delivery.  [x] HRS will contact patient within 24 hours, an HRS  will call the patient directly: If the patient does not answer, HRS will follow up with the clinical team notifying them about the unsuccessful attempt to contact the patient. HRS will make three call attempts to the patient.Provide patient with [facility] Virtual install number is: [phone].  [x] ACM will contact patient once equipment is active to welcome them to the program.                                                         [x] Hours of RPM monitoring - Monday-Friday [id number]; encourage patient to get vitals entered by Noon each day to have the alert addressed same day.  [x][facility] mailed [facility] Patient flyer to patient.                      ACM made aware the RPM kit has been ordered.

## 2024-08-08 ENCOUNTER — OFFICE VISIT (OUTPATIENT)
Dept: FAMILY MEDICINE CLINIC | Age: 49
End: 2024-08-08
Payer: MEDICAID

## 2024-08-08 VITALS
OXYGEN SATURATION: 96 % | BODY MASS INDEX: 61.79 KG/M2 | RESPIRATION RATE: 18 BRPM | WEIGHT: 293 LBS | HEART RATE: 89 BPM | DIASTOLIC BLOOD PRESSURE: 90 MMHG | SYSTOLIC BLOOD PRESSURE: 134 MMHG

## 2024-08-08 DIAGNOSIS — M16.11 PRIMARY OSTEOARTHRITIS OF RIGHT HIP: Primary | ICD-10-CM

## 2024-08-08 DIAGNOSIS — E66.9 TYPE 2 DIABETES MELLITUS WITH OBESITY (HCC): ICD-10-CM

## 2024-08-08 DIAGNOSIS — E11.69 TYPE 2 DIABETES MELLITUS WITH OBESITY (HCC): ICD-10-CM

## 2024-08-08 PROCEDURE — 4004F PT TOBACCO SCREEN RCVD TLK: CPT | Performed by: FAMILY MEDICINE

## 2024-08-08 PROCEDURE — 99214 OFFICE O/P EST MOD 30 MIN: CPT | Performed by: FAMILY MEDICINE

## 2024-08-08 PROCEDURE — 3080F DIAST BP >= 90 MM HG: CPT | Performed by: FAMILY MEDICINE

## 2024-08-08 PROCEDURE — G8417 CALC BMI ABV UP PARAM F/U: HCPCS | Performed by: FAMILY MEDICINE

## 2024-08-08 PROCEDURE — G8427 DOCREV CUR MEDS BY ELIG CLIN: HCPCS | Performed by: FAMILY MEDICINE

## 2024-08-08 PROCEDURE — 3044F HG A1C LEVEL LT 7.0%: CPT | Performed by: FAMILY MEDICINE

## 2024-08-08 PROCEDURE — 2022F DILAT RTA XM EVC RTNOPTHY: CPT | Performed by: FAMILY MEDICINE

## 2024-08-08 PROCEDURE — 3017F COLORECTAL CA SCREEN DOC REV: CPT | Performed by: FAMILY MEDICINE

## 2024-08-08 PROCEDURE — 3075F SYST BP GE 130 - 139MM HG: CPT | Performed by: FAMILY MEDICINE

## 2024-08-08 RX ORDER — TIRZEPATIDE 2.5 MG/.5ML
2.5 INJECTION, SOLUTION SUBCUTANEOUS WEEKLY
Qty: 2 ML | Refills: 2 | Status: SHIPPED | OUTPATIENT
Start: 2024-08-08

## 2024-08-08 NOTE — PROGRESS NOTES
2024    Blood pressure (!) 134/90, pulse 89, resp. rate 18, weight (!) 163.3 kg (360 lb), SpO2 96 %, not currently breastfeeding.    Odalis Jett (:  1975) is a 48 y.o. female, here for evaluation of the following medical concerns:    Chief Complaint   Patient presents with    Follow-up     Spasms in bilateral legs.     Patient of Day, Tad MANCILLA MD here for concerns of  leg pain.  R hip pain after a fall at daughter's home . She had collapse of left leg with a step-down.  Fell onto buttocks.  After 4 days the pain was increasing R groin and hip.  So she went to the ER- on .  CT scans of pelvix and XR of R femur done. Severe oa in hips, but no fx.    Saw Dr Gill in f/u.  3 prior hip steroid injections.    Sees pain mgmt for severe oa pain  Now seeing DR Pretty. Last intraarticular steroid injection  there.  Also on opioid pain meds.    Her last injection did not work at all.  Pain is severe R hip.   Muscle spasms  Hurts to touch  She is unable to walk or stand more than 5 min at a time- then has to sit.  Only has a cane.    Some hot sweats, but no fevers/chills.    Dr Gill says she need NICO, but has to lose 80 mor lbs.    Is trying to get GLP-1 therapy for aid with wt loss.    Has DM-2     Lab Results   Component Value Date/Time    LABA1C 6.0 2024 12:14 PM    LABA1C 5.7 2023 11:34 AM    LABA1C 5.2 2022 02:33 PM      On metformin  No hx pancreatitis      Patient Active Problem List   Diagnosis    Osteoarthritis of left knee    Essential hypertension    CHF (congestive heart failure) (HCC)    Osteoarthritis of both knees    Hyperlipidemia    Pulmonary nodule seen on imaging study    Cavitary lesion of lung    COPD, mild (HCC)    Chronic diastolic CHF (congestive heart failure) (HCC)    JOSE (obstructive sleep apnea)    Tobacco abuse counseling    Pneumatocele of lung    Chronic pain syndrome    Primary osteoarthritis of right hip    Chronic bilateral low back pain without

## 2024-08-08 NOTE — PATIENT INSTRUCTIONS
For the [facility] medicine:   Start at 2.5 mg weekly.  Please call once a month for a dose increase. The plan is to increase monthly to the target dose ot 15 mg.

## 2024-08-09 ENCOUNTER — TELEPHONE (OUTPATIENT)
Dept: ADMINISTRATIVE | Age: 49
End: 2024-08-09

## 2024-08-09 NOTE — TELEPHONE ENCOUNTER
Submitted PA for Mounjaro 2.5MG/0.5ML pen-injectors   Via CMM Key: [id number] STATUS: PENDING.    Follow up done daily; if no decision with in three days we will refax.  If another three days goes by with no decision will call the insurance for status.

## 2024-08-12 NOTE — TELEPHONE ENCOUNTER
The medication was DENIED;     Outcome  Denied on August 9 by [employer] 2017  Coverage is provided when the member meets all the following: Member has a history of at least 120 days of therapy with THREE preferred medications [ONE of the 120 day trials must be Byetta (5 mcg and 10 mcg), Victoza (18 MG/3 ML PEN)(Brand name is preferred by your plan) or Trulicity (0.75 mg, 1.5 mg, 3 mg and 4.5 mg)], which include but are not limited to: Farxiga 5 and 10 mg (Brand name is preferred by your plan), Glimepiride, Glipizide, Invokana 100 mg and 300 mg, Januvia, Jardiance 10 and 25 mg and Metformin IR and ER (generic of Glucophage XR). Member has had an inadequate clinical response (the inability to reach A1C goal (less than 7%) after at least 120 days of current regimen, with use of two or more drugs concomitantly per ADA (American Diabetes Association) guidelines and, Member has documented adherence and appropriate dose escalation (must achieve maximum recommended dose or document that maximum recommended dose is not tolerated or is clinically inappropriate) and, Documentation includes a patient specific A1C goal if less than 7%. The [facility] Policy for Medical Necessity as posted on the [facility] website and [facility] Preferred Drug List criteria were reviewed and per [place] Administrative Code Rule 5160-1-01 (C) and 5160-26-03 (B), a medically necessary service must include: generally accepted standards of medical practice, be clinically appropriate in administration, treatment and outcome and be the lowest cost alternative to effectively treat the condition. Please contact your provider to assist you with other treatment options that might be covered under your benefit package, or other services that might be available through the community.    If you want an APPEAL; please note in this encounter what new information you would like to APPEAL with.  Once complete route back to PA POOL.    If this requires a

## 2024-08-13 ENCOUNTER — CARE COORDINATION (OUTPATIENT)
Dept: CARE COORDINATION | Age: 49
End: 2024-08-13

## 2024-08-13 NOTE — CARE COORDINATION
Ambulatory Care Coordination Note     2024 10:17 AM     Patient Current Location:  Home: [address] [place] [id number]     ACM contacted the patient by telephone. Verified name and  with patient as identifiers.         ACM: Elizabeth Gallagher RN     Challenges to be reviewed by the provider   Additional needs identified to be addressed with provider Yes  medications-It looks like pt Mounjaro has been denied.  Pt is wondering if you will be trying to prescribe another med or if the office will try to appeal?               Method of communication with provider: chart routing.    Care Summary Note: CC f/u completed.  Pt was pleasant and engaged with no s/s of disease exacerbation.  Pt taking medications as prescribed.   Pt enrolled in RPM, but has not yet called and hooked it up.  Pt encouraged to do so.  Pt checked BP today and it was 136/89, HR 69.  BG level was 99.  Pt has been informed that Mounjaro has been declined.  Message sent to PCP for update on appeal vs prescribing different med.  Pt has not yet been contacted by dietician, message sent for update.  Pt did get walker.  Pt given information to possibly help assist in rehoming dogs, [employer], [phone] and [employer], [phone].  Pt is to see pain management on Thursday.  Pt plans to decline shot, stating it make her pain worse and does not want it.  ACM encouraged pt to discuss other alternatives with them.     Offered patient enrollment in the Remote Patient Monitoring (RPM) program for in-home monitoring: Yes, patient enrolled; current status is preactivatedpt encouraged to call and set up. .     Assessments Completed:   Diabetes Assessment    Medic Alert ID: No  Meal Planning: Avoidance of concentrated sweets   How often do you test your blood sugar?: Daily, Bedtime   Do you have barriers with adherence to non-pharmacologic self-management interventions? (Nutrition/Exercise/Self-Monitoring): Yes   Have you ever had to go to the ED

## 2024-08-14 PROBLEM — G89.4 CHRONIC PAIN SYNDROME: Status: RESOLVED | Noted: 2019-02-07 | Resolved: 2024-08-14

## 2024-08-15 ENCOUNTER — CARE COORDINATION (OUTPATIENT)
Dept: CARE COORDINATION | Age: 49
End: 2024-08-15

## 2024-08-15 NOTE — TELEPHONE ENCOUNTER
Received request for appeal. Christina will allow PA be resent with additional information.     Submitted PA for Mounjaro 2.5MG/0.5ML pen-injectors  Via [facility] [id number] STATUS: PENDING.    Follow up done daily; if no decision with in three days we will refax.  If another three days goes by with no decision will call the insurance for status.

## 2024-08-16 RX ORDER — DULAGLUTIDE 0.75 MG/.5ML
0.75 INJECTION, SOLUTION SUBCUTANEOUS WEEKLY
Qty: 4 ADJUSTABLE DOSE PRE-FILLED PEN SYRINGE | Refills: 1 | Status: SHIPPED | OUTPATIENT
Start: 2024-08-16

## 2024-08-16 NOTE — TELEPHONE ENCOUNTER
Pt states she has tried trulicity.  I told her ozempic was denied and mounjaro was denied we even appealed it and the only thing left to do is for her to call the insurance and see what they say she should take and let us know

## 2024-08-16 NOTE — TELEPHONE ENCOUNTER
Thank you.   We will start trulicity for her. It is also once a week.  WILL ORDER IN SEPARATE ENCOUNTER

## 2024-08-16 NOTE — TELEPHONE ENCOUNTER
The medication was DENIED;     Outcome  Denied on August 15 by [employer] 2017  Coverage is provided when the member meets all the followin. Coverage is provided when the member has a history of at least 120 days of therapy with THREE preferred (medication covered by the Plan) medications [ONE of the 120 day trials must be Byetta (5 mcg and 10 mcg), Victoza (18 MG/3 ML PEN) or Trulicity (0.75 mg, 1.5 mg, 3 mg and 4.5 mg)], which include but are not limited to: Farxiga 5 and 10 mg, Invokana 100 mg and 300 mg, Victoza 18 MG/3 ML PEN, and Jardiance 10 and 25 mg. 2. Member has had an inadequate clinical response (the inability to reach A1C goal (less than 7%) (a test result that shows a three-month average of blood sugars) after at least 120 days of current regimen, with use of two or more drugs concomitantly (at the same time) per ADA guidelines (American Diabetes Association) and, 3. Member has documented adherence (taking medications correctly) and appropriate dose escalation (must achieve maximum recommended dose or document that maximum recommended dose is not tolerated or is clinically inappropriate) and, 4. Documentation includes a patient specific A1C goal if less than 7% and must include current A1C (within the last 6 months).     If you want an APPEAL; please note in this encounter what new information you would like to APPEAL with.  Appeal instructions uploaded into media. Once complete route back to PA POOL.    If this requires a response please respond to the pool ( P MHCX PSC MEDICATION PRE-AUTH).      Thank you please advise patient.

## 2024-08-19 ENCOUNTER — CARE COORDINATION (OUTPATIENT)
Dept: CASE MANAGEMENT | Age: 49
End: 2024-08-19

## 2024-08-19 NOTE — CARE COORDINATION
-Remote Alert Monitoring Note      Date/Time:  2024 9:21 AM  Patient Current Location: [place]  Verified patients name and  as identifiers.    Rpm alert to be reviewed by the provider                  LPN contacted patient by telephone regarding red alert received   Background: RPM for   Refer to 911 immediately if:  Patient unresponsive or unable to provide history  Change in cognition or sudden confusion  Patient unable to respond in complete sentences  Intense chest pain/tightness  Any concern for any clinical emergency  Red Alert: Provider response time of 1 hr required for any red alert requiring intervention  Yellow Alert: Provider response time of 3hr required for any escalated yellow alert  Patient Chief Complaint:  BP Triage  Are you having any Chest Pain? no   Are you having any Shortness of Breath? no   Do you have a headache or have any vision changes? no   Are you having any numbness or tingling? no   Are you having any other health concerns or issues? no     Clinical Interventions: Reviewed and followed up on alerts and treatments-   LPN contacted pt in regard to RPM red alert for BP of, 176/108. Pt denied any new worrisome and or worsening symptoms at this time. Pt had not taken any BP meds prior to checking BP. Pt will take her BP meds and recheck BP later today.    Plan/Follow Up: Will continue to review, monitor and address alerts with follow up based on severity of symptoms and risk factors.  **For any new or worsening symptoms or you are concerned in anyway, please contact your Provider or report to the nearest Emergency Room.**              Bennett Pascual LPN, [facility], Remote Patient Monitoring    PH: [phone]  Email: [email or web address]

## 2024-08-20 ENCOUNTER — CARE COORDINATION (OUTPATIENT)
Dept: CARE COORDINATION | Age: 49
End: 2024-08-20

## 2024-08-21 ENCOUNTER — CARE COORDINATION (OUTPATIENT)
Dept: CARE COORDINATION | Age: 49
End: 2024-08-21

## 2024-08-22 ENCOUNTER — CARE COORDINATION (OUTPATIENT)
Dept: CARE COORDINATION | Age: 49
End: 2024-08-22

## 2024-08-22 NOTE — CARE COORDINATION
Odalis LING Johnie  August 21, 2024    Initial Referral Reason: Desired Weight Loss     Patient Care Team:  Tad Ramirez MD as PCP - General (Family Medicine)  [facility]  Elizabeth Gallagher, SARANYA as Ambulatory Care Manager  Carlita Hu RD as Dietitian (Dietitian Registered)    Past Medical History:    Current Outpatient Medications   Medication Sig Dispense Refill    dulaglutide (TRULICITY) 0.75 MG/0.5ML SOPN SC injection Inject 0.5 mLs into the skin once a week 4 Adjustable Dose Pre-filled Pen Syringe 1    Tirzepatide (MOUNJARO) 2.5 MG/0.5ML SOPN SC injection Inject 0.5 mLs into the skin once a week 2 mL 2    Misc. Devices (BARIATRIC ROLLATOR) MISC 600lb. Capacity Mohinder Extra-Wide Deluxe Bariatric Walker Rollator. Heavy-Duty Oval Tubing. High-Capacity Padded Seat/Backrest. Height Adjustable 1 each 0    meloxicam (MOBIC) 7.5 MG tablet Take 1 tablet by mouth daily 30 tablet 3    cloNIDine (CATAPRES) 0.1 MG tablet TAKE TWO TABLETS BY MOUTH TWO TIMES A  tablet 0    oxyBUTYnin (DITROPAN-XL) 5 MG extended release tablet TAKE 1 TABLET BY MOUTH DAILY 90 tablet 1    metFORMIN (GLUCOPHAGE) 500 MG tablet TAKE 1 TABLET BY MOUTH TWICE A DAY WITH A MEAL 180 tablet 1    HYDROcodone-Acetaminophen (VICODIN PO) Take 1 tablet by mouth 3 times daily as needed 7.5mg/325mg Max Daily Amount: 3 tablets      Semaglutide,0.25 or 0.5MG/DOS, 2 MG/1.5ML SOPN 0.25mg subq weekly for 4 weeks, followed by 0.5mg subq weekly (Patient not taking: Reported on 8/6/2024) 1.5 mL 3    guaiFENesin (MUCINEX) 600 MG extended release tablet Take 1 tablet by mouth 2 times daily 30 tablet 5    tiZANidine (ZANAFLEX) 4 MG tablet Take 1 tablet by mouth 2 times daily as needed (back pain) 60 tablet 5    ARIPiprazole (ABILIFY) 2 MG tablet TAKE 1 TABLET BY MOUTH DAILY 90 tablet 0    montelukast (SINGULAIR) 10 MG tablet TAKE ONE TABLET BY MOUTH ONCE NIGHTLY 90 tablet 1    omeprazole (PRILOSEC) 20 MG delayed release capsule TAKE 1

## 2024-08-23 ENCOUNTER — CARE COORDINATION (OUTPATIENT)
Dept: CASE MANAGEMENT | Age: 49
End: 2024-08-23

## 2024-08-23 VITALS
SYSTOLIC BLOOD PRESSURE: 165 MMHG | DIASTOLIC BLOOD PRESSURE: 101 MMHG | OXYGEN SATURATION: 93 % | BODY MASS INDEX: 59.32 KG/M2 | WEIGHT: 293 LBS | HEART RATE: 81 BPM

## 2024-08-23 NOTE — CARE COORDINATION
-Remote Alert Monitoring Note      Date/Time:  2024 10:46 AM  Patient Current Location: [place]  Verified patients name and  as identifiers.          Rpm alert to be reviewed by the provider                       LPN contacted patient by telephone regarding red alert received   Background: RPM for   Refer to 911 immediately if:  Patient unresponsive or unable to provide history  Change in cognition or sudden confusion  Patient unable to respond in complete sentences  Intense chest pain/tightness  Any concern for any clinical emergency  Red Alert: Provider response time of 1 hr required for any red alert requiring intervention  Yellow Alert: Provider response time of 3hr required for any escalated yellow alert  Patient Chief Complaint:  BP Triage  Are you having any Chest Pain? no   Are you having any Shortness of Breath? no   Do you have a headache or have any vision changes? no   Are you having any numbness or tingling? no   Are you having any other health concerns or issues? no      Clinical Interventions: Reviewed and followed up on alerts and treatments-   LPN contacted pt in regard to RPM red alert for BP of, 161/103. Pt denied any new worrisome and or worsening symptoms at this time. Pt advises that she took all BP meds prior to checking BP. Pt did f/u with Neph on yesterday. Of note, lab results are pending, and med adjustments may follow. Pt will recheck BP later this morning.     Plan/Follow Up: Will continue to review, monitor and address alerts with follow up based on severity of symptoms and risk factors.  **For any new or worsening symptoms or you are concerned in anyway, please contact your Provider or report to the nearest Emergency Room.**              Bennett Pascual LPN, [facility], Remote Patient Monitoring     PH: [phone]  Email: [email or web address]

## 2024-08-26 ENCOUNTER — CARE COORDINATION (OUTPATIENT)
Dept: CASE MANAGEMENT | Age: 49
End: 2024-08-26

## 2024-08-26 RX ORDER — ARIPIPRAZOLE 2 MG/1
2 TABLET ORAL DAILY
Qty: 90 TABLET | Refills: 0 | Status: SHIPPED | OUTPATIENT
Start: 2024-08-26

## 2024-08-26 NOTE — CARE COORDINATION
-Remote Alert Monitoring Note      Date/Time:  2024 11:03 AM  Patient Current Location: [place]  Verified patients name and  as identifiers.             Rpm alert to be reviewed by the provider                        LPN contacted patient by telephone regarding red alert received   Background: RPM for   Refer to 911 immediately if:  Patient unresponsive or unable to provide history  Change in cognition or sudden confusion  Patient unable to respond in complete sentences  Intense chest pain/tightness  Any concern for any clinical emergency  Red Alert: Provider response time of 1 hr required for any red alert requiring intervention  Yellow Alert: Provider response time of 3hr required for any escalated yellow alert  Patient Chief Complaint:  O2 Triage  Are you having any Chest Pain? no   Are you having any Shortness of Breath? no   Swelling in your hands or feet? no     Are you having any other health concerns or issues? no       Clinical Interventions: Reviewed and followed up on alerts and treatments-   LPN contacted pt in regard to RPM red alert for PO of 87%. Pt denied any new worrisome and or worsening symptoms at this time. Pt is speaking in complete sentences. No apparent distress noted. No audible wheezing, labored breathing or SOB. Pt is not home to recheck PO. Pt will recheck when she arrives home.       Plan/Follow Up: Will continue to review, monitor and address alerts with follow up based on severity of symptoms and risk factors.  **For any new or worsening symptoms or you are concerned in anyway, please contact your Provider or report to the nearest Emergency Room.**              Bennett Pascual LPN, [facility], Remote Patient Monitoring     PH: [phone]  Email: [email or web address]

## 2024-08-27 ENCOUNTER — CARE COORDINATION (OUTPATIENT)
Dept: CARE COORDINATION | Age: 49
End: 2024-08-27

## 2024-08-27 ASSESSMENT — ENCOUNTER SYMPTOMS: DYSPNEA ASSOCIATED WITH: MINIMAL EXERTION

## 2024-08-27 NOTE — CARE COORDINATION
TWICE A DAY WITH A MEAL  180 tablet 1    HYDROcodone-Acetaminophen (VICODIN PO) Take 1 tablet by mouth 3 times daily as needed 7.5mg/325mg Max Daily Amount: 3 tablets       Semaglutide,0.25 or 0.5MG/DOS, 2 MG/1.5ML SOPN 0.25mg subq weekly for 4 weeks, followed by 0.5mg subq weekly (Patient not taking: Reported on 8/6/2024) 7/16/2024: 7/16/24 hasn't started yet,   will be changed to something else 1.5 mL 3    guaiFENesin (MUCINEX) 600 MG extended release tablet Take 1 tablet by mouth 2 times daily  30 tablet 5    tiZANidine (ZANAFLEX) 4 MG tablet Take 1 tablet by mouth 2 times daily as needed (back pain)  60 tablet 5    montelukast (SINGULAIR) 10 MG tablet TAKE ONE TABLET BY MOUTH ONCE NIGHTLY  90 tablet 1    omeprazole (PRILOSEC) 20 MG delayed release capsule TAKE 1 CAPSULE BY MOUTH DAILY (Patient taking differently: TAKE 1 CAPSULE BY MOUTH DAILY    Taking PRN)  90 capsule 1    Blood Pressure Monitoring (B-D ASSURE BPM/AUTO ARM CUFF) MISC 1 Device by Does not apply route daily  1 each 0    chlorthalidone (HYGROTON) 25 MG tablet Take 1.5 tablets by mouth daily  135 tablet 5    loratadine (CLARITIN) 10 MG tablet TAKE ONE TABLET BY MOUTH DAILY  90 tablet 3    venlafaxine (EFFEXOR XR) 75 MG extended release capsule Take 2 capsules by mouth daily  60 capsule 3    metoprolol succinate (TOPROL XL) 100 MG extended release tablet Take 1 tablet by mouth daily  90 tablet 5    torsemide 40 MG TABS Take 40 mg by mouth daily  90 tablet 5    TRUE METRIX BLOOD GLUCOSE TEST strip USE ONE STRIP TO TEST TWICE A DAY  50 strip 5    TRELEGY ELLIPTA 100-62.5-25 MCG/ACT AEPB inhaler INHALE ONE PUFF BY MOUTH DAILY  1 each 11    ipratropium 0.5 mg-albuterol 2.5 mg (DUONEB) 0.5-2.5 (3) MG/3ML SOLN nebulizer solution INHALE THREE MILLILITERS  (1 VIAL) IA NEBULIZATION BY MOUTH EVERY 4 HOURS  180 mL 11    Respiratory Therapy Supplies (NEBULIZER/TUBING/MOUTHPIECE) KIT 1 kit by Does not apply route daily  1 kit 0    Blood Glucose Monitoring Suppl

## 2024-08-28 ENCOUNTER — CARE COORDINATION (OUTPATIENT)
Dept: CARE COORDINATION | Age: 49
End: 2024-08-28

## 2024-08-28 NOTE — CARE COORDINATION
-Remote Alert Monitoring Note      Date/Time:  2024 11:31 AM  Patient Current Location: [place]  Verified patients name and  as identifiers.    Rpm alert to be reviewed by the provider   red alert  weight (3# weight gain in 1 day, 5# weight gain in 7 days)                     LPN contacted patient by telephone regarding red alert received   Background: Pt enrolled for CHF, COPD, DM and HTN    Refer to 911 immediately if:  Patient unresponsive or unable to provide history  Change in cognition or sudden confusion  Patient unable to respond in complete sentences  Intense chest pain/tightness  Any concern for any clinical emergency  Red Alert: Provider response time of 1 hr required for any red alert requiring intervention  Yellow Alert: Provider response time of 3hr required for any escalated yellow alert    Clinical Interventions:  Spoke with patient. She has had a weight increase of 7.1lbs in 1 day. She does note edema to BLE but reports its not as severe as it usually is in the past. She has missed her torsemide x2 days due to running errands and she wants to be sure she has access to a restroom. She has not taken it today yet but has a few errands to run and then will take it this afternoon when she returns back home. She denies SOB, she is speaking in full sentences during call. Will monitor weight tomorrow after taking the torsemide this afternoon since she has missed some doses this week. Pt aware if weight is the same or higher, we will contact her cardiologist for further instruction. She VU     Plan/Follow Up: Will continue to review, monitor and address alerts with follow up based on severity of symptoms and risk factors.  **For any new or worsening symptoms or you are concerned in anyway, please contact your Provider or report to the nearest Emergency Room.**

## 2024-09-04 ENCOUNTER — OFFICE VISIT (OUTPATIENT)
Dept: PULMONOLOGY | Age: 49
End: 2024-09-04
Payer: MEDICAID

## 2024-09-04 ENCOUNTER — CARE COORDINATION (OUTPATIENT)
Dept: CASE MANAGEMENT | Age: 49
End: 2024-09-04

## 2024-09-04 VITALS
SYSTOLIC BLOOD PRESSURE: 138 MMHG | HEART RATE: 79 BPM | TEMPERATURE: 48.2 F | BODY MASS INDEX: 50.02 KG/M2 | RESPIRATION RATE: 18 BRPM | WEIGHT: 293 LBS | OXYGEN SATURATION: 88 % | HEIGHT: 64 IN | DIASTOLIC BLOOD PRESSURE: 82 MMHG

## 2024-09-04 DIAGNOSIS — F17.210 CIGARETTE NICOTINE DEPENDENCE WITHOUT COMPLICATION: ICD-10-CM

## 2024-09-04 DIAGNOSIS — J44.9 COPD, MILD (HCC): ICD-10-CM

## 2024-09-04 DIAGNOSIS — J30.89 OTHER ALLERGIC RHINITIS: ICD-10-CM

## 2024-09-04 DIAGNOSIS — J44.1 COPD EXACERBATION (HCC): Primary | ICD-10-CM

## 2024-09-04 PROCEDURE — 3017F COLORECTAL CA SCREEN DOC REV: CPT | Performed by: INTERNAL MEDICINE

## 2024-09-04 PROCEDURE — 3023F SPIROM DOC REV: CPT | Performed by: INTERNAL MEDICINE

## 2024-09-04 PROCEDURE — G8417 CALC BMI ABV UP PARAM F/U: HCPCS | Performed by: INTERNAL MEDICINE

## 2024-09-04 PROCEDURE — 3075F SYST BP GE 130 - 139MM HG: CPT | Performed by: INTERNAL MEDICINE

## 2024-09-04 PROCEDURE — G8427 DOCREV CUR MEDS BY ELIG CLIN: HCPCS | Performed by: INTERNAL MEDICINE

## 2024-09-04 PROCEDURE — 3079F DIAST BP 80-89 MM HG: CPT | Performed by: INTERNAL MEDICINE

## 2024-09-04 PROCEDURE — 4004F PT TOBACCO SCREEN RCVD TLK: CPT | Performed by: INTERNAL MEDICINE

## 2024-09-04 PROCEDURE — 99214 OFFICE O/P EST MOD 30 MIN: CPT | Performed by: INTERNAL MEDICINE

## 2024-09-04 RX ORDER — AMOXICILLIN AND CLAVULANATE POTASSIUM 500; 125 MG/1; MG/1
1 TABLET, FILM COATED ORAL 3 TIMES DAILY
Qty: 30 TABLET | Refills: 0 | Status: SHIPPED | OUTPATIENT
Start: 2024-09-04 | End: 2024-09-14

## 2024-09-04 RX ORDER — PREDNISONE 10 MG/1
TABLET ORAL
Qty: 30 TABLET | Refills: 0 | Status: SHIPPED | OUTPATIENT
Start: 2024-09-04 | End: 2024-09-14

## 2024-09-04 ASSESSMENT — COPD QUESTIONNAIRES
QUESTION3_CHESTTIGHTNESS: 3
QUESTION4_WALKINCLINE: 5
CAT_TOTALSCORE: 21
QUESTION1_COUGHFREQUENCY: 4
QUESTION6_LEAVINGHOUSE: 1
QUESTION2_CHESTPHLEGM: 5
QUESTION8_ENERGYLEVEL: 2
QUESTION7_SLEEPQUALITY: 0
QUESTION5_HOMEACTIVITIES: 1

## 2024-09-04 NOTE — PROGRESS NOTES
REASON FOR CONSULTATION/CC:    Chief Complaint   Patient presents with    COPD    Shortness of Breath           PCP: Tad Ramirez MD    HISTORY OF PRESENT ILLNESS: Odalis Jett is a 48 y.o. year old female with a history of JOSE who presents :         complains of cough with liime green phlegm for ~ 3 days. No fevers or chills with associated with wheezing.    Using Trelegy and albuterol > 4 times per day.       Tobacco abuse  Continues to smoking.    Smoking 8 cigarettes per day.   Peak 1 ppd x 30 year.    Trying to quit.     Jose  Not using     Obesity  Uncontrolled.       allergic rhinitis   Claritin and Singulair.                 Objective:   PHYSICAL EXAM:  Blood pressure 138/82, pulse 79, temperature (!) 48.2 °F (9 °C), resp. rate 18, height 1.626 m (5' 4\"), weight (!) 155.1 kg (342 lb), SpO2 (!) 88%, not currently breastfeeding.'    Gen: No distress.  Body mass index is 58.7 kg/m².   ENT:   Resp: No accessory muscle use. No crackles. +  wheezes. And rhonchi on right side.   CV: Regular rate. Regular rhythm. No murmur or rub. No edema.   Skin: Warm, dry, normal texture and turgor. No nodule on exposed extremities.   M/S: No cyanosis. No clubbing. No joint deformity.  Psych: Oriented x 3. No anxiety.  Awake. Alert. Intact judgement and insight. Good Mood / Affect.  Memory appears in tact   .     Data Reviewed:        Assessment:      JOSE  Obesity Body mass index is 58.7 kg/m².   allergic rhinitis   COPD, mild, FEV1 76%.  Decreased DLCO.  Goiter         Plan:      Problem List Items Addressed This Visit       Other allergic rhinitis     Claritin and Singulair.    Controlled.          COPD, mild (HCC)     Trelegy   albuterol       Having copd exacerbation.  Prednisone and augmentin             Relevant Medications    predniSONE (DELTASONE) 10 MG tablet    amoxicillin-clavulanate (AUGMENTIN) 500-125 MG per tablet    Cigarette nicotine dependence without complication     Continues smoke.  Discussed.  Trying to

## 2024-09-04 NOTE — CARE COORDINATION
Provider or report to the nearest Emergency Room.**              Bennett Pascual LPN, [facility], Remote Patient Monitoring     PH: [phone]  Email: [email or web address]

## 2024-09-04 NOTE — ASSESSMENT & PLAN NOTE
Continues smoke.  Discussed.  Trying to quit.    Expiration Date (Month Year): Feb 2018 Kybella Volume In Cc (Won't Render If 0): 10 Procedure Text: The treatment areas were cleansed. Tali Squibb was administered using the parameters mentioned above. Number Of Grid Dots (Won't Render If 0): [address] Anesthesia Volume In Cc: 0 Vials Used (Won't Render If 0 - Required If Using Inventory): 5 Price (Use Numbers Only, No Special Characters Or $): [address] Consent: Written consent obtained. Risks include but not limited to bruising, beading, irregular texture, ulceration, infection, allergic reaction, scar formation, incomplete augmentation, temporary nature, procedural pain. Treatment Number (Won't Render If 0): 1 Detail Level: Simple Lot #: [id number] Treatment Area: submental chin Post-Care Instructions: Patient instructed to apply ice to reduce swelling.  Paid $2,400 for 5 vials

## 2024-09-05 ENCOUNTER — CARE COORDINATION (OUTPATIENT)
Dept: CASE MANAGEMENT | Age: 49
End: 2024-09-05

## 2024-09-05 NOTE — CARE COORDINATION
-Remote Alert Monitoring Note      Date/Time:  2024 9:50 AM  Patient Current Location: [place]  Verified patients name and  as identifiers.             Rpm alert to be reviewed by the provider                        LPN contacted patient by telephone regarding red alert received   Background: RPM for   Refer to 911 immediately if:  Patient unresponsive or unable to provide history  Change in cognition or sudden confusion  Patient unable to respond in complete sentences  Intense chest pain/tightness  Any concern for any clinical emergency  Red Alert: Provider response time of 1 hr required for any red alert requiring intervention  Yellow Alert: Provider response time of 3hr required for any escalated yellow alert  Patient Chief Complaint:  Weight Triage  Are you weighing any different than you did yesterday? (time of day, clothes and shoes on or off, etc)? no   Do you have any shortness of breath? no   Do you have any swelling in your hands of feet? Yes, BLE in ankles, mild   Are you having any other health concerns or issues? no       Clinical Interventions: Reviewed and followed up on alerts and treatments-   LPN contacted pt in regard to RPM red alert for wt increase of 7.9 lbs in 24 hrs. Pt denied any new worrisome and or worsening symptoms at this time. Pt is speaking in complete sentences. No apparent distress noted. Pt denied any new or worsening edema, SOB and chest pain. Pt followed proper protocol to obtain wt metrics today. Pt is compliant with all meds. Pt weighed on a hard, flat surface, however pt advised writer that she has to move the scale each day because she has dogs. Pt does not weigh in the same spot. Pt advises that the BLE in her ankles is not new, not severe and not worsening. Writer did not escalate but will monitor closely.          Plan/Follow Up: Will continue to review, monitor and address alerts with follow up based on severity of symptoms and risk factors.  **For any new or

## 2024-09-11 ENCOUNTER — CARE COORDINATION (OUTPATIENT)
Dept: CARE COORDINATION | Age: 49
End: 2024-09-11

## 2024-09-11 DIAGNOSIS — J44.9 COPD, MILD (HCC): Primary | ICD-10-CM

## 2024-09-11 ASSESSMENT — ENCOUNTER SYMPTOMS: DYSPNEA ASSOCIATED WITH: MINIMAL EXERTION

## 2024-09-12 ENCOUNTER — TELEPHONE (OUTPATIENT)
Dept: PULMONOLOGY | Age: 49
End: 2024-09-12

## 2024-09-12 ENCOUNTER — CARE COORDINATION (OUTPATIENT)
Dept: CARE COORDINATION | Age: 49
End: 2024-09-12

## 2024-09-12 ASSESSMENT — ENCOUNTER SYMPTOMS: DYSPNEA ASSOCIATED WITH: EXERTION

## 2024-09-13 ENCOUNTER — TELEPHONE (OUTPATIENT)
Dept: PULMONOLOGY | Age: 49
End: 2024-09-13

## 2024-09-18 ENCOUNTER — CARE COORDINATION (OUTPATIENT)
Dept: CARE COORDINATION | Age: 49
End: 2024-09-18

## 2024-09-18 DIAGNOSIS — I10 ESSENTIAL HYPERTENSION: Primary | ICD-10-CM

## 2024-09-18 ASSESSMENT — ENCOUNTER SYMPTOMS: DYSPNEA ASSOCIATED WITH: EXERTION

## 2024-09-26 ENCOUNTER — CARE COORDINATION (OUTPATIENT)
Dept: CARE COORDINATION | Age: 49
End: 2024-09-26

## 2024-09-26 SDOH — ECONOMIC STABILITY: TRANSPORTATION INSECURITY
IN THE PAST 12 MONTHS, HAS THE LACK OF TRANSPORTATION KEPT YOU FROM MEDICAL APPOINTMENTS OR FROM GETTING MEDICATIONS?: NO

## 2024-09-26 SDOH — ECONOMIC STABILITY: FOOD INSECURITY: WITHIN THE PAST 12 MONTHS, THE FOOD YOU BOUGHT JUST DIDN'T LAST AND YOU DIDN'T HAVE MONEY TO GET MORE.: NEVER TRUE

## 2024-09-26 SDOH — SOCIAL STABILITY: SOCIAL NETWORK
DO YOU BELONG TO ANY CLUBS OR ORGANIZATIONS SUCH AS CHURCH GROUPS UNIONS, FRATERNAL OR ATHLETIC GROUPS, OR SCHOOL GROUPS?: NO

## 2024-09-26 SDOH — ECONOMIC STABILITY: INCOME INSECURITY: IN THE LAST 12 MONTHS, WAS THERE A TIME WHEN YOU WERE NOT ABLE TO PAY THE MORTGAGE OR RENT ON TIME?: NO

## 2024-09-26 SDOH — HEALTH STABILITY: PHYSICAL HEALTH: ON AVERAGE, HOW MANY MINUTES DO YOU ENGAGE IN EXERCISE AT THIS LEVEL?: 20 MIN

## 2024-09-26 SDOH — HEALTH STABILITY: MENTAL HEALTH: HOW MANY STANDARD DRINKS CONTAINING ALCOHOL DO YOU HAVE ON A TYPICAL DAY?: 1 OR 2

## 2024-09-26 SDOH — ECONOMIC STABILITY: INCOME INSECURITY: HOW HARD IS IT FOR YOU TO PAY FOR THE VERY BASICS LIKE FOOD, HOUSING, MEDICAL CARE, AND HEATING?: SOMEWHAT HARD

## 2024-09-26 SDOH — SOCIAL STABILITY: SOCIAL NETWORK: ARE YOU MARRIED, WIDOWED, DIVORCED, SEPARATED, NEVER MARRIED, OR LIVING WITH A PARTNER?: MARRIED

## 2024-09-26 SDOH — SOCIAL STABILITY: SOCIAL NETWORK: HOW OFTEN DO YOU ATTEND CHURCH OR RELIGIOUS SERVICES?: NEVER

## 2024-09-26 SDOH — ECONOMIC STABILITY: FOOD INSECURITY: WITHIN THE PAST 12 MONTHS, YOU WORRIED THAT YOUR FOOD WOULD RUN OUT BEFORE YOU GOT MONEY TO BUY MORE.: NEVER TRUE

## 2024-09-26 SDOH — HEALTH STABILITY: MENTAL HEALTH: HOW OFTEN DO YOU HAVE A DRINK CONTAINING ALCOHOL?: MONTHLY OR LESS

## 2024-09-26 SDOH — HEALTH STABILITY: PHYSICAL HEALTH: ON AVERAGE, HOW MANY DAYS PER WEEK DO YOU ENGAGE IN MODERATE TO STRENUOUS EXERCISE (LIKE A BRISK WALK)?: 3 DAYS

## 2024-09-26 SDOH — SOCIAL STABILITY: SOCIAL NETWORK: HOW OFTEN DO YOU ATTENT MEETINGS OF THE CLUB OR ORGANIZATION YOU BELONG TO?: NEVER

## 2024-09-26 SDOH — ECONOMIC STABILITY: TRANSPORTATION INSECURITY
IN THE PAST 12 MONTHS, HAS LACK OF TRANSPORTATION KEPT YOU FROM MEETINGS, WORK, OR FROM GETTING THINGS NEEDED FOR DAILY LIVING?: NO

## 2024-09-26 SDOH — SOCIAL STABILITY: SOCIAL NETWORK: HOW OFTEN DO YOU GET TOGETHER WITH FRIENDS OR RELATIVES?: MORE THAN THREE TIMES A WEEK

## 2024-09-26 SDOH — SOCIAL STABILITY: SOCIAL NETWORK
IN A TYPICAL WEEK, HOW MANY TIMES DO YOU TALK ON THE PHONE WITH FAMILY, FRIENDS, OR NEIGHBORS?: MORE THAN THREE TIMES A WEEK

## 2024-10-02 ENCOUNTER — CARE COORDINATION (OUTPATIENT)
Dept: CARE COORDINATION | Age: 49
End: 2024-10-02

## 2024-10-02 ENCOUNTER — CARE COORDINATION (OUTPATIENT)
Dept: CASE MANAGEMENT | Age: 49
End: 2024-10-02

## 2024-10-02 NOTE — CARE COORDINATION
Date/Time:  10/2/2024 12:39 PM  LPN attempted to reach patient by telephone regarding red alert in remote patient monitoring program for wt increase of 3 lbs in 24 hrs. Left HIPPA compliant message requesting a return call. Will attempt to reach patient again.     Bennett Pascual LPN, [facility], Remote Patient Monitoring    PH: [phone]  Email: [email or web address]

## 2024-10-02 NOTE — CARE COORDINATION
SW received referral from [facility] for assistance with community resource needs. SW placed call to patient; unable to leave voicemail as message stated \"this number is not receiving calls at this time.\" Will continue to follow.    DP conferences:  [facility] [phone]  [facility] [phone]  [place] [phone]    Kat Crum MSW, LSW     [phone]

## 2024-10-02 NOTE — CARE COORDINATION
-Remote Alert Monitoring Note      Date/Time:  10/2/2024 2:58 PM  Patient Current Location: [place]  Verified patients name and  as identifiers.             Rpm alert to be reviewed by the provider                        LPN contacted patient by telephone regarding red alert received   Background: RPM for   Refer to 911 immediately if:  Patient unresponsive or unable to provide history  Change in cognition or sudden confusion  Patient unable to respond in complete sentences  Intense chest pain/tightness  Any concern for any clinical emergency  Red Alert: Provider response time of 1 hr required for any red alert requiring intervention  Yellow Alert: Provider response time of 3hr required for any escalated yellow alert  Patient Chief Complaint:  Weight Triage  Are you weighing any different than you did yesterday? (time of day, clothes and shoes on or off, etc)? no   Do you have any shortness of breath? no   Do you have any swelling in your hands of feet? Yes, BLE in ankles, mild   Are you having any other health concerns or issues? no         Clinical Interventions: Reviewed and followed up on alerts and treatments-   LPN contacted pt in regard to RPM red alert for wt increase of 3 in 24 hrs. Pt denied any new worrisome and or worsening symptoms at this time. Pt is speaking in complete sentences. No apparent distress noted. Pt denied any new or worsening edema, SOB and chest pain. Pt followed proper protocol to obtain wt metrics today. Pt is compliant with all meds. Pt weighed on a hard, flat surface, however pt advised writer that she has to move the scale each day because she has dogs. Pt does not weigh in the same spot. Pt also advised that she had not taken her torsemide. Writer advised pt to take the torsemide and pt v/u.  Pt advises that the BLE in her ankles is not new, not severe and not worsening. Writer did not escalate but will monitor closely.           Plan/Follow Up: Will continue to review, monitor and

## 2024-10-08 DIAGNOSIS — R23.2 HOT FLASHES: ICD-10-CM

## 2024-10-08 DIAGNOSIS — I10 ESSENTIAL HYPERTENSION: ICD-10-CM

## 2024-10-08 LAB
ANION GAP SERPL CALCULATED.3IONS-SCNC: 8 MMOL/L (ref 3–16)
BASOPHILS # BLD: 0 K/UL (ref 0–0.2)
BASOPHILS NFR BLD: 0.8 %
BUN SERPL-MCNC: 11 MG/DL (ref 7–20)
CALCIUM SERPL-MCNC: 9.5 MG/DL (ref 8.3–10.6)
CHLORIDE SERPL-SCNC: 97 MMOL/L (ref 99–110)
CO2 SERPL-SCNC: 33 MMOL/L (ref 21–32)
CREAT SERPL-MCNC: 0.9 MG/DL (ref 0.6–1.1)
CRP SERPL-MCNC: 18.8 MG/L (ref 0–5.1)
DEPRECATED RDW RBC AUTO: 13.5 % (ref 12.4–15.4)
EOSINOPHIL # BLD: 0.1 K/UL (ref 0–0.6)
EOSINOPHIL NFR BLD: 2.5 %
ERYTHROCYTE [SEDIMENTATION RATE] IN BLOOD BY WESTERGREN METHOD: 40 MM/HR (ref 0–30)
GFR SERPLBLD CREATININE-BSD FMLA CKD-EPI: 77 ML/MIN/{1.73_M2}
GLUCOSE SERPL-MCNC: 101 MG/DL (ref 70–99)
HCT VFR BLD AUTO: 46.3 % (ref 36–48)
HGB BLD-MCNC: 15.5 G/DL (ref 12–16)
LYMPHOCYTES # BLD: 1.7 K/UL (ref 1–5.1)
LYMPHOCYTES NFR BLD: 31.2 %
MCH RBC QN AUTO: 32.7 PG (ref 26–34)
MCHC RBC AUTO-ENTMCNC: 33.5 G/DL (ref 31–36)
MCV RBC AUTO: 97.6 FL (ref 80–100)
MONOCYTES # BLD: 0.4 K/UL (ref 0–1.3)
MONOCYTES NFR BLD: 6.5 %
NEUTROPHILS # BLD: 3.3 K/UL (ref 1.7–7.7)
NEUTROPHILS NFR BLD: 59 %
PLATELET # BLD AUTO: 260 K/UL (ref 135–450)
PMV BLD AUTO: 10 FL (ref 5–10.5)
POTASSIUM SERPL-SCNC: 4 MMOL/L (ref 3.5–5.1)
RBC # BLD AUTO: 4.75 M/UL (ref 4–5.2)
SODIUM SERPL-SCNC: 138 MMOL/L (ref 136–145)
TSH SERPL DL<=0.005 MIU/L-ACNC: 1.08 UIU/ML (ref 0.27–4.2)
WBC # BLD AUTO: 5.6 K/UL (ref 4–11)

## 2024-10-09 ENCOUNTER — CARE COORDINATION (OUTPATIENT)
Dept: CARE COORDINATION | Age: 49
End: 2024-10-09

## 2024-10-09 ENCOUNTER — CARE COORDINATION (OUTPATIENT)
Dept: CASE MANAGEMENT | Age: 49
End: 2024-10-09

## 2024-10-09 NOTE — CARE COORDINATION
MOHIT placed call to patient to discuss community resource needs. Patient reports utilizing [employer] for pantry needs. She cites finding it difficult to pay for dog food and other bills. She is currently enrolled in PIPP and is going to re-certify for the program soon.   MOHIT discussed potential assistance through [facility] partner [employer] and she is agreeable to receive their information via e-mail address on file. SW to also send contact number for the Community [employer]. Will follow up with patient in two weeks.    E-mail sent to patient:  Hi Mrs. Jett,     Thank you for speaking with me today. Please see the contact information below for the [facility] partner [employer] in your area.   [facility] [phone]  [facility] [phone]  [place] [phone]    You can also reach out to the [facility] Community Action Agency for additional financial support options. I will follow up with you again soon.        Kat Crum MSW, LSW     [phone]

## 2024-10-09 NOTE — CARE COORDINATION
-Remote Alert Monitoring Note      Date/Time:  10/9/2024 3:20 PM  Patient Current Location: [place]  Verified patients name and  as identifiers.    Rpm alert to be reviewed by the provider   red alert  pulse ox reading (81%) HR 93  Vitals Recheck pulse ox reading (86%)  Additional needs to be addressed by provider:     LPN contacted pt in regard to RPM red alert for PO of 81%. Pt c/o \"a lot of mucus, it's nasty and green.\" Pt also c/o an increase in SOB and advised that she had to use the albuterol \"five times yesterday.\" Pt afebrile and advised that she has some \"chest tightness\" but denied chest pain. Pt compliant with all meds.    Pt rechecked PO and updated PO is at 86%      Please advise, thank you.                LPN contacted patient by telephone regarding red alert received   Background: RPM for   Refer to 911 immediately if:  Patient unresponsive or unable to provide history  Change in cognition or sudden confusion  Patient unable to respond in complete sentences  Intense chest pain/tightness  Any concern for any clinical emergency  Red Alert: Provider response time of 1 hr required for any red alert requiring intervention  Yellow Alert: Provider response time of 3hr required for any escalated yellow alert  Patient Chief Complaint:  O2 Triage  Are you having any Chest Pain? no   Are you having any Shortness of Breath? Yes, baseline   Swelling in your hands or feet? no      Are you having any other health concerns or issues? no         Clinical Interventions: Reviewed and followed up on alerts and treatments-   LPN contacted pt in regard to RPM red alert for PO of 81%. Pt c/o \"a lot of mucus, it's nasty and green.\" Pt also c/o an increase in SOB and advised that she had to use the albuterol \"five times yesterday.\" Pt afebrile and advised that she has some \"chest tightness\" but denied chest pain. Pt compliant with all meds.    Pt rechecked PO and updated PO is at 86%. Writer advised PCP.    The pt was advised

## 2024-10-09 NOTE — CARE COORDINATION
Date/Time:  10/9/2024 12:11 PM  LPN attempted to reach patient by telephone regarding red alert in remote patient monitoring program for PO of 81%. Left HIPPA compliant message requesting a return call. Will attempt to reach patient again.       Bennett Pascual LPN, [facility], Remote Patient Monitoring    PH: [phone]  Email: [email or web address]

## 2024-10-10 DIAGNOSIS — J44.9 COPD, MILD (HCC): ICD-10-CM

## 2024-10-10 RX ORDER — FLUTICASONE FUROATE, UMECLIDINIUM BROMIDE AND VILANTEROL TRIFENATATE 100; 62.5; 25 UG/1; UG/1; UG/1
POWDER RESPIRATORY (INHALATION)
Qty: 1 EACH | Refills: 11 | Status: SHIPPED | OUTPATIENT
Start: 2024-10-10

## 2024-10-10 NOTE — TELEPHONE ENCOUNTER
Called and spoke with pt her oxygen was 85 and her pulse was 113.  I advised pt with oxygen that low she needs to go to the ER she agreed and will head there now.

## 2024-10-10 NOTE — TELEPHONE ENCOUNTER
MA team - are we able to check and see how Cameron is doing today? If pulse ox still below 90% she needs to be seen in office today. Thank you

## 2024-10-11 ENCOUNTER — APPOINTMENT (OUTPATIENT)
Dept: CT IMAGING | Age: 49
DRG: 140 | End: 2024-10-11
Payer: MEDICAID

## 2024-10-11 ENCOUNTER — HOSPITAL ENCOUNTER (INPATIENT)
Age: 49
LOS: 2 days | Discharge: HOME | DRG: 140 | End: 2024-10-13
Attending: STUDENT IN AN ORGANIZED HEALTH CARE EDUCATION/TRAINING PROGRAM | Admitting: STUDENT IN AN ORGANIZED HEALTH CARE EDUCATION/TRAINING PROGRAM
Payer: MEDICAID

## 2024-10-11 ENCOUNTER — CARE COORDINATION (OUTPATIENT)
Dept: CARE COORDINATION | Age: 49
End: 2024-10-11

## 2024-10-11 ENCOUNTER — APPOINTMENT (OUTPATIENT)
Dept: GENERAL RADIOLOGY | Age: 49
DRG: 140 | End: 2024-10-11
Payer: MEDICAID

## 2024-10-11 ENCOUNTER — OFFICE VISIT (OUTPATIENT)
Dept: FAMILY MEDICINE CLINIC | Age: 49
End: 2024-10-11
Payer: MEDICAID

## 2024-10-11 VITALS
BODY MASS INDEX: 56.13 KG/M2 | TEMPERATURE: 98.1 F | HEART RATE: 107 BPM | RESPIRATION RATE: 18 BRPM | WEIGHT: 293 LBS | OXYGEN SATURATION: 81 %

## 2024-10-11 DIAGNOSIS — J44.1 COPD EXACERBATION (HCC): Primary | ICD-10-CM

## 2024-10-11 DIAGNOSIS — R09.02 HYPOXEMIA: ICD-10-CM

## 2024-10-11 DIAGNOSIS — R05.8 PRODUCTIVE COUGH: ICD-10-CM

## 2024-10-11 PROBLEM — J96.00 ACUTE RESPIRATORY FAILURE: Status: ACTIVE | Noted: 2024-10-11

## 2024-10-11 LAB
ALBUMIN SERPL-MCNC: 3.6 G/DL (ref 3.4–5)
ALBUMIN/GLOB SERPL: 1.1 {RATIO} (ref 1.1–2.2)
ALP SERPL-CCNC: 138 U/L (ref 40–129)
ALT SERPL-CCNC: 12 U/L (ref 10–40)
ANION GAP SERPL CALCULATED.3IONS-SCNC: 9 MMOL/L (ref 3–16)
AST SERPL-CCNC: 17 U/L (ref 15–37)
BASE EXCESS BLDV CALC-SCNC: 7.1 MMOL/L
BASOPHILS # BLD: 0 K/UL (ref 0–0.2)
BASOPHILS NFR BLD: 0.4 %
BILIRUB SERPL-MCNC: 0.5 MG/DL (ref 0–1)
BUN SERPL-MCNC: 13 MG/DL (ref 7–20)
CALCIUM SERPL-MCNC: 9.3 MG/DL (ref 8.3–10.6)
CHLORIDE SERPL-SCNC: 98 MMOL/L (ref 99–110)
CO2 BLDV-SCNC: 36 MMOL/L
CO2 SERPL-SCNC: 34 MMOL/L (ref 21–32)
COHGB MFR BLDV: 4.8 %
CREAT SERPL-MCNC: 0.9 MG/DL (ref 0.6–1.1)
CRP SERPL-MCNC: 168 MG/L (ref 0–5.1)
D-DIMER QUANTITATIVE: 0.57 UG/ML FEU (ref 0–0.6)
DEPRECATED RDW RBC AUTO: 13.8 % (ref 12.4–15.4)
EKG ATRIAL RATE: 81 BPM
EKG DIAGNOSIS: NORMAL
EKG P AXIS: 51 DEGREES
EKG P-R INTERVAL: 160 MS
EKG Q-T INTERVAL: 382 MS
EKG QRS DURATION: 94 MS
EKG QTC CALCULATION (BAZETT): 443 MS
EKG R AXIS: 71 DEGREES
EKG T AXIS: 33 DEGREES
EKG VENTRICULAR RATE: 81 BPM
EOSINOPHIL # BLD: 0.1 K/UL (ref 0–0.6)
EOSINOPHIL NFR BLD: 1.3 %
FLUAV RNA UPPER RESP QL NAA+PROBE: NEGATIVE
FLUBV AG NPH QL: NEGATIVE
GFR SERPLBLD CREATININE-BSD FMLA CKD-EPI: 77 ML/MIN/{1.73_M2}
GLUCOSE BLD-MCNC: 142 MG/DL (ref 70–99)
GLUCOSE SERPL-MCNC: 108 MG/DL (ref 70–99)
HCO3 BLDV-SCNC: 34 MMOL/L (ref 23–29)
HCT VFR BLD AUTO: 44.2 % (ref 36–48)
HGB BLD-MCNC: 15.5 G/DL (ref 12–16)
INFLUENZA A ANTIGEN, POC: NEGATIVE
INFLUENZA B ANTIGEN, POC: NEGATIVE
LOT EXPIRE DATE: NORMAL
LOT KIT NUMBER: NORMAL
LYMPHOCYTES # BLD: 1.8 K/UL (ref 1–5.1)
LYMPHOCYTES NFR BLD: 23.5 %
MAGNESIUM SERPL-MCNC: 2.01 MG/DL (ref 1.8–2.4)
MCH RBC QN AUTO: 33.7 PG (ref 26–34)
MCHC RBC AUTO-ENTMCNC: 35.1 G/DL (ref 31–36)
MCV RBC AUTO: 95.8 FL (ref 80–100)
METHGB MFR BLDV: 0.2 %
MONOCYTES # BLD: 0.6 K/UL (ref 0–1.3)
MONOCYTES NFR BLD: 8 %
NEUTROPHILS # BLD: 5.2 K/UL (ref 1.7–7.7)
NEUTROPHILS NFR BLD: 66.8 %
NT-PROBNP SERPL-MCNC: 153 PG/ML (ref 0–124)
O2 THERAPY: ABNORMAL
PCO2 BLDV: 55.3 MMHG (ref 40–50)
PERFORMED ON: ABNORMAL
PH BLDV: 7.4 [PH] (ref 7.35–7.45)
PLATELET # BLD AUTO: 257 K/UL (ref 135–450)
PMV BLD AUTO: 9.3 FL (ref 5–10.5)
PO2 BLDV: 47 MMHG
POTASSIUM SERPL-SCNC: 3.4 MMOL/L (ref 3.5–5.1)
PROCALCITONIN SERPL IA-MCNC: 0.06 NG/ML (ref 0–0.15)
PROT SERPL-MCNC: 6.9 G/DL (ref 6.4–8.2)
RBC # BLD AUTO: 4.61 M/UL (ref 4–5.2)
SAO2 % BLDV: 83 %
SARS-COV-2 RDRP RESP QL NAA+PROBE: NOT DETECTED
SARS-COV-2, POC: NORMAL
SODIUM SERPL-SCNC: 141 MMOL/L (ref 136–145)
TROPONIN, HIGH SENSITIVITY: <6 NG/L (ref 0–14)
VALID INTERNAL CONTROL: NORMAL
VENDOR AND KIT NAME POC: NORMAL
WBC # BLD AUTO: 7.7 K/UL (ref 4–11)

## 2024-10-11 PROCEDURE — 6360000004 HC RX CONTRAST MEDICATION: Performed by: PHYSICIAN ASSISTANT

## 2024-10-11 PROCEDURE — G8484 FLU IMMUNIZE NO ADMIN: HCPCS | Performed by: FAMILY MEDICINE

## 2024-10-11 PROCEDURE — 87428 SARSCOV & INF VIR A&B AG IA: CPT | Performed by: FAMILY MEDICINE

## 2024-10-11 PROCEDURE — 83880 ASSAY OF NATRIURETIC PEPTIDE: CPT

## 2024-10-11 PROCEDURE — 1200000000 HC SEMI PRIVATE

## 2024-10-11 PROCEDURE — 84145 PROCALCITONIN (PCT): CPT

## 2024-10-11 PROCEDURE — 71260 CT THORAX DX C+: CPT

## 2024-10-11 PROCEDURE — 3017F COLORECTAL CA SCREEN DOC REV: CPT | Performed by: FAMILY MEDICINE

## 2024-10-11 PROCEDURE — 84484 ASSAY OF TROPONIN QUANT: CPT

## 2024-10-11 PROCEDURE — 4004F PT TOBACCO SCREEN RCVD TLK: CPT | Performed by: FAMILY MEDICINE

## 2024-10-11 PROCEDURE — 87635 SARS-COV-2 COVID-19 AMP PRB: CPT

## 2024-10-11 PROCEDURE — 93005 ELECTROCARDIOGRAM TRACING: CPT | Performed by: EMERGENCY MEDICINE

## 2024-10-11 PROCEDURE — 99285 EMERGENCY DEPT VISIT HI MDM: CPT

## 2024-10-11 PROCEDURE — 94660 CPAP INITIATION&MGMT: CPT

## 2024-10-11 PROCEDURE — 85025 COMPLETE CBC W/AUTO DIFF WBC: CPT

## 2024-10-11 PROCEDURE — 82803 BLOOD GASES ANY COMBINATION: CPT

## 2024-10-11 PROCEDURE — 80053 COMPREHEN METABOLIC PANEL: CPT

## 2024-10-11 PROCEDURE — 2700000000 HC OXYGEN THERAPY PER DAY

## 2024-10-11 PROCEDURE — 5A09357 ASSISTANCE WITH RESPIRATORY VENTILATION, LESS THAN 24 CONSECUTIVE HOURS, CONTINUOUS POSITIVE AIRWAY PRESSURE: ICD-10-PCS | Performed by: ANESTHESIOLOGY

## 2024-10-11 PROCEDURE — 3023F SPIROM DOC REV: CPT | Performed by: FAMILY MEDICINE

## 2024-10-11 PROCEDURE — 96374 THER/PROPH/DIAG INJ IV PUSH: CPT

## 2024-10-11 PROCEDURE — 85379 FIBRIN DEGRADATION QUANT: CPT

## 2024-10-11 PROCEDURE — 93010 ELECTROCARDIOGRAM REPORT: CPT | Performed by: INTERNAL MEDICINE

## 2024-10-11 PROCEDURE — 99213 OFFICE O/P EST LOW 20 MIN: CPT | Performed by: FAMILY MEDICINE

## 2024-10-11 PROCEDURE — 6370000000 HC RX 637 (ALT 250 FOR IP): Performed by: STUDENT IN AN ORGANIZED HEALTH CARE EDUCATION/TRAINING PROGRAM

## 2024-10-11 PROCEDURE — 71045 X-RAY EXAM CHEST 1 VIEW: CPT

## 2024-10-11 PROCEDURE — 86140 C-REACTIVE PROTEIN: CPT

## 2024-10-11 PROCEDURE — G8417 CALC BMI ABV UP PARAM F/U: HCPCS | Performed by: FAMILY MEDICINE

## 2024-10-11 PROCEDURE — G8427 DOCREV CUR MEDS BY ELIG CLIN: HCPCS | Performed by: FAMILY MEDICINE

## 2024-10-11 PROCEDURE — 94761 N-INVAS EAR/PLS OXIMETRY MLT: CPT

## 2024-10-11 PROCEDURE — 87804 INFLUENZA ASSAY W/OPTIC: CPT

## 2024-10-11 PROCEDURE — 83735 ASSAY OF MAGNESIUM: CPT

## 2024-10-11 PROCEDURE — 6360000002 HC RX W HCPCS: Performed by: PHYSICIAN ASSISTANT

## 2024-10-11 RX ORDER — ONDANSETRON 4 MG/1
4 TABLET, ORALLY DISINTEGRATING ORAL EVERY 8 HOURS PRN
Status: DISCONTINUED | OUTPATIENT
Start: 2024-10-11 | End: 2024-10-13 | Stop reason: HOSPADM

## 2024-10-11 RX ORDER — TORSEMIDE 20 MG/1
50 TABLET ORAL DAILY
Status: DISCONTINUED | OUTPATIENT
Start: 2024-10-11 | End: 2024-10-13 | Stop reason: HOSPADM

## 2024-10-11 RX ORDER — AZITHROMYCIN 500 MG/1
500 TABLET, FILM COATED ORAL NIGHTLY
Status: DISCONTINUED | OUTPATIENT
Start: 2024-10-11 | End: 2024-10-13 | Stop reason: HOSPADM

## 2024-10-11 RX ORDER — PANTOPRAZOLE SODIUM 20 MG/1
20 TABLET, DELAYED RELEASE ORAL
Status: DISCONTINUED | OUTPATIENT
Start: 2024-10-12 | End: 2024-10-13 | Stop reason: HOSPADM

## 2024-10-11 RX ORDER — PREDNISONE 20 MG/1
40 TABLET ORAL DAILY
Status: DISCONTINUED | OUTPATIENT
Start: 2024-10-12 | End: 2024-10-13 | Stop reason: HOSPADM

## 2024-10-11 RX ORDER — METHOCARBAMOL 500 MG/1
500 TABLET, FILM COATED ORAL NIGHTLY
Status: DISCONTINUED | OUTPATIENT
Start: 2024-10-12 | End: 2024-10-13 | Stop reason: HOSPADM

## 2024-10-11 RX ORDER — GLUCAGON 1 MG/ML
1 KIT INJECTION PRN
Status: DISCONTINUED | OUTPATIENT
Start: 2024-10-11 | End: 2024-10-13 | Stop reason: HOSPADM

## 2024-10-11 RX ORDER — METHYLPREDNISOLONE SODIUM SUCCINATE 125 MG/2ML
60 INJECTION, POWDER, LYOPHILIZED, FOR SOLUTION INTRAMUSCULAR; INTRAVENOUS ONCE
Status: COMPLETED | OUTPATIENT
Start: 2024-10-11 | End: 2024-10-11

## 2024-10-11 RX ORDER — PREDNISONE 20 MG/1
40 TABLET ORAL DAILY
Qty: 10 TABLET | Refills: 0 | Status: ON HOLD | OUTPATIENT
Start: 2024-10-11 | End: 2024-10-13 | Stop reason: HOSPADM

## 2024-10-11 RX ORDER — IOPAMIDOL 755 MG/ML
75 INJECTION, SOLUTION INTRAVASCULAR
Status: COMPLETED | OUTPATIENT
Start: 2024-10-11 | End: 2024-10-11

## 2024-10-11 RX ORDER — IPRATROPIUM BROMIDE AND ALBUTEROL SULFATE 2.5; .5 MG/3ML; MG/3ML
1 SOLUTION RESPIRATORY (INHALATION)
Status: DISCONTINUED | OUTPATIENT
Start: 2024-10-11 | End: 2024-10-13

## 2024-10-11 RX ORDER — ACETAMINOPHEN 650 MG/1
650 SUPPOSITORY RECTAL EVERY 6 HOURS PRN
Status: DISCONTINUED | OUTPATIENT
Start: 2024-10-11 | End: 2024-10-13 | Stop reason: HOSPADM

## 2024-10-11 RX ORDER — CEPHALEXIN 500 MG/1
500 CAPSULE ORAL EVERY 8 HOURS SCHEDULED
Status: DISCONTINUED | OUTPATIENT
Start: 2024-10-11 | End: 2024-10-11

## 2024-10-11 RX ORDER — ENOXAPARIN SODIUM 100 MG/ML
30 INJECTION SUBCUTANEOUS 2 TIMES DAILY
Status: DISCONTINUED | OUTPATIENT
Start: 2024-10-12 | End: 2024-10-13 | Stop reason: HOSPADM

## 2024-10-11 RX ORDER — OXYBUTYNIN CHLORIDE 5 MG/1
5 TABLET, EXTENDED RELEASE ORAL NIGHTLY
Status: DISCONTINUED | OUTPATIENT
Start: 2024-10-12 | End: 2024-10-13 | Stop reason: HOSPADM

## 2024-10-11 RX ORDER — ARIPIPRAZOLE 2 MG/1
2 TABLET ORAL DAILY
Status: DISCONTINUED | OUTPATIENT
Start: 2024-10-12 | End: 2024-10-13 | Stop reason: HOSPADM

## 2024-10-11 RX ORDER — DEXTROSE MONOHYDRATE 100 MG/ML
INJECTION, SOLUTION INTRAVENOUS CONTINUOUS PRN
Status: DISCONTINUED | OUTPATIENT
Start: 2024-10-11 | End: 2024-10-13 | Stop reason: HOSPADM

## 2024-10-11 RX ORDER — INSULIN LISPRO 100 [IU]/ML
0-8 INJECTION, SOLUTION INTRAVENOUS; SUBCUTANEOUS
Status: DISCONTINUED | OUTPATIENT
Start: 2024-10-11 | End: 2024-10-13 | Stop reason: HOSPADM

## 2024-10-11 RX ORDER — VENLAFAXINE HYDROCHLORIDE 75 MG/1
150 CAPSULE, EXTENDED RELEASE ORAL DAILY
Status: DISCONTINUED | OUTPATIENT
Start: 2024-10-12 | End: 2024-10-13 | Stop reason: HOSPADM

## 2024-10-11 RX ORDER — SPIRONOLACTONE 25 MG/1
25 TABLET ORAL 2 TIMES DAILY
Status: DISCONTINUED | OUTPATIENT
Start: 2024-10-12 | End: 2024-10-13 | Stop reason: HOSPADM

## 2024-10-11 RX ORDER — ACETAMINOPHEN 325 MG/1
650 TABLET ORAL EVERY 6 HOURS PRN
Status: DISCONTINUED | OUTPATIENT
Start: 2024-10-11 | End: 2024-10-13 | Stop reason: HOSPADM

## 2024-10-11 RX ORDER — OXYCODONE AND ACETAMINOPHEN 7.5; 325 MG/1; MG/1
1 TABLET ORAL EVERY 8 HOURS PRN
Status: DISCONTINUED | OUTPATIENT
Start: 2024-10-11 | End: 2024-10-13 | Stop reason: HOSPADM

## 2024-10-11 RX ORDER — ONDANSETRON 2 MG/ML
4 INJECTION INTRAMUSCULAR; INTRAVENOUS EVERY 6 HOURS PRN
Status: DISCONTINUED | OUTPATIENT
Start: 2024-10-11 | End: 2024-10-13 | Stop reason: HOSPADM

## 2024-10-11 RX ORDER — DOXYCYCLINE HYCLATE 100 MG
100 TABLET ORAL 2 TIMES DAILY
Qty: 14 TABLET | Refills: 0 | Status: ON HOLD | OUTPATIENT
Start: 2024-10-11 | End: 2024-10-13 | Stop reason: HOSPADM

## 2024-10-11 RX ORDER — CLONIDINE HYDROCHLORIDE 0.1 MG/1
0.1 TABLET ORAL 2 TIMES DAILY
Status: DISCONTINUED | OUTPATIENT
Start: 2024-10-11 | End: 2024-10-13 | Stop reason: HOSPADM

## 2024-10-11 RX ORDER — METOPROLOL SUCCINATE 50 MG/1
100 TABLET, EXTENDED RELEASE ORAL DAILY
Status: DISCONTINUED | OUTPATIENT
Start: 2024-10-12 | End: 2024-10-12

## 2024-10-11 RX ORDER — OXYCODONE AND ACETAMINOPHEN 7.5; 325 MG/1; MG/1
1 TABLET ORAL EVERY 4 HOURS PRN
Status: DISCONTINUED | OUTPATIENT
Start: 2024-10-11 | End: 2024-10-11

## 2024-10-11 RX ORDER — CHLORTHALIDONE 25 MG/1
25 TABLET ORAL DAILY
Status: DISCONTINUED | OUTPATIENT
Start: 2024-10-12 | End: 2024-10-13 | Stop reason: HOSPADM

## 2024-10-11 RX ORDER — POTASSIUM CHLORIDE 1500 MG/1
20 TABLET, EXTENDED RELEASE ORAL ONCE
Status: COMPLETED | OUTPATIENT
Start: 2024-10-11 | End: 2024-10-11

## 2024-10-11 RX ADMIN — POTASSIUM CHLORIDE 20 MEQ: 1500 TABLET, EXTENDED RELEASE ORAL at 22:00

## 2024-10-11 RX ADMIN — IOPAMIDOL 75 ML: 755 INJECTION, SOLUTION INTRAVENOUS at 16:47

## 2024-10-11 RX ADMIN — TORSEMIDE 50 MG: 20 TABLET ORAL at 22:00

## 2024-10-11 RX ADMIN — METHYLPREDNISOLONE SODIUM SUCCINATE 60 MG: 125 INJECTION INTRAMUSCULAR; INTRAVENOUS at 19:00

## 2024-10-11 RX ADMIN — AZITHROMYCIN 500 MG: 500 TABLET, FILM COATED ORAL at 19:54

## 2024-10-11 ASSESSMENT — PAIN - FUNCTIONAL ASSESSMENT: PAIN_FUNCTIONAL_ASSESSMENT: 0-10

## 2024-10-11 ASSESSMENT — ENCOUNTER SYMPTOMS: DYSPNEA ASSOCIATED WITH: MINIMAL EXERTION

## 2024-10-11 ASSESSMENT — PAIN SCALES - GENERAL: PAINLEVEL_OUTOF10: 0

## 2024-10-11 NOTE — H&P
History and Physical      Name:  Odalis Jett /Age/Sex: 1975  (49 y.o. female)   MRN & CSN:  [id number] & [id number] Encounter Date/Time: 10/11/2024 7:22 PM EDT   Location:  [id number] PCP: Tad Ramirez MD       Assessment and Plan:   Odalis Jett is a 49 y.o. female with COPD, morbid obesity/JOSE, hypertension, GERD, T2DM, mood disorder, chronic pain with opioid dependence presented from home with shortness of breath.    Acute respiratory failure with hypercapnia  Secondary to Moderate COPD exacerbation, uncontrolled JOSE-OHS and Tobacco use  CTA chest reviewed negative for acute PE, background emphysematous changes, scattered nodules, cardiomegaly. Negative COVID and influenza screen, /Procal 0.06  Scheduled Duonebs, PO Steroids  Azithromycin, check sputum culture  Increase home dose of torsemide to 50 mg daily  Counseled on smoking cessation and compliance with CPAP    Hypokalemia, 3.4  PO supplementation, recheck metabolic panel in am    Left thyromegaly  Stable thyroid function panel from 10/8  Recommend outpatient thyroid ultrasound    Morbid Obesity/BMI 56.12  Counseled on lifestyle modification    Hypertension  Continue home chlorthalidone, Toprol-XL, spironolactone and clonidine     T2DM without long term use of insulin  Sliding scale insulin, POC glucose ACHS and hypoglycemia protocol    GERD  Continue home Protonix    Mood disorder  Continue Venlafaxine and Abilify    Chronic pain with opioid dependence  Continue home Percocet as needed (PDMP reviewed)    Inpatient MedSurg  Code Status: Code status was discussed in detail with Patient; verbalized understanding of the different types of code status; Patient opted for Full code     Disposition:     Current Living situation: Home  Expected Disposition: Home  Estimated D/C: 2-3 days    Diet ADULT DIET; Regular   DVT Prophylaxis [x] Lovenox, []  Heparin, [] SCDs, [] Ambulation,  [] Eliquis, [] Xarelto, [] Coumadin   Code

## 2024-10-11 NOTE — PROGRESS NOTES
10/11/2024    This is a 49 y.o. female   Chief Complaint   Patient presents with    Shortness of Breath     Been going on x 3 days, her oxygen has been in the 80's denied emergency room. Coughing up green stuff.  No fever, pt always hurts so she can't tell if she is having body aches      HPI    Here for concerns for shortness of breath  - started about 3 days ago. Feels short of breath. Coughing up green mucus  - no fever or chills. No fatigue and doesn't feel ill otherwise  - denies any increase in swelling. Weight is down. Has been taking torsemide regularly   - denies feeling light-headed or confused    Nurse told her that her pulse ox was in the 80s. She was advised to go to the ED. She reports she has animals at home without someone to take care of them and so does not want to go to the hospital.   - 4 dogs    She notes she is wheezing. Did a breathing treatment this morning which helped some.    Review of Systems     Current Outpatient Medications   Medication Sig Dispense Refill    predniSONE (DELTASONE) 20 MG tablet Take 2 tablets by mouth daily for 5 days 10 tablet 0    doxycycline hyclate (VIBRA-TABS) 100 MG tablet Take 1 tablet by mouth 2 times daily for 7 days 14 tablet 0    fluticasone-umeclidin-vilant (TRELEGY ELLIPTA) 100-62.5-25 MCG/ACT AEPB inhaler INHALE 1 PUFF BY MOUTH DAILY 1 each 11    ARIPiprazole (ABILIFY) 2 MG tablet TAKE 1 TABLET BY MOUTH DAILY 90 tablet 0    methocarbamol (ROBAXIN) 500 MG tablet Take 1 tablet by mouth nightly      spironolactone (ALDACTONE) 25 MG tablet Take 1 tablet by mouth 2 times daily 90 tablet 5    dulaglutide (TRULICITY) 0.75 MG/0.5ML SOPN SC injection Inject 0.5 mLs into the skin once a week 4 Adjustable Dose Pre-filled Pen Syringe 1    Tirzepatide (MOUNJARO) 2.5 MG/0.5ML SOPN SC injection Inject 0.5 mLs into the skin once a week 2 mL 2    Misc. Devices (BARIATRIC ROLLATOR) MISC 600lb. Capacity Pender Extra-Wide Deluxe Bariatric Walker Rollator. Heavy-Duty Oval

## 2024-10-11 NOTE — CARE COORDINATION
Ambulatory Care Coordination Note     10/11/2024 10:46 AM     Patient Current Location:  Home: [address] [place] [id number]     ACM contacted the patient by telephone. Verified name and  with patient as identifiers.         ACM: Elizabeth Gallagher RN     Challenges to be reviewed by the provider   Additional needs identified to be addressed with provider Yes  Pt reporting increased sputum that is thick and green, increased cough, fatigue.  Pt O2 sat's have been lower, sometimes dropping to the low 80's.  On phone it was 89. She has been advised to go to the ED, but declining.  She has an appt today w/Fatmata.  Pt is using her nebulizer TID and increased use of inhalers.               Method of communication with provider: chart routing.    Has the patient been seen in the ED since your last call? no    Care Summary Note: Pt reporting increased sputum that is thick and green, increased cough, fatigue.  Pt O2 sat's have been lower, sometimes dropping to the low 80's.  She has been advised to go to the ED, but declining.  She has an appt today w/Fatmata, NP @ 1240 today.  Pt is using her nebulizer TID and increased use of inhalers.  PCP made aware.  ACM reinforced education regarding going to ED for low O2 levles.  When pt checked O2 this a.m. it was 89.  Pt does not have O2.  Pt did not appear SOB or in immediate distress on the phone.  ACM discussed flu and PNA vaccines with pt once symptoms have resolved and pt declining.    Offered patient enrollment in the Remote Patient Monitoring (RPM) program for in-home monitoring: Yes, patient enrolled; current status is activated and monitoring.     Assessments Completed:   Diabetes Assessment    Medic Alert ID: No  Meal Planning: Avoidance of concentrated sweets   How often do you test your blood sugar?: Daily, Bedtime   Do you have barriers with adherence to non-pharmacologic self-management interventions? (Nutrition/Exercise/Self-Monitoring): Yes   Have you

## 2024-10-11 NOTE — ED PROVIDER NOTES
[facility] EMERGENCY DEPARTMENT  EMERGENCY DEPARTMENT ENCOUNTER        Pt Name: Odalis Jett  MRN: [id number]  Birthdate 1975  Date of evaluation: 10/11/2024  Provider: THEODORE Jones  PCP: Tad Ramirez MD  Note Started: 6:07 PM EDT 10/11/24      MINDI. I have evaluated this patient.        CHIEF COMPLAINT       Chief Complaint   Patient presents with    Cough    Shortness of Breath     Sent by ambulatory staff for low oxygen.        HISTORY OF PRESENT ILLNESS: 1 or more Elements     History from : Patient    Limitations to history : None    Odalis Jett is a 49 y.o. female who presents to the emergency room due to cough, shortness of breath and a flow oxygen noted at primary care doctor's office earlier today.  Patient was sent here by Dr. Ramirez because of hypoxemia on arrival her oxygen was in the 80s.  She does have a history of COPD and does use breathing treatments but has not had any improvement in symptoms.  She states that over the last week or so she has been having cough and congestion.  She denies any significant lower leg swelling or calf pain.  She does have a history of CHF.  She denies chest pain associate with this.    Nursing Notes were all reviewed and agreed with or any disagreements were addressed in the HPI.    REVIEW OF SYSTEMS :      Review of Systems   Constitutional:  Negative for chills, diaphoresis and fever.   HENT:  Positive for congestion. Negative for rhinorrhea and sore throat.    Eyes:  Negative for pain and visual disturbance.   Respiratory:  Positive for cough and shortness of breath.    Cardiovascular:  Negative for chest pain and leg swelling.   Gastrointestinal:  Negative for abdominal pain, constipation, diarrhea, nausea and vomiting.   Genitourinary:  Negative for difficulty urinating, dysuria and frequency.   Musculoskeletal:  Negative for back pain and neck pain.   Skin:  Negative for rash and wound.   Neurological:  Negative for

## 2024-10-11 NOTE — ED NOTES
Report received from SARANYA Perales. Pt sitting up in bed eyes open, AAOx4, NAD, resp e/u on 2L NC o2, bed locked lowest position, rails up x2, call light within reach, pt on BP cuff Pulse ox and Cardiac monitor, has snacks at bedside, pt on phone.

## 2024-10-12 DIAGNOSIS — J44.9 COPD, MILD (HCC): ICD-10-CM

## 2024-10-12 DIAGNOSIS — J30.89 OTHER ALLERGIC RHINITIS: ICD-10-CM

## 2024-10-12 LAB
ALBUMIN SERPL-MCNC: 3.6 G/DL (ref 3.4–5)
ALBUMIN/GLOB SERPL: 1.1 {RATIO} (ref 1.1–2.2)
ALP SERPL-CCNC: 109 U/L (ref 40–129)
ALT SERPL-CCNC: 12 U/L (ref 10–40)
ANION GAP SERPL CALCULATED.3IONS-SCNC: 12 MMOL/L (ref 3–16)
AST SERPL-CCNC: 16 U/L (ref 15–37)
BASOPHILS # BLD: 0 K/UL (ref 0–0.2)
BASOPHILS NFR BLD: 0.1 %
BILIRUB SERPL-MCNC: 0.3 MG/DL (ref 0–1)
BUN SERPL-MCNC: 16 MG/DL (ref 7–20)
CALCIUM SERPL-MCNC: 9.5 MG/DL (ref 8.3–10.6)
CHLORIDE SERPL-SCNC: 94 MMOL/L (ref 99–110)
CO2 SERPL-SCNC: 33 MMOL/L (ref 21–32)
CREAT SERPL-MCNC: 1 MG/DL (ref 0.6–1.1)
DEPRECATED RDW RBC AUTO: 13.7 % (ref 12.4–15.4)
EOSINOPHIL # BLD: 0 K/UL (ref 0–0.6)
EOSINOPHIL NFR BLD: 0.1 %
GFR SERPLBLD CREATININE-BSD FMLA CKD-EPI: 68 ML/MIN/{1.73_M2}
GLUCOSE BLD-MCNC: 192 MG/DL (ref 70–99)
GLUCOSE BLD-MCNC: 202 MG/DL (ref 70–99)
GLUCOSE BLD-MCNC: 211 MG/DL (ref 70–99)
GLUCOSE BLD-MCNC: 248 MG/DL (ref 70–99)
GLUCOSE SERPL-MCNC: 211 MG/DL (ref 70–99)
HCT VFR BLD AUTO: 45.4 % (ref 36–48)
HGB BLD-MCNC: 15.5 G/DL (ref 12–16)
LYMPHOCYTES # BLD: 0.9 K/UL (ref 1–5.1)
LYMPHOCYTES NFR BLD: 13.2 %
MCH RBC QN AUTO: 33.2 PG (ref 26–34)
MCHC RBC AUTO-ENTMCNC: 34.2 G/DL (ref 31–36)
MCV RBC AUTO: 97 FL (ref 80–100)
MONOCYTES # BLD: 0.1 K/UL (ref 0–1.3)
MONOCYTES NFR BLD: 1.2 %
NEUTROPHILS # BLD: 5.8 K/UL (ref 1.7–7.7)
NEUTROPHILS NFR BLD: 85.4 %
PERFORMED ON: ABNORMAL
PLATELET # BLD AUTO: 261 K/UL (ref 135–450)
PMV BLD AUTO: 9.5 FL (ref 5–10.5)
POTASSIUM SERPL-SCNC: 3.9 MMOL/L (ref 3.5–5.1)
PROT SERPL-MCNC: 7 G/DL (ref 6.4–8.2)
RBC # BLD AUTO: 4.68 M/UL (ref 4–5.2)
REPORT: NORMAL
RESP PATH DNA+RNA PNL NPH NAA+NON-PROBE: NORMAL
SODIUM SERPL-SCNC: 139 MMOL/L (ref 136–145)
WBC # BLD AUTO: 6.8 K/UL (ref 4–11)

## 2024-10-12 PROCEDURE — 36415 COLL VENOUS BLD VENIPUNCTURE: CPT

## 2024-10-12 PROCEDURE — 87205 SMEAR GRAM STAIN: CPT

## 2024-10-12 PROCEDURE — 6370000000 HC RX 637 (ALT 250 FOR IP)

## 2024-10-12 PROCEDURE — 87077 CULTURE AEROBIC IDENTIFY: CPT

## 2024-10-12 PROCEDURE — 2580000003 HC RX 258

## 2024-10-12 PROCEDURE — 2700000000 HC OXYGEN THERAPY PER DAY

## 2024-10-12 PROCEDURE — 85025 COMPLETE CBC W/AUTO DIFF WBC: CPT

## 2024-10-12 PROCEDURE — 99223 1ST HOSP IP/OBS HIGH 75: CPT | Performed by: INTERNAL MEDICINE

## 2024-10-12 PROCEDURE — 94640 AIRWAY INHALATION TREATMENT: CPT

## 2024-10-12 PROCEDURE — 80053 COMPREHEN METABOLIC PANEL: CPT

## 2024-10-12 PROCEDURE — 1200000000 HC SEMI PRIVATE

## 2024-10-12 PROCEDURE — 6370000000 HC RX 637 (ALT 250 FOR IP): Performed by: STUDENT IN AN ORGANIZED HEALTH CARE EDUCATION/TRAINING PROGRAM

## 2024-10-12 PROCEDURE — 0202U NFCT DS 22 TRGT SARS-COV-2: CPT

## 2024-10-12 PROCEDURE — 94660 CPAP INITIATION&MGMT: CPT

## 2024-10-12 PROCEDURE — 87070 CULTURE OTHR SPECIMN AEROBIC: CPT

## 2024-10-12 PROCEDURE — 6360000002 HC RX W HCPCS

## 2024-10-12 PROCEDURE — 6360000002 HC RX W HCPCS: Performed by: STUDENT IN AN ORGANIZED HEALTH CARE EDUCATION/TRAINING PROGRAM

## 2024-10-12 PROCEDURE — 94760 N-INVAS EAR/PLS OXIMETRY 1: CPT

## 2024-10-12 PROCEDURE — 94761 N-INVAS EAR/PLS OXIMETRY MLT: CPT

## 2024-10-12 PROCEDURE — 94680 O2 UPTK RST&XERS DIR SIMPLE: CPT

## 2024-10-12 RX ORDER — IPRATROPIUM BROMIDE AND ALBUTEROL SULFATE 2.5; .5 MG/3ML; MG/3ML
SOLUTION RESPIRATORY (INHALATION)
Qty: 180 ML | Refills: 11 | Status: SHIPPED | OUTPATIENT
Start: 2024-10-12

## 2024-10-12 RX ORDER — MORPHINE SULFATE 15 MG/1
15 TABLET, FILM COATED, EXTENDED RELEASE ORAL 2 TIMES DAILY
COMMUNITY

## 2024-10-12 RX ORDER — MORPHINE SULFATE 15 MG/1
15 TABLET, FILM COATED, EXTENDED RELEASE ORAL EVERY 12 HOURS SCHEDULED
Status: DISCONTINUED | OUTPATIENT
Start: 2024-10-12 | End: 2024-10-13 | Stop reason: HOSPADM

## 2024-10-12 RX ORDER — WATER 10 ML/10ML
INJECTION INTRAMUSCULAR; INTRAVENOUS; SUBCUTANEOUS
Status: COMPLETED
Start: 2024-10-12 | End: 2024-10-12

## 2024-10-12 RX ORDER — METHYLPREDNISOLONE SODIUM SUCCINATE 40 MG/ML
40 INJECTION, POWDER, LYOPHILIZED, FOR SOLUTION INTRAMUSCULAR; INTRAVENOUS EVERY 8 HOURS
Status: DISCONTINUED | OUTPATIENT
Start: 2024-10-12 | End: 2024-10-13

## 2024-10-12 RX ORDER — GABAPENTIN 300 MG/1
300 CAPSULE ORAL 3 TIMES DAILY
Status: DISCONTINUED | OUTPATIENT
Start: 2024-10-12 | End: 2024-10-13 | Stop reason: HOSPADM

## 2024-10-12 RX ORDER — HYDROCODONE BITARTRATE AND ACETAMINOPHEN 7.5; 325 MG/1; MG/1
1 TABLET ORAL 3 TIMES DAILY
COMMUNITY

## 2024-10-12 RX ORDER — METOPROLOL SUCCINATE 50 MG/1
100 TABLET, EXTENDED RELEASE ORAL DAILY
Status: DISCONTINUED | OUTPATIENT
Start: 2024-10-12 | End: 2024-10-13 | Stop reason: HOSPADM

## 2024-10-12 RX ORDER — GABAPENTIN 300 MG/1
300 CAPSULE ORAL 3 TIMES DAILY
COMMUNITY

## 2024-10-12 RX ADMIN — PANTOPRAZOLE SODIUM 20 MG: 20 TABLET, DELAYED RELEASE ORAL at 05:41

## 2024-10-12 RX ADMIN — IPRATROPIUM BROMIDE AND ALBUTEROL SULFATE 1 DOSE: 2.5; .5 SOLUTION RESPIRATORY (INHALATION) at 11:38

## 2024-10-12 RX ADMIN — MORPHINE SULFATE 15 MG: 15 TABLET, FILM COATED, EXTENDED RELEASE ORAL at 21:13

## 2024-10-12 RX ADMIN — ENOXAPARIN SODIUM 30 MG: 100 INJECTION SUBCUTANEOUS at 10:07

## 2024-10-12 RX ADMIN — GABAPENTIN 300 MG: 300 CAPSULE ORAL at 21:14

## 2024-10-12 RX ADMIN — VENLAFAXINE HYDROCHLORIDE 150 MG: 75 CAPSULE, EXTENDED RELEASE ORAL at 10:07

## 2024-10-12 RX ADMIN — CHLORTHALIDONE 25 MG: 25 TABLET ORAL at 10:08

## 2024-10-12 RX ADMIN — IPRATROPIUM BROMIDE AND ALBUTEROL SULFATE 1 DOSE: 2.5; .5 SOLUTION RESPIRATORY (INHALATION) at 07:27

## 2024-10-12 RX ADMIN — TORSEMIDE 50 MG: 20 TABLET ORAL at 10:08

## 2024-10-12 RX ADMIN — OXYCODONE AND ACETAMINOPHEN 1 TABLET: 7.5; 325 TABLET ORAL at 05:41

## 2024-10-12 RX ADMIN — IPRATROPIUM BROMIDE AND ALBUTEROL SULFATE 1 DOSE: 2.5; .5 SOLUTION RESPIRATORY (INHALATION) at 16:52

## 2024-10-12 RX ADMIN — INSULIN LISPRO 2 UNITS: 100 INJECTION, SOLUTION INTRAVENOUS; SUBCUTANEOUS at 13:22

## 2024-10-12 RX ADMIN — METHOCARBAMOL 500 MG: 500 TABLET ORAL at 21:14

## 2024-10-12 RX ADMIN — OXYCODONE AND ACETAMINOPHEN 1 TABLET: 7.5; 325 TABLET ORAL at 16:00

## 2024-10-12 RX ADMIN — ARIPIPRAZOLE 2 MG: 2 TABLET ORAL at 10:08

## 2024-10-12 RX ADMIN — WATER 10 ML: 1 INJECTION INTRAMUSCULAR; INTRAVENOUS; SUBCUTANEOUS at 18:32

## 2024-10-12 RX ADMIN — METHYLPREDNISOLONE SODIUM SUCCINATE 40 MG: 40 INJECTION INTRAMUSCULAR; INTRAVENOUS at 10:08

## 2024-10-12 RX ADMIN — OXYBUTYNIN CHLORIDE 5 MG: 5 TABLET, EXTENDED RELEASE ORAL at 21:14

## 2024-10-12 RX ADMIN — INSULIN LISPRO 2 UNITS: 100 INJECTION, SOLUTION INTRAVENOUS; SUBCUTANEOUS at 18:33

## 2024-10-12 RX ADMIN — WATER 10 ML: 1 INJECTION INTRAMUSCULAR; INTRAVENOUS; SUBCUTANEOUS at 10:08

## 2024-10-12 RX ADMIN — SPIRONOLACTONE 25 MG: 25 TABLET ORAL at 10:08

## 2024-10-12 RX ADMIN — METHYLPREDNISOLONE SODIUM SUCCINATE 40 MG: 40 INJECTION INTRAMUSCULAR; INTRAVENOUS at 18:32

## 2024-10-12 RX ADMIN — CLONIDINE HYDROCHLORIDE 0.1 MG: 0.1 TABLET ORAL at 21:14

## 2024-10-12 RX ADMIN — OXYBUTYNIN CHLORIDE 5 MG: 5 TABLET, EXTENDED RELEASE ORAL at 00:53

## 2024-10-12 RX ADMIN — GABAPENTIN 300 MG: 300 CAPSULE ORAL at 16:00

## 2024-10-12 RX ADMIN — IPRATROPIUM BROMIDE AND ALBUTEROL SULFATE 1 DOSE: 2.5; .5 SOLUTION RESPIRATORY (INHALATION) at 20:01

## 2024-10-12 RX ADMIN — ENOXAPARIN SODIUM 30 MG: 100 INJECTION SUBCUTANEOUS at 21:14

## 2024-10-12 RX ADMIN — AZITHROMYCIN 500 MG: 500 TABLET, FILM COATED ORAL at 21:13

## 2024-10-12 RX ADMIN — SPIRONOLACTONE 25 MG: 25 TABLET ORAL at 00:54

## 2024-10-12 RX ADMIN — SPIRONOLACTONE 25 MG: 25 TABLET ORAL at 21:13

## 2024-10-12 RX ADMIN — INSULIN LISPRO 2 UNITS: 100 INJECTION, SOLUTION INTRAVENOUS; SUBCUTANEOUS at 21:23

## 2024-10-12 RX ADMIN — METOPROLOL SUCCINATE 100 MG: 50 TABLET, EXTENDED RELEASE ORAL at 10:08

## 2024-10-12 ASSESSMENT — PAIN SCALES - GENERAL
PAINLEVEL_OUTOF10: 5
PAINLEVEL_OUTOF10: 9
PAINLEVEL_OUTOF10: 6

## 2024-10-12 ASSESSMENT — PAIN DESCRIPTION - DESCRIPTORS
DESCRIPTORS: ACHING

## 2024-10-12 ASSESSMENT — PAIN DESCRIPTION - ORIENTATION
ORIENTATION: RIGHT

## 2024-10-12 ASSESSMENT — PAIN DESCRIPTION - LOCATION
LOCATION: HIP

## 2024-10-12 NOTE — PROGRESS NOTES
4 Eyes Skin Assessment     NAME:  Odalis Jett  YOB: 1975  MEDICAL RECORD NUMBER:  [id number]    The patient is being assessed for  Admission    I agree that at least one RN has performed a thorough Head to Toe Skin Assessment on the patient. ALL assessment sites listed below have been assessed.      Areas assessed by both nurses:    Head, Face, Ears, Shoulders, Back, Chest, Arms, Elbows, Hands, Sacrum. Buttock, Coccyx, Ischium, Legs. Feet and Heels, and Under Medical Devices         Does the Patient have a Wound? No noted wound(s)       [place] Prevention initiated by RN: No  Wound Care Orders initiated by RN: No    Pressure Injury (Stage 3,4, Unstageable, DTI, NWPT, and Complex wounds) if present, place Wound referral order by RN under : No    New Ostomies, if present place, Ostomy referral order under : No     Nurse 1 eSignature: Electronically signed by Maggie Edwards RN on 10/11/24 at 10:51 PM EDT    **SHARE this note so that the co-signing nurse can place an eSignature**    Nurse 2 eSignature: Electronically signed by Nichole Tan RN on 10/12/24 at 12:06 AM EDT

## 2024-10-12 NOTE — PLAN OF CARE
Problem: Chronic Conditions and Co-morbidities  Goal: Patient's chronic conditions and co-morbidity symptoms are monitored and maintained or improved  Outcome: Progressing     Problem: Discharge Planning  Goal: Discharge to home or other facility with appropriate resources  Outcome: Progressing  Flowsheets (Taken 10/11/2024 2129)  Discharge to home or other facility with appropriate resources:   Identify barriers to discharge with patient and caregiver   Identify discharge learning needs (meds, wound care, etc)   Refer to discharge planning if patient needs post-hospital services based on physician order or complex needs related to functional status, cognitive ability or social support system   Arrange for needed discharge resources and transportation as appropriate     Problem: Pain  Goal: Verbalizes/displays adequate comfort level or baseline comfort level  Outcome: Progressing     Problem: Safety - Adult  Goal: Free from fall injury  Outcome: Progressing

## 2024-10-12 NOTE — PROGRESS NOTES
Patient arrives to room 4270 via transport.Patient admitted for Acute respiratory failure.Patient alert and oriented x 4.Patient on 2 L O 2 NC.Allergy bracelet placed on patient arm.Bed in lowest position.Call light in reach.

## 2024-10-12 NOTE — CONSULTS
PULMONARY EMBOLISM W CONTRAST    Narrative  EXAMINATION:  CTA OF THE CHEST 10/11/2024 4:18 pm    TECHNIQUE:  CTA of the chest was performed after the administration of intravenous  contrast.  Multiplanar reformatted images are provided for review.  MIP  images are provided for review. Automated exposure control, iterative  reconstruction, and/or weight based adjustment of the mA/kV was utilized to  reduce the radiation dose to as low as reasonably achievable.    COMPARISON:  03/28/2024 without contrast    HISTORY:  ORDERING SYSTEM PROVIDED HISTORY: Elevated dimer, shortness of breath,  hypoxemia  TECHNOLOGIST PROVIDED HISTORY:  Reason for exam:->Elevated dimer, shortness of breath, hypoxemia  Additional Contrast?->1  Reason for Exam: Elevated dimer, shortness of breath, hypoxemia  Relevant Medical/Surgical History: none    FINDINGS:  Pulmonary Arteries: Pulmonary arteries are diffusely under filled.  No  definite central pulmonary emboli.  Main pulmonary artery is dilated.    Mediastinum: No evidence of mediastinal lymphadenopathy.  The heart and  pericardium demonstrate no acute abnormality.  There is no acute abnormality  of the thoracic aorta.    Lungs/pleura: 2 lung cysts in the left lower lobe.  These were noted on the  previous study.  The cyst that is more anterior a slightly irregular wall.  It currently measures 4.5 cm transverse by 3.6 cm anterior to posterior.  On  the previous study, it measured 4.4 cm transverse by 3.5 cm anterior to  posterior.  The more inferior cyst is unchanged.  Micro nodules in the right  upper lobe are new and the left upper lobe.  And the lingula.  And the right  lower lobe.    Upper Abdomen: Limited images of the upper abdomen are unremarkable.    Soft Tissues/Bones: Spondylosis.  Mild osteoarthritic change of the  glenohumeral joints.  Thyroid is enlarged.  Questionable left thyroid nodules  1.7 by 3.6 cm..    Impression  1. No definite pulmonary emboli.  2. New micro nodules

## 2024-10-12 NOTE — PROGRESS NOTES
Pharmacy Medication Reconciliation Note     List of medications patient is currently taking is complete.    Source of information:   1. Conversation with patient   2. EMR    Notes regarding home medications:   1. Confirmed home pain management regimen with patient - patient taking morphine ER, scheduled Norco, and gabapentin.  2. Added diclofenac gel and updated chlorthalidone dose  3. Doxycycline and prednisone prescriptions written for the patient yesterday but patient was taken to the ER      Minna Orosco PharmD  10/12/2024 1:28 PM

## 2024-10-12 NOTE — PROGRESS NOTES
Hospitalist Progress Note      PCP: Tad Ramirez MD    Date of Admission: 10/11/2024    Chief Complaint: Worsening shortness of breath, productive cough    Hospital Course: 49-year-old female with past medical history of COPD, essential hypertension, type II non-insulin-dependent diabetes, mood disorder, chronic pain, tobacco abuse who presented to the ED with worsening shortness of breath and increased productive cough.  Patient was sent by her PCP when she was found to be hypoxic on room air with O2 sats in the 80s.  When patient arrived to the ED her O2 saturation was 87% on room air, she was placed on 3 L nasal cannula with improvement.  CT PE protocol was obtained which showed no evidence of PE but showed new micronodules in the lungs.  She was given a dose of IV Solu-Medrol and admitted for further workup/treatment.  Patient was started on DuoNebs and azithromycin.  He was able to be weaned to 1 L nasal cannula.  Patient follows with Dr. Jack, pulmonology consulted.      Subjective: Patient seen lying in bed, states she is breathing better today.  Patient also states she is able to cough more stuff up and that is also lighter in color.  States he was able to give a sputum sample already to the lab.  Patient states she does not normally wear oxygen at baseline but did require it multiple years back.  Discussed with patient to try to wean her off oxygen today, she also states she follow-up with Dr. Jack, will consult pulmonology.  Patient denies any fevers or chills, no recent sick contacts but has multiple grandchildren that she is around.    Assessment/Plan:    Acute hypoxic respiratory failure  Acute exacerbation COPD  -Initially 87% on room air and placed on 3 L nasal cannula.  No oxygen at baseline  -Currently weaned to 1 L nasal cannula, continue to wean as tolerable  -RVP negative.  CT PE without evidence of PE, shows new micronodules in the lungs which is most likely an atypical

## 2024-10-12 NOTE — PROGRESS NOTES
10/12/24 1152   Resting (Room Air)   SpO2 89   HR 88   Resting (On O2)   SpO2 95   HR 93   O2 Device Nasal cannula   O2 Flow Rate (l/min) 2 l/min   During Walk (Room Air)   SpO2 86   HR 92   Walk/Assistance Device Cane   During Walk (On O2)   SpO2 96   HR 92   O2 Device Nasal cannula   O2 Flow Rate (l/min) 2 l/min   Walk/Assistance Device Cane   After Walk   SpO2 97   HR 90   O2 Device Nasal cannula   O2 Flow Rate (l/min) 2 l/min   Does the Patient Qualify for Home O2 Yes   Liter Flow at Rest 2   Liter Flow on Exertion 2   Does the Patient Need Portable Oxygen Tanks Yes

## 2024-10-13 VITALS
BODY MASS INDEX: 50.02 KG/M2 | WEIGHT: 293 LBS | HEART RATE: 90 BPM | OXYGEN SATURATION: 96 % | SYSTOLIC BLOOD PRESSURE: 123 MMHG | DIASTOLIC BLOOD PRESSURE: 83 MMHG | RESPIRATION RATE: 20 BRPM | HEIGHT: 64 IN | TEMPERATURE: 98.2 F

## 2024-10-13 LAB
GLUCOSE BLD-MCNC: 165 MG/DL (ref 70–99)
GLUCOSE BLD-MCNC: 225 MG/DL (ref 70–99)
PERFORMED ON: ABNORMAL
PERFORMED ON: ABNORMAL

## 2024-10-13 PROCEDURE — 2580000003 HC RX 258

## 2024-10-13 PROCEDURE — 6370000000 HC RX 637 (ALT 250 FOR IP): Performed by: STUDENT IN AN ORGANIZED HEALTH CARE EDUCATION/TRAINING PROGRAM

## 2024-10-13 PROCEDURE — 6360000002 HC RX W HCPCS: Performed by: STUDENT IN AN ORGANIZED HEALTH CARE EDUCATION/TRAINING PROGRAM

## 2024-10-13 PROCEDURE — 6370000000 HC RX 637 (ALT 250 FOR IP)

## 2024-10-13 PROCEDURE — 6370000000 HC RX 637 (ALT 250 FOR IP): Performed by: INTERNAL MEDICINE

## 2024-10-13 PROCEDURE — 94660 CPAP INITIATION&MGMT: CPT

## 2024-10-13 PROCEDURE — 2700000000 HC OXYGEN THERAPY PER DAY

## 2024-10-13 PROCEDURE — 94761 N-INVAS EAR/PLS OXIMETRY MLT: CPT

## 2024-10-13 PROCEDURE — 6360000002 HC RX W HCPCS

## 2024-10-13 PROCEDURE — 94640 AIRWAY INHALATION TREATMENT: CPT

## 2024-10-13 PROCEDURE — 94680 O2 UPTK RST&XERS DIR SIMPLE: CPT

## 2024-10-13 RX ORDER — IPRATROPIUM BROMIDE AND ALBUTEROL SULFATE 2.5; .5 MG/3ML; MG/3ML
1 SOLUTION RESPIRATORY (INHALATION)
Status: DISCONTINUED | OUTPATIENT
Start: 2024-10-13 | End: 2024-10-13 | Stop reason: HOSPADM

## 2024-10-13 RX ORDER — WATER 10 ML/10ML
INJECTION INTRAMUSCULAR; INTRAVENOUS; SUBCUTANEOUS
Status: COMPLETED
Start: 2024-10-13 | End: 2024-10-13

## 2024-10-13 RX ORDER — PREDNISONE 20 MG/1
TABLET ORAL
Qty: 10 TABLET | Refills: 0 | Status: SHIPPED | OUTPATIENT
Start: 2024-10-13 | End: 2024-10-25

## 2024-10-13 RX ORDER — AZITHROMYCIN 500 MG/1
500 TABLET, FILM COATED ORAL NIGHTLY
Qty: 5 TABLET | Refills: 0 | Status: SHIPPED | OUTPATIENT
Start: 2024-10-13 | End: 2024-10-18

## 2024-10-13 RX ORDER — GUAIFENESIN 600 MG/1
600 TABLET, EXTENDED RELEASE ORAL 2 TIMES DAILY
Qty: 30 TABLET | Refills: 5 | Status: SHIPPED | OUTPATIENT
Start: 2024-10-13

## 2024-10-13 RX ORDER — GUAIFENESIN 600 MG/1
600 TABLET, EXTENDED RELEASE ORAL 2 TIMES DAILY
Status: DISCONTINUED | OUTPATIENT
Start: 2024-10-13 | End: 2024-10-13 | Stop reason: HOSPADM

## 2024-10-13 RX ORDER — IPRATROPIUM BROMIDE AND ALBUTEROL SULFATE 2.5; .5 MG/3ML; MG/3ML
1 SOLUTION RESPIRATORY (INHALATION) EVERY 4 HOURS PRN
Status: DISCONTINUED | OUTPATIENT
Start: 2024-10-13 | End: 2024-10-13 | Stop reason: HOSPADM

## 2024-10-13 RX ADMIN — GABAPENTIN 300 MG: 300 CAPSULE ORAL at 14:36

## 2024-10-13 RX ADMIN — CHLORTHALIDONE 25 MG: 25 TABLET ORAL at 09:30

## 2024-10-13 RX ADMIN — IPRATROPIUM BROMIDE AND ALBUTEROL SULFATE 1 DOSE: .5; 2.5 SOLUTION RESPIRATORY (INHALATION) at 09:17

## 2024-10-13 RX ADMIN — IPRATROPIUM BROMIDE AND ALBUTEROL SULFATE 1 DOSE: .5; 2.5 SOLUTION RESPIRATORY (INHALATION) at 16:03

## 2024-10-13 RX ADMIN — ARIPIPRAZOLE 2 MG: 2 TABLET ORAL at 09:30

## 2024-10-13 RX ADMIN — METHYLPREDNISOLONE SODIUM SUCCINATE 40 MG: 40 INJECTION INTRAMUSCULAR; INTRAVENOUS at 03:30

## 2024-10-13 RX ADMIN — ENOXAPARIN SODIUM 30 MG: 100 INJECTION SUBCUTANEOUS at 09:29

## 2024-10-13 RX ADMIN — PANTOPRAZOLE SODIUM 20 MG: 20 TABLET, DELAYED RELEASE ORAL at 06:19

## 2024-10-13 RX ADMIN — METOPROLOL SUCCINATE 100 MG: 50 TABLET, EXTENDED RELEASE ORAL at 09:30

## 2024-10-13 RX ADMIN — TORSEMIDE 50 MG: 20 TABLET ORAL at 09:30

## 2024-10-13 RX ADMIN — WATER 10 ML: 1 INJECTION INTRAMUSCULAR; INTRAVENOUS; SUBCUTANEOUS at 03:30

## 2024-10-13 RX ADMIN — GUAIFENESIN 600 MG: 600 TABLET ORAL at 09:30

## 2024-10-13 RX ADMIN — CLONIDINE HYDROCHLORIDE 0.1 MG: 0.1 TABLET ORAL at 09:30

## 2024-10-13 RX ADMIN — MORPHINE SULFATE 15 MG: 15 TABLET, FILM COATED, EXTENDED RELEASE ORAL at 09:30

## 2024-10-13 RX ADMIN — WATER 10 ML: 1 INJECTION INTRAMUSCULAR; INTRAVENOUS; SUBCUTANEOUS at 12:09

## 2024-10-13 RX ADMIN — GABAPENTIN 300 MG: 300 CAPSULE ORAL at 09:30

## 2024-10-13 RX ADMIN — METHYLPREDNISOLONE SODIUM SUCCINATE 40 MG: 40 INJECTION INTRAMUSCULAR; INTRAVENOUS at 12:09

## 2024-10-13 RX ADMIN — SPIRONOLACTONE 25 MG: 25 TABLET ORAL at 09:30

## 2024-10-13 RX ADMIN — INSULIN LISPRO 2 UNITS: 100 INJECTION, SOLUTION INTRAVENOUS; SUBCUTANEOUS at 12:09

## 2024-10-13 RX ADMIN — VENLAFAXINE HYDROCHLORIDE 150 MG: 75 CAPSULE, EXTENDED RELEASE ORAL at 09:29

## 2024-10-13 RX ADMIN — IPRATROPIUM BROMIDE AND ALBUTEROL SULFATE 1 DOSE: .5; 2.5 SOLUTION RESPIRATORY (INHALATION) at 11:47

## 2024-10-13 ASSESSMENT — PAIN - FUNCTIONAL ASSESSMENT
PAIN_FUNCTIONAL_ASSESSMENT: ACTIVITIES ARE NOT PREVENTED
PAIN_FUNCTIONAL_ASSESSMENT: ACTIVITIES ARE NOT PREVENTED

## 2024-10-13 ASSESSMENT — PAIN DESCRIPTION - DESCRIPTORS
DESCRIPTORS: ACHING;THROBBING
DESCRIPTORS: ACHING;THROBBING

## 2024-10-13 ASSESSMENT — PAIN DESCRIPTION - FREQUENCY
FREQUENCY: CONTINUOUS
FREQUENCY: CONTINUOUS

## 2024-10-13 ASSESSMENT — PAIN DESCRIPTION - PAIN TYPE
TYPE: CHRONIC PAIN
TYPE: CHRONIC PAIN

## 2024-10-13 ASSESSMENT — PAIN DESCRIPTION - ORIENTATION
ORIENTATION: RIGHT;LEFT
ORIENTATION: RIGHT;LEFT

## 2024-10-13 ASSESSMENT — PAIN SCALES - GENERAL
PAINLEVEL_OUTOF10: 7
PAINLEVEL_OUTOF10: 4

## 2024-10-13 ASSESSMENT — PAIN DESCRIPTION - LOCATION
LOCATION: BACK;HIP;KNEE
LOCATION: BACK;HIP;KNEE

## 2024-10-13 ASSESSMENT — PAIN DESCRIPTION - ONSET
ONSET: ON-GOING
ONSET: ON-GOING

## 2024-10-13 NOTE — PROGRESS NOTES
Discharge instructions and medications reviewed with patient.  Pt. Verbalized understanding. Denies questions.  Discontinued IV without complications.  Pt. tolerated well.    Electronically signed by Xiomara Block RN on 10/13/2024 at 4:46 PM

## 2024-10-13 NOTE — DISCHARGE SUMMARY
Hospital Medicine Discharge Summary    Patient ID: Odalis Jett      Patient's PCP: Tad Ramirez MD    Admit Date: 10/11/2024     Discharge Date:   10/13/24    Admitting Physician: No admitting provider for patient encounter.     Discharge Physician: Sharla Mccallum PA-C         Hospital Course: 49-year-old female with past medical history of COPD, essential hypertension, type II non-insulin-dependent diabetes, mood disorder, chronic pain, tobacco abuse who presented to the ED with worsening shortness of breath and increased productive cough.  Patient was sent by her PCP when she was found to be hypoxic on room air with O2 sats in the 80s.  When patient arrived to the ED her O2 saturation was 87% on room air, she was placed on 3 L nasal cannula with improvement.  CT PE protocol was obtained which showed no evidence of PE but showed new micronodules in the lungs.  She was given a dose of IV Solu-Medrol and admitted for further workup/treatment.  Patient was started on DuoNebs and azithromycin.  He was able to be weaned to 1 L nasal cannula.  Patient follows with pulmonologist Dr. Jack, he was consulted.  Patient felt she could manage her care at home and pulmonology cleared for discharge.  Home O2 eval was done 10/12 and revealed patient qualified for 2 L nasal cannula with exertion and at rest. Patient did not want to discharge on oxygen, was agreeable to stay inpatient for further steroids. Home O2 eval was done again on 10/13 and patient still qualified for 2 L nasal cannula with exertion.  Discussed results with patient at bedside, she stated she did not and would not wear oxygen at home.  Home oxygen DME still ordered in case patient changed her mind.  She was educated on importance.  Respiratory culture not finalized prior to DC, discussed with Dr. Jack, will send patient out on azithromycin to cover for atypical infection.  Prescription also sent for prednisone taper and Mucinex..

## 2024-10-13 NOTE — PLAN OF CARE
Problem: Chronic Conditions and Co-morbidities  Goal: Patient's chronic conditions and co-morbidity symptoms are monitored and maintained or improved  10/13/2024 1533 by Xiomara Chase RN  Outcome: Progressing  10/13/2024 0418 by Maggie Edwards RN  Outcome: Progressing     Problem: Discharge Planning  Goal: Discharge to home or other facility with appropriate resources  10/13/2024 1533 by Xiomara Chase RN  Outcome: Progressing  10/13/2024 0418 by Maggie Edwards RN  Outcome: Progressing     Problem: Pain  Goal: Verbalizes/displays adequate comfort level or baseline comfort level  10/13/2024 1533 by Xiomara Chase RN  Outcome: Progressing  10/13/2024 0418 by Maggie Edwards RN  Outcome: Progressing     Problem: Safety - Adult  Goal: Free from fall injury  10/13/2024 1533 by Xiomara Chase RN  Outcome: Progressing  10/13/2024 0418 by Maggie Edwards RN  Outcome: Progressing     Problem: ABCDS Injury Assessment  Goal: Absence of physical injury  Outcome: Progressing

## 2024-10-13 NOTE — RT PROTOCOL NOTE
RT Inhaler-Nebulizer Bronchodilator Protocol Note    There is a bronchodilator order in the chart from a provider indicating to follow the RT Bronchodilator Protocol and there is an “Initiate RT Inhaler-Nebulizer Bronchodilator Protocol” order as well (see protocol at bottom of note).    CXR Findings:  XR CHEST PORTABLE    Result Date: 10/11/2024  No acute pulmonary process identified. Borderline enlargement of the heart.       The findings from the last RT Protocol Assessment were as follows:   History Pulmonary Disease: Chronic pulmonary disease  Respiratory Pattern: Regular pattern and RR 12-20 bpm  Breath Sounds: Slightly diminished and/or crackles  Cough: Strong, productive  Indication for Bronchodilator Therapy: Decreased or absent breath sounds, On home bronchodilators  Bronchodilator Assessment Score: 5    Aerosolized bronchodilator medication orders have been revised according to the RT Inhaler-Nebulizer Bronchodilator Protocol below.    Respiratory Therapist to perform RT Therapy Protocol Assessment initially then follow the protocol.  Repeat RT Therapy Protocol Assessment PRN for score 0-3 or on second treatment, BID, and PRN for scores above 3.    No Indications - adjust the frequency to every 6 hours PRN wheezing or bronchospasm, if no treatments needed after 48 hours then discontinue using Per Protocol order mode.     If indication present, adjust the RT bronchodilator orders based on the Bronchodilator Assessment Score as indicated below.  Use Inhaler orders unless patient has one or more of the following: on home nebulizer, not able to hold breath for 10 seconds, is not alert and oriented, cannot activate and use MDI correctly, or respiratory rate 25 breaths per minute or more, then use the equivalent nebulizer order(s) with same Frequency and PRN reasons based on the score.  If a patient is on this medication at home then do not decrease Frequency below that used at home.    0-3 - enter or revise RT

## 2024-10-13 NOTE — PLAN OF CARE
Problem: Chronic Conditions and Co-morbidities  Goal: Patient's chronic conditions and co-morbidity symptoms are monitored and maintained or improved  10/13/2024 1534 by Xiomara Chase RN  Outcome: Adequate for Discharge  10/13/2024 1533 by Xiomara Chase RN  Outcome: Progressing  10/13/2024 0418 by Maggie Edwards RN  Outcome: Progressing     Problem: Discharge Planning  Goal: Discharge to home or other facility with appropriate resources  10/13/2024 1534 by Xiomara Chase RN  Outcome: Adequate for Discharge  10/13/2024 1533 by Xiomara Chase RN  Outcome: Progressing  10/13/2024 0418 by Maggie Edwards RN  Outcome: Progressing     Problem: Pain  Goal: Verbalizes/displays adequate comfort level or baseline comfort level  10/13/2024 1534 by Xiomara Chase RN  Outcome: Adequate for Discharge  10/13/2024 1533 by Xiomara Chase RN  Outcome: Progressing  10/13/2024 0418 by Maggie Edwards RN  Outcome: Progressing     Problem: Safety - Adult  Goal: Free from fall injury  10/13/2024 1534 by Xiomara Chase RN  Outcome: Adequate for Discharge  10/13/2024 1533 by Xiomara Chase RN  Outcome: Progressing  10/13/2024 0418 by Maggie Edwards RN  Outcome: Progressing     Problem: ABCDS Injury Assessment  Goal: Absence of physical injury  10/13/2024 1534 by Xiomara Chase RN  Outcome: Adequate for Discharge  10/13/2024 1533 by Xiomara Chase RN  Outcome: Progressing

## 2024-10-13 NOTE — PLAN OF CARE
Problem: Chronic Conditions and Co-morbidities  Goal: Patient's chronic conditions and co-morbidity symptoms are monitored and maintained or improved  10/13/2024 0418 by Maggie Edwards RN  Outcome: Progressing  10/12/2024 1545 by Kenna Barrow RN  Outcome: Progressing     Problem: Discharge Planning  Goal: Discharge to home or other facility with appropriate resources  10/13/2024 0418 by Maggie Edwards RN  Outcome: Progressing  10/12/2024 1545 by Kenna Barrow RN  Outcome: Progressing     Problem: Pain  Goal: Verbalizes/displays adequate comfort level or baseline comfort level  10/13/2024 0418 by Maggie Edwards RN  Outcome: Progressing  10/12/2024 1545 by Kenna Barrow RN  Outcome: Progressing     Problem: Safety - Adult  Goal: Free from fall injury  10/13/2024 0418 by Maggie Edwards RN  Outcome: Progressing  10/12/2024 1545 by Kenna Barrow RN  Outcome: Progressing

## 2024-10-13 NOTE — PROGRESS NOTES
Patient refusing the use of HS BiPAP at this time.  Patient stating she was on the phone with her family.  Will let staff know if she changes her mind.  Electronically signed by Tala Walter RCP on 10/13/2024 at 12:22 AM

## 2024-10-13 NOTE — CARE COORDINATION
Received call back from [facility] [email or web address]. Se reported that the O2  is on his way to the hospital to deliver patient's portable O2 unit and that [facility] representative would deliver home O2 concentrator to patient's home tomorrow, Monday, October 14, 2024. Updated bedside Rn.    Electronically signed by Magaly Flores, MSW, LISW, Case Management on 10/13/2024 at 1:41 PM  [place] [phone]    
[R09.02]  COPD exacerbation (HCC) [J44.1]    The Patient and/or Patient Representative Agree with the Discharge Plan?  yes    CHRISTOPHER Marquez  Case Management Department  Ph: [phone]

## 2024-10-13 NOTE — PROGRESS NOTES
Patient on room air at rest  SpO2  95%  Patient  on room air with ambulation 80%  Patient on 2 lpm with ambulation 90%    Patient still qualifies for home oxygen.

## 2024-10-14 ENCOUNTER — CARE COORDINATION (OUTPATIENT)
Dept: CASE MANAGEMENT | Age: 49
End: 2024-10-14

## 2024-10-14 LAB
BACTERIA SPEC RESP CULT: ABNORMAL
BACTERIA SPEC RESP CULT: ABNORMAL
GRAM STN SPEC: ABNORMAL
ORGANISM: ABNORMAL

## 2024-10-14 RX ORDER — DULAGLUTIDE 0.75 MG/.5ML
INJECTION, SOLUTION SUBCUTANEOUS
Qty: 2 ML | Refills: 1 | Status: SHIPPED | OUTPATIENT
Start: 2024-10-14

## 2024-10-15 ENCOUNTER — CARE COORDINATION (OUTPATIENT)
Dept: CASE MANAGEMENT | Age: 49
End: 2024-10-15

## 2024-10-15 NOTE — CARE COORDINATION
-Remote Alert Monitoring Note      Date/Time:  10/15/2024 11:05 AM  Patient Current Location: Home: [address] [place], [place] [id number]  Verified patients name and  as identifiers.    Rpm alert to be reviewed by the provider   red alert  pulse ox reading (86%)  Vitals Recheck pulse ox reading (n/a)  Additional needs to be addressed by provider: No                   LPN contacted patient by telephone regarding red alert received   Background: enrolled in [facility] for CHF COPD DM AND HTN  Refer to [facility] immediately if:  Patient unresponsive or unable to provide history  Change in cognition or sudden confusion  Patient unable to respond in complete sentences  Intense chest pain/tightness  Any concern for any clinical emergency  Red Alert: Provider response time of 1 hr required for any red alert requiring intervention  Yellow Alert: Provider response time of 3hr required for any escalated yellow alert  Patient Chief Complaint:  Oxygen O2 Triage  Are you having any Chest Pain? no   Are you having any Shortness of Breath? no   Swelling in your hands or feet? no   Are you having any other health concerns or issues? no  .............................................................................................................................................................................................  Do you use oxygen? No   Patient educated on oxygen preparedness in case of an emergency?  No      Clinical Interventions: Reviewed and followed up on alerts and treatments-spoke to [place] regarding [facility] RED alert for low pulse ox metric. Denies chest pain or dyspnea. No swelling. No use of oxygen. Speaks clearly in full sentences with no respiratory distress.   Educated on rubbing hands together for 10 seconds and placing pulse ox on her index finger. Lay hand flat to check. Pt reports she is not home at this time but will recheck when she returns.  Plan/Follow Up: Will continue to review, monitor and address alerts

## 2024-10-15 NOTE — CARE COORDINATION
Remote Patient Monitoring Note      Date/Time:  10/15/2024 1:57 PM  Patient Current Location: Home: [address] [place] [id number]  LPN contacted patient by telephone regarding red alert received for pulse ox reading (86%). Verified patients name and  as identifiers.  Background: enrolled in RPM for CHF COPD DM HTN  Clinical Interventions: Reviewed and followed up on alerts and treatments-spoke to [place] . Requested recheck of pulse ox metrics. Pt speaks clearly in full sentences with no respiratory distress. Pt wears oxygen PRN 2lpm. Pt has not used her Duoneb nebulizer.   Educated on checking pulse ox reading. Wipe inside of pulse oximeter with a clean tissue due to natural oils on skin.  Rub hands together briskly for 10 seconds to improve circulation. Lay hand flat on table, dresser or counter. Place pulse oximeter on index finger. verbalized understanding.  Pt education given to recheck oxygen level if below 92%. Pt reports her nails are long.  Requested placement of pulse ox on a finger with shorter nail.  Pt rechecked her pulse ox metrics. New reading 96%.  All metrics WNL  Plan/Follow Up: Will continue to review, monitor and address alerts with follow up based on severity of symptoms and risk factors.

## 2024-10-17 ENCOUNTER — CARE COORDINATION (OUTPATIENT)
Dept: CASE MANAGEMENT | Age: 49
End: 2024-10-17

## 2024-10-17 NOTE — PROGRESS NOTES
Physician Progress Note      PATIENT:               DEVAUGHN CARNEY  CSN #:                  [id number]  :                       1975  ADMIT DATE:       10/11/2024 1:27 PM  DISCH DATE:        10/13/2024 5:18 PM  RESPONDING  PROVIDER #:        Sharla Mccallum          QUERY TEXT:    Pt admitted with COPD exacerbation and acute respiratory failure.  Noted   documentation of Atypical Pneumonia on 10/12/24 by ordered Pulmonology   consultant.  If possible, please document in progress notes and discharge   summary:    The medical record reflects the following:  Risk Factors: COPD Exacerbation morbid obesity  tobacco use JOSE  Clinical Indicators: per  Pulmonology consult \"In emergency room, chest x-ray   CT chest confirmed abnormality of the left upper lobe consistent with atypical   pneumonia  Azithromycin  Solu-Medrol, okay to change to prednisone\"  Treatment: Pulmonology consult and Zithromax  Options provided:  -- Atypical Pneumonia confirmed present on admission  -- Atypical Pneumonia ruled out  -- Defer to Pulmonology consultant documentation regarding Atypical Pneumonia  -- Other - I will add my own diagnosis  -- Disagree - Not applicable / Not valid  -- Disagree - Clinically unable to determine / Unknown  -- Refer to Clinical Documentation Reviewer    PROVIDER RESPONSE TEXT:    I defer to Pulmonology consultant regarding documentation of Atypical   Pneumonia .    Query created by: Paula Gill on 10/17/2024 10:21 AM      Electronically signed by:  Sharla Mccallum 10/17/2024 1:06 PM

## 2024-10-17 NOTE — CARE COORDINATION
Date/Time:  10/17/2024 9:15 AM  LPN attempted to reach patient by telephone regarding red alert in remote patient monitoring program for wt increase of 3 lbs in 24 hrs. Left HIPPA compliant message requesting a return call. Will attempt to reach patient again.             Bennett Pascual LPN, PCC, Remote Patient Monitoring    PH: [phone]  Email: [email or web address]

## 2024-10-23 ENCOUNTER — CARE COORDINATION (OUTPATIENT)
Dept: CASE MANAGEMENT | Age: 49
End: 2024-10-23

## 2024-10-23 ENCOUNTER — CARE COORDINATION (OUTPATIENT)
Dept: CARE COORDINATION | Age: 49
End: 2024-10-23

## 2024-10-23 NOTE — CARE COORDINATION
Ambulatory Care Coordination Note     10/23/2024 4:13 PM     Patient Current Location:  Home: [address] [place] [id number]     LPN CC contacted the LPN CC by telephone. Verified name and  with patient as identifiers.         ACM: Mikayla Diaz LPN     Challenges to be reviewed by the provider   Additional needs identified to be addressed with provider No  none               Method of communication with provider: none.    Has the patient been seen in the ED since your last call? Yes,   Discharge Date: 10/13/24   Discharge Facility: [facility]  Reason for ED Visit: sob  Visit Diagnosis: Acute Respiratory failure    Number of ED visits in the last 6 months: 3      Do you have any ongoing symptoms? Yes, there has been no change in my symptoms.   Current symptoms: cough non productive.    Did you call your PCP prior to going to the ED? No, did not call the PCP office.     Review of Discharge Instructions:   [x] AVS discharge instructions  [x] Right Care, Right Place, Right Time document  [x] Medication changes  [x] Follow up appointments - Patient stated that she will call PCP to schedule a HFU appointment  [] Referral follow up   []        Care Summary Note: Spoke with Odalis Jett who reported that she still has a dry cough. Patient stated that it feels like mucus is there but nothing comes up. Patient stated that she did complete her ATB without issues. Patient denied cp, sob, dizziness, headache, n/v, diarrhea, abdominal pains, fever, or chills. Patient report that appetite and fluid intake is good and denied any problems with bowel or bladder. Patient reported that she is taking all medications as ordered. Patient denied any other needs at this time. Patient instructed to continue to monitor s/s, reporting any that may present to MD immediately for early intervention.  Patient is agreeable to f/u calls.     Offered patient enrollment in the Remote Patient Monitoring (RPM) program

## 2024-10-23 NOTE — CARE COORDINATION
SW placed follow up call to patient who stated she has not reviewed e-mail sent by this worker. SW to re-send on this date. Patient reports she has been trying to clean up her home after leaving her dogs alone while she was at the hospital.   SW inquired if patient would be interested in completing AD's and she politely declined. SW encouraged her that if she changes her mind, this worker can send out paperwork.     SW to follow up in two weeks.      Kat Crum MSW, LSW     [phone]

## 2024-10-24 ENCOUNTER — CARE COORDINATION (OUTPATIENT)
Dept: CASE MANAGEMENT | Age: 49
End: 2024-10-24

## 2024-10-24 NOTE — CARE COORDINATION
Remote Patient Monitoring Note      Date/Time:  10/24/2024 4:14 PM  Odalis Jett , I am a nurse with the remote patient monitoring team;  reaching out to you today to remind you to please take your vitals. Important: Please try to take your vitals each day before 12pm. This ensures the monitoring team will have time to connect with your providers and get back to you with any new orders or instructions.     Please enter all of your vitals each day. Please ensure that your tablet is plugged in, turned on and charging. If you are having issues with your equipment, please call: [phone].    Be well,    Misty Pascual LPN, [facility]  PH: [phone]

## 2024-10-25 ENCOUNTER — CARE COORDINATION (OUTPATIENT)
Dept: CASE MANAGEMENT | Age: 49
End: 2024-10-25

## 2024-10-29 ENCOUNTER — CARE COORDINATION (OUTPATIENT)
Dept: CASE MANAGEMENT | Age: 49
End: 2024-10-29

## 2024-10-30 ENCOUNTER — CARE COORDINATION (OUTPATIENT)
Dept: CARE COORDINATION | Age: 49
End: 2024-10-30

## 2024-10-31 ENCOUNTER — CARE COORDINATION (OUTPATIENT)
Dept: CASE MANAGEMENT | Age: 49
End: 2024-10-31

## 2024-10-31 NOTE — CARE COORDINATION
Odalis Jett  10/30/2024    Registered Dietitian Progress Note for Care Coordination    Assessment: Odalis is a 49 y.o. female.  RD referred for desired weight loss. RD spoke with patient for initial nutrition assessment on 8/21/24. RD called to follow up with patient today, 10/30/24. RD discussed previous goals with patient. Patient states that she is \"doing better.\" Patient reports that she was hospitalized 10/11/24-10/13/24 2/2 COPD exacerbation. Patient c/o xerostomia. RD offered to send handout regarding nutritional ways to manage dry mouth. Patient was agreeable. RD will send to her home address. Patient reports that she continues on her weight loss journey. Reports a current weight of 341 lbs this date. 3 lb (0.9%) weight loss x one month. Patient reports that she weighs herself every morning due to h/o CHF. Patient endorses fluid retention and swollen bilateral lower extremities this date. Patient states that her appetite fluctuates day-to-day. Patient has been eating two meals/day, on average. Patient reports that she has been eating \"a lot\" of fruits and vegetables. Patient has been snacking on nuts (i.e., pistachios, almonds). RD reiterated importance of choosing nuts that are low in sodium or are sodium-free due to h/o CHF. Patient verbalized understanding. RD also reiterated importance of following suggested serving sizes as nuts are nutrient-dense. Patient verbalized understanding. Patient reports for physical activity, she has been walking her dogs and is \"constantly moving.\"     24-Hour Food Recall:   Breakfast - None   Lunch - Pizza, 5x chicken wings, corn  Dinner - None   Snacks - Pistachios, cranberries, almonds   Beverages - Coffee, water      Nutrition Monitoring and Evaluation  Indicator/Goal Criteria Progress   #1 Follow MyPlate guidelines  #1 I will build balanced meal using the MyPlate reference: 1/2 plate fruits and/or vegetables, 1/4 plate protein, and 1/4 plate starchy

## 2024-10-31 NOTE — CARE COORDINATION
-Remote Alert Monitoring Note      Date/Time:  10/31/2024 8:33 AM  Patient Current Location: [place]  Verified patients name and  as identifiers.      Rpm alert to be reviewed by the provider   red alert  weight (4.7 lbs increase in 24 hrs)    Additional needs to be addressed by provider:     LPN contacted pt in regard to RPM red alert for wt increase of 4.7 lbs in 24 hrs in 24 hrs.     Pt c/o worsening BLE edema in ankles and feet. Pt denied SOB and chest pain. Pt followed proper protocol to obtain wt metrics today. Pt is compliant with all meds/diuretics. Pt weighed on a hard, flat surface.       Please advise, thank you.               LPN contacted patient by telephone regarding red alert received   Background: RPM for   Refer to 911 immediately if:  Patient unresponsive or unable to provide history  Change in cognition or sudden confusion  Patient unable to respond in complete sentences  Intense chest pain/tightness  Any concern for any clinical emergency  Red Alert: Provider response time of 1 hr required for any red alert requiring intervention  Yellow Alert: Provider response time of 3hr required for any escalated yellow alert  Patient Chief Complaint:  Weight Triage  Are you weighing any different than you did yesterday? (time of day, clothes and shoes on or off, etc)? no   Do you have any shortness of breath? no   Do you have any swelling in your hands of feet? Yes, BLE in ankles   Are you having any other health concerns or issues? no         Clinical Interventions: Reviewed and followed up on alerts and treatments-   LPN contacted pt in regard to RPM red alert for wt increase of 4.7 lbs in 24 hrs in 24 hrs.     Pt c/o worsening BLE edema in ankles and feet. Pt denied SOB and chest pain. Pt followed proper protocol to obtain wt metrics today. Pt is compliant with all meds/diuretics. Pt weighed on a hard, flat surface.      Writer advised Cardio.    Plan/Follow Up: Will continue to review, monitor and

## 2024-11-01 ENCOUNTER — CARE COORDINATION (OUTPATIENT)
Dept: CASE MANAGEMENT | Age: 49
End: 2024-11-01

## 2024-11-01 NOTE — TELEPHONE ENCOUNTER
Pt has gained five lbs in two days, she takes 20 mg of the torsemide at night.  It should be twice a day and she is only taking it twice a day.  Her sob comes on when she calms down at night and in the morning she is so congested she can't get the mucus up and she is short of breathe with that.

## 2024-11-01 NOTE — TELEPHONE ENCOUNTER
Please let Odalis know:  Important to take twice per day to avoid hospital stay  Please let us know if gaining more water weight or more short of breath

## 2024-11-01 NOTE — CARE COORDINATION
Tad Ramirez MD   Physician  Specialty: Family Medicine     Telephone Encounter     Signed     Encounter Date: 11/1/2024     Signed         Please ask Odalis if she is having worsening heart failure symptoms (swelling, shortness of breath) and what does of water pill she is taking (torsemide)              Steph Oliva MA   Medical Assistant     Telephone Encounter     Signed     Encounter Date: 11/1/2024     Signed         Pt has gained five lbs in two days, she takes 20 mg of the torsemide at night.  It should be twice a day and she is only taking it twice a day.  Her sob comes on when she calms down at night and in the morning she is so congested she can't get the mucus up and she is short of breathe with that.                  Tad Ramirez MD   Physician  Specialty: Family Medicine     Telephone Encounter     Signed     Encounter Date: 11/1/2024     Signed         Please let Odalis know:  Important to take twice per day to avoid hospital stay  Please let us know if gaining more water weight or more short of breath                 Steph Oliva MA   Medical Assistant     Telephone Encounter     Signed     Encounter Date: 11/1/2024     Signed         Called pt and let her know. She agrees with the plan                 
symptoms and risk factors.  **For any new or worsening symptoms or you are concerned in anyway, please contact your Provider or report to the nearest Emergency Room.**              Bennett Pascual LPN, [facility], Remote Patient Monitoring     PH: [phone]  Email: [email or web address]

## 2024-11-01 NOTE — CARE COORDINATION
11/1/2024 4:25 PM  *  RPM Alert   Remote Patient Monitoring Note      Date/Time:  11/1/2024 4:25 PM  Odalis Jett , I am a nurse with the remote patient monitoring team;  reaching out to you today to remind you to please take your vitals. Important: Please try to take your vitals each day before 12pm. This ensures the monitoring team will have time to connect with your providers and get back to you with any new orders or instructions.     Please enter all of your vitals each day. Please ensure that your tablet is plugged in, turned on and charging. If you are having issues with your equipment, please call: [phone].    Please enter your glucose reading daily.     Be well,    Misty Pascual LPN, [facility]  PH: [phone]

## 2024-11-01 NOTE — TELEPHONE ENCOUNTER
Please ask Odalis if she is having worsening heart failure symptoms (swelling, shortness of breath) and what does of water pill she is taking (torsemide)

## 2024-11-06 ENCOUNTER — HOSPITAL ENCOUNTER (EMERGENCY)
Age: 49
Discharge: HOME | End: 2024-11-06
Payer: MEDICAID

## 2024-11-06 ENCOUNTER — CARE COORDINATION (OUTPATIENT)
Dept: CARE COORDINATION | Age: 49
End: 2024-11-06

## 2024-11-06 ENCOUNTER — APPOINTMENT (OUTPATIENT)
Dept: GENERAL RADIOLOGY | Age: 49
End: 2024-11-06
Payer: MEDICAID

## 2024-11-06 ENCOUNTER — TELEPHONE (OUTPATIENT)
Dept: PRIMARY CARE CLINIC | Age: 49
End: 2024-11-06

## 2024-11-06 ENCOUNTER — CARE COORDINATION (OUTPATIENT)
Dept: CASE MANAGEMENT | Age: 49
End: 2024-11-06

## 2024-11-06 VITALS
WEIGHT: 293 LBS | BODY MASS INDEX: 50.02 KG/M2 | TEMPERATURE: 98.3 F | SYSTOLIC BLOOD PRESSURE: 130 MMHG | HEIGHT: 64 IN | DIASTOLIC BLOOD PRESSURE: 86 MMHG | HEART RATE: 91 BPM | OXYGEN SATURATION: 94 % | RESPIRATION RATE: 16 BRPM

## 2024-11-06 DIAGNOSIS — J44.1 COPD EXACERBATION (HCC): Primary | ICD-10-CM

## 2024-11-06 DIAGNOSIS — M25.551 CHRONIC RIGHT HIP PAIN: ICD-10-CM

## 2024-11-06 DIAGNOSIS — G89.29 CHRONIC RIGHT HIP PAIN: ICD-10-CM

## 2024-11-06 LAB
ALBUMIN SERPL-MCNC: 3.6 G/DL (ref 3.4–5)
ALBUMIN/GLOB SERPL: 1.3 {RATIO} (ref 1.1–2.2)
ALP SERPL-CCNC: 87 U/L (ref 40–129)
ALT SERPL-CCNC: 17 U/L (ref 10–40)
ANION GAP SERPL CALCULATED.3IONS-SCNC: 8 MMOL/L (ref 3–16)
AST SERPL-CCNC: 23 U/L (ref 15–37)
BASOPHILS # BLD: 0.1 K/UL (ref 0–0.2)
BASOPHILS NFR BLD: 0.8 %
BILIRUB SERPL-MCNC: 0.4 MG/DL (ref 0–1)
BUN SERPL-MCNC: 10 MG/DL (ref 7–20)
CALCIUM SERPL-MCNC: 9.2 MG/DL (ref 8.3–10.6)
CHLORIDE SERPL-SCNC: 99 MMOL/L (ref 99–110)
CO2 SERPL-SCNC: 30 MMOL/L (ref 21–32)
CREAT SERPL-MCNC: 1 MG/DL (ref 0.6–1.1)
DEPRECATED RDW RBC AUTO: 13.6 % (ref 12.4–15.4)
EOSINOPHIL # BLD: 0.1 K/UL (ref 0–0.6)
EOSINOPHIL NFR BLD: 1.7 %
GFR SERPLBLD CREATININE-BSD FMLA CKD-EPI: 68 ML/MIN/{1.73_M2}
GLUCOSE SERPL-MCNC: 122 MG/DL (ref 70–99)
HCT VFR BLD AUTO: 42 % (ref 36–48)
HGB BLD-MCNC: 14.2 G/DL (ref 12–16)
LYMPHOCYTES # BLD: 2.6 K/UL (ref 1–5.1)
LYMPHOCYTES NFR BLD: 36.1 %
MCH RBC QN AUTO: 32.6 PG (ref 26–34)
MCHC RBC AUTO-ENTMCNC: 33.7 G/DL (ref 31–36)
MCV RBC AUTO: 96.6 FL (ref 80–100)
MONOCYTES # BLD: 0.5 K/UL (ref 0–1.3)
MONOCYTES NFR BLD: 7.7 %
NEUTROPHILS # BLD: 3.8 K/UL (ref 1.7–7.7)
NEUTROPHILS NFR BLD: 53.7 %
NT-PROBNP SERPL-MCNC: 143 PG/ML (ref 0–124)
PLATELET # BLD AUTO: 246 K/UL (ref 135–450)
PMV BLD AUTO: 9 FL (ref 5–10.5)
POTASSIUM SERPL-SCNC: 3.6 MMOL/L (ref 3.5–5.1)
PROT SERPL-MCNC: 6.4 G/DL (ref 6.4–8.2)
RBC # BLD AUTO: 4.34 M/UL (ref 4–5.2)
SODIUM SERPL-SCNC: 137 MMOL/L (ref 136–145)
TROPONIN, HIGH SENSITIVITY: <6 NG/L (ref 0–14)
TROPONIN, HIGH SENSITIVITY: <6 NG/L (ref 0–14)
WBC # BLD AUTO: 7.1 K/UL (ref 4–11)

## 2024-11-06 PROCEDURE — 96374 THER/PROPH/DIAG INJ IV PUSH: CPT

## 2024-11-06 PROCEDURE — 6360000002 HC RX W HCPCS: Performed by: PHYSICIAN ASSISTANT

## 2024-11-06 PROCEDURE — 6370000000 HC RX 637 (ALT 250 FOR IP): Performed by: PHYSICIAN ASSISTANT

## 2024-11-06 PROCEDURE — 80053 COMPREHEN METABOLIC PANEL: CPT

## 2024-11-06 PROCEDURE — 94760 N-INVAS EAR/PLS OXIMETRY 1: CPT

## 2024-11-06 PROCEDURE — 71046 X-RAY EXAM CHEST 2 VIEWS: CPT

## 2024-11-06 PROCEDURE — 96372 THER/PROPH/DIAG INJ SC/IM: CPT

## 2024-11-06 PROCEDURE — 99285 EMERGENCY DEPT VISIT HI MDM: CPT

## 2024-11-06 PROCEDURE — 93005 ELECTROCARDIOGRAM TRACING: CPT | Performed by: STUDENT IN AN ORGANIZED HEALTH CARE EDUCATION/TRAINING PROGRAM

## 2024-11-06 PROCEDURE — 84484 ASSAY OF TROPONIN QUANT: CPT

## 2024-11-06 PROCEDURE — 36415 COLL VENOUS BLD VENIPUNCTURE: CPT

## 2024-11-06 PROCEDURE — 83880 ASSAY OF NATRIURETIC PEPTIDE: CPT

## 2024-11-06 PROCEDURE — 85025 COMPLETE CBC W/AUTO DIFF WBC: CPT

## 2024-11-06 PROCEDURE — 94640 AIRWAY INHALATION TREATMENT: CPT

## 2024-11-06 RX ORDER — ALBUTEROL SULFATE 90 UG/1
2 INHALANT RESPIRATORY (INHALATION) 4 TIMES DAILY PRN
Qty: 18 G | Refills: 0 | Status: SHIPPED | OUTPATIENT
Start: 2024-11-06

## 2024-11-06 RX ORDER — IPRATROPIUM BROMIDE AND ALBUTEROL SULFATE 2.5; .5 MG/3ML; MG/3ML
1 SOLUTION RESPIRATORY (INHALATION) ONCE
Status: COMPLETED | OUTPATIENT
Start: 2024-11-06 | End: 2024-11-06

## 2024-11-06 RX ORDER — KETOROLAC TROMETHAMINE 30 MG/ML
30 INJECTION, SOLUTION INTRAMUSCULAR; INTRAVENOUS ONCE
Status: COMPLETED | OUTPATIENT
Start: 2024-11-06 | End: 2024-11-06

## 2024-11-06 RX ORDER — METHYLPREDNISOLONE SODIUM SUCCINATE 125 MG/2ML
125 INJECTION, POWDER, LYOPHILIZED, FOR SOLUTION INTRAMUSCULAR; INTRAVENOUS ONCE
Status: COMPLETED | OUTPATIENT
Start: 2024-11-06 | End: 2024-11-06

## 2024-11-06 RX ORDER — ALBUTEROL SULFATE 0.83 MG/ML
2.5 SOLUTION RESPIRATORY (INHALATION) ONCE
Status: COMPLETED | OUTPATIENT
Start: 2024-11-06 | End: 2024-11-06

## 2024-11-06 RX ORDER — KETOROLAC TROMETHAMINE 10 MG/1
10 TABLET, FILM COATED ORAL EVERY 6 HOURS PRN
Qty: 20 TABLET | Refills: 0 | Status: SHIPPED | OUTPATIENT
Start: 2024-11-06

## 2024-11-06 RX ADMIN — ALBUTEROL SULFATE 2.5 MG: 0.83 SOLUTION RESPIRATORY (INHALATION) at 19:40

## 2024-11-06 RX ADMIN — IPRATROPIUM BROMIDE AND ALBUTEROL SULFATE 1 DOSE: .5; 2.5 SOLUTION RESPIRATORY (INHALATION) at 19:40

## 2024-11-06 RX ADMIN — KETOROLAC TROMETHAMINE 30 MG: 30 INJECTION, SOLUTION INTRAMUSCULAR at 19:10

## 2024-11-06 RX ADMIN — METHYLPREDNISOLONE SODIUM SUCCINATE 125 MG: 125 INJECTION INTRAMUSCULAR; INTRAVENOUS at 19:12

## 2024-11-06 ASSESSMENT — PAIN - FUNCTIONAL ASSESSMENT: PAIN_FUNCTIONAL_ASSESSMENT: 0-10

## 2024-11-06 ASSESSMENT — PAIN DESCRIPTION - ORIENTATION: ORIENTATION: RIGHT

## 2024-11-06 ASSESSMENT — PAIN DESCRIPTION - DESCRIPTORS: DESCRIPTORS: ACHING

## 2024-11-06 ASSESSMENT — PAIN DESCRIPTION - LOCATION: LOCATION: HIP

## 2024-11-06 ASSESSMENT — PAIN DESCRIPTION - FREQUENCY: FREQUENCY: CONTINUOUS

## 2024-11-06 ASSESSMENT — PAIN SCALES - GENERAL: PAINLEVEL_OUTOF10: 7

## 2024-11-06 ASSESSMENT — PAIN DESCRIPTION - PAIN TYPE: TYPE: ACUTE PAIN;CHRONIC PAIN

## 2024-11-06 NOTE — TELEPHONE ENCOUNTER
11/06/24 1:37 PM     REMOTE PATIENT MONITORING HIGH ALERT RESPONSE         ASSESSMENT AND PLAN   Tachycardia  --pt advised by PCP office to go to ED for elevated HR. She states she is agreeable to go. This concludes this alert response.    CHIEF COMPLAINT    Responding to a high RPM alert for elevated HR: 133/97 (122) at 9:35A and 92% (121) at 10:14A. Pt's HR has not been this high during her time on RPM.    HPI    Odalis Jett is a 49 y.o. female who presents with pain in hips and distress over having to find a new home for her pet dogs. States she was recently started on Percocet in place of Vicodin which wasn't helping. States she needs a hip replacement. Pain is not worse today, per pt, but continuing. She states that just before I called, she received a call from her PCP office advising her to go to ED for evaluation of elevated HR. She is agreeable to go. I encouraged her to do so and thanked her for her time before disconnecting.     CURRENT MEDICATIONS    Current Outpatient Rx   Medication Sig Dispense Refill    dulaglutide (TRULICITY) 0.75 MG/0.5ML SOPN SC injection INJECT 0.75 MG UNDER THE SKIN ONCE WEEKLY 2 mL 1    guaiFENesin (MUCINEX) 600 MG extended release tablet Take 1 tablet by mouth 2 times daily 30 tablet 5    omeprazole (PRILOSEC) 20 MG delayed release capsule TAKE 1 CAPSULE BY MOUTH DAILY 90 capsule 1    gabapentin (NEURONTIN) 300 MG capsule Take 1 capsule by mouth 3 times daily.      HYDROcodone-acetaminophen (NORCO) 7.5-325 MG per tablet Take 1 tablet by mouth in the morning, at noon, and at bedtime.      morphine (MS CONTIN) 15 MG extended release tablet Take 1 tablet by mouth 2 times daily. Max Daily Amount: 30 mg      ipratropium 0.5 mg-albuterol 2.5 mg (DUONEB) 0.5-2.5 (3) MG/3ML SOLN nebulizer solution INHALE THREE MILLILITERS  (1 VIAL) IA NEBULIZATION BY MOUTH EVERY 4 HOURS 180 mL 11    diclofenac sodium (VOLTAREN) 1 % GEL Apply 4 g topically 4 times daily as needed for Pain

## 2024-11-06 NOTE — TELEPHONE ENCOUNTER
Called and spoke with pt she states she can't take a step without hurting so she is going to go to the ER FYMANISHA

## 2024-11-06 NOTE — TELEPHONE ENCOUNTER
For these abnormal vitals pt needs medical eval  MA team - in our office if openings, if not, ER recommended

## 2024-11-06 NOTE — CARE COORDINATION
11/6/2024 12:31 PM  *  No Provider Response 12:31 PM11/6/2024  Patient is currently enrolled in RPM RPM Care Plans: CHF, COPD, Diabetes, and HTN.  Will route to RPM  Marla Cruz NP per RPM protocol for review of alert escalation.

## 2024-11-06 NOTE — CARE COORDINATION
SW placed follow up call to patient who reports stress related to caring for her four dogs. She has reached out to the local shelter to see if they could take them and they do not have room at this time. Once they have room, she would need to pay $50 per dog for them to take them and she cannot afford to do so.   She has also asked friends/family and posted on social media for anyone willing to take one or more in.   MOHIT encouraged her to contact a local animal clinic, [facility] [phone], to see if they have any other resources or suggestions.     SW encouraged patient to call this worker if additional questions or needs arise. Will sign off at this time.    Kat Crum MSW, LSW     [phone]

## 2024-11-06 NOTE — CARE COORDINATION
2024 9:32 AM  *  Alert and Triage   -Remote Alert Monitoring Note      Date/Time:  2024 9:32 AM  Patient Current Location: [place]  Verified patients name and  as identifiers.      Rpm alert to be reviewed by the provider   red alert  blood pressure heart rate (122), pulse ox reading (88%), and pulse ox heart rate (136)  Vitals Recheck pulse ox reading (83%) and pulse ox heart rate (120)  Additional needs to be addressed by provider: FYI LPN contacted this pt in regard to RPM red alerts for PO HR of 88%. Pt denied any symptoms and speaking in complete sentences. Pt did recheck PO and PO HR multiple times, after being advised to sit and rest. PO is now at 92%. PO HR is still elevated above 120.     Please advise if there are any instructions for the pt, thank you.    133/97/122 92/121  85/122  83/120  88/136              LPN contacted patient by telephone regarding red alert received   Background: RPM for COPD, CHF, DM, HTN  Refer to 911 immediately if:  Patient unresponsive or unable to provide history  Change in cognition or sudden confusion  Patient unable to respond in complete sentences  Intense chest pain/tightness  Any concern for any clinical emergency  Red Alert: Provider response time of 1 hr required for any red alert requiring intervention  Yellow Alert: Provider response time of 3hr required for any escalated yellow alert  Patient Chief Complaint:  HR Triage  Are you having any Chest Pain? no   Are you having any Shortness of Breath? no   Are you having any dizziness? no   Are you feeling more fatigued or tired than normal? no   Are you having any other health concerns or issues? no          Clinical Interventions: Reviewed and followed up on alerts and treatments-   LPN contacted pt in regard to RPM red alert for PO HR of 136 bpm. Pt denied any new, worrisome and or worsening symptoms at this time.       Writer confirmed with patient that he has no chest pain, SOB, headache, vision changes,

## 2024-11-07 ENCOUNTER — CARE COORDINATION (OUTPATIENT)
Dept: CARE COORDINATION | Age: 49
End: 2024-11-07

## 2024-11-07 ENCOUNTER — PATIENT MESSAGE (OUTPATIENT)
Dept: CARE COORDINATION | Age: 49
End: 2024-11-07

## 2024-11-07 LAB
EKG ATRIAL RATE: 90 BPM
EKG DIAGNOSIS: NORMAL
EKG P AXIS: 67 DEGREES
EKG P-R INTERVAL: 162 MS
EKG Q-T INTERVAL: 368 MS
EKG QRS DURATION: 92 MS
EKG QTC CALCULATION (BAZETT): 450 MS
EKG R AXIS: 55 DEGREES
EKG T AXIS: 61 DEGREES
EKG VENTRICULAR RATE: 90 BPM

## 2024-11-07 PROCEDURE — 93010 ELECTROCARDIOGRAM REPORT: CPT | Performed by: INTERNAL MEDICINE

## 2024-11-07 ASSESSMENT — ENCOUNTER SYMPTOMS: DYSPNEA ASSOCIATED WITH: MINIMAL EXERTION

## 2024-11-07 NOTE — ED NOTES
Provider order placed for patient's discharge. Provider reviewed decision to discharge with the patient. Discharge paperwork and any prescriptions were reviewed with the patient. Patient verbalized understanding of discharge education and any prescriptions and has no further questions or further needs at this time. Patient left with all personal belongings and was stable upon departure. Patient thanked for choosing [facility] and informed to return should any need arise.

## 2024-11-07 NOTE — ED PROVIDER NOTES
to:  Infection is not suspected    CONSULTS: (Who and What was discussed)  None              CC/HPI Summary, DDx, ED Course, and Reassessment: 49-year-old female with morbid obesity, PMH of COPD who presents with complaint of shortness of breath and 1 episode of tachycardia prior to arrival with no associated chest pain.  Her vitals here are within normal limits.  She did have an episode of hypoxia here but after taking some deep breaths this improved on its own.  Blood testing here was performed and her troponins are undetectable x 2.  No signs of infection with normal WBC  A chest x-ray was performed and reviewed and read by radiologist as above showing no signs of infiltrate or effusion.  This was reviewed by myself as well.  In the meantime she was given a DuoNeb, albuterol treatment and Solu-Medrol.  She had significant symptom relief.    Additionally she was given Toradol for her right hip pain which she did have relief of this.  She wanted a prescription to go home with which was provided.  I discussed the importance of following up with her PCP for further evaluation or her pulmonologist.  She will call tomorrow.  Strict return precautions given.  Discharged stable condition.    Disposition Considerations (include 1 Tests not done, Shared Decision Making, Pt Expectation of Test or Tx.): Considered admission versus discharge as she did have an episode of hypoxia here.  However she is supposed be wearing 2 L of oxygen at home and she has not at home.  I discussed wearing this regularly and following up with her pulmonologist.  She would rather go home but strict return precautions were given.        I am the Primary Clinician of Record.    FINAL IMPRESSION      1. Chronic right hip pain    2. COPD exacerbation (HCC)          DISPOSITION/PLAN     DISPOSITION Decision To Discharge 11/06/2024 09:11:28 PM           PATIENT REFERRED TO:  Tad Ramirez MD  [address]

## 2024-11-07 NOTE — CARE COORDINATION
Ambulatory Care Coordination Note     2024 11:30 AM     Patient Current Location:  Home: [address] [place] [id number]     ACM contacted the patient by telephone. Verified name and  with patient as identifiers.         ACM: Elizabeth Gallagher RN     Challenges to be reviewed by the provider   Additional needs identified to be addressed with provider No  none               Method of communication with provider: none.    Utilization: Has the patient been seen in the ED since your last call? Yes,   Discharge Date: 24   Discharge Facility: [facility]  Reason for ED Visit: SOB, Chest Pain  Visit Diagnosis:   1. Chronic right hip pain    2. COPD exacerbation (HCC)       Number of ED visits in the last 6 months: 4      Do you have any ongoing symptoms? Yes, my symptoms have improved.   Current symptoms: chronic pain and SOB, improved.    Did you call your PCP prior to going to the ED?  Pt was contacted by RPM NP    Review of Discharge Instructions:   [x] AVS discharge instructions  [x] Right Care, Right Place, Right Time document  [x] Medication changes  [x] Follow up appointments  [x] Referral follow up   []        Care Summary Note: CC f/u completed.  Pt was pleasant and engaged with no s/s of disease exacerbation.  Pt taking medications as prescribed.   Pt has not yet picked up toradol from pharmacy, has plans to do so today.  Pt reporting that she has had increased stress due to spouse gambling problem.  Pt reports that his gambling is affecting her ability to pay bills.  ACM discussed pt setting boundaries that she feels she can keep, as well as seeking counseling.  Pt has been in touch w/[facility] and is trying to get back in touch with her former therapist, Shauna.  ACM will assist w/finding pt resources should she not be successful.  Pt could stay with family should she need it.  ED f/u appt was made by pt.  RPM vitals below:        Offered patient enrollment in the Remote

## 2024-11-08 NOTE — TELEPHONE ENCOUNTER
Have patient take additional 20 mg of torsemide daily through the weekend and call back next week if no improvement.  Repeat BMP and BNP in 7 to 10 days.

## 2024-11-14 ENCOUNTER — OFFICE VISIT (OUTPATIENT)
Dept: FAMILY MEDICINE CLINIC | Age: 49
End: 2024-11-14
Payer: MEDICAID

## 2024-11-14 VITALS
OXYGEN SATURATION: 91 % | HEIGHT: 64 IN | RESPIRATION RATE: 16 BRPM | SYSTOLIC BLOOD PRESSURE: 126 MMHG | DIASTOLIC BLOOD PRESSURE: 84 MMHG | BODY MASS INDEX: 50.02 KG/M2 | WEIGHT: 293 LBS | HEART RATE: 79 BPM

## 2024-11-14 DIAGNOSIS — E66.9 TYPE 2 DIABETES MELLITUS WITH OBESITY (HCC): ICD-10-CM

## 2024-11-14 DIAGNOSIS — I10 ESSENTIAL HYPERTENSION: ICD-10-CM

## 2024-11-14 DIAGNOSIS — I50.32 CHRONIC DIASTOLIC CHF (CONGESTIVE HEART FAILURE) (HCC): ICD-10-CM

## 2024-11-14 DIAGNOSIS — R05.1 ACUTE COUGH: Primary | ICD-10-CM

## 2024-11-14 DIAGNOSIS — E11.69 TYPE 2 DIABETES MELLITUS WITH OBESITY (HCC): ICD-10-CM

## 2024-11-14 PROBLEM — J96.00 ACUTE RESPIRATORY FAILURE: Status: RESOLVED | Noted: 2024-10-11 | Resolved: 2024-11-14

## 2024-11-14 PROCEDURE — 3017F COLORECTAL CA SCREEN DOC REV: CPT | Performed by: FAMILY MEDICINE

## 2024-11-14 PROCEDURE — 99214 OFFICE O/P EST MOD 30 MIN: CPT | Performed by: FAMILY MEDICINE

## 2024-11-14 PROCEDURE — G8427 DOCREV CUR MEDS BY ELIG CLIN: HCPCS | Performed by: FAMILY MEDICINE

## 2024-11-14 PROCEDURE — 2022F DILAT RTA XM EVC RTNOPTHY: CPT | Performed by: FAMILY MEDICINE

## 2024-11-14 PROCEDURE — 3079F DIAST BP 80-89 MM HG: CPT | Performed by: FAMILY MEDICINE

## 2024-11-14 PROCEDURE — 4004F PT TOBACCO SCREEN RCVD TLK: CPT | Performed by: FAMILY MEDICINE

## 2024-11-14 PROCEDURE — 3044F HG A1C LEVEL LT 7.0%: CPT | Performed by: FAMILY MEDICINE

## 2024-11-14 PROCEDURE — 3074F SYST BP LT 130 MM HG: CPT | Performed by: FAMILY MEDICINE

## 2024-11-14 PROCEDURE — G8484 FLU IMMUNIZE NO ADMIN: HCPCS | Performed by: FAMILY MEDICINE

## 2024-11-14 PROCEDURE — G8417 CALC BMI ABV UP PARAM F/U: HCPCS | Performed by: FAMILY MEDICINE

## 2024-11-14 RX ORDER — MONTELUKAST SODIUM 10 MG/1
TABLET ORAL
Qty: 90 TABLET | Refills: 1 | Status: SHIPPED | OUTPATIENT
Start: 2024-11-14

## 2024-11-14 NOTE — PROGRESS NOTES
11/14/2024    This is a 49 y.o. female   Chief Complaint   Patient presents with    2 Week Follow-Up     Pt is tired and sore.  Still has a cough.  Still has a little crackel when she breathes.  Still coughing up a lot of mucus      HPI    Here for ER f/u    She went to the ED 8 days ago on 11/6 due to cough, shortness of breath, and low pulse ox readings at home.  - felt achey and tired  - blood work up unremarkable  - CT scan notable for signs of atypical infection.   - she was given Solumedrol at the ED  - discharged with Toradol    Now, she notes still having congestion and coughing up mucus, but feels almost back to normal, and \"way better\" than 8 days ago  - not feeling short of breath    At baseline since prior hospital stay she has been on 1-2L of supplemental oxygen    Chronic diastolic heart failure  - torsemide recently increased to 40mg in AM 20mg at night by her Cardiologist  - notes more urine output  - she notes 6 pound weight decrease over the last few days    Hemoglobin A1C   Date Value Ref Range Status   03/20/2024 6.0 See comment % Final     Comment:     Comment:  Diagnosis of Diabetes: > or = 6.5%  Increased risk of diabetes (Prediabetes): 5.7-6.4%  Glycemic Control: Nonpregnant Adults: <7.0%                    Pregnant: <6.0%           Review of Systems     Current Outpatient Medications   Medication Sig Dispense Refill    dulaglutide (TRULICITY) 1.5 MG/0.5ML SC injection Inject 0.5 mLs into the skin once a week 2 mL 2    ketorolac (TORADOL) 10 MG tablet Take 1 tablet by mouth every 6 hours as needed for Pain 20 tablet 0    albuterol sulfate HFA (VENTOLIN HFA) 108 (90 Base) MCG/ACT inhaler Inhale 2 puffs into the lungs 4 times daily as needed for Wheezing 18 g 0    guaiFENesin (MUCINEX) 600 MG extended release tablet Take 1 tablet by mouth 2 times daily 30 tablet 5    omeprazole (PRILOSEC) 20 MG delayed release capsule TAKE 1 CAPSULE BY MOUTH DAILY 90 capsule 1    gabapentin (NEURONTIN) 300 MG

## 2024-11-18 ENCOUNTER — CARE COORDINATION (OUTPATIENT)
Dept: CASE MANAGEMENT | Age: 49
End: 2024-11-18

## 2024-11-18 NOTE — CARE COORDINATION
patient understand envrionmental exposure?: Yes  Is the patient able to verbalize Rescue vs. Long Acting medications?: Yes  Does the patient have a nebulizer?: Yes  Does the patient use a space with inhaled medications?: No            Symptoms:     Symptom course: stable  Breathlessness: none  Increase use of rapid acting/rescue inhaled medications?: No  Change in chronic cough?: No/At Baseline  Change in sputum?: No/At Baseline  Sputum characteristics: Clear  Self Monitoring - SaO2: Yes  Baseline SaO2 Readin  Have you had a recent diagnosis of pneumonia either by PCP or at a hospital?: No      ,   Hypertension - Encounter Level              Medications Reviewed:   Patient denies any changes with medications and reports taking all medications as prescribed.    Advance Care Planning:   Not on file     Care Planning:   Not completed during this call    PCP/Specialist follow up:   Future Appointments         Provider Specialty Dept Phone    2024 7:40 AM Ryder Jack MD Pulmonology [phone]    2024 2:45 PM Rob Aguilar MD Nephrology [phone]    3/4/2025 11:40 AM Ryder Jack MD Pulmonology [phone]            Follow Up:   Plan for next AC outreach in approximately 1 week to complete:  - disease specific assessments  - advance care planning   - goal progression.   Patient  is agreeable to this plan.

## 2024-11-25 ENCOUNTER — CARE COORDINATION (OUTPATIENT)
Dept: CARE COORDINATION | Age: 49
End: 2024-11-25

## 2024-11-25 NOTE — CARE COORDINATION
Ambulatory Care Coordination Note     2024 10:35 AM     Patient Current Location:  Home: [address] [place] [address]     ACM contacted the patient by telephone. Verified name and  with patient as identifiers.         ACM: Elizabeth Gallagher RN     Challenges to be reviewed by the provider   Additional needs identified to be addressed with provider No  none               Method of communication with provider: none.    Utilization: Patient has not had any utilization since our last call.     Care Summary Note: CC f/u completed.  Pt was pleasant and engaged with no s/s of disease exacerbation.  Pt taking medications as prescribed.   CHF Holiday education sent via [employer].  Pt continues to have ongoing swelling, reports that it is on left leg only.  Pt reports dx from prior doc (Dr. Willoughby) of lymphedema, which is causing swelling.  ACM discussed getting new dx from PCP and exploring options, but pt declining at this time.  Pt is wearing compression socks, which she states is improving edema.  Pt is urinating and states she has had increased urination, which helps with retention.      Offered patient enrollment in the Remote Patient Monitoring (RPM) program for in-home monitoring: Yes, but did not enroll at this time: already monitoring with home equipment.     Assessments Completed:   Diabetes Assessment    Medic Alert ID: No  Meal Planning: Avoidance of concentrated sweets   How often do you test your blood sugar?: Daily, Bedtime   Do you have barriers with adherence to non-pharmacologic self-management interventions? (Nutrition/Exercise/Self-Monitoring): Yes   Have you ever had to go to the ED for symptoms of low blood sugar?: No       No patient-reported symptoms   Do you have hyperglycemia symptoms?: No   Do you have hypoglycemia symptoms?: No   Last Blood Sugar Value: 125   Blood Sugar Monitoring Regimen: Morning Fasting, At Bedtime         ,   Congestive Heart Failure Assessment    Are you

## 2024-12-02 RX ORDER — ARIPIPRAZOLE 2 MG/1
2 TABLET ORAL DAILY
Qty: 90 TABLET | Refills: 0 | Status: SHIPPED | OUTPATIENT
Start: 2024-12-02

## 2024-12-06 RX ORDER — ARIPIPRAZOLE 2 MG/1
2 TABLET ORAL DAILY
Qty: 90 TABLET | Refills: 0 | OUTPATIENT
Start: 2024-12-06

## 2024-12-09 ENCOUNTER — CARE COORDINATION (OUTPATIENT)
Dept: CARE COORDINATION | Age: 49
End: 2024-12-09

## 2024-12-09 DIAGNOSIS — I10 ESSENTIAL HYPERTENSION: ICD-10-CM

## 2024-12-09 NOTE — TELEPHONE ENCOUNTER
Requested Prescriptions     Pending Prescriptions Disp Refills    chlorthalidone (HYGROTON) 25 MG tablet [Pharmacy Med Name: CHLORTHALIDONE 25 MG TABLET] 90 tablet      Sig: TAKE 1 TABLET BY MOUTH DAILY     Last OV 12/1/23  Next OV - none  Last refill - 4/4/24  Last labs - 11/14/24

## 2024-12-09 NOTE — CARE COORDINATION
Ambulatory Care Coordination Note     2024 10:48 AM     Patient Current Location:  Home: [address] [place] [address]     ACM contacted the patient by telephone. Verified name and  with patient as identifiers.         ACM: Elizabeth Gallagher RN     Challenges to be reviewed by the provider   Additional needs identified to be addressed with provider No  none               Method of communication with provider: none.    Utilization: Patient has not had any utilization since our last call.     Care Summary Note: CC f/u completed.  Pt was pleasant and engaged with no s/s of disease exacerbation.  Pt taking medications as prescribed.   Pt reminded to do RPM vitals daily.  Pt reports having family over and plans to restart this today.  Pt has very little swelling, mostly in her hip and knee where she needs to have replacement.  Pt reports tenderness.  Pt is going to PT 1x week.  Pt reports that she has to be at 300 lbs to qualify for hip replacement.  Pt is working towards goal weight and at this time has no PRN ACM needs.      Heart Failure Education outreach Date/Time: 2024 11:00 AM    Ambulatory Care Manager (ACM) contacted the patient by telephone to perform Ambulatory Care Coordination. Verified name and  with patient as identifiers. Provided introduction to self, and explanation of the Ambulatory Care Manager's role.     ACM reviewed that a Heart Healthy tips for the Holiday packet has been sent to [employer]. ACM reviewed CHF zones, daily weights, fluid restriction, the importance of low sodium diet, and healthy tips packet with the patient. Instructed patient to call their Cardiologist if they have a weight gain of 3 lbs in 2 days or 5 lbs in a week.     Patient reminded that there is a physician on call 24 hours a day / 7 days a week should the patient have questions or concerns. The patient verbalized understanding.      Offered patient enrollment in the Remote Patient Monitoring (RPM)

## 2024-12-10 ENCOUNTER — CARE COORDINATION (OUTPATIENT)
Dept: CARE COORDINATION | Age: 49
End: 2024-12-10

## 2024-12-10 RX ORDER — CHLORTHALIDONE 25 MG/1
25 TABLET ORAL DAILY
Qty: 90 TABLET | Refills: 5 | Status: SHIPPED | OUTPATIENT
Start: 2024-12-10

## 2024-12-17 ENCOUNTER — HOSPITAL ENCOUNTER (EMERGENCY)
Age: 49
Discharge: HOME | End: 2024-12-17
Attending: EMERGENCY MEDICINE
Payer: MEDICAID

## 2024-12-17 VITALS
BODY MASS INDEX: 59.75 KG/M2 | HEART RATE: 88 BPM | DIASTOLIC BLOOD PRESSURE: 82 MMHG | OXYGEN SATURATION: 97 % | WEIGHT: 293 LBS | RESPIRATION RATE: 16 BRPM | SYSTOLIC BLOOD PRESSURE: 148 MMHG | TEMPERATURE: 97.7 F

## 2024-12-17 DIAGNOSIS — G89.29 CHRONIC RIGHT HIP PAIN: Primary | ICD-10-CM

## 2024-12-17 DIAGNOSIS — M25.551 CHRONIC RIGHT HIP PAIN: Primary | ICD-10-CM

## 2024-12-17 PROCEDURE — 99284 EMERGENCY DEPT VISIT MOD MDM: CPT

## 2024-12-17 PROCEDURE — 96372 THER/PROPH/DIAG INJ SC/IM: CPT

## 2024-12-17 PROCEDURE — 6360000002 HC RX W HCPCS: Performed by: EMERGENCY MEDICINE

## 2024-12-17 RX ORDER — KETOROLAC TROMETHAMINE 30 MG/ML
30 INJECTION, SOLUTION INTRAMUSCULAR; INTRAVENOUS ONCE
Status: COMPLETED | OUTPATIENT
Start: 2024-12-17 | End: 2024-12-17

## 2024-12-17 RX ADMIN — KETOROLAC TROMETHAMINE 30 MG: 30 INJECTION, SOLUTION INTRAMUSCULAR at 12:56

## 2024-12-17 ASSESSMENT — PAIN SCALES - GENERAL
PAINLEVEL_OUTOF10: 10
PAINLEVEL_OUTOF10: 5
PAINLEVEL_OUTOF10: 10

## 2024-12-17 ASSESSMENT — PAIN DESCRIPTION - LOCATION
LOCATION: HIP
LOCATION: HIP

## 2024-12-17 ASSESSMENT — LIFESTYLE VARIABLES
HOW MANY STANDARD DRINKS CONTAINING ALCOHOL DO YOU HAVE ON A TYPICAL DAY: 1 OR 2
HOW OFTEN DO YOU HAVE A DRINK CONTAINING ALCOHOL: 2-4 TIMES A MONTH

## 2024-12-17 ASSESSMENT — PAIN DESCRIPTION - ORIENTATION
ORIENTATION: RIGHT
ORIENTATION: RIGHT

## 2024-12-17 NOTE — ED NOTES
Patient DCed from ED at this time. Discussed AVS, follow up, and scripts. They verbalized understanding. Reinforced that should symptoms persist or worsen to return to the ED. They verbalized understanding. Patient ambulated out of ED. RN thanked patient for choosing [facility].

## 2024-12-17 NOTE — ED PROVIDER NOTES
eMERGENCY dEPARTMENT eNCOUnter      Pt Name: Odalis Jett  MRN: [id number]  Birthdate 1975  Date of evaluation: 12/17/2024  Provider: CAROL DAVIS MD     CHIEF COMPLAINT       Chief Complaint   Patient presents with    Hip Pain     Complaints of right hip pain. Pt states she needs a total hip replacement but needs to lose 40lbs.          HISTORY OF PRESENT ILLNESS   (Location/Symptom, Timing/Onset,Context/Setting, Quality, Duration, Modifying Factors, Severity) Note limiting factors.   HPI    Odalis Jett is a 49 y.o. female who presents to the emergency department with right hip pain.  Patient has a chronic right hip pain secondary to osteoarthritis.  Patient states been going on for 11 years.  She has seen orthopedic surgeon and they recommended hip surgery and total hip replacement however, patient is obese and required to lose 40 to 50 pounds before they are willing to do the surgery.  Patient has been placed on pain pills but it is no longer working.  She has been on morphine but they took it off because it was interfering with her life patient with the morphine all needs with sleep and cannot take care of her kids.  Patient now is really in a dilemma.  Unable to really to do much she did drive herself here to the department.    Nursing Notes were reviewed.    REVIEW OFSYSTEMS    (2+ for level 4; 10+ for level 5)   Review of Systems    General: No fevers, chills or night sweats, No weight loss    Head:  No Sore throat,  No Ear Pain    Chest:  Nontender.  No Cough, No SOB,  Chest Pain    GI: No abdominal pain or vomiting    : No dysuria or hematuria    Musculoskeletal: No unrelenting pain or night pain    Neurologic: No bowel or bladder incontinence, No saddle anesthesia, No leg weakness    All other systems reviewed and are negative.        PAST MEDICAL HISTORY     Past Medical History:   Diagnosis Date    Anxiety     Arthritis     Asthma     Back pain     CHF (congestive heart failure)

## 2024-12-18 ENCOUNTER — CARE COORDINATION (OUTPATIENT)
Dept: CASE MANAGEMENT | Age: 49
End: 2024-12-18

## 2024-12-18 NOTE — CARE COORDINATION
Absolutely! Thank you very much!  ===View-only below this line===  ----- Message -----  From: Tad Ramirez MD  Sent: 12/18/2024   4:32 PM EST  To: Misty Pascual LPN    Thank you    Are we able to monitor for the next two days - if still elevated please let me know and I will send in a low dose of amlodipine BP medication.    Sincerely,  Dr. Ramirez

## 2024-12-18 NOTE — CARE COORDINATION
You  Tad Ramirez MD2 hours ago (1:19 PM)     LT  Thank you, Dr. Ramirez. Pt does advise that she is taking all meds as prescribed. Pt taking diuretic and two Clonidine BID. Pt was seen in the ED on yesterday for right hip pain as well.      Tad Ramirez MD  You2 hours ago (12:58 PM)       I agree with the Care Coordinator's Plan of Care    Are we able to confirm her clonidine dose, and also if she has been able to take her diuretic?  (water pill)    I know sometimes it is more difficult for her to take that if she is out and about or watching her grandkids. Thank you.    Dr Ramirez

## 2024-12-18 NOTE — CARE COORDINATION
-Remote Alert Monitoring Note      Date/Time:  2024 8:11 AM  Patient Current Location: Home: [address] [place], [place] [id number]  Verified patients name and  as identifiers.    Rpm alert to be reviewed by the provider   red alert  blood pressure reading (141/103)  Vitals Recheck n/a  Additional needs to be addressed by provider: No                   LPN contacted patient by telephone regarding red alert received   Background: enrolled in RPM for CHF COPD DM AND HTN  Refer to [facility] immediately if:  Patient unresponsive or unable to provide history  Change in cognition or sudden confusion  Patient unable to respond in complete sentences  Intense chest pain/tightness  Any concern for any clinical emergency  Red Alert: Provider response time of 1 hr required for any red alert requiring intervention  Yellow Alert: Provider response time of 3hr required for any escalated yellow alert  Patient Chief Complaint:  Blood Pressure BP Triage  Are you having any Chest Pain? no   Are you having any Shortness of Breath? no   Do you have a headache or have any vision changes? no   Are you having any numbness or tingling? no   Are you having any other health concerns or issues? no  Patient/Caregiver educated on how to properly take a blood pressure. Patient/Caregiver verbalizes understanding.     Clinical Interventions: Reviewed and followed up on alerts and treatments-spoke to Odalis regarding RPM red alert for high blood pressure. Denies chest pain or dyspnea. No numbness tingling or headache.  Pt reports she has taken all of her medications this morning. Requested pt recheck her BP. Odalis states she has gone back to bed and will recheck later this morning.     Plan/Follow Up: Will continue to review, monitor and address alerts with follow up based on severity of symptoms and risk factors.  **For any new or worsening symptoms or you are concerned in anyway, please contact your Provider or report to the nearest

## 2024-12-18 NOTE — CARE COORDINATION
12/18/2024 9:25 AM  *  Alert and Triage   -Remote Alert Monitoring Note      Date/Time:  12/18/2024 9:26 AM  Patient Current Location: [facility] alert to be reviewed by the provider   red alert  blood pressure reading (141/103)   Vitals Recheck blood pressure reading (165/102) HR 85  Additional needs to be addressed by provider: WILMER Please not BP readings.    Pt advised RPM LPN Nurse that she is not experiencing any chest pain, dyspnea, numbness, tingling or HA. Pt also advised the RPM Nurse that she had taken BP meds prior to checking BP this morning. Please see pt's recent BP readings below.    165/105/85  141/103/106  162/98/85   158/99/90   139/93/77      150/90/84      130/90/83   150/99/84     Please advise if there are any instructions for this pt, thank you.

## 2024-12-20 ENCOUNTER — CARE COORDINATION (OUTPATIENT)
Dept: CARE COORDINATION | Age: 49
End: 2024-12-20

## 2024-12-20 NOTE — CARE COORDINATION
Ambulatory Care Coordination Note     2024 9:29 AM     Patient Current Location:  Home: [address] [place] [id number]     ACM contacted the patient by telephone. Verified name and  with patient as identifiers.         ACM: Elizabeth Gallagher RN     Challenges to be reviewed by the provider   Additional needs identified to be addressed with provider Yes  ED f/u appt made for .  After talking to pt, her elevated BP could be r/t pain and stress.  Her son adult was air cared to [facility] and he is having open heart sx this morning.  Her pain has also increased r/t pain management removing morphine from her pain regime.  I educated her to call pain management and discuss this.  She may benefit from a virtual visit instead of IP due to stress, but I told her that your office would reach out to her should you be in agreeement.                Method of communication with provider: chart routing.    Utilization: Has the patient been seen in the ED since your last call? Yes,   Discharge Date: 24   Discharge Facility: [facility]  Reason for ED Visit: Hip pain  Visit Diagnosis: Chronic hip pain    Number of ED visits in the last 6 months: 5      Do you have any ongoing symptoms? Yes, there has been no change in my symptoms.   Current symptoms: hip pain.    Did you call your PCP prior to going to the ED? No, did not call the PCP office.     Review of Discharge Instructions:   [x] AVS discharge instructions  [x] Right Care, Right Place, Right Time document  [x] Medication changes  [x] Follow up appointments  [x] Referral follow up   [x] Education to call Pain Management       Care Summary Note: ACM spoke to pt, HIPAA verified.  ED f/u appt made for .  Pt reports her elevated BP could be r/t pain and stress.  Her son adult was air cared to [facility] and he is having open heart sx this morning.  Her pain has also increased r/t pain management removing morphine from her pain regime.  Education

## 2024-12-26 ENCOUNTER — HOSPITAL ENCOUNTER (EMERGENCY)
Age: 49
Discharge: HOME | End: 2024-12-27
Attending: EMERGENCY MEDICINE
Payer: MEDICAID

## 2024-12-26 VITALS
HEART RATE: 77 BPM | RESPIRATION RATE: 18 BRPM | TEMPERATURE: 97.5 F | WEIGHT: 293 LBS | SYSTOLIC BLOOD PRESSURE: 129 MMHG | HEIGHT: 64 IN | OXYGEN SATURATION: 98 % | BODY MASS INDEX: 50.02 KG/M2 | DIASTOLIC BLOOD PRESSURE: 76 MMHG

## 2024-12-26 DIAGNOSIS — M25.551 RIGHT HIP PAIN: Primary | ICD-10-CM

## 2024-12-26 PROCEDURE — 6370000000 HC RX 637 (ALT 250 FOR IP): Performed by: EMERGENCY MEDICINE

## 2024-12-26 PROCEDURE — 6360000002 HC RX W HCPCS: Performed by: EMERGENCY MEDICINE

## 2024-12-26 PROCEDURE — 99284 EMERGENCY DEPT VISIT MOD MDM: CPT

## 2024-12-26 PROCEDURE — 96372 THER/PROPH/DIAG INJ SC/IM: CPT

## 2024-12-26 RX ORDER — OXYCODONE AND ACETAMINOPHEN 5; 325 MG/1; MG/1
1 TABLET ORAL ONCE
Status: COMPLETED | OUTPATIENT
Start: 2024-12-26 | End: 2024-12-26

## 2024-12-26 RX ADMIN — HYDROMORPHONE HYDROCHLORIDE 1 MG: 1 INJECTION, SOLUTION INTRAMUSCULAR; INTRAVENOUS; SUBCUTANEOUS at 23:50

## 2024-12-26 RX ADMIN — OXYCODONE HYDROCHLORIDE AND ACETAMINOPHEN 1 TABLET: 5; 325 TABLET ORAL at 23:49

## 2024-12-26 ASSESSMENT — PAIN DESCRIPTION - LOCATION: LOCATION: HIP;GROIN;LEG

## 2024-12-26 ASSESSMENT — PAIN SCALES - GENERAL: PAINLEVEL_OUTOF10: 10

## 2024-12-26 ASSESSMENT — PAIN - FUNCTIONAL ASSESSMENT: PAIN_FUNCTIONAL_ASSESSMENT: 0-10

## 2024-12-26 ASSESSMENT — PAIN DESCRIPTION - ORIENTATION: ORIENTATION: RIGHT

## 2024-12-26 ASSESSMENT — PAIN DESCRIPTION - DESCRIPTORS: DESCRIPTORS: SHOOTING;HEAVINESS

## 2024-12-27 ENCOUNTER — CARE COORDINATION (OUTPATIENT)
Dept: CARE COORDINATION | Age: 49
End: 2024-12-27

## 2024-12-27 NOTE — ED PROVIDER NOTES
Prescriptions    No medications on file       Follow-up with:  Tad Ramirez MD  [address] [id number] [phone]    Schedule an appointment as soon as possible for a visit in 2 days  For recheck    Your pain specialist and orthopedist  Keep all appointments as scheduled          DISCLAIMER: This chart was created using Dragon dictation software.  Efforts were made by me to ensure accuracy, however some errors may be present due to limitations of this technology and occasionally words are not transcribed correctly.        Rayne Goldberg MD  12/26/24 [id number]

## 2024-12-27 NOTE — CARE COORDINATION
Ambulatory Care Coordination Note     2024 3:08 PM     Patient Current Location:  Home: [address] [place] [id number]     ACM contacted the patient by telephone. Verified name and  with patient as identifiers.         ACM: Odette Coon RN     Challenges to be reviewed by the provider   Additional needs identified to be addressed with provider No  none               Method of communication with provider: none.    Utilization: Has the patient been seen in the ED since your last call? Yes,   Discharge Date: 24   Discharge Facility: [facility]  Reason for ED Visit:  Hip Pain  Visit Diagnosis:  Hip Pain    Number of ED visits in the last 6 months:  6    Do you have any ongoing symptoms? No  Did you call your PCP prior to going to the ED? No, did not call the PCP office.     Review of Discharge Instructions:   [x] AVS discharge instructions  [x] Right Care, Right Place, Right Time document  [x] Medication changes  [x] Follow up appointments  [x] Referral follow up          Care Summary Note:  ACM made outreach today following E/D visit on 24 at [facility] for Right Hip pain. Pt stating that she does feel better today. No C/O CP, SOB, LE or ABD swelling, High or low BS S/S. Steady on her feet with no report of falls. Enrolled into RPM and reviewed today, noted WNL. Has Pain Mgt apt and PCP apt on 2024. Discussed and confirmed with pt today, pt VU. Declined med rec review. Pt stating that she is taking all medications as directed with no barriers. ACM discussed hydration and constipation with pt today as well, pt VU. All questions answered, no other concerns at this time.          PCP/Specialist follow up:   Future Appointments         Provider Specialty Dept Phone    2024 9:20 AM aTd Ramirez MD Family Medicine [phone]    3/4/2025 11:40 AM Ryder Jack MD Pulmonology [phone]            Follow Up:   Plan for next ACM outreach in

## 2024-12-30 ENCOUNTER — TELEMEDICINE (OUTPATIENT)
Dept: FAMILY MEDICINE CLINIC | Age: 49
End: 2024-12-30
Payer: MEDICAID

## 2024-12-30 ENCOUNTER — CARE COORDINATION (OUTPATIENT)
Dept: CARE COORDINATION | Age: 49
End: 2024-12-30

## 2024-12-30 DIAGNOSIS — M16.11 OSTEOARTHRITIS OF RIGHT HIP, UNSPECIFIED OSTEOARTHRITIS TYPE: Primary | ICD-10-CM

## 2024-12-30 DIAGNOSIS — E11.69 TYPE 2 DIABETES MELLITUS WITH OBESITY (HCC): ICD-10-CM

## 2024-12-30 DIAGNOSIS — E66.9 TYPE 2 DIABETES MELLITUS WITH OBESITY (HCC): ICD-10-CM

## 2024-12-30 PROCEDURE — 4004F PT TOBACCO SCREEN RCVD TLK: CPT | Performed by: FAMILY MEDICINE

## 2024-12-30 PROCEDURE — G2211 COMPLEX E/M VISIT ADD ON: HCPCS | Performed by: FAMILY MEDICINE

## 2024-12-30 PROCEDURE — G8484 FLU IMMUNIZE NO ADMIN: HCPCS | Performed by: FAMILY MEDICINE

## 2024-12-30 PROCEDURE — G8427 DOCREV CUR MEDS BY ELIG CLIN: HCPCS | Performed by: FAMILY MEDICINE

## 2024-12-30 PROCEDURE — 2022F DILAT RTA XM EVC RTNOPTHY: CPT | Performed by: FAMILY MEDICINE

## 2024-12-30 PROCEDURE — 3044F HG A1C LEVEL LT 7.0%: CPT | Performed by: FAMILY MEDICINE

## 2024-12-30 PROCEDURE — 3017F COLORECTAL CA SCREEN DOC REV: CPT | Performed by: FAMILY MEDICINE

## 2024-12-30 PROCEDURE — 99213 OFFICE O/P EST LOW 20 MIN: CPT | Performed by: FAMILY MEDICINE

## 2024-12-30 PROCEDURE — G8417 CALC BMI ABV UP PARAM F/U: HCPCS | Performed by: FAMILY MEDICINE

## 2024-12-30 RX ORDER — OXYCODONE AND ACETAMINOPHEN 7.5; 325 MG/1; MG/1
1 TABLET ORAL EVERY 6 HOURS PRN
COMMUNITY
Start: 2024-12-01

## 2024-12-30 NOTE — PROGRESS NOTES
12/30/2024    This is a 49 y.o. female   Chief Complaint   Patient presents with    Follow-up     Pt states her hip pain is worse, it's hurting bad.  She has an appt with pain dr today at 11     HPI    Virtual visit today to discuss R hip pain    Has known R hip OA and has seen Orthopedics for this. She had an injection 4/25/24    She currently sees a pain management specialist. She is on Percocet, morphine was recently dc'd due to side effects, but since then hasn't been able to mange her pain. She has an appt with her specialist today.    She went to the ED on 12/17/and on 12/26 due to her hip pain.    Diabetes  Hemoglobin A1C   Date Value Ref Range Status   03/20/2024 6.0 See comment % Final     Comment:     Comment:  Diagnosis of Diabetes: > or = 6.5%  Increased risk of diabetes (Prediabetes): 5.7-6.4%  Glycemic Control: Nonpregnant Adults: <7.0%                    Pregnant: <6.0%       On Trulicity 1.mg  Denies GI side effects    Review of Systems     Current Outpatient Medications   Medication Sig Dispense Refill    oxyCODONE-acetaminophen (PERCOCET) 7.5-325 MG per tablet Take 1 tablet by mouth every 6 hours as needed.      Dulaglutide 3 MG/0.5ML SOAJ Inject 3 mg into the skin once a week 4 Adjustable Dose Pre-filled Pen Syringe 3    chlorthalidone (HYGROTON) 25 MG tablet TAKE 1 TABLET BY MOUTH DAILY 90 tablet 5    ARIPiprazole (ABILIFY) 2 MG tablet TAKE 1 TABLET BY MOUTH DAILY 90 tablet 0    montelukast (SINGULAIR) 10 MG tablet TAKE ONE TABLET BY MOUTH ONCE NIGHTLY 90 tablet 1    albuterol sulfate HFA (VENTOLIN HFA) 108 (90 Base) MCG/ACT inhaler Inhale 2 puffs into the lungs 4 times daily as needed for Wheezing 18 g 0    guaiFENesin (MUCINEX) 600 MG extended release tablet Take 1 tablet by mouth 2 times daily 30 tablet 5    omeprazole (PRILOSEC) 20 MG delayed release capsule TAKE 1 CAPSULE BY MOUTH DAILY 90 capsule 1    gabapentin (NEURONTIN) 300 MG capsule Take 1 capsule by mouth 3 times daily.

## 2025-01-02 ENCOUNTER — CARE COORDINATION (OUTPATIENT)
Dept: CASE MANAGEMENT | Age: 50
End: 2025-01-02

## 2025-01-02 NOTE — CARE COORDINATION
1/2/2025 11:07 AM  *  No Metrics x 5 Days Odalis Jett  is enrolled in Remote Patient Monitoring (RPM) and has not entered vitals in 5 days. The RPM team has  spoken with  your patient to discuss adherence in RPM.    Please reach out to your patient and discuss adherence with RPM. If the patient is no longer interested in participating, please send a dis-enrollment request to the RPM pool for processing.     Thank You,     Bennett Pascual LPN, [facility], Remote Patient Monitoring    PH: [phone]  Email: [email or web address]                   Normal OB results (or with acceptable variants)  MC message sent if active  Update PNL, if needed  24 hr prot: 248mg : add to PL w date  13w5d

## 2025-01-04 DIAGNOSIS — G89.4 CHRONIC PAIN SYNDROME: ICD-10-CM

## 2025-01-04 RX ORDER — VENLAFAXINE HYDROCHLORIDE 75 MG/1
150 CAPSULE, EXTENDED RELEASE ORAL DAILY
Qty: 60 CAPSULE | Refills: 3 | OUTPATIENT
Start: 2025-01-04

## 2025-01-06 ENCOUNTER — CARE COORDINATION (OUTPATIENT)
Dept: CASE MANAGEMENT | Age: 50
End: 2025-01-06

## 2025-01-06 ENCOUNTER — CARE COORDINATION (OUTPATIENT)
Dept: CARE COORDINATION | Age: 50
End: 2025-01-06

## 2025-01-06 ASSESSMENT — ENCOUNTER SYMPTOMS: DYSPNEA ASSOCIATED WITH: MINIMAL EXERTION

## 2025-01-06 NOTE — CARE COORDINATION
Ambulatory Care Coordination Note     2025 12:26 PM     Patient Current Location:  Home: [address] [place] [id number]     ACM contacted the patient by telephone. Verified name and  with patient as identifiers.         ACM: Elizabeth Gallagher RN     Challenges to be reviewed by the provider   Additional needs identified to be addressed with provider No  none               Method of communication with provider: none.    Utilization: Patient has not had any utilization since our last call.     Care Summary Note: CC f/u completed.  Pt was pleasant and engaged with no s/s of disease exacerbation.  Pt taking medications as prescribed.   Pt is reporting mild increased congestion w/cough.  Pt reports being around her grandchildren and believes she ha a small viral infection.  Pt educated to continue to ambulate and take deep breaths to clear lungs.  Pt reporting increased pain.  She reached out to pain management and her pain patches were denied.  Pt educated to continue down path she is on to lose weight to reach the 300 lbs to have her hip sx.  Pt also encouraged to reach out to ortho to discuss if there is any way to do surgery since pt has increased pain.  ACM encouraged vitals compliance.  Pt reports that her son was able to come home for the holidays, but that he needs to head back up to [place] for second surgery. Empathetic listening provided by [facility].    Offered patient enrollment in the Remote Patient Monitoring (RPM) program for in-home monitoring: Yes, patient enrolled; current status is activated and monitoring.     Assessments Completed:   Diabetes Assessment    Medic Alert ID: No  Meal Planning: Avoidance of concentrated sweets   How often do you test your blood sugar?: Daily, Bedtime   Do you have barriers with adherence to non-pharmacologic self-management interventions? (Nutrition/Exercise/Self-Monitoring): Yes   Have you ever had to go to the ED for symptoms of low blood sugar?: No

## 2025-01-06 NOTE — CARE COORDINATION
1/6/2025 2:09 PM  *  My Chart Message Odalis Jett , I am a nurse with the remote patient monitoring team;  reaching out to you today to remind you to please take your vitals. Important: Please try to take your vitals each day before 12pm. This ensures the monitoring team will have time to connect with your providers and get back to you with any new orders or instructions.

## 2025-01-08 ENCOUNTER — CARE COORDINATION (OUTPATIENT)
Dept: CASE MANAGEMENT | Age: 50
End: 2025-01-08

## 2025-01-08 NOTE — CARE COORDINATION
2025 1:58 PM  *  Alert and Triage   -Remote Alert Monitoring Note      Date/Time:  2025 1:59 PM  Patient Current Location: [place]  Verified patients name and  as identifiers.             Rpm alert to be reviewed by the provider                 LPN contacted patient by telephone regarding red alert received   Background: RPM for COPD, CHF, DM, HTN  Refer to 911 immediately if:  Patient unresponsive or unable to provide history  Change in cognition or sudden confusion  Patient unable to respond in complete sentences  Intense chest pain/tightness  Any concern for any clinical emergency  Red Alert: Provider response time of 1 hr required for any red alert requiring intervention  Yellow Alert: Provider response time of 3hr required for any escalated yellow alert  Patient Chief Complaint:  BP Triage  Are you having any Chest Pain? no   Are you having any Shortness of Breath? no   Do you have a headache or have any vision changes? no   Are you having any numbness or tingling? no   Are you having any other health concerns or issues? no  Patient/Caregiver educated on how to properly take a blood pressure. Patient/Caregiver verbalizes understanding.          Clinical Interventions: Reviewed and followed up on alerts and treatments-   LPN contacted pt in regard to RPM red alert for BP of 160/105. Pt denied any new, worrisome and or worsening symptoms at this time.         Writer confirmed with patient that he has no chest pain, SOB, headache, vision changes, numbness, tingling, N/V, dizziness/lightheadedness or any other concerns at this time.    Pt will rest and recheck BP. Pt advises that she has taken her BP meds today.     Plan/Follow Up: Will continue to review, monitor and address alerts with follow up based on severity of symptoms and risk factors.  **For any new or worsening symptoms or you are concerned in anyway, please contact your Provider or report to the nearest Emergency Room.**

## 2025-01-14 ENCOUNTER — CARE COORDINATION (OUTPATIENT)
Dept: CARE COORDINATION | Age: 50
End: 2025-01-14

## 2025-01-14 DIAGNOSIS — N39.46 MIXED STRESS AND URGE INCONTINENCE: ICD-10-CM

## 2025-01-14 DIAGNOSIS — E11.69 TYPE 2 DIABETES MELLITUS WITH OBESITY (HCC): ICD-10-CM

## 2025-01-14 DIAGNOSIS — E66.9 TYPE 2 DIABETES MELLITUS WITH OBESITY (HCC): ICD-10-CM

## 2025-01-14 RX ORDER — OXYBUTYNIN CHLORIDE 5 MG/1
TABLET, EXTENDED RELEASE ORAL
Qty: 90 TABLET | Refills: 1 | Status: SHIPPED | OUTPATIENT
Start: 2025-01-14

## 2025-01-14 NOTE — CARE COORDINATION
Odalis Jett  1/14/2025    Registered Dietitian Progress Note for Care Coordination    Assessment: Odalis is a 49 y.o. female.  RD referred for desired weight loss. RD spoke with patient for initial nutrition assessment on 8/21/24. RD called to follow up with patient today, 1/14/25. RD discussed previous goals with patient. Patient states that she is doing okay. Reports that she started the new year out with being on steroids due to URI. Patient reports that she received the handouts RD sent regarding xerostomia. Patient reports that her xerostomia is not better, but that she is sucking on ice chips to help. Patient has been limiting fluids due to h/o CHF; has been limiting herself to 40 oz water/day. She is also drinking 16 oz coffee per day. Patient reports a current weight of 345 lbs, which is a 4 lb (1.2%) weight increase x one month. Patient denies significant swelling but endorses slight edema to (R) hip. Patient continues to attend physical therapy twice weekly, and has been doing PT exercised at home on her off-days. Patient has been eating one to two meals/day, see food recall below. Patient reports that she usually skips breakfast as she is not hungry in the morning. States that she will get sick if she forces herself to eat when not hungry. Patient will \"sometimes\" eat dinner; may skip due to being too tired at the end of the day. RD reiterated importance of eating frequently throughout the day to prevent the body from going into starvation mode. Encouraged more snacks, if she can. Patient verbalized understanding. Patient has no nutrition-related questions/concerns. RD will sign off. RD encouraged patient to reach out should any nutrition-related questions/concerns arise. Patient verbalized understanding and thanked RD.    24-Hour Food Recall:   Breakfast - None   Lunch - Chicken noodle soup  Dinner - None  Snacks - None   Beverages - Coffee, water     Nutrition Monitoring and

## 2025-01-15 ENCOUNTER — CARE COORDINATION (OUTPATIENT)
Dept: CASE MANAGEMENT | Age: 50
End: 2025-01-15

## 2025-01-15 NOTE — CARE COORDINATION
1/15/2025 8:39 AM  *  Alert and Triage   -Remote Alert Monitoring Note      Date/Time:  1/15/2025 8:40 AM  Patient Current Location: [place]  Verified patients name and  as identifiers.             Rpm alert to be reviewed by the provider                   LPN contacted patient by telephone regarding red alert received   Background: RPM for   Refer to 911 immediately if:  Patient unresponsive or unable to provide history  Change in cognition or sudden confusion  Patient unable to respond in complete sentences  Intense chest pain/tightness  Any concern for any clinical emergency  Red Alert: Provider response time of 1 hr required for any red alert requiring intervention  Yellow Alert: Provider response time of 3hr required for any escalated yellow alert  Patient Chief Complaint:  Weight Triage  Are you weighing any different than you did yesterday? (time of day, clothes and shoes on or off, etc)? no   Do you have any shortness of breath? no   Do you have any swelling in your hands of feet? no   Are you having any other health concerns or issues? no         Clinical Interventions: Reviewed and followed up on alerts and treatments-   LPN contacted pt in regard to RPM red alert for wt increase of 4.9 lbs in 24 hrs.      Pt denied SOB and chest pain and edema. Pt followed proper protocol to obtain wt metrics today. Pt is compliant with all meds/diuretics. Pt weighed on a hard, flat surface. Writer did not escalate.       Plan/Follow Up: Will continue to review, monitor and address alerts with follow up based on severity of symptoms and risk factors.  **For any new or worsening symptoms or you are concerned in anyway, please contact your Provider or report to the nearest Emergency Room.**              Bennett Pascual LPN, [facility], Remote Patient Monitoring     PH: [phone]  Email: [email or web address]

## 2025-01-20 ENCOUNTER — CARE COORDINATION (OUTPATIENT)
Dept: OTHER | Facility: CLINIC | Age: 50
End: 2025-01-20

## 2025-01-20 NOTE — CARE COORDINATION
2025 10:22 AM  *  Alert and Triage   -Remote Alert Monitoring Note      Date/Time:  2025 10:22 AM  Patient Current Location: Home: [address] [place], [place] [id number]  Verified patients name and  as identifiers.    Rpm alert to be reviewed by the provider   red alert  blood pressure reading (166/103)  Vitals Recheck blood pressure reading (in 1 hr)  Additional needs to be addressed by provider: No                   ACM contacted patient by telephone regarding red alert received   Background: CHF, COPD, DM, HTN  Refer to 911 immediately if:  Patient unresponsive or unable to provide history  Change in cognition or sudden confusion  Patient unable to respond in complete sentences  Intense chest pain/tightness  Any concern for any clinical emergency  Red Alert: Provider response time of 1 hr required for any red alert requiring intervention  Yellow Alert: Provider response time of 3hr required for any escalated yellow alert  Patient Chief Complaint:  Blood Pressure BP Triage  Are you having any Chest Pain? no   Are you having any Shortness of Breath? no   Do you have a headache or have any vision changes? no   Are you having any numbness or tingling? no   Are you having any other health concerns or issues? Hip pain  Patient/Caregiver educated on how to properly take a blood pressure. Patient/Caregiver verbalizes understanding.     Clinical Interventions: Reviewed and followed up on alerts and treatments-discussed red alert r/t BP with patient. Patient reports she just now took her BP meds and wasn't thinking when she took BP at same time as weight. She denies any new or concerns s/s and is agreeable to repeating BP within the next hour. She will expect another call with no new metrics in an hour, or repeated abnormal metrics.     Plan/Follow Up: Will continue to review, monitor and address alerts with follow up based on severity of symptoms and risk factors.  **For any new or worsening symptoms or

## 2025-01-22 DIAGNOSIS — E11.69 TYPE 2 DIABETES MELLITUS WITH OTHER SPECIFIED COMPLICATION, WITHOUT LONG-TERM CURRENT USE OF INSULIN (HCC): Primary | ICD-10-CM

## 2025-01-22 RX ORDER — CALCIUM CITRATE/VITAMIN D3 200MG-6.25
TABLET ORAL
Qty: 50 STRIP | Refills: 5 | Status: CANCELLED | OUTPATIENT
Start: 2025-01-22

## 2025-01-22 RX ORDER — GLUCOSAMINE HCL/CHONDROITIN SU 500-400 MG
1 CAPSULE ORAL 2 TIMES DAILY
Qty: 200 EACH | Refills: 0 | Status: SHIPPED | OUTPATIENT
Start: 2025-01-22

## 2025-01-22 RX ORDER — CALCIUM CITRATE/VITAMIN D3 200MG-6.25
TABLET ORAL
Qty: 100 EACH | Refills: 3 | Status: SHIPPED | OUTPATIENT
Start: 2025-01-22

## 2025-01-27 ENCOUNTER — PATIENT MESSAGE (OUTPATIENT)
Dept: CASE MANAGEMENT | Age: 50
End: 2025-01-27

## 2025-01-27 ENCOUNTER — CARE COORDINATION (OUTPATIENT)
Dept: CASE MANAGEMENT | Age: 50
End: 2025-01-27

## 2025-01-27 NOTE — CARE COORDINATION
1/27/2025 2:21 PM  *  My Chart Message Odalis Jett , I am a nurse with the remote patient monitoring team;  reaching out to you today to remind you to please take your vitals. Important: Please try to take your vitals each day before 12pm. This ensures the monitoring team will have time to connect with your providers and get back to you with any new orders or instructions.

## 2025-01-28 ENCOUNTER — CARE COORDINATION (OUTPATIENT)
Dept: CASE MANAGEMENT | Age: 50
End: 2025-01-28

## 2025-01-28 RX ORDER — PREDNISONE 10 MG/1
TABLET ORAL
Qty: 30 TABLET | Refills: 0 | Status: SHIPPED | OUTPATIENT
Start: 2025-01-28 | End: 2025-02-07

## 2025-01-28 RX ORDER — DOXYCYCLINE HYCLATE 100 MG
100 TABLET ORAL 2 TIMES DAILY
Qty: 14 TABLET | Refills: 0 | Status: SHIPPED | OUTPATIENT
Start: 2025-01-28 | End: 2025-02-04

## 2025-01-28 NOTE — CARE COORDINATION
2025 9:49 AM  *  Alert and Triage   -Remote Alert Monitoring Note      Date/Time:  2025 9:51 AM  Patient Current Location: [place]  Verified patients name and  as identifiers.    Rpm alert to be reviewed by the provider   red alert  pulse ox reading (81%)  Vitals Recheck pulse ox reading (86%, then 85%)  Additional needs to be addressed by provider:     LPN contacted pt in regard to RPM red alert for PO of 81%.      Pt c/o new, productive cough with clear, foamy sputum. Pt afebrile but endorses SOB with cough. Pt attributes cough to \"a virus going around the house.\" Pt denied chest pain and any other worrisome or worsening symptoms. Pt has been asked multiple times to check glucose. Pt will check later.    Pt rechecked PO twice more. Updated PO readings are, 86%, and 85%. Pt is speaking in complete sentences. No audible labored breathing, wheezing, dyspnea during call.    Pt compliant with all meds.    Please advise, thank you.               LPN contacted patient by telephone regarding red alert received   Background: RPM for COPD, CHF, DM, HTN  Refer to 911 immediately if:  Patient unresponsive or unable to provide history  Change in cognition or sudden confusion  Patient unable to respond in complete sentences  Intense chest pain/tightness  Any concern for any clinical emergency  Red Alert: Provider response time of 1 hr required for any red alert requiring intervention  Yellow Alert: Provider response time of 3hr required for any escalated yellow alert  Patient Chief Complaint:  O2 Triage  Are you having any Chest Pain? no   Are you having any Shortness of Breath? Yes with cough   Swelling in your hands or feet? no   Are you having any other health concerns or issues? Yes, virus  .............................................................................................................................................................................................  Do you use oxygen? No   Patient

## 2025-01-28 NOTE — CARE COORDINATION
Ryder Jack MD   Physician  Specialty: Pulmonary Disease     Telephone Encounter     Signed     Encounter Date: 1/27/2025     Signed         Prednisone and doxy sen              Elena Reynolds MA   Medical Assistant     Telephone Encounter     Signed     Encounter Date: 1/27/2025     Signed         Spoke with pt she is currently on o2 with exertion an at night 2 liters. Pt said she is also very congested making it hard for her to breath 3 days everyone in her house is sick! No fever mucus is foamy an clear               Elena Reynolds MA   Medical Assistant     Telephone Encounter     Signed     Encounter Date: 1/27/2025     Signed         Called pt no answer left a vm to call.                 Ryder Jack MD   Physician  Specialty: Pulmonary Disease     Telephone Encounter     Signed     Encounter Date: 1/28/2025     Signed         Please call pt .  See if she has o2.  If not, can we get if for her.

## 2025-01-28 NOTE — TELEPHONE ENCOUNTER
Spoke with pt she is currently on o2 with exertion an at night 2 liters. Pt said she is also very congested making it hard for her to breath 3 days everyone in her house is sick! No fever mucus is foamy an clear

## 2025-01-29 ENCOUNTER — CARE COORDINATION (OUTPATIENT)
Dept: CASE MANAGEMENT | Age: 50
End: 2025-01-29

## 2025-01-29 NOTE — CARE COORDINATION
2025 8:15 AM  *  Alert and Triage   -Remote Alert Monitoring Note      Date/Time:  2025 8:15 AM  Patient Current Location: [place]  Verified patients name and  as identifiers.    Rpm alert to be reviewed by the provider   red alert  pulse ox reading (85%)  Vitals Recheck pulse ox reading (88%)                     LPN contacted patient by telephone regarding red alert received   Background: RPM for COPD, CHF, DM, HTN  Refer to 911 immediately if:  Patient unresponsive or unable to provide history  Change in cognition or sudden confusion  Patient unable to respond in complete sentences  Intense chest pain/tightness  Any concern for any clinical emergency  Red Alert: Provider response time of 1 hr required for any red alert requiring intervention  Yellow Alert: Provider response time of 3hr required for any escalated yellow alert  Patient Chief Complaint:  O2 Triage  Are you having any Chest Pain? no   Are you having any Shortness of Breath? Yes with cough   Swelling in your hands or feet? no   Are you having any other health concerns or issues? Yes, virus  .............................................................................................................................................................................................  Do you use oxygen? No   Patient educated on oxygen preparedness in case of an emergency?  No      Patient/Caregiver educated on how to how to properly place pulse oximeter. Patient/Caregiver verbalizes understanding.            Clinical Interventions: Reviewed and followed up on alerts and treatments-   LPN contacted pt in regard to RPM red alert for PO of 85%.      Pt c/o new, productive cough with clear, foamy sputum. Pt afebrile but endorses SOB with cough. Pt attributes cough to \"a virus going around the house.\" Pt denied chest pain and any other worrisome or worsening symptoms.      Pt rechecked PO twice more. Updated PO readings are, 82% and then 88%, WNL for

## 2025-01-30 ENCOUNTER — CARE COORDINATION (OUTPATIENT)
Dept: CASE MANAGEMENT | Age: 50
End: 2025-01-30

## 2025-01-30 NOTE — CARE COORDINATION
2025 8:47 AM  *  Alert and Triage   -Remote Alert Monitoring Note      Date/Time:  2025 8:47 AM  Patient Current Location: [place]  Verified patients name and  as identifiers.    Rpm alert to be reviewed by the provider   red alert  pulse ox reading (84%)  Vitals Recheck pulse ox reading ( )  Additional needs to be addressed by provider:                      LPN contacted patient by telephone regarding red alert received   Background: RPM for COPD, CHF, DM, HTN  Refer to 911 immediately if:  Patient unresponsive or unable to provide history  Change in cognition or sudden confusion  Patient unable to respond in complete sentences  Intense chest pain/tightness  Any concern for any clinical emergency  Red Alert: Provider response time of 1 hr required for any red alert requiring intervention  Yellow Alert: Provider response time of 3hr required for any escalated yellow alert  Patient Chief Complaint:  O2 Triage  Are you having any Chest Pain? no   Are you having any Shortness of Breath? Yes with cough   Swelling in your hands or feet? no   Are you having any other health concerns or issues? Yes, virus  .............................................................................................................................................................................................  Do you use oxygen? No   Patient educated on oxygen preparedness in case of an emergency?  No      Patient/Caregiver educated on how to how to properly place pulse oximeter. Patient/Caregiver verbalizes understanding.            Clinical Interventions: Reviewed and followed up on alerts and treatments-   LPN contacted pt in regard to RPM red alert for PO of 84%.      Pt c/o new, productive cough with clear, foamy sputum. Pt afebrile but endorses SOB with cough. Pt attributes cough to \"a virus going around the house.\" Pt denied chest pain and any other worrisome or worsening symptoms.      No audible labored breathing,  Eugenio Perez(Attending)

## 2025-01-31 ENCOUNTER — CARE COORDINATION (OUTPATIENT)
Dept: CARE COORDINATION | Age: 50
End: 2025-01-31

## 2025-01-31 ENCOUNTER — CARE COORDINATION (OUTPATIENT)
Dept: CASE MANAGEMENT | Age: 50
End: 2025-01-31

## 2025-01-31 NOTE — CARE COORDINATION
1/31/2025 9:37 AM  *  Unable to Reach Date/Time:  1/31/2025 9:37 AM  LPN attempted to reach patient by telephone regarding red alert in remote patient monitoring program for PO of 86%. Left HIPAA compliant message requesting a return call. Will attempt to reach patient again.         Bennett Pascual LPN, [facility], Remote Patient Monitoring    PH: [phone]  Email: [email or web address]

## 2025-01-31 NOTE — CARE COORDINATION
Ambulatory Care Coordination Note     2025 11:15 AM     Patient Current Location:  Home: [address] [place] [id number]     ACM contacted the patient by telephone. Verified name and  with patient as identifiers.         ACM: Elizabeth Navarrete RN     Challenges to be reviewed by the provider   Additional needs identified to be addressed with provider No  none               Method of communication with provider: none.    Utilization: Patient has not had any utilization since our last call.     Care Summary Note: ACM received message form RPM team notifying of low SpO2 of 86% and they were unable to make contact with patient. ACM placed call to patient who reported that she is still fighting a virus and having a productive cough.  Pt denied CP, SOB, HA, dizziness, edema to hands, ABD or feet. Pt checked SpO2 while on the line with this ACM, SpO2 was 94%.  Pt reported that when she is coughing it seems to take her breath and when up moving throughout the house.  ACM discussed that when SpO2 reading is below 90, sit and rest for a moment then recheck and manually enter result into RPM.  Pt VU.  ACM advised pt that is SpO2 were to remain under 90 even after resting for a few minutes, put on her O2 (used HS only) and contact the doctor and if she becomes increasing SOB, call 911 for emergent assistance. Pt VU.    Offered patient enrollment in the Remote Patient Monitoring (RPM) program for in-home monitoring: Yes, patient enrolled; current status is activated and monitoring.     Date BP Pulse Ox Weight Glucose   2025  86/108  (9:12 AM) 328  (9:02 AM)    2025 139/80/92  (8:42 AM) 90/94  (10:07 AM)  88/96  (9:57 AM)  84/97  (8:38 AM) 328.7  (7:40 AM) 111  (10:01 AM)   2025 123/78/91  (8:19 AM) 88/98  (8:18 AM)  82/95  (8:01 AM)  85/81  (7:41 AM) 328.5  (7:29 AM) 128  (8:16 AM)   2025 132/78/93  (9:53 AM) 85/96  (9:55 AM)  86/95  (9:54 AM)  81/98  (9:28 AM) 329.8  (9:03 AM)

## 2025-01-31 NOTE — CARE COORDINATION
1/31/2025 10:37 AM  *  Unable to Reach Date/Time:  1/31/2025 10:37 AM  LPN attempted to reach patient and emergency contact by telephone x 3 regarding red alert in remote patient monitoring program for PO of 86%. Left HIPAA compliant message requesting a return call. Writer will route to [facility], per RPM protocol, if unable to reach patient and emergency contact after three attempts.            Bennett Pascual LPN, [facility], Remote Patient Monitoring     PH: [phone]  Email: [email or web address]

## 2025-02-03 ENCOUNTER — CARE COORDINATION (OUTPATIENT)
Dept: CASE MANAGEMENT | Age: 50
End: 2025-02-03

## 2025-02-03 NOTE — CARE COORDINATION
2/3/2025 10:12AM  *  Alert and Triage   -Remote Alert Monitoring Note      Date/Time:  2/3/2025 10:50 AM  Patient Current Location: [place]  Verified patients name and  as identifiers.    Rpm alert to be reviewed by the provider   red alert  pulse ox reading (83%)  Vitals Recheck pulse ox reading (90%)  Additional needs to be addressed by provider: None                 LPN contacted patient by telephone regarding red alert received   Background: RPM for COPD, CHF, DM, HTN  Refer to 911 immediately if:  Patient unresponsive or unable to provide history  Change in cognition or sudden confusion  Patient unable to respond in complete sentences  Intense chest pain/tightness  Any concern for any clinical emergency  Red Alert: Provider response time of 1 hr required for any red alert requiring intervention  Yellow Alert: Provider response time of 3hr required for any escalated yellow alert  Patient Chief Complaint:  O2 Triage  Are you having any Chest Pain? no   Are you having any Shortness of Breath? Yes with cough   Swelling in your hands or feet? no   Are you having any other health concerns or issues? Yes, virus  .............................................................................................................................................................................................  Do you use oxygen? No   Patient educated on oxygen preparedness in case of an emergency?  No      Patient/Caregiver educated on how to how to properly place pulse oximeter. Patient/Caregiver verbalizes understanding.            Clinical Interventions: Reviewed and followed up on alerts and treatments-   LPN contacted pt in regard to RPM red alert for PO of 83%.      Pt is speaking in complete sentences. No apparent distress noted. No audible wheezing, labored breathing or SOB. Pt compliant with all meds.    Writer asked pt to recheck PO. Pt will recheck PO later this morning. Writer will update if and when pt

## 2025-02-04 ENCOUNTER — CARE COORDINATION (OUTPATIENT)
Dept: CARE COORDINATION | Age: 50
End: 2025-02-04

## 2025-02-04 NOTE — CARE COORDINATION
2025 11:43 AM  *  RPM Verification RPM Verification and Welcome Call Successful? Yes,     Remote Patient Monitoring Welcome Note  Date/Time:  2025 11:43 AM  Patient Current Location: Home: [address] [place] [id number]  Verified patients name and  as identifiers.       Completed and confirmed the following:    [x] Patient received all RPM equipment (tablet, scale, blood pressure device and cuff, and pulse oximeter)  Cuff Size: regular (9.05\"-15.74\")    Weight Scale:  bariatric (330-550lbs)                    [x] Instructed patient keep box for use when returning equipment                                                          [x] Reviewed Patient Welcome Letter with patient    [x]  Reviewed Consent Form 2024  Copy of consent form in chart.                 [x] Reviewed expectations for patient and care team  Monitoring hours M-F 9-4pm  It is important to take your vitals every day, even on the weekends,to keep your care team aware of how you are doing every day of the week.  Completing monitoring by 12pm on  so that alerts can be responded to in the same day  Patient weighs self at same time every day (or after urinating and waking up)  Take blood pressure 1-2 hrs after medications   RPM team may have different phone area code (including [place], [place], [place] or [place])        aguilar                      [x] Instructed patient to keep scale on flat surface                                                         [x] Instructed patient to keep tablet plugged in at all times                         [x] Instructed how to contact IT support  ([phone])  [x] Provided Remote Patient Monitoring care  information  [place]    Emergency Contact Verified: Pratik Jett (spouse) [phone]                All questions answered at this time.

## 2025-02-05 ENCOUNTER — CARE COORDINATION (OUTPATIENT)
Dept: CARE COORDINATION | Age: 50
End: 2025-02-05

## 2025-02-05 NOTE — ACP (ADVANCE CARE PLANNING)
Advance Care Planning   General Advance Care Planning (ACP) Conversation    Date of Conversation: 2/5/2025  Conducted with: Patient with Decision Making Capacity  Other persons present: None    Healthcare Decision Maker:    Primary Decision Maker: Pratik Jett - Spouse - [phone]    Today we documented Decision Maker(s) consistent with Legal Next of Kin hierarchy.  Content/Action Overview:  Has ACP document(s) on file - reflects the patient's care preferences  Reviewed DNR/DNI and patient elects Full Code (Attempt Resuscitation)        Length of Voluntary ACP Conversation in minutes:  <16 minutes (Non-Billable)    Elizabeth Gallagher RN

## 2025-02-05 NOTE — CARE COORDINATION
Ambulatory Care Coordination Note     2025 11:56 AM     Patient Current Location:  Home: [address] [place] [id number]     ACM contacted the patient by telephone. Verified name and  with patient as identifiers.         ACM: Elizabeth Gallagher RN     Challenges to be reviewed by the provider   Additional needs identified to be addressed with provider No  none               Method of communication with provider: none.    Utilization: Patient has not had any utilization since our last call.     Care Summary Note: CC f/u completed.  Pt was pleasant and engaged with no s/s of disease exacerbation.  Pt taking medications as prescribed.   Pt states URI symptoms are improving.  ACP conversation completed and reflect pt wishes and docs.  Pt has an ortho appt on  to discuss ongoing pain and to possibly schedule surgery for hip replacement.  Pt has lost weight, but is not yet at 300 lb goal.  However, pt has been experiencing increased pain, which is impeding her movement.  ACM will assist w/plan based on Ortho visit.    Offered patient enrollment in the Remote Patient Monitoring (RPM) program for in-home monitoring: Yes, patient enrolled; current status is activated and monitoring.     Assessments Completed:   Diabetes Assessment    Medic Alert ID: No  Meal Planning: Avoidance of concentrated sweets   How often do you test your blood sugar?: Daily, Bedtime   Do you have barriers with adherence to non-pharmacologic self-management interventions? (Nutrition/Exercise/Self-Monitoring): Yes   Have you ever had to go to the ED for symptoms of low blood sugar?: No       No patient-reported symptoms   Do you have hyperglycemia symptoms?: No   Do you have hypoglycemia symptoms?: No   Last Blood Sugar Value: 114   Blood Sugar Monitoring Regimen: At Bedtime, Morning Fasting         ,   Congestive Heart Failure Assessment    Are you currently restricting fluids?: No Restriction  Do you understand a low sodium diet?:

## 2025-02-06 ENCOUNTER — TELEPHONE (OUTPATIENT)
Dept: PRIMARY CARE CLINIC | Age: 50
End: 2025-02-06

## 2025-02-06 ENCOUNTER — CARE COORDINATION (OUTPATIENT)
Dept: CARE COORDINATION | Age: 50
End: 2025-02-06

## 2025-02-06 NOTE — TELEPHONE ENCOUNTER
02/06/25 2:16 PM     REMOTE PATIENT MONITORING HIGH ALERT RESPONSE         ASSESSMENT AND PLAN   Weight gain  --pt was fully dressed for appts this morning when she weighed, per our RPM LPN's note, which is unusual for her  --in addition, had an outlier high weight on 2/2 of 336.2 and a lower than usual weight of 330.2 the day after this--steady on scale? other days with extra clothing on or weighing at other times?  --pt denied any increase in edema when called by LPN  --will ask ACM to F/U with pt on variations in recent weights  --overall weight is down from average weight of 341 at start of program  --continues on torsemide for total of 40 mg daily--sometimes takes 20 mg BID  --no other intervention today; will continue to monitor       CHIEF COMPLAINT    Responding to a high RPM alert for weight gain of 5.3# in the last 7 days; however, this morning's weight was taken fully clothed before leaving for appts    HPI    Odalis Jett is a 49 y.o. female presents with above weight gain alert. Reports hx of lymphedema and is affected more in her right leg. Does report there is no increase in edema in legs today and no appearance of edema anywhere else. Denies SOB at rest and has her usual mild LAIRD with exertion. Denies CP. When asked if she recalls what might have been going on earlier in the last 7 days to cause large single-day increase in weight (336.2 up from 328 2 days prior), she attributes this to using scale in her living room (the only place she has room to weigh) and getting weight after others were up in the household, keeping her from being able to weigh privately and in much lighter bedclothes as she tries to do consistently. Confirms she is on torsemide 40 mg daily--sometimes taking it 20 mg BID, depending on her childcare responsibilities at home.    ALLERGIES    Allergies   Allergen Reactions    Latex Hives and Swelling     Marla Cruz, FORREST, FNP-C, Remote Patient Monitoring NP, (Ph)

## 2025-02-06 NOTE — CARE COORDINATION
-Remote Alert Monitoring Note      Date/Time:  2025 11:25 AM  Patient Current Location: Home: [address] [place], [place] [id number]  Verified patients name and  as identifiers.    Rpm alert to be reviewed by the provider                    LPN contacted patient by telephone regarding red alert received   Background: Pt enrolled for CHF, COPD, DM and HTN  Refer to 911 immediately if:  Patient unresponsive or unable to provide history  Change in cognition or sudden confusion  Patient unable to respond in complete sentences  Intense chest pain/tightness  Any concern for any clinical emergency  Red Alert: Provider response time of 1 hr required for any red alert requiring intervention  Yellow Alert: Provider response time of 3hr required for any escalated yellow alert  Patient Chief Complaint:  Weight Weight Scale Triage  Was your weight obtained upon rising/waking today? no   Was your weight obtained after voiding and/or use of the bathroom today? yes   Did you weigh yourself in the same amount of clothing today, compared to how you typically do? No--patient was fully dressed   Was the scale bumped or moved prior to today's weight? no   Is your scale on a flat/hard surface? yes   Did you obtain your weight with shoes on? no   If yes, is this something you normally do during your daily weights? yes   Were you standing up straight on the scale today? yes   Were you leaning on anything while obtaining your weight today? no   Weight Education Provided to Patient or Caregiver:   Patient to weigh on the same scale at the same time each day  Patient to weigh first thing in the morning, after going to the bathroom; before eating breakfast, and before having anything to drink.  Instructed on importance of  not wearing shoes on the scale, and wearing the same type of clothing each day.  Clinical Interventions:  Spoke with patient, she reports she had several appts this morning and when she weighed herself, she was

## 2025-02-10 ENCOUNTER — CARE COORDINATION (OUTPATIENT)
Dept: CASE MANAGEMENT | Age: 50
End: 2025-02-10

## 2025-02-10 ENCOUNTER — TELEPHONE (OUTPATIENT)
Dept: PRIMARY CARE CLINIC | Age: 50
End: 2025-02-10

## 2025-02-10 NOTE — TELEPHONE ENCOUNTER
02/10/25 1:18 PM     REMOTE PATIENT MONITORING HIGH ALERT RESPONSE     Message sent to patient via Patient Connect Portal for Remote Patient Monitoring     GWENDOLYN Wong, This is Marla Cruz the [facility] nurse practitioner with [facility]. I will be calling you shortly from [phone]. Please answer if you are able.       ASSESSMENT AND PLAN   Weight gain  --pt to make F/U appts with neph and card  --agrees to take Torsemide 40 mg 1 and 1/2 tablets x 2 days and then resume usual dose of 40 mg daily  --asking ACM to F/U on these directions and to provide pt with information on CHF Clinic services and benefits     CHIEF COMPLAINT    Responding to a high RPM alert for weight gain of 6.4# in the last 7 days    HPI    Odalis Jett is a 49 y.o. female presents with above weight gain. Denies any concerning symptoms since speaking with RPM LPN earlier today. Agrees to repeat BP--now states it is 112/71 (85) but this did not transmit to tablet. Tablet not connected and low on battery. Advised to recharge and make sure tablet is connected prior to getting readings. Pt reports 2 missed doses of chlorthalidone 25 mg (1 daily), 4 missed doses of spironolactone 25 mg (BID daily dosing), and 5-6 missed doses of Torsemide 40 mg (1 daily) over the last 2 weeks. Reports when she has to go out, she doesn't take Torsemide. Last neph appt appears to be 8/2024--was advised to return in 3 months. Agrees to call for new F/U appt. It appears last card appt was 12/23--agrees to call Dr. Ocampo's office to request F/U appt with him also. States she is agreeable to taking Torsemide 40 mg 1 and 1/2 tablets x 2 days and then resuming usual 40 mg dose. Will ask ACM to F/U with pt on these directions and to provide information on CHF Clinics.    ALLERGIES    Allergies   Allergen Reactions    Latex Hives and Swelling       RECENT LABS  Lab Results   Component Value Date/Time     11/14/2024 12:53 PM    K 4.3 11/14/2024 12:53 PM    K

## 2025-02-10 NOTE — CARE COORDINATION
2/10/2025 9:17 AM  *  Alert and Triage   -Remote Alert Monitoring Note      Date/Time:  2/10/2025 9:17 AM  Patient Current Location: [place]  Verified patients name and  as identifiers.    Rpm alert to be reviewed by the provider   red alert  weight (6.4 lbs in 7 days)    Additional needs to be addressed by provider:       LPN contacted pt in regard to RPM red alert for wt increase of 6.4 lbs in 7 days. Pt denied any new, worrisome and or worsening symptoms at this time.     Pt denied any new or worsening edema, SOB and chest pain. Pt followed proper protocol to obtain wt metrics today, and weighed in very \"thin\" pajamas. Pt is compliant with all meds. Pt weighed on a hard, flat surface. Pt advises that, \"I don't feel like I'm holding water.\"     Please advise, thank you.                 LPN contacted patient by telephone regarding red alert received   Background: Pt enrolled for CHF, COPD, DM and HTN  Refer to 911 immediately if:  Patient unresponsive or unable to provide history  Change in cognition or sudden confusion  Patient unable to respond in complete sentences  Intense chest pain/tightness  Any concern for any clinical emergency  Red Alert: Provider response time of 1 hr required for any red alert requiring intervention  Yellow Alert: Provider response time of 3hr required for any escalated yellow alert  Patient Chief Complaint:  Weight Weight Scale Triage  Was your weight obtained upon rising/waking today? no   Was your weight obtained after voiding and/or use of the bathroom today? yes   Did you weigh yourself in the same amount of clothing today, compared to how you typically do? Yes, pt weighed in \"thin\" pajamas.   Was the scale bumped or moved prior to today's weight? no   Is your scale on a flat/hard surface? yes   Did you obtain your weight with shoes on? no   If yes, is this something you normally do during your daily weights? yes   Were you standing up straight on the scale today? yes   Were you

## 2025-02-12 DIAGNOSIS — G89.4 CHRONIC PAIN SYNDROME: ICD-10-CM

## 2025-02-12 RX ORDER — VENLAFAXINE HYDROCHLORIDE 75 MG/1
150 CAPSULE, EXTENDED RELEASE ORAL DAILY
Qty: 60 CAPSULE | Refills: 3 | OUTPATIENT
Start: 2025-02-12

## 2025-02-13 ENCOUNTER — CARE COORDINATION (OUTPATIENT)
Dept: CARE COORDINATION | Age: 50
End: 2025-02-13

## 2025-02-13 NOTE — CARE COORDINATION
-Remote Alert Monitoring Note      Date/Time:  2025 12:53 PM  Patient Current Location: Home: [address] [place], [place] [id number]  Verified patients name and  as identifiers.    Rpm alert to be reviewed by the provider   red alert  pulse ox reading (86)  Vitals Recheck pulse ox reading (91)  Additional needs to be addressed by provider: No                   ACM contacted patient by telephone regarding red alert received   Background: CHF,COPD,DM,HTN  Refer to 911 immediately if:  Patient unresponsive or unable to provide history  Change in cognition or sudden confusion  Patient unable to respond in complete sentences  Intense chest pain/tightness  Any concern for any clinical emergency  Red Alert: Provider response time of 1 hr required for any red alert requiring intervention  Yellow Alert: Provider response time of 3hr required for any escalated yellow alert  Patient Chief Complaint:  Oxygen O2 Triage  Are you having any Chest Pain? no   Are you having any Shortness of Breath? no   Swelling in your hands or feet? no   Are you having any other health concerns or issues? no  .............................................................................................................................................................................................  Do you use oxygen? No   Patient educated on oxygen preparedness in case of an emergency?  No     Patient/Caregiver educated on how to how to properly place pulse oximeter. Patient/Caregiver verbalizes understanding.     Clinical Interventions: Reviewed metrics needed for daily management of chronic disease-RN outreached to patient to discuss PO reading of 86%  and survey question of change in sputum color. Patient notes she finished ABX and steroids and is taking mucinex. Notes her provider is aware. Denies any concerning symptoms at this time and speaking in full sentences. Patient PO recheck notes 91%. PO took quite a while to read and RN

## 2025-02-13 NOTE — CARE COORDINATION
RN attempted to reach patient by phone to discuss PO reading of 86%. Patient did not answer and VM left requesting a return call. Will make second attempt at a later time.

## 2025-02-17 ENCOUNTER — CARE COORDINATION (OUTPATIENT)
Dept: CARE COORDINATION | Age: 50
End: 2025-02-17

## 2025-02-17 ENCOUNTER — TELEPHONE (OUTPATIENT)
Dept: PRIMARY CARE CLINIC | Age: 50
End: 2025-02-17

## 2025-02-17 ENCOUNTER — CARE COORDINATION (OUTPATIENT)
Dept: OTHER | Facility: CLINIC | Age: 50
End: 2025-02-17

## 2025-02-17 NOTE — TELEPHONE ENCOUNTER
(VENTOLIN HFA) 108 (90 Base) MCG/ACT inhaler Inhale 2 puffs into the lungs 4 times daily as needed for Wheezing 18 g 0    guaiFENesin (MUCINEX) 600 MG extended release tablet Take 1 tablet by mouth 2 times daily 30 tablet 5    omeprazole (PRILOSEC) 20 MG delayed release capsule TAKE 1 CAPSULE BY MOUTH DAILY 90 capsule 1    gabapentin (NEURONTIN) 300 MG capsule Take 1 capsule by mouth 3 times daily.      ipratropium 0.5 mg-albuterol 2.5 mg (DUONEB) 0.5-2.5 (3) MG/3ML SOLN nebulizer solution INHALE THREE MILLILITERS  (1 VIAL) IA NEBULIZATION BY MOUTH EVERY 4 HOURS 180 mL 11    diclofenac sodium (VOLTAREN) 1 % GEL Apply 4 g topically 4 times daily as needed for Pain      fluticasone-umeclidin-vilant (TRELEGY ELLIPTA) 100-62.5-25 MCG/ACT AEPB inhaler INHALE 1 PUFF BY MOUTH DAILY 1 each 11    methocarbamol (ROBAXIN) 500 MG tablet Take 1 tablet by mouth 3 times daily as needed (muscle spasms) (Patient not taking: Reported on 12/30/2024)      spironolactone (ALDACTONE) 25 MG tablet Take 1 tablet by mouth 2 times daily 90 tablet 5    Misc. Devices (BARIATRIC ROLLATOR) MISC 600lb. Capacity St[employer]man Extra-Wide Deluxe Bariatric Walker Rollator. Heavy-Duty Oval Tubing. High-Capacity Padded Seat/Backrest. Height Adjustable 1 each 0    meloxicam (MOBIC) 7.5 MG tablet Take 1 tablet by mouth daily 30 tablet 3    cloNIDine (CATAPRES) 0.1 MG tablet TAKE TWO TABLETS BY MOUTH TWO TIMES A  tablet 0    Blood Pressure Monitoring (B-D ASSURE BPM/AUTO ARM CUFF) MISC 1 Device by Does not apply route daily 1 each 0    loratadine (CLARITIN) 10 MG tablet TAKE ONE TABLET BY MOUTH DAILY 90 tablet 3    venlafaxine (EFFEXOR XR) 75 MG extended release capsule Take 2 capsules by mouth daily 60 capsule 3    metoprolol succinate (TOPROL XL) 100 MG extended release tablet Take 1 tablet by mouth daily 90 tablet 5    torsemide 40 MG TABS Take 40 mg by mouth daily (Patient taking differently: Take 20 mg by mouth in the morning and at bedtime 2 daily

## 2025-02-17 NOTE — CARE COORDINATION
2025 10:44 AM  *  Alert and Triage   -Remote Alert Monitoring Note      Date/Time:  2025 10:48 AM  Patient Current Location: Home: [address] [place], [place] [id number]  Verified patients name and  as identifiers.    Rpm alert to be reviewed by the provider   red alert  weight (336.6)  Vitals Recheck weight (tomorrow)  Additional needs to be addressed by provider: No                   ACM contacted patient by telephone regarding red alert received   Background: CHF, COPD, DM, HTN  Refer to 911 immediately if:  Patient unresponsive or unable to provide history  Change in cognition or sudden confusion  Patient unable to respond in complete sentences  Intense chest pain/tightness  Any concern for any clinical emergency  Red Alert: Provider response time of 1 hr required for any red alert requiring intervention  Yellow Alert: Provider response time of 3hr required for any escalated yellow alert  Patient Chief Complaint:  Weight Weight Scale Triage  Was your weight obtained upon rising/waking today? Yes   Was your weight obtained after voiding and/or use of the bathroom today? yes   Did you weigh yourself in the same amount of clothing today, compared to how you typically do? yes   Was the scale bumped or moved prior to today's weight? no   Is your scale on a flat/hard surface? no   Did you obtain your weight with shoes on? no   If yes, is this something you normally do during your daily weights? no   Were you standing up straight on the scale today? yes   Were you leaning on anything while obtaining your weight today? no   Weight Education Provided to Patient or Caregiver:   Patient to weigh on the same scale at the same time each day  Patient to weigh first thing in the morning, after going to the bathroom; before eating breakfast, and before having anything to drink.  Instructed on importance of  not wearing shoes on the scale, and wearing the same type of clothing each day.  Clinical Interventions:  Pertinent PMH/PSH/FHx/SHx and Review of Systems contained within:    56yo M w PMH of IDDM, HTN presents to ED for eval s/p hypoglycemia episode.  Pt states he usually takes insulin at 5am w breakfast & has lunch, but due to late night out, today took his am dose with breakfast at 9am, then went back to sleep and did not wake up for lunch.  Pt alarm went off in the evening & his aunt heard pt not waking up, checked on pt, noted he was altered & called EMS.  EMS report FS 20.  Pt improved after D50.  Pt denies CP, SOB, abd pain, N/V/D, fever, chills.  Pt states he is only on insulin, no oral meds for DM.  Pt also reports he has been running low on his losartan, has been breaking tabs in half to last until next PMD appt.    No fever/chills, No photophobia/eye pain/changes in vision, No ear pain/sore throat/dysphagia, No chest pain/palpitations, no SOB/cough/wheeze/stridor, No abdominal pain, no dysuria/frequency/discharge, No neck/back pain, no rash

## 2025-02-17 NOTE — CARE COORDINATION
Ambulatory Care Coordination Note     2025 3:07 PM     Patient Current Location:  Home: [address] [place] [id number]     ACM contacted the patient by telephone. Verified name and  with patient as identifiers.         ACM: Elizabeth Gallagher RN     Challenges to be reviewed by the provider   Additional needs identified to be addressed with provider No  none               Method of communication with provider: none.    Utilization: Patient has not had any utilization since our last call.     Care Summary Note: ACM outreach to pt regarding RPM red alert of 6.2 lb weight gain in 2 day.  Pt is not reporting any SOB, chest pain or swelling.  PT reports that she did just finish around of steroids, which causes her to gain weight.  ACM encouraged pt to make Cards appt since it has been over a year and will route a message to staff to assist.     Offered patient enrollment in the Remote Patient Monitoring (RPM) program for in-home monitoring: Yes, patient enrolled; current status is activated and monitoring.     Assessments Completed:   No changes since last call    Medications Reviewed:   Patient denies any changes with medications and reports taking all medications as prescribed.    Advance Care Planning:   Not reviewed during this call     Care Planning:   Not completed during this call    PCP/Specialist follow up:   Future Appointments         Provider Specialty Dept Phone    3/4/2025 11:40 AM Ryder Jack MD Pulmonology [phone]            Follow Up:   Plan for next ACM outreach in approximately 2 weeks to complete:  - follow up appointment with Cards .   Patient  is agreeable to this plan.

## 2025-02-21 ENCOUNTER — CARE COORDINATION (OUTPATIENT)
Dept: CASE MANAGEMENT | Age: 50
End: 2025-02-21

## 2025-02-21 NOTE — CARE COORDINATION
2025 3:12 PM  *  Alert and Triage   -Remote Alert Monitoring Note      Date/Time:  2025 3:13 PM  Patient Current Location: [place]  Verified patients name and  as identifiers.    Rpm alert to be reviewed by the provider                LPN contacted patient by telephone regarding red alert received   Background: Pt enrolled for CHF, COPD, DM and HTN  Refer to 911 immediately if:  Patient unresponsive or unable to provide history  Change in cognition or sudden confusion  Patient unable to respond in complete sentences  Intense chest pain/tightness  Any concern for any clinical emergency  Red Alert: Provider response time of 1 hr required for any red alert requiring intervention  Yellow Alert: Provider response time of 3hr required for any escalated yellow alert  Patient Chief Complaint:  Weight Weight Scale Triage  Was your weight obtained upon rising/waking today? no   Was your weight obtained after voiding and/or use of the bathroom today? yes   Did you weigh yourself in the same amount of clothing today, compared to how you typically do? Yes, pt weighed in \"thin\" pajamas.   Was the scale bumped or moved prior to today's weight? no   Is your scale on a flat/hard surface? yes   Did you obtain your weight with shoes on? no   If yes, is this something you normally do during your daily weights? yes   Were you standing up straight on the scale today? yes   Were you leaning on anything while obtaining your weight today? no   Weight Education Provided to Patient or Caregiver:   Patient to weigh on the same scale at the same time each day  Patient to weigh first thing in the morning, after going to the bathroom; before eating breakfast, and before having anything to drink.  Instructed on importance of  not wearing shoes on the scale, and wearing the same type of clothing each day.     Clinical Interventions:  LPN contacted pt in regard to RPM red alert for wt increase of 8.4 lbs in 7 days, and 5.1 lbs in 24 hrs.

## 2025-02-25 ENCOUNTER — CARE COORDINATION (OUTPATIENT)
Dept: CASE MANAGEMENT | Age: 50
End: 2025-02-25

## 2025-02-25 NOTE — CARE COORDINATION
2/25/2025 12:31 PM  *  My Chart Message Odalis Jett , I am a nurse with the remote patient monitoring team;  reaching out to you today to remind you to please take your vitals. Important: Please try to take your vitals each day before 12pm. This ensures the monitoring team will have time to connect with your providers and get back to you with any new orders or instructions.

## 2025-02-28 ENCOUNTER — HOSPITAL ENCOUNTER (OUTPATIENT)
Dept: GENERAL RADIOLOGY | Age: 50
Discharge: HOME | End: 2025-02-28
Payer: MEDICAID

## 2025-02-28 ENCOUNTER — HOSPITAL ENCOUNTER (OUTPATIENT)
Age: 50
Discharge: HOME | End: 2025-02-28
Payer: MEDICAID

## 2025-02-28 DIAGNOSIS — M16.51 POST-TRAUMATIC OSTEOARTHRITIS OF RIGHT HIP: ICD-10-CM

## 2025-02-28 DIAGNOSIS — M54.16 LUMBAR RADICULOPATHY: ICD-10-CM

## 2025-02-28 PROCEDURE — 73502 X-RAY EXAM HIP UNI 2-3 VIEWS: CPT

## 2025-02-28 PROCEDURE — 72110 X-RAY EXAM L-2 SPINE 4/>VWS: CPT

## 2025-03-04 ENCOUNTER — TELEPHONE (OUTPATIENT)
Dept: FAMILY MEDICINE CLINIC | Age: 50
End: 2025-03-04

## 2025-03-04 ENCOUNTER — OFFICE VISIT (OUTPATIENT)
Dept: PULMONOLOGY | Age: 50
End: 2025-03-04
Payer: MEDICAID

## 2025-03-04 VITALS
BODY MASS INDEX: 50.02 KG/M2 | SYSTOLIC BLOOD PRESSURE: 128 MMHG | OXYGEN SATURATION: 95 % | DIASTOLIC BLOOD PRESSURE: 72 MMHG | WEIGHT: 293 LBS | HEIGHT: 64 IN | RESPIRATION RATE: 18 BRPM | TEMPERATURE: 97.6 F | HEART RATE: 71 BPM

## 2025-03-04 DIAGNOSIS — J30.89 OTHER ALLERGIC RHINITIS: Primary | ICD-10-CM

## 2025-03-04 DIAGNOSIS — E66.01 MORBID OBESITY: ICD-10-CM

## 2025-03-04 DIAGNOSIS — Z72.0 TOBACCO ABUSE: ICD-10-CM

## 2025-03-04 PROCEDURE — 3078F DIAST BP <80 MM HG: CPT | Performed by: INTERNAL MEDICINE

## 2025-03-04 PROCEDURE — 99214 OFFICE O/P EST MOD 30 MIN: CPT | Performed by: INTERNAL MEDICINE

## 2025-03-04 PROCEDURE — 3017F COLORECTAL CA SCREEN DOC REV: CPT | Performed by: INTERNAL MEDICINE

## 2025-03-04 PROCEDURE — 3074F SYST BP LT 130 MM HG: CPT | Performed by: INTERNAL MEDICINE

## 2025-03-04 PROCEDURE — G8417 CALC BMI ABV UP PARAM F/U: HCPCS | Performed by: INTERNAL MEDICINE

## 2025-03-04 PROCEDURE — G8427 DOCREV CUR MEDS BY ELIG CLIN: HCPCS | Performed by: INTERNAL MEDICINE

## 2025-03-04 PROCEDURE — 4004F PT TOBACCO SCREEN RCVD TLK: CPT | Performed by: INTERNAL MEDICINE

## 2025-03-04 NOTE — TELEPHONE ENCOUNTER
Pt came into office, her pulse ox stopped working and she was not able to get it working with new batteries. It came in a set with a scale, bp cuff, and a pulse ox.  Pt uses a [employer] pulse ox that connects to other devices. She would like to know if a new one can be ordered.     Pt stated that she has to male it back in next month.  Gave patient contact number for [employer] to see if they can get a replacement to her before she sends it back in.    Pt will call us if she needs a new order.

## 2025-03-04 NOTE — PROGRESS NOTES
REASON FOR CONSULTATION/CC:    Chief Complaint   Patient presents with    COPD           PCP: Tad Ramirez MD    HISTORY OF PRESENT ILLNESS: Odalis Jett is a 49 y.o. year old female with a history of JOSE who presents :                Tobacco use  Peak 1 ppd x 30 year.    Still weaning.   Curretnly 1/4 ppd.       Obesity   Body mass index is 58.53 kg/m².  Still uncontrolled     allergic rhinitis   Claritin and Singulair.   Still having drainage    Still coughing up mucus.    Did not like nasal spray .  Had issues with nasal bleeding and issues with medication staying in secondary to congestion.        COPD   Lucy Siu's           Objective:   PHYSICAL EXAM:  Blood pressure 128/72, pulse 71, temperature 97.6 °F (36.4 °C), resp. rate 18, height 1.626 m (5' 4\"), weight (!) 154.7 kg (341 lb), SpO2 95%, not currently breastfeeding.'    Gen: No distress.  Body mass index is 58.53 kg/m².   ENT:   Resp: No accessory muscle use. No crackles. +  wheezes. And rhonchi on right side.   CV: Regular rate. Regular rhythm. No murmur or rub. No edema.   Skin: Warm, dry, normal texture and turgor. No nodule on exposed extremities.   M/S: No cyanosis. No clubbing. No joint deformity.  Psych: Oriented x 3. No anxiety.  Awake. Alert. Intact judgement and insight. Good Mood / Affect.  Memory appears in tact   .     Data Reviewed:        Assessment:      JSOE  Obesity Body mass index is 58.53 kg/m².   allergic rhinitis   COPD, mild, FEV1 76%.  Decreased DLCO.  Goiter         Plan:      Problem List Items Addressed This Visit       Tobacco abuse     Working on weaning off. Down to 1/4 ppd         Other allergic rhinitis - Primary     Claritin and Singulair.       Having symptoms but adverse to nasal sprays.           Morbid obesity     uncontrolled                        [facility]  [phone]

## 2025-03-06 ENCOUNTER — CARE COORDINATION (OUTPATIENT)
Dept: CASE MANAGEMENT | Age: 50
End: 2025-03-06

## 2025-03-06 ENCOUNTER — TELEPHONE (OUTPATIENT)
Dept: PRIMARY CARE CLINIC | Age: 50
End: 2025-03-06

## 2025-03-06 NOTE — TELEPHONE ENCOUNTER
03/06/25 2:52 PM    REMOTE PATIENT MONITORING ALERT RESPONSE         ASSESSMENT AND PLAN   HTN  Asymptomatic, today's DBP is slightly above her norm  Continue to monitor with [facility]    CHIEF COMPLAINT    Chief Complaint   Patient presents with    [facility] alert     High BP  137/102/113  121/105/119    HPI    Odalis Jett is a 49 y.o. female who is enrolled in [facility] for CHF, COPD, DM and HTN. Alert received from [facility] LPN for asymptomatic high BP.   DBP noted to be higher today than her norm of 80-90s. Reviewed VS, trends, notes and [facility] LPN triage note.        CURRENT MEDICATIONS    Current Outpatient Rx   Medication Sig Dispense Refill    Alcohol Swabs 70 % PADS 1 each by Does not apply route 2 times daily 200 each 0    blood glucose test strips (TRUE METRIX BLOOD GLUCOSE TEST) strip Use to check blood sugar twice daily as needed 100 each 3    oxyBUTYnin (DITROPAN-XL) 5 MG extended release tablet TAKE 1 TABLET BY MOUTH DAILY 90 tablet 1    metFORMIN (GLUCOPHAGE) 500 MG tablet TAKE 1 TABLET BY MOUTH TWICE A DAY WITH A MEAL 180 tablet 1    oxyCODONE-acetaminophen (PERCOCET) 7.5-325 MG per tablet Take 1 tablet by mouth every 6 hours as needed.      Dulaglutide 3 MG/0.5ML SOAJ Inject 3 mg into the skin once a week 4 Adjustable Dose Pre-filled Pen Syringe 3    chlorthalidone (HYGROTON) 25 MG tablet TAKE 1 TABLET BY MOUTH DAILY 90 tablet 5    ARIPiprazole (ABILIFY) 2 MG tablet TAKE 1 TABLET BY MOUTH DAILY 90 tablet 0    montelukast (SINGULAIR) 10 MG tablet TAKE ONE TABLET BY MOUTH ONCE NIGHTLY 90 tablet 1    albuterol sulfate HFA (VENTOLIN HFA) 108 (90 Base) MCG/ACT inhaler Inhale 2 puffs into the lungs 4 times daily as needed for Wheezing 18 g 0    guaiFENesin (MUCINEX) 600 MG extended release tablet Take 1 tablet by mouth 2 times daily 30 tablet 5    omeprazole (PRILOSEC) 20 MG delayed release capsule TAKE 1 CAPSULE BY MOUTH DAILY 90 capsule 1    gabapentin (NEURONTIN) 300 MG capsule Take 1 capsule by mouth 3 times daily.

## 2025-03-06 NOTE — CARE COORDINATION
SOB, headache, vision changes, numbness, tingling, N/V, dizziness/lightheadedness or any other concerns at this time.   Pt compliant with all meds.     Writer asked pt to recheck BP during call, alerting red again at, 137/102.  Escalated to CNP for review.     Plan/Follow Up: Will continue to review, monitor and address alerts with follow up based on severity of symptoms and risk factors.  **For any new or worsening symptoms or you are concerned in anyway, please contact your Provider or report to the nearest Emergency Room.**              Bennett Pascual LPN, PCC, Remote Patient Monitoring     PH: [phone]  Email: [email or web address]

## 2025-03-07 PROBLEM — Z72.0 TOBACCO ABUSE: Status: ACTIVE | Noted: 2025-03-07

## 2025-03-10 ENCOUNTER — CARE COORDINATION (OUTPATIENT)
Dept: CASE MANAGEMENT | Age: 50
End: 2025-03-10

## 2025-03-10 RX ORDER — ARIPIPRAZOLE 2 MG/1
2 TABLET ORAL DAILY
Qty: 90 TABLET | Refills: 0 | Status: SHIPPED | OUTPATIENT
Start: 2025-03-10

## 2025-03-10 NOTE — CARE COORDINATION
Paloma Goldberg, RN  You3 minutes ago (10:59 AM)     RJ  Discussed with Dr Ocampo, he has not seen the patient in the office in almost 2 years. He will address at upcoming appointment.

## 2025-03-10 NOTE — PROGRESS NOTES
torsemide 40 MG TABS Take 40 mg by mouth daily (Patient taking differently: Take 20 mg by mouth in the morning and at bedtime 2 daily and 1 nightly) 90 tablet 5    Respiratory Therapy Supplies (NEBULIZER/TUBING/MOUTHPIECE) KIT 1 kit by Does not apply route daily 1 kit 0    Blood Glucose Monitoring Suppl (TRUE METRIX METER) w/Device KIT 1 kit by Does not apply route 2 times daily 1 kit 0    Lancets MISC 1 each by Does not apply route 2 times daily Dispense brand per insurance preference 200 each 1    methocarbamol (ROBAXIN) 500 MG tablet Take 1 tablet by mouth 3 times daily as needed (muscle spasms) (Patient not taking: Reported on 3/12/2025)       No current facility-administered medications for this visit.         EKG: 3/12/2025  Sinus rhythm rate 94     Echo: 12/5/17  Concentric LVH with normal systolic function. EF is 60%  Left atrium is of normal size.  Right ventricular systolic function is normal .       Assessment/Plan:    Lower extremity edema  When I look at her feet I find very minimal edema  I think the configuration of her legs is such that it appears that she has edema but it is fat  I am asking her to see if she can try without the gabapentin  She is going to gradually try to taper off the gabapentin and see.  She is also on Mobic for her hip pain    Diastolic heart failure   Echo showed EF 60%  Encouraged medication compliance     Essential hypertension  /70  Continue current medications     Hyperlipidemia    (9/14/23)   Continue statin     Tobacco use/COPD/JOSE   Encouraged smoking cessation  Following with pulmonary      Diabetes   Management per PCP       Return to office for follow up in 1 year      Thank you very much for allowing me to participate in the care of your patient. Please do not hesitate to contact me if you have any questions.      Sincerely,        ARACELI Ocampo M.D  [facility] [place]    03/12/25

## 2025-03-10 NOTE — TELEPHONE ENCOUNTER
Discussed with Dr Ocampo, he has not seen the patient in the office in almost 2 years. He will address at upcoming appointment.

## 2025-03-10 NOTE — CARE COORDINATION
3/10/2025 9:33 AM  *  Alert and Triage   -Remote Alert Monitoring Note      Date/Time:  3/10/2025 9:33 AM  Patient Current Location: [place]  Verified patients name and  as identifiers.    Rpm alert to be reviewed by the provider   red alert  weight (3.1 lbs in 24 hrs)    Additional needs to be addressed by provider:       LPN contacted pt in regard to RPM red alert for wt increase of, 3.1 lbs in 24 hrs.   Pt c/o worsening BLE. Pt denied chest pain and SOB. Pt did follow proper protocol to obtain wt metrics today. Pt is compliant with all meds/diuretics. Pt weighed on a hard, flat surface.     Please advise, thank you.           LPN contacted patient by telephone regarding red alert received   Background: Pt enrolled for CHF, COPD, DM and HTN  Refer to 911 immediately if:  Patient unresponsive or unable to provide history  Change in cognition or sudden confusion  Patient unable to respond in complete sentences  Intense chest pain/tightness  Any concern for any clinical emergency  Red Alert: Provider response time of 1 hr required for any red alert requiring intervention  Yellow Alert: Provider response time of 3hr required for any escalated yellow alert  Patient Chief Complaint:  Weight Weight Scale Triage  Was your weight obtained upon rising/waking today? no   Was your weight obtained after voiding and/or use of the bathroom today? yes   Did you weigh yourself in the same amount of clothing today, compared to how you typically do? Yes, pt weighed in \"thin\" pajamas.   Was the scale bumped or moved prior to today's weight? no   Is your scale on a flat/hard surface? yes   Did you obtain your weight with shoes on? no   If yes, is this something you normally do during your daily weights? yes   Were you standing up straight on the scale today? yes   Were you leaning on anything while obtaining your weight today? no   Weight Education Provided to Patient or Caregiver:   Patient to weigh on the same scale at the same time

## 2025-03-11 ENCOUNTER — CARE COORDINATION (OUTPATIENT)
Dept: CARE COORDINATION | Age: 50
End: 2025-03-11

## 2025-03-11 NOTE — CARE COORDINATION
Ambulatory Care Coordination Note     3/11/2025 9:49 AM     ACM outreach attempt by this ACM today to perform care management follow up . ACM was unable to reach the patient by telephone today;   left voice message requesting a return phone call to this ACM.     PCP/Specialist follow up:   Future Appointments         Provider Specialty Dept Phone    3/12/2025 10:00 AM Edgard Ocampo MD Cardiology [phone]    3/24/2025 2:40 PM Day, Tad MANCILLA MD Family Medicine [phone]    3/27/2025 2:45 PM Rob Aguilar MD Nephrology [phone]    9/15/2025 11:40 AM Ryder Jack MD Pulmonology [phone]            Follow Up:   Plan for next ACM outreach in approximately 1 week

## 2025-03-12 ENCOUNTER — TELEPHONE (OUTPATIENT)
Dept: FAMILY MEDICINE CLINIC | Age: 50
End: 2025-03-12

## 2025-03-12 ENCOUNTER — OFFICE VISIT (OUTPATIENT)
Dept: CARDIOLOGY CLINIC | Age: 50
End: 2025-03-12
Payer: MEDICAID

## 2025-03-12 VITALS
DIASTOLIC BLOOD PRESSURE: 78 MMHG | SYSTOLIC BLOOD PRESSURE: 116 MMHG | WEIGHT: 293 LBS | OXYGEN SATURATION: 97 % | BODY MASS INDEX: 58.74 KG/M2 | HEART RATE: 96 BPM

## 2025-03-12 DIAGNOSIS — I10 HYPERTENSION, UNSPECIFIED TYPE: Primary | ICD-10-CM

## 2025-03-12 PROCEDURE — G8417 CALC BMI ABV UP PARAM F/U: HCPCS | Performed by: INTERNAL MEDICINE

## 2025-03-12 PROCEDURE — 3074F SYST BP LT 130 MM HG: CPT | Performed by: INTERNAL MEDICINE

## 2025-03-12 PROCEDURE — 99214 OFFICE O/P EST MOD 30 MIN: CPT | Performed by: INTERNAL MEDICINE

## 2025-03-12 PROCEDURE — 4004F PT TOBACCO SCREEN RCVD TLK: CPT | Performed by: INTERNAL MEDICINE

## 2025-03-12 PROCEDURE — 3078F DIAST BP <80 MM HG: CPT | Performed by: INTERNAL MEDICINE

## 2025-03-12 PROCEDURE — G8427 DOCREV CUR MEDS BY ELIG CLIN: HCPCS | Performed by: INTERNAL MEDICINE

## 2025-03-12 PROCEDURE — 3017F COLORECTAL CA SCREEN DOC REV: CPT | Performed by: INTERNAL MEDICINE

## 2025-03-12 PROCEDURE — 93000 ELECTROCARDIOGRAM COMPLETE: CPT | Performed by: INTERNAL MEDICINE

## 2025-03-12 RX ORDER — AVOBENZONE, HOMOSALATE, OCTISALATE, OCTOCRYLENE 30; 40; 45; 26 MG/ML; MG/ML; MG/ML; MG/ML
1 CREAM TOPICAL 2 TIMES DAILY
Qty: 200 EACH | Refills: 1 | Status: SHIPPED | OUTPATIENT
Start: 2025-03-12

## 2025-03-14 ENCOUNTER — TELEPHONE (OUTPATIENT)
Dept: PULMONOLOGY | Age: 50
End: 2025-03-14

## 2025-03-14 NOTE — TELEPHONE ENCOUNTER
Received fax from [facility] for fluticasone-umeclidin-vilant (TRELEGY ELLIPTA) 100-62.5-25 MCG/ACT AEPB inhaler      RxBin:[id number]  RxPCN:[id number]  MemberID:[id number]  PlanID:[id number]  Patient name: Odalis Jtet  : 1975       Sent to PA Pool

## 2025-03-17 NOTE — TELEPHONE ENCOUNTER
Submitted PA for Lucy Newman 100-62.5-25MCG/ACT aerosol powder  [facility] [id number]  STATUS: PENDING.    Follow up done daily; if no decision with in three days we will refax.  If another three days goes by with no decision will call the insurance for status.

## 2025-03-18 ENCOUNTER — CARE COORDINATION (OUTPATIENT)
Dept: CASE MANAGEMENT | Age: 50
End: 2025-03-18

## 2025-03-18 ENCOUNTER — TELEPHONE (OUTPATIENT)
Dept: CARDIOLOGY CLINIC | Age: 50
End: 2025-03-18

## 2025-03-18 NOTE — CARE COORDINATION
3/18/2025 9:52 AM  *  Alert and Triage   -Remote Alert Monitoring Note      Date/Time:  3/18/2025 9:53 AM  Patient Current Location: [place]  Verified patients name and  as identifiers.    Rpm alert to be reviewed by the provider   red alert  (Wt increase of, 4 lbs in 24 hrs and 6.2 lbs in 7 days).    Additional needs to be addressed by provider:     LPN contacted pt in regard to RPM red alert for wt increase of, 4 lbs in 24 hrs and 6.2 lbs in 7 days.    Pt c/o worsening BLE. Pt denied chest pain and SOB. Pt did follow proper protocol to obtain wt metrics today. Pt is compliant with all meds. Pt weighed on a hard, flat surface.       Please advise, thank you.         LPN contacted patient by telephone regarding red alert received   Background: Pt enrolled for CHF, COPD, DM and HTN  Refer to 911 immediately if:  Patient unresponsive or unable to provide history  Change in cognition or sudden confusion  Patient unable to respond in complete sentences  Intense chest pain/tightness  Any concern for any clinical emergency  Red Alert: Provider response time of 1 hr required for any red alert requiring intervention  Yellow Alert: Provider response time of 3hr required for any escalated yellow alert  Patient Chief Complaint:  Weight Weight Scale Triage  Was your weight obtained upon rising/waking today? no   Was your weight obtained after voiding and/or use of the bathroom today? yes   Did you weigh yourself in the same amount of clothing today, compared to how you typically do? Yes, pt weighed in \"thin\" pajamas.   Was the scale bumped or moved prior to today's weight? no   Is your scale on a flat/hard surface? yes   Did you obtain your weight with shoes on? no   If yes, is this something you normally do during your daily weights? yes   Were you standing up straight on the scale today? yes   Were you leaning on anything while obtaining your weight today? no   Weight Education Provided to Patient or Caregiver:   Patient to

## 2025-03-18 NOTE — TELEPHONE ENCOUNTER
Alert monitoring sent to Dr. Ocampo (as below). Please see how patient is taking her torsemide - the medication list is somewhat confusing. Please also see if she feels the BLE edema is worse than when she saw Dr. Ocampo in office on 3/12/25? Please also ask if she has tried stopping/decreasing dose of her gabapentin as discussed in office? Thanks.      3/18/2025 9:52 AM  *  Alert and Triage   -Remote Alert Monitoring Note        Date/Time:      3/18/2025 9:53 AM  Patient Current Location: [place]  Verified patients name and  as identifiers.          Rpm alert to be reviewed by the provider    red alert  (Wt increase of, 4 lbs in 24 hrs and 6.2 lbs in 7 days).     Additional needs to be addressed by provider:      LPN contacted pt in regard to RPM red alert for wt increase of, 4 lbs in 24 hrs and 6.2 lbs in 7 days.     Pt c/o worsening BLE. Pt denied chest pain and SOB. Pt did follow proper protocol to obtain wt metrics today. Pt is compliant with all meds. Pt weighed on a hard, flat surface.         Please advise, thank you.           LPN contacted patient by telephone regarding red alert received   Background: Pt enrolled for CHF, COPD, DM and HTN  Refer to 911 immediately if:  Patient unresponsive or unable to provide history  Change in cognition or sudden confusion  Patient unable to respond in complete sentences  Intense chest pain/tightness  Any concern for any clinical emergency  Red Alert: Provider response time of 1 hr required for any red alert requiring intervention  Yellow Alert: Provider response time of 3hr required for any escalated yellow alert  Patient Chief Complaint:  Weight Weight Scale Triage  Was your weight obtained upon rising/waking today? no   Was your weight obtained after voiding and/or use of the bathroom today? yes   Did you weigh yourself in the same amount of clothing today, compared to how you typically do? Yes, pt weighed in \"thin\" pajamas.   Was the scale bumped or moved prior to today's

## 2025-03-18 NOTE — TELEPHONE ENCOUNTER
Called spoke with patient. States she is taking torsemide 20 mg twice daily.    States BLE edema is worse today.  stayed up all night and didn't elevate her legs.   States today she will be elevating legs today.  does wear compression stockings.      is weaning herself off gabapentin 300 mg 1 tablets twice daily.    Please advise.

## 2025-03-18 NOTE — TELEPHONE ENCOUNTER
Dr. Ocampo, please advise.     Received remote alert monitoring that patient gained about 4 lbs in 24 hours. She has chronic BLE edema but feels it is worse today. Did not elevated legs last evening. Wears compression stockings. She is taking torsemide 20 mg BID, and currently weaning herself off of gabapentin (taking 300 mg BID). Patient had OV with you on 3/12/25 (below).     Lower extremity edema  When I look at her feet I find very minimal edema  I think the configuration of her legs is such that it appears that she has edema but it is fat  I am asking her to see if she can try without the gabapentin  She is going to gradually try to taper off the gabapentin and see.  She is also on Mobic for her hip pain     Diastolic heart failure   Echo showed EF 60%  Encouraged medication compliance

## 2025-03-18 NOTE — CARE COORDINATION
Bianca Byrd, RN   Registered Nurse     Telephone Encounter     Signed     Encounter Date: 3/18/2025     Signed         Dr. Ocampo, please advise.      Received remote alert monitoring that patient gained about 4 lbs in 24 hours. She has chronic BLE edema but feels it is worse today. Did not elevated legs last evening. Wears compression stockings. She is taking torsemide 20 mg BID, and currently weaning herself off of gabapentin (taking 300 mg BID). Patient had OV with you on 3/12/25 (below).      Lower extremity edema  When I look at her feet I find very minimal edema  I think the configuration of her legs is such that it appears that she has edema but it is fat  I am asking her to see if she can try without the gabapentin  She is going to gradually try to taper off the gabapentin and see.  She is also on Mobic for her hip pain     Diastolic heart failure   Echo showed EF 60%  Encouraged medication compliance                     Therese Barahona   Medical Assistant     Telephone Encounter     Signed     Encounter Date: 3/18/2025     Signed         Called spoke with patient. States she is taking torsemide 20 mg twice daily.     States BLE edema is worse today. States stayed up all night and didn't elevate her legs.   States today she will be elevating legs today. States does wear compression stockings.      States is weaning herself off gabapentin 300 mg 1 tablets twice daily.     Please advise.                          Bianca Byrd RN   Registered Nurse     Telephone Encounter     Signed     Encounter Date: 3/18/2025     Signed         Alert monitoring sent to Dr. Ocampo (as below). Please see how patient is taking her torsemide - the medication list is somewhat confusing. Please also see if she feels the BLE edema is worse than when she saw Dr. Ocampo in office on 3/12/25? Please also ask if she has tried stopping/decreasing dose of her gabapentin as discussed in office? Thanks.

## 2025-03-19 NOTE — TELEPHONE ENCOUNTER
The medication was DENIED; DENIAL letter is uploaded to MEDIA.    Generic Denial: Auto response per portal. No letter generated. please see note below    Note :    Outcome  Denied on March 18 by [employer] 2017  Coverage for reauthorization is provided when your provider has submitted documentation (for example: chart notes) showing positive clinical response (outcome) to use of the requested medication as well as documentation (for example: chart notes) that support ongoing safety monitoring while using the requested medication.    If you want an APPEAL; please note in this encounter what new information you would like to APPEAL with.  Once complete route back to PA POOL.    If this requires a response please respond to the pool ( P [facility] MEDICATION PRE-AUTH).      Thank you please advise patient.

## 2025-03-21 ENCOUNTER — CARE COORDINATION (OUTPATIENT)
Dept: CARE COORDINATION | Age: 50
End: 2025-03-21

## 2025-03-21 ENCOUNTER — CARE COORDINATION (OUTPATIENT)
Dept: CASE MANAGEMENT | Age: 50
End: 2025-03-21

## 2025-03-21 NOTE — TELEPHONE ENCOUNTER
Approved on March 20 by [employer] 2017  Your PA request for [id number] was approved for 365 days. The PA# assigned is [id number]. TRELEGY ELLIPTA 100-62.5-25  Effective Date: 3/20/2025  Authorization Expiration Date: 3/19/2026

## 2025-03-21 NOTE — CARE COORDINATION
3/21/2025 12:12 PM  *  My Chart Message Odalis Jett , I am a nurse with the remote patient monitoring team;  reaching out to you today to remind you to please take your vitals. Important: Please try to take your vitals each day before 12pm. This ensures the monitoring team will have time to connect with your providers and get back to you with any new orders or instructions.

## 2025-03-25 ENCOUNTER — CARE COORDINATION (OUTPATIENT)
Dept: PRIMARY CARE CLINIC | Age: 50
End: 2025-03-25

## 2025-03-25 ENCOUNTER — CARE COORDINATION (OUTPATIENT)
Dept: CARE COORDINATION | Age: 50
End: 2025-03-25

## 2025-03-25 DIAGNOSIS — I10 ESSENTIAL HYPERTENSION: Primary | ICD-10-CM

## 2025-03-25 DIAGNOSIS — I50.20 SYSTOLIC CONGESTIVE HEART FAILURE, UNSPECIFIED HF CHRONICITY (HCC): ICD-10-CM

## 2025-03-25 DIAGNOSIS — E11.42 DIABETIC POLYNEUROPATHY ASSOCIATED WITH TYPE 2 DIABETES MELLITUS: ICD-10-CM

## 2025-03-25 DIAGNOSIS — J44.9 COPD, MILD (HCC): ICD-10-CM

## 2025-03-25 NOTE — CARE COORDINATION
RPM Kit Return    Patient Odalis Jett  03/25/25     Care Coordination  placed call to patient to arrange RPM kit  through [employer]. Left HIPAA Compliant Message     provided return and how to pack equipment in original packing via the patients voicemail if available and provided call back number should patient have questions.    Patient made aware [employer] will  equipment in 2-4 days.

## 2025-03-25 NOTE — PROGRESS NOTES
Remote Patient Order Discontinued    Received request from Elizabeth Gallagher RN to discontinue order for remote patient monitoring of CHF, COPD, Diabetes, and HTN and order completed.

## 2025-03-25 NOTE — CARE COORDINATION
Ambulatory Care Coordination Note     3/25/2025 1:19 PM     Patient Current Location:  Home: [address] [place] [id number]     ACM contacted the patient by telephone. Verified name and  with patient as identifiers.     Patient graduated from the High Risk Care Management program on 3/25/2025.  Patient verbalizes confidence in the ability to self-manage at this time. has the ability to self-manage at this time..  Care management goals have been completed. No further Ambulatory Care Manager follow up scheduled.      ACM: Elizabeth Gallagher RN     Challenges to be reviewed by the provider   Additional needs identified to be addressed with provider No  none               Method of communication with provider: phone.    Utilization:  0    Care Summary Note:     ACM called and spoke to patient. RPM equipment arranged to be picked up.[facility] verified that pt has working home scale to continue to monitor weights daily, pt has working BP cuff to continue to monitor her BP. Pt has demonstrated management of disease processes. [facility] educated pt on following up with PCP as needed. ACM graduating pt from services.     Offered patient enrollment in the Remote Patient Monitoring (RPM) program for in-home monitoring: Patient being discharged from remote patient monitoring program today.     Assessments Completed:   Diabetes Assessment    Medic Alert ID: No  Meal Planning: Avoidance of concentrated sweets   How often do you test your blood sugar?: Daily, Bedtime   Do you have barriers with adherence to non-pharmacologic self-management interventions? (Nutrition/Exercise/Self-Monitoring): Yes   Have you ever had to go to the ED for symptoms of low blood sugar?: No       No patient-reported symptoms   Do you have hyperglycemia symptoms?: No   Do you have hypoglycemia symptoms?: No   Last Blood Sugar Value: 156   Blood Sugar Monitoring Regimen: At Bedtime, Morning Fasting   Blood Sugar Trends: No Change         ,   Congestive

## 2025-04-15 ENCOUNTER — HOSPITAL ENCOUNTER (OUTPATIENT)
Dept: PHYSICAL THERAPY | Age: 50
Setting detail: THERAPIES SERIES
End: 2025-04-15
Payer: MEDICAID

## 2025-04-15 DIAGNOSIS — R53.1 WEAKNESS: Primary | ICD-10-CM

## 2025-04-15 DIAGNOSIS — Z74.09 DECREASED FUNCTIONAL MOBILITY AND ENDURANCE: ICD-10-CM

## 2025-04-15 DIAGNOSIS — R52 PAIN: ICD-10-CM

## 2025-04-15 DIAGNOSIS — M25.60 STIFFNESS IN JOINT: ICD-10-CM

## 2025-04-16 ENCOUNTER — HOSPITAL ENCOUNTER (OUTPATIENT)
Dept: PHYSICAL THERAPY | Age: 50
Setting detail: THERAPIES SERIES
Discharge: HOME | End: 2025-04-16
Payer: MEDICAID

## 2025-04-16 DIAGNOSIS — M54.41 CHRONIC BILATERAL LOW BACK PAIN WITH RIGHT-SIDED SCIATICA: Primary | ICD-10-CM

## 2025-04-16 DIAGNOSIS — M25.551 PAIN OF RIGHT HIP: ICD-10-CM

## 2025-04-16 DIAGNOSIS — M25.651 DECREASED RANGE OF RIGHT HIP MOVEMENT: ICD-10-CM

## 2025-04-16 DIAGNOSIS — G89.29 CHRONIC BILATERAL LOW BACK PAIN WITH RIGHT-SIDED SCIATICA: Primary | ICD-10-CM

## 2025-04-16 DIAGNOSIS — M25.562 CHRONIC PAIN OF BOTH KNEES: ICD-10-CM

## 2025-04-16 DIAGNOSIS — R29.898 DECREASED STRENGTH OF LOWER EXTREMITY: ICD-10-CM

## 2025-04-16 DIAGNOSIS — G89.29 CHRONIC PAIN OF BOTH KNEES: ICD-10-CM

## 2025-04-16 DIAGNOSIS — M53.86 DECREASED RANGE OF MOTION OF LUMBAR SPINE: ICD-10-CM

## 2025-04-16 DIAGNOSIS — M25.561 CHRONIC PAIN OF BOTH KNEES: ICD-10-CM

## 2025-04-16 PROCEDURE — 97110 THERAPEUTIC EXERCISES: CPT

## 2025-04-16 PROCEDURE — 97162 PT EVAL MOD COMPLEX 30 MIN: CPT

## 2025-04-16 NOTE — PLAN OF CARE
help. Overall, patient reports her pain will fluctuate and sometimes be better and sometimes worse. She reports her goal is to get back to walking as she would like to go outside       Test used Initial score  4/16/25 04/16/2025   Pain Summary VAS 6/10    Functional questionnaire Modified Oswestry and WOMAC JAVED: 28/50 (56%)    WOMAC: 673/92 (79.3% disability) (#3 not answered)    Other:              Pain:  Pain location: across low back and anterior and posterior R hip with pulling in joint line and anterior and posterior knees and tingling/tenderness down R tibia  Patient describes pain to be  tingling, pulling  Pain decreases with: Sitting and Resting  Pain increases with: Standing and Walking     Living status: patient lives alone in home with 13 steps with no handrails    Current Functional Limitations:    Functional Complaints:  walking, standing, stairs      PLOF:  No functional limitations  Pt's sleep is affected?   YES    Occupation/School:  Work/School Status: Disabled  Job Duties/Demands: NA    Hand Dominance: NA    Sport/ Recreation/ Leisure/ Hobbies: cardio, walking, daily stretches    Review Of Systems (ROS):  [x] Performed Review of systems (Integumentary, CardioPulmonary, Neurological) by intake and observation. Intake form is in the medical record. Patient has been instructed to contact their primary care physician regarding ROS issues if not already being addressed at this time.    [x] Patient history, allergies, meds reviewed. Medical chart reviewed. See intake form.     OBJECTIVE EXAMINATION     4/16/25  ROM/Strength: (Blank cells denote NT)      Mvmt (norm) AROM L AROM R Notes PROM L PROM R Notes     LUMBAR Flex (90)         Ext (25) Deferred today due to pain        SB (25)          Rotation (30)             HIP Flex (120)          Abd (45)          ER (50)          IR (45)          Ext (20)         KNEE Flex (140)          Ext (0)           ANKLE DF (20)          PF (50)          Inversion (30)

## 2025-04-18 ENCOUNTER — HOSPITAL ENCOUNTER (OUTPATIENT)
Dept: PHYSICAL THERAPY | Age: 50
Setting detail: THERAPIES SERIES
Discharge: HOME | End: 2025-04-18
Payer: MEDICAID

## 2025-04-18 PROCEDURE — 97110 THERAPEUTIC EXERCISES: CPT

## 2025-04-18 NOTE — FLOWSHEET NOTE
[facility] - Outpatient Rehabilitation and Therapy: [address] [place] [address] office: [phone] fax: [phone]         Physical Therapy: TREATMENT/PROGRESS NOTE   Patient: Odalis Jett (49 y.o. female)   Examination Date: 2025   :  1975 MRN: [id number]   Visit #: 2   Insurance Allowable Auth Needed   30 []Yes    [x]No    Auth after 30th visit    Insurance: Payor: [employer] [place] MEDICAID / Plan: [employer] [place] MEDICA / Product Type: *No Product type* /   Insurance ID: [id number] - (Medicaid Managed)  Secondary Insurance (if applicable):    Treatment Diagnosis:     ICD-10-CM    1. Chronic bilateral low back pain with right-sided sciatica  G89.29     M54.41       2. Pain of right hip  M25.551       3. Chronic pain of both knees  M25.561     M25.562     G89.29       4. Decreased strength of lower extremity  R29.898       5. Decreased range of motion of lumbar spine  M53.86       6. Decreased range of right hip movement  M25.651          Medical Diagnosis:  Osteoarthritis of knees, bilateral [M17.0]  Low back pain [M54.50]   Referring Physician: Leobardo Pretty,*  PCP: Tad Ramirez MD     Plan of care signed (Y/N):     Date of Patient follow up with Physician:      Plan of Care Report: NO  POC update due: (10 visits /OR AUTH LIMITS, whichever is less)  2025                                             Medical History:  Comorbidities:  Hypertension  Osteoarthritis  Rheumatoid Arthritis  Anxiety  Depression  Asthma   Current smoker with a half pack a day  Relevant Medical History: headaches and migraines  Patient needing to lose weight prior to R NICO  Feet can get swollen on the dorsal aspect                                         Precautions/ Contra-indications:           Latex allergy:   assess next visit  Pacemaker:     assess next visit  Contraindications for Manipulation: NA  Date of Surgery: NA  Other:    Red Flags:  None  No chest pain or

## 2025-04-21 ENCOUNTER — HOSPITAL ENCOUNTER (OUTPATIENT)
Dept: PHYSICAL THERAPY | Age: 50
Setting detail: THERAPIES SERIES
Discharge: HOME | End: 2025-04-21
Payer: MEDICAID

## 2025-04-21 PROCEDURE — 97110 THERAPEUTIC EXERCISES: CPT

## 2025-04-21 NOTE — FLOWSHEET NOTE
[facility] - Outpatient Rehabilitation and Therapy: [address] [place] [address] office: [phone] fax: [phone]         Physical Therapy: TREATMENT/PROGRESS NOTE   Patient: Odalis Jett (49 y.o. female)   Examination Date: 2025   :  1975 MRN: [id number]   Visit #: 3   Insurance Allowable Auth Needed   30 []Yes    [x]No    Auth after 30th visit    Insurance: Payor: [employer] [place] MEDICAID / Plan: [facility] [place] MEDICA / Product Type: *No Product type* /   Insurance ID: [id number] - (Medicaid Managed)  Secondary Insurance (if applicable):    Treatment Diagnosis:     ICD-10-CM    1. Chronic bilateral low back pain with right-sided sciatica  G89.29     M54.41       2. Pain of right hip  M25.551       3. Chronic pain of both knees  M25.561     M25.562     G89.29       4. Decreased strength of lower extremity  R29.898       5. Decreased range of motion of lumbar spine  M53.86       6. Decreased range of right hip movement  M25.651          Medical Diagnosis:  Osteoarthritis of knees, bilateral [M17.0]  Low back pain [M54.50]   Referring Physician: Leobardo Pretty,*  PCP: Tad Ramirez MD     Plan of care signed (Y/N):     Date of Patient follow up with Physician:      Plan of Care Report: NO  POC update due: (10 visits /OR AUTH LIMITS, whichever is less)  2025                                             Medical History:  Comorbidities:  Hypertension  Osteoarthritis  Rheumatoid Arthritis  Anxiety  Depression  Asthma   Current smoker with a half pack a day  Relevant Medical History: headaches and migraines  Patient needing to lose weight prior to R NICO  Feet can get swollen on the dorsal aspect                                         Precautions/ Contra-indications:           Latex allergy:   assess next visit  Pacemaker:     assess next visit  Contraindications for Manipulation: NA  Date of Surgery: NA  Other:    Red Flags:  None  No chest pain or

## 2025-04-24 ENCOUNTER — APPOINTMENT (OUTPATIENT)
Dept: PHYSICAL THERAPY | Age: 50
End: 2025-04-24
Payer: MEDICAID

## 2025-04-28 ENCOUNTER — HOSPITAL ENCOUNTER (OUTPATIENT)
Dept: PHYSICAL THERAPY | Age: 50
Setting detail: THERAPIES SERIES
Discharge: HOME | End: 2025-04-28
Payer: MEDICAID

## 2025-04-28 PROCEDURE — 97110 THERAPEUTIC EXERCISES: CPT

## 2025-04-28 NOTE — FLOWSHEET NOTE
[facility] - Outpatient Rehabilitation and Therapy: [address] [place] [address] office: [phone] fax: [phone]         Physical Therapy: TREATMENT/PROGRESS NOTE   Patient: Odalis Jett (49 y.o. female)   Examination Date: 2025   :  1975 MRN: [id number]   Visit #: 4   Insurance Allowable Auth Needed   30 []Yes    [x]No    Auth after 30th visit    Insurance: Payor: [employer] [place] MEDICAID / Plan: [employer] [place] MEDICA / Product Type: *No Product type* /   Insurance ID: [id number] - (Medicaid Managed)  Secondary Insurance (if applicable):    Treatment Diagnosis:     ICD-10-CM    1. Chronic bilateral low back pain with right-sided sciatica  G89.29     M54.41       2. Pain of right hip  M25.551       3. Chronic pain of both knees  M25.561     M25.562     G89.29       4. Decreased strength of lower extremity  R29.898       5. Decreased range of motion of lumbar spine  M53.86       6. Decreased range of right hip movement  M25.651          Medical Diagnosis:  Osteoarthritis of knees, bilateral [M17.0]  Low back pain [M54.50]   Referring Physician: Leobardo Pretty,*  PCP: Tad Ramirez MD     Plan of care signed (Y/N):     Date of Patient follow up with Physician:      Plan of Care Report: NO  POC update due: (10 visits /OR AUTH LIMITS, whichever is less)  2025                                             Medical History:  Comorbidities:  Hypertension  Osteoarthritis  Rheumatoid Arthritis  Anxiety  Depression  Asthma   Current smoker with a half pack a day  Relevant Medical History: headaches and migraines  Patient needing to lose weight prior to R NICO  Feet can get swollen on the dorsal aspect                                         Precautions/ Contra-indications:           Latex allergy:   assess next visit  Pacemaker:     assess next visit  Contraindications for Manipulation: NA  Date of Surgery: NA  Other:    Red Flags:  None  No chest pain or

## 2025-05-01 ENCOUNTER — HOSPITAL ENCOUNTER (OUTPATIENT)
Dept: PHYSICAL THERAPY | Age: 50
Setting detail: THERAPIES SERIES
Discharge: HOME | End: 2025-05-01
Payer: MEDICAID

## 2025-05-01 PROCEDURE — 97110 THERAPEUTIC EXERCISES: CPT

## 2025-05-01 NOTE — FLOWSHEET NOTE
NA    Prognosis for POC: [x] Good [] Fair  [] Poor    Patient requires continued skilled intervention: [x] Yes  [] No      CHARGE CAPTURE     No CPT exclusions    PT CHARGE GRID   CPT Code (TIMED) minutes # CPT Code (UNTIMED) #     Therex (57464)  42 3  EVAL:MODERATE (33544 - Typically 30 minutes face-to-face)     Neuromusc. Re-ed (93405)    Re-Eval (74152)     Manual (72947)    Estim Unattended (34302)     Ther. Act (45641)    Mech. Traction (09112)     Gait (77574)    Dry Needle 1-2 muscle (15599)     Aquatic Therex (85511)    Dry Needle 3+ muscle (65781)     Iontophoresis (22320)    VASO (57974)     Ultrasound (65123)    Group Therapy (40786)     Estim Attended (87867)    Canalith Repositioning (33537)     Physical Performance Test (71036)    Custom orthotic ()     Other:    Other:    Total Timed Code Tx Minutes 42 3       Total Treatment Minutes 42        Charge Justification:  (48768) THERAPEUTIC EXERCISE - Provided verbal/tactile cueing for HEP and/or activities related to strengthening, flexibility, endurance, ROM performed to prevent loss of range of motion, maintain or improve muscular strength or increase flexibility, following either an injury or surgery.     GOALS     Patient stated goal: patient wants to be able to walk without limitations and be able to go outside  [] Progressing: [] Met: [x] Not Met: [] Adjusted    Therapist goals for Patient:   Short Term Goals: To be achieved in: 2 weeks  1Independent in HEP and progression per patient tolerance, in order to prevent re-injury.   [] Progressing: [] Met: [x] Not Met: [] Adjusted  Patient will have a decrease in pain to <5/10 to facilitate improvement in movement, function, and ADLs as indicated by Functional Deficits.  [] Progressing: [] Met: [x] Not Met: [] Adjusted    IF APPLICABLE:  [] Patient to demonstrate independence in wear and care for custom orthotic device. (Only if applicable for orthotic eval)     Long Term Goals: To be achieved in:

## 2025-05-12 ENCOUNTER — APPOINTMENT (OUTPATIENT)
Dept: PHYSICAL THERAPY | Age: 50
End: 2025-05-12
Payer: MEDICAID

## 2025-05-13 ENCOUNTER — HOSPITAL ENCOUNTER (OUTPATIENT)
Dept: PHYSICAL THERAPY | Age: 50
Setting detail: THERAPIES SERIES
End: 2025-05-13
Payer: MEDICAID

## 2025-05-15 ENCOUNTER — HOSPITAL ENCOUNTER (OUTPATIENT)
Dept: PHYSICAL THERAPY | Age: 50
Setting detail: THERAPIES SERIES
Discharge: HOME | End: 2025-05-15
Payer: MEDICAID

## 2025-05-15 PROCEDURE — 97110 THERAPEUTIC EXERCISES: CPT | Performed by: PHYSICAL THERAPIST

## 2025-05-15 NOTE — FLOWSHEET NOTE
on table HR  2 10    Review of HEP    5/15/25 reviewed and updated; green TB given   SLR in supine   NPV    SLR in SL   NPV    bridging   NPV    clams   NPV           Manual Intervention (16669)  TIME            Long axis hip distraction    [place] good for R hip   Hip distraction foot over shoulder    Tried and too painful                 NMR re-education (23061) resistance Sets/time Reps CUES NEEDED   Seated sciatic nerve glide    Emphasis on not aiming for a stretch                               Therapeutic Activity (25272)  Sets/time                                          Modalities:    No modalities applied this session    Education/Home Exercise Program: Patient HEP program created electronically.  Refer to [employer] access code: [email or web address]  Access Code: [email or web address]  URL: [email or web address]  Date: 04/21/2025  Prepared by: Dallas Flores    Exercises  - sciatic nerve glide  - 1 x daily - 7 x weekly - 3 sets - 10 reps  - Seated Long Arc Quad  - 1 x daily - 7 x weekly - 2-3 sets - 10 reps  - Supine Hip Internal and External Rotation  - 1 x daily - 7 x weekly - 2 sets - 20 reps    04/28/25: patient can trial supine self hip distraction at home    5/15/25 green TB given for HEP- latex free; reviewed HEP that pt was currently performing and advanced;    ASSESSMENT   Today's Assessment:  Progressed resistance program for strengthening R LE. Pt performed exercises correctly w/ some pain at times that resolved w/ rest. Significant difference noted between LE's w/ exercises.  Patient will continue to benefit from skilled therapy to address deficits and to progress towards goals    Medical Necessity Documentation:  I certify that this patient meets the below criteria necessary for medical necessity for care and/or justification of therapy services:  The patient has functional impairments and/or activity limitations and would benefit from continued outpatient therapy services to address the deficits outlined in the

## 2025-05-19 ENCOUNTER — HOSPITAL ENCOUNTER (OUTPATIENT)
Dept: PHYSICAL THERAPY | Age: 50
Setting detail: THERAPIES SERIES
Discharge: HOME | End: 2025-05-19
Payer: MEDICAID

## 2025-05-19 PROCEDURE — 97110 THERAPEUTIC EXERCISES: CPT

## 2025-05-19 RX ORDER — MONTELUKAST SODIUM 10 MG/1
10 TABLET ORAL NIGHTLY
Qty: 90 TABLET | Refills: 1 | Status: SHIPPED | OUTPATIENT
Start: 2025-05-19

## 2025-05-19 NOTE — FLOWSHEET NOTE
[facility] - Outpatient Rehabilitation and Therapy: [address] [place] [address] office: [phone] fax: [phone]         Physical Therapy: TREATMENT/PROGRESS NOTE   Patient: Odalis Jett (49 y.o. female)   Examination Date: 2025   :  1975 MRN: [id number]   Visit #: 7   Insurance Allowable Auth Needed   30 []Yes    [x]No    Auth after 30th visit    Insurance: Payor: [employer] [place] MEDICAID / Plan: [employer] [place] MEDICA / Product Type: *No Product type* /   Insurance ID: [id number] - (Medicaid Managed)  Secondary Insurance (if applicable):    Treatment Diagnosis:     ICD-10-CM    1. Chronic bilateral low back pain with right-sided sciatica  G89.29     M54.41       2. Pain of right hip  M25.551       3. Chronic pain of both knees  M25.561     M25.562     G89.29       4. Decreased strength of lower extremity  R29.898       5. Decreased range of motion of lumbar spine  M53.86       6. Decreased range of right hip movement  M25.651          Medical Diagnosis:  Osteoarthritis of knees, bilateral [M17.0]  Low back pain [M54.50]   Referring Physician: Leobardo Pretty,*  PCP: Tad Ramirez MD     Plan of care signed (Y/N):     Date of Patient follow up with Physician:      Plan of Care Report: NO  POC update due: (10 visits /OR AUTH LIMITS, whichever is less)  2025                                             Medical History:  Comorbidities:  Hypertension  Osteoarthritis  Rheumatoid Arthritis  Anxiety  Depression  Asthma   Current smoker with a half pack a day  Relevant Medical History: headaches and migraines  Patient needing to lose weight prior to R NICO  Feet can get swollen on the dorsal aspect                                         Precautions/ Contra-indications:           Latex allergy:  YES  Pacemaker:    NO  Contraindications for Manipulation: NA  Date of Surgery: NA  Other:    Red Flags:  None  No chest pain or current shortness of

## 2025-05-20 ENCOUNTER — HOSPITAL ENCOUNTER (OUTPATIENT)
Dept: WOMENS IMAGING | Age: 50
Discharge: HOME | End: 2025-05-20
Payer: MEDICAID

## 2025-05-20 VITALS — HEIGHT: 64 IN | WEIGHT: 293 LBS | BODY MASS INDEX: 50.02 KG/M2

## 2025-05-20 DIAGNOSIS — Z12.31 VISIT FOR SCREENING MAMMOGRAM: ICD-10-CM

## 2025-05-20 PROCEDURE — 77063 BREAST TOMOSYNTHESIS BI: CPT

## 2025-05-21 DIAGNOSIS — E66.9 TYPE 2 DIABETES MELLITUS WITH OBESITY (HCC): ICD-10-CM

## 2025-05-21 DIAGNOSIS — E11.69 TYPE 2 DIABETES MELLITUS WITH OBESITY (HCC): ICD-10-CM

## 2025-05-22 RX ORDER — DULAGLUTIDE 3 MG/.5ML
INJECTION, SOLUTION SUBCUTANEOUS
Qty: 2 ML | Refills: 1 | Status: SHIPPED | OUTPATIENT
Start: 2025-05-22

## 2025-05-23 ENCOUNTER — RESULTS FOLLOW-UP (OUTPATIENT)
Dept: FAMILY MEDICINE CLINIC | Age: 50
End: 2025-05-23

## 2025-05-23 ENCOUNTER — HOSPITAL ENCOUNTER (OUTPATIENT)
Dept: PHYSICAL THERAPY | Age: 50
Setting detail: THERAPIES SERIES
Discharge: HOME | End: 2025-05-23
Payer: MEDICAID

## 2025-05-23 PROCEDURE — 97110 THERAPEUTIC EXERCISES: CPT

## 2025-05-23 NOTE — FLOWSHEET NOTE
[] Progressing: [] Met: [x] Not Met: [] Adjusted  Patient will have a decrease in pain to <5/10 to facilitate improvement in movement, function, and ADLs as indicated by Functional Deficits.  [] Progressing: [] Met: [x] Not Met: [] Adjusted    IF APPLICABLE:  [] Patient to demonstrate independence in wear and care for custom orthotic device. (Only if applicable for orthotic eval)     Long Term Goals: To be achieved in: 8 weeks  Disability index score of 50% or less for the WOMAC to assist with reaching prior level of function with activities such as walking.  [] Progressing: [] Met: [x] Not Met: [] Adjusted  Patient will demonstrate increased AROM of R hip flexion to match the L hip without pain to allow for proper joint functioning to enable patient to walk with adequate ROM for ambulation and navigating the stairs at home.   [] Progressing: [] Met: [x] Not Met: [] Adjusted  Patient will demonstrate increased Strength of R hip flexion to at least 4/5 throughout without pain to allow for proper functional mobility to enable patient to return to navigating her stairs and walking with adequate strength.   [] Progressing: [] Met: [x] Not Met: [] Adjusted  Patient will return to walking for up to 15 minutes without increased symptoms or restriction to be able to go outside her home and participate in activities she enjoys.   [] Progressing: [] Met: [x] Not Met: [] Adjusted  Patient will be able to stand for 20 minutes with a cane with no increase in pain to be able to manage her home and be outside with adequate tolerance without limitations    [] Progressing: [] Met: [x] Not Met: [] Adjusted          Overall Progression Towards Functional goals/ Treatment Progress Update:  [] Patient is progressing as expected towards functional goals listed.    [] Progression is slowed due to complexities/Impairments listed.  [] Progression has been slowed due to co-morbidities.  [x] Plan just implemented, too soon (<30days) to

## 2025-05-28 ENCOUNTER — APPOINTMENT (OUTPATIENT)
Dept: PHYSICAL THERAPY | Age: 50
End: 2025-05-28
Payer: MEDICAID

## 2025-05-30 ENCOUNTER — HOSPITAL ENCOUNTER (OUTPATIENT)
Dept: PHYSICAL THERAPY | Age: 50
Setting detail: THERAPIES SERIES
Discharge: HOME | End: 2025-05-30
Payer: MEDICAID

## 2025-05-30 PROCEDURE — 97110 THERAPEUTIC EXERCISES: CPT

## 2025-05-30 NOTE — FLOWSHEET NOTE
progression per patient tolerance, in order to prevent re-injury.   [] Progressing: [] Met: [x] Not Met: [] Adjusted  Patient will have a decrease in pain to <5/10 to facilitate improvement in movement, function, and ADLs as indicated by Functional Deficits.  [] Progressing: [] Met: [x] Not Met: [] Adjusted    IF APPLICABLE:  [] Patient to demonstrate independence in wear and care for custom orthotic device. (Only if applicable for orthotic eval)     Long Term Goals: To be achieved in: 8 weeks  Disability index score of 50% or less for the WOMAC to assist with reaching prior level of function with activities such as walking.  [] Progressing: [] Met: [x] Not Met: [] Adjusted  Patient will demonstrate increased AROM of R hip flexion to match the L hip without pain to allow for proper joint functioning to enable patient to walk with adequate ROM for ambulation and navigating the stairs at home.   [] Progressing: [] Met: [x] Not Met: [] Adjusted  Patient will demonstrate increased Strength of R hip flexion to at least 4/5 throughout without pain to allow for proper functional mobility to enable patient to return to navigating her stairs and walking with adequate strength.   [] Progressing: [] Met: [x] Not Met: [] Adjusted  Patient will return to walking for up to 15 minutes without increased symptoms or restriction to be able to go outside her home and participate in activities she enjoys.   [] Progressing: [] Met: [x] Not Met: [] Adjusted  Patient will be able to stand for 20 minutes with a cane with no increase in pain to be able to manage her home and be outside with adequate tolerance without limitations    [] Progressing: [] Met: [x] Not Met: [] Adjusted          Overall Progression Towards Functional goals/ Treatment Progress Update:  [] Patient is progressing as expected towards functional goals listed.    [] Progression is slowed due to complexities/Impairments listed.  [] Progression has been slowed due to

## 2025-06-02 ENCOUNTER — HOSPITAL ENCOUNTER (OUTPATIENT)
Dept: PHYSICAL THERAPY | Age: 50
Setting detail: THERAPIES SERIES
End: 2025-06-02
Payer: MEDICAID

## 2025-06-04 ENCOUNTER — HOSPITAL ENCOUNTER (OUTPATIENT)
Dept: PHYSICAL THERAPY | Age: 50
Setting detail: THERAPIES SERIES
Discharge: HOME | End: 2025-06-04
Payer: MEDICAID

## 2025-06-04 PROCEDURE — 97110 THERAPEUTIC EXERCISES: CPT

## 2025-06-04 NOTE — THERAPY DISCHARGE
[id number] - (Medicaid Managed)  Secondary Insurance (if applicable):    Treatment Diagnosis:     ICD-10-CM    1. Chronic bilateral low back pain with right-sided sciatica  G89.29     M54.41       2. Pain of right hip  M25.551       3. Chronic pain of both knees  M25.561     M25.562     G89.29       4. Decreased strength of lower extremity  R29.898       5. Decreased range of motion of lumbar spine  M53.86       6. Decreased range of right hip movement  M25.651          Medical Diagnosis:  Osteoarthritis of knees, bilateral [M17.0]  Low back pain [M54.50]   Referring Physician: Leobardo Pretty,*  PCP: Tad Ramirez MD     Plan of care signed (Y/N): faxed     Date of Patient follow up with Physician:      Plan of Care Report: NO  POC update due: (10 visits /OR AUTH LIMITS, whichever is less)  5/16/2025                                             Medical History:  Comorbidities:  Hypertension  Osteoarthritis  Rheumatoid Arthritis  Anxiety  Depression  Asthma   Current smoker with a half pack a day  Relevant Medical History: headaches and migraines  Patient needing to lose weight prior to R NICO  Feet can get swollen on the dorsal aspect                                         Precautions/ Contra-indications:           Latex allergy:  YES  Pacemaker:    NO  Contraindications for Manipulation: NA  Date of Surgery: NA  Other:    Red Flags:  None  No chest pain or current shortness of breath  Shortness of breath is due to asthma when it occurs  Loss of strength over time and gradual  Appetite fluctuate  Weight fluctuates with steroids    Suicide Screening:   The patient did not verbalize a primary behavioral concern, suicidal ideation, suicidal intent, or demonstrate suicidal behaviors.    Preferred Language for Healthcare:   [x] English       [] other:    SUBJECTIVE EXAMINATION     Patient stated complaint: patient reports having low back pain and R hip pain and bilateral anterior and posterior knee pain that has

## 2025-06-18 RX ORDER — ARIPIPRAZOLE 2 MG/1
2 TABLET ORAL DAILY
Qty: 90 TABLET | Refills: 0 | Status: SHIPPED | OUTPATIENT
Start: 2025-06-18

## 2025-06-25 ENCOUNTER — OFFICE VISIT (OUTPATIENT)
Dept: FAMILY MEDICINE CLINIC | Age: 50
End: 2025-06-25
Payer: MEDICAID

## 2025-06-25 VITALS
DIASTOLIC BLOOD PRESSURE: 80 MMHG | RESPIRATION RATE: 20 BRPM | BODY MASS INDEX: 55.96 KG/M2 | HEART RATE: 82 BPM | WEIGHT: 293 LBS | SYSTOLIC BLOOD PRESSURE: 116 MMHG | OXYGEN SATURATION: 95 %

## 2025-06-25 DIAGNOSIS — E66.9 TYPE 2 DIABETES MELLITUS WITH OBESITY (HCC): ICD-10-CM

## 2025-06-25 DIAGNOSIS — E11.69 TYPE 2 DIABETES MELLITUS WITH OBESITY (HCC): ICD-10-CM

## 2025-06-25 DIAGNOSIS — L30.4 INTERTRIGO: ICD-10-CM

## 2025-06-25 DIAGNOSIS — G89.4 CHRONIC PAIN SYNDROME: ICD-10-CM

## 2025-06-25 DIAGNOSIS — F33.41 MAJOR DEPRESSIVE DISORDER, RECURRENT, IN PARTIAL REMISSION: ICD-10-CM

## 2025-06-25 DIAGNOSIS — I50.32 CHRONIC DIASTOLIC CHF (CONGESTIVE HEART FAILURE) (HCC): ICD-10-CM

## 2025-06-25 DIAGNOSIS — L73.2 HIDRADENITIS: Primary | ICD-10-CM

## 2025-06-25 DIAGNOSIS — Z72.0 TOBACCO ABUSE: ICD-10-CM

## 2025-06-25 PROCEDURE — G8417 CALC BMI ABV UP PARAM F/U: HCPCS | Performed by: FAMILY MEDICINE

## 2025-06-25 PROCEDURE — 99214 OFFICE O/P EST MOD 30 MIN: CPT | Performed by: FAMILY MEDICINE

## 2025-06-25 PROCEDURE — 3017F COLORECTAL CA SCREEN DOC REV: CPT | Performed by: FAMILY MEDICINE

## 2025-06-25 PROCEDURE — 3046F HEMOGLOBIN A1C LEVEL >9.0%: CPT | Performed by: FAMILY MEDICINE

## 2025-06-25 PROCEDURE — 4004F PT TOBACCO SCREEN RCVD TLK: CPT | Performed by: FAMILY MEDICINE

## 2025-06-25 PROCEDURE — G8427 DOCREV CUR MEDS BY ELIG CLIN: HCPCS | Performed by: FAMILY MEDICINE

## 2025-06-25 PROCEDURE — 3079F DIAST BP 80-89 MM HG: CPT | Performed by: FAMILY MEDICINE

## 2025-06-25 PROCEDURE — 2022F DILAT RTA XM EVC RTNOPTHY: CPT | Performed by: FAMILY MEDICINE

## 2025-06-25 PROCEDURE — 3074F SYST BP LT 130 MM HG: CPT | Performed by: FAMILY MEDICINE

## 2025-06-25 RX ORDER — VENLAFAXINE HYDROCHLORIDE 75 MG/1
150 CAPSULE, EXTENDED RELEASE ORAL DAILY
Qty: 60 CAPSULE | Refills: 3 | Status: SHIPPED | OUTPATIENT
Start: 2025-06-25

## 2025-06-25 RX ORDER — NYSTATIN 100000 [USP'U]/G
POWDER TOPICAL
Qty: 1 EACH | Refills: 2 | Status: SHIPPED | OUTPATIENT
Start: 2025-06-25

## 2025-06-25 RX ORDER — CLINDAMYCIN PHOSPHATE 10 MG/G
1 GEL TOPICAL DAILY
Qty: 60 G | Refills: 3 | Status: SHIPPED | OUTPATIENT
Start: 2025-06-25

## 2025-06-25 RX ORDER — DOXYCYCLINE HYCLATE 100 MG
100 TABLET ORAL 2 TIMES DAILY
Qty: 28 TABLET | Refills: 0 | Status: SHIPPED | OUTPATIENT
Start: 2025-06-25 | End: 2025-07-09

## 2025-06-25 NOTE — PROGRESS NOTES
Odalis Jett (:  1975) is a 49 y.o. female,Established patient, here for evaluation of the following chief complaint(s):  skin infection  (Pt is here for a skin infection that has been going on for 7 days. Pt also stated she has irritation in her pelvic area and around her stomach that has been going on for 7 days )      1. Hidradenitis  Use prolonged course of doxycycline, start topical clindamycin for ongoing control and prevention.  If not improving as expected, would consider continuing the low-dose Doxy daily  - doxycycline hyclate (VIBRA-TABS) 100 MG tablet; Take 1 tablet by mouth 2 times daily for 14 days  Dispense: 28 tablet; Refill: 0  - Clindamycin Phosphate (CLINDAMYCIN PHOS, TWICE-DAILY,) 1 % GEL; Apply 1 each topically daily  Dispense: 60 g; Refill: 3    2. Intertrigo  Treat topically  - nystatin (MYCOSTATIN) 431538 UNIT/GM powder; Apply 3 times daily.  Dispense: 1 each; Refill: 2    3. Major depressive disorder, recurrent, in partial remission  Reports doing okay on current dose of venlafaxine    4. Chronic pain syndrome  - venlafaxine (EFFEXOR XR) 75 MG extended release capsule; Take 2 capsules by mouth daily  Dispense: 60 capsule; Refill: 3    5. Chronic diastolic CHF (congestive heart failure) (HCC)  No signs of volume overload.  Weight is decreasing, congratulated on weight loss    6. Type 2 diabetes mellitus with obesity (HCC)  Continue current regimen, tolerating well    7. Tobacco abuse  She is working on Obvious back, we discussed this      Return in about 4 months (around 10/25/2025) for dm f/u.    Subjective       HPI    Depression with chronic pain syndrome  - needs refill on Effexor  - symptoms are stable    Diabetes  Hemoglobin A1C   Date Value Ref Range Status   2024 6.0 See comment % Final     Comment:     Comment:  Diagnosis of Diabetes: > or = 6.5%  Increased risk of diabetes (Prediabetes): 5.7-6.4%  Glycemic Control: Nonpregnant Adults: <7.0%

## 2025-07-07 ENCOUNTER — OFFICE VISIT (OUTPATIENT)
Dept: ORTHOPEDIC SURGERY | Age: 50
End: 2025-07-07
Payer: MEDICAID

## 2025-07-07 VITALS — WEIGHT: 293 LBS | HEIGHT: 64 IN | BODY MASS INDEX: 50.02 KG/M2

## 2025-07-07 DIAGNOSIS — M16.11 PRIMARY OSTEOARTHRITIS OF RIGHT HIP: Primary | ICD-10-CM

## 2025-07-07 PROCEDURE — 99213 OFFICE O/P EST LOW 20 MIN: CPT | Performed by: PHYSICIAN ASSISTANT

## 2025-07-07 PROCEDURE — G8417 CALC BMI ABV UP PARAM F/U: HCPCS | Performed by: PHYSICIAN ASSISTANT

## 2025-07-07 PROCEDURE — 3017F COLORECTAL CA SCREEN DOC REV: CPT | Performed by: PHYSICIAN ASSISTANT

## 2025-07-07 PROCEDURE — G8427 DOCREV CUR MEDS BY ELIG CLIN: HCPCS | Performed by: PHYSICIAN ASSISTANT

## 2025-07-07 PROCEDURE — 4004F PT TOBACCO SCREEN RCVD TLK: CPT | Performed by: PHYSICIAN ASSISTANT

## 2025-07-07 NOTE — PROGRESS NOTES
This dictation was done with [employer] dictation and may contain mechanical errors related to translation.    I have today reviewed with Odalis Jett the clinically relevant, past medical history, medications, allergies, family history, social history, and Review Of Systems form the patient’s most recent history form & I have documented any details relevant to today's presenting complaints in my history below. Ms. Odalis Jett's self-reported past medical history, medications, allergies, family history, social history, and Review Of Systems form has been scanned into the chart under the \"Media\" tab.    Subjective:  Odalis Jett is a 49 y.o. who is here complaining of pain in her right hip.  She had seen Dr. Gill last year had a hip joint injection which did provide relief for a period of time.  She is in pain management and when I did a hip injection for her back in February she had no relief in fact she says it hurts even worse.  We took up-to-date x-rays that included an AP pelvis and 2 view right hip.      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

## 2025-07-22 DIAGNOSIS — E11.69 TYPE 2 DIABETES MELLITUS WITH OBESITY (HCC): ICD-10-CM

## 2025-07-22 DIAGNOSIS — N39.46 MIXED STRESS AND URGE INCONTINENCE: ICD-10-CM

## 2025-07-22 DIAGNOSIS — E66.9 TYPE 2 DIABETES MELLITUS WITH OBESITY (HCC): ICD-10-CM

## 2025-07-22 RX ORDER — OXYBUTYNIN CHLORIDE 5 MG/1
5 TABLET, EXTENDED RELEASE ORAL DAILY
Qty: 90 TABLET | Refills: 1 | Status: SHIPPED | OUTPATIENT
Start: 2025-07-22

## 2025-08-21 DIAGNOSIS — E66.9 TYPE 2 DIABETES MELLITUS WITH OBESITY (HCC): ICD-10-CM

## 2025-08-21 DIAGNOSIS — E11.69 TYPE 2 DIABETES MELLITUS WITH OBESITY (HCC): ICD-10-CM

## 2025-08-21 RX ORDER — DULAGLUTIDE 3 MG/.5ML
INJECTION, SOLUTION SUBCUTANEOUS
Qty: 2 ML | Refills: 1 | Status: SHIPPED | OUTPATIENT
Start: 2025-08-21

## (undated) DEVICE — FORMALIN CLEAR VIAL 20 ML 10%

## (undated) DEVICE — ENDOSCOPY KIT: Brand: MEDLINE INDUSTRIES, INC.

## (undated) DEVICE — FORCEPS BX L240CM DIA2.4MM L NDL RAD JAW 4 133340